# Patient Record
Sex: MALE | Race: WHITE | NOT HISPANIC OR LATINO | Employment: OTHER | URBAN - METROPOLITAN AREA
[De-identification: names, ages, dates, MRNs, and addresses within clinical notes are randomized per-mention and may not be internally consistent; named-entity substitution may affect disease eponyms.]

---

## 2017-01-04 PROBLEM — E66.9 OBESITY: Status: ACTIVE | Noted: 2017-01-04

## 2017-01-05 ENCOUNTER — ANTICOAGULATION VISIT (OUTPATIENT)
Dept: PHARMACY | Facility: HOSPITAL | Age: 52
End: 2017-01-05

## 2017-01-05 ENCOUNTER — OFFICE VISIT (OUTPATIENT)
Dept: CARDIOLOGY | Facility: HOSPITAL | Age: 52
End: 2017-01-05

## 2017-01-05 VITALS
DIASTOLIC BLOOD PRESSURE: 109 MMHG | HEIGHT: 77 IN | WEIGHT: 315 LBS | OXYGEN SATURATION: 96 % | HEART RATE: 79 BPM | SYSTOLIC BLOOD PRESSURE: 171 MMHG | BODY MASS INDEX: 37.19 KG/M2 | TEMPERATURE: 97.8 F | RESPIRATION RATE: 20 BRPM

## 2017-01-05 DIAGNOSIS — I10 ESSENTIAL HYPERTENSION: ICD-10-CM

## 2017-01-05 DIAGNOSIS — R60.0 PEDAL EDEMA: ICD-10-CM

## 2017-01-05 DIAGNOSIS — R06.02 SHORTNESS OF BREATH: ICD-10-CM

## 2017-01-05 DIAGNOSIS — R07.9 CHEST PAIN, UNSPECIFIED TYPE: Primary | ICD-10-CM

## 2017-01-05 DIAGNOSIS — Z95.2 HX OF AORTIC VALVE REPLACEMENT, MECHANICAL: ICD-10-CM

## 2017-01-05 DIAGNOSIS — I48.91 ATRIAL FIBRILLATION, UNSPECIFIED TYPE (HCC): ICD-10-CM

## 2017-01-05 LAB — INR PPP: 1.3 (ref 0.9–1.1)

## 2017-01-05 PROCEDURE — G0463 HOSPITAL OUTPT CLINIC VISIT: HCPCS

## 2017-01-05 PROCEDURE — 36416 COLLJ CAPILLARY BLOOD SPEC: CPT

## 2017-01-05 PROCEDURE — 85610 PROTHROMBIN TIME: CPT

## 2017-01-05 PROCEDURE — 99215 OFFICE O/P EST HI 40 MIN: CPT | Performed by: NURSE PRACTITIONER

## 2017-01-05 RX ORDER — HYDROCODONE BITARTRATE AND ACETAMINOPHEN 7.5; 325 MG/1; MG/1
1 TABLET ORAL 3 TIMES DAILY
COMMUNITY
Start: 2017-01-04 | End: 2017-06-13

## 2017-01-05 NOTE — MR AVS SNAPSHOT
Mitchell Alberto   1/5/2017 9:00 AM   Anticoagulation Visit    Dept Phone:  [phone]   Encounter #:  [id number]    Provider:  [facility]   Department:  [facility] ANTICOAGULATION CLINIC                Your Full Care Plan              Your Updated Medication List          This list is accurate as of: 1/5/17  9:38 AM.  Always use your most recent med list.                ALPRAZolam 0.25 MG tablet   Commonly known as:  XANAX   Take 1 tablet by mouth Daily As Needed for anxiety. for anxiety       CIALIS 5 MG tablet   Generic drug:  tadalafil   TAKE ONE TABLET BY MOUTH DAILY       citalopram 10 MG tablet   Commonly known as:  CELEXA   Take 1 tablet by mouth Daily.       dicyclomine 20 MG tablet   Commonly known as:  BENTYL   Take 1 tablet by mouth Every 8 (Eight) Hours As Needed (abd pain).       divalproex 500 MG DR tablet   Commonly known as:  DEPAKOTE   TAKE ONE TABLET BY MOUTH THREE TIMES A DAY       furosemide 40 MG tablet   Commonly known as:  LASIX   Take 1 tablet by mouth Daily.       guaiFENesin 600 MG 12 hr tablet   Commonly known as:  MUCINEX       HYDROcodone-acetaminophen  MG per tablet   Commonly known as:  NORCO       lisinopril 40 MG tablet   Commonly known as:  PRINIVIL,ZESTRIL   TAKE ONE TABLET BY MOUTH DAILY **MUST CALL MD FOR APPOINTMENT       pantoprazole 20 MG EC tablet   Commonly known as:  PROTONIX   Take 1 tablet by mouth Daily for 30 days.       triamterene-hydrochlorothiazide 37.5-25 MG per capsule   Commonly known as:  DYAZIDE       warfarin 5 MG tablet   Commonly known as:  COUMADIN   Take 1.5 to 2 tablets by mouth daily as directed by the anticoagulation pharmacist               We Performed the Following     POC INR       You Were Diagnosed With        Codes Comments    Hx of aortic valve replacement, mechanical     ICD-10-CM: Z95.2  ICD-9-CM: V43.3     Atrial fibrillation, unspecified type     ICD-10-CM: I48.91  ICD-9-CM: 427.31          Instructions     None    Patient Instructions History      Anticoagulation Summary as of 2017     INR goal 2.5-3.5   Today's INR 1.30!   Next INR check 2017    Indications   Atrial fibrillation [I48.91] [I48.91]  Hx of aortic valve replacement  mechanical [Z95.2] [Z95.2]         2017 Details    Sun  Wed Thu Fri Sat     1               2               3               4               5      10 mg   See details      6      10 mg         7      7.5 mg           8      7.5 mg         9      7.5 mg         10               11               12               13               14                 15               16               17               18               19               20               21                 22               23               24               25               26               27               28                 29               30               31                    Date Details    This INR check       Date of next INR:  2017           Upcoming Appointments     Visit Type Date Time Department    ANTI COAG - FOLLOW UP 2017  9:00 AM  [facility]    NEW PATIENT 2017  9:30 AM [facility] [place]    ANTI COAG - FOLLOW UP 2017  9:30 AM [facility]    FOLLOW UP 2017  2:45 PM [facility] [place]    FOLLOW UP 2/10/2017 10:15 AM [facility]      ironSourcet Signup     [facility] [email or web address] allows you to send messages to your doctor, view your test results, renew your prescriptions, schedule appointments, and more. To sign up, go to CDP and click on the Sign Up Now link in the New User? box. Enter your [email or web address] Activation Code exactly as it appears below along with the last four digits of your Social Security Number and your Date of Birth () to complete the sign-up process. If you do not sign up before the expiration date, you must request a new code.    [email or web address] Activation Code: [email or web address]  Expires:  1/19/2017  9:38 AM    If you have questions, you can email [email or web address] or call [phone] to talk to our MyChart staff. Remember, "[employer]hart is NOT to be used for urgent needs. For medical emergencies, dial 911.               Other Info from Your Visit           Your Appointments     Jan 09, 2017  9:30 AM EST   Anti Coag - Follow Up with ANTI COAG CLINIC   [facility] ANTICOAGULATION CLINIC (--)    [address]  [address]  [facility] [address]   [phone]            Jan 30, 2017  2:45 PM EST   Follow Up with Kate Garrido DO   [facility] INTERNAL MEDICINE AND PEDIATRICS (--)    [address]  [facility] [address] [phone]           Arrive 15 minutes prior to appointment.            Feb 10, 2017 10:15 AM EST   Follow Up with Mike Hollis MD   [facility] CARDIOLOGY (--)    [address]  [facility] [id number] [phone]           Arrive 15 minutes prior to appointment.              Allergies     No Known Allergies      Vital Signs     Smoking Status                   Never Smoker           Results     POC INR      Component Value Standard Range & Units    INR 1.30 0.9 - 1.1

## 2017-01-05 NOTE — MR AVS SNAPSHOT
Mitchell Alberto   1/5/2017 9:30 AM   Office Visit    Dept Phone:  [phone]   Encounter #:  [id number]    Provider:  YUE Martinez   Department:  [facility]                Your Full Care Plan              Today's Medication Changes          These changes are accurate as of: 1/5/17 10:56 AM.  If you have any questions, ask your nurse or doctor.               Medication(s)that have changed:     HYDROcodone-acetaminophen 7.5-325 MG per tablet   Commonly known as:  NORCO   Take 1 tablet by mouth 3 (Three) Times a Day.   What changed:  Another medication with the same name was removed. Continue taking this medication, and follow the directions you see here.   Changed by:  YUE Martinez         Stop taking medication(s)listed here:     dicyclomine 20 MG tablet   Commonly known as:  BENTYL   Stopped by:  YUE Martinez           guaiFENesin 600 MG 12 hr tablet   Commonly known as:  MUCINEX   Stopped by:  YUE Martinez                      Your Updated Medication List          This list is accurate as of: 1/5/17 10:56 AM.  Always use your most recent med list.                ALPRAZolam 0.25 MG tablet   Commonly known as:  XANAX   Take 1 tablet by mouth Daily As Needed for anxiety. for anxiety       CIALIS 5 MG tablet   Generic drug:  tadalafil   TAKE ONE TABLET BY MOUTH DAILY       citalopram 10 MG tablet   Commonly known as:  CELEXA   Take 1 tablet by mouth Daily.       divalproex 500 MG DR tablet   Commonly known as:  DEPAKOTE   TAKE ONE TABLET BY MOUTH THREE TIMES A DAY       furosemide 40 MG tablet   Commonly known as:  LASIX   Take 1 tablet by mouth Daily.       HYDROcodone-acetaminophen 7.5-325 MG per tablet   Commonly known as:  NORCO       lisinopril 40 MG tablet   Commonly known as:  PRINIVIL,ZESTRIL   TAKE ONE TABLET BY MOUTH DAILY **MUST CALL MD FOR APPOINTMENT       pantoprazole 20 MG EC tablet   Commonly known as:  PROTONIX    Take 1 tablet by mouth Daily for 30 days.       triamterene-hydrochlorothiazide 37.5-25 MG per capsule   Commonly known as:  DYAZIDE       warfarin 5 MG tablet   Commonly known as:  COUMADIN   Take 1.5 to 2 tablets by mouth daily as directed by the anticoagulation pharmacist               You Were Diagnosed With        Codes Comments    Benign prostatic hyperplasia (BPH) with post-void dribbling     ICD-10-CM: N40.1, N39.43  ICD-9-CM: 600.01, 788.35       Instructions     None    Patient Instructions History      Upcoming Appointments     Visit Type Date Time Department    ANTI COAG - FOLLOW UP 2017  9:00 AM  [facility]    NEW PATIENT 2017  9:30 AM [facility] [place]    FOLLOW UP 2017  9:00 AM [facility]    ANTI COAG - FOLLOW UP 2017  9:30 AM  [facility]    FOLLOW UP 2017  2:45 PM [facility] [place]      NeurOp Signup     [facility] NeurOp allows you to send messages to your doctor, view your test results, renew your prescriptions, schedule appointments, and more. To sign up, go to [employer] and click on the Sign Up Now link in the New User? box. Enter your NeurOp Activation Code exactly as it appears below along with the last four digits of your Social Security Number and your Date of Birth () to complete the sign-up process. If you do not sign up before the expiration date, you must request a new code.    NeurOp Activation Code: [email or web address]  Expires: 2017  9:38 AM    If you have questions, you can email [email or web address] or call [phone] to talk to our NeurOp staff. Remember, NeurOp is NOT to be used for urgent needs. For medical emergencies, dial 911.               Other Info from Your Visit           Your Appointments     2017  9:00 AM EST   Follow Up with Mike Hollis MD   [facility] [place] CARDIOLOGY (--)    [address] [place] [address] [address]  [facility] [address]  "  [phone]           Arrive 15 minutes prior to appointment.            Jan 09, 2017  9:30 AM EST   Anti Coag - Follow Up with ANTI COAG CLINIC   [facility] ANTICOAGULATION CLINIC (--)    [address]  [address]  [facility] [address]   [phone]            Jan 30, 2017  2:45 PM EST   Follow Up with Kate Garrido,    [facility] INTERNAL MEDICINE AND PEDIATRICS (--)    [address]  [facility] [address] [phone]           Arrive 15 minutes prior to appointment.              Allergies     No Known Allergies      Reason for Visit     Establish Care s/p ED Visit for Chest Pain      Vital Signs     Blood Pressure Pulse Temperature Respirations Height Weight    171/109 (BP Location: Left arm, Patient Position: Standing) 79 97.8 °F (36.6 °C) (Temporal Artery ) 20 77\" (195.6 cm) 340 lb (154 kg)    Oxygen Saturation Body Mass Index Smoking Status             96% 40.32 kg/m2 Never Smoker         Problems and Diagnoses Noted     Benign prostatic hyperplasia (BPH) with post-void dribbling        "

## 2017-01-05 NOTE — PROGRESS NOTES
Encounter Date:01/05/2017      Patient ID: Mitchell Alberto is a 51 y.o. male.        Subjective:     Chief Complaint: Establish Care (s/p ED Visit for Chest Pain)     History of Present Illness Patient presents to the Heart and valve center today at the request of [facility] ED. Patient presented to the office today with complaints of tightness in his chest, fatigue, dyspnea, pedal edema and intermittent dizziness. He has hx aortic valve replacement and ascending aortic dissection in  2012. Mr. Alberto reports that he has been having intermittent chest pains over the past 3 weeks. He describes the pain as sharp and stabbing, located on the left side of her chest.He reports getting short of breath from the pain, and reports that it feels similar to when he had his last cardiac surgery performed. The pain worsens with deep breathing. Additional ankle swelling. Denies any nausea or sweats.   Patient also reports an increase in his gerd symptoms. He was prescribed protonix by Dr Herrera in the Ed but patient has refused to take it stating that this feels similar to when he experienced his ascending aortic dissection in 2012. 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  Chest Pain   Pain location: L chest  Pain quality: sharp and stabbing   Pain radiates to: Does not radiate  Pain severity: Moderate  Onset quality: Gradual  Duration: 3 weeks  Timing: Intermittent  Progression: Worsening  Chronicity: Recurrent  Relieved by: Nothing  Worsened by: Deep breathing and exertion  Ineffective treatments: None tried  Associated symptoms: dizziness, lower extremity edema, shortness of breath, fatigue  Associated symptoms: no abdominal pain, no back pain, no cough,  no fever, no headache, no nausea, no numbness, no vomiting and no weakness  Problem list:       1. Ascending  aortic dissection and severe aortic insufficiency:  a. Mechanical Bentall aortic valve replacement ([facility]) and ascending aortic dissection repair, 11/17/2012, Dr. Jasmeet De Dios.   b. Echocardiogram, 12/03/2013:  i. LVEF (55% to 60%), mechanical aortic valve, area 1.18 cm2.  ii. Chronic Coumadin therapy                                                   c. Cardiac PET scan, 08/26/2015, negative for reversible ischemia; fixed photopenia                                                         of the apex and septum possibly due to LBBB; LVEF (30% to 34%).                                                  d. Cardiac catheterization, Dorian Giraldo MD, 09/03/2015: No obstructive CAD.    2. 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 Residual left upper extremity weakness.                                                                                                                                                                                   3. HTN  4. Obesity BMI 40.3    Past Surgical History   Procedure Laterality Date   • Abdominal aortic aneurysm repair       History of Aortic Aneurysm Repair   • Aortic valve repair/replacement       Replacement   • Back surgery     • Hernia repair  08/2014     Left inguinal herniorrhaphy   • Skin cancer excision  08/2014     Left forehead melanoma excision, Dr. Gisell Kothari, August 2014.        No Known Allergies      Current Outpatient Prescriptions:   •  ALPRAZolam (XANAX) 0.25 MG tablet, Take 1 tablet by mouth Daily As Needed for anxiety. for anxiety, Disp: 30 tablet, Rfl: 0  •  CIALIS 5 MG tablet, TAKE ONE TABLET BY MOUTH DAILY, Disp: 30 tablet, Rfl: 2  •  citalopram (CELEXA) 10 MG tablet, Take 1 tablet by mouth Daily., Disp: 30 tablet, Rfl: 3  •  divalproex (DEPAKOTE) 500 MG DR tablet, TAKE ONE TABLET BY MOUTH THREE TIMES A DAY, Disp: 90 tablet, Rfl: 2  •  furosemide (LASIX) 40 MG tablet, Take 1 tablet by mouth Daily., Disp: 90  tablet, Rfl: 0  •  HYDROcodone-acetaminophen (NORCO) 7.5-325 MG per tablet, Take 1 tablet by mouth 3 (Three) Times a Day., Disp: , Rfl:   •  lisinopril (PRINIVIL,ZESTRIL) 40 MG tablet, TAKE ONE TABLET BY MOUTH DAILY **MUST CALL MD FOR APPOINTMENT, Disp: 30 tablet, Rfl: 0  •  pantoprazole (PROTONIX) 20 MG EC tablet, Take 1 tablet by mouth Daily for 30 days., Disp: 30 tablet, Rfl: 0  •  triamterene-hydrochlorothiazide (DYAZIDE) 37.5-25 MG per capsule, Take 1 capsule by mouth daily., Disp: , Rfl:   •  warfarin (COUMADIN) 5 MG tablet, Take 1.5 to 2 tablets by mouth daily as directed by the anticoagulation pharmacist, Disp: 180 tablet, Rfl: 1    The following portions of the chart were reviewed and updated as appropriate: Allergies, current medications, past family history, social history, past medical history.     Review of Systems   Constitution: Positive for chills, decreased appetite, weakness, malaise/fatigue and night sweats. Negative for diaphoresis, fever and weight loss. Weight gain: up to 20 lbs in 1 year.   HENT: Positive for congestion and hearing loss (on left ear). Negative for headaches, hoarse voice and nosebleeds.    Eyes: Positive for blurred vision. Negative for visual disturbance and visual halos.   Cardiovascular: Positive for chest pain, dyspnea on exertion, irregular heartbeat, leg swelling and palpitations. Negative for claudication, cyanosis, near-syncope, orthopnea, paroxysmal nocturnal dyspnea and syncope.   Respiratory: Positive for cough, sleep disturbances due to breathing and snoring. Negative for hemoptysis, shortness of breath, sputum production and wheezing.    Endocrine: Positive for cold intolerance.   Hematologic/Lymphatic: Negative for bleeding problem. Bruises/bleeds easily.   Skin: Negative for dry skin, itching and rash.   Musculoskeletal: Negative for arthritis, joint pain, joint swelling and myalgias.   Gastrointestinal: Positive for bloating, flatus, heartburn and nausea.  "Negative for abdominal pain, constipation, diarrhea, hematemesis, hematochezia, melena and vomiting.   Genitourinary: Positive for nocturia. Negative for dysuria, frequency, hematuria and urgency.   Neurological: Positive for excessive daytime sleepiness, dizziness, light-headedness and loss of balance.   Psychiatric/Behavioral: Positive for depression. The patient has insomnia and is nervous/anxious.            Objective:     Vitals:    01/05/17 1002 01/05/17 1003 01/05/17 1004   BP: (!) 188/103 (!) 158/110 (!) 171/109   BP Location: Right arm Left arm Left arm   Patient Position: Sitting Sitting Standing   Cuff Size: Large Adult     Pulse: 72  79   Resp: 20     Temp: 97.8 °F (36.6 °C)     TempSrc: Temporal Artery      SpO2: 96%     Weight: (!) 340 lb (154 kg)     Height: 77\" (195.6 cm)         Physical Exam   Constitutional: He is oriented to person, place, and time. He appears well-developed and well-nourished. He is active and cooperative. No distress.   HENT:   Head: Normocephalic and atraumatic.   Mouth/Throat: Oropharynx is clear and moist.   Eyes: Conjunctivae and EOM are normal. Pupils are equal, round, and reactive to light.   Neck: Normal range of motion. Neck supple. No JVD present. No tracheal deviation present. No thyromegaly present.   Cardiovascular: Normal rate, regular rhythm, normal heart sounds and intact distal pulses.    Systolic murmur 1/6  Mechanical click present   Pulmonary/Chest: Effort normal and breath sounds normal.   Abdominal: Soft. Bowel sounds are normal. He exhibits no distension. There is no tenderness.   Musculoskeletal: Normal range of motion. He exhibits edema (2+ pitting edema noted bilateral lower extremities).   Neurological: He is alert and oriented to person, place, and time.   Skin: Skin is warm, dry and intact.   Psychiatric: He has a normal mood and affect. His behavior is normal.   Nursing note and vitals reviewed.      Lab and Diagnostic Review:    CTA Chest 12/27/16: "    Pulmonary arteries: No evidence of pulmonary embolus.  Aorta: Noncontrast abdominal aorta demonstrates normal density. Prosthetic    aortic valve. Negative for thoracic aortic dissection or aneurysm.  Lungs: There are bilateral posterior dependent changes. Linear atelectasis    or scarring greatest at the lung bases. No consolidation to indicate pneumonia.  Pleural spaces: Unremarkable. No significant effusion. No pneumothorax.  Heart: Mild cardiomegaly. No significant pericardial effusion. No evidence    of RV dysfunction.  Bones: There are degenerative changes involving the spine. No acute    fracture.  Thyroid: Calcified left thyroid nodule measuring 4 mm.  Lymph nodes: Calcified mediastinal and hilar lymph nodes.  Upper abdomen: No acute abnormality involving the visualized superior    Abdomen.  Labs 12/27/16:   Troponin I 0.00 - 0.07 ng/mL 0.00     INR  4.72     Glucose 70 - 100 mg/dL 101 (H)   BUN 9 - 23 mg/dL 19   Creatinine 0.60 - 1.30 mg/dL 1.00   Sodium 132 - 146 mmol/L 140   Potassium 3.5 - 5.5 mmol/L 3.9   Chloride 99 - 109 mmol/L 106   CO2 20.0 - 31.0 mmol/L 31.0   Calcium 8.7 - 10.4 mg/dL 9.1   Total Protein 5.7 - 8.2 g/dL 6.6   Albumin 3.20 - 4.80 g/dL 4.10   ALT (SGPT) 7 - 40 U/L 15   AST (SGOT) 0 - 33 U/L 21   Alkaline Phosphatase 25 - 100 U/L 55   Total Bilirubin 0.3 - 1.2 mg/dL 0.3   eGFR Non African Amer >60 mL/min/1.73 79   Globulin gm/dL 2.5   A/G Ratio 1.5 - 2.5 g/dL 1.6       WBC 3.50 - 10.80 10*3/mm3 6.03   RBC 4.20 - 5.76 10*6/mm3 4.31   Hemoglobin 13.1 - 17.5 g/dL 12.7 (L)   Hematocrit 38.9 - 50.9 % 38.1 (L)   MCV 80.0 - 99.0 fL 88.4   MCH 27.0 - 31.0 pg 29.5   MCHC 32.0 - 36.0 g/dL 33.3   RDW 11.3 - 14.5 % 15.7 (H)   RDW-SD 37.0 - 54.0 fl 50.7   MPV 6.0 - 12.0 fL 10.8   Platelets 150 - 450 10*3/mm3 181         Assessment and Plan:         1. Chest pain, unspecified type   Patient was diagnosed with GERD in the ED but is adamant that this is cardiac in nature. Patient to see   Bunny in the morning for further evaluation. [facility] per Dr Giraldo 9/2015 showed mild nonobstructive disease  CTA of chest 12/27/16 No signs of aortic dissection    2. Essential hypertension  Elevated today; patient will be seeing Dr Hollis in the morning. Further recommendations per Dr Hollis    3. Shortness of breath    Related to volume overload  4. Pedal edema  Patient is currently on diazide and notes he is compliant with his medications. Patient suffered ototoxicity from daily lasix use April 2016. Further recommendations per Dr Hollis    It has been a pleasure to participate in the care of this patient.  Patient was instructed to call the [facility] Center with any questions, concerns, or worsening symptoms.  * Please note that portions of this note were completed with a voice recognition program. Efforts were made to edit the dictation but occasionally words are transcribed.

## 2017-01-05 NOTE — PROGRESS NOTES
Anticoagulation Clinic Progress Note  Indication: Atrial Fibrillation and Mechanical Aortic Valve  Referring Provider: Enzo   Initial Warfarin Start Date: 11/2012  Planned Duration of Therapy: Life  Goal INR: 2.5- 3.5  Current Drug Interactions: divalproex, citalopram      Anticoagulation Clinic INR History:  Date 9/12 10/21 11/17 12/27 1/5       Total Weekly Dose  70mg 60-62.5mg  55mg (2 held doses)       INR 2.9 3.6 3.3 4.72 (ED) 1.3           Clinic Interview:  Tablet Strength: pt has 5 mg tablets   Current Dose: pt is uncertain, following calendar vs rx bottle -- believes he has been taking 10mg (2 tabs) alternating with 5mg (1 tab) every other day  Patient Findings      Positives Signs/symptoms of bleeding, Change in medications     Negatives Signs/symptoms of thrombosis, Laboratory test error suspected, Change in health, Change in alcohol use, Change in activity, Upcoming invasive procedure, Emergency department visit, Upcoming dental procedure, Missed doses, Extra doses, Change in diet/appetite, Hospital admission, Bruising, Other complaints     Comments Pt noticed BRBPR yesterday; recently admitted to the hospital with supratherapeutic INR but reports no bleeding before ED admission. Medications were changed on discharge -- added pantoprazole, although pt has not yet started taking it, + dicyclomine. Pt started on citalopram in November and reports it was helping initially, but he now no longer feels it is doing anything.         Plan:  1. INR is subtherapeutic today. Pt held doses last week following supratherapeutic reading in the ED, but INR dropped significantly since that time. Pt is somewhat unsure of his current warfarin dose, but based on history, will have him take 10mg today, tomorrow (pt verified he took 10mg yesterday, as well), then 7.5mg SatSun.  2. RTC on Monday to ensure INR is increasing appropriately. Will require close follow up for the next month given dose uncertainty.  3. Pt was  discharged on pantoprazole but questions needing it -- has not started it at home. Started citalopram in Nov. Discussed potential DDIs with this medication, as well as others. Enforced importance of letting our clinic know of any medication changes in the future.  4. Pt declines refills.  5. Verbal and written information provided. Pt expresses understanding and has no further questions at this time. Will address other questions related to disease state in [facility]VI.    Ann Cowden, PharmISABEL  1/5/2017  9:04 AM

## 2017-01-06 ENCOUNTER — HOSPITAL ENCOUNTER (OUTPATIENT)
Dept: CARDIOLOGY | Facility: HOSPITAL | Age: 52
Discharge: HOME | End: 2017-01-06
Admitting: PHYSICIAN ASSISTANT

## 2017-01-06 ENCOUNTER — OFFICE VISIT (OUTPATIENT)
Dept: CARDIOLOGY | Facility: CLINIC | Age: 52
End: 2017-01-06

## 2017-01-06 ENCOUNTER — APPOINTMENT (OUTPATIENT)
Dept: LAB | Facility: HOSPITAL | Age: 52
End: 2017-01-06

## 2017-01-06 VITALS
DIASTOLIC BLOOD PRESSURE: 96 MMHG | WEIGHT: 315 LBS | HEART RATE: 67 BPM | BODY MASS INDEX: 37.19 KG/M2 | HEIGHT: 77 IN | SYSTOLIC BLOOD PRESSURE: 152 MMHG

## 2017-01-06 DIAGNOSIS — I10 ESSENTIAL HYPERTENSION: ICD-10-CM

## 2017-01-06 DIAGNOSIS — Z95.2 HX OF AORTIC VALVE REPLACEMENT, MECHANICAL: ICD-10-CM

## 2017-01-06 DIAGNOSIS — R07.2 PRECORDIAL PAIN: Primary | ICD-10-CM

## 2017-01-06 LAB
BH CV ECHO MEAS - AO MAX PG (FULL): 29.6 MMHG
BH CV ECHO MEAS - AO MAX PG: 33.8 MMHG
BH CV ECHO MEAS - AO MEAN PG (FULL): 15.8 MMHG
BH CV ECHO MEAS - AO MEAN PG: 17.5 MMHG
BH CV ECHO MEAS - AO ROOT AREA (BSA CORRECTED): 1.2
BH CV ECHO MEAS - AO ROOT AREA: 8.4 CM^2
BH CV ECHO MEAS - AO ROOT DIAM: 3.3 CM
BH CV ECHO MEAS - AO V2 MAX: 290.2 CM/SEC
BH CV ECHO MEAS - AO V2 MEAN: 191.2 CM/SEC
BH CV ECHO MEAS - AO V2 VTI: 59.6 CM
BH CV ECHO MEAS - AVA(I,A): 1.2 CM^2
BH CV ECHO MEAS - AVA(I,D): 1.2 CM^2
BH CV ECHO MEAS - AVA(V,A): 1.3 CM^2
BH CV ECHO MEAS - AVA(V,D): 1.3 CM^2
BH CV ECHO MEAS - BSA(HAYCOCK): 3 M^2
BH CV ECHO MEAS - BSA: 2.8 M^2
BH CV ECHO MEAS - BZI_BMI: 40.8 KILOGRAMS/M^2
BH CV ECHO MEAS - BZI_METRIC_HEIGHT: 195.6 CM
BH CV ECHO MEAS - BZI_METRIC_WEIGHT: 156 KG
BH CV ECHO MEAS - CONTRAST EF (2CH): 54.5 ML/M^2
BH CV ECHO MEAS - CONTRAST EF 4CH: 46.6 ML/M^2
BH CV ECHO MEAS - EDV(CUBED): 114.6 ML
BH CV ECHO MEAS - EDV(MOD-SP2): 376 ML
BH CV ECHO MEAS - EDV(MOD-SP4): 305 ML
BH CV ECHO MEAS - EDV(TEICH): 110.5 ML
BH CV ECHO MEAS - EF(CUBED): 69.7 %
BH CV ECHO MEAS - EF(MOD-SP2): 54.5 %
BH CV ECHO MEAS - EF(MOD-SP4): 46.6 %
BH CV ECHO MEAS - EF(TEICH): 61.2 %
BH CV ECHO MEAS - ESV(CUBED): 34.7 ML
BH CV ECHO MEAS - ESV(MOD-SP2): 171 ML
BH CV ECHO MEAS - ESV(MOD-SP4): 163 ML
BH CV ECHO MEAS - ESV(TEICH): 42.9 ML
BH CV ECHO MEAS - FS: 32.8 %
BH CV ECHO MEAS - IVS/LVPW: 0.96
BH CV ECHO MEAS - IVSD: 1.8 CM
BH CV ECHO MEAS - LA DIMENSION: 4.2 CM
BH CV ECHO MEAS - LA/AO: 1.3
BH CV ECHO MEAS - LV DIASTOLIC VOL/BSA (35-75): 108.3 ML/M^2
BH CV ECHO MEAS - LV MASS(C)D: 415.9 GRAMS
BH CV ECHO MEAS - LV MASS(C)DI: 147.7 GRAMS/M^2
BH CV ECHO MEAS - LV MAX PG: 4.2 MMHG
BH CV ECHO MEAS - LV MEAN PG: 1.8 MMHG
BH CV ECHO MEAS - LV SYSTOLIC VOL/BSA (12-30): 57.9 ML/M^2
BH CV ECHO MEAS - LV V1 MAX: 102.4 CM/SEC
BH CV ECHO MEAS - LV V1 MEAN: 59.4 CM/SEC
BH CV ECHO MEAS - LV V1 VTI: 20.1 CM
BH CV ECHO MEAS - LVIDD: 4.9 CM
BH CV ECHO MEAS - LVIDS: 3.3 CM
BH CV ECHO MEAS - LVLD AP2: 10.6 CM
BH CV ECHO MEAS - LVLD AP4: 10.3 CM
BH CV ECHO MEAS - LVLS AP2: 9.7 CM
BH CV ECHO MEAS - LVLS AP4: 9.6 CM
BH CV ECHO MEAS - LVOT AREA (M): 3.5 CM^2
BH CV ECHO MEAS - LVOT AREA: 3.6 CM^2
BH CV ECHO MEAS - LVOT DIAM: 2.1 CM
BH CV ECHO MEAS - LVPWD: 1.9 CM
BH CV ECHO MEAS - MED PEAK E' VEL: 7.02 CM/SEC
BH CV ECHO MEAS - MPA AREA: 8.7 CM^2
BH CV ECHO MEAS - MPA DIAM: 3.3 CM
BH CV ECHO MEAS - MV A DUR: 8.7 SEC
BH CV ECHO MEAS - MV A MAX VEL: 82.7 CM/SEC
BH CV ECHO MEAS - MV DEC TIME: 0.27 SEC
BH CV ECHO MEAS - MV E MAX VEL: 78.4 CM/SEC
BH CV ECHO MEAS - MV E/A: 0.95
BH CV ECHO MEAS - PA ACC SLOPE: 1116 CM/SEC^2
BH CV ECHO MEAS - PA ACC TIME: 0.1 SEC
BH CV ECHO MEAS - PA MAX PG: 15.1 MMHG
BH CV ECHO MEAS - PA MEAN PG: 5.5 MMHG
BH CV ECHO MEAS - PA PR(ACCEL): 35.4 MMHG
BH CV ECHO MEAS - PA V2 MAX: 194.1 CM/SEC
BH CV ECHO MEAS - PA V2 MEAN: 105.6 CM/SEC
BH CV ECHO MEAS - PA V2 VTI: 37.9 CM
BH CV ECHO MEAS - PULM A REVS VEL: 31.6 CM/SEC
BH CV ECHO MEAS - PULM DIAS VEL: 51.7 CM/SEC
BH CV ECHO MEAS - PULM S/D: 1.5
BH CV ECHO MEAS - PULM SYS VEL: 76.7 CM/SEC
BH CV ECHO MEAS - QP/QS (V,AO): 0.69
BH CV ECHO MEAS - QP/QS (V,LVOT): 4.5
BH CV ECHO MEAS - RVSP: 25 MMHG
BH CV ECHO MEAS - SI(AO): 178.5 ML/M^2
BH CV ECHO MEAS - SI(CUBED): 28.3 ML/M^2
BH CV ECHO MEAS - SI(LVOT): 25.9 ML/M^2
BH CV ECHO MEAS - SI(MOD-SP2): 72.8 ML/M^2
BH CV ECHO MEAS - SI(MOD-SP4): 50.4 ML/M^2
BH CV ECHO MEAS - SI(TEICH): 24 ML/M^2
BH CV ECHO MEAS - SV(AO): 502.7 ML
BH CV ECHO MEAS - SV(CUBED): 79.8 ML
BH CV ECHO MEAS - SV(LVOT): 72.9 ML
BH CV ECHO MEAS - SV(MOD-SP2): 205 ML
BH CV ECHO MEAS - SV(MOD-SP4): 142 ML
BH CV ECHO MEAS - SV(TEICH): 67.6 ML
BH CV ECHO MEAS - TAPSE (>1.6): 2.2 CM2
BH CV ECHO MEAS - TR MAX VEL: 251 CM/SEC
BH CV XLRA - RV BASE: 6 CM
BH CV XLRA - RV LENGTH: 9.1 CM
BH CV XLRA - RV MID: 4.8 CM
BH CV XLRA - TDI S': 12.2 CM/SEC
BNP SERPL-MCNC: 30 PG/ML (ref 0–100)
E/E' RATIO: 10
LEFT ATRIUM VOLUME INDEX: 50 ML/M2
LV EF 2D ECHO EST: 55 %
Lab: -0.53
Lab: -3.5
Lab: 0.35
Lab: 0.78
Lab: 116.9 ML/M^2
Lab: 329.3 ML

## 2017-01-06 PROCEDURE — 83880 ASSAY OF NATRIURETIC PEPTIDE: CPT | Performed by: PHYSICIAN ASSISTANT

## 2017-01-06 PROCEDURE — 93000 ELECTROCARDIOGRAM COMPLETE: CPT | Performed by: INTERNAL MEDICINE

## 2017-01-06 PROCEDURE — 93306 TTE W/DOPPLER COMPLETE: CPT

## 2017-01-06 PROCEDURE — 93306 TTE W/DOPPLER COMPLETE: CPT | Performed by: INTERNAL MEDICINE

## 2017-01-06 PROCEDURE — 99213 OFFICE O/P EST LOW 20 MIN: CPT | Performed by: INTERNAL MEDICINE

## 2017-01-06 PROCEDURE — 36415 COLL VENOUS BLD VENIPUNCTURE: CPT | Performed by: INTERNAL MEDICINE

## 2017-01-06 NOTE — LETTER
January 6, 2017     Kate Garrido DO  [address] [facility] [address]  [place] [id number]    Patient: Mitchell Alberto   YOB: 1965   Date of Visit: 1/6/2017       Dear Dr. Garrido, DO:    Thank you for referring Mitchell Alberto to me for evaluation. Below are the relevant portions of my assessment and plan of care.    If you have questions, please do not hesitate to call me. I look forward to following Mitchell along with you.         Sincerely,        Mike Hollis MD        CC: No Recipients  SHAGGY Small  1/6/2017 10:15 AM  Signed  [facility]  Office Progress Note  Mitchell Alberto  1965  [phone]      Visit Date: 01/06/2017    PCP: Kate Garrido DO  [address] [facility] [address]  [place] [id number]    IDENTIFICATION: A 51 y.o. male resident of [place].    Chief Complaint   Patient presents with   • Follow-up     HTN       PROBLEM LIST:   1.  Ascending aortic dissection and severe aortic insufficiency:  a.  Mechanical Bentall aortic valve replacement ([facility]) and ascending aortic dissection repair, 11/17/2012, Dr. Jasmeet De Dios.   b. Echocardiogram, 12/03/2013:  i. LVEF (55% to 60%), mechanical aortic valve, area 1.18 cm2.  ii. Chronic Coumadin therapy                     c.    Cardiac PET scan, 08/26/2015, negative for reversible ischemia; fixed photopenia                            of the apex and septum possibly due to LBBB; LVEF (30% to 34%).                    d.    Cardiac catheterization,  Dorian Giraldo MD, 09/03/2015:  No obstructive                            CAD.  2.  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  Residual left upper extremity weakness.    3. Left arm discomfort:  a. Possibly due to embolic CVA vs. intraoperative nerve traction.   4. Hypertension.   5. Obesity.    6. 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 Status post surgery:  a. Left inguinal herniorrhaphy,  August 2014.   b. Left forehead melanoma excision, Dr. Gisell Kothari, August 2014.    Allergies  No Known Allergies    Current Medications    Current Outpatient Prescriptions:   •  ALPRAZolam (XANAX) 0.25 MG tablet, Take 1 tablet by mouth Daily As Needed for anxiety. for anxiety, Disp: 30 tablet, Rfl: 0  •  CIALIS 5 MG tablet, TAKE ONE TABLET BY MOUTH DAILY, Disp: 30 tablet, Rfl: 2  •  citalopram (CELEXA) 10 MG tablet, Take 1 tablet by mouth Daily., Disp: 30 tablet, Rfl: 3  •  divalproex (DEPAKOTE) 500 MG DR tablet, TAKE ONE TABLET BY MOUTH THREE TIMES A DAY, Disp: 90 tablet, Rfl: 2  •  furosemide (LASIX) 40 MG tablet, Take 1 tablet by mouth Daily., Disp: 90 tablet, Rfl: 0  •  HYDROcodone-acetaminophen (NORCO) 7.5-325 MG per tablet, Take 1 tablet by mouth 3 (Three) Times a Day., Disp: , Rfl:   •  lisinopril (PRINIVIL,ZESTRIL) 40 MG tablet, TAKE ONE TABLET BY MOUTH DAILY **MUST CALL MD FOR APPOINTMENT, Disp: 30 tablet, Rfl: 0  •  warfarin (COUMADIN) 5 MG tablet, Take 1.5 to 2 tablets by mouth daily as directed by the anticoagulation pharmacist, Disp: 180 tablet, Rfl: 1      History of Present Illness   Patient returns to office today for follow-up after 1 year hiatus from Dr. Lopez's office. He presents with 6 months of intermittent sharp, stabbing chest pain that radiates through his anterior left thorax that is not associated with nausea or dyspnea.  There is no rhyme or reason for it.  He does acknowledge shortness of breath with activities and walking across a room to make him significant fatigue.  He states he has been waking up in the last 2-3 weeks with proximal nocturnal dyspnea and having issues with orthopnea.  States the heaviness has ever been and he has been following a healthy diet and watching his caloric intake.  He does not follow his blood pressures at home but has been told of his last to ER visits that his blood pressure is been significantly high.  He relates this to the stress of a significant  "other being diagnosed with colon cancer.  He does acknowledge 2 weeks of upper rash or tract symptoms of congestion and rhinorrhea.  No cough or fevers.    ROS:  All systems have been reviewed and are negative with the exception of those mentioned in the HPI.    OBJECTIVE:  Vitals:    01/06/17 0914   BP: 152/96   BP Location: Left arm   Patient Position: Sitting   Pulse: 67   Weight: (!) 344 lb 12.8 oz (156 kg)   Height: 77\" (195.6 cm)     Physical Exam   Constitutional: He is oriented to person, place, and time. He appears well-nourished. No distress.   Neck: Neck supple. No JVD present. No tracheal deviation present. No thyromegaly present.   Cardiovascular: Normal rate, regular rhythm and intact distal pulses.   No extrasystoles are present.   No murmur heard.  Mechanical S2   Pulmonary/Chest: Effort normal and breath sounds normal. He has no wheezes. He has no rales.   Some tenderness to left anterior thorax with palpation.   Abdominal: Soft. Bowel sounds are normal. There is no tenderness. There is no guarding.   Musculoskeletal: Normal range of motion. He exhibits edema (trace pitting edema lower extremities bilaterally). He exhibits no tenderness.   Lymphadenopathy:     He has no cervical adenopathy.   Neurological: He is alert and oriented to person, place, and time.   Nursing note and vitals reviewed.      Diagnostic Data:    ECG 12 Lead  Date/Time: 1/6/2017 10:10 AM  Performed by: MARTA CONNER  Authorized by: MARTA CONNER   Rhythm: sinus rhythm  BPM: 65  Conduction: left bundle branch block  Clinical impression: abnormal ECG          ASSESSMENT:   Diagnosis Plan   1. Precordial pain  Adult Transthoracic Echo Complete    BNP   2. Hx of aortic valve replacement, mechanical [Z95.2]  Adult Transthoracic Echo Complete    BNP   3. Essential hypertension         PLAN:  1.  His sharp chest pains are probably related to costochondritis with the tenderness found on palpation today and his history of upper " respiratory tract symptoms.  However due to his significant concerns over development of another aortic dissection we will performed echocardiogram to evaluate his cardiac function.  His last ER visit last 2 weeks did show a normal CTA of his chest without evidence of aortic dissection or aneurysm.  2.  He had a reduced EF on his last echocardiogram in August 2015 and there was no obstructive CAD on a heart catheter in September 2015.  We will repeat echocardiogram to reevaluate this.  With his left bundle branch block a continuation of low EF does place him to benefit from a biventricular PPM.  3.  Elevated in office today.  Discussed with him to begin keeping a diary of blood pressures at home and follow low sodium diet before we attempt adjustment of antihypertensive therapy.    Follow up in 1 month.    Scribed for Mike Hollis MD by SHAGGY Small. 1/6/2017  10:13 AM     Kate Garrido DO, thank you for referring Mr. Alberto for evaluation.  I have forwarded my electronically generated recommendations to you for review.  Please do not hesitate to call with any questions.      Mike Hollis MD, FACC

## 2017-01-06 NOTE — PROGRESS NOTES
[facility]  Office Progress Note  Mitchell Alberto  1965  [phone]      Visit Date: 01/06/2017    PCP: Kate Garrido,   [address] [facility] [address]  [place] [id number]    IDENTIFICATION: A 51 y.o. male resident of [place].    Chief Complaint   Patient presents with   • Follow-up     HTN       PROBLEM LIST:   1.  Ascending aortic dissection and severe aortic insufficiency:  a.  Mechanical Bentall aortic valve replacement ([facility]) and ascending aortic dissection repair, 11/17/2012, Dr. Jasmeet De Dios.   b. Echocardiogram, 12/03/2013:  i. LVEF (55% to 60%), mechanical aortic valve, area 1.18 cm2.  ii. Chronic Coumadin therapy                     c.    Cardiac PET scan, 08/26/2015, negative for reversible ischemia; fixed photopenia                            of the apex and septum possibly due to LBBB; LVEF (30% to 34%).                    d.    Cardiac catheterization,  Dorian Giraldo MD, 09/03/2015:  No obstructive                            CAD.  2.  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  Residual left upper extremity weakness.    3. Left arm discomfort:  a. Possibly due to embolic CVA vs. intraoperative nerve traction.   4. Hypertension.   5. Obesity.    6. 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 Status post surgery:  a. Left inguinal herniorrhaphy, August 2014.   b. Left forehead melanoma excision, Dr. Gisell Kothari, August 2014.    Allergies  No Known Allergies    Current Medications    Current Outpatient Prescriptions:   •  ALPRAZolam (XANAX) 0.25 MG tablet, Take 1 tablet by mouth Daily As Needed for anxiety. for anxiety, Disp: 30 tablet, Rfl: 0  •  CIALIS 5 MG tablet, TAKE ONE TABLET BY MOUTH DAILY, Disp: 30 tablet, Rfl: 2  •  citalopram (CELEXA) 10 MG tablet, Take 1 tablet by mouth Daily., Disp: 30 tablet, Rfl: 3  •  divalproex (DEPAKOTE) 500 MG DR tablet, TAKE ONE TABLET BY MOUTH THREE TIMES A DAY, Disp: 90  "tablet, Rfl: 2  •  furosemide (LASIX) 40 MG tablet, Take 1 tablet by mouth Daily., Disp: 90 tablet, Rfl: 0  •  HYDROcodone-acetaminophen (NORCO) 7.5-325 MG per tablet, Take 1 tablet by mouth 3 (Three) Times a Day., Disp: , Rfl:   •  lisinopril (PRINIVIL,ZESTRIL) 40 MG tablet, TAKE ONE TABLET BY MOUTH DAILY **MUST CALL MD FOR APPOINTMENT, Disp: 30 tablet, Rfl: 0  •  warfarin (COUMADIN) 5 MG tablet, Take 1.5 to 2 tablets by mouth daily as directed by the anticoagulation pharmacist, Disp: 180 tablet, Rfl: 1      History of Present Illness   Patient returns to office today for follow-up after 1 year hiatus from Dr. Lopez's office. He presents with 6 months of intermittent sharp, stabbing chest pain that radiates through his anterior left thorax that is not associated with nausea or dyspnea.  There is no rhyme or reason for it.  He does acknowledge shortness of breath with activities and walking across a room to make him significant fatigue.  He states he has been waking up in the last 2-3 weeks with proximal nocturnal dyspnea and having issues with orthopnea.  States the heaviness has ever been and he has been following a healthy diet and watching his caloric intake.  He does not follow his blood pressures at home but has been told of his last to ER visits that his blood pressure is been significantly high.  He relates this to the stress of a significant other being diagnosed with colon cancer.  He does acknowledge 2 weeks of upper rash or tract symptoms of congestion and rhinorrhea.  No cough or fevers.    ROS:  All systems have been reviewed and are negative with the exception of those mentioned in the HPI.    OBJECTIVE:  Vitals:    01/06/17 0914   BP: 152/96   BP Location: Left arm   Patient Position: Sitting   Pulse: 67   Weight: (!) 344 lb 12.8 oz (156 kg)   Height: 77\" (195.6 cm)     Physical Exam   Constitutional: He is oriented to person, place, and time. He appears well-nourished. No distress.   Neck: Neck " supple. No JVD present. No tracheal deviation present. No thyromegaly present.   Cardiovascular: Normal rate, regular rhythm and intact distal pulses.   No extrasystoles are present.   No murmur heard.  Mechanical S2   Pulmonary/Chest: Effort normal and breath sounds normal. He has no wheezes. He has no rales.   Some tenderness to left anterior thorax with palpation.   Abdominal: Soft. Bowel sounds are normal. There is no tenderness. There is no guarding.   Musculoskeletal: Normal range of motion. He exhibits edema (trace pitting edema lower extremities bilaterally). He exhibits no tenderness.   Lymphadenopathy:     He has no cervical adenopathy.   Neurological: He is alert and oriented to person, place, and time.   Nursing note and vitals reviewed.      Diagnostic Data:    ECG 12 Lead  Date/Time: 1/6/2017 10:10 AM  Performed by: MARTA CONNER  Authorized by: MARTA CONNER   Rhythm: sinus rhythm  BPM: 65  Conduction: left bundle branch block  Clinical impression: abnormal ECG          ASSESSMENT:   Diagnosis Plan   1. Precordial pain  Adult Transthoracic Echo Complete    BNP   2. Hx of aortic valve replacement, mechanical [Z95.2]  Adult Transthoracic Echo Complete    BNP   3. Essential hypertension         PLAN:  1.  His sharp chest pains are probably related to costochondritis with the tenderness found on palpation today and his history of upper respiratory tract symptoms.  However due to his significant concerns over development of another aortic dissection we will performed echocardiogram to evaluate his cardiac function.  His last ER visit last 2 weeks did show a normal CTA of his chest without evidence of aortic dissection or aneurysm.  2.  He had a reduced EF on his last echocardiogram in August 2015 and there was no obstructive CAD on a heart catheter in September 2015.  We will repeat echocardiogram to reevaluate this.  With his left bundle branch block a continuation of low EF does place him to benefit  from a biventricular PPM.  3.  Elevated in office today.  Discussed with him to begin keeping a diary of blood pressures at home and follow low sodium diet before we attempt adjustment of antihypertensive therapy.    Follow up in 1 month.    Scribed for Mike Hollis MD by SHAGGY Small. 1/6/2017  10:13 AM   I, Mike Hollis MD, personally performed the services described in this documentation as scribed by the above named individual in my presence, and it is both accurate and complete.  1/6/2017  10:47 AM    Kate Garrido DO, thank you for referring Mr. Alberto for evaluation.  I have forwarded my electronically generated recommendations to you for review.  Please do not hesitate to call with any questions.      Mike Hollis MD, FACC

## 2017-01-09 ENCOUNTER — ANTICOAGULATION VISIT (OUTPATIENT)
Dept: PHARMACY | Facility: HOSPITAL | Age: 52
End: 2017-01-09

## 2017-01-09 ENCOUNTER — APPOINTMENT (OUTPATIENT)
Dept: PHARMACY | Facility: HOSPITAL | Age: 52
End: 2017-01-09

## 2017-01-09 DIAGNOSIS — Z95.2 HX OF AORTIC VALVE REPLACEMENT, MECHANICAL: ICD-10-CM

## 2017-01-09 LAB — INR PPP: 2.7 (ref 0.9–1.1)

## 2017-01-09 PROCEDURE — 36416 COLLJ CAPILLARY BLOOD SPEC: CPT

## 2017-01-09 PROCEDURE — G0463 HOSPITAL OUTPT CLINIC VISIT: HCPCS

## 2017-01-09 PROCEDURE — 85610 PROTHROMBIN TIME: CPT

## 2017-01-09 NOTE — MR AVS SNAPSHOT
Mitchell Alberto   1/9/2017 2:45 PM   Anticoagulation Visit    Dept Phone:  [phone]   Encounter #:  [id number]    Provider:  [facility]   Department:  [facility]                Your Full Care Plan              Your Updated Medication List          This list is accurate as of: 1/9/17  3:33 PM.  Always use your most recent med list.                ALPRAZolam 0.25 MG tablet   Commonly known as:  XANAX   Take 1 tablet by mouth Daily As Needed for anxiety. for anxiety       CIALIS 5 MG tablet   Generic drug:  tadalafil   TAKE ONE TABLET BY MOUTH DAILY       citalopram 10 MG tablet   Commonly known as:  CELEXA   Take 1 tablet by mouth Daily.       divalproex 500 MG DR tablet   Commonly known as:  DEPAKOTE   TAKE ONE TABLET BY MOUTH THREE TIMES A DAY       furosemide 40 MG tablet   Commonly known as:  LASIX   Take 1 tablet by mouth Daily.       HYDROcodone-acetaminophen 7.5-325 MG per tablet   Commonly known as:  NORCO       lisinopril 40 MG tablet   Commonly known as:  PRINIVIL,ZESTRIL   TAKE ONE TABLET BY MOUTH DAILY **MUST CALL MD FOR APPOINTMENT       warfarin 5 MG tablet   Commonly known as:  COUMADIN   Take 1.5 to 2 tablets by mouth daily as directed by the anticoagulation pharmacist               We Performed the Following     POC INR       You Were Diagnosed With        Codes Comments    Hx of aortic valve replacement, mechanical     ICD-10-CM: Z95.2  ICD-9-CM: V43.3       Instructions     None    Patient Instructions History      Anticoagulation Summary as of 1/9/2017     INR goal 2.5-3.5   Today's INR 2.70   Next INR check 1/23/2017    Indications   Atrial fibrillation [I48.91] [I48.91]  Hx of aortic valve replacement  mechanical [Z95.2] [Z95.2]         January 2017 Details    Sun Mon Tue Wed Thu Fri Sat     1               2               3               4               5               6               7                 8               9         10 mg   See details      10      7.5 mg         11      10 mg         12      7.5 mg         13      10 mg         14      7.5 mg           15      7.5 mg         16      10 mg         17      7.5 mg         18      10 mg         19      7.5 mg         20      10 mg         21      7.5 mg           22      7.5 mg         23      10 mg         24               25               26               27               28                 29               30               31                    Date Details    This INR check       Date of next INR:  2017           Upcoming Appointments     Visit Type Date Time Department    ANTI COAG - FOLLOW UP 2017  2:45 PM  [facility]    ANTI COAG - FOLLOW UP 2017 10:30 AM  [facility]    FOLLOW UP 2017  2:45 PM [facility]      Fang[employer] Signup     [facility] allows you to send messages to your doctor, view your test results, renew your prescriptions, schedule appointments, and more. To sign up, go to [employer] and click on the Sign Up Now link in the New User? box. Enter your [employer] Activation Code exactly as it appears below along with the last four digits of your Social Security Number and your Date of Birth () to complete the sign-up process. If you do not sign up before the expiration date, you must request a new code.    Fang[employer] Activation Code: [email or web address]  Expires: 2017  9:38 AM    If you have questions, you can email [email or web address] or call [phone] to talk to our [employer] staff. Remember, [employer] is NOT to be used for urgent needs. For medical emergencies, dial 911.               Other Info from Your Visit           Your Appointments     2017 10:30 AM EST   Anti Coag - Follow Up with ANTI COAG CLINIC   [facility] ANTICO[facility]UL[facility] (--)    [facility] [facility] [address]  [facility] [address]   [phone]            2017  2:45 PM EST    Follow Up with Kate Garrido DO   [facility] INTERNAL MEDICINE AND PEDIATRICS (--)    [address] [facility] [address]  [facility] [address] [phone]           Arrive 15 minutes prior to appointment.              Allergies     No Known Allergies      Vital Signs     Smoking Status                   Never Smoker           Results     POC INR      Component Value Standard Range & Units    INR 2.70 0.9 - 1.1

## 2017-01-09 NOTE — PROGRESS NOTES
Anticoagulation Clinic Progress Note  Indication: Atrial Fibrillation and Mechanical Aortic Valve  Referring Provider: Enzo   Initial Warfarin Start Date: 11/2012  Planned Duration of Therapy: Life  Goal INR: 2.5- 3.5  Current Drug Interactions: divalproex, citalopram      Anticoagulation Clinic INR History:  Date 9/12 10/21 11/17 12/27 1/5 1/9      Total Weekly Dose  70mg 60-62.5mg  55mg (2 held doses) 60 mg 60mg     INR 2.9 3.6 3.3 4.72 (ED) 1.3 2.7          Clinic Interview:  Tablet Strength: pt has 5 mg tablets   Current Dose: pt followed his dosing calendar -- 10mg x2 days, then 7.5mg x2 days  Patient Findings      Negatives Signs/symptoms of thrombosis, Signs/symptoms of bleeding, Laboratory test error suspected, Change in health, Change in alcohol use, Change in activity, Upcoming invasive procedure, Emergency department visit, Upcoming dental procedure, Missed doses, Extra doses, Change in medications, Change in diet/appetite, Hospital admission, Bruising, Other complaints     Comments No more BRBPR, although pt is planning to follow up with proctologist soon.         Plan:  1. INR is therapeutic today after dose adjustment last week. Given dosing history and recent supratherapeutic INR on avg. 62.5mg weekly dose, will instruct pt to decrease his dose slightly to 60mg weekly (7.5mg daily except 10mg MonWedFri).  2. RTC in 2 weeks.  3. No recent changes to medications -- pt was instructed not to take pantoprazole or dicyclomine.  4. Pt declines refills.  5. Verbal and written information provided. Pt expresses understanding and has no further questions at this time. Will address other questions related to disease state in HVI.    Ann Cowden, PharmD  1/9/2017  3:14 PM

## 2017-01-13 ENCOUNTER — TELEPHONE (OUTPATIENT)
Dept: CARDIOLOGY | Facility: CLINIC | Age: 52
End: 2017-01-13

## 2017-01-13 NOTE — TELEPHONE ENCOUNTER
Spoke with patient and informed him of recommendation to stop lasix and monitor BP and swelling over next week and call back patient stated he can not go without fluid pill. Informed patient would call back Monday with recommendation. Reviewed echo further patient understanding.

## 2017-01-13 NOTE — TELEPHONE ENCOUNTER
Spoke with patient and asked him how is chest pain and hearing loss has been. Patient stated he had chest pain over the holiday which is why he came in to see us. Patient is concerned about his hearing and wants to stop taking the lasix. Reviewed echo results with patient he stated concern over the right and left cavity size showing dilation. Informed patient nurse would talk with provider about his concerns and wanting to stop lasix and call him back to discuss. Patient verbalized understanding.

## 2017-01-17 NOTE — TELEPHONE ENCOUNTER
Called patient back to F/u on BP reading and swelling. Left patient message to call back with readings and how his swelling has been to determine what medication he can be switched to rather than lasix.

## 2017-01-20 ENCOUNTER — TELEPHONE (OUTPATIENT)
Dept: CARDIOLOGY | Facility: CLINIC | Age: 52
End: 2017-01-20

## 2017-01-20 DIAGNOSIS — R60.9 EDEMA, UNSPECIFIED TYPE: Primary | ICD-10-CM

## 2017-01-20 RX ORDER — LISINOPRIL 40 MG/1
TABLET ORAL
Qty: 30 TABLET | Refills: 0 | Status: SHIPPED | OUTPATIENT
Start: 2017-01-20 | End: 2017-01-30

## 2017-01-20 RX ORDER — CHLORTHALIDONE 25 MG/1
12.5 TABLET ORAL DAILY
Qty: 60 TABLET | Refills: 2 | Status: SHIPPED | OUTPATIENT
Start: 2017-01-20 | End: 2017-03-15

## 2017-01-20 RX ORDER — TRIAMTERENE AND HYDROCHLOROTHIAZIDE 37.5; 25 MG/1; MG/1
CAPSULE ORAL
Qty: 30 CAPSULE | Refills: 3 | Status: SHIPPED | OUTPATIENT
Start: 2017-01-20 | End: 2017-01-20

## 2017-01-20 NOTE — TELEPHONE ENCOUNTER
Called patient to ask if he had a BP log, and patient stated he did not he does not have a cuff to take his BP at home. Informed patient that Dr vira COON would advise him to stop the lasix due to his hearing loss concerns and we would start him on chlorthalidone 12.5mg daily and we would like him to take BP when possible and get labs drawn in 7 days. Patient verbalized understanding

## 2017-01-30 ENCOUNTER — OFFICE VISIT (OUTPATIENT)
Dept: INTERNAL MEDICINE | Facility: CLINIC | Age: 52
End: 2017-01-30

## 2017-01-30 VITALS
SYSTOLIC BLOOD PRESSURE: 140 MMHG | RESPIRATION RATE: 16 BRPM | WEIGHT: 315 LBS | BODY MASS INDEX: 41.15 KG/M2 | DIASTOLIC BLOOD PRESSURE: 82 MMHG | HEART RATE: 60 BPM

## 2017-01-30 DIAGNOSIS — H91.93 HEARING LOSS, BILATERAL: ICD-10-CM

## 2017-01-30 DIAGNOSIS — F41.9 ANXIETY: ICD-10-CM

## 2017-01-30 DIAGNOSIS — F32.A DEPRESSION, UNSPECIFIED DEPRESSION TYPE: Primary | ICD-10-CM

## 2017-01-30 PROCEDURE — 99214 OFFICE O/P EST MOD 30 MIN: CPT | Performed by: INTERNAL MEDICINE

## 2017-01-30 RX ORDER — ALPRAZOLAM 0.25 MG/1
0.25 TABLET ORAL DAILY PRN
Qty: 30 TABLET | Refills: 0 | OUTPATIENT
Start: 2017-01-30 | End: 2018-05-07

## 2017-02-02 ENCOUNTER — TELEPHONE (OUTPATIENT)
Dept: INTERNAL MEDICINE | Facility: CLINIC | Age: 52
End: 2017-02-02

## 2017-02-02 PROBLEM — H91.90 HEARING LOSS: Status: ACTIVE | Noted: 2017-02-02

## 2017-02-02 NOTE — TELEPHONE ENCOUNTER
I am trying to order a CT without contrast to assess the patient's sinus cavities and ears. I am struggling finding the right CT to order and a diagnosis code that will work.     I have used hearing loss and sinus congestion.? Any thoughts?

## 2017-02-07 DIAGNOSIS — H91.93 HEARING LOSS, BILATERAL: Primary | ICD-10-CM

## 2017-02-08 DIAGNOSIS — H91.93 HEARING LOSS, BILATERAL: Primary | ICD-10-CM

## 2017-02-13 ENCOUNTER — ANTICOAGULATION VISIT (OUTPATIENT)
Dept: PHARMACY | Facility: HOSPITAL | Age: 52
End: 2017-02-13

## 2017-02-13 ENCOUNTER — HOSPITAL ENCOUNTER (OUTPATIENT)
Dept: CT IMAGING | Facility: HOSPITAL | Age: 52
Discharge: HOME | End: 2017-02-13
Attending: INTERNAL MEDICINE | Admitting: INTERNAL MEDICINE

## 2017-02-13 ENCOUNTER — TELEPHONE (OUTPATIENT)
Dept: INTERNAL MEDICINE | Facility: CLINIC | Age: 52
End: 2017-02-13

## 2017-02-13 DIAGNOSIS — N40.1 BENIGN PROSTATIC HYPERPLASIA (BPH) WITH POST-VOID DRIBBLING: Primary | ICD-10-CM

## 2017-02-13 DIAGNOSIS — N39.43 BENIGN PROSTATIC HYPERPLASIA (BPH) WITH POST-VOID DRIBBLING: Primary | ICD-10-CM

## 2017-02-13 DIAGNOSIS — Z95.2 HX OF AORTIC VALVE REPLACEMENT, MECHANICAL: ICD-10-CM

## 2017-02-13 DIAGNOSIS — H91.93 HEARING LOSS, BILATERAL: ICD-10-CM

## 2017-02-13 LAB — INR PPP: 2.7 (ref 0.9–1.1)

## 2017-02-13 PROCEDURE — 70486 CT MAXILLOFACIAL W/O DYE: CPT

## 2017-02-13 PROCEDURE — 85610 PROTHROMBIN TIME: CPT

## 2017-02-13 PROCEDURE — G0463 HOSPITAL OUTPT CLINIC VISIT: HCPCS

## 2017-02-13 PROCEDURE — 36416 COLLJ CAPILLARY BLOOD SPEC: CPT

## 2017-02-13 NOTE — PROGRESS NOTES
Anticoagulation Clinic Progress Note  Indication: Atrial Fibrillation and Mechanical Aortic Valve  Referring Provider: Enzo   Initial Warfarin Start Date: 11/2012  Planned Duration of Therapy: Life  Goal INR: 2.5- 3.5  Current Drug Interactions: divalproex, citalopram      Anticoagulation Clinic INR History:  Date 9/12 10/21 11/17 12/27 1/5 1/9 2/13     Total Weekly Dose  70mg 60-62.5mg  55mg (2 held doses) 60 mg 60mg     INR 2.9 3.6 3.3 4.72 (ED) 1.3 2.7 2.7     Notes                Clinic Interview:  Tablet Strength: pt has 5 mg tablets   Current Dose: pt followed his dosing calendar -- 10mg x2 days, then 7.5mg x2 days  Patient Findings      Negatives Signs/symptoms of thrombosis, Signs/symptoms of bleeding, Laboratory test error suspected, Change in health, Change in alcohol use, Change in activity, Upcoming invasive procedure, Emergency department visit, Upcoming dental procedure, Missed doses, Extra doses, Change in medications, Change in diet/appetite, Hospital admission, Bruising, Other complaints     Comments Patient has nothing to report at this time.       Plan:  1. INR is therapeutic today after dose adjustment last week. Will instruct pt to continue 60mg weekly (7.5mg daily except 10mg MonWedFri).  2. RTC in 4 weeks.  3. No recent changes to medications.  4. Pt declines refills.  5. Verbal and written information provided. Pt expresses understanding and has no further questions at this time. Will address other questions related to disease state in HVI.    Do Vargas PharmD  2/13/2017  4:07 PM

## 2017-02-13 NOTE — TELEPHONE ENCOUNTER
Increase Cialis to 10 mg PO daily prn #30 with 3 refills, his insurance approved this so I think he cn have 30 per fill.

## 2017-02-13 NOTE — TELEPHONE ENCOUNTER
----- Message from Lashawn Hanley sent at 2/13/2017  2:03 PM EST -----  Contact: GINNA HARDEN PH:[phone]  GINNA HARDEN CALLING TO SEE IF HE CAN GET AN INCREASED DOSAGE OF CIALIS. HE USES THE [facility]. HE CAN BE REACHED -[phone]

## 2017-02-14 RX ORDER — TADALAFIL 10 MG/1
10 TABLET ORAL DAILY PRN
Qty: 30 TABLET | Refills: 3 | Status: SHIPPED | OUTPATIENT
Start: 2017-02-14 | End: 2017-03-15

## 2017-02-14 NOTE — TELEPHONE ENCOUNTER
Pt called back I informed him that he can increase his Cialis to 10 mg and that a rx has been sent to his pharmacy.

## 2017-02-20 ENCOUNTER — TELEPHONE (OUTPATIENT)
Dept: INTERNAL MEDICINE | Facility: CLINIC | Age: 52
End: 2017-02-20

## 2017-02-20 NOTE — TELEPHONE ENCOUNTER
----- Message from Sonya Oliveira sent at 2/20/2017  2:48 PM EST -----  Contact: Pharmacy  Pharmacy called stating patient keeps calling them and is getting pretty out of hand over Cialis rx they received.  Call [address] at [phone] regarding this rx.

## 2017-02-22 ENCOUNTER — TELEPHONE (OUTPATIENT)
Dept: INTERNAL MEDICINE | Facility: CLINIC | Age: 52
End: 2017-02-22

## 2017-02-22 DIAGNOSIS — N40.1 BENIGN PROSTATIC HYPERPLASIA (BPH) WITH POST-VOID DRIBBLING: ICD-10-CM

## 2017-02-22 DIAGNOSIS — N39.43 BENIGN PROSTATIC HYPERPLASIA (BPH) WITH POST-VOID DRIBBLING: ICD-10-CM

## 2017-02-22 RX ORDER — LISINOPRIL 40 MG/1
TABLET ORAL
Qty: 30 TABLET | Refills: 0 | Status: SHIPPED | OUTPATIENT
Start: 2017-02-22 | End: 2017-03-21 | Stop reason: SDUPTHER

## 2017-02-22 NOTE — TELEPHONE ENCOUNTER
Pt would like us to make a follow up with his ENT,   Dr. Hernandez at [facility], [place] due to hearing loss.

## 2017-02-27 NOTE — TELEPHONE ENCOUNTER
Can we make sure patient knows about the appointmetn below. If we have a box of Cialis 5mg samples put him one back if not when we do get them I will notify him.

## 2017-03-02 ENCOUNTER — TELEPHONE (OUTPATIENT)
Dept: INTERNAL MEDICINE | Facility: CLINIC | Age: 52
End: 2017-03-02

## 2017-03-02 NOTE — TELEPHONE ENCOUNTER
----- Message from Liane Dumont sent at 3/2/2017 11:16 AM EST -----  [phone]    PT HAS ONE MONTH FU SCHEDULED FOR TOMORROW.  HE WONDERS IF HE STILL NEEDS THIS APPT.  HE SAYS HAD A CT SCAN AND DISCUSSED RESULTS WITH DR MODI AND FEELS LIKE THAT WAS ALL HE NEEDED. APPT IS STILL ON FOR NOW. PLEASE LET PT KNOW IF HE NEED TO KEEP THIS

## 2017-03-15 ENCOUNTER — ANTICOAGULATION VISIT (OUTPATIENT)
Dept: PHARMACY | Facility: HOSPITAL | Age: 52
End: 2017-03-15

## 2017-03-15 DIAGNOSIS — Z95.2 HX OF AORTIC VALVE REPLACEMENT, MECHANICAL: ICD-10-CM

## 2017-03-15 DIAGNOSIS — I48.91 ATRIAL FIBRILLATION, UNSPECIFIED TYPE (HCC): ICD-10-CM

## 2017-03-15 LAB
INR PPP: 3.8 (ref 0.91–1.09)
PROTHROMBIN TIME: 45.2 SECONDS (ref 10–13.8)

## 2017-03-15 PROCEDURE — 36416 COLLJ CAPILLARY BLOOD SPEC: CPT

## 2017-03-15 PROCEDURE — 85610 PROTHROMBIN TIME: CPT

## 2017-03-15 PROCEDURE — G0463 HOSPITAL OUTPT CLINIC VISIT: HCPCS

## 2017-03-15 RX ORDER — TRIAMTERENE AND HYDROCHLOROTHIAZIDE 37.5; 25 MG/1; MG/1
1 CAPSULE ORAL DAILY
COMMUNITY
Start: 2017-02-22 | End: 2017-06-09

## 2017-03-15 RX ORDER — TADALAFIL 5 MG
1 TABLET ORAL AS NEEDED
COMMUNITY
Start: 2017-02-22 | End: 2021-07-29 | Stop reason: HOSPADM

## 2017-03-15 NOTE — PROGRESS NOTES
Anticoagulation Clinic Progress Note  Indication: Atrial Fibrillation and Mechanical Aortic Valve  Referring Provider: Enzo   Initial Warfarin Start Date: 11/2012  Planned Duration of Therapy: Life  Goal INR: 2.5- 3.5  Current Drug Interactions: divalproex, citalopram      Anticoagulation Clinic INR History:  Date 9/12 10/21 11/17 12/27 1/5 1/9 2/13 3/15    Total Weekly Dose  70mg 60-62.5mg  55mg (2 held doses) 60 mg 60mg 60mg    INR 2.9 3.6 3.3 4.72 (ED) 1.3 2.7 2.7 3.8    Notes                Clinic Interview:  Tablet Strength: pt has 5 mg tablets   Current Dose: pt is uncertain his dose but verifies that he has been following his dosing calendar  Patient Findings      Negatives Signs/symptoms of thrombosis, Signs/symptoms of bleeding, Laboratory test error suspected, Change in health, Change in alcohol use, Change in activity, Upcoming invasive procedure, Emergency department visit, Upcoming dental procedure, Missed doses, Extra doses, Change in medications, Change in diet/appetite, Hospital admission, Bruising, Other complaints         Plan:  1. INR is slightly supratherapeutic today but has previously been therapeutic. Will instruct pt to have an extra serving of GLV tonight, then continue 60mg weekly (7.5mg daily except 10mg MonWedFri).  2. RTC in 3 weeks.  3. Updated pt's med list, but he reports no recent changes. He is taking triamterene/HCTZ, not clorthalidone.   4. Pt declines refills.  5. Verbal and written information provided. Pt expresses understanding and has no further questions at this time. Will address other questions related to disease state in HVI.      Ann Cowden, PharmD  3/15/2017  4:09 PM

## 2017-03-21 DIAGNOSIS — F41.9 ANXIETY: ICD-10-CM

## 2017-03-21 RX ORDER — CITALOPRAM 10 MG/1
TABLET ORAL
Qty: 30 TABLET | Refills: 2 | Status: SHIPPED | OUTPATIENT
Start: 2017-03-21 | End: 2017-06-24 | Stop reason: SDUPTHER

## 2017-03-22 RX ORDER — LISINOPRIL 40 MG/1
TABLET ORAL
Qty: 30 TABLET | Refills: 0 | Status: SHIPPED | OUTPATIENT
Start: 2017-03-22 | End: 2017-04-25 | Stop reason: SDUPTHER

## 2017-04-10 ENCOUNTER — ANTICOAGULATION VISIT (OUTPATIENT)
Dept: PHARMACY | Facility: HOSPITAL | Age: 52
End: 2017-04-10

## 2017-04-10 DIAGNOSIS — Z95.2 HX OF AORTIC VALVE REPLACEMENT, MECHANICAL: ICD-10-CM

## 2017-04-10 LAB
INR PPP: 3 (ref 0.91–1.09)
PROTHROMBIN TIME: 35.7 SECONDS (ref 10–13.8)

## 2017-04-10 PROCEDURE — 85610 PROTHROMBIN TIME: CPT

## 2017-04-10 PROCEDURE — G0463 HOSPITAL OUTPT CLINIC VISIT: HCPCS

## 2017-04-10 PROCEDURE — 36416 COLLJ CAPILLARY BLOOD SPEC: CPT

## 2017-04-10 NOTE — PROGRESS NOTES
Anticoagulation Clinic Progress Note  Indication: Atrial Fibrillation and Mechanical Aortic Valve  Referring Provider: Enzo   Initial Warfarin Start Date: 11/2012  Planned Duration of Therapy: Life  Goal INR: 2.5- 3.5  Current Drug Interactions: divalproex, citalopram      Anticoagulation Clinic INR History:  Date 9/12 10/21 11/17 12/27 1/5 1/9 2/13 3/15 4/10   Total Weekly Dose  70mg 60-62.5mg  55mg (2 held doses) 60 mg 60mg 60mg 60mg   INR 2.9 3.6 3.3 4.72 (ED) 1.3 2.7 2.7 3.8 3.0   Notes                Clinic Interview:  Tablet Strength: pt has 5 mg tablets   Current Dose: pt is uncertain his dose but verifies that he has been following his dosing calendar  Patient Findings      Negatives Signs/symptoms of thrombosis, Signs/symptoms of bleeding, Laboratory test error suspected, Change in health, Change in alcohol use, Change in activity, Upcoming invasive procedure, Emergency department visit, Upcoming dental procedure, Missed doses, Extra doses, Change in medications, Change in diet/appetite, Hospital admission, Bruising, Other complaints     Comments Patient has been feeling dizzy- edy when turns head or goes from sitting to standing. He states he will see Dr. Hollis, offered to send a note to Dr. Hollis regarding but he declined and stated he would set up an appointment. Advised patient to keep a piece of paper in his wallet to write down BP and have it checked at [facility], [facility], [facility] etc. Patient states nothing else has changed at this time, and asked if we would set up reminder phone calls. Will personally call patient if [employer] does not start working for patients who are being seen on Monday/ Tuesday        Plan:  1. INR is therapeutic today (3.0). Will instruct patient to continue 60mg weekly (7.5mg daily except 10mg MonWedFri).  2. RTC in 4 weeks.  3. Updated pt's med list, but he reports no recent changes. He is taking triamterene/HCTZ, not clorthalidone.   4. Pt declines refills.  5. Verbal  and written information provided. Pt expresses understanding and has no further questions at this time. Will address other questions related to disease state in HVI.      Do Vargas, PharmD  4/10/2017  1:38 PM

## 2017-04-25 RX ORDER — DIVALPROEX SODIUM 500 MG/1
TABLET, DELAYED RELEASE ORAL
Qty: 90 TABLET | Refills: 1 | Status: SHIPPED | OUTPATIENT
Start: 2017-04-25 | End: 2017-11-27 | Stop reason: SDDI

## 2017-04-25 RX ORDER — LISINOPRIL 40 MG/1
TABLET ORAL
Qty: 30 TABLET | Refills: 0 | Status: SHIPPED | OUTPATIENT
Start: 2017-04-25 | End: 2017-05-29 | Stop reason: SDUPTHER

## 2017-05-08 ENCOUNTER — ANTICOAGULATION VISIT (OUTPATIENT)
Dept: PHARMACY | Facility: HOSPITAL | Age: 52
End: 2017-05-08

## 2017-05-08 DIAGNOSIS — Z95.2 HX OF AORTIC VALVE REPLACEMENT, MECHANICAL: ICD-10-CM

## 2017-05-08 LAB
INR PPP: 2.2 (ref 0.91–1.09)
PROTHROMBIN TIME: 26.1 SECONDS (ref 10–13.8)

## 2017-05-08 PROCEDURE — 36416 COLLJ CAPILLARY BLOOD SPEC: CPT

## 2017-05-08 PROCEDURE — G0463 HOSPITAL OUTPT CLINIC VISIT: HCPCS

## 2017-05-08 PROCEDURE — 85610 PROTHROMBIN TIME: CPT

## 2017-05-22 ENCOUNTER — APPOINTMENT (OUTPATIENT)
Dept: PHARMACY | Facility: HOSPITAL | Age: 52
End: 2017-05-22

## 2017-05-23 ENCOUNTER — ANTICOAGULATION VISIT (OUTPATIENT)
Dept: PHARMACY | Facility: HOSPITAL | Age: 52
End: 2017-05-23

## 2017-05-23 DIAGNOSIS — Z95.2 HX OF AORTIC VALVE REPLACEMENT, MECHANICAL: ICD-10-CM

## 2017-05-23 LAB
INR PPP: 2.2 (ref 0.91–1.09)
PROTHROMBIN TIME: 26.8 SECONDS (ref 10–13.8)

## 2017-05-23 PROCEDURE — G0463 HOSPITAL OUTPT CLINIC VISIT: HCPCS

## 2017-05-23 PROCEDURE — 36416 COLLJ CAPILLARY BLOOD SPEC: CPT

## 2017-05-23 PROCEDURE — 85610 PROTHROMBIN TIME: CPT

## 2017-05-24 DIAGNOSIS — Z95.2 HX OF AORTIC VALVE REPLACEMENT, MECHANICAL: ICD-10-CM

## 2017-05-24 RX ORDER — WARFARIN SODIUM 5 MG/1
TABLET ORAL
Qty: 80 TABLET | Refills: 2 | Status: SHIPPED | OUTPATIENT
Start: 2017-05-24 | End: 2017-07-19 | Stop reason: SDUPTHER

## 2017-05-26 RX ORDER — TRIAMTERENE AND HYDROCHLOROTHIAZIDE 37.5; 25 MG/1; MG/1
CAPSULE ORAL
Qty: 30 CAPSULE | Refills: 2 | Status: SHIPPED | OUTPATIENT
Start: 2017-05-26 | End: 2017-08-10 | Stop reason: SDUPTHER

## 2017-05-30 ENCOUNTER — TELEPHONE (OUTPATIENT)
Dept: PHARMACY | Facility: HOSPITAL | Age: 52
End: 2017-05-30

## 2017-05-30 RX ORDER — LISINOPRIL 40 MG/1
TABLET ORAL
Qty: 30 TABLET | Refills: 0 | Status: SHIPPED | OUTPATIENT
Start: 2017-05-30 | End: 2017-06-26 | Stop reason: SDUPTHER

## 2017-06-06 ENCOUNTER — TELEPHONE (OUTPATIENT)
Dept: PHARMACY | Facility: HOSPITAL | Age: 52
End: 2017-06-06

## 2017-06-06 ENCOUNTER — ANTICOAGULATION VISIT (OUTPATIENT)
Dept: PHARMACY | Facility: HOSPITAL | Age: 52
End: 2017-06-06

## 2017-06-06 DIAGNOSIS — Z95.2 HX OF AORTIC VALVE REPLACEMENT, MECHANICAL: ICD-10-CM

## 2017-06-06 LAB
INR PPP: 1.2 (ref 0.91–1.09)
PROTHROMBIN TIME: 14.2 SECONDS (ref 10–13.8)

## 2017-06-06 PROCEDURE — 36416 COLLJ CAPILLARY BLOOD SPEC: CPT

## 2017-06-06 PROCEDURE — 85610 PROTHROMBIN TIME: CPT

## 2017-06-06 PROCEDURE — G0463 HOSPITAL OUTPT CLINIC VISIT: HCPCS

## 2017-06-06 RX ORDER — FUROSEMIDE 40 MG/1
TABLET ORAL
Qty: 90 TABLET | Refills: 0 | OUTPATIENT
Start: 2017-06-06

## 2017-06-06 NOTE — TELEPHONE ENCOUNTER
Dr. Hollis     Mitchell Alberto is a 52yo Male who presented to [facility] today with INR of 1.2 due to increase in Vit K foods. Given pt's moderate thrombotic risk (mechanical AVR, afib, HTN), will plan to place him on bridge therapy with LMWH until INR returns to therapeutic range. He is scheduled for a cardiology appointment with you on 6/9. Patient will f/u INR in Anticoagulation Clinic at this time.    Warfarin Indication: Atrial Fibrillation and Mechanical Aortic Valve   Goal INR: 2.5- 3.5    Wt: 148.09kg (6/6/17)  SCr: 1.0 (12/27/16)   Estimated CrCl: 182.94mL/min  (12/27/2017)       Plan for Lovenox Bridge Therapy:  1. Lovenox 150mg BID first dose this afternoon until return to clinic on 6/9.  2. Patient will also boost warfarin dose 12% until RTC.      Please advise if you are agreeable with this plan for Bridge Therapy.

## 2017-06-06 NOTE — PROGRESS NOTES
"Anticoagulation Clinic Progress Note  Indication: Atrial Fibrillation and Mechanical Aortic Valve  Referring Provider: Enzo   Initial Warfarin Start Date: 11/2012  Planned Duration of Therapy: Life  Goal INR: 2.5- 3.5  Current Drug Interactions: divalproex, citalopram    Vit K: patient has 1-2 iceberg salads  Alcohol: none  Tobacco: none    Anticoagulation Clinic INR History:  Date 9/12 10/21 11/17 12/27 1/5 1/9 2/13 3/15 4/10   Total Weekly Dose  70mg 60-62.5mg  55mg (2 held doses) 60 mg 60mg 60mg 60mg   INR 2.9 3.6 3.3 4.72 (ED) 1.3 2.7 2.7 3.8 3.0   Notes              Date 5/8 5/23 6/6         Total Weekly Dose 52.5mg 60mg 62.5 62.5        INR 2.2 2.2 1.2         Notes missed dose More vit K Dose increase; increase in Vit K foods           Clinic Interview:  Tablet Strength: pt has 5 mg tablets   Current Dose: pt is uncertain his dose but verifies that he has been following his dosing calendar  Patient Findings      Positives Change in diet/appetite     Negatives Signs/symptoms of thrombosis, Signs/symptoms of bleeding, Laboratory test error suspected, Change in health, Change in alcohol use, Change in activity, Upcoming invasive procedure, Emergency department visit, Upcoming dental procedure, Missed doses, Extra doses, Change in medications, Hospital admission, Bruising, Other complaints     Comments Patient had big spinach salad and chicken liver yesterday (out of normal). Increased in green beans, he has \"alot\" in past week. He has also eaten norman slaw, peas, matt beans, and green beans. These foods are out of the normal for this patient. Patient is aware of effects of Vit K foods on INR. Patient is willing to use lovenox bridge until therapeutic INR is reached.      Plan:  1. INR is SUBtherapeutic today (1.2)- despite dose increase; likely due to increase in Vit K yesterday/ over past week. Patient confirmed he is going to see Dr. Hollis on 6/9.  Will instruct patient to start lovenox bridge today until " RTC on 6/9 after appointment with cardiology. Patient was provided with both verbal and written administration directions for SubQ injections. Encouraged him to ask the pharmacist for demonstration of dosing. Confirming plan with Dr. Hollis. Will instruct patient to take 15mg tonight and 10mg WedThu with Lovenox bridge until RTC on 6/9. Confirmed plan with Dr. Hollis.  2. RTC 6/9 with cardiology appointment as we regain therapeutic INR goal between 2.5-3.5.  3. Updated pt's med list, but he reports no recent changes.   4. Pt declines refills. Call in Lovenox shots for the patient to use as bridge therapy while boost Warfarin. Will dose patient for mechanical heart valve 1mg/kg q12h until targeted anticoagulation reached- confirmed lexicomp/ micromedex resources. Confirmed current patient's weight is: 325.8lbs (148kg). Call in to [facility] Lovenox 150mg SubQ q12h QTY: 10 [facility] voiced she would also instruct patient on administration techniques.   5. Verbal and written information provided. Pt expresses understanding and has no further questions at this time. Will address other questions related to disease state in HVI.      Do Vargas, PharmD  6/6/2017  1:28 PM

## 2017-06-07 RX ORDER — FUROSEMIDE 40 MG/1
TABLET ORAL
Qty: 90 TABLET | Refills: 0 | OUTPATIENT
Start: 2017-06-07

## 2017-06-09 ENCOUNTER — ANTICOAGULATION VISIT (OUTPATIENT)
Dept: PHARMACY | Facility: HOSPITAL | Age: 52
End: 2017-06-09

## 2017-06-09 ENCOUNTER — OFFICE VISIT (OUTPATIENT)
Dept: CARDIOLOGY | Facility: CLINIC | Age: 52
End: 2017-06-09

## 2017-06-09 ENCOUNTER — LAB (OUTPATIENT)
Dept: LAB | Facility: HOSPITAL | Age: 52
End: 2017-06-09

## 2017-06-09 VITALS
BODY MASS INDEX: 37.19 KG/M2 | HEART RATE: 68 BPM | HEIGHT: 77 IN | DIASTOLIC BLOOD PRESSURE: 94 MMHG | WEIGHT: 315 LBS | SYSTOLIC BLOOD PRESSURE: 160 MMHG

## 2017-06-09 DIAGNOSIS — I10 ESSENTIAL HYPERTENSION: ICD-10-CM

## 2017-06-09 DIAGNOSIS — I35.0 NONRHEUMATIC AORTIC VALVE STENOSIS: Primary | ICD-10-CM

## 2017-06-09 DIAGNOSIS — E78.2 MIXED HYPERLIPIDEMIA: ICD-10-CM

## 2017-06-09 DIAGNOSIS — Z95.2 HX OF AORTIC VALVE REPLACEMENT, MECHANICAL: ICD-10-CM

## 2017-06-09 DIAGNOSIS — I35.0 NONRHEUMATIC AORTIC VALVE STENOSIS: ICD-10-CM

## 2017-06-09 LAB
ALBUMIN SERPL-MCNC: 4.4 G/DL (ref 3.2–4.8)
ALBUMIN/GLOB SERPL: 1.6 G/DL (ref 1.5–2.5)
ALP SERPL-CCNC: 57 U/L (ref 25–100)
ALT SERPL W P-5'-P-CCNC: 17 U/L (ref 7–40)
ANION GAP SERPL CALCULATED.3IONS-SCNC: 4 MMOL/L (ref 3–11)
AST SERPL-CCNC: 18 U/L (ref 0–33)
BASOPHILS # BLD AUTO: 0.02 10*3/MM3 (ref 0–0.2)
BASOPHILS NFR BLD AUTO: 0.4 % (ref 0–1)
BILIRUB SERPL-MCNC: 0.4 MG/DL (ref 0.3–1.2)
BNP SERPL-MCNC: 110 PG/ML (ref 0–100)
BUN BLD-MCNC: 13 MG/DL (ref 9–23)
BUN/CREAT SERPL: 13 (ref 7–25)
CALCIUM SPEC-SCNC: 10.2 MG/DL (ref 8.7–10.4)
CHLORIDE SERPL-SCNC: 104 MMOL/L (ref 99–109)
CO2 SERPL-SCNC: 30 MMOL/L (ref 20–31)
CREAT BLD-MCNC: 1 MG/DL (ref 0.6–1.3)
DEPRECATED RDW RBC AUTO: 46.7 FL (ref 37–54)
EOSINOPHIL # BLD AUTO: 0.1 10*3/MM3 (ref 0.1–0.3)
EOSINOPHIL NFR BLD AUTO: 1.9 % (ref 0–3)
ERYTHROCYTE [DISTWIDTH] IN BLOOD BY AUTOMATED COUNT: 14.8 % (ref 11.3–14.5)
GFR SERPL CREATININE-BSD FRML MDRD: 78 ML/MIN/1.73
GLOBULIN UR ELPH-MCNC: 2.8 GM/DL
GLUCOSE BLD-MCNC: 90 MG/DL (ref 70–100)
HCT VFR BLD AUTO: 42.8 % (ref 38.9–50.9)
HGB BLD-MCNC: 14.2 G/DL (ref 13.1–17.5)
IMM GRANULOCYTES # BLD: 0.02 10*3/MM3 (ref 0–0.03)
IMM GRANULOCYTES NFR BLD: 0.4 % (ref 0–0.6)
INR PPP: 2.2 (ref 0.91–1.09)
LYMPHOCYTES # BLD AUTO: 1.3 10*3/MM3 (ref 0.6–4.8)
LYMPHOCYTES NFR BLD AUTO: 25 % (ref 24–44)
MCH RBC QN AUTO: 28.6 PG (ref 27–31)
MCHC RBC AUTO-ENTMCNC: 33.2 G/DL (ref 32–36)
MCV RBC AUTO: 86.1 FL (ref 80–99)
MONOCYTES # BLD AUTO: 0.57 10*3/MM3 (ref 0–1)
MONOCYTES NFR BLD AUTO: 11 % (ref 0–12)
NEUTROPHILS # BLD AUTO: 3.18 10*3/MM3 (ref 1.5–8.3)
NEUTROPHILS NFR BLD AUTO: 61.3 % (ref 41–71)
PLATELET # BLD AUTO: 191 10*3/MM3 (ref 150–450)
PMV BLD AUTO: 10.6 FL (ref 6–12)
POTASSIUM BLD-SCNC: 4.4 MMOL/L (ref 3.5–5.5)
PROT SERPL-MCNC: 7.2 G/DL (ref 5.7–8.2)
PROTHROMBIN TIME: 26.5 SECONDS (ref 10–13.8)
RBC # BLD AUTO: 4.97 10*6/MM3 (ref 4.2–5.76)
SODIUM BLD-SCNC: 138 MMOL/L (ref 132–146)
WBC NRBC COR # BLD: 5.19 10*3/MM3 (ref 3.5–10.8)

## 2017-06-09 PROCEDURE — 85025 COMPLETE CBC W/AUTO DIFF WBC: CPT | Performed by: INTERNAL MEDICINE

## 2017-06-09 PROCEDURE — G0463 HOSPITAL OUTPT CLINIC VISIT: HCPCS | Performed by: PHARMACIST

## 2017-06-09 PROCEDURE — 85610 PROTHROMBIN TIME: CPT

## 2017-06-09 PROCEDURE — 36415 COLL VENOUS BLD VENIPUNCTURE: CPT

## 2017-06-09 PROCEDURE — 99214 OFFICE O/P EST MOD 30 MIN: CPT | Performed by: INTERNAL MEDICINE

## 2017-06-09 PROCEDURE — 36416 COLLJ CAPILLARY BLOOD SPEC: CPT

## 2017-06-09 PROCEDURE — 83880 ASSAY OF NATRIURETIC PEPTIDE: CPT | Performed by: INTERNAL MEDICINE

## 2017-06-09 PROCEDURE — 80053 COMPREHEN METABOLIC PANEL: CPT | Performed by: INTERNAL MEDICINE

## 2017-06-09 NOTE — PROGRESS NOTES
[facility] at [facility]  Office Progress Note  Mitchell Alberto  1965  [phone]      Visit Date: 01/06/2017    PCP: Kate Garrido, DO  [address]  [place] [id number]    IDENTIFICATION: A 52 y.o. male resident of [place].    Chief Complaint   Patient presents with   • Follow-up     SOA/DIZZINESS       PROBLEM LIST:   1.  Ascending aortic dissection and severe aortic insufficiency:  a.  Mechanical Bentall aortic valve replacement ([employer] [facility]) and ascending aortic dissection repair, 11/17/2012, Dr. Jasmeet De Dios.   b. Echocardiogram, 12/03/2013:  i. LVEF (55% to 60%), mechanical aortic valve, area 1.18 cm2.  ii. Chronic Coumadin therapy                     c.    Cardiac PET scan, 08/26/2015, negative for reversible ischemia; fixed photopenia                            of the apex and septum possibly due to LBBB; LVEF (30% to 34%).                    d.    Cardiac catheterization,  Dorian Giraldo MD, 09/03/2015:  No obstructive                            CAD.                    E. 1/17 echo EF 55% mild lvh AVR mean pressure 17                    F. 12/16 CTA chest wnl  2.  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  Residual left upper extremity weakness.    3. Left arm discomfort:  a. Possibly due to embolic CVA vs. intraoperative nerve traction.   4. Hypertension.   5. Obesity.    6. 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 Status post surgery:  a. Left inguinal herniorrhaphy, August 2014.   b. Left forehead melanoma excision, Dr. Gisell Kothari, August 2014.    Allergies  No Known Allergies    Current Medications    Current Outpatient Prescriptions:   •  ALPRAZolam (XANAX) 0.25 MG tablet, Take 1 tablet by mouth Daily As Needed for anxiety. for anxiety, Disp: 30 tablet, Rfl: 0  •  CIALIS 5 MG tablet, Take 1 tablet by mouth As Needed., Disp: , Rfl:   •  citalopram (CeleXA) 10 MG tablet, TAKE ONE TABLET BY MOUTH DAILY, Disp:  "30 tablet, Rfl: 2  •  divalproex (DEPAKOTE) 500 MG DR tablet, TAKE ONE TABLET BY MOUTH THREE TIMES A DAY, Disp: 90 tablet, Rfl: 1  •  enoxaparin (LOVENOX) 150 MG/ML injection, Inject 0.99 mL under the skin Every 12 (Twelve) Hours for 4 days. Inject 1 syringe SubQ BID until return to anticoagulation clinic, Disp: 7.92 mL, Rfl: 0  •  HYDROcodone-acetaminophen (NORCO) 7.5-325 MG per tablet, Take 1 tablet by mouth 3 (Three) Times a Day., Disp: , Rfl:   •  lisinopril (PRINIVIL,ZESTRIL) 40 MG tablet, TAKE ONE TABLET BY MOUTH DAILY, Disp: 30 tablet, Rfl: 0  •  PHARMACY TO DOSE WARFARIN, Continuous As Needed (patient is managed by the [facility] ([phone]))., Disp: , Rfl:   •  triamterene-hydrochlorothiazide (DYAZIDE) 37.5-25 MG per capsule, TAKE ONE CAPSULE BY MOUTH DAILY, Disp: 30 capsule, Rfl: 2  •  warfarin (COUMADIN) 5 MG tablet, TAKE TWO TABLETS BY MOUTH EVERY OTHER DAY ALTERNATING WITH TAKE ONE AND ONE-HALF (1 & 1/2) TABLET BY MOUTH EVERY OTHER DAY OR AS DIRECTED, Disp: 80 tablet, Rfl: 2      History of Present Illness   Patient returns to office today for follow-up , patient with a litany of issues least of which is subtherapeutic INR this week requiring Lovenox injections.  He has had some tearing type of chest discomfort and headaches with labile blood pressures.      ROS:  All systems have been reviewed and are negative with the exception of those mentioned in the HPI.    OBJECTIVE:  Vitals:    06/09/17 1516 06/09/17 1517   BP: 172/97 160/94   BP Location: Left arm Left arm   Patient Position: Sitting Standing   Pulse: 67 68   Weight: (!) 320 lb 12.8 oz (146 kg)    Height: 77\" (195.6 cm)      Physical Exam   Constitutional: He is oriented to person, place, and time. He appears well-nourished. No distress.   Neck: Neck supple. No JVD present. No tracheal deviation present. No thyromegaly present.   Cardiovascular: Normal rate, regular rhythm and intact distal pulses.   No " extrasystoles are present.   No murmur heard.  Mechanical S2   Pulmonary/Chest: Effort normal and breath sounds normal. He has no wheezes. He has no rales.   Some tenderness to left anterior thorax with palpation.   Abdominal: Soft. Bowel sounds are normal. There is no tenderness. There is no guarding.   Musculoskeletal: Normal range of motion. He exhibits edema (trace pitting edema lower extremities bilaterally). He exhibits no tenderness.   Lymphadenopathy:     He has no cervical adenopathy.   Neurological: He is alert and oriented to person, place, and time.   Nursing note and vitals reviewed.      Diagnostic Data:  Procedures  ASSESSMENT:   Diagnosis Plan   1. Nonrheumatic aortic valve stenosis  CBC & Differential    BNP    Comprehensive Metabolic Panel   2. Essential hypertension     3. Mixed hyperlipidemia         PLAN:  1.  Aortic valve disease status post mechanical aVR in Bentyl procedure with acceptable CTA of the chest 1216.  He continues to have accelerated hypertension which we will have evaluated he was sent to obtain a home blood pressure monitor and contact us this following week.    2.  Dyspnea and shortness of breath will document BNP CBC and CMP in the office today    3.  Dyslipidemia  Not onstatin therapy

## 2017-06-09 NOTE — PROGRESS NOTES
Anticoagulation Clinic Progress Note  Indication: Atrial Fibrillation and Mechanical Aortic Valve  Referring Provider: Enzo   Initial Warfarin Start Date: 11/2012  Planned Duration of Therapy: Life  Goal INR: 2.5- 3.5  Current Drug Interactions: divalproex, citalopram    Vit K: patient has 1-2 iceberg salads  Alcohol: none  Tobacco: none    Anticoagulation Clinic INR History:  Date 9/12 10/21 11/17 12/27 1/5 1/9 2/13 3/15 4/10   Total Weekly Dose  70mg 60-62.5mg  55mg (2 held doses) 60 mg 60mg 60mg 60mg   INR 2.9 3.6 3.3 4.72 (ED) 1.3 2.7 2.7 3.8 3.0   Notes              Date 5/8 5/23 6/6 6/9        Total Weekly Dose 52.5mg 60mg 62.5 62.5        INR 2.2 2.2 1.2 2.2        Notes missed dose More vit K Dose increase; increase in Vit K foods           Clinic Interview:  Tablet Strength: pt has 5 mg tablets   Current Dose:     Patient Findings      Positives Change in diet/appetite     Negatives Signs/symptoms of thrombosis, Signs/symptoms of bleeding, Laboratory test error suspected, Change in health, Change in alcohol use, Change in activity, Upcoming invasive procedure, Emergency department visit, Upcoming dental procedure, Missed doses, Extra doses, Change in medications, Hospital admission, Bruising, Other complaints     Comments [facility] leading to the 1.2 INR         Plan:  1. INR is SUBtherapeutic today (2.2)- despite dose boost; likely due to increase in Vit K over past week. Patient confirmed he is going to see Dr. Hollis today. He was a bit rushed.  Will instruct patient to continue lovenox bridge today until RTC on 6/13 or has exhausted his supply. Will instruct patient to take 15mg tonight and 10mg sat, sun  Then 7.5 mg on Monday with Lovenox bridge until RTC on 6/13.  Will consider dose adjustment increase on Tuesday. He has been on 65-70 mg weekly. He may require 10 mg daily except 7.5 mg on one day of the week.   2. RTC 6/13 to recheck INR as we attempt to regain  therapeutic INR goal between 2.5-3.5.  3. he reports no recent changes in medications.   4. Pt declines refills.   5. Verbal and written information provided. Pt expresses understanding and has no further questions at this time. Will address other questions related to disease state in HVI.      Sam Zaldivar [facility]  6/9/2017  2:42 PM

## 2017-06-13 ENCOUNTER — ANTICOAGULATION VISIT (OUTPATIENT)
Dept: PHARMACY | Facility: HOSPITAL | Age: 52
End: 2017-06-13

## 2017-06-13 DIAGNOSIS — Z95.2 HX OF AORTIC VALVE REPLACEMENT, MECHANICAL: ICD-10-CM

## 2017-06-13 DIAGNOSIS — I48.91 ATRIAL FIBRILLATION, UNSPECIFIED TYPE (HCC): ICD-10-CM

## 2017-06-13 LAB
INR PPP: 2.6 (ref 0.91–1.09)
PROTHROMBIN TIME: 31.5 SECONDS (ref 10–13.8)

## 2017-06-13 PROCEDURE — 36416 COLLJ CAPILLARY BLOOD SPEC: CPT

## 2017-06-13 PROCEDURE — G0463 HOSPITAL OUTPT CLINIC VISIT: HCPCS

## 2017-06-13 PROCEDURE — 85610 PROTHROMBIN TIME: CPT

## 2017-06-13 RX ORDER — FLUTICASONE PROPIONATE 50 MCG
1 SPRAY, SUSPENSION (ML) NASAL DAILY PRN
COMMUNITY
Start: 2017-03-23 | End: 2018-05-07

## 2017-06-13 RX ORDER — HYDROCODONE BITARTRATE AND ACETAMINOPHEN 5; 325 MG/1; MG/1
1 TABLET ORAL 2 TIMES DAILY
COMMUNITY
Start: 2017-05-31 | End: 2017-11-27

## 2017-06-13 NOTE — PROGRESS NOTES
Anticoagulation Clinic Progress Note  Indication: Atrial Fibrillation and Mechanical Aortic Valve  Referring Provider: Enzo   Initial Warfarin Start Date: 11/2012  Planned Duration of Therapy: Life  Goal INR: 2.5- 3.5  Current Drug Interactions: divalproex, citalopram    Vit K: patient is eating daily samara lettuce salads  Alcohol: none  Tobacco: none    Anticoagulation Clinic INR History:  Date 9/12 10/21 11/17 12/27 1/5 1/9 2/13 3/15 4/10   Total Weekly Dose  70mg 60-62.5mg  55mg (2 held doses) 60 mg 60mg 60mg 60mg   INR 2.9 3.6 3.3 4.72 (ED) 1.3 2.7 2.7 3.8 3.0   Notes              Date 5/8 5/23 6/6 6/9 6/13       Total Weekly Dose 52.5mg 60mg 62.5mg 62.5 mg 77.5 mg 65 mg      INR 2.2 2.2 1.2 2.2 2.6       Notes missed dose More vit K Dose increase; increase in Vit K foods  boost         Clinic Interview:  Tablet Strength: pt has 5 mg tablets   Current Dose: fluctuating with subtherapeutic readings -- see dosing calendar below  Patient Findings      Positives Change in diet/appetite     Negatives Signs/symptoms of thrombosis, Signs/symptoms of bleeding, Laboratory test error suspected, Change in health, Change in alcohol use, Change in activity, Upcoming invasive procedure, Emergency department visit, Upcoming dental procedure, Missed doses, Extra doses, Change in medications, Hospital admission, Bruising, Other complaints     Comments Pt did not continue Lovenox over the weekend, per Dr Hollis's recommendation. He has been eating samara lettuce salads daily since last visit. Reports no complications, missed warfarin doses, or other changes. He is going to order a BP cuff, as requested by Dr Hollis. Asking for refill of furosemide, but noted that Dorie declined this refill last week. He will call to discuss further with Dr Hollis.         Plan:  1. INR is therapeutic today at the low end of goal following boosted doses last week (with Lovenox). Will instruct Mr Alberto to increase his dose slightly further  to warfarin 10 mg daily except 7.5 mg on Mondays.  2. RTC in 1 week to reassess. If therapeutic, consider 2-3 week follow up thereafter.  3. Pt reports no recent changes in medications.   4. Pt declines warfarin refills.   5. Verbal and written information provided. Pt expresses understanding and has no further questions at this time. Will address other questions related to disease state in HVI.      Ann R Cowden, RPH  6/13/2017  3:49 PM

## 2017-06-20 ENCOUNTER — TELEPHONE (OUTPATIENT)
Dept: CARDIOLOGY | Facility: CLINIC | Age: 52
End: 2017-06-20

## 2017-06-20 RX ORDER — FUROSEMIDE 40 MG/1
40 TABLET ORAL DAILY PRN
Qty: 90 TABLET | Refills: 3 | Status: SHIPPED | OUTPATIENT
Start: 2017-06-20 | End: 2018-09-23 | Stop reason: SDUPTHER

## 2017-06-20 RX ORDER — POTASSIUM CHLORIDE 750 MG/1
10 TABLET, FILM COATED, EXTENDED RELEASE ORAL DAILY PRN
Qty: 90 TABLET | Refills: 3 | Status: SHIPPED | OUTPATIENT
Start: 2017-06-20 | End: 2018-10-12

## 2017-06-20 NOTE — TELEPHONE ENCOUNTER
Called patient back and let him know I spoke with dr Hollis and we will call in lasix to take as needed for >2lb weight gain in 24hr. He would like him to take with potassium and will send both scripts into pharmacy.

## 2017-06-24 DIAGNOSIS — F41.9 ANXIETY: ICD-10-CM

## 2017-06-26 RX ORDER — CITALOPRAM 10 MG/1
TABLET ORAL
Qty: 30 TABLET | Refills: 1 | Status: SHIPPED | OUTPATIENT
Start: 2017-06-26 | End: 2017-08-10 | Stop reason: SDUPTHER

## 2017-06-26 RX ORDER — LISINOPRIL 40 MG/1
TABLET ORAL
Qty: 30 TABLET | Refills: 6 | Status: SHIPPED | OUTPATIENT
Start: 2017-06-26 | End: 2018-01-19 | Stop reason: SDUPTHER

## 2017-06-27 ENCOUNTER — APPOINTMENT (OUTPATIENT)
Dept: PHARMACY | Facility: HOSPITAL | Age: 52
End: 2017-06-27

## 2017-06-28 ENCOUNTER — APPOINTMENT (OUTPATIENT)
Dept: PHARMACY | Facility: HOSPITAL | Age: 52
End: 2017-06-28

## 2017-06-28 ENCOUNTER — ANTICOAGULATION VISIT (OUTPATIENT)
Dept: PHARMACY | Facility: HOSPITAL | Age: 52
End: 2017-06-28

## 2017-06-28 DIAGNOSIS — Z95.2 HX OF AORTIC VALVE REPLACEMENT, MECHANICAL: ICD-10-CM

## 2017-06-28 DIAGNOSIS — I48.91 ATRIAL FIBRILLATION, UNSPECIFIED TYPE (HCC): ICD-10-CM

## 2017-06-28 PROCEDURE — 85610 PROTHROMBIN TIME: CPT

## 2017-06-28 PROCEDURE — G0463 HOSPITAL OUTPT CLINIC VISIT: HCPCS

## 2017-06-28 PROCEDURE — 36416 COLLJ CAPILLARY BLOOD SPEC: CPT

## 2017-06-28 NOTE — PROGRESS NOTES
Anticoagulation Clinic Progress Note  Indication: Atrial Fibrillation and Mechanical Aortic Valve  Referring Provider: Enzo   Initial Warfarin Start Date: 11/2012  Planned Duration of Therapy: Life  Goal INR: 2.5- 3.5  Current Drug Interactions: divalproex, citalopram    Vit K: patient is eating daily samara lettuce salads  Alcohol: none  Tobacco: none    Anticoagulation Clinic INR History:  Date 9/12 10/21 11/17 12/27 1/5 1/9 2/13 3/15 4/10   Total Weekly Dose  70mg 60-62.5mg  55mg (2 held doses) 60 mg 60mg 60mg 60mg   INR 2.9 3.6 3.3 4.72 (ED) 1.3 2.7 2.7 3.8 3.0     Date 5/8 5/23 6/6 6/9 6/13 6/28      Total Weekly Dose 52.5mg 60mg 62.5mg 62.5 mg 77.5 mg 57.5 mg 67.5 mg     INR 2.2 2.2 1.2 2.2 2.6 2.3      Notes missed dose more vit K dose increase; increase in Vit K foods  boost missed dose        Clinic Interview:  Tablet Strength: pt has 5 mg tablets   Current Dose: pt is unable to tell me his exact dosing schedule, but he reports that he has followed his dosing calendar (10mg daily except 7.5mg Mon)  Patient Findings      Positives Missed doses, Other complaints     Negatives Signs/symptoms of thrombosis, Signs/symptoms of bleeding, Laboratory test error suspected, Change in health, Change in alcohol use, Change in activity, Upcoming invasive procedure, Emergency department visit, Upcoming dental procedure, Extra doses, Change in medications, Change in diet/appetite, Hospital admission, Bruising     Comments Mr. Alberto fell asleep early and missed his warfarin dose this past Sunday 6/25. He inquired about the importance of this medication, and the risks associated with his valve. We discussed this at length, explaining that his mechanical valve is a foreign body that is quite thrombogenic, though slightly less-so in the aortic position vs mitral. That his atrial fib adds to his overall thrombotic risk, as does his HTN in combination with these factors. In addition, warfarin is complicated given multiple  drug-drug and drug-food (vitamin k) interactions. Emphasized that he is not a candidate for the newer anticoagulants given his mechanical valve, and even if he has valve re-do with porcine option, he will likely still need lifelong anticoagulation given afib. Pt verbalizes improved understanding.         Plan:  1. INR is slightly subtherapeutic today, likely a result of missed dose this past weekend. Will instruct pt to BOOST his dose today (15mg), then continue warfarin 10 mg daily except 7.5 mg on Mondays.  2. RTC in 3 weeks. Pt more aware of importance of close monitoring given high risk (valve + afib). See above re: discussion.  3. Dr Hollis has called in a new rx for Lasix + KCl PRN >2lb weight gain in 24 hours. Pt has yet to pick this up.  4. Pt declines warfarin refills.   5. Verbal and written information provided. Pt expresses understanding and has no further questions at this time. Will address other questions related to disease state in HVI.      Liane Diaz RPH  6/28/2017  1:07 PM

## 2017-06-29 LAB
INR PPP: 2.3 (ref 0.91–1.09)
PROTHROMBIN TIME: 27.7 SECONDS (ref 10–13.8)

## 2017-07-19 ENCOUNTER — ANTICOAGULATION VISIT (OUTPATIENT)
Dept: PHARMACY | Facility: HOSPITAL | Age: 52
End: 2017-07-19

## 2017-07-19 DIAGNOSIS — Z95.2 HX OF AORTIC VALVE REPLACEMENT, MECHANICAL: ICD-10-CM

## 2017-07-19 DIAGNOSIS — I48.91 ATRIAL FIBRILLATION, UNSPECIFIED TYPE (HCC): ICD-10-CM

## 2017-07-19 LAB
INR PPP: 2.4 (ref 0.91–1.09)
PROTHROMBIN TIME: 29.2 SECONDS (ref 10–13.8)

## 2017-07-19 PROCEDURE — 36416 COLLJ CAPILLARY BLOOD SPEC: CPT

## 2017-07-19 PROCEDURE — G0463 HOSPITAL OUTPT CLINIC VISIT: HCPCS

## 2017-07-19 PROCEDURE — 85610 PROTHROMBIN TIME: CPT

## 2017-07-19 RX ORDER — WARFARIN SODIUM 5 MG/1
TABLET ORAL
Qty: 180 TABLET | Refills: 1 | OUTPATIENT
Start: 2017-07-19 | End: 2017-09-06 | Stop reason: SDUPTHER

## 2017-07-19 NOTE — PROGRESS NOTES
Anticoagulation Clinic Progress Note  Indication: Atrial Fibrillation and Mechanical Aortic Valve  Referring Provider: Enzo Hollis  Initial Warfarin Start Date: 11/2012  Planned Duration of Therapy: Life  Goal INR: 2.5- 3.5  Current Drug Interactions: divalproex, citalopram    Vit K: patient is eating daily samara lettuce salads  Alcohol: none  Tobacco: none    Anticoagulation Clinic INR History:  Date 9/12 10/21 11/17 12/27 1/5 1/9 2/13 3/15 4/10   Total Weekly Dose  70mg 60-62.5mg  55mg (2 held doses) 60 mg 60mg 60mg 60mg   INR 2.9 3.6 3.3 4.72 (ED) 1.3 2.7 2.7 3.8 3.0     Date 5/8 5/23 6/6 6/9 6/13 6/28 7/19     Total Weekly Dose 52.5mg 60mg 62.5mg 62.5 mg 77.5 mg 57.5 mg 67.5 mg 70mg    INR 2.2 2.2 1.2 2.2 2.6 2.3 2.4     Notes missed dose more vit K dose increase; increase in Vit K foods  boost missed dose          Clinic Interview:  Tablet Strength: pt has 5 mg tablets   Current Dose: pt is unable to tell me his exact dosing schedule, but he reports that he has followed his dosing calendar (10mg daily except 7.5mg Mon)  Patient Findings      Positives Signs/symptoms of thrombosis, Change in medications, Other complaints     Negatives Signs/symptoms of bleeding, Laboratory test error suspected, Change in health, Change in alcohol use, Change in activity, Upcoming invasive procedure, Emergency department visit, Upcoming dental procedure, Missed doses, Extra doses, Change in diet/appetite, Hospital admission, Bruising     Comments Pt has not missed any warfarin doses since his last anticoag clinic visit. He has picked up his rx for furosemide and is taking it on a daily basis -- if not, he reports his ankles swell badly. Last Friday he was sitting down and experienced one of his (usual) dizzy spells, but he passed out for about a minute this time, per friend's report. He thinks he noticed that he had some slurred speech that afternoon but that did not last long. Also reports that his right arm went  completely numb for a short time later that same afternoon. Since then, Mr. Alberto reports that he hasn't had any energy and just does not feel like himself. He is concerned that something has changed for the worse. We discussed s/sx of CVA/TIA at length -- the FAST acronym, and the importance of going to the ED immediately if he notices any symptoms like this in the future. Discussed current and previous INR, goal range, risk of CVA/TIA when INR falls out of range, and the importance of timely follow up. I encouraged him to go to the ED this afternoon for further workup given his description of this occurence, but he would like to wait to discuss with Dr Hollis at his appointment on Friday.       Plan:  1. INR is slightly subtherapeutic again today. Instructed pt to BOOST his warfarin dose today (12.5mg), then increase his dose to warfarin 10 mg daily.  3. RTC in 2 weeks to assess dose adjustment.   4. See above re: furosemide.  5. Pt requests warfarin refills.   6. Verbal and written information provided. Pt expresses understanding and has no further questions at this time.      Liane Diaz [facility]  7/19/2017  3:48 PM

## 2017-07-21 ENCOUNTER — OFFICE VISIT (OUTPATIENT)
Dept: CARDIOLOGY | Facility: CLINIC | Age: 52
End: 2017-07-21

## 2017-07-21 VITALS
SYSTOLIC BLOOD PRESSURE: 120 MMHG | BODY MASS INDEX: 36.72 KG/M2 | WEIGHT: 311 LBS | DIASTOLIC BLOOD PRESSURE: 70 MMHG | HEIGHT: 77 IN | HEART RATE: 72 BPM

## 2017-07-21 DIAGNOSIS — I10 ESSENTIAL HYPERTENSION: ICD-10-CM

## 2017-07-21 DIAGNOSIS — R55 POSTURAL DIZZINESS WITH PRESYNCOPE: Primary | ICD-10-CM

## 2017-07-21 DIAGNOSIS — R42 POSTURAL DIZZINESS WITH PRESYNCOPE: Primary | ICD-10-CM

## 2017-07-21 DIAGNOSIS — E78.2 MIXED HYPERLIPIDEMIA: ICD-10-CM

## 2017-07-21 DIAGNOSIS — I35.0 NONRHEUMATIC AORTIC VALVE STENOSIS: ICD-10-CM

## 2017-07-21 PROCEDURE — 99213 OFFICE O/P EST LOW 20 MIN: CPT | Performed by: INTERNAL MEDICINE

## 2017-07-21 NOTE — PROGRESS NOTES
[facility] at [facility]  Office Progress Note  Mitchell Alberto  1965  [phone]      Visit Date: 01/06/2017    PCP: Kate Garrido, DO  [address] [facility] [address]  [place] [id number]    IDENTIFICATION: A 52 y.o. male resident of [facility].    Chief Complaint   Patient presents with   • Dizziness       PROBLEM LIST:   1.  Ascending aortic dissection and severe aortic insufficiency:  a.  Mechanical Bentall aortic valve replacement ([facility]) and ascending aortic dissection repair, 11/17/2012, Dr. Jasmeet De Dios.   b. Echocardiogram, 12/03/2013:  i. LVEF (55% to 60%), mechanical aortic valve, area 1.18 cm2.  ii. Chronic Coumadin therapy                     c.    Cardiac PET scan, 08/26/2015, negative for reversible ischemia; fixed photopenia                            of the apex and septum possibly due to LBBB; LVEF (30% to 34%).                    d.    Cardiac catheterization,  Dorian Giraldo MD, 09/03/2015:  No obstructive                            CAD.                    E. 1/17 echo EF 55% mild lvh AVR mean pressure 17                    F. 12/16 CTA chest wnl  2.  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  Residual left upper extremity weakness.    3. Left arm discomfort:  a. Possibly due to embolic CVA vs. intraoperative nerve traction.   4. Hypertension.   5. Obesity.    6. 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 Status post surgery:  a. Left inguinal herniorrhaphy, August 2014.   b. Left forehead melanoma excision, Dr. Gisell Kothari, August 2014.    Allergies  No Known Allergies    Current Medications    Current Outpatient Prescriptions:   •  ALPRAZolam (XANAX) 0.25 MG tablet, Take 1 tablet by mouth Daily As Needed for anxiety. for anxiety, Disp: 30 tablet, Rfl: 0  •  CIALIS 5 MG tablet, Take 1 tablet by mouth As Needed., Disp: , Rfl:   •  citalopram (CeleXA) 10 MG tablet, TAKE ONE TABLET BY MOUTH DAILY, Disp: 30 tablet, Rfl:  "1  •  divalproex (DEPAKOTE) 500 MG DR tablet, TAKE ONE TABLET BY MOUTH THREE TIMES A DAY, Disp: 90 tablet, Rfl: 1  •  fluticasone (FLONASE) 50 MCG/ACT nasal spray, 1 spray into each nostril Daily As Needed., Disp: , Rfl:   •  furosemide (LASIX) 40 MG tablet, Take 1 tablet by mouth Daily As Needed (>2lb weight gain in 24hr). Take 1 tab as needed daily for >2lb weight gain in 24hr, Disp: 90 tablet, Rfl: 3  •  HYDROcodone-acetaminophen (NORCO) 5-325 MG per tablet, Take 1 tablet by mouth 2 (Two) Times a Day., Disp: , Rfl:   •  lisinopril (PRINIVIL,ZESTRIL) 40 MG tablet, TAKE ONE TABLET BY MOUTH DAILY, Disp: 30 tablet, Rfl: 6  •  PHARMACY TO DOSE WARFARIN, Continuous As Needed (patient is managed by the [facility] ([phone]))., Disp: , Rfl:   •  potassium chloride (K-DUR) 10 MEQ CR tablet, Take 1 tablet by mouth Daily As Needed (take with lasix)., Disp: 90 tablet, Rfl: 3  •  triamterene-hydrochlorothiazide (DYAZIDE) 37.5-25 MG per capsule, TAKE ONE CAPSULE BY MOUTH DAILY, Disp: 30 capsule, Rfl: 2  •  warfarin (COUMADIN) 5 MG tablet, Take 2 tablets by mouth daily, or as directed by the anticoagulation pharmacist., Disp: 180 tablet, Rfl: 1      History of Present Illness   Patient returns to office today for early follow-up , patient with presyncope however he has no monitor to measure.  Stable chest pain.    ROS:  All systems have been reviewed and are negative with the exception of those mentioned in the HPI.    OBJECTIVE:  Vitals:    07/21/17 1345 07/21/17 1347   BP: 128/78 120/70   BP Location: Right arm Right arm   Patient Position: Sitting Standing   Pulse: 72    Weight: (!) 311 lb (141 kg)    Height: 77\" (195.6 cm)      Physical Exam   Constitutional: He is oriented to person, place, and time. He appears well-nourished. No distress.   Neck: Neck supple. No JVD present. No tracheal deviation present. No thyromegaly present.   Cardiovascular: Normal rate, regular rhythm and " intact distal pulses.   No extrasystoles are present.   No murmur heard.  Mechanical S2   Pulmonary/Chest: Effort normal and breath sounds normal. He has no wheezes. He has no rales.   Some tenderness to left anterior thorax with palpation.   Abdominal: Soft. Bowel sounds are normal. There is no tenderness. There is no guarding.   Musculoskeletal: Normal range of motion. He exhibits edema (trace pitting edema lower extremities bilaterally). He exhibits no tenderness.   Lymphadenopathy:     He has no cervical adenopathy.   Neurological: He is alert and oriented to person, place, and time.   Nursing note and vitals reviewed.      Diagnostic Data:  Procedures  ASSESSMENT:   Diagnosis Plan   1. Postural dizziness with presyncope     2. Nonrheumatic aortic valve stenosis     3. Essential hypertension     4. Mixed hyperlipidemia         PLAN:  1.  Aortic valve disease status post mechanical aVR in Bentyl procedure with acceptable CTA of the chest 1216.  He continues to have accelerated hypertension which we will have evaluated he was sent to obtain a home blood pressure monitor and contact us this following week.    2.  Dyspnea and shortness of breath will document BNP CBC and CMP in the office today    3.  Dyslipidemia  Not on statin therapy    4.  Presyncope - pt to get home bp monitor and call w results.  cbc 6/17 wnl

## 2017-08-10 ENCOUNTER — ANTICOAGULATION VISIT (OUTPATIENT)
Dept: PHARMACY | Facility: HOSPITAL | Age: 52
End: 2017-08-10

## 2017-08-10 DIAGNOSIS — F41.9 ANXIETY: ICD-10-CM

## 2017-08-10 DIAGNOSIS — Z95.2 HX OF AORTIC VALVE REPLACEMENT, MECHANICAL: ICD-10-CM

## 2017-08-10 LAB
INR PPP: 3.5 (ref 0.91–1.09)
PROTHROMBIN TIME: 41.9 SECONDS (ref 10–13.8)

## 2017-08-10 PROCEDURE — G0463 HOSPITAL OUTPT CLINIC VISIT: HCPCS

## 2017-08-10 PROCEDURE — 85610 PROTHROMBIN TIME: CPT

## 2017-08-10 PROCEDURE — 36416 COLLJ CAPILLARY BLOOD SPEC: CPT

## 2017-08-10 RX ORDER — CITALOPRAM 10 MG/1
TABLET ORAL
Qty: 30 TABLET | Refills: 0 | Status: SHIPPED | OUTPATIENT
Start: 2017-08-10 | End: 2017-11-20 | Stop reason: SDUPTHER

## 2017-08-10 RX ORDER — TRIAMTERENE AND HYDROCHLOROTHIAZIDE 37.5; 25 MG/1; MG/1
CAPSULE ORAL
Qty: 30 CAPSULE | Refills: 1 | Status: SHIPPED | OUTPATIENT
Start: 2017-08-10 | End: 2018-10-12

## 2017-08-10 NOTE — PROGRESS NOTES
Anticoagulation Clinic Progress Note  Indication: Atrial Fibrillation and Mechanical Aortic Valve  Referring Provider: Enzo Hollis  Initial Warfarin Start Date: 11/2012  Planned Duration of Therapy: Life  Goal INR: 2.5- 3.5  Current Drug Interactions: divalproex, citalopram    Vit K: patient is eating daily samara lettuce salads  Alcohol: none  Tobacco: none    Anticoagulation Clinic INR History:  Date 9/12 10/21 11/17 12/27 1/5 1/9 2/13 3/15 4/10   Total Weekly Dose  70mg 60-62.5mg  55mg (2 held doses) 60 mg 60mg 60mg 60mg   INR 2.9 3.6 3.3 4.72 (ED) 1.3 2.7 2.7 3.8 3.0     Date 5/8 5/23 6/6 6/9 6/13 6/28 7/19 8/10    Total Weekly Dose 52.5mg 60mg 62.5mg 62.5 mg 77.5 mg 57.5 mg 67.5 mg 70mg    INR 2.2 2.2 1.2 2.2 2.6 2.3 2.4 3.5    Notes missed dose more vit K dose increase; increase in Vit K foods  boost missed dose          Clinic Interview:  Tablet Strength: pt has 5 mg tablets   Current Dose: pt is unable to tell me his exact dosing schedule, but he reports that he has followed his dosing calendar (10mg daily)  Patient Findings      Negatives Signs/symptoms of thrombosis, Signs/symptoms of bleeding, Laboratory test error suspected, Change in health, Change in alcohol use, Change in activity, Upcoming invasive procedure, Emergency department visit, Upcoming dental procedure, Missed doses, Extra doses, Change in medications, Change in diet/appetite, Hospital admission, Bruising, Other complaints     Comments Patient is still dizzy but has not passed out. He plans to order a blood pressure cuff within the next few days. Patient states that his energy is better. He has lost 56lbs.        Plan:  1. INR is therapeutic today (3.5). Instructed pt to continue warfarin 10 mg daily.  3. RTC in 3 weeks to ensure remains WNL.   4. No medication changes  5. Pt declines warfarin refills.   6. Verbal and written information provided. Pt expresses understanding and has no further questions at this time.      Do GRIFFITH  [place] Vargsa  8/10/2017  3:14 PM

## 2017-08-28 ENCOUNTER — APPOINTMENT (OUTPATIENT)
Dept: PHARMACY | Facility: HOSPITAL | Age: 52
End: 2017-08-28

## 2017-08-29 ENCOUNTER — APPOINTMENT (OUTPATIENT)
Dept: PHARMACY | Facility: HOSPITAL | Age: 52
End: 2017-08-29

## 2017-08-30 ENCOUNTER — ANTICOAGULATION VISIT (OUTPATIENT)
Dept: PHARMACY | Facility: HOSPITAL | Age: 52
End: 2017-08-30

## 2017-08-30 DIAGNOSIS — Z95.2 HX OF AORTIC VALVE REPLACEMENT, MECHANICAL: ICD-10-CM

## 2017-08-30 LAB
INR PPP: 1.2 (ref 0.91–1.09)
PROTHROMBIN TIME: 15 SECONDS (ref 10–13.8)

## 2017-08-30 PROCEDURE — 85610 PROTHROMBIN TIME: CPT

## 2017-08-30 PROCEDURE — 36416 COLLJ CAPILLARY BLOOD SPEC: CPT

## 2017-08-30 PROCEDURE — G0463 HOSPITAL OUTPT CLINIC VISIT: HCPCS

## 2017-08-30 NOTE — PROGRESS NOTES
Anticoagulation Clinic Progress Note  Indication: Atrial Fibrillation and Mechanical Aortic Valve  Referring Provider: Enzo Hollis  Initial Warfarin Start Date: 11/2012  Planned Duration of Therapy: Life  Goal INR: 2.5- 3.5  Current Drug Interactions: divalproex, citalopram    Vit K: patient is eating daily samara lettuce salads  Alcohol: none  Tobacco: none    Anticoagulation Clinic INR History:  Date 9/12 10/21 11/17 12/27 1/5 1/9 2/13 3/15 4/10   Total Weekly Dose  70mg 60-62.5mg  55mg (2 held doses) 60 mg 60mg 60mg 60mg   INR 2.9 3.6 3.3 4.72 (ED) 1.3 2.7 2.7 3.8 3.0     Date 5/8 5/23 6/6 6/9 6/13 6/28 7/19 8/10 8/30   Total Weekly Dose 52.5mg 60mg 62.5mg 62.5 mg 77.5 mg 57.5 mg 67.5 mg 70mg 70mg   INR 2.2 2.2 1.2 2.2 2.6 2.3 2.4 3.5 1.2   Notes missed dose more vit K dose increase; increase in Vit K foods  boost missed dose   Increase GLV; protein       Clinic Interview:  Tablet Strength: pt has 5 mg tablets   Current Dose: pt is unable to tell me his exact dosing schedule, but he reports that he has followed his dosing calendar (10mg daily)  Wt: 129.09kg (8/2017)  SCr: 1.0 (12/27/16)   Estimated CrCl: 182.94mL/min  (12/27/2017)  Patient Findings      Positives Upcoming invasive procedure, Change in diet/appetite     Negatives Signs/symptoms of thrombosis, Signs/symptoms of bleeding, Laboratory test error suspected, Change in health, Change in alcohol use, Change in activity, Emergency department visit, Upcoming dental procedure, Missed doses, Extra doses, Change in medications, Hospital admission, Bruising, Other complaints     Comments Patient states that his dizzy spells has stopped. Patient has elevated PSA and will complete biopsy. It is currently not scheduled, however, Dr. Ivy's office at [facility] nephrology will call patient today to schedule appointment. Advised patient to discuss hold for warfarin with them. Patient is increasing protein intake for diet and has stopped eating  carbohydrates- he has lost 68lbs. Patient also discusses eating more green vegetables this past week and doesn't anticipate to continue eating as much GLV as he has had this past week.         Plan for Lovenox Bridge Therapy:  1. Lovenox 120mg Q12H; first dose this afternoon through [facility] 9/3. QTY: 9 refills:0  2. Patient will also boost warfarin dose ~10.7% this week and RTC in one week.    Plan:  1. INR is SUBtherapeutic today (1.2)- likely due to increase in GLV and protein diet. Instructed pt to begin lovenox injections tonight and continue through Sunday night 9/3 and increase warfarin dose to 12.5mg today, tomorrow, and Friday (10.7% increase) then continue warfarin 10 mg daily until RTC.  3. RTC in 1 week.  4. No medication changes. Called in Rx for Lovenox as written above to [facility] at [facility] pharmacy.  5. Pt declines warfarin refills. Patient weighs 284lbs per patient- reports when he was at doctor last Monday.   6. Verbal and written information provided. Pt expresses understanding and has no further questions at this time.      Do Vargas, PharmD  8/30/2017  9:43 AM

## 2017-08-31 ENCOUNTER — TELEPHONE (OUTPATIENT)
Dept: CARDIOLOGY | Facility: CLINIC | Age: 52
End: 2017-08-31

## 2017-09-01 NOTE — TELEPHONE ENCOUNTER
MALDONADO at 11:18am with the fax number for [facility].  On the message I left instructions for his medication. Awaiting his return call.

## 2017-09-06 ENCOUNTER — ANTICOAGULATION VISIT (OUTPATIENT)
Dept: PHARMACY | Facility: HOSPITAL | Age: 52
End: 2017-09-06

## 2017-09-06 DIAGNOSIS — I48.91 ATRIAL FIBRILLATION, UNSPECIFIED TYPE (HCC): ICD-10-CM

## 2017-09-06 DIAGNOSIS — Z95.2 HX OF AORTIC VALVE REPLACEMENT, MECHANICAL: ICD-10-CM

## 2017-09-06 LAB
INR PPP: 3.2 (ref 0.91–1.09)
PROTHROMBIN TIME: 38.6 SECONDS (ref 10–13.8)

## 2017-09-06 PROCEDURE — G0463 HOSPITAL OUTPT CLINIC VISIT: HCPCS

## 2017-09-06 PROCEDURE — 36416 COLLJ CAPILLARY BLOOD SPEC: CPT

## 2017-09-06 PROCEDURE — 85610 PROTHROMBIN TIME: CPT

## 2017-09-06 RX ORDER — WARFARIN SODIUM 5 MG/1
TABLET ORAL
Qty: 180 TABLET | Refills: 1 | OUTPATIENT
Start: 2017-09-06 | End: 2018-07-29 | Stop reason: SDUPTHER

## 2017-09-06 NOTE — PROGRESS NOTES
Anticoagulation Clinic Progress Note  Indication: Atrial Fibrillation and Mechanical Aortic Valve  Referring Provider: Enzo Hollis  Initial Warfarin Start Date: 11/2012  Planned Duration of Therapy: Life  Goal INR: 2.5- 3.5  Current Drug Interactions: divalproex, citalopram    Vit K: patient is eating daily samara lettuce salads  Alcohol: none  Tobacco: none    Anticoagulation Clinic INR History:  Date 5/8 5/23 6/6 6/9 6/13 6/28 7/19 8/10 8/30   Total Weekly Dose 52.5mg 60mg 62.5mg 62.5 mg 77.5 mg 57.5 mg 67.5 mg 70mg 70mg   INR 2.2 2.2 1.2 2.2 2.6 2.3 2.4 3.5 1.2   Notes missed dose more vit K dose increase; increase in Vit K foods  boost missed dose   Increase GLV; protein     Date 9/6           Total Weekly Dose 67.5 mg           INR 3.2           Notes boost + 1 held dose               Clinic Interview:  Tablet Strength: pt has 5 mg tablets   Current Dose: pt is unable to tell me his exact dosing schedule, but he reports that he has followed his dosing calendar (10mg daily)  Wt: 129.09kg (8/2017)  SCr: 1.0 (12/27/16)   Estimated CrCl: 182.94mL/min  (12/27/2017)  Patient Findings      Positives Upcoming invasive procedure, Missed doses, Extra doses     Negatives Signs/symptoms of thrombosis, Signs/symptoms of bleeding, Laboratory test error suspected, Change in health, Change in alcohol use, Change in activity, Emergency department visit, Upcoming dental procedure, Change in medications, Change in diet/appetite, Hospital admission, Bruising, Other complaints     Comments Pt is scheduled for a prostate biopsy on Friday 9/8 -- instructed to hold his warfarin x3 days prior. He cannot afford Lovenox injections (copay over $100, so he did not use Lovenox injections as recommended last week). He continues on his diet.       Plan:  1. INR is therapeutic today following boosted doses last week. Pt is currently holding his warfarin x3 days prior to prostate biopsy -- instructed him to boost his dose x2 days  (12.5mg) after his procedure, then resume warfarin 10mg daily thereafter.  3. RTC ~1 week post-biopsy, Monday 9/18, to ensure INR back WNL following hold.  4. No recent medication changes -- pt did not take Lovenox this past week, as they were too expensive.  5. Pt requests warfarin refills.  6. Verbal and written information provided. Pt expresses understanding and has no further questions at this time.      Liane Diaz [facility]  9/6/2017  9:28 AM

## 2017-09-18 ENCOUNTER — ANTICOAGULATION VISIT (OUTPATIENT)
Dept: PHARMACY | Facility: HOSPITAL | Age: 52
End: 2017-09-18

## 2017-09-18 DIAGNOSIS — Z95.2 HX OF AORTIC VALVE REPLACEMENT, MECHANICAL: ICD-10-CM

## 2017-09-18 LAB
INR PPP: 1.8 (ref 0.91–1.09)
PROTHROMBIN TIME: 21.2 SECONDS (ref 10–13.8)

## 2017-09-18 PROCEDURE — 85610 PROTHROMBIN TIME: CPT

## 2017-09-18 PROCEDURE — 36416 COLLJ CAPILLARY BLOOD SPEC: CPT

## 2017-09-18 PROCEDURE — G0463 HOSPITAL OUTPT CLINIC VISIT: HCPCS

## 2017-09-18 NOTE — PROGRESS NOTES
"Anticoagulation Clinic Progress Note  Indication: Atrial Fibrillation and Mechanical Aortic Valve  Referring Provider: Enzo Hollis  Initial Warfarin Start Date: 11/2012  Planned Duration of Therapy: Life  Goal INR: 2.5- 3.5  Current Drug Interactions: divalproex, citalopram    Vit K: patient is eating daily samara lettuce salads  Alcohol: none  Tobacco: none    Anticoagulation Clinic INR History:  Date 5/8 5/23 6/6 6/9 6/13 6/28 7/19 8/10 8/30   Total Weekly Dose 52.5mg 60mg 62.5mg 62.5 mg 77.5 mg 57.5 mg 67.5 mg 70mg 70mg   INR 2.2 2.2 1.2 2.2 2.6 2.3 2.4 3.5 1.2   Notes missed dose more vit K dose increase; increase in Vit K foods  boost missed dose   Increase GLV; protein     Date 9/6 9/18          Total Weekly Dose 67.5 mg 60mg          INR 3.2 1.8          Notes boost + 1 held dose Missed dose + significant alcohol consumption on 9/16              Clinic Interview:  Tablet Strength: pt has 5 mg tablets   Current Dose: patient takes 10mg daily  Wt: 129.09kg (8/2017)  SCr: 1.0 (6/9/17)       Patient Findings      Positives Signs/symptoms of bleeding, Change in health, Change in alcohol use, Missed doses     Negatives Signs/symptoms of thrombosis, Laboratory test error suspected, Change in activity, Upcoming invasive procedure, Emergency department visit, Upcoming dental procedure, Extra doses, Change in medications, Change in diet/appetite, Hospital admission, Bruising, Other complaints     Comments Patient still having hematuria; was told by provider this should last ~3 days. Blood in semen also; was told this could last up to 1 month. Drank lots of alcohol on Saturday, unsure of how much.       Plan:  1. INR is subtherapeutic today (1.8). Pt resumed warfarin after biopsy with boosted doses x2 days (12.5mg) after the procedure, but missed a 10mg dose on 9/16. Instructed him to take warfarin 12.5mg x 2 doses (today and tomorrow), then resume 10mg daily.   2. Patient reports persistent blood, \"lots of it\", " in his urine and ejaculate since the Bx. Instructed him to call his physician and to notify us if the doctor wishes to make any changes to his warfarin.  3. RTC in 1 week, Monday 9/25, to ensure INR is WNL and to follow-up with bleeding.  4. No recent medication changes.  5. Declined warfarin refills.  6. Verbal and written information provided. Pt expresses understanding and has no further questions at this time.      [facility]  9/18/2017  10:16 AM

## 2017-10-03 ENCOUNTER — TELEPHONE (OUTPATIENT)
Dept: PHARMACY | Facility: HOSPITAL | Age: 52
End: 2017-10-03

## 2017-10-04 ENCOUNTER — APPOINTMENT (OUTPATIENT)
Dept: PHARMACY | Facility: HOSPITAL | Age: 52
End: 2017-10-04

## 2017-10-05 ENCOUNTER — TELEPHONE (OUTPATIENT)
Dept: PHARMACY | Facility: HOSPITAL | Age: 52
End: 2017-10-05

## 2017-10-09 ENCOUNTER — ANTICOAGULATION VISIT (OUTPATIENT)
Dept: PHARMACY | Facility: HOSPITAL | Age: 52
End: 2017-10-09

## 2017-10-09 DIAGNOSIS — I48.91 ATRIAL FIBRILLATION, UNSPECIFIED TYPE (HCC): ICD-10-CM

## 2017-10-09 DIAGNOSIS — Z95.2 HX OF AORTIC VALVE REPLACEMENT, MECHANICAL: ICD-10-CM

## 2017-10-09 LAB
INR PPP: 3.4 (ref 0.91–1.09)
PROTHROMBIN TIME: 40.5 SECONDS (ref 10–13.8)

## 2017-10-09 PROCEDURE — 36416 COLLJ CAPILLARY BLOOD SPEC: CPT

## 2017-10-09 PROCEDURE — G0463 HOSPITAL OUTPT CLINIC VISIT: HCPCS

## 2017-10-09 PROCEDURE — 85610 PROTHROMBIN TIME: CPT

## 2017-10-09 NOTE — PROGRESS NOTES
"Anticoagulation Clinic Progress Note  Indication: Atrial Fibrillation and Mechanical Aortic Valve  Referring Provider: Enzo Hollis  Initial Warfarin Start Date: 11/2012  Planned Duration of Therapy: Life  Goal INR: 2.5- 3.5  Current Drug Interactions: divalproex, citalopram    Vit K: patient is eating daily samara lettuce salads  Alcohol: none  Tobacco: none    Anticoagulation Clinic INR History:  Date 5/8 5/23 6/6 6/9 6/13 6/28 7/19 8/10 8/30   Total Weekly Dose 52.5mg 60mg 62.5mg 62.5 mg 77.5 mg 57.5 mg 67.5 mg 70mg 70mg   INR 2.2 2.2 1.2 2.2 2.6 2.3 2.4 3.5 1.2   Notes missed dose more vit K dose increase; increase in Vit K foods  boost missed dose   Increase GLV; protein     Date 9/6 9/18 10/9         Total Weekly Dose 67.5 mg 60mg          INR 3.2 1.8 3.4         Notes boost + 1 held dose Missed dose + significant alcohol consumption on 9/16              Clinic Interview:  Tablet Strength: pt has 5 mg tablets   Current Dose: patient takes 10mg daily  Wt: 129.09kg (8/2017)  SCr: 1.0 (6/9/17)       Patient Findings      Positives Signs/symptoms of bleeding, Change in health, Change in alcohol use, Missed doses     Negatives Signs/symptoms of thrombosis, Laboratory test error suspected, Change in activity, Upcoming invasive procedure, Emergency department visit, Upcoming dental procedure, Extra doses, Change in medications, Change in diet/appetite, Hospital admission, Bruising, Other complaints     Comments Patient still having hematuria; was told by provider this should last ~3 days. Blood in semen also; was told this could last up to 1 month. Drank lots of alcohol on Saturday, unsure of how much.     Plan:  1. INR is  then resume 10mg daily.   2. Patient reports persistent blood, \"lots of it\", in his urine and ejaculate since the Bx. Instructed him to call his physician and to notify us if the doctor wishes to make any changes to his warfarin.  3. RTC in 1 week, Monday 9/25, to ensure INR is WNL and to " follow-up with bleeding.  4. No recent medication changes.  5. Declined warfarin refills.  6. Verbal and written information provided. Pt expresses understanding and has no further questions at this time.    Liane Diaz RPH  10/9/2017  11:57 AM

## 2017-10-09 NOTE — PROGRESS NOTES
Anticoagulation Clinic Progress Note  Indication: Atrial Fibrillation and Mechanical Aortic Valve  Referring Provider: Enzo Hollis  Initial Warfarin Start Date: 11/2012  Planned Duration of Therapy: Life  Goal INR: 2.5- 3.5  Current Drug Interactions: divalproex, citalopram    Vit K: patient is eating daily samara lettuce salads  Alcohol: none  Tobacco: none    Anticoagulation Clinic INR History:  Date 5/8 5/23 6/6 6/9 6/13 6/28 7/19 8/10 8/30   Total Weekly Dose 52.5mg 60mg 62.5mg 62.5 mg 77.5 mg 57.5 mg 67.5 mg 70mg 70mg   INR 2.2 2.2 1.2 2.2 2.6 2.3 2.4 3.5 1.2   Notes missed dose more vit K dose increase; increase in Vit K foods  boost missed dose   Increase GLV; protein     Date 9/6 9/18 10/9         Total Weekly Dose 67.5 mg 60mg 70mg         INR 3.2 1.8 3.4         Notes boost + 1 held dose Missed dose + significant alcohol consumption on 9/16            Clinic Interview:  Tablet Strength: pt has 5 mg tablets   Current Dose: patient takes 10mg daily  Wt: 129.09kg (8/2017)  SCr: 1.0 (6/9/17)   Patient Findings      Positives Signs/symptoms of bleeding, Change in health, Bruising     Negatives Signs/symptoms of thrombosis, Laboratory test error suspected, Change in alcohol use, Change in activity, Upcoming invasive procedure, Emergency department visit, Upcoming dental procedure, Missed doses, Extra doses, Change in medications, Change in diet/appetite, Hospital admission, Other complaints     Comments Patient discussed that hematuria has remitted, however, he has continued to find blood in semen. Originally Mr. Alberto was told this could last up to 1 month, however, patient plans to contact Dr. Ivy today to discuss symptoms. Patient have noticed more bruising recently. He thinks it is possible that it is from his dog. Advised patient to continue to watch this. If it gets darker or bigger, advised patient to contact PCP to have it looked at immediately. Mr. Alberto reports having a virus this weekend,  however, he is feeling better.         Plan:  1. INR is therapeutic today (3.4). Instructed patient to continue 10mg daily.   2. Patient reports persistent blood in semen since the Bx. Instructed him to call his physician and to notify us if the doctor wishes to make any changes to his warfarin.  3. RTC in 3 week, Monday 10/30, to ensure INR is WNL and to follow-up with bleeding.  4. No recent medication changes.  5. Declined warfarin refills.  6. Verbal and written information provided. Pt expresses understanding and has no further questions at this time.      Do Vargas, PharmD  10/9/2017  11:58 AM

## 2017-10-19 DIAGNOSIS — Z95.2 HX OF AORTIC VALVE REPLACEMENT, MECHANICAL: ICD-10-CM

## 2017-10-19 RX ORDER — WARFARIN SODIUM 5 MG/1
TABLET ORAL
Qty: 80 TABLET | Refills: 1 | Status: SHIPPED | OUTPATIENT
Start: 2017-10-19 | End: 2017-11-16 | Stop reason: SDUPTHER

## 2017-10-21 ENCOUNTER — HOSPITAL ENCOUNTER (EMERGENCY)
Facility: HOSPITAL | Age: 52
Discharge: HOME | End: 2017-10-21
Attending: EMERGENCY MEDICINE | Admitting: EMERGENCY MEDICINE

## 2017-10-21 ENCOUNTER — APPOINTMENT (OUTPATIENT)
Dept: ULTRASOUND IMAGING | Facility: HOSPITAL | Age: 52
End: 2017-10-21

## 2017-10-21 VITALS
BODY MASS INDEX: 33.06 KG/M2 | TEMPERATURE: 98.7 F | HEIGHT: 77 IN | HEART RATE: 70 BPM | WEIGHT: 280 LBS | OXYGEN SATURATION: 96 % | SYSTOLIC BLOOD PRESSURE: 135 MMHG | RESPIRATION RATE: 18 BRPM | DIASTOLIC BLOOD PRESSURE: 78 MMHG

## 2017-10-21 DIAGNOSIS — N43.3 BILATERAL HYDROCELE: Primary | ICD-10-CM

## 2017-10-21 LAB
BACTERIA UR QL AUTO: ABNORMAL /HPF
BILIRUB UR QL STRIP: ABNORMAL
CLARITY UR: CLEAR
COLOR UR: YELLOW
GLUCOSE UR STRIP-MCNC: NEGATIVE MG/DL
HGB UR QL STRIP.AUTO: NEGATIVE
HYALINE CASTS UR QL AUTO: ABNORMAL /LPF
KETONES UR QL STRIP: NEGATIVE
LEUKOCYTE ESTERASE UR QL STRIP.AUTO: ABNORMAL
NITRITE UR QL STRIP: NEGATIVE
PH UR STRIP.AUTO: 6 [PH] (ref 5–8)
PROT UR QL STRIP: NEGATIVE
RBC # UR: ABNORMAL /HPF
REF LAB TEST METHOD: ABNORMAL
SP GR UR STRIP: 1.03 (ref 1–1.03)
SQUAMOUS #/AREA URNS HPF: ABNORMAL /HPF
UROBILINOGEN UR QL STRIP: ABNORMAL
WBC UR QL AUTO: ABNORMAL /HPF

## 2017-10-21 PROCEDURE — 87086 URINE CULTURE/COLONY COUNT: CPT | Performed by: EMERGENCY MEDICINE

## 2017-10-21 PROCEDURE — 87077 CULTURE AEROBIC IDENTIFY: CPT | Performed by: EMERGENCY MEDICINE

## 2017-10-21 PROCEDURE — 81001 URINALYSIS AUTO W/SCOPE: CPT | Performed by: EMERGENCY MEDICINE

## 2017-10-21 PROCEDURE — 93976 VASCULAR STUDY: CPT

## 2017-10-21 PROCEDURE — 87186 SC STD MICRODIL/AGAR DIL: CPT | Performed by: EMERGENCY MEDICINE

## 2017-10-21 PROCEDURE — 99283 EMERGENCY DEPT VISIT LOW MDM: CPT

## 2017-10-21 PROCEDURE — 76870 US EXAM SCROTUM: CPT

## 2017-10-21 RX ORDER — HYDROCODONE BITARTRATE AND ACETAMINOPHEN 5; 325 MG/1; MG/1
1 TABLET ORAL EVERY 6 HOURS PRN
Qty: 12 TABLET | Refills: 0 | Status: SHIPPED | OUTPATIENT
Start: 2017-10-21 | End: 2017-11-27

## 2017-10-21 RX ORDER — OXYCODONE AND ACETAMINOPHEN 7.5; 325 MG/1; MG/1
1 TABLET ORAL ONCE
Status: COMPLETED | OUTPATIENT
Start: 2017-10-21 | End: 2017-10-21

## 2017-10-21 RX ORDER — NAPROXEN 250 MG/1
500 TABLET ORAL ONCE
Status: COMPLETED | OUTPATIENT
Start: 2017-10-21 | End: 2017-10-21

## 2017-10-21 RX ADMIN — NAPROXEN 500 MG: 250 TABLET ORAL at 20:50

## 2017-10-21 RX ADMIN — OXYCODONE HYDROCHLORIDE AND ACETAMINOPHEN 1 TABLET: 7.5; 325 TABLET ORAL at 21:00

## 2017-10-22 NOTE — DISCHARGE INSTRUCTIONS
Support your scrotum with a jock strap or snug underwear.     Follow up with Dr. Ivy, urology, as soon as possible. Please call to make an appointment.    Immediately return to the emergency department for any new or worsening symptoms.

## 2017-10-22 NOTE — ED PROVIDER NOTES
Subjective   HPI Comments: Mr. Mitchell Alberto is a 52 y.o. male who presents to the ED with c/o scrotal pain. He notes that about 5 days ago he was moving something heavy with his neighbor when we felt a twinge in his scrotum and groin. Over the next couple of days the pain and swelling worsened and now his scrotum is the size of a softball. He complains of severe pain, along with redness and numbness in the area. He also complains of nausea associated with the pain. He notes that these sx are similar to when he had an inguinal hernia. His hernia was surgically repaired and scrotum returned to normal size shortly. He has no other acute sx at this time. He denies any new or unfaithful sexual partners.     Patient is a 52 y.o. male presenting with male genitourinary complaint.   History provided by:  Patient  Male  Problem   Presenting symptoms: scrotal pain and swelling    Scrotal pain:     Affected testicle:  Left    Severity:  Severe    Onset quality:  Sudden    Duration:  5 days    Timing:  Constant    Progression:  Worsening    Chronicity:  New  Swelling:     Location:  Genitalia    Onset quality:  Sudden    Duration:  5 days    Timing:  Constant    Progression:  Worsening    Chronicity:  New  Context comment:  Heavy lifting  Relieved by:  None tried  Worsened by:  Nothing  Ineffective treatments:  None tried  Associated symptoms: nausea and scrotal swelling    Risk factors: does not have multiple sexual partners and no new sexual partner        Review of Systems   Gastrointestinal: Positive for nausea.   Genitourinary: Positive for scrotal swelling and testicular pain.   Neurological: Positive for numbness (Left testicle area).       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 Residual left upper extremity weakness.     • Transient cerebral ischemia        No Known Allergies    Past Surgical History:   Procedure Laterality Date   • ABDOMINAL AORTIC ANEURYSM REPAIR      History of Aortic Aneurysm Repair   • AORTIC VALVE REPAIR/REPLACEMENT      Replacement   • BACK SURGERY     • HERNIA REPAIR  08/2014    Left inguinal herniorrhaphy   • SKIN CANCER EXCISION  08/2014    Left forehead melanoma excision, Dr. Gisell Kothari, August 2014.        Family History   Problem Relation Age of Onset   • Aortic aneurysm Mother    • Arthritis Mother    • Heart disease Mother    • Heart attack Mother    • Hypertension Mother    • Arthritis Father    • Heart disease Father    • Melanoma Father    • Hypertension Father    • Depression Other    • Hypertension Sister    • Heart disease Brother    • Hypertension Brother    • Hypertension Brother        Social History     Social History   • Marital status:      Spouse name: N/A   • Number of children: N/A   • Years of education: N/A     Occupational History   • Disabled      Social History Main Topics   • Smoking status: Never Smoker   • Smokeless tobacco: Never Used   • Alcohol use Yes      Comment: very occasional   • Drug use: No   • Sexual activity: Not Asked     Other Topics Concern   • None     Social History Narrative    Patient drinks 6-8 caffeine servings per day.         Objective   Physical Exam   Constitutional: He is oriented to person, place, and time. He appears well-developed and well-nourished. No distress.   HENT:   Head: Normocephalic and atraumatic.   Nose: Nose normal.   Airway patent.   Eyes: Conjunctivae are normal. No scleral icterus.   Neck: Normal range of motion and phonation normal. Neck supple.   Pulmonary/Chest: Effort normal. No respiratory distress.   Abdominal: Soft. There is tenderness.   LLQ TTP down to the left groin especially in the left  inguinal area.   Genitourinary: Left testis shows tenderness.   Genitourinary Comments: Swelling in the scrotum. Exquisite tenderness of the left testicle with enlargement.    Lymphadenopathy:        Right: No inguinal adenopathy present.        Left: No inguinal adenopathy present.   No obvious swollen lymph nodes in inguinal areas.    Neurological: He is alert and oriented to person, place, and time.   Skin: Skin is warm and dry.   Psychiatric: He has a normal mood and affect. His behavior is normal.   Nursing note and vitals reviewed.      Procedures         ED Course  ED Course   Comment By Time   MEERA query complete. Treatment plan to include limited course of prescribed  controlled substance. Risks including addiction, benefits, and alternatives presented to patient. Aum Rascon 10/21 2244     Recent Results (from the past 24 hour(s))   Urinalysis With / Culture If Indicated - Urine, Clean Catch    Collection Time: 10/21/17  9:00 PM   Result Value Ref Range    Color, UA Yellow Yellow, Straw    Appearance, UA Clear Clear    pH, UA 6.0 5.0 - 8.0    Specific Gravity, UA 1.026 1.001 - 1.030    Glucose, UA Negative Negative    Ketones, UA Negative Negative    Bilirubin, UA Small (1+) (A) Negative    Blood, UA Negative Negative    Protein, UA Negative Negative    Leuk Esterase, UA Small (1+) (A) Negative    Nitrite, UA Negative Negative    Urobilinogen, UA 1.0 E.U./dL 0.2 - 1.0 E.U./dL   Urinalysis, Microscopic Only - Urine, Clean Catch    Collection Time: 10/21/17  9:00 PM   Result Value Ref Range    RBC, UA 0-2 None Seen, 0-2 /HPF    WBC, UA 0-2 (A) None Seen /HPF    Bacteria, UA None Seen None Seen, Trace /HPF    Squamous Epithelial Cells, UA None Seen None Seen, 0-2 /HPF    Hyaline Casts, UA None Seen 0 - 6 /LPF    Methodology Automated Microscopy      Note: In addition to lab results from this visit, the labs listed above may include labs taken at another facility or during a different encounter within the last  "24 hours. Please correlate lab times with ED admission and discharge times for further clarification of the services performed during this visit.    US Testicular or Ovarian Vascular Limited   Final Result     Bilateral hydroceles.      THIS DOCUMENT HAS BEEN ELECTRONICALLY SIGNED BY AI ARITA MD      US Scrotum & Testicles   Final Result     Bilateral hydroceles.      THIS DOCUMENT HAS BEEN ELECTRONICALLY SIGNED BY AI ARITA MD        Vitals:    10/21/17 1958 10/21/17 2255   BP: 144/73 135/78   BP Location: Left arm Right arm   Patient Position: Sitting Lying   Pulse: 75 70   Resp: 16 18   Temp: 98.7 °F (37.1 °C)    TempSrc: Oral    SpO2: 94% 96%   Weight: 280 lb (127 kg)    Height: 77\" (195.6 cm)      Medications   naproxen (NAPROSYN) tablet 500 mg (500 mg Oral Given 10/21/17 2050)   oxyCODONE-acetaminophen (PERCOCET) 7.5-325 MG per tablet 1 tablet (1 tablet Oral Given 10/21/17 2100)     ECG/EMG Results (last 24 hours)     ** No results found for the last 24 hours. **                          [facility]    Final diagnoses:   Bilateral hydrocele       Documentation assistance provided by oriana MILLER.  Information recorded by the scribe was done at my direction and has been verified and validated by me.     Santos Miller  10/21/17 2045       Keven Lizarraga MD  10/22/17 0112    "

## 2017-10-23 ENCOUNTER — EPISODE CHANGES (OUTPATIENT)
Dept: CASE MANAGEMENT | Facility: OTHER | Age: 52
End: 2017-10-23

## 2017-10-24 ENCOUNTER — TELEPHONE (OUTPATIENT)
Dept: EMERGENCY DEPT | Facility: HOSPITAL | Age: 52
End: 2017-10-24

## 2017-10-24 LAB
BACTERIA SPEC AEROBE CULT: ABNORMAL

## 2017-10-31 ENCOUNTER — TELEPHONE (OUTPATIENT)
Dept: PHARMACY | Facility: HOSPITAL | Age: 52
End: 2017-10-31

## 2017-11-15 ENCOUNTER — APPOINTMENT (OUTPATIENT)
Dept: PHARMACY | Facility: HOSPITAL | Age: 52
End: 2017-11-15

## 2017-11-16 ENCOUNTER — ANTICOAGULATION VISIT (OUTPATIENT)
Dept: PHARMACY | Facility: HOSPITAL | Age: 52
End: 2017-11-16

## 2017-11-16 DIAGNOSIS — I48.91 ATRIAL FIBRILLATION, UNSPECIFIED TYPE (HCC): ICD-10-CM

## 2017-11-16 DIAGNOSIS — Z95.2 HX OF AORTIC VALVE REPLACEMENT, MECHANICAL: ICD-10-CM

## 2017-11-16 LAB
INR PPP: 1.6 (ref 0.91–1.09)
PROTHROMBIN TIME: 19.7 SECONDS (ref 10–13.8)

## 2017-11-16 PROCEDURE — G0463 HOSPITAL OUTPT CLINIC VISIT: HCPCS

## 2017-11-16 PROCEDURE — 36416 COLLJ CAPILLARY BLOOD SPEC: CPT

## 2017-11-16 PROCEDURE — 85610 PROTHROMBIN TIME: CPT

## 2017-11-16 NOTE — PROGRESS NOTES
"Anticoagulation Clinic Progress Note  Indication: Atrial Fibrillation and Mechanical Aortic Valve  Referring Provider: Enzo Hollis  Initial Warfarin Start Date: 11/2012  Planned Duration of Therapy: Life  Goal INR: 2.5- 3.5  Current Drug Interactions: divalproex, citalopram    Vit K: patient is eating daily samara lettuce salads  Alcohol: none  Tobacco: none      Anticoagulation Clinic INR History:  Date 5/8 5/23 6/6 6/9 6/13 6/28 7/19 8/10 8/30   Total Weekly Dose 52.5mg 60mg 62.5mg 62.5 mg 77.5 mg 57.5 mg 67.5 mg 70mg 70mg   INR 2.2 2.2 1.2 2.2 2.6 2.3 2.4 3.5 1.2   Notes missed dose more vit K dose increase; increase in Vit K foods  boost missed dose   Increase GLV; protein     Date 9/6 9/18 10/9 11/16        Total Weekly Dose 67.5 mg 60mg 70mg 70mg        INR 3.2 1.8 3.4 1.6        Notes boost + 1 held dose Missed dose + significant alcohol consumption on 9/16            Clinic Interview:  Tablet Strength: pt has 5 mg tablets   Current Dose: patient takes 10mg daily  Patient Findings      Positives Emergency department visit     Negatives Signs/symptoms of thrombosis, Signs/symptoms of bleeding, Laboratory test error suspected, Change in health, Change in alcohol use, Change in activity, Upcoming invasive procedure, Upcoming dental procedure, Missed doses, Extra doses, Change in medications, Change in diet/appetite, Hospital admission, Bruising, Other complaints     Comments Mr. Alberto denies any further bleeding issues, and he denies missed warfarin doses, reporting he is taking his medication \"religiously,\" with a daily alarm. He is taking TWO 5mg warfarin tablets daily. He also denies significant changes in his diet since last visit. He went to the ED with hydrocele and was prescribed Lortab last month, but he otherwise denies changes in medications. Only notable change is weight loss -- he has has lost 70-80 lbs over the past few months.     Plan:  1. INR is subtherapeutic, uncertain the cause (see " above -- pt denies changes). For now, will re-initiate enoxaparin shots q12h. Mr. Alberto will also BOOST his warfarin dose tonight (15mg) and tomorrow (12.5mg), then continue warfarin 10mg daily.   2. RTC on Monday to reassess. Given INR lability and pt's non-compliance with follow up, will submit information to [facility] in the meantime to see if he may qualify for patient self testing.  3. No recent medication changes; pt declines warfarin refills. Called in enoxaparin 120mg (0.98 mg/kg) syringes, #6 with 2 refills.   Wt: 271 lb = 123kg per pt report 11/16/17   SCr: 1.0 on 6/9/17    4. Verbal and written information provided in clinic. Pt expresses understanding and has no further questions at this time.    Liane Diaz [facility]  11/16/2017  9:09 AM

## 2017-11-20 DIAGNOSIS — F41.9 ANXIETY: ICD-10-CM

## 2017-11-20 RX ORDER — CITALOPRAM 10 MG/1
10 TABLET ORAL DAILY
Qty: 30 TABLET | Refills: 0 | OUTPATIENT
Start: 2017-11-20 | End: 2018-05-07

## 2017-11-27 ENCOUNTER — ANTICOAGULATION VISIT (OUTPATIENT)
Dept: PHARMACY | Facility: HOSPITAL | Age: 52
End: 2017-11-27

## 2017-11-27 DIAGNOSIS — Z95.2 HX OF AORTIC VALVE REPLACEMENT, MECHANICAL: ICD-10-CM

## 2017-11-27 LAB
INR PPP: 1.9 (ref 0.91–1.09)
PROTHROMBIN TIME: 22.4 SECONDS (ref 10–13.8)

## 2017-11-27 PROCEDURE — G0463 HOSPITAL OUTPT CLINIC VISIT: HCPCS

## 2017-11-27 PROCEDURE — 85610 PROTHROMBIN TIME: CPT

## 2017-11-27 PROCEDURE — 36416 COLLJ CAPILLARY BLOOD SPEC: CPT

## 2017-11-27 NOTE — PROGRESS NOTES
Anticoagulation Clinic Progress Note  Indication: Atrial Fibrillation and Mechanical Aortic Valve  Referring Provider: Enzo Hollis  Initial Warfarin Start Date: 11/2012  Planned Duration of Therapy: Life  Goal INR: 2.5- 3.5  Current Drug Interactions: citalopram    Vit K: patient is eating daily samara lettuce salads  Alcohol: none  Tobacco: none      Anticoagulation Clinic INR History:  Date 5/8 5/23 6/6 6/9 6/13 6/28 7/19 8/10 8/30   Total Weekly Dose 52.5mg 60mg 62.5mg 62.5 mg 77.5 mg 57.5 mg 67.5 mg 70mg 70mg   INR 2.2 2.2 1.2 2.2 2.6 2.3 2.4 3.5 1.2   Notes missed dose more vit K dose increase; increase in Vit K foods  boost missed dose   Increase GLV; protein     Date 9/6 9/18 10/9 11/16 11/27       Total Weekly Dose 67.5 mg 60mg 70mg 70mg 70mg 75mg      INR 3.2 1.8 3.4 1.6 1.9       Notes boost + 1 held dose Missed dose + significant alcohol consumption on 9/16  enox enox         Clinic Interview:  Tablet Strength: pt has 5 mg tablets   Current Dose: patient takes 10mg daily  Patient Findings      Positives Other complaints     Negatives Signs/symptoms of thrombosis, Signs/symptoms of bleeding, Laboratory test error suspected, Change in health, Change in alcohol use, Change in activity, Upcoming invasive procedure, Emergency department visit, Upcoming dental procedure, Missed doses, Extra doses, Change in medications, Change in diet/appetite, Hospital admission, Bruising     Comments Mr. Alberto reports self stopping depakote a month or so ago.  Patient has been eating several shucky beans (dried green beans). Only notable change is weight loss -- he has has lost 70-80 lbs over the past few months.     Plan:  1. INR is subtherapeutic again. For now, will re-initiate enoxaparin shots q12h through tomorrow. Mr. Alberto will increase weekly doses to 10mg daily except 15mg Mon.   2. RTC on Wednesday 12/6 to reassess. Patient has not heard from [facility], however, has voicemails from [employer] numbers and plans to  listen to voicemails.  3. No recent medication changes; pt declines warfarin refills. Called in enoxaparin 120mg (0.98 mg/kg) syringes, #2 with 0 refills. Wt: 266 lb = 120kg per pt report 11/27/17 SCr: 1.0 on 6/9/17  4. Verbal and written information provided in clinic. Pt expresses understanding by teach back and has no further questions at this time.    Do Vargas, PharmD  11/27/2017  1:17 PM

## 2017-12-04 LAB — INR PPP: 2.5

## 2017-12-11 LAB — INR PPP: 3.2

## 2017-12-20 ENCOUNTER — ANTICOAGULATION VISIT (OUTPATIENT)
Dept: PHARMACY | Facility: HOSPITAL | Age: 52
End: 2017-12-20

## 2017-12-20 DIAGNOSIS — Z95.2 HX OF AORTIC VALVE REPLACEMENT, MECHANICAL: ICD-10-CM

## 2017-12-26 ENCOUNTER — ANTICOAGULATION VISIT (OUTPATIENT)
Dept: PHARMACY | Facility: HOSPITAL | Age: 52
End: 2017-12-26

## 2017-12-26 DIAGNOSIS — Z95.2 HX OF AORTIC VALVE REPLACEMENT, MECHANICAL: ICD-10-CM

## 2017-12-26 LAB — INR PPP: 2.7

## 2017-12-26 NOTE — PROGRESS NOTES
Unable to LVM- Mailbox is full    Anticoagulation Clinic Progress Note  Indication: Atrial Fibrillation and Mechanical Aortic Valve  Referring Provider: Enzo Hollis  Initial Warfarin Start Date: 11/2012  Planned Duration of Therapy: Life  Goal INR: 2.5- 3.5  Current Drug Interactions: citalopram    Vit K: patient is eating daily samara lettuce salads  Alcohol: none  Tobacco: none      Anticoagulation Clinic INR History:  Date 5/8 5/23 6/6 6/9 6/13 6/28 7/19 8/10 8/30   Total Weekly Dose 52.5mg 60mg 62.5mg 62.5 mg 77.5 mg 57.5 mg 67.5 mg 70mg 70mg   INR 2.2 2.2 1.2 2.2 2.6 2.3 2.4 3.5 1.2   Notes missed dose more vit K dose increase; increase in Vit K foods  boost missed dose   Increase GLV; protein     Date 9/6 9/18 10/9 11/16 11/27 12/4 12/11 12/25    Total Weekly Dose 67.5 mg 60mg 70mg 70mg 70mg 70mg 75mg 75mg    INR 3.2 1.8 3.4 1.6 1.9 2.5 3.2 2.7    Notes boost + 1 held dose Missed dose + significant alcohol consumption on 9/16  enox enox         Clinic Interview:  Tablet Strength: pt has 5 mg tablets   Current Dose: patient takes 10mg daily  Patient Findings      Positives Bruising, Other complaints     Negatives Signs/symptoms of thrombosis, Signs/symptoms of bleeding, Laboratory test error suspected, Change in health, Change in alcohol use, Change in activity, Upcoming invasive procedure, Emergency department visit, Upcoming dental procedure, Missed doses, Extra doses, Change in medications, Change in diet/appetite, Hospital admission     Comments Pt reported he tripped and fell hard on the sidewalk Mon 12/25 while walking his dog. He said he was unable to catch himself and believes he may have bruised or broken a rib. I discussed the risk of internal bleeding with pt and advised him to either go to the ED or call his PCP immediately and discuss his accident with them. Pt acknowledged, understood and said he would call them today.     Patient Contact: [phone]    Plan:  1. INR is therapeutic.   Remy to continue weekly dose 10mg daily except 15mg Mon.   2. Recheck INR on 1/2 to ensure WNL. Plan to call [facility] regarding INR results that are not linked.  3. No recent medication changes; pt declines warfarin refills.   4. Verbal and written information provided in clinic. Pt expresses understanding by teach back and has no further questions at this time.    Sam Burleson  12/26/2017  8:28 AM       I, Do Vargas, PharmD, have reviewed the note in full and agree with the assessment and plan.  12/26/17  4:15 PM

## 2018-01-03 LAB — INR PPP: 2

## 2018-01-04 ENCOUNTER — ANTICOAGULATION VISIT (OUTPATIENT)
Dept: PHARMACY | Facility: HOSPITAL | Age: 53
End: 2018-01-04

## 2018-01-04 DIAGNOSIS — Z95.2 HX OF AORTIC VALVE REPLACEMENT, MECHANICAL: ICD-10-CM

## 2018-01-04 NOTE — PROGRESS NOTES
"Unable to LVM- Mailbox is full    Anticoagulation Clinic Progress Note  Indication: Atrial Fibrillation and Mechanical Aortic Valve  Referring Provider: Enzo Hollis  Initial Warfarin Start Date: 11/2012  Planned Duration of Therapy: Life  Goal INR: 2.5- 3.5  Current Drug Interactions: citalopram    Vit K: patient is eating daily samara lettuce salads  Alcohol: none  Tobacco: none      Anticoagulation Clinic INR History:  Date 5/8 5/23 6/6 6/9 6/13 6/28 7/19 8/10 8/30   Total Weekly Dose 52.5mg 60mg 62.5mg 62.5 mg 77.5 mg 57.5 mg 67.5 mg 70mg 70mg   INR 2.2 2.2 1.2 2.2 2.6 2.3 2.4 3.5 1.2   Notes missed dose more vit K dose increase; increase in Vit K foods  boost missed dose   Increase GLV; protein     Date 9/6 9/18 10/9 11/16 11/27 12/4 12/11 12/25 1/4   Total Weekly Dose 67.5 mg 60mg 70mg 70mg 70mg 70mg 75mg 75mg 70mg   INR 3.2 1.8 3.4 1.6 1.9 2.5 3.2 2.7 2.0   Notes boost + 1 held dose Missed dose + significant alcohol consumption on 9/16  enox enox    boost     Date            Total Weekly Dose 75mg           INR            Notes              Clinic Interview:  Tablet Strength: pt has 5 mg tablets   Current Dose: patient takes 10mg daily  Patient Findings      Positives Change in diet/appetite     Negatives Signs/symptoms of thrombosis, Signs/symptoms of bleeding, Laboratory test error suspected, Change in health, Change in alcohol use, Change in activity, Upcoming invasive procedure, Emergency department visit, Upcoming dental procedure, Missed doses, Extra doses, Change in medications, Hospital admission, Bruising, Other complaints     Comments Patient reports eating \"several\" servings of cooked cabbage over the holidays. He understands that GLV, especially cooked cabbage, can decrease his INR.     Patient Contact: [phone]    Plan:  1. INR was SUBtherapeutic on 1/3 at 2.0, goal INR 2.5-3.5. Mr. Alberto reports his dose as warfarin 10 mg daily, however our records show his dose was warfarin 10mg " "daily except 15mg on Mondays. Mr. Alberto attributes his low INR to \"several\" servings of cooked cabbage over the holidays. I instructed Mr. Alberto to BOOST with warfarin 15mg tonight, then continue his reported dose of warfarin 10mg daily.  2. Recheck INR in 1 week to ensure WNL.  3. Verbal information provided over the phone. Pt expresses understanding by teach back and has no further questions at this time.    Aliyah De Leon, PharmD  Pharmacy Resident  1/4/2018  10:00 AM  "

## 2018-01-15 ENCOUNTER — ANTICOAGULATION VISIT (OUTPATIENT)
Dept: PHARMACY | Facility: HOSPITAL | Age: 53
End: 2018-01-15

## 2018-01-15 DIAGNOSIS — Z95.2 HX OF AORTIC VALVE REPLACEMENT, MECHANICAL: ICD-10-CM

## 2018-01-15 LAB — INR PPP: 2.9

## 2018-01-15 NOTE — PROGRESS NOTES
Anticoagulation Clinic Progress Note  Indication: Atrial Fibrillation and Mechanical Aortic Valve  Referring Provider: Enzo Hollis  Initial Warfarin Start Date: 11/2012  Planned Duration of Therapy: Life  Goal INR: 2.5- 3.5  Current Drug Interactions: citalopram    Vit K: patient is eating daily samara lettuce salads  Alcohol: none  Tobacco: none      Anticoagulation Clinic INR History:  Date 5/8 5/23 6/6 6/9 6/13 6/28 7/19 8/10 8/30   Total Weekly Dose 52.5mg 60mg 62.5mg 62.5 mg 77.5 mg 57.5 mg 67.5 mg 70mg 70mg   INR 2.2 2.2 1.2 2.2 2.6 2.3 2.4 3.5 1.2   Notes missed dose more vit K dose increase; increase in Vit K foods  boost missed dose   Increase GLV; protein     Date 9/6 9/18 10/9 11/16 11/27 12/4 12/11 12/25 1/4   Total Weekly Dose 67.5 mg 60mg 70mg 70mg 70mg 70mg 75mg 75mg 70mg   INR 3.2 1.8 3.4 1.6 1.9 2.5 3.2 2.7 2.0   Notes boost + 1 held dose Missed dose + significant alcohol consumption on 9/16  enox enox    boost     Date 1/15           Total Weekly Dose 75mg           INR 2.9           Notes              Clinic Interview:  Tablet Strength: pt has 5 mg tablets   Current Dose: patient takes 10mg daily except 15mg Mon  Patient Findings      Negatives Signs/symptoms of thrombosis, Signs/symptoms of bleeding, Laboratory test error suspected, Change in health, Change in alcohol use, Change in activity, Upcoming invasive procedure, Emergency department visit, Upcoming dental procedure, Missed doses, Extra doses, Change in medications, Change in diet/appetite, Hospital admission, Bruising, Other complaints   Patient Contact: [phone]    Plan:  1. INR was therapeutic on 1/12 at 2.9. Instructed Mr. Alberto continue increased dose of warfarin 10mg daily except 15mg Mon.  2. Recheck INR on 1/25. Patient is waiting for shipment of testing supplies from [facility] and should have them by that time. He has been therapeutic on this dose recently, so q2week testing is acceptable.   3. Verbal information provided  over the phone. Pt expresses understanding by teach back and has no further questions at this time.    Ella Bauer, Pharmacy Intern   1/15/2018  8:30 AM       I, Do Vargas, PharmD, have reviewed the note in full and agree with the assessment and plan.  01/16/18  4:40 PM

## 2018-01-19 RX ORDER — LISINOPRIL 40 MG/1
TABLET ORAL
Qty: 90 TABLET | Refills: 3 | Status: SHIPPED | OUTPATIENT
Start: 2018-01-19 | End: 2018-10-12 | Stop reason: SDUPTHER

## 2018-01-25 ENCOUNTER — ANTICOAGULATION VISIT (OUTPATIENT)
Dept: PHARMACY | Facility: HOSPITAL | Age: 53
End: 2018-01-25

## 2018-01-25 DIAGNOSIS — Z95.2 HX OF AORTIC VALVE REPLACEMENT, MECHANICAL: ICD-10-CM

## 2018-01-25 LAB — INR PPP: 2.5

## 2018-01-25 NOTE — PROGRESS NOTES
Anticoagulation Clinic Progress Note  Indication: Atrial Fibrillation and Mechanical Aortic Valve  Referring Provider: Enzo Hollis  Initial Warfarin Start Date: 11/2012  Planned Duration of Therapy: Life  Goal INR: 2.5- 3.5  Current Drug Interactions: citalopram    Vit K: patient is eating daily samara lettuce salads  Alcohol: none  Tobacco: none      Anticoagulation Clinic INR History:  Date 5/8 5/23 6/6 6/9 6/13 6/28 7/19 8/10 8/30   Total Weekly Dose 52.5mg 60mg 62.5mg 62.5 mg 77.5 mg 57.5 mg 67.5 mg 70mg 70mg   INR 2.2 2.2 1.2 2.2 2.6 2.3 2.4 3.5 1.2   Notes missed dose more vit K dose increase; increase in Vit K foods  boost missed dose   Increase GLV; protein     Date 9/6 9/18 10/9 11/16 11/27 12/4 12/11 12/25 1/4   Total Weekly Dose 67.5 mg 60mg 70mg 70mg 70mg 70mg 75mg 75mg 70mg   INR 3.2 1.8 3.4 1.6 1.9 2.5 3.2 2.7 2.0   Notes boost + 1 held dose Missed dose + significant alcohol consumption on 9/16  enox enox    boost     Date 1/15 1/25          Total Weekly Dose 75mg 75mg          INR 2.9 2.5          Notes              Clinic Interview:  Tablet Strength: pt has 5 mg tablets   Current Dose: patient takes 10mg daily except 15mg Mon  Patient Findings      Negatives Signs/symptoms of thrombosis, Signs/symptoms of bleeding, Laboratory test error suspected, Change in health, Change in alcohol use, Change in activity, Upcoming invasive procedure, Emergency department visit, Upcoming dental procedure, Missed doses, Extra doses, Change in medications, Change in diet/appetite, Hospital admission, Bruising, Other complaints   Patient Contact: [phone]    Plan:  1. INR is therapeutic today at 2.5. Instructed Mr. Alberto continue warfarin 10mg daily except 15mg Mon.  2. Recheck INR in 2 weeks.   3. Verbal information provided over the phone. Pt expresses understanding by teach back and has no further questions at this time.    Ella Bauer, Pharmacy Intern   1/25/2018  3:23 PM     Gladis JAMES,  RP, have reviewed the note in full and agree with the assessment and plan.  01/25/18  4:54 PM

## 2018-02-15 ENCOUNTER — ANTICOAGULATION VISIT (OUTPATIENT)
Dept: PHARMACY | Facility: HOSPITAL | Age: 53
End: 2018-02-15

## 2018-02-15 DIAGNOSIS — Z95.2 HX OF AORTIC VALVE REPLACEMENT, MECHANICAL: ICD-10-CM

## 2018-02-15 LAB — INR PPP: 2.1

## 2018-02-15 NOTE — PROGRESS NOTES
Anticoagulation Clinic Progress Note  Indication: Atrial Fibrillation and Mechanical Aortic Valve  Referring Provider: Enzo Hollis  Initial Warfarin Start Date: 11/2012  Planned Duration of Therapy: Life  Goal INR: 2.5- 3.5  Current Drug Interactions: citalopram    Vit K: patient is eating daily samara lettuce salads  Alcohol: none  Tobacco: none      Anticoagulation Clinic INR History:  Date 5/8 5/23 6/6 6/9 6/13 6/28 7/19 8/10 8/30   Total Weekly Dose 52.5mg 60mg 62.5mg 62.5 mg 77.5 mg 57.5 mg 67.5 mg 70mg 70mg   INR 2.2 2.2 1.2 2.2 2.6 2.3 2.4 3.5 1.2   Notes missed dose more vit K dose increase; increase in Vit K foods  boost missed dose   Increase GLV; protein     Date 9/6 9/18 10/9 11/16 11/27 12/4 12/11 12/25 1/4   Total Weekly Dose 67.5 mg 60mg 70mg 70mg 70mg 70mg 75mg 75mg 70mg   INR 3.2 1.8 3.4 1.6 1.9 2.5 3.2 2.7 2.0   Notes boost + 1 held dose Missed dose + significant alcohol consumption on 9/16  enox enox    boost     Date 1/15 1/25 2/15         Total Weekly Dose 75mg 75mg 65mg         INR 2.9 2.5 2.1         Notes   Missed           Clinic Interview:  Tablet Strength: pt has 5 mg tablets   Current Dose: patient takes 10mg daily except 15mg Mon  Patient Findings      Positives Missed doses     Negatives Signs/symptoms of thrombosis, Signs/symptoms of bleeding, Laboratory test error suspected, Change in health, Change in alcohol use, Change in activity, Upcoming invasive procedure, Emergency department visit, Upcoming dental procedure, Extra doses, Change in medications, Change in diet/appetite, Hospital admission, Bruising, Other complaints     Comments Patient reports missing last night's 10mg dose due to travel. Patient also reports having 1 bite of cooked cabbage at [facility]. Patient denies any other changes in medications, health, or diet.    Patient Contact: [phone]    Plan:  1. INR is subtherapeutic today at 2.1 which Mr. Alberto attributed to his missed dose last night.  Instructed Mr. Alberto to BOOST tonight's dose to 15 mg and continue warfarin 10mg daily except 15mg Mon.  2. Recheck INR in 1 week.   3. Verbal information provided over the phone. Pt expresses understanding by teach back and has no further questions at this time.    Rona Raymundo, Pharmacy Intern   2/15/2018  11:03 AM     I, Sam Zaldivar, [facility], have reviewed the note in full and agree with the assessment and plan.  02/15/18  1:40 PM

## 2018-02-23 ENCOUNTER — ANTICOAGULATION VISIT (OUTPATIENT)
Dept: PHARMACY | Facility: HOSPITAL | Age: 53
End: 2018-02-23

## 2018-02-23 DIAGNOSIS — Z95.2 HX OF AORTIC VALVE REPLACEMENT, MECHANICAL: ICD-10-CM

## 2018-02-23 LAB — INR PPP: 3.3

## 2018-02-23 NOTE — PROGRESS NOTES
LVM 2/23 @ 3:00pm. If nothing has changed, patient can continue warfarin 10mg daily except 15mg Mon.    Anticoagulation Clinic Progress Note  Indication: Atrial Fibrillation and Mechanical Aortic Valve  Referring Provider: Enzo Hollis  Initial Warfarin Start Date: 11/2012  Planned Duration of Therapy: Life  Goal INR: 2.5- 3.5  Current Drug Interactions: citalopram    Vit K: patient is eating daily samara lettuce salads  Alcohol: none  Tobacco: none      Anticoagulation Clinic INR History:  Date 5/8 5/23 6/6 6/9 6/13 6/28 7/19 8/10 8/30   Total Weekly Dose 52.5mg 60mg 62.5mg 62.5 mg 77.5 mg 57.5 mg 67.5 mg 70mg 70mg   INR 2.2 2.2 1.2 2.2 2.6 2.3 2.4 3.5 1.2   Notes missed dose more vit K dose increase; increase in Vit K foods  boost missed dose   Increase GLV; protein     Date 9/6 9/18 10/9 11/16 11/27 12/4 12/11 12/25 1/4   Total Weekly Dose 67.5 mg 60mg 70mg 70mg 70mg 70mg 75mg 75mg 70mg   INR 3.2 1.8 3.4 1.6 1.9 2.5 3.2 2.7 2.0   Notes boost + 1 held dose Missed dose + significant alcohol consumption on 9/16  enox enox    boost     Date 1/15 1/25 2/15 2/23        Total Weekly Dose 75mg 75mg 65mg 75mg        INR 2.9 2.5 2.1 3.3        Notes   Missed           Clinic Interview:  Tablet Strength: pt has 5 mg tablets   Current Dose: patient takes 10mg daily except 15mg Mon    Patient Contact: [phone]    Plan:  1. INR is back to therapeutic range today at 3.3 after boosted dose last week. If nothing has changed, patient can continue warfarin 10mg daily except 15mg Mon.  2. Recheck INR in 1 week. (Friday)  3. Verbal information provided over the phone. Pt expresses understanding by teach back and has no further questions at this time.    Aliyah De Leon [facility]   2/23/2018  2:58 PM     Closing 2/23 note.  Sam Zaldivar [facility]  2/26/2018  2:31 PM

## 2018-03-02 ENCOUNTER — ANTICOAGULATION VISIT (OUTPATIENT)
Dept: PHARMACY | Facility: HOSPITAL | Age: 53
End: 2018-03-02

## 2018-03-02 DIAGNOSIS — Z95.2 HX OF AORTIC VALVE REPLACEMENT, MECHANICAL: ICD-10-CM

## 2018-03-02 DIAGNOSIS — I48.91 ATRIAL FIBRILLATION, UNSPECIFIED TYPE (HCC): ICD-10-CM

## 2018-03-02 LAB
INR PPP: 1.4
INR PPP: 1.5

## 2018-03-02 RX ORDER — DIVALPROEX SODIUM 500 MG/1
1500 TABLET, EXTENDED RELEASE ORAL DAILY
COMMUNITY
End: 2018-05-23 | Stop reason: SDUPTHER

## 2018-03-02 NOTE — PROGRESS NOTES
Anticoagulation Clinic Progress Note  Indication: Atrial Fibrillation and Mechanical Aortic Valve  Referring Provider: Enzo Hollis  Initial Warfarin Start Date: 11/2012  Planned Duration of Therapy: Life  Goal INR: 2.5- 3.5  Current Drug Interactions: citalopram, divalproex    Vit K: patient is eating daily samara lettuce salads  Alcohol: none  Tobacco: none      Anticoagulation Clinic INR History:  Date 5/8 5/23 6/6 6/9 6/13 6/28 7/19 8/10 8/30   Total Weekly Dose 52.5mg 60mg 62.5mg 62.5 mg 77.5 mg 57.5 mg 67.5 mg 70mg 70mg   INR 2.2 2.2 1.2 2.2 2.6 2.3 2.4 3.5 1.2   Notes missed dose more vit K dose increase; increase in Vit K foods  boost missed dose   Increase GLV; protein     Date 9/6 9/18 10/9 11/16 11/27 12/4 12/11 12/25 1/4   Total Weekly Dose 67.5 mg 60mg 70mg 70mg 70mg 70mg 75mg 75mg 70mg   INR 3.2 1.8 3.4 1.6 1.9 2.5 3.2 2.7 2.0   Notes boost + 1 held dose Missed dose + significant alcohol consumption on 9/16  enox enox    boost     Date 1/15 1/25 2/15 2/23 3/2       Total Weekly Dose 75mg 75mg 65mg 75mg 75mg       INR 2.9 2.5 2.1 3.3 1.4       Notes   Missed           Clinic Interview:  Tablet Strength: pt has 5 mg tablets   Current Dose: 10mg daily except 15mg Mon  Patient Contact: [phone]  Patient Findings      Positives Change in medications     Negatives Signs/symptoms of thrombosis, Signs/symptoms of bleeding, Laboratory test error suspected, Change in health, Change in alcohol use, Change in activity, Upcoming invasive procedure, Emergency department visit, Upcoming dental procedure, Missed doses, Extra doses, Change in diet/appetite, Hospital admission, Bruising, Other complaints     Comments Mr. Alberto reports he ate some osorio on a burger the other night, but he otherwise denies changes in diet or health. He also denies missing any doses of warfarin since last check. He did start taking divalproex 1500mg daily about two weeks ago (previously on, but stopped for about a year), bu  concomitant use of valproic acid and warfarin has resulted in supratherapeutic INR levels due to decreased protein binding.      Plan:  1. INR is subtherapeutic today, uncertain the cause. Instructed Mr. Alberto to take warfarin 15mg daily through the weekend and  Lovenox refill (120mg (1mg/kg) SubQ q12h x6).  2. Repeat INR on Monday.   3. Verbal information provided over the phone. Mr. Alberto expresses understanding by teach back and has no further questions at this time.    Liane Diaz [facility]   3/2/2018  5:07 PM

## 2018-03-06 ENCOUNTER — APPOINTMENT (OUTPATIENT)
Dept: CT IMAGING | Facility: HOSPITAL | Age: 53
End: 2018-03-06

## 2018-03-06 ENCOUNTER — APPOINTMENT (OUTPATIENT)
Dept: ULTRASOUND IMAGING | Facility: HOSPITAL | Age: 53
End: 2018-03-06

## 2018-03-06 ENCOUNTER — ANTICOAGULATION VISIT (OUTPATIENT)
Dept: PHARMACY | Facility: HOSPITAL | Age: 53
End: 2018-03-06

## 2018-03-06 ENCOUNTER — HOSPITAL ENCOUNTER (EMERGENCY)
Facility: HOSPITAL | Age: 53
Discharge: HOME | End: 2018-03-06
Attending: EMERGENCY MEDICINE | Admitting: EMERGENCY MEDICINE

## 2018-03-06 VITALS
OXYGEN SATURATION: 95 % | RESPIRATION RATE: 16 BRPM | WEIGHT: 255 LBS | HEART RATE: 58 BPM | SYSTOLIC BLOOD PRESSURE: 132 MMHG | BODY MASS INDEX: 30.11 KG/M2 | TEMPERATURE: 98.3 F | DIASTOLIC BLOOD PRESSURE: 67 MMHG | HEIGHT: 77 IN

## 2018-03-06 DIAGNOSIS — Z95.2 HX OF AORTIC VALVE REPLACEMENT, MECHANICAL: ICD-10-CM

## 2018-03-06 DIAGNOSIS — N45.1 EPIDIDYMITIS, LEFT: Primary | ICD-10-CM

## 2018-03-06 DIAGNOSIS — I48.91 ATRIAL FIBRILLATION, UNSPECIFIED TYPE (HCC): ICD-10-CM

## 2018-03-06 LAB
BASOPHILS # BLD AUTO: 0.01 10*3/MM3 (ref 0–0.2)
BASOPHILS NFR BLD AUTO: 0.1 % (ref 0–1)
BILIRUB UR QL STRIP: NEGATIVE
CLARITY UR: CLEAR
COLOR UR: YELLOW
DEPRECATED RDW RBC AUTO: 47 FL (ref 37–54)
EOSINOPHIL # BLD AUTO: 0.03 10*3/MM3 (ref 0–0.3)
EOSINOPHIL NFR BLD AUTO: 0.4 % (ref 0–3)
ERYTHROCYTE [DISTWIDTH] IN BLOOD BY AUTOMATED COUNT: 15.3 % (ref 11.3–14.5)
GLUCOSE UR STRIP-MCNC: NEGATIVE MG/DL
HCT VFR BLD AUTO: 40.6 % (ref 38.9–50.9)
HGB BLD-MCNC: 13.3 G/DL (ref 13.1–17.5)
HGB UR QL STRIP.AUTO: NEGATIVE
IMM GRANULOCYTES # BLD: 0.02 10*3/MM3 (ref 0–0.03)
IMM GRANULOCYTES NFR BLD: 0.2 % (ref 0–0.6)
INR PPP: 1.78 (ref 0.91–1.09)
INR PPP: 2
KETONES UR QL STRIP: ABNORMAL
LEUKOCYTE ESTERASE UR QL STRIP.AUTO: NEGATIVE
LYMPHOCYTES # BLD AUTO: 1.07 10*3/MM3 (ref 0.6–4.8)
LYMPHOCYTES NFR BLD AUTO: 12.6 % (ref 24–44)
MCH RBC QN AUTO: 27.7 PG (ref 27–31)
MCHC RBC AUTO-ENTMCNC: 32.8 G/DL (ref 32–36)
MCV RBC AUTO: 84.6 FL (ref 80–99)
MONOCYTES # BLD AUTO: 0.47 10*3/MM3 (ref 0–1)
MONOCYTES NFR BLD AUTO: 5.5 % (ref 0–12)
NEUTROPHILS # BLD AUTO: 6.92 10*3/MM3 (ref 1.5–8.3)
NEUTROPHILS NFR BLD AUTO: 81.2 % (ref 41–71)
NITRITE UR QL STRIP: NEGATIVE
PH UR STRIP.AUTO: 7 [PH] (ref 5–8)
PLATELET # BLD AUTO: 179 10*3/MM3 (ref 150–450)
PMV BLD AUTO: 11.3 FL (ref 6–12)
PROT UR QL STRIP: NEGATIVE
PROTHROMBIN TIME: 18.7 SECONDS (ref 9.6–11.5)
RBC # BLD AUTO: 4.8 10*6/MM3 (ref 4.2–5.76)
SP GR UR STRIP: 1.02 (ref 1–1.03)
UROBILINOGEN UR QL STRIP: ABNORMAL
WBC NRBC COR # BLD: 8.52 10*3/MM3 (ref 3.5–10.8)

## 2018-03-06 PROCEDURE — 85025 COMPLETE CBC W/AUTO DIFF WBC: CPT | Performed by: EMERGENCY MEDICINE

## 2018-03-06 PROCEDURE — 96375 TX/PRO/DX INJ NEW DRUG ADDON: CPT

## 2018-03-06 PROCEDURE — 76870 US EXAM SCROTUM: CPT

## 2018-03-06 PROCEDURE — 96365 THER/PROPH/DIAG IV INF INIT: CPT

## 2018-03-06 PROCEDURE — 25010000002 ONDANSETRON PER 1 MG: Performed by: EMERGENCY MEDICINE

## 2018-03-06 PROCEDURE — 85610 PROTHROMBIN TIME: CPT | Performed by: EMERGENCY MEDICINE

## 2018-03-06 PROCEDURE — 74176 CT ABD & PELVIS W/O CONTRAST: CPT

## 2018-03-06 PROCEDURE — 25010000002 CEFTRIAXONE PER 250 MG: Performed by: EMERGENCY MEDICINE

## 2018-03-06 PROCEDURE — 93976 VASCULAR STUDY: CPT

## 2018-03-06 PROCEDURE — 99284 EMERGENCY DEPT VISIT MOD MDM: CPT

## 2018-03-06 PROCEDURE — 81003 URINALYSIS AUTO W/O SCOPE: CPT | Performed by: EMERGENCY MEDICINE

## 2018-03-06 PROCEDURE — 25010000002 HYDROMORPHONE PER 4 MG: Performed by: EMERGENCY MEDICINE

## 2018-03-06 RX ORDER — HYDROCODONE BITARTRATE AND ACETAMINOPHEN 5; 325 MG/1; MG/1
1 TABLET ORAL EVERY 6 HOURS PRN
Qty: 15 TABLET | Refills: 0 | Status: SHIPPED | OUTPATIENT
Start: 2018-03-06 | End: 2018-05-16

## 2018-03-06 RX ORDER — CEFTRIAXONE SODIUM 1 G/50ML
1 INJECTION, SOLUTION INTRAVENOUS ONCE
Status: COMPLETED | OUTPATIENT
Start: 2018-03-06 | End: 2018-03-06

## 2018-03-06 RX ORDER — ONDANSETRON 2 MG/ML
4 INJECTION INTRAMUSCULAR; INTRAVENOUS ONCE
Status: COMPLETED | OUTPATIENT
Start: 2018-03-06 | End: 2018-03-06

## 2018-03-06 RX ORDER — LEVOFLOXACIN 500 MG/1
500 TABLET, FILM COATED ORAL DAILY
Qty: 10 TABLET | Refills: 0 | Status: SHIPPED | OUTPATIENT
Start: 2018-03-06 | End: 2018-05-16

## 2018-03-06 RX ORDER — HYDROMORPHONE HYDROCHLORIDE 1 MG/ML
0.5 INJECTION, SOLUTION INTRAMUSCULAR; INTRAVENOUS; SUBCUTANEOUS ONCE
Status: COMPLETED | OUTPATIENT
Start: 2018-03-06 | End: 2018-03-06

## 2018-03-06 RX ORDER — SODIUM CHLORIDE 0.9 % (FLUSH) 0.9 %
10 SYRINGE (ML) INJECTION AS NEEDED
Status: DISCONTINUED | OUTPATIENT
Start: 2018-03-06 | End: 2018-03-07 | Stop reason: HOSPADM

## 2018-03-06 RX ADMIN — ONDANSETRON 4 MG: 2 INJECTION INTRAMUSCULAR; INTRAVENOUS at 20:05

## 2018-03-06 RX ADMIN — CEFTRIAXONE SODIUM 1 G: 1 INJECTION, SOLUTION INTRAVENOUS at 21:57

## 2018-03-06 RX ADMIN — HYDROMORPHONE HYDROCHLORIDE 0.5 MG: 10 INJECTION INTRAMUSCULAR; INTRAVENOUS; SUBCUTANEOUS at 20:05

## 2018-03-06 NOTE — PROGRESS NOTES
Anticoagulation Clinic Progress Note  Indication: Atrial Fibrillation and Mechanical Aortic Valve  Referring Provider: Enzo Hollis  Initial Warfarin Start Date: 11/2012  Planned Duration of Therapy: Life  Goal INR: 2.5- 3.5  Current Drug Interactions: citalopram, divalproex    Vit K: patient is eating daily samara lettuce salads  Alcohol: none  Tobacco: none      Anticoagulation Clinic INR History:  Date 5/8 5/23 6/6 6/9 6/13 6/28 7/19 8/10 8/30   Total Weekly Dose 52.5mg 60mg 62.5mg 62.5 mg 77.5 mg 57.5 mg 67.5 mg 70mg 70mg   INR 2.2 2.2 1.2 2.2 2.6 2.3 2.4 3.5 1.2   Notes missed dose more vit K dose increase; increase in Vit K foods  boost missed dose   Increase GLV; protein     Date 9/6 9/18 10/9 11/16 11/27 12/4 12/11 12/25 1/4   Total Weekly Dose 67.5 mg 60mg 70mg 70mg 70mg 70mg 75mg 75mg 70mg   INR 3.2 1.8 3.4 1.6 1.9 2.5 3.2 2.7 2.0   Notes boost + 1 held dose Missed dose + significant alcohol consumption on 9/16  enox enox    boost     Date 1/15 1/25 2/15 2/23 3/2 3/6      Total Weekly Dose 75mg 75mg 65mg 75mg 75mg 90mg      INR 2.9 2.5 2.1 3.3 1.4 2.0, 1.78 (ED)      Notes   Missed   enox; boost enox       Clinic Interview:  Tablet Strength: pt has 5 mg tablets   Current Dose: 10mg daily except 15mg Mon  Patient Contact: [phone]  Patient Findings      Positives Change in health, Emergency department visit, Change in medications     Negatives Signs/symptoms of thrombosis, Signs/symptoms of bleeding, Laboratory test error suspected, Change in alcohol use, Change in activity, Upcoming invasive procedure, Upcoming dental procedure, Missed doses, Extra doses, Change in diet/appetite, Hospital admission, Bruising, Other complaints     Comments Mr. Alberto was seen in the ED on 3/6 with recent history of testicular pain and vomiting, treated for epididymitis. He received an IM dose of ceftriaxone and was given an outpt rx for levofloxacin on discharge (x10 day course).     Plan:  1. INR is  subtherapeutic again despite recent dose increase. Mr. Alberto received an IM abx injection + PO abx in the ED yesterday, so will proceed cautiously at this time. Given INR remains <2.0, instructed Mr. Alberto to  enoxaparin refill and use one shot every 12 hours until retest. He will also continue taking warfarin 15mg daily for now.  2. Repeat INR on Friday. If it trends significantly upward in this time, will recommend dose decrease / increased vitamin K intake to temper elevation.   3. Verbal information provided over the phone. Mr. Alberto expresses understanding by teach back and has no further questions at this time.    Liane Diaz [facility]   3/6/2018  11:52 AM

## 2018-03-07 NOTE — DISCHARGE INSTRUCTIONS
Levaquin will interact with your warfarin to make your blood more thin.  You should watch for bleeding.  See your doctor in a few days to have your INR checked, you may need to skip some doses of warfarin to avoid getting too thin. If you do develop blood in your stool or any concerning bleeding, come to the ED for evaluation and treatment.

## 2018-03-07 NOTE — ED PROVIDER NOTES
Subjective   HPI Comments: Mr. Mitchell Alberto is a 52 y.o. male who presents to the ED with c/o testicular pain. He notes of intermittent left testicular pain with onset 2 weeks ago. Today around 1130 this morning, he had a sudden pain again in his left testicle but instead of relieving on its own, his pain worsened and radiates to his LLQ and back. His pain worsens with standing or sitting. He has vomited today, and has some trouble urinating. He denies any other acute sx at this time. He has been by Urology and told he had bilateral hydroceles. He has a hx of hernia with repair.     Patient is a 52 y.o. male presenting with male genitourinary complaint.   History provided by:  Patient  Male  Problem   Presenting symptoms: scrotal pain    Scrotal pain:     Affected testicle:  Left    Severity:  Severe    Onset quality:  Gradual    Duration:  2 months    Timing:  Intermittent    Progression:  Worsening  Context: spontaneously    Relieved by:  None tried  Worsened by:  Nothing  Ineffective treatments:  None tried  Associated symptoms: abdominal pain, nausea and vomiting    Associated symptoms: no diarrhea        Review of Systems   Gastrointestinal: Positive for abdominal pain, nausea and vomiting. Negative for diarrhea.   Genitourinary: Positive for difficulty urinating and testicular pain.   Musculoskeletal: Positive for back pain.   All other systems reviewed and are negative.      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 Residual left upper extremity weakness.     • Transient cerebral ischemia        No Known Allergies    Past Surgical History:   Procedure Laterality Date   • ABDOMINAL AORTIC  ANEURYSM REPAIR      History of Aortic Aneurysm Repair   • AORTIC VALVE REPAIR/REPLACEMENT      Replacement   • BACK SURGERY     • HERNIA REPAIR  08/2014    Left inguinal herniorrhaphy   • SKIN CANCER EXCISION  08/2014    Left forehead melanoma excision, Dr. Gisell Kothari, August 2014.        Family History   Problem Relation Age of Onset   • Aortic aneurysm Mother    • Arthritis Mother    • Heart disease Mother    • Heart attack Mother    • Hypertension Mother    • Arthritis Father    • Heart disease Father    • Melanoma Father    • Hypertension Father    • Depression Other    • Hypertension Sister    • Heart disease Brother    • Hypertension Brother    • Hypertension Brother        Social History     Social History   • Marital status:      Occupational History   • Disabled      Social History Main Topics   • Smoking status: Never Smoker   • Smokeless tobacco: Never Used   • Alcohol use Yes      Comment: very occasional   • Drug use: No     Social History Narrative    Patient drinks 6-8 caffeine servings per day.         Objective   Physical Exam   Constitutional: He is oriented to person, place, and time. He appears well-developed and well-nourished. No distress.   HENT:   Head: Normocephalic and atraumatic.   Nose: Nose normal.   Eyes: Conjunctivae are normal. No scleral icterus.   Neck: Normal range of motion. Neck supple.   Cardiovascular: Normal rate, regular rhythm, normal heart sounds and intact distal pulses.    No murmur heard.  Pulmonary/Chest: Effort normal and breath sounds normal. No respiratory distress.   Abdominal: Soft. There is tenderness. There is no rebound and no guarding.   Obese. Moderate LLQ TTP   Musculoskeletal: Normal range of motion.   Neurological: He is alert and oriented to person, place, and time.   Skin: Skin is warm and dry. He is not diaphoretic.   Psychiatric: He has a normal mood and affect. His behavior is normal.   Nursing note and vitals reviewed.      Procedures          ED Course  ED Course   US shows epididymitis.  CT negative.  Labs benign.  Pt feels much better after meds. Patient stable on serial rechecks.  Discussed findings, concerns, plan of care, expected course, reasons to return and followup.                    MDM  Number of Diagnoses or Management Options  Epididymitis, left:      Amount and/or Complexity of Data Reviewed  Clinical lab tests: reviewed and ordered  Tests in the radiology section of CPT®: reviewed and ordered  Independent visualization of images, tracings, or specimens: yes        Final diagnoses:   Epididymitis, left       Documentation assistance provided by oriana MILLER.  Information recorded by the oriana was done at my direction and has been verified and validated by me.     Santos Miller  03/06/18 0381       Mac Norris MD  03/06/18 0799

## 2018-03-09 ENCOUNTER — TELEPHONE (OUTPATIENT)
Dept: PHARMACY | Facility: HOSPITAL | Age: 53
End: 2018-03-09

## 2018-03-09 NOTE — TELEPHONE ENCOUNTER
Tested with home monitor and last night INR = 3.7, so Mr. Alberto held the dose last night of warfarin and will continue with levofloxacin for 10 days. He will continue with warfarin 10 mg tonight and over the weekend before retesting on Monday. He didn't use the enoxaparin syringes with the slightly supratherapeutic INR result from last night.

## 2018-03-15 LAB — INR PPP: 2.4

## 2018-03-16 ENCOUNTER — ANTICOAGULATION VISIT (OUTPATIENT)
Dept: PHARMACY | Facility: HOSPITAL | Age: 53
End: 2018-03-16

## 2018-03-16 DIAGNOSIS — Z95.2 HX OF AORTIC VALVE REPLACEMENT, MECHANICAL: ICD-10-CM

## 2018-03-16 NOTE — PROGRESS NOTES
LVM 3/16 11:34     Anticoagulation Clinic Progress Note  Indication: Atrial Fibrillation and Mechanical Aortic Valve  Referring Provider: Enzo Hollis  Initial Warfarin Start Date: 11/2012  Planned Duration of Therapy: Life  Goal INR: 2.5- 3.5  Current Drug Interactions: citalopram, divalproex    Vit K: patient is eating daily asmara lettuce salads  Alcohol: none  Tobacco: none      Anticoagulation Clinic INR History:  Date 5/8 5/23 6/6 6/9 6/13 6/28 7/19 8/10 8/30   Total Weekly Dose 52.5mg 60mg 62.5mg 62.5 mg 77.5 mg 57.5 mg 67.5 mg 70mg 70mg   INR 2.2 2.2 1.2 2.2 2.6 2.3 2.4 3.5 1.2   Notes missed dose more vit K dose increase; increase in Vit K foods  boost missed dose   Increase GLV; protein     Date 9/6 9/18 10/9 11/16 11/27 12/4 12/11 12/25 1/4   Total Weekly Dose 67.5 mg 60mg 70mg 70mg 70mg 70mg 75mg 75mg 70mg   INR 3.2 1.8 3.4 1.6 1.9 2.5 3.2 2.7 2.0   Notes boost + 1 held dose Missed dose + significant alcohol consumption on 9/16  enox enox    boost     Date 1/15 1/25 2/15 2/23 3/2 3/6 3/8 3/15    Total Weekly Dose 75mg 75mg 65mg 75mg 75mg 90mg 100mg 55mg    INR 2.9 2.5 2.1 3.3 1.4 2.0, 1.78 (ED) 3.7 2.4    Notes   Missed   enox; boost levaquin heldx1 levaquin      Clinic Interview:  Tablet Strength: pt has 5 mg tablets   Current Dose: 10mg daily except 15mg Mon  Patient Contact: [phone]  Patient Findings 3/6/18     Positives Change in health, Emergency department visit, Change in medications     Negatives Signs/symptoms of thrombosis, Signs/symptoms of bleeding, Laboratory test error suspected, Change in alcohol use, Change in activity, Upcoming invasive procedure, Upcoming dental procedure, Missed doses, Extra doses, Change in diet/appetite, Hospital admission, Bruising, Other complaints     Comments Mr. Alberto was seen in the ED on 3/6 with recent history of testicular pain and vomiting, treated for epididymitis. He received an IM dose of ceftriaxone and was given an outpt rx for levofloxacin  on discharge (x10 day course).       1.    Rona Raymundo, Pharmacy Intern   3/16/2018  11:09 AM     With PT/INR result from Monday- will sign note from Friday.  I, Do Vargas, PharmD, have reviewed the note in full and agree with the assessment and plan.  03/20/18  2:18 PM

## 2018-03-19 LAB — INR PPP: 3.2

## 2018-03-20 ENCOUNTER — ANTICOAGULATION VISIT (OUTPATIENT)
Dept: PHARMACY | Facility: HOSPITAL | Age: 53
End: 2018-03-20

## 2018-03-20 DIAGNOSIS — Z95.2 HX OF AORTIC VALVE REPLACEMENT, MECHANICAL: ICD-10-CM

## 2018-03-20 NOTE — PROGRESS NOTES
Anticoagulation Clinic Progress Note  Indication: Atrial Fibrillation and Mechanical Aortic Valve  Referring Provider: Enzo Hollis  Initial Warfarin Start Date: 11/2012  Planned Duration of Therapy: Life  Goal INR: 2.5- 3.5  Current Drug Interactions: citalopram, divalproex    Vit K: patient is eating daily samaar lettuce salads  Alcohol: none  Tobacco: none      Anticoagulation Clinic INR History:  Date 5/8 5/23 6/6 6/9 6/13 6/28 7/19 8/10 8/30   Total Weekly Dose 52.5mg 60mg 62.5mg 62.5 mg 77.5 mg 57.5 mg 67.5 mg 70mg 70mg   INR 2.2 2.2 1.2 2.2 2.6 2.3 2.4 3.5 1.2   Notes missed dose more vit K dose increase; increase in Vit K foods  boost missed dose   Increase GLV; protein     Date 9/6 9/18 10/9 11/16 11/27 12/4 12/11 12/25 1/4   Total Weekly Dose 67.5 mg 60mg 70mg 70mg 70mg 70mg 75mg 75mg 70mg   INR 3.2 1.8 3.4 1.6 1.9 2.5 3.2 2.7 2.0   Notes boost + 1 held dose Missed dose + significant alcohol consumption on 9/16  enox enox    boost     Date 1/15 1/25 2/15 2/23 3/2 3/6 3/15 3/20     Total Weekly Dose 75mg 75mg 65mg 75mg 75mg 90mg 65mg 90mg 80mg    INR 2.9 2.5 2.1 3.3 1.4 2.0, 1.78 (ED) 2.4 3.2     Notes   Missed   enox; boost; levaquin Held x1        Clinic Interview:  Tablet Strength: pt has 5 mg tablets   Current Dose: 10mg daily except 15mg Mon  Patient Contact: [phone]    Patient Findings   Negatives Signs/symptoms of thrombosis, Signs/symptoms of bleeding, Laboratory test error suspected, Change in health, Change in alcohol use, Change in activity, Upcoming invasive procedure, Emergency department visit, Upcoming dental procedure, Missed doses, Extra doses, Change in medications, Change in diet/appetite, Hospital admission, Bruising, Other complaints   Comments Discussed Mayonnaise and vit K content. Emphasized consistency. Mr. Alberto reports using 15mg daily.     Plan:  1. INR is therapeutic following increase to 15mg daily through weekend. At this time, Mr. Alberto has had variable instances  causing variable INR results. Given he has discontinued his antibiotic use, will target dosing close to previous stable INR therefore instructed Mr. Alberto to take 10mg daily except 15mg ThurSun.  2. Repeat INR on Monday. Will consider dose increase to 15mg x 3 days weekly if INR falls.  3. Verbal information provided over the phone. Mr. Alberto expresses understanding by teach back and has no further questions at this time.    Do Vargas, PharmD   3/20/2018  2:07 PM

## 2018-03-20 NOTE — PROGRESS NOTES
LVM 3/16 11:34     Anticoagulation Clinic Progress Note  Indication: Atrial Fibrillation and Mechanical Aortic Valve  Referring Provider: Enzo Hollis  Initial Warfarin Start Date: 11/2012  Planned Duration of Therapy: Life  Goal INR: 2.5- 3.5  Current Drug Interactions: citalopram, divalproex    Vit K: patient is eating daily samara lettuce salads  Alcohol: none  Tobacco: none      Anticoagulation Clinic INR History:  Date 5/8 5/23 6/6 6/9 6/13 6/28 7/19 8/10 8/30   Total Weekly Dose 52.5mg 60mg 62.5mg 62.5 mg 77.5 mg 57.5 mg 67.5 mg 70mg 70mg   INR 2.2 2.2 1.2 2.2 2.6 2.3 2.4 3.5 1.2   Notes missed dose more vit K dose increase; increase in Vit K foods  boost missed dose   Increase GLV; protein     Date 9/6 9/18 10/9 11/16 11/27 12/4 12/11 12/25 1/4   Total Weekly Dose 67.5 mg 60mg 70mg 70mg 70mg 70mg 75mg 75mg 70mg   INR 3.2 1.8 3.4 1.6 1.9 2.5 3.2 2.7 2.0   Notes boost + 1 held dose Missed dose + significant alcohol consumption on 9/16  enox enox    boost     Date 1/15 1/25 2/15 2/23 3/2 3/6 3/8 3/15 3/19   Total Weekly Dose 75mg 75mg 65mg 75mg 75mg 90mg 100mg 65mg    INR 2.9 2.5 2.1 3.3 1.4 2.0, 1.78 (ED) 3.7 2.4 3.2   Notes   Missed   enox; boost levaquin heldx1 levaquin      Clinic Interview:  Tablet Strength: pt has 5 mg tablets   Current Dose: 10mg daily except 15mg Mon  Patient Contact: [phone]    Plan: 3/6/18  1. INR is back WNL today at 3.2.     Anna Tucker, Pharmacy Technician   3/20/2018  1:56 PM

## 2018-03-27 ENCOUNTER — ANTICOAGULATION VISIT (OUTPATIENT)
Dept: PHARMACY | Facility: HOSPITAL | Age: 53
End: 2018-03-27

## 2018-03-27 DIAGNOSIS — Z95.2 HX OF AORTIC VALVE REPLACEMENT, MECHANICAL: ICD-10-CM

## 2018-03-27 LAB — INR PPP: 3.5

## 2018-03-27 NOTE — PROGRESS NOTES
Anticoagulation Clinic Progress Note  Indication: Atrial Fibrillation and Mechanical Aortic Valve  Referring Provider: Enzo Hollis  Initial Warfarin Start Date: 11/2012  Planned Duration of Therapy: Life  Goal INR: 2.5- 3.5  Current Drug Interactions: citalopram, divalproex    Vit K: patient is eating daily samara lettuce salads  Alcohol: none  Tobacco: none       Anticoagulation Clinic INR History:  Date 5/8 5/23 6/6 6/9 6/13 6/28 7/19 8/10 8/30   Total Weekly Dose 52.5mg 60mg 62.5mg 62.5 mg 77.5 mg 57.5 mg 67.5 mg 70mg 70mg   INR 2.2 2.2 1.2 2.2 2.6 2.3 2.4 3.5 1.2   Notes missed dose more vit K dose increase; increase in Vit K foods  boost missed dose   Increase GLV; protein     Date 9/6 9/18 10/9 11/16 11/27 12/4 12/11 12/25 1/4   Total Weekly Dose 67.5 mg 60mg 70mg 70mg 70mg 70mg 75mg 75mg 70mg   INR 3.2 1.8 3.4 1.6 1.9 2.5 3.2 2.7 2.0   Notes boost + 1 held dose Missed dose + significant alcohol consumption on 9/16  enox enox    boost     Date 1/15 1/25 2/15 2/23 3/2 3/6 3/15 3/20 3/27    Total Weekly Dose 75mg 75mg 65mg 75mg 75mg 90mg 65mg 90mg 80mg 75mg   INR 2.9 2.5 2.1 3.3 1.4 2.0, 1.78 (ED) 2.4 3.2 3.5    Notes   Missed   enox; boost; levaquin Held x1        Clinic Interview:  Tablet Strength: pt has 5 mg tablets   Current Dose: 10mg daily except 15mg Mon  Patient Contact: [phone]    Patient Findings:  Positives Other complaints   Negatives Signs/symptoms of thrombosis, Signs/symptoms of bleeding, Laboratory test error suspected, Change in health, Change in alcohol use, Change in activity, Upcoming invasive procedure, Emergency department visit, Upcoming dental procedure, Missed doses, Extra doses, Change in medications, Change in diet/appetite, Hospital admission, Bruising   Comments Mr. Alberto complains of some recent apparent orthostatic hypotension (dizziness upon standing quickly). Advised him that it is best to go from sitting to standing more slowly, especially since he is on  anti-hypertensives. Also recommended (again) that he get a BP cuff to assess his BP with future episodes like this.      Plan:  1. INR is therapeutic today at the upper end of goal, so instructed Mr. Alberto to take warfarin 10mg daily except 15mg on Thursday, only, this week.  2. Repeat INR in one week.  3. Verbal information provided over the phone. Mr. Alberto expresses understanding by teach back and has no further questions at this time.    Liane Diaz [facility]   3/27/2018  4:41 PM

## 2018-04-05 ENCOUNTER — ANTICOAGULATION VISIT (OUTPATIENT)
Dept: PHARMACY | Facility: HOSPITAL | Age: 53
End: 2018-04-05

## 2018-04-05 DIAGNOSIS — Z95.2 HX OF AORTIC VALVE REPLACEMENT, MECHANICAL: ICD-10-CM

## 2018-04-05 LAB — INR PPP: 2.4

## 2018-04-05 NOTE — PROGRESS NOTES
Anticoagulation Clinic Progress Note  Indication: Atrial Fibrillation and Mechanical Aortic Valve  Referring Provider: Enzo Hollis  Initial Warfarin Start Date: 11/2012  Planned Duration of Therapy: Life  Goal INR: 2.5- 3.5  Current Drug Interactions: citalopram, divalproex    Vit K: patient is eating daily samara lettuce salads  Alcohol: none  Tobacco: none       Anticoagulation Clinic INR History:  Date 5/8 5/23 6/6 6/9 6/13 6/28 7/19 8/10 8/30   Total Weekly Dose 52.5mg 60mg 62.5mg 62.5 mg 77.5 mg 57.5 mg 67.5 mg 70mg 70mg   INR 2.2 2.2 1.2 2.2 2.6 2.3 2.4 3.5 1.2   Notes missed dose more vit K dose increase; increase in Vit K foods  boost missed dose   Increase GLV; protein     Date 9/6 9/18 10/9 11/16 11/27 12/4 12/11 12/25 1/4   Total Weekly Dose 67.5 mg 60mg 70mg 70mg 70mg 70mg 75mg 75mg 70mg   INR 3.2 1.8 3.4 1.6 1.9 2.5 3.2 2.7 2.0   Notes boost + 1 held dose Missed dose + significant alcohol consumption on 9/16  enox enox    boost     Date 1/15 1/25 2/15 2/23 3/2 3/6 3/15 3/20 3/27 4/5   Total Weekly Dose 75mg 75mg 65mg 75mg 75mg 90mg 65mg 90mg 80mg 75mg   INR 2.9 2.5 2.1 3.3 1.4 2.0, 1.78 (ED) 2.4 3.2 3.5 2.4   Notes   Missed   enox; boost; levaquin Held x1        Date 4/5            Total Weekly Dose 85mg            INR             Notes               Clinic Interview:  Tablet Strength: pt has 5 mg tablets   Current Dose: 10mg daily except 15mg Mon  Patient Contact: [phone]    Patient Findings   Positives Other complaints   Negatives Signs/symptoms of thrombosis, Signs/symptoms of bleeding, Laboratory test error suspected, Change in health, Change in alcohol use, Change in activity, Upcoming invasive procedure, Emergency department visit, Upcoming dental procedure, Missed doses, Extra doses, Change in medications, Change in diet/appetite, Hospital admission, Bruising   Comments Mr. Alberto still complains of some recent apparent orthostatic hypotension (dizziness upon standing quickly). He has  been slowly going from sitting to standing more slowly, as instructed at last telephone encounter. He is planning to make an appointment with cardiology regarding this. He expressed concern for drop given lovenox shots are expensive and insurance has not covered them well (6 shots ~$300)     Plan:  1. INR is therapeutic today at the upper end of goal. Given recent history of boost with 90 mg and high cost of lovenox shots will boost to attempt to avoid need for injections. Therefore, instructed Mr. Alberto to take 15mg tonight as scheduled and BOOST dose tomorrow to 15mg, then increase weekly dose to warfarin 10mg daily except 15mg on MonThu. May consider dose targeting 80- 85mg/ week thereafter.  2. Repeat INR in one week.  3. Verbal information provided over the phone. Mr. Alberto expresses understanding by teach back and has no further questions at this time.    Do Vargas, PharmD   4/5/2018  8:16 AM

## 2018-04-14 LAB — INR PPP: 2.3

## 2018-04-16 ENCOUNTER — ANTICOAGULATION VISIT (OUTPATIENT)
Dept: PHARMACY | Facility: HOSPITAL | Age: 53
End: 2018-04-16

## 2018-04-16 DIAGNOSIS — Z95.2 HX OF AORTIC VALVE REPLACEMENT, MECHANICAL: ICD-10-CM

## 2018-04-16 NOTE — PROGRESS NOTES
Anticoagulation Clinic Progress Note  Indication: Atrial Fibrillation and Mechanical Aortic Valve  Referring Provider: Enzo Hollis  Initial Warfarin Start Date: 11/2012  Planned Duration of Therapy: Life  Goal INR: 2.5- 3.5  Current Drug Interactions: citalopram, divalproex    Vit K: patient is eating daily samara lettuce salads  Alcohol: none  Tobacco: none       Anticoagulation Clinic INR History:  Date 5/8 5/23 6/6 6/9 6/13 6/28 7/19 8/10 8/30   Total Weekly Dose 52.5mg 60mg 62.5mg 62.5 mg 77.5 mg 57.5 mg 67.5 mg 70mg 70mg   INR 2.2 2.2 1.2 2.2 2.6 2.3 2.4 3.5 1.2   Notes missed dose more vit K dose increase; increase in Vit K foods  boost missed dose   Increase GLV; protein     Date 9/6 9/18 10/9 11/16 11/27 12/4 12/11 12/25 1/4   Total Weekly Dose 67.5 mg 60mg 70mg 70mg 70mg 70mg 75mg 75mg 70mg   INR 3.2 1.8 3.4 1.6 1.9 2.5 3.2 2.7 2.0   Notes boost + 1 held dose Missed dose + significant alcohol consumption on 9/16  enox enox    boost     Date 1/15 1/25 2/15 2/23 3/2 3/6 3/15 3/20 3/27 4/4   Total Weekly Dose 75mg 75mg 65mg 75mg 75mg 90mg 65mg 90mg 80mg 75mg   INR 2.9 2.5 2.1 3.3 1.4 2.0, 1.78 (ED) 2.4 3.2 3.5 2.4   Notes   Missed   enox; boost; levaquin Held x1        Date 4/14 4/20           Total Weekly Dose 80mg 90mg           INR 2.3            Notes               Clinic Interview:  Tablet Strength: pt has 5 mg tablets   Current Dose: 10mg daily except 15mg Mon  Patient Contact: [phone]    Patient Findings   Negatives Signs/symptoms of thrombosis, Signs/symptoms of bleeding, Laboratory test error suspected, Change in health, Change in alcohol use, Change in activity, Upcoming invasive procedure, Emergency department visit, Upcoming dental procedure, Missed doses, Extra doses, Change in medications, Change in diet/appetite, Hospital admission, Bruising, Other complaints   Comments Pt denies any changes in medications or diet. He said that when his INR came back subtherapeutic on Saturday he  self-adjusted his dose to 15mg Saturday and Sunday. He claims that in the past, 15mg daily has worked best for him and he thinks that's the dose he needs to be on.      Plan:  1. INR is subtherapeutic again today at 2.3. Pt has already self-adjusted his dose and taken 15mg on Saturday and Sunday. Instructed Mr. Alberto to take 15mg tonight as scheduled and then continue warfarin 10mg daily except 15mg on MonThu.   2. Repeat INR 4 days on 4/20 to ensure WNL. May consider weekly dose of 85-90mg at that time.   3. Verbal information provided over the phone. Mr. Alberto expresses understanding by teach back and has no further questions at this time.  .Eddie Coleman, Pharmacy Student  4/16/2018  3:52 PM     ISam, [facility], have reviewed the note in full and agree with the assessment and plan.  04/16/18  4:13 PM

## 2018-04-24 ENCOUNTER — ANTICOAGULATION VISIT (OUTPATIENT)
Dept: PHARMACY | Facility: HOSPITAL | Age: 53
End: 2018-04-24

## 2018-04-24 DIAGNOSIS — Z95.2 HX OF AORTIC VALVE REPLACEMENT, MECHANICAL: ICD-10-CM

## 2018-04-24 LAB — INR PPP: 2

## 2018-04-24 NOTE — PROGRESS NOTES
Anticoagulation Clinic Progress Note  Indication: Atrial Fibrillation and Mechanical Aortic Valve  Referring Provider: Enzo Hollis  Initial Warfarin Start Date: 11/2012  Planned Duration of Therapy: Life  Goal INR: 2.5- 3.5  Current Drug Interactions: citalopram, divalproex    Vit K: patient is eating daily samara lettuce salads  Alcohol: none  Tobacco: none       Anticoagulation Clinic INR History:  Date 5/8 5/23 6/6 6/9 6/13 6/28 7/19 8/10 8/30   Total Weekly Dose 52.5mg 60mg 62.5mg 62.5 mg 77.5 mg 57.5 mg 67.5 mg 70mg 70mg   INR 2.2 2.2 1.2 2.2 2.6 2.3 2.4 3.5 1.2   Notes missed dose more vit K dose increase; increase in Vit K foods  boost missed dose   Increase GLV; protein     Date 9/6 9/18 10/9 11/16 11/27 12/4 12/11 12/25 1/4   Total Weekly Dose 67.5 mg 60mg 70mg 70mg 70mg 70mg 75mg 75mg 70mg   INR 3.2 1.8 3.4 1.6 1.9 2.5 3.2 2.7 2.0   Notes boost + 1 held dose Missed dose + significant alcohol consumption on 9/16  enox enox    boost     Date 1/15 1/25 2/15 2/23 3/2 3/6 3/15 3/20 3/27 4/4   Total Weekly Dose 75mg 75mg 65mg 75mg 75mg 90mg 65mg 90mg 80mg 75mg   INR 2.9 2.5 2.1 3.3 1.4 2.0, 1.78 (ED) 2.4 3.2 3.5 2.4   Notes   Missed   enox; boost; levaquin Held x1        Date 4/14 4/20           Total Weekly Dose 80mg 80mg           INR 2.3 2.0           Notes               Clinic Interview:  Tablet Strength: pt has 5 mg tablets   Current Dose: 10mg daily except 15mg Mon  Patient Contact: [phone]    Patient Findings   Negatives Signs/symptoms of thrombosis, Signs/symptoms of bleeding, Laboratory test error suspected, Change in health, Change in alcohol use, Change in activity, Upcoming invasive procedure, Emergency department visit, Upcoming dental procedure, Missed doses, Extra doses, Change in medications, Change in diet/appetite, Hospital admission, Bruising, Other complaints   Comments He claims that in the past, 15mg daily has worked best for him and he thinks that's the dose he needs to be on.       Plan:  1. INR is subtherapeutic again today at 2.0 following non- compliance with follow up date. Unable to contact patient since 4/24 despite leaving several voicemail's. Spoke with patient today 4/27 and patient has continued 15mg daily since test on 4/24.   2. Repeat INR tomorrow on 4/27.  3. Verbal information provided over the phone. Mr. Alberto expresses understanding by teach back and has no further questions at this time.    Do Vargas, PharmD  4/24/2018  3:03 PM

## 2018-05-01 LAB — INR PPP: 3.2

## 2018-05-02 ENCOUNTER — TRANSCRIBE ORDERS (OUTPATIENT)
Dept: ADMINISTRATIVE | Facility: HOSPITAL | Age: 53
End: 2018-05-02

## 2018-05-02 ENCOUNTER — ANTICOAGULATION VISIT (OUTPATIENT)
Dept: PHARMACY | Facility: HOSPITAL | Age: 53
End: 2018-05-02

## 2018-05-02 DIAGNOSIS — R31.0 GROSS HEMATURIA: Primary | ICD-10-CM

## 2018-05-02 DIAGNOSIS — I48.91 ATRIAL FIBRILLATION, UNSPECIFIED TYPE (HCC): ICD-10-CM

## 2018-05-02 DIAGNOSIS — Z95.2 HX OF AORTIC VALVE REPLACEMENT, MECHANICAL: ICD-10-CM

## 2018-05-02 NOTE — PROGRESS NOTES
Anticoagulation [facility]- Clinic Progress Note  Indication: Atrial Fibrillation and Mechanical Aortic Valve  Referring Provider: Enzo Hollis  Initial Warfarin Start Date: 11/2012  Planned Duration of Therapy: Life  Goal INR: 2.5- 3.5  Current Drug Interactions: citalopram, divalproex    Vit K: patient is eating daily samara lettuce salads  Alcohol: none  Tobacco: none       Anticoagulation Clinic INR History:  Date 5/8 5/23 6/6 6/9 6/13 6/28 7/19 8/10 8/30   Total Weekly Dose 52.5mg 60mg 62.5mg 62.5 mg 77.5 mg 57.5 mg 67.5 mg 70mg 70mg   INR 2.2 2.2 1.2 2.2 2.6 2.3 2.4 3.5 1.2   Notes missed dose more vit K dose increase; increase in Vit K foods  boost missed dose   Increase GLV; protein     Date 9/6 9/18 10/9 11/16 11/27 12/4 12/11 12/25 1/4   Total Weekly Dose 67.5 mg 60mg 70mg 70mg 70mg 70mg 75mg 75mg 70mg   INR 3.2 1.8 3.4 1.6 1.9 2.5 3.2 2.7 2.0   Notes boost + 1 held dose Missed dose + significant alcohol consumption on 9/16  enox enox    boost     Date 1/15 1/25 2/15 2/23 3/2 3/6 3/15 3/20 3/27 4/4   Total Weekly Dose 75mg 75mg 65mg 75mg 75mg 90mg 65mg 90mg 80mg 75mg   INR 2.9 2.5 2.1 3.3 1.4 2.0, 1.78 (ED) 2.4 3.2 3.5 2.4   Notes   Missed   enox; boost; levaquin Held x1        Date 4/14 4/20 5/1          Total Weekly Dose 80mg 80mg 90mg          INR 2.3 2.0 3.2          Notes               Clinic Interview:  Tablet Strength: pt has 5 mg tablets   Current Dose: 15mg daily except 10mg FriSatSun  Patient Contact: [phone]    Plan:  1. INR is therapeutic today at 3.2. Continue current dose 15 mg daily except 10 mg FRSaSu  2. Repeat INR in one week on 5/8.  3. Verbal information provided over the phone. Mr. Alberto expresses understanding by teach back and has no further questions at this time.        Gertrudis Glez, Pharmacy Technician  5/2/2018  8:10 AM     I, [facility], have reviewed the note in full and agree with the assessment and plan.  05/07/18  7:57 AM

## 2018-05-03 ENCOUNTER — EPISODE CHANGES (OUTPATIENT)
Dept: CASE MANAGEMENT | Facility: OTHER | Age: 53
End: 2018-05-03

## 2018-05-15 ENCOUNTER — TELEPHONE (OUTPATIENT)
Dept: PHARMACY | Facility: HOSPITAL | Age: 53
End: 2018-05-15

## 2018-05-16 ENCOUNTER — APPOINTMENT (OUTPATIENT)
Dept: ULTRASOUND IMAGING | Facility: HOSPITAL | Age: 53
End: 2018-05-16

## 2018-05-16 ENCOUNTER — HOSPITAL ENCOUNTER (EMERGENCY)
Facility: HOSPITAL | Age: 53
Discharge: HOME | End: 2018-05-16
Attending: EMERGENCY MEDICINE | Admitting: EMERGENCY MEDICINE

## 2018-05-16 ENCOUNTER — ANTICOAGULATION VISIT (OUTPATIENT)
Dept: PHARMACY | Facility: HOSPITAL | Age: 53
End: 2018-05-16

## 2018-05-16 ENCOUNTER — APPOINTMENT (OUTPATIENT)
Dept: GENERAL RADIOLOGY | Facility: HOSPITAL | Age: 53
End: 2018-05-16

## 2018-05-16 VITALS
TEMPERATURE: 98.1 F | RESPIRATION RATE: 20 BRPM | HEART RATE: 40 BPM | BODY MASS INDEX: 30.7 KG/M2 | SYSTOLIC BLOOD PRESSURE: 165 MMHG | HEIGHT: 77 IN | WEIGHT: 260 LBS | DIASTOLIC BLOOD PRESSURE: 99 MMHG | OXYGEN SATURATION: 94 %

## 2018-05-16 DIAGNOSIS — N45.3 EPIDIDYMOORCHITIS: Primary | ICD-10-CM

## 2018-05-16 DIAGNOSIS — Z95.2 HX OF AORTIC VALVE REPLACEMENT, MECHANICAL: ICD-10-CM

## 2018-05-16 LAB
ALBUMIN SERPL-MCNC: 4.4 G/DL (ref 3.2–4.8)
ALBUMIN/GLOB SERPL: 1.7 G/DL (ref 1.5–2.5)
ALP SERPL-CCNC: 67 U/L (ref 25–100)
ALT SERPL W P-5'-P-CCNC: 53 U/L (ref 7–40)
ANION GAP SERPL CALCULATED.3IONS-SCNC: 6 MMOL/L (ref 3–11)
AST SERPL-CCNC: 43 U/L (ref 0–33)
BACTERIA UR QL AUTO: ABNORMAL /HPF
BASOPHILS # BLD AUTO: 0.02 10*3/MM3 (ref 0–0.2)
BASOPHILS NFR BLD AUTO: 0.3 % (ref 0–1)
BILIRUB SERPL-MCNC: 0.6 MG/DL (ref 0.3–1.2)
BILIRUB UR QL STRIP: NEGATIVE
BUN BLD-MCNC: 14 MG/DL (ref 9–23)
BUN/CREAT SERPL: 15.6 (ref 7–25)
CALCIUM SPEC-SCNC: 8.8 MG/DL (ref 8.7–10.4)
CHLORIDE SERPL-SCNC: 102 MMOL/L (ref 99–109)
CLARITY UR: CLEAR
CO2 SERPL-SCNC: 25 MMOL/L (ref 20–31)
COLOR UR: YELLOW
CREAT BLD-MCNC: 0.9 MG/DL (ref 0.6–1.3)
DEPRECATED RDW RBC AUTO: 46.3 FL (ref 37–54)
EOSINOPHIL # BLD AUTO: 0.13 10*3/MM3 (ref 0–0.3)
EOSINOPHIL NFR BLD AUTO: 1.6 % (ref 0–3)
ERYTHROCYTE [DISTWIDTH] IN BLOOD BY AUTOMATED COUNT: 15.2 % (ref 11.3–14.5)
GFR SERPL CREATININE-BSD FRML MDRD: 89 ML/MIN/1.73
GLOBULIN UR ELPH-MCNC: 2.6 GM/DL
GLUCOSE BLD-MCNC: 125 MG/DL (ref 70–100)
GLUCOSE UR STRIP-MCNC: NEGATIVE MG/DL
HCT VFR BLD AUTO: 41.9 % (ref 38.9–50.9)
HGB BLD-MCNC: 14 G/DL (ref 13.1–17.5)
HGB UR QL STRIP.AUTO: ABNORMAL
HYALINE CASTS UR QL AUTO: ABNORMAL /LPF
IMM GRANULOCYTES # BLD: 0.01 10*3/MM3 (ref 0–0.03)
IMM GRANULOCYTES NFR BLD: 0.1 % (ref 0–0.6)
INR PPP: 2.36 (ref 0.91–1.09)
INR PPP: 2.9
KETONES UR QL STRIP: NEGATIVE
LEUKOCYTE ESTERASE UR QL STRIP.AUTO: NEGATIVE
LIPASE SERPL-CCNC: 29 U/L (ref 6–51)
LYMPHOCYTES # BLD AUTO: 1.39 10*3/MM3 (ref 0.6–4.8)
LYMPHOCYTES NFR BLD AUTO: 17.6 % (ref 24–44)
MCH RBC QN AUTO: 28.1 PG (ref 27–31)
MCHC RBC AUTO-ENTMCNC: 33.4 G/DL (ref 32–36)
MCV RBC AUTO: 84.1 FL (ref 80–99)
MONOCYTES # BLD AUTO: 0.43 10*3/MM3 (ref 0–1)
MONOCYTES NFR BLD AUTO: 5.4 % (ref 0–12)
NEUTROPHILS # BLD AUTO: 5.95 10*3/MM3 (ref 1.5–8.3)
NEUTROPHILS NFR BLD AUTO: 75.1 % (ref 41–71)
NITRITE UR QL STRIP: NEGATIVE
PH UR STRIP.AUTO: 5.5 [PH] (ref 5–8)
PLATELET # BLD AUTO: 188 10*3/MM3 (ref 150–450)
PMV BLD AUTO: 11.3 FL (ref 6–12)
POTASSIUM BLD-SCNC: 4.5 MMOL/L (ref 3.5–5.5)
PROT SERPL-MCNC: 7 G/DL (ref 5.7–8.2)
PROT UR QL STRIP: NEGATIVE
PROTHROMBIN TIME: 24.8 SECONDS (ref 9.6–11.5)
RBC # BLD AUTO: 4.98 10*6/MM3 (ref 4.2–5.76)
RBC # UR: ABNORMAL /HPF
REF LAB TEST METHOD: ABNORMAL
SODIUM BLD-SCNC: 133 MMOL/L (ref 132–146)
SP GR UR STRIP: 1.01 (ref 1–1.03)
SQUAMOUS #/AREA URNS HPF: ABNORMAL /HPF
TROPONIN I SERPL-MCNC: 0 NG/ML (ref 0–0.07)
UROBILINOGEN UR QL STRIP: ABNORMAL
WBC NRBC COR # BLD: 7.92 10*3/MM3 (ref 3.5–10.8)
WBC UR QL AUTO: ABNORMAL /HPF

## 2018-05-16 PROCEDURE — 96375 TX/PRO/DX INJ NEW DRUG ADDON: CPT

## 2018-05-16 PROCEDURE — 85610 PROTHROMBIN TIME: CPT | Performed by: EMERGENCY MEDICINE

## 2018-05-16 PROCEDURE — 71045 X-RAY EXAM CHEST 1 VIEW: CPT

## 2018-05-16 PROCEDURE — 96361 HYDRATE IV INFUSION ADD-ON: CPT

## 2018-05-16 PROCEDURE — 80053 COMPREHEN METABOLIC PANEL: CPT | Performed by: EMERGENCY MEDICINE

## 2018-05-16 PROCEDURE — 84484 ASSAY OF TROPONIN QUANT: CPT

## 2018-05-16 PROCEDURE — 99284 EMERGENCY DEPT VISIT MOD MDM: CPT

## 2018-05-16 PROCEDURE — 93005 ELECTROCARDIOGRAM TRACING: CPT | Performed by: EMERGENCY MEDICINE

## 2018-05-16 PROCEDURE — 76870 US EXAM SCROTUM: CPT

## 2018-05-16 PROCEDURE — 85025 COMPLETE CBC W/AUTO DIFF WBC: CPT | Performed by: EMERGENCY MEDICINE

## 2018-05-16 PROCEDURE — 25010000002 ONDANSETRON PER 1 MG: Performed by: EMERGENCY MEDICINE

## 2018-05-16 PROCEDURE — 81001 URINALYSIS AUTO W/SCOPE: CPT | Performed by: EMERGENCY MEDICINE

## 2018-05-16 PROCEDURE — 93976 VASCULAR STUDY: CPT

## 2018-05-16 PROCEDURE — 96374 THER/PROPH/DIAG INJ IV PUSH: CPT

## 2018-05-16 PROCEDURE — 25010000002 MORPHINE PER 10 MG: Performed by: EMERGENCY MEDICINE

## 2018-05-16 PROCEDURE — 83690 ASSAY OF LIPASE: CPT | Performed by: EMERGENCY MEDICINE

## 2018-05-16 RX ORDER — ONDANSETRON 2 MG/ML
4 INJECTION INTRAMUSCULAR; INTRAVENOUS ONCE
Status: DISCONTINUED | OUTPATIENT
Start: 2018-05-16 | End: 2018-05-16 | Stop reason: HOSPADM

## 2018-05-16 RX ORDER — ONDANSETRON 2 MG/ML
4 INJECTION INTRAMUSCULAR; INTRAVENOUS ONCE
Status: COMPLETED | OUTPATIENT
Start: 2018-05-16 | End: 2018-05-16

## 2018-05-16 RX ORDER — LEVOFLOXACIN 500 MG/1
500 TABLET, FILM COATED ORAL DAILY
Qty: 10 TABLET | Refills: 0 | Status: SHIPPED | OUTPATIENT
Start: 2018-05-16 | End: 2018-10-12

## 2018-05-16 RX ORDER — LEVOFLOXACIN 500 MG/1
500 TABLET, FILM COATED ORAL ONCE
Status: COMPLETED | OUTPATIENT
Start: 2018-05-16 | End: 2018-05-16

## 2018-05-16 RX ORDER — HYDROCODONE BITARTRATE AND ACETAMINOPHEN 5; 325 MG/1; MG/1
1 TABLET ORAL EVERY 6 HOURS PRN
Qty: 12 TABLET | Refills: 0 | Status: SHIPPED | OUTPATIENT
Start: 2018-05-16 | End: 2018-10-12

## 2018-05-16 RX ORDER — SODIUM CHLORIDE 0.9 % (FLUSH) 0.9 %
10 SYRINGE (ML) INJECTION AS NEEDED
Status: DISCONTINUED | OUTPATIENT
Start: 2018-05-16 | End: 2018-05-16 | Stop reason: HOSPADM

## 2018-05-16 RX ORDER — MORPHINE SULFATE 4 MG/ML
4 INJECTION, SOLUTION INTRAMUSCULAR; INTRAVENOUS ONCE
Status: COMPLETED | OUTPATIENT
Start: 2018-05-16 | End: 2018-05-16

## 2018-05-16 RX ADMIN — ONDANSETRON 4 MG: 2 INJECTION INTRAMUSCULAR; INTRAVENOUS at 17:42

## 2018-05-16 RX ADMIN — MORPHINE SULFATE 4 MG: 4 INJECTION, SOLUTION INTRAMUSCULAR; INTRAVENOUS at 18:07

## 2018-05-16 RX ADMIN — LEVOFLOXACIN 500 MG: 500 TABLET, FILM COATED ORAL at 20:39

## 2018-05-16 RX ADMIN — SODIUM CHLORIDE 1000 ML: 9 INJECTION, SOLUTION INTRAVENOUS at 17:42

## 2018-05-16 NOTE — PROGRESS NOTES
LVM with Mr. Alberto- unable to get in touch with him    Anticoagulation Clinic - Remote Progress Note  [facility]    Indication: Atrial Fibrillation and Mechanical Aortic Valve  Referring Provider: Enzo Hollis  Initial Warfarin Start Date: 11/2012  Planned Duration of Therapy: Life  Goal INR: 2.5- 3.5  Current Drug Interactions: citalopram, divalproex    Vit K: patient is eating daily samara lettuce salads  Alcohol: none  Tobacco: none       Anticoagulation Clinic INR History:  Date 5/8 5/23 6/6 6/9 6/13 6/28 7/19 8/10 8/30   Total Weekly Dose 52.5mg 60mg 62.5mg 62.5 mg 77.5 mg 57.5 mg 67.5 mg 70mg 70mg   INR 2.2 2.2 1.2 2.2 2.6 2.3 2.4 3.5 1.2   Notes missed dose more vit K dose increase; increase in Vit K foods  boost missed dose   Increase GLV; protein     Date 9/6 9/18 10/9 11/16 11/27 12/4 12/11 12/25 1/4   Total Weekly Dose 67.5 mg 60mg 70mg 70mg 70mg 70mg 75mg 75mg 70mg   INR 3.2 1.8 3.4 1.6 1.9 2.5 3.2 2.7 2.0   Notes boost + 1 held dose Missed dose + significant alcohol consumption on 9/16  enox enox    boost     Date 1/15 1/25 2/15 2/23 3/2 3/6 3/15 3/20 3/27 4/4   Total Weekly Dose 75mg 75mg 65mg 75mg 75mg 90mg 65mg 90mg 80mg 75mg   INR 2.9 2.5 2.1 3.3 1.4 2.0, 1.78 (ED) 2.4 3.2 3.5 2.4   Notes   Missed   enox; boost; levaquin Held x1        Date 4/14 4/20 5/1 5/16         Total Weekly Dose 80mg 80mg 90mg 90mg         INR 2.3 2.0 3.2 2.9         Notes     levoflox          Clinic Interview:  Tablet Strength: pt has 5 mg tablets   Current Dose: 15mg daily except 10mg FriSatSun  Patient Contact: [phone]    Plan:  1. INR is therapeutic today (5/16) at 2.9. Instructed patient to continue current dose 15 mg daily except 10 mg FRSaSu  2. Repeat INR in one week (5/23)  3. Verbal information provided over the phone. Mr. Alberto expresses understanding by teach back and has no further questions at this time.  4. Mr. Alberto declines the need for warfarin refills at this  time.      5/17/2018  12:09 PM

## 2018-05-17 NOTE — ED PROVIDER NOTES
Subjective   Pt is a 52 yr old white male that presents to the ER with c/o testicular pain. Pain is in the left testicle.  Patient complains of pain with urination.  Patient describes the pain as a burning sensation.  And is seeing Dr. mehta in the past for this and has been given multiple rounds of antibiotics.  Patient reports that will improve for a little bit a time and then return.  Patient recently had a CT as well as a biopsy of his prostate however he is unsure of the results.  Patient complains of nausea, vomiting.  Patient also reports some mild chest pressure as well as shortness of breath.  Patient has a history of an aortic tear 4 years ago.  Patient also reports a history of enlarged prostate and hypertension.        History provided by:  Patient  Illness   Location:  Lt testicle  Severity:  Moderate  Timing:  Constant  Progression:  Worsening  Worsened by:  Palpation, movement  Associated symptoms: chest pain and shortness of breath    Associated symptoms: no abdominal pain, no cough, no diarrhea, no fever, no nausea and no vomiting        Review of Systems   Constitutional: Negative for chills and fever.   Respiratory: Positive for shortness of breath. Negative for cough.    Cardiovascular: Positive for chest pain.   Gastrointestinal: Negative for abdominal pain, diarrhea, nausea and vomiting.   Genitourinary: Positive for dysuria and testicular pain.   All other systems reviewed and are negative.      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 Residual left upper extremity weakness.     • Transient cerebral ischemia        No Known Allergies    Past Surgical History:   Procedure  Laterality Date   • ABDOMINAL AORTIC ANEURYSM REPAIR      History of Aortic Aneurysm Repair   • AORTIC VALVE REPAIR/REPLACEMENT      Replacement   • BACK SURGERY     • HERNIA REPAIR  08/2014    Left inguinal herniorrhaphy   • SKIN CANCER EXCISION  08/2014    Left forehead melanoma excision, Dr. Gisell Kothari, August 2014.        Family History   Problem Relation Age of Onset   • Aortic aneurysm Mother    • Arthritis Mother    • Heart disease Mother    • Heart attack Mother    • Hypertension Mother    • Arthritis Father    • Heart disease Father    • Melanoma Father    • Hypertension Father    • Depression Other    • Hypertension Sister    • Heart disease Brother    • Hypertension Brother    • Hypertension Brother        Social History     Social History   • Marital status:      Occupational History   • Disabled      Social History Main Topics   • Smoking status: Never Smoker   • Smokeless tobacco: Never Used   • Alcohol use Yes      Comment: very occasional   • Drug use: No     Other Topics Concern   • Not on file     Social History Narrative    Patient drinks 6-8 caffeine servings per day.           Objective   Physical Exam   Constitutional: He is oriented to person, place, and time. He appears well-developed and well-nourished. He appears distressed (uncomfortable in bed at this time.).   HENT:   Head: Normocephalic and atraumatic.   Right Ear: External ear normal.   Left Ear: External ear normal.   Mouth/Throat: Oropharynx is clear and moist.   Eyes: Conjunctivae and EOM are normal.   Neck: Normal range of motion.   Cardiovascular: Bradycardia present.    Pulmonary/Chest: Effort normal and breath sounds normal. No respiratory distress.   Abdominal: Soft. Bowel sounds are normal. He exhibits no distension. There is no tenderness.   Genitourinary: Penis normal. Right testis shows no mass, no swelling and no tenderness. Right testis is descended. Left testis shows tenderness. Left testis shows no mass and no  swelling. Left testis is descended. No penile erythema. No discharge found.   Neurological: He is alert and oriented to person, place, and time.   Skin: Skin is warm and dry.   Psychiatric: He has a normal mood and affect.   Nursing note and vitals reviewed.      Procedures           ED Course  ED Course as of May 27 2108   Sun May 27, 2018   2106 5/16/2018  2000 patient reports feeling much better at this time.  Patient is advised of results.  Patient will be started on Levaquin.  Patient will be given Norco for pain.  Patient encouraged to follow up with his urologist.  Patient also had a negative troponin.  Patient denies chest pain, shortness of breath at this time.  Patient has an appointment with cardiology.  Patient to keep his appointment as planned.  Patient agrees and verbalizes understanding.  [KG]      ED Course User Index  [KG] Ciarra Pereira, YUE        No results found for this or any previous visit (from the past 24 hour(s)).  Note: In addition to lab results from this visit, the labs listed above may include labs taken at another facility or during a different encounter within the last 24 hours. Please correlate lab times with ED admission and discharge times for further clarification of the services performed during this visit.    US Testicular or Ovarian Vascular Limited   Final Result     Findings consistent with LEFT EPIDIDYMOORCHITIS without evidence of    testicular torsion or mass.         Moderate anechoic simple appearing bilateral hydroceles, larger on the RIGHT.         Recommend followup to complete resolution.          THIS DOCUMENT HAS BEEN ELECTRONICALLY SIGNED BY DONATO GIVENS MD      US Scrotum & Testicles   Final Result     Findings consistent with LEFT EPIDIDYMOORCHITIS without evidence of    testicular torsion or mass.         Moderate anechoic simple appearing bilateral hydroceles, larger on the RIGHT.         Recommend followup to complete resolution.          THIS DOCUMENT  "HAS BEEN ELECTRONICALLY SIGNED BY DONATO GIVENS MD      XR Chest 1 View   Final Result   As on the previous examination there is cardiomegaly.   However on the current examination, there is the new finding of central   vascular congestion without overt pulmonary edema.       D:  05/16/2018   E:  05/16/2018       This report was finalized on 5/16/2018 5:50 PM by Dr. Jeromy Cooper MD.            Vitals:    05/16/18 1656 05/16/18 1745 05/16/18 1930 05/16/18 1931   BP: (!) 194/98 161/91 165/99    BP Location: Left arm      Patient Position: Sitting      Pulse: (!) 37 61  (!) 40   Resp: 20      Temp: 98.1 °F (36.7 °C)      TempSrc: Oral      SpO2: 99% 98% 95% 94%   Weight: 118 kg (260 lb)      Height: 195.6 cm (77\")        Medications   sodium chloride 0.9 % bolus 1,000 mL (0 mL Intravenous Stopped 5/16/18 2039)   Morphine sulfate (PF) injection 4 mg (4 mg Intravenous Given 5/16/18 1807)   ondansetron (ZOFRAN) injection 4 mg (4 mg Intravenous Given 5/16/18 1742)   levoFLOXacin (LEVAQUIN) tablet 500 mg (500 mg Oral Given 5/16/18 2039)     ECG/EMG Results (last 24 hours)     ** No results found for the last 24 hours. **                  [facility]      Final diagnoses:   Epididymoorchitis            Ciarra C Randall, APRN  05/27/18 2108    "

## 2018-05-21 NOTE — PROGRESS NOTES
Anticoagulation [facility]- Clinic Progress Note  Indication: Atrial Fibrillation and Mechanical Aortic Valve  Referring Provider: Enzo Hollis  Initial Warfarin Start Date: 11/2012  Planned Duration of Therapy: Life  Goal INR: 2.5- 3.5  Current Drug Interactions: citalopram, divalproex    Vit K: patient is eating daily samara lettuce salads  Alcohol: none  Tobacco: none       Anticoagulation Clinic INR History:  Date 5/8 5/23 6/6 6/9 6/13 6/28 7/19 8/10 8/30   Total Weekly Dose 52.5mg 60mg 62.5mg 62.5 mg 77.5 mg 57.5 mg 67.5 mg 70mg 70mg   INR 2.2 2.2 1.2 2.2 2.6 2.3 2.4 3.5 1.2   Notes missed dose more vit K dose increase; increase in Vit K foods  boost missed dose   Increase GLV; protein     Date 9/6 9/18 10/9 11/16 11/27 12/4 12/11 12/25 1/4   Total Weekly Dose 67.5 mg 60mg 70mg 70mg 70mg 70mg 75mg 75mg 70mg   INR 3.2 1.8 3.4 1.6 1.9 2.5 3.2 2.7 2.0   Notes boost + 1 held dose Missed dose + significant alcohol consumption on 9/16  enox enox    boost     Date 1/15 1/25 2/15 2/23 3/2 3/6 3/15 3/20 3/27 4/4   Total Weekly Dose 75mg 75mg 65mg 75mg 75mg 90mg 65mg 90mg 80mg 75mg   INR 2.9 2.5 2.1 3.3 1.4 2.0, 1.78 (ED) 2.4 3.2 3.5 2.4   Notes   Missed   enox; boost; levaquin Held x1        Date 4/14 4/20 5/1 5/16         Total Weekly Dose 80mg 80mg 90mg 90mg  (ED)         INR 2.3 2.0 3.2 2.9         Notes     levoflox          Clinic Interview:  Tablet Strength: pt has 5 mg tablets   Current Dose: 15mg daily except 10mg FriSatSun  Patient Contact: [phone]    Multiple failed attempts to reach patient, left voicemail on 5/21 instructing patient to continue maintenance dose and retest 5/21 due to recent abx therapy    Plan:  1. INR was therapeutic on 5/16 at 2.9. Will assume patient will continue current dose 15 mg daily except 10 mg FrSaSu  2. Left voicemail to repeat INR on 5/21 due to recent start of Levaquin regimen.  3. Verbal information provided over the phone. Mr. Alberto expresses understanding by teach  back and has no further questions at this time.        Gertrudis Glez, Pharmacy Technician  5/21/2018  9:55 AM

## 2018-05-21 NOTE — PROGRESS NOTES
Pt was to retest today 5/21. Gertrudis ALANIS I, Do Vargas, PharmD, have reviewed the note in full and agree with the assessment and plan.  05/21/18  4:16 PM

## 2018-05-23 ENCOUNTER — TELEPHONE (OUTPATIENT)
Dept: INTERNAL MEDICINE | Facility: CLINIC | Age: 53
End: 2018-05-23

## 2018-05-23 RX ORDER — DIVALPROEX SODIUM 500 MG/1
1500 TABLET, EXTENDED RELEASE ORAL DAILY
Qty: 45 TABLET | Refills: 0 | Status: SHIPPED | OUTPATIENT
Start: 2018-05-23 | End: 2018-06-05 | Stop reason: SDUPTHER

## 2018-05-23 NOTE — TELEPHONE ENCOUNTER
LVM with office number provided, let pt know I sent in 15 day supply of Depakote and he will need office visit for further refills. Next office visit scheduled with Basilio on 6/4/2018.     ----- Message from Liane Aceves sent at 5/23/2018  1:24 PM EDT -----  [phone]    PT HAS APPT TO REESTABLISH WITH BASILIO FROM DR MODI ON 6/4 BUT NEEDS ENOUGH divalproex (DEPAKOTE) 500 MG 24 hr tablet TO GET HIM THROUGH UNTIL THEN-HE HAS BEEN OUT FOR 2 WEEKS.  CAN YOU FILL THROUGH THEN?    [facility]

## 2018-05-29 ENCOUNTER — ANTICOAGULATION VISIT (OUTPATIENT)
Dept: PHARMACY | Facility: HOSPITAL | Age: 53
End: 2018-05-29

## 2018-05-29 DIAGNOSIS — Z95.2 HX OF AORTIC VALVE REPLACEMENT, MECHANICAL: ICD-10-CM

## 2018-05-29 LAB — INR PPP: 3.2

## 2018-06-05 RX ORDER — DIVALPROEX SODIUM 500 MG/1
TABLET, EXTENDED RELEASE ORAL
Qty: 21 TABLET | Refills: 0 | Status: SHIPPED | OUTPATIENT
Start: 2018-06-05 | End: 2018-10-12 | Stop reason: SDUPTHER

## 2018-06-07 ENCOUNTER — ANTICOAGULATION VISIT (OUTPATIENT)
Dept: PHARMACY | Facility: HOSPITAL | Age: 53
End: 2018-06-07

## 2018-06-07 DIAGNOSIS — Z95.2 HX OF AORTIC VALVE REPLACEMENT, MECHANICAL: ICD-10-CM

## 2018-06-07 LAB — INR PPP: 4.1

## 2018-06-07 NOTE — PROGRESS NOTES
Anticoagulation [facility]- Clinic Progress Note  Indication: Atrial Fibrillation and Mechanical Aortic Valve  Referring Provider: Enzo Hollis  Initial Warfarin Start Date: 11/2012  Planned Duration of Therapy: Life  Goal INR: 2.5- 3.5  Current Drug Interactions: citalopram, divalproex     Vit K: patient is eating daily samara lettuce salads  Alcohol: none  Tobacco: none       Anticoagulation Clinic INR History:  Date 5/8 5/23 6/6 6/9 6/13 6/28 7/19 8/10 8/30   Total Weekly Dose 52.5mg 60mg 62.5mg 62.5 mg 77.5 mg 57.5 mg 67.5 mg 70mg 70mg   INR 2.2 2.2 1.2 2.2 2.6 2.3 2.4 3.5 1.2   Notes missed dose more vit K dose increase; increase in Vit K foods  boost missed dose   Increase GLV; protein     Date 9/6 9/18 10/9 11/16 11/27 12/4 12/11 12/25 1/4   Total Weekly Dose 67.5 mg 60mg 70mg 70mg 70mg 70mg 75mg 75mg 70mg   INR 3.2 1.8 3.4 1.6 1.9 2.5 3.2 2.7 2.0   Notes boost + 1 held dose Missed dose + significant alcohol consumption on 9/16  enox enox    boost     Date 1/15 1/25 2/15 2/23 3/2 3/6 3/15 3/20 3/27 4/4   Total Weekly Dose 75mg 75mg 65mg 75mg 75mg 90mg 65mg 90mg 80mg 75mg   INR 2.9 2.5 2.1 3.3 1.4 2.0, 1.78 (ED) 2.4 3.2 3.5 2.4   Notes   Missed   enox; boost; levaquin Held x1        Date 4/14 4/20 5/1 5/16 5/27 6/7       Total Weekly Dose 80mg 80mg 90mg 90mg  (ED) 90mg        INR 2.3 2.0 3.2 2.9 3.2 4.1       Notes     levoflox          Clinic Interview:  Tablet Strength: 5mg tablets   Current Dose: 15mg daily except 10mg SunFriSat  Patient Contact: [phone]    Plan:  1. INR is supratherapeutic -- LVM for Mr. Alberto to take warfarin 5mg tonight.    Liane Diaz [facility]  6/7/2018  3:20 PM

## 2018-06-12 ENCOUNTER — ANTICOAGULATION VISIT (OUTPATIENT)
Dept: PHARMACY | Facility: HOSPITAL | Age: 53
End: 2018-06-12

## 2018-06-12 DIAGNOSIS — Z95.2 HX OF AORTIC VALVE REPLACEMENT, MECHANICAL: ICD-10-CM

## 2018-06-12 NOTE — PROGRESS NOTES
Anticoagulation [facility] Progress Note  Indication: Atrial Fibrillation and Mechanical Aortic Valve  Referring Provider: Enzo Hollis  Initial Warfarin Start Date: 11/2012  Planned Duration of Therapy: Life  Goal INR: 2.5- 3.5  Current Drug Interactions: citalopram, divalproex     Vit K: patient is eating daily samara lettuce salads  Alcohol: none  Tobacco: none       Anticoagulation Clinic INR History:  Date 5/8 5/23 6/6 6/9 6/13 6/28 7/19 8/10 8/30   Total Weekly Dose 52.5mg 60mg 62.5mg 62.5 mg 77.5 mg 57.5 mg 67.5 mg 70mg 70mg   INR 2.2 2.2 1.2 2.2 2.6 2.3 2.4 3.5 1.2   Notes missed dose more vit K dose increase; increase in Vit K foods  boost missed dose   Increase GLV; protein     Date 9/6 9/18 10/9 11/16 11/27 12/4 12/11 12/25 1/4   Total Weekly Dose 67.5 mg 60mg 70mg 70mg 70mg 70mg 75mg 75mg 70mg   INR 3.2 1.8 3.4 1.6 1.9 2.5 3.2 2.7 2.0   Notes boost + 1 held dose Missed dose + significant alcohol consumption on 9/16  enox enox    boost     Date 1/15 1/25 2/15 2/23 3/2 3/6 3/15 3/20 3/27 4/4   Total Weekly Dose 75mg 75mg 65mg 75mg 75mg 90mg 65mg 90mg 80mg 75mg   INR 2.9 2.5 2.1 3.3 1.4 2.0, 1.78 (ED) 2.4 3.2 3.5 2.4   Notes   Missed   enox; boost; levaquin Held x1        Date 4/14 4/20 5/1 5/16 5/27 6/7 6/13      Total Weekly Dose 80mg 80mg 90mg 90mg  (ED) 90mg 90mg 75mg      INR 2.3 2.0 3.2 2.9 3.2 4.1       Notes     levoflox          Clinic Interview:  Tablet Strength: 5mg tablets   Current Dose: 15mg daily except 10mg SunFriSat  Patient Contact: [phone]    Patient Findings     Positives:   Missed doses   Negatives:   Signs/symptoms of thrombosis, Signs/symptoms of bleeding, Laboratory test error suspected, Change in health, Change in alcohol use, Change in activity, Upcoming invasive procedure, Emergency department visit, Upcoming dental procedure, Extra doses, Change in medications, Change in diet/appetite, Hospital admission, Bruising, Other complaints   Comments:   Patient  self-adjusted and held dose 6/7 due to supratherapeutic reading.     Plan:  1. INR was supratherapeutic 6/7 at 4.1. Mr Alberto self-adjusted regimen and held 6/7 dose. Instructed Mr Alberto to re-check his INR tomorrow, 6/13 for and updated reading.  2. Repeat INR tomorrow, 6/13      Giovani JoeD  06/12/18  11:36 AM

## 2018-06-16 LAB — INR PPP: 3.1

## 2018-06-18 ENCOUNTER — ANTICOAGULATION VISIT (OUTPATIENT)
Dept: PHARMACY | Facility: HOSPITAL | Age: 53
End: 2018-06-18

## 2018-06-18 DIAGNOSIS — Z95.2 HX OF AORTIC VALVE REPLACEMENT, MECHANICAL: ICD-10-CM

## 2018-06-18 NOTE — PROGRESS NOTES
Anticoagulation [facility] Progress Note  Indication: Atrial Fibrillation and Mechanical Aortic Valve  Referring Provider: Enzo Hollis  Initial Warfarin Start Date: 11/2012  Planned Duration of Therapy: Life  Goal INR: 2.5- 3.5  Current Drug Interactions: citalopram, divalproex     Vit K: patient is eating daily samara lettuce salads  Alcohol: none  Tobacco: none       Anticoagulation Clinic INR History:  Date 5/8 5/23 6/6 6/9 6/13 6/28 7/19 8/10 8/30   Total Weekly Dose 52.5mg 60mg 62.5mg 62.5 mg 77.5 mg 57.5 mg 67.5 mg 70mg 70mg   INR 2.2 2.2 1.2 2.2 2.6 2.3 2.4 3.5 1.2   Notes missed dose more vit K dose increase; increase in Vit K foods  boost missed dose   Increase GLV; protein     Date 9/6 9/18 10/9 11/16 11/27 12/4 12/11 12/25 1/4   Total Weekly Dose 67.5 mg 60mg 70mg 70mg 70mg 70mg 75mg 75mg 70mg   INR 3.2 1.8 3.4 1.6 1.9 2.5 3.2 2.7 2.0   Notes boost + 1 held dose Missed dose + significant alcohol consumption on 9/16  enox enox    boost     Date 1/15 1/25 2/15 2/23 3/2 3/6 3/15 3/20 3/27 4/4   Total Weekly Dose 75mg 75mg 65mg 75mg 75mg 90mg 65mg 90mg 80mg 75mg   INR 2.9 2.5 2.1 3.3 1.4 2.0, 1.78 (ED) 2.4 3.2 3.5 2.4   Notes   Missed   enox; boost; levaquin Held x1        Date 4/14 4/20 5/1 5/16 5/27 6/7 6/16      Total Weekly Dose 80mg 80mg 90mg 90mg  (ED) 90mg 90mg 75mg?      INR 2.3 2.0 3.2 2.9 3.2 4.1       Notes     levoflox          Clinic Interview:  Tablet Strength: 5mg tablets   Current Dose: 15mg daily except 10mg SunFriSat  Patient Contact: [phone]    Plan:  1. INR was back WNL on 6/16 at 3.1. Left voice message instructing Mr. Alberto to continue warfarin 15mg daily except 10mg on SunFriSat.  2. Repeat INR on Thursday, 6/21.  3. Mitchell Alberto understands the importance of calling the [facility] if he notices any s/sx of bleeding, stroke, or abnormal bruising, if any changes are made to his medications or medication doses (Rx, OTC, herbal), or if any upcoming  procedures are scheduled. He likewise will let us know if any other changes, questions, or concerns arise regarding anticoagulation therapy. Mitchell Alberto voices understanding of this information and confirms that he has the [facility]'s contact information.    Gertrudis Glez CPhT  06/18/18  8:29 AM

## 2018-06-20 ENCOUNTER — TELEPHONE (OUTPATIENT)
Dept: PHARMACY | Facility: HOSPITAL | Age: 53
End: 2018-06-20

## 2018-06-22 ENCOUNTER — ANTICOAGULATION VISIT (OUTPATIENT)
Dept: PHARMACY | Facility: HOSPITAL | Age: 53
End: 2018-06-22

## 2018-06-22 DIAGNOSIS — Z95.2 HX OF AORTIC VALVE REPLACEMENT, MECHANICAL: ICD-10-CM

## 2018-06-22 LAB — INR PPP: 1.3

## 2018-06-22 NOTE — PROGRESS NOTES
Anticoagulation [facility] Progress Note  Indication: Atrial Fibrillation and Mechanical Aortic Valve  Referring Provider: Enzo Hollis  Initial Warfarin Start Date: 11/2012  Planned Duration of Therapy: Life  Goal INR: 2.5- 3.5  Current Drug Interactions: citalopram, divalproex     Vit K: patient is eating daily samara lettuce salads  Alcohol: none  Tobacco: none       Anticoagulation Clinic INR History:  Date 5/8 5/23 6/6 6/9 6/13 6/28 7/19 8/10 8/30   Total Weekly Dose 52.5mg 60mg 62.5mg 62.5 mg 77.5 mg 57.5 mg 67.5 mg 70mg 70mg   INR 2.2 2.2 1.2 2.2 2.6 2.3 2.4 3.5 1.2   Notes missed dose more vit K dose increase; increase in Vit K foods  boost missed dose   Increase GLV; protein     Date 9/6 9/18 10/9 11/16 11/27 12/4 12/11 12/25 1/4   Total Weekly Dose 67.5 mg 60mg 70mg 70mg 70mg 70mg 75mg 75mg 70mg   INR 3.2 1.8 3.4 1.6 1.9 2.5 3.2 2.7 2.0   Notes boost + 1 held dose Missed dose + significant alcohol consumption on 9/16  enox enox    boost     Date 1/15 1/25 2/15 2/23 3/2 3/6 3/15 3/20 3/27 4/4   Total Weekly Dose 75mg 75mg 65mg 75mg 75mg 90mg 65mg 90mg 80mg 75mg   INR 2.9 2.5 2.1 3.3 1.4 2.0, 1.78 (ED) 2.4 3.2 3.5 2.4   Notes   missed   enox; boost; levaquin held x1        Date 4/14 4/20 5/1 5/16 5/27 6/7 6/15 6/22     Total Weekly Dose 80mg 80mg 90mg 90mg  (ED) 90mg 75mg 95mg 95mg     INR 2.3 2.0 3.2 2.9 3.2 4.1 3.1 1.3     Notes     levoflox missed; levoflox levoflox levoflox       Clinic Interview:  Tablet Strength: 5mg tablets   Current Dose: 15mg daily except 10mg SunFriSat  Patient Contact: [phone]    Patient Findings:  Positives:   Change in medications   Negatives:   Signs/symptoms of thrombosis, Signs/symptoms of bleeding, Laboratory test error suspected, Change in health, Change in alcohol use, Change in activity, Upcoming invasive procedure, Emergency department visit, Upcoming dental procedure, Missed doses, Extra doses, Change in diet/appetite, Hospital admission, Bruising, Other  complaints   Comments:   Mr. Alberto denies missed doses of warfarin, denies changes in his diet. He finished levofloxacin two days ago.      Plan:  1. INR is subtherapeutic today, uncertain the cause. Mr. Alberto has just finished his abx and otherwise denies significant changes. He refuses Lovenox injections at this time (he has none available, and they are very expensive now). Instructed Mr. Alberto to take warfarin 17.5mg tonight, then 15mg SatSun.  2. Repeat INR on Monday.  3. Verbal information provided over the phone. Mr. Alberto expresses understanding with teach back and has no further questions at this time.     Ann Cowden Mayer, PharmD  6/22/2018  5:27 PM

## 2018-06-22 NOTE — PROGRESS NOTES
LVM 6/18 and 6/20 -- did not reach pt until Friday 6/22. Requested retest, see encounter dated 6/22.

## 2018-06-25 ENCOUNTER — ANTICOAGULATION VISIT (OUTPATIENT)
Dept: PHARMACY | Facility: HOSPITAL | Age: 53
End: 2018-06-25

## 2018-06-25 DIAGNOSIS — Z95.2 HX OF AORTIC VALVE REPLACEMENT, MECHANICAL: ICD-10-CM

## 2018-06-25 LAB — INR PPP: 2

## 2018-06-25 NOTE — PROGRESS NOTES
Anticoagulation Clinic - Remote Progress Note  [facility]    Indication: Atrial Fibrillation and Mechanical Aortic Valve; h/o embolic CVA  Referring Provider: milly Mckeon)  Initial Warfarin Start Date: 11/2012  Planned Duration of Therapy: Life  Goal INR: 2.5- 3.5  Current Drug Interactions: citalopram, divalproex   CHADS-VASc: 3 (HTN, h/o CVA)    Vit K Diet: patient is eating daily samara lettuce salads  Alcohol: none  Tobacco: none       Anticoagulation Clinic INR History:  Date 5/8 5/23 6/6 6/9 6/13 6/28 7/19 8/10 8/30   Total Weekly Dose 52.5mg 60mg 62.5mg 62.5 mg 77.5 mg 57.5 mg 67.5 mg 70mg 70mg   INR 2.2 2.2 1.2 2.2 2.6 2.3 2.4 3.5 1.2   Notes missed dose more vit K dose increase; increase in Vit K foods  boost missed dose   Increase GLV; protein     Date 9/6 9/18 10/9 11/16 11/27 12/4 12/11 12/25 1/4   Total Weekly Dose 67.5 mg 60mg 70mg 70mg 70mg 70mg 75mg 75mg 70mg   INR 3.2 1.8 3.4 1.6 1.9 2.5 3.2 2.7 2.0   Notes boost + 1 held dose Missed dose + significant alcohol consumption on 9/16  enox enox    boost     Date 1/15 1/25 2/15 2/23 3/2 3/6 3/15 3/20 3/27 4/4   Total Weekly Dose 75mg 75mg 65mg 75mg 75mg 90mg 65mg 90mg 80mg 75mg   INR 2.9 2.5 2.1 3.3 1.4 2.0, 1.78 (ED) 2.4 3.2 3.5 2.4   Notes   missed   enox; boost; levaquin held x1        Date 4/14 4/20 5/1 5/16 5/27 6/7 6/15 6/22 6/25    Total Weekly Dose 80mg 80mg 90mg 90mg  (ED) 90mg 75mg 95mg 95mg 107.5mg    INR 2.3 2.0 3.2 2.9 3.2 4.1 3.1 1.3 2.0    Notes     levoflox missed; levoflox levoflox levoflox; refuse lovenox       Clinic Interview:  Tablet Strength: 5mg tablets   Current Dose: 15mg daily except 10mg SunFriSat  Patient Contact: [phone] -- try to call in AM    [facility] 6/25 @ 2130 instructing patient to take warfarin 17.5mg tonight  [facility] 6/26 instructing patient to take 15mg tonight- reiterated doses from Monday    Plan:   LVM Monday 6/25, Tues 6/26 -- finally reached pt Wed 6/27 -- he reports he did not receive  either VM. He followed his dosing instructions over the weekend, and he has since taken 10mg warfarin (Jesus). He is unable to check his INR tonight, so he will take 15mg warfarin and recheck it tomorrow AM. For future reference, he reports the morning is a better time to reach him.    Ann Cowden Mayer, PharmD  6/27/2018  4:49 PM

## 2018-07-04 LAB — INR PPP: 2.7

## 2018-07-05 ENCOUNTER — ANTICOAGULATION VISIT (OUTPATIENT)
Dept: PHARMACY | Facility: HOSPITAL | Age: 53
End: 2018-07-05

## 2018-07-05 DIAGNOSIS — Z95.2 HX OF AORTIC VALVE REPLACEMENT, MECHANICAL: ICD-10-CM

## 2018-07-05 NOTE — PROGRESS NOTES
Anticoagulation Clinic - Remote Progress Note  [employer] [employer]    Indication: Atrial Fibrillation and Mechanical Aortic Valve; h/o embolic CVA  Referring Provider: milly Mckeon)  Initial Warfarin Start Date: 11/2012  Planned Duration of Therapy: Life  Goal INR: 2.5- 3.5  Current Drug Interactions: citalopram, divalproex   CHADS-VASc: 3 (HTN, h/o CVA)    Vit K Diet: patient is eating daily samara lettuce salads  Alcohol: none  Tobacco: none       Anticoagulation Clinic INR History:  Date 5/8 5/23 6/6 6/9 6/13 6/28 7/19 8/10 8/30   Total Weekly Dose 52.5mg 60mg 62.5mg 62.5 mg 77.5 mg 57.5 mg 67.5 mg 70mg 70mg   INR 2.2 2.2 1.2 2.2 2.6 2.3 2.4 3.5 1.2   Notes missed dose more vit K dose increase; increase in Vit K foods  boost missed dose   Increase GLV; protein     Date 9/6 9/18 10/9 11/16 11/27 12/4 12/11 12/25 1/4   Total Weekly Dose 67.5 mg 60mg 70mg 70mg 70mg 70mg 75mg 75mg 70mg   INR 3.2 1.8 3.4 1.6 1.9 2.5 3.2 2.7 2.0   Notes boost + 1 held dose Missed dose + significant alcohol consumption on 9/16  enox enox    boost     Date 1/15 1/25 2/15 2/23 3/2 3/6 3/15 3/20 3/27 4/4   Total Weekly Dose 75mg 75mg 65mg 75mg 75mg 90mg 65mg 90mg 80mg 75mg   INR 2.9 2.5 2.1 3.3 1.4 2.0, 1.78 (ED) 2.4 3.2 3.5 2.4   Notes   missed   enox; boost; levaquin held x1        Date 4/14 4/20 5/1 5/16 5/27 6/7 6/15 6/22 6/25 7/4   Total Weekly Dose 80mg 80mg 90mg 90mg  (ED) 90mg 75mg 95mg 95mg 107.5mg    INR 2.3 2.0 3.2 2.9 3.2 4.1 3.1 1.3 2.0 2.7   Notes     levoflox missed; levoflox levoflox levoflox; refuse lovenox       Clinic Interview:  Tablet Strength: 5mg tablets   Current Dose: 15mg daily except 10mg SunFriSat  Patient Contact: [phone] -- try to call in PM    Patient Findings   Negatives:   Signs/symptoms of thrombosis, Signs/symptoms of bleeding, Laboratory test error suspected, Change in health, Change in alcohol use, Change in activity, Upcoming invasive procedure, Emergency department visit, Upcoming  dental procedure, Missed doses, Extra doses, Change in medications, Change in diet/appetite, Hospital admission, Bruising, Other complaints     Plan:   1. INR is therapeutic. [facility] 7/5 @ 1445. [facility] 7/6 @ 1630. Finally got in touch with Mr. Alberto on 7/11. He reports that he has been taking 10mg daily except 15mg on TueSat. Updated in tracker. Discussed issue with non-compliance and patient voices understanding. Patient will repeat INR at 8am in the morning and call [facility] Anticoagulation clinic to ensure WNL prior to leaving town for several days.   2. Repeat INR tomorrow 7/12.  3. Verbal information provided over the phone. Mr. Alberto expresses understanding by teach back and has no further questions at this time.    Do Vargas, PharmD  7/5/2018  2:43 PM

## 2018-07-09 ENCOUNTER — TELEPHONE (OUTPATIENT)
Dept: PHARMACY | Facility: HOSPITAL | Age: 53
End: 2018-07-09

## 2018-07-29 DIAGNOSIS — Z95.2 HX OF AORTIC VALVE REPLACEMENT, MECHANICAL: ICD-10-CM

## 2018-07-30 RX ORDER — WARFARIN SODIUM 5 MG/1
TABLET ORAL
Qty: 180 TABLET | Refills: 0 | Status: SHIPPED | OUTPATIENT
Start: 2018-07-30 | End: 2018-11-09 | Stop reason: SDUPTHER

## 2018-08-01 ENCOUNTER — ANTICOAGULATION VISIT (OUTPATIENT)
Dept: PHARMACY | Facility: HOSPITAL | Age: 53
End: 2018-08-01

## 2018-08-01 DIAGNOSIS — Z95.2 HX OF AORTIC VALVE REPLACEMENT, MECHANICAL: ICD-10-CM

## 2018-08-01 LAB — INR PPP: 2.7

## 2018-08-01 NOTE — PROGRESS NOTES
Anticoagulation Clinic - Remote Progress Note  [employer] [employer]  Testing frequency: 7 days    Indication: Atrial Fibrillation and Mechanical Aortic Valve; h/o embolic CVA  Referring Provider: milly Mckeon)  Initial Warfarin Start Date: 11/2012  Planned Duration of Therapy: Life  Goal INR: 2.5-3.5  Current Drug Interactions: citalopram, divalproex   CHADS-VASc: 3 (HTN, h/o CVA)    Vit K Diet: patient is eating daily samara lettuce salads  Alcohol: none  Tobacco: none       Anticoagulation Clinic INR History:  Date 5/8 5/23 6/6 6/9 6/13 6/28 7/19 8/10 8/30   Total Weekly Dose 52.5mg 60mg 62.5mg 62.5 mg 77.5 mg 57.5 mg 67.5 mg 70mg 70mg   INR 2.2 2.2 1.2 2.2 2.6 2.3 2.4 3.5 1.2   Notes missed dose more vit K dose increase; increase in Vit K foods  boost missed dose   Increase GLV; protein     Date 9/6 9/18 10/9 11/16 11/27 12/4 12/11 12/25 1/4/18   Total Weekly Dose 67.5 mg 60mg 70mg 70mg 70mg 70mg 75mg 75mg 70mg   INR 3.2 1.8 3.4 1.6 1.9 2.5 3.2 2.7 2.0   Notes boost + 1 held dose Missed dose + significant alcohol consumption on 9/16  enox enox    boost     Date 1/15 1/25 2/15 2/23 3/2 3/6 3/15 3/20 3/27 4/4   Total Weekly Dose 75mg 75mg 65mg 75mg 75mg 90mg 65mg 90mg 80mg 75mg   INR 2.9 2.5 2.1 3.3 1.4 2.0, 1.78 (ED) 2.4 3.2 3.5 2.4   Notes   missed   enox; boost; levaquin held x1        Date 4/14 4/20 5/1 5/16 5/27 6/7 6/15 6/22 6/25 7/4   Total Weekly Dose 80mg 80mg 90mg 90mg  (ED) 90mg 75mg 95mg 95mg 107.5 mg ???   INR 2.3 2.0 3.2 2.9 3.2 4.1 3.1 1.3 2.0 2.7   Notes     levoflox missed; levoflox levoflox levoflox; refuse lovenox       Date 7/31            Total Weekly Dose 105mg  ???            INR 2.7            Notes self adj              Phone Interview:  Tablet Strength: 5mg tablets   Current Dose: 15mg daily except 10mg SunFriSat  Patient Contact: [phone] -- try to call in PM    Patient Findings:  Positives:   Extra doses   Negatives:   Signs/symptoms of thrombosis, Signs/symptoms of  bleeding, Laboratory test error suspected, Change in health, Change in alcohol use, Change in activity, Upcoming invasive procedure, Emergency department visit, Upcoming dental procedure, Missed doses, Change in medications, Change in diet/appetite, Hospital admission, Bruising, Other complaints   Comments:   Spoke with patient re:results from 7/31. He says he has been taking warfarin 15mg every day for the past few weeks, have updated tracker to reflect.     He will test tonight/early tomorrow and says we should be able to talk to him around 1100 tomorrow, 8/8.     Plan:   1. INR was therapeutic on 7/31 at 2.7.  Instructed patient to continue warfarin 15mg for now (see pt findings).  2. Repeat INR in 1 week, 8/8 -- spoke to patient on 8/7@1627, he says he will test tonight/early tomorrow and requests we call around 1100 tomorrow, 8/8.  3. Verbal information provided over the phone. Pt RBV dosing instructions, expresses understanding by teach back, and has no further questions at this time.    Sam Burleson CPhT  8/1/2018  11:37 AM    I, Liaen Diaz, [facility], have reviewed the note in full and agree with the assessment and plan.  08/07/18  5:19 PM

## 2018-08-27 ENCOUNTER — ANTICOAGULATION VISIT (OUTPATIENT)
Dept: PHARMACY | Facility: HOSPITAL | Age: 53
End: 2018-08-27

## 2018-08-27 DIAGNOSIS — Z95.2 HX OF AORTIC VALVE REPLACEMENT, MECHANICAL: ICD-10-CM

## 2018-08-27 LAB — INR PPP: 1.3

## 2018-08-31 ENCOUNTER — ANTICOAGULATION VISIT (OUTPATIENT)
Dept: PHARMACY | Facility: HOSPITAL | Age: 53
End: 2018-08-31

## 2018-08-31 DIAGNOSIS — Z95.2 HX OF AORTIC VALVE REPLACEMENT, MECHANICAL: ICD-10-CM

## 2018-09-04 ENCOUNTER — ANTICOAGULATION VISIT (OUTPATIENT)
Dept: PHARMACY | Facility: HOSPITAL | Age: 53
End: 2018-09-04

## 2018-09-04 DIAGNOSIS — Z95.2 HX OF AORTIC VALVE REPLACEMENT, MECHANICAL: ICD-10-CM

## 2018-09-04 LAB — INR PPP: 5.6

## 2018-09-04 NOTE — PROGRESS NOTES
Anticoagulation Clinic - Remote Progress Note  [employer] [employer] [facility]  Testing frequency: 7 days    Indication: Atrial Fibrillation and Mechanical Aortic Valve; h/o embolic CVA  Referring Provider: milly Mckeon)  Initial Warfarin Start Date: 11/2012  Planned Duration of Therapy: Life  Goal INR: 2.5-3.5  Current Drug Interactions: citalopram, divalproex   CHADS-VASc: 3 (HTN, h/o CVA)    Vit K Diet: patient is eating daily samara lettuce salads  Alcohol: none  Tobacco: none       Anticoagulation Clinic INR History:  Date 5/8 5/23 6/6 6/9 6/13 6/28 7/19 8/10 8/30   Total Weekly Dose 52.5mg 60mg 62.5mg 62.5 mg 77.5 mg 57.5 mg 67.5 mg 70mg 70mg   INR 2.2 2.2 1.2 2.2 2.6 2.3 2.4 3.5 1.2   Notes missed dose more vit K dose increase; increase in Vit K foods  boost missed dose   Increase GLV; protein     Date 9/6 9/18 10/9 11/16 11/27 12/4 12/11 12/25 1/4/18   Total Weekly Dose 67.5 mg 60mg 70mg 70mg 70mg 70mg 75mg 75mg 70mg   INR 3.2 1.8 3.4 1.6 1.9 2.5 3.2 2.7 2.0   Notes boost + 1 held dose Missed dose + significant alcohol consumption on 9/16  enox enox    boost     Date 1/15 1/25 2/15 2/23 3/2 3/6 3/15 3/20 3/27 4/4   Total Weekly Dose 75mg 75mg 65mg 75mg 75mg 90mg 65mg 90mg 80mg 75mg   INR 2.9 2.5 2.1 3.3 1.4 2.0, 1.78 (ED) 2.4 3.2 3.5 2.4   Notes   missed   enox; boost; levaquin held x1        Date 4/14 4/20 5/1 5/16 5/27 6/7 6/15 6/22 6/25 7/4   Total Weekly Dose 80mg 80mg 90mg 90mg  (ED) 90mg 75mg 95mg 95mg 107.5 mg ???   INR 2.3 2.0 3.2 2.9 3.2 4.1 3.1 1.3 2.0 2.7   Notes     levoflox missed; levoflox levoflox levoflox; refuse lovenox       Date 7/31 8/27 8/31 9/4        Total Weekly Dose 105mg  ??? 65mg? 95mg 100 mg        INR 2.7 1.3 2.6 5.6        Notes self adj Self adj; miss; enox            Phone Interview:  Tablet Strength: 5mg tablets   Current Dose: 15mg daily except 10mg SunFriSat  Patient Contact: [phone] -- try to call in PM    Patient Findings   Negatives:   Signs/symptoms of  thrombosis, Signs/symptoms of bleeding, Laboratory test error suspected, Change in health, Change in alcohol use, Change in activity, Upcoming invasive procedure, Emergency department visit, Upcoming dental procedure, Missed doses, Extra doses, Change in medications, Change in diet/appetite, Hospital admission, Bruising, Other complaints       Plan:   1. INR is SUPRAtherapeutic today.  For now, instructed Mr. Alberto to HOLD his dose today and then continue 15mg WedThur. May consider dosing of 90-95mg/week.  2. Repeat INR on Friday.  3. Verbal information provided over the phone. Pt RBV dosing instructions, expresses understanding by teach back, and has no further questions at this time.    Do Vargas, PharmD  9/4/2018  1:57 PM

## 2018-09-11 LAB — INR PPP: 1.6

## 2018-09-12 ENCOUNTER — ANTICOAGULATION VISIT (OUTPATIENT)
Dept: PHARMACY | Facility: HOSPITAL | Age: 53
End: 2018-09-12

## 2018-09-12 DIAGNOSIS — Z95.2 HX OF AORTIC VALVE REPLACEMENT, MECHANICAL: ICD-10-CM

## 2018-09-12 NOTE — PROGRESS NOTES
Anticoagulation Clinic - Remote Progress Note  [employer] [employer]  Testing frequency: 7 days    Indication: Atrial Fibrillation and Mechanical Aortic Valve; h/o embolic CVA  Referring Provider: milly Mckeon)  Initial Warfarin Start Date: 11/2012  Planned Duration of Therapy: Life  Goal INR: 2.5-3.5  Current Drug Interactions: citalopram, divalproex   CHADS-VASc: 3 (HTN, h/o CVA)    Vit K Diet: patient is eating daily samara lettuce salads  Alcohol: none  Tobacco: none       Anticoagulation Clinic INR History:  Date 5/8 5/23 6/6 6/9 6/13 6/28 7/19 8/10 8/30   Total Weekly Dose 52.5mg 60mg 62.5mg 62.5 mg 77.5 mg 57.5 mg 67.5 mg 70mg 70mg   INR 2.2 2.2 1.2 2.2 2.6 2.3 2.4 3.5 1.2   Notes missed dose more vit K dose increase; increase in Vit K foods  boost missed dose   Increase GLV; protein     Date 9/6 9/18 10/9 11/16 11/27 12/4 12/11 12/25 1/4/18   Total Weekly Dose 67.5 mg 60mg 70mg 70mg 70mg 70mg 75mg 75mg 70mg   INR 3.2 1.8 3.4 1.6 1.9 2.5 3.2 2.7 2.0   Notes boost + 1 held dose Missed dose + significant alcohol consumption on 9/16  enox enox    boost     Date 1/15 1/25 2/15 2/23 3/2 3/6 3/15 3/20 3/27 4/4   Total Weekly Dose 75mg 75mg 65mg 75mg 75mg 90mg 65mg 90mg 80mg 75mg   INR 2.9 2.5 2.1 3.3 1.4 2.0, 1.78 (ED) 2.4 3.2 3.5 2.4   Notes   missed   enox; boost; levaquin held x1        Date 4/14 4/20 5/1 5/16 5/27 6/7 6/15 6/22 6/25 7/4   Total Weekly Dose 80mg 80mg 90mg 90mg  (ED) 90mg 75mg 95mg 95mg 107.5 mg ???   INR 2.3 2.0 3.2 2.9 3.2 4.1 3.1 1.3 2.0 2.7   Notes     levoflox missed; levoflox levoflox levoflox; refuse lovenox       Date 7/31 8/27 8/31 9/4 9/11       Total Weekly Dose 105mg  ??? 65mg  ??? 95mg 100 mg 80mg  ???       INR 2.7 1.3 2.6 5.6 1.6       Notes self adj self adj; miss; enox   incr GLV, mis-dosing, refuse enox         Phone Interview:  Tablet Strength: 5mg tablets   Current Dose: 15mg daily except 10mg SunFriSat  Patient Contact: [phone] -- try to call in  PM    Patient Findings:   Positives:   Missed doses, Change in diet/appetite   Negatives:   Signs/symptoms of thrombosis, Signs/symptoms of bleeding, Laboratory test error suspected, Change in health, Change in alcohol use, Change in activity, Upcoming invasive procedure, Emergency department visit, Upcoming dental procedure, Extra doses, Change in medications, Hospital admission, Bruising, Other complaints   Comments:   Mr. Alberto reports he has been eating a lot of broccoli this past week (broccoli casserole). He has also only been taking TWO warfarin tablets (10mg) daily, instead of the instructed 15mg dose.     Plan:   1. INR is SUBtherapeutic today, due to a multitude of factors: Mr. Alberto did not follow our warfarin dosing instructions, he did not test as requested, and he has been varying his diet, eating a lot of broccoli over the last week or so. He is unable to afford / refuses Lovenox at this time. For now, instructed him to take warfarin 17.5mg (3.5 tablets) WedThurs.  2. Repeat INR on Friday. Emphasized the importance of compliance with these instructions, to hopefully limit this ongoing INR oscillation (SUB to SUPRAtherapeutic).   3. Verbal information provided over the phone. Pt RBV dosing instructions, expresses understanding by teach back, and has no further questions at this time.  4. Reviewed vitamin K content of various foods, as Mr. Alberto admitted he was unaware of the content in broccoli. Will mail him a list, as well.    Liane Diaz [facility]  9/12/2018  7:59 AM

## 2018-09-24 RX ORDER — FUROSEMIDE 40 MG/1
TABLET ORAL
Qty: 90 TABLET | Refills: 2 | Status: SHIPPED | OUTPATIENT
Start: 2018-09-24 | End: 2020-01-02

## 2018-09-27 ENCOUNTER — ANTICOAGULATION VISIT (OUTPATIENT)
Dept: PHARMACY | Facility: HOSPITAL | Age: 53
End: 2018-09-27

## 2018-09-27 DIAGNOSIS — Z95.2 HX OF AORTIC VALVE REPLACEMENT, MECHANICAL: ICD-10-CM

## 2018-09-27 LAB — INR PPP: 1.6

## 2018-09-27 NOTE — PROGRESS NOTES
Anticoagulation Clinic - Remote Progress Note  [facility]  Testing frequency: 7 days    Indication: Atrial Fibrillation and Mechanical Aortic Valve; h/o embolic CVA  Referring Provider: milly Mckeon)  Initial Warfarin Start Date: 11/2012  Planned Duration of Therapy: Life  Goal INR: 2.5-3.5  Current Drug Interactions: citalopram, divalproex   CHADS-VASc: 3 (HTN, h/o CVA)    Vit K Diet: patient is eating daily samara lettuce salads  Alcohol: none  Tobacco: none       Anticoagulation Clinic INR History:  Date 5/8 5/23 6/6 6/9 6/13 6/28 7/19 8/10 8/30   Total Weekly Dose 52.5mg 60mg 62.5mg 62.5 mg 77.5 mg 57.5 mg 67.5 mg 70mg 70mg   INR 2.2 2.2 1.2 2.2 2.6 2.3 2.4 3.5 1.2   Notes missed dose more vit K dose increase; increase in Vit K foods  boost missed dose   Increase GLV; protein     Date 9/6 9/18 10/9 11/16 11/27 12/4 12/11 12/25 1/4/18   Total Weekly Dose 67.5 mg 60mg 70mg 70mg 70mg 70mg 75mg 75mg 70mg   INR 3.2 1.8 3.4 1.6 1.9 2.5 3.2 2.7 2.0   Notes boost + 1 held dose Missed dose + significant alcohol consumption on 9/16  enox enox    boost     Date 1/15 1/25 2/15 2/23 3/2 3/6 3/15 3/20 3/27 4/4   Total Weekly Dose 75mg 75mg 65mg 75mg 75mg 90mg 65mg 90mg 80mg 75mg   INR 2.9 2.5 2.1 3.3 1.4 2.0, 1.78 (ED) 2.4 3.2 3.5 2.4   Notes   missed   enox; boost; levaquin held x1        Date 4/14 4/20 5/1 5/16 5/27 6/7 6/15 6/22 6/25 7/4   Total Weekly Dose 80mg 80mg 90mg 90mg  (ED) 90mg 75mg 95mg 95mg 107.5 mg ???   INR 2.3 2.0 3.2 2.9 3.2 4.1 3.1 1.3 2.0 2.7   Notes     levoflox missed; levoflox levoflox levoflox; refuse lovenox       Date 7/31 8/27 8/31 9/4 9/11 9/27      Total Weekly Dose 105mg  ??? 65mg  ??? 95mg 100 mg 80mg  ??? 70 mg      INR 2.7 1.3 2.6 5.6 1.6 1.6      Notes self adj self adj; miss; enox   incr GLV, mis-dosing, refuse enox incorrect doses        Phone Interview:  Tablet Strength: 5mg tablets   Current Dose: 15mg daily except 10mg SunFriSat  Patient Contact: [phone] --  "try to call in PM    Patient Findings     Positives:   Change in medications, Change in diet/appetite, Other complaints   Negatives:   Signs/symptoms of thrombosis, Signs/symptoms of bleeding, Laboratory test error suspected, Change in health, Change in alcohol use, Change in activity, Upcoming invasive procedure, Emergency department visit, Upcoming dental procedure, Missed doses, Extra doses, Hospital admission, Bruising   Comments:   He does not believe he missed any doses but he has been at the hospital. Mr Alberto has been taking his warfarin differently than prescribed.  He stated that he has been taking warfarin 10 mg oral daily since he boosted with 17.5 mg 9/12 and 9/13.  Discussed the possibility of bridging with enoxaparin.  Enoxaparin was called into pharmacy and when I called patient back, he would agree to 3 doses as that is all that he can afford.    He stated that he ate a couple servings of broccoli this weekend.   Wife had esoph removed and part of her lung.     LVM: 9/27, explaining that his INR was concerningly low, that he was at an increased risk of clotting, and to please call back by end of day 430pm to discuss a plan.    Plan:   1. INR is SUBtherapeutic today, however, Mr. Alberto thought that he had taken warfarin as instructed but that he has been at the hospital with his sick wife; however, stated that he may have missed some doses. Discovered he had been taking differently than prescribed. He has been varying his diet, eating a lot of broccoli over the last week or so as he has been eating at the hospital.  He is willing to take enoxaparin bridge - \"as many doses as he can afford\" - he agreed to take one dose tonight and then 2 doses on Friday.  Lovenox Rx called in.  For now, instructed him to take warfarin 15 mg (3 tablets) today, tomorrow and Saturday.  Reviewed importance of self-care.  2. Repeat INR on Sunday morning early and contact the clinic at inpatient number. Emphasized the " importance of compliance with these instructions, to hopefully limit this ongoing INR oscillation (SUB to SUPRAtherapeutic).   3. Verbal information provided over the phone. Pt RBV dosing instructions, expresses understanding by teach back, and has no further questions at this time.    Dora Pereira, PharmD  9/27/2018  1:04 PM

## 2018-10-02 ENCOUNTER — ANTICOAGULATION VISIT (OUTPATIENT)
Dept: PHARMACY | Facility: HOSPITAL | Age: 53
End: 2018-10-02

## 2018-10-02 DIAGNOSIS — Z95.2 HX OF AORTIC VALVE REPLACEMENT, MECHANICAL: ICD-10-CM

## 2018-10-02 LAB — INR PPP: 2.3

## 2018-10-02 NOTE — PROGRESS NOTES
Anticoagulation Clinic - Remote Progress Note  [facility]  Testing frequency: 7 days    Indication: Atrial Fibrillation and Mechanical Aortic Valve; h/o embolic CVA  Referring Provider: milly Mckeon)  Initial Warfarin Start Date: 11/2012  Planned Duration of Therapy: Life  Goal INR: 2.5-3.5  Current Drug Interactions: citalopram, divalproex   CHADS-VASc: 3 (HTN, h/o CVA)    Vit K Diet: patient is eating daily samara lettuce salads  Alcohol: none  Tobacco: none       Anticoagulation Clinic INR History:  Date 5/8 5/23 6/6 6/9 6/13 6/28 7/19 8/10 8/30   Total Weekly Dose 52.5mg 60mg 62.5mg 62.5 mg 77.5 mg 57.5 mg 67.5 mg 70mg 70mg   INR 2.2 2.2 1.2 2.2 2.6 2.3 2.4 3.5 1.2   Notes missed dose more vit K dose increase; increase in Vit K foods  boost missed dose   Increase GLV; protein     Date 9/6 9/18 10/9 11/16 11/27 12/4 12/11 12/25 1/4/18   Total Weekly Dose 67.5 mg 60mg 70mg 70mg 70mg 70mg 75mg 75mg 70mg   INR 3.2 1.8 3.4 1.6 1.9 2.5 3.2 2.7 2.0   Notes boost + 1 held dose Missed dose + significant alcohol consumption on 9/16  enox enox    boost     Date 1/15 1/25 2/15 2/23 3/2 3/6 3/15 3/20 3/27 4/4   Total Weekly Dose 75mg 75mg 65mg 75mg 75mg 90mg 65mg 90mg 80mg 75mg   INR 2.9 2.5 2.1 3.3 1.4 2.0, 1.78 (ED) 2.4 3.2 3.5 2.4   Notes   missed   enox; boost; levaquin held x1        Date 4/14 4/20 5/1 5/16 5/27 6/7 6/15 6/22 6/25 7/4   Total Weekly Dose 80mg 80mg 90mg 90mg  (ED) 90mg 75mg 95mg 95mg 107.5 mg ???   INR 2.3 2.0 3.2 2.9 3.2 4.1 3.1 1.3 2.0 2.7   Notes     levoflox missed; levoflox levoflox levoflox; refuse lovenox       Date 7/31 8/27 8/31 9/4 9/11 9/27 10/2     Total Weekly Dose 105mg  ??? 65mg  ??? 95mg 100 mg 80mg  ??? 70 mg 90mg     INR 2.7 1.3 2.6 5.6 1.6 1.6 2.3     Notes self adj self adj; miss; enox   incr GLV, mis-dosing, refuse enox incorrect doses        Phone Interview:  Tablet Strength: 5mg tablets   Current Dose: 15mg daily except 10mg SunFriSat  Patient Contact:  [phone] -- try to call in PM    Patient Findings:  Negatives:   Signs/symptoms of thrombosis, Signs/symptoms of bleeding, Laboratory test error suspected, Change in health, Change in alcohol use, Change in activity, Upcoming invasive procedure, Emergency department visit, Upcoming dental procedure, Missed doses, Extra doses, Change in medications, Change in diet/appetite, Hospital admission, Bruising, Other complaints     Plan:   1. INR is slightly SUBtherapeutic again on 10/2 at 2.3, up from 1.6 last week. Spoke to Mr. Alberto today, 10/5. He states he has taken warfarin 15mg daily except 10mg on Mondays. Mr. Alberto states his family member is in the hospital and he is doing the best he can, it is difficult to check his INR regularly. He is very aware per our previous encounters the risks associated with SUBtherapeutic and SUPRAtherapeutic INR Results. He is aware of the need for compliance. Instructed Mr. Alberto to continue taking warfarin 15mg daily except 10mg on Monday. Patient will recheck INR today, 10/5. If INR is therapeutic, will not call patient but he will continue his current maintenance dose as stated above.  2. Patient stated he went to the pharmacy to  Lovenox shots and they were very expensive--patient cannot afford Lovenox injections. He acknowledged the risks of SUBtherapeutic INRs.  3. Recheck INR today, 10/5, and again on 10/12.  4. Verbal and written information provided to patient over the phone. Mr. Alberto expresses understanding by teach back and has no further questions at this time.     Edie Dunaway, PharmD  10/5/2018  11:26 AM

## 2018-10-05 LAB — INR PPP: 2.4

## 2018-10-08 ENCOUNTER — ANTICOAGULATION VISIT (OUTPATIENT)
Dept: PHARMACY | Facility: HOSPITAL | Age: 53
End: 2018-10-08

## 2018-10-08 DIAGNOSIS — Z95.2 HX OF AORTIC VALVE REPLACEMENT, MECHANICAL: ICD-10-CM

## 2018-10-08 NOTE — PROGRESS NOTES
Anticoagulation Clinic - Remote Progress Note  [facility]  Testing frequency: 7 days    Indication: Atrial Fibrillation and Mechanical Aortic Valve; h/o embolic CVA  Referring Provider: milly Mckeon)  Initial Warfarin Start Date: 11/2012  Planned Duration of Therapy: Life  Goal INR: 2.5-3.5  Current Drug Interactions: citalopram, divalproex   CHADS-VASc: 3 (HTN, h/o CVA)    Vit K Diet: patient is eating daily samara lettuce salads  Alcohol: none  Tobacco: none       Anticoagulation Clinic INR History:  Date 5/8 5/23 6/6 6/9 6/13 6/28 7/19 8/10 8/30   Total Weekly Dose 52.5mg 60mg 62.5mg 62.5 mg 77.5 mg 57.5 mg 67.5 mg 70mg 70mg   INR 2.2 2.2 1.2 2.2 2.6 2.3 2.4 3.5 1.2   Notes missed dose more vit K dose increase; increase in Vit K foods  boost missed dose   Increase GLV; protein     Date 9/6 9/18 10/9 11/16 11/27 12/4 12/11 12/25 1/4/18   Total Weekly Dose 67.5 mg 60mg 70mg 70mg 70mg 70mg 75mg 75mg 70mg   INR 3.2 1.8 3.4 1.6 1.9 2.5 3.2 2.7 2.0   Notes boost + 1 held dose Missed dose + significant alcohol consumption on 9/16  enox enox    boost     Date 1/15 1/25 2/15 2/23 3/2 3/6 3/15 3/20 3/27 4/4   Total Weekly Dose 75mg 75mg 65mg 75mg 75mg 90mg 65mg 90mg 80mg 75mg   INR 2.9 2.5 2.1 3.3 1.4 2.0, 1.78 (ED) 2.4 3.2 3.5 2.4   Notes   missed   enox; boost; levaquin held x1        Date 4/14 4/20 5/1 5/16 5/27 6/7 6/15 6/22 6/25 7/4   Total Weekly Dose 80mg 80mg 90mg 90mg  (ED) 90mg 75mg 95mg 95mg 107.5 mg ???   INR 2.3 2.0 3.2 2.9 3.2 4.1 3.1 1.3 2.0 2.7   Notes     levoflox missed; levoflox levoflox levoflox; refuse lovenox       Date 7/31 8/27 8/31 9/4 9/11 9/27 10/2 10/5    Total Weekly Dose 105mg  ??? 65mg  ??? 95mg 100 mg 80mg  ??? 70 mg 90mg 100mg 100mg   INR 2.7 1.3 2.6 5.6 1.6 1.6 2.3 2.4    Notes self adj self adj; miss; enox   incr GLV, mis-dosing, refuse enox incorrect doses        Phone Interview:  Tablet Strength: 5mg tablets   Current Dose: 15mg daily except 10mg  Chris  Patient Contact: [phone] -- try to call in PM    Patient Findings:  Negatives:   Signs/symptoms of thrombosis, Signs/symptoms of bleeding, Laboratory test error suspected, Change in health, Change in alcohol use, Change in activity, Upcoming invasive procedure, Emergency department visit, Upcoming dental procedure, Missed doses, Extra doses, Change in medications, Change in diet/appetite, Hospital admission, Bruising, Other complaints   Comments:   Mr. Alberto denies changes in medications, diet, or health. He is currently staying in the hospital with his wife.      Plan:    1. INR was slightly subtherapeutic again Friday but continues to slowly improve. Mr. Alberto is very aware of the risks associated with SUBtherapeutic and SUPRAtherapeutic INR results and of the need for compliance. Instructed Mr. Alberto to continue warfarin 15mg daily except 10mg on Monday.  2. Repeat INR again this Friday, 10/12.  3. Verbal and written information provided to patient over the phone. Mr. Alberto expresses understanding by teach back and has no further questions at this time.     Ann Cowden Mayer, PharmD  10/8/2018  12:25 PM

## 2018-10-11 NOTE — PROGRESS NOTES
"Subjective:    Mitchell Alberto is a 53 y.o. male.     Chief Complaint   Patient presents with   • Annual Exam   • Establish Care     re-establish care        History of Present Illness   Here to re-establish care.    Patient complains of BPH for years of duration. Patient requests Cialis because it helps \"shrink his prostate\" and controls his nocturia and dribbling. Patient complains of enlarged prostate for years of duration.Associated nocturia, dribbling and denies erectile dysfunction.  Patient requests a different urologist. Patient reports intermittent issues with hydrocele and he does not desire any more surgeries. With flares he has testicle swelling and pain. Patient states he has had these issues for years.    Patient complains of anxiety and depression and \"borderline bipolar\" for years of duration. Associated with \"bad choices\", unwise spending, decreased motivation and depression. Managed with Depakote. Worsened with wife's cancer and illness. He is spending every day at hospital with wife. No suicidal or homicidal thoughts. Patient requests refill for Depakote.     Patient complains of hypertension for years of duration. Patient reports he has had a 3cm tear in aorta with valve replacement 7 years ago. No chest pain, leg swelling or shortness of breath. Patient states he takes Lovenox as needed and Coumadin daily for anticoagulation. Patient has history of atrial fib and patient reports current right BBB. Patient states he saw cardiologist one year ago and needs to return. Patient states he sees Dr Hollis.    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 He has hearing aids in both ears that he uses intermittently.    Current Outpatient Prescriptions:   •  CIALIS 5 MG tablet, Take 1 tablet by mouth As Needed., Disp: , Rfl:   •  divalproex (DEPAKOTE) 500 MG 24 hr tablet, Take 3 tablets daily, Disp: 90 tablet, Rfl: 6  •  enoxaparin (LOVENOX) 120 MG/0.8ML solution syringe, Inject 0.8 mL " under the skin into the appropriate area as directed Every 12 (Twelve) Hours., Disp: 14 syringe, Rfl: 1  •  enoxaparin (LOVENOX) 150 MG/ML injection, Inject contents of one syringe under the skin every 24 hours., Disp: 6 mL, Rfl: 0  •  furosemide (LASIX) 40 MG tablet, TAKE ONE TABLET BY MOUTH DAILY AS NEEDED (GREATER THAN 2LB WEIGHT GAIN IN 24 HOURS), Disp: 90 tablet, Rfl: 2  •  lisinopril (PRINIVIL,ZESTRIL) 40 MG tablet, Take 1 tablet by mouth Daily., Disp: 90 tablet, Rfl: 3  •  PHARMACY TO DOSE WARFARIN, Continuous As Needed (patient is managed by the [facility] ([phone]))., Disp: , Rfl:   •  warfarin (COUMADIN) 5 MG tablet, TAKE TWO TABLETS BY MOUTH DAILY OR AS DIRECTED BY ANTICOAGULATION PHARMACIST, Disp: 180 tablet, Rfl: 0     The following portions of the patient's history were reviewed and updated as appropriate: allergies, current medications, past family history, past medical history, past social history, past surgical history and problem list.    Review of Systems   Constitutional: Negative for chills, fatigue and fever.   HENT: Positive for hearing loss. Negative for congestion, dental problem, mouth sores, nosebleeds, postnasal drip, rhinorrhea, sinus pain, sinus pressure, sneezing and sore throat.         Hearing aids.   Eyes: Negative for pain, discharge, redness and itching.   Respiratory: Negative for cough, shortness of breath and wheezing.    Cardiovascular: Negative for chest pain, palpitations and leg swelling.        HTN. Artificial valve. No VICTORIA, orthopnea, PND, or claudication.   Gastrointestinal: Negative for abdominal distention, abdominal pain, blood in stool, diarrhea, nausea and vomiting.   Endocrine: Negative for cold intolerance, heat intolerance, polydipsia and polyuria.   Genitourinary: Negative for difficulty urinating, dysuria, frequency, hematuria and urgency.   Musculoskeletal: Negative for arthralgias, gait problem, joint swelling and  "myalgias.   Skin: Negative for color change, rash and wound.   Allergic/Immunologic: Negative.    Neurological: Negative for dizziness, syncope, weakness, light-headedness, numbness and headaches.   Hematological: Negative for adenopathy. Does not bruise/bleed easily.   Psychiatric/Behavioral: Negative for self-injury and suicidal ideas.        Anxiety and depression       Objective:    /80   Pulse 60   Temp 97.8 °F (36.6 °C)   Resp 18   Ht 195.6 cm (77\")   Wt 134 kg (295 lb 3.2 oz)   BMI 35.01 kg/m²     Physical Exam   Constitutional: He is oriented to person, place, and time. He appears well-developed and well-nourished. He is cooperative. He is easily aroused.  Non-toxic appearance. He does not have a sickly appearance. He does not appear ill. No distress.   HENT:   Head: Normocephalic and atraumatic. Head is without abrasion. Hair is normal.   Right Ear: Tympanic membrane, external ear and ear canal normal. No foreign bodies. Tympanic membrane is not perforated and not erythematous. Decreased hearing is noted.   Left Ear: Tympanic membrane, external ear and ear canal normal. No foreign bodies. Tympanic membrane is not perforated and not erythematous. Decreased hearing is noted.   Nose: Nose normal. No mucosal edema, rhinorrhea or septal deviation. No epistaxis.  No foreign bodies.   Mouth/Throat: Oropharynx is clear and moist. No oral lesions. Normal dentition.   Eyes: Conjunctivae and lids are normal. Pupils are equal, round, and reactive to light. Right eye exhibits no discharge. Left eye exhibits no discharge. Right conjunctiva is not injected. Left conjunctiva is not injected. No scleral icterus.   Neck: Normal range of motion and full passive range of motion without pain. Neck supple. No edema and normal range of motion present. No thyroid mass and no thyromegaly present.   Cardiovascular: Normal rate, regular rhythm and normal heart sounds. Exam reveals no gallop and no friction rub.   No " murmur heard.  Mechanical S2   Pulmonary/Chest: Effort normal and breath sounds normal. No accessory muscle usage. He has no rhonchi. He has no rales. He exhibits no tenderness.   Abdominal: Soft. Bowel sounds are normal. He exhibits no distension. There is no hepatosplenomegaly or hepatomegaly. There is no tenderness. There is no CVA tenderness.   Musculoskeletal: Normal range of motion. He exhibits no edema, tenderness or deformity.   Lymphadenopathy:     He has no cervical adenopathy.   Neurological: He is alert, oriented to person, place, and time and easily aroused. He has normal reflexes. No cranial nerve deficit. Coordination normal.   Muscle strength 5/5 and equal throughout.   Skin: Skin is warm, dry and intact. No abrasion and no rash noted. He is not diaphoretic. No cyanosis or erythema. Nails show no clubbing.   Psychiatric: He has a normal mood and affect. His speech is normal and behavior is normal.   Nursing note and vitals reviewed.      Assessment/Plan:    Mitchell was seen today for annual exam and establish care.    Diagnoses and all orders for this visit:    Essential hypertension  -     CBC & Differential  -     Comprehensive Metabolic Panel  Continue current medicines and monitor blood pressure    Immunization due  -     Fluarix/Fluzone/Afluria Quad>6 Months    Atrial fibrillation, unspecified type (CMS/HCC)  -     Ambulatory Referral to Cardiology  Continue anticoagulation  Ascending aortic aneurysm (CMS/HCC)  -     Ambulatory Referral to Cardiology    Hx of aortic valve replacement, mechanical [Z95.2]  -     Ambulatory Referral to Cardiology    Need for hepatitis C screening test  -     Hepatitis C antibody    Screening for endocrine disorder  -     T4, Free  -     TSH    Screening for lipoid disorders  -     Lipid Panel    Benign prostatic hyperplasia with post-void dribbling  -     Ambulatory Referral to Urology  Continue Cialis  Anxiety  Continue Depakote  Depression, unspecified depression  type  Continue Depakote  Bilateral sensorineural hearing loss    Screen for colon cancer  -     Ambulatory Referral For Screening Colonoscopy    Other orders  -     divalproex (DEPAKOTE) 500 MG 24 hr tablet; Take 3 tablets daily  -     lisinopril (PRINIVIL,ZESTRIL) 40 MG tablet; Take 1 tablet by mouth Daily.        Return in about 3 months (around 1/12/2019).

## 2018-10-12 ENCOUNTER — TELEPHONE (OUTPATIENT)
Dept: INTERNAL MEDICINE | Facility: CLINIC | Age: 53
End: 2018-10-12

## 2018-10-12 ENCOUNTER — OFFICE VISIT (OUTPATIENT)
Dept: INTERNAL MEDICINE | Facility: CLINIC | Age: 53
End: 2018-10-12

## 2018-10-12 VITALS
HEART RATE: 60 BPM | HEIGHT: 77 IN | SYSTOLIC BLOOD PRESSURE: 120 MMHG | DIASTOLIC BLOOD PRESSURE: 80 MMHG | RESPIRATION RATE: 18 BRPM | TEMPERATURE: 97.8 F | WEIGHT: 295.2 LBS | BODY MASS INDEX: 34.86 KG/M2

## 2018-10-12 DIAGNOSIS — F41.9 ANXIETY: ICD-10-CM

## 2018-10-12 DIAGNOSIS — N40.1 BENIGN PROSTATIC HYPERPLASIA WITH POST-VOID DRIBBLING: ICD-10-CM

## 2018-10-12 DIAGNOSIS — N39.43 BENIGN PROSTATIC HYPERPLASIA WITH POST-VOID DRIBBLING: ICD-10-CM

## 2018-10-12 DIAGNOSIS — Z95.2 HX OF AORTIC VALVE REPLACEMENT, MECHANICAL: ICD-10-CM

## 2018-10-12 DIAGNOSIS — Z11.59 NEED FOR HEPATITIS C SCREENING TEST: ICD-10-CM

## 2018-10-12 DIAGNOSIS — I71.21 ASCENDING AORTIC ANEURYSM (HCC): ICD-10-CM

## 2018-10-12 DIAGNOSIS — R73.9 HYPERGLYCEMIA: ICD-10-CM

## 2018-10-12 DIAGNOSIS — F32.A DEPRESSION, UNSPECIFIED DEPRESSION TYPE: ICD-10-CM

## 2018-10-12 DIAGNOSIS — I48.91 ATRIAL FIBRILLATION, UNSPECIFIED TYPE (HCC): ICD-10-CM

## 2018-10-12 DIAGNOSIS — Z12.11 SCREEN FOR COLON CANCER: ICD-10-CM

## 2018-10-12 DIAGNOSIS — H90.3 BILATERAL SENSORINEURAL HEARING LOSS: ICD-10-CM

## 2018-10-12 DIAGNOSIS — Z23 IMMUNIZATION DUE: ICD-10-CM

## 2018-10-12 DIAGNOSIS — Z13.220 SCREENING FOR LIPOID DISORDERS: ICD-10-CM

## 2018-10-12 DIAGNOSIS — I10 ESSENTIAL HYPERTENSION: Primary | ICD-10-CM

## 2018-10-12 DIAGNOSIS — Z13.29 SCREENING FOR ENDOCRINE DISORDER: ICD-10-CM

## 2018-10-12 PROBLEM — H90.5 SENSORINEURAL HEARING LOSS: Status: ACTIVE | Noted: 2017-02-02

## 2018-10-12 LAB
ALBUMIN SERPL-MCNC: 4.57 G/DL (ref 3.2–4.8)
ALBUMIN/GLOB SERPL: 2.1 G/DL (ref 1.5–2.5)
ALP SERPL-CCNC: 76 U/L (ref 25–100)
ALT SERPL W P-5'-P-CCNC: 27 U/L (ref 7–40)
ANION GAP SERPL CALCULATED.3IONS-SCNC: 5 MMOL/L (ref 3–11)
ARTICHOKE IGE QN: 104 MG/DL (ref 0–130)
AST SERPL-CCNC: 23 U/L (ref 0–33)
BASOPHILS # BLD AUTO: 0.02 10*3/MM3 (ref 0–0.2)
BASOPHILS NFR BLD AUTO: 0.3 % (ref 0–1)
BILIRUB SERPL-MCNC: 0.5 MG/DL (ref 0.3–1.2)
BUN BLD-MCNC: 27 MG/DL (ref 9–23)
BUN/CREAT SERPL: 22.5 (ref 7–25)
CALCIUM SPEC-SCNC: 9.4 MG/DL (ref 8.7–10.4)
CHLORIDE SERPL-SCNC: 104 MMOL/L (ref 99–109)
CHOLEST SERPL-MCNC: 164 MG/DL (ref 0–200)
CO2 SERPL-SCNC: 28 MMOL/L (ref 20–31)
CREAT BLD-MCNC: 1.2 MG/DL (ref 0.6–1.3)
DEPRECATED RDW RBC AUTO: 46.8 FL (ref 37–54)
EOSINOPHIL # BLD AUTO: 0.13 10*3/MM3 (ref 0–0.3)
EOSINOPHIL NFR BLD AUTO: 2 % (ref 0–3)
ERYTHROCYTE [DISTWIDTH] IN BLOOD BY AUTOMATED COUNT: 14.7 % (ref 11.3–14.5)
GFR SERPL CREATININE-BSD FRML MDRD: 63 ML/MIN/1.73
GLOBULIN UR ELPH-MCNC: 2.1 GM/DL
GLUCOSE BLD-MCNC: 110 MG/DL (ref 70–100)
HBA1C MFR BLD: 5.6 % (ref 4.8–5.6)
HCT VFR BLD AUTO: 45.1 % (ref 38.9–50.9)
HCV AB SER DONR QL: NORMAL
HDLC SERPL-MCNC: 53 MG/DL (ref 40–60)
HGB BLD-MCNC: 14.6 G/DL (ref 13.1–17.5)
IMM GRANULOCYTES # BLD: 0.01 10*3/MM3 (ref 0–0.03)
IMM GRANULOCYTES NFR BLD: 0.2 % (ref 0–0.6)
LYMPHOCYTES # BLD AUTO: 1.46 10*3/MM3 (ref 0.6–4.8)
LYMPHOCYTES NFR BLD AUTO: 22.8 % (ref 24–44)
MCH RBC QN AUTO: 28.1 PG (ref 27–31)
MCHC RBC AUTO-ENTMCNC: 32.4 G/DL (ref 32–36)
MCV RBC AUTO: 86.9 FL (ref 80–99)
MONOCYTES # BLD AUTO: 0.47 10*3/MM3 (ref 0–1)
MONOCYTES NFR BLD AUTO: 7.4 % (ref 0–12)
NEUTROPHILS # BLD AUTO: 4.3 10*3/MM3 (ref 1.5–8.3)
NEUTROPHILS NFR BLD AUTO: 67.3 % (ref 41–71)
PLATELET # BLD AUTO: 206 10*3/MM3 (ref 150–450)
PMV BLD AUTO: 11.6 FL (ref 6–12)
POTASSIUM BLD-SCNC: 4.5 MMOL/L (ref 3.5–5.5)
PROT SERPL-MCNC: 6.7 G/DL (ref 5.7–8.2)
RBC # BLD AUTO: 5.19 10*6/MM3 (ref 4.2–5.76)
SODIUM BLD-SCNC: 137 MMOL/L (ref 132–146)
T4 FREE SERPL-MCNC: 0.96 NG/DL (ref 0.89–1.76)
TRIGL SERPL-MCNC: 61 MG/DL (ref 0–150)
TSH SERPL DL<=0.05 MIU/L-ACNC: 2.2 MIU/ML (ref 0.35–5.35)
WBC NRBC COR # BLD: 6.39 10*3/MM3 (ref 3.5–10.8)

## 2018-10-12 PROCEDURE — 36415 COLL VENOUS BLD VENIPUNCTURE: CPT | Performed by: NURSE PRACTITIONER

## 2018-10-12 PROCEDURE — 90471 IMMUNIZATION ADMIN: CPT | Performed by: NURSE PRACTITIONER

## 2018-10-12 PROCEDURE — 90686 IIV4 VACC NO PRSV 0.5 ML IM: CPT | Performed by: NURSE PRACTITIONER

## 2018-10-12 PROCEDURE — 84439 ASSAY OF FREE THYROXINE: CPT | Performed by: NURSE PRACTITIONER

## 2018-10-12 PROCEDURE — 83036 HEMOGLOBIN GLYCOSYLATED A1C: CPT | Performed by: NURSE PRACTITIONER

## 2018-10-12 PROCEDURE — 80053 COMPREHEN METABOLIC PANEL: CPT | Performed by: NURSE PRACTITIONER

## 2018-10-12 PROCEDURE — 85025 COMPLETE CBC W/AUTO DIFF WBC: CPT | Performed by: NURSE PRACTITIONER

## 2018-10-12 PROCEDURE — 80061 LIPID PANEL: CPT | Performed by: NURSE PRACTITIONER

## 2018-10-12 PROCEDURE — 99214 OFFICE O/P EST MOD 30 MIN: CPT | Performed by: NURSE PRACTITIONER

## 2018-10-12 PROCEDURE — 86803 HEPATITIS C AB TEST: CPT | Performed by: NURSE PRACTITIONER

## 2018-10-12 PROCEDURE — 84443 ASSAY THYROID STIM HORMONE: CPT | Performed by: NURSE PRACTITIONER

## 2018-10-12 RX ORDER — DIVALPROEX SODIUM 500 MG/1
TABLET, EXTENDED RELEASE ORAL
Qty: 90 TABLET | Refills: 6 | Status: SHIPPED | OUTPATIENT
Start: 2018-10-12 | End: 2020-02-21

## 2018-10-12 RX ORDER — LISINOPRIL 40 MG/1
40 TABLET ORAL DAILY
Qty: 90 TABLET | Refills: 3 | Status: SHIPPED | OUTPATIENT
Start: 2018-10-12 | End: 2020-02-04 | Stop reason: SDUPTHER

## 2018-10-12 NOTE — TELEPHONE ENCOUNTER
----- Message from Liane Aceves sent at 10/12/2018 11:05 AM EDT -----  WERE YOU GOING TO REFER PT TO A PROCTOLOGIST?  PT SAID IT WAS VERY IMPORTANT WHEN HE CHECKED OUT AND I DONT SEE A REFERRAL

## 2018-10-15 ENCOUNTER — TELEPHONE (OUTPATIENT)
Dept: INTERNAL MEDICINE | Facility: CLINIC | Age: 53
End: 2018-10-15

## 2018-10-15 ENCOUNTER — ANTICOAGULATION VISIT (OUTPATIENT)
Dept: PHARMACY | Facility: HOSPITAL | Age: 53
End: 2018-10-15

## 2018-10-15 DIAGNOSIS — Z95.2 HX OF AORTIC VALVE REPLACEMENT, MECHANICAL: ICD-10-CM

## 2018-10-15 NOTE — TELEPHONE ENCOUNTER
----- Message from YUE Durham sent at 10/15/2018 11:11 AM EDT -----  Inform patient glucose mildly elevated, but A1C stable, Hepatitis C negative, thyroid labs normal. BUN elevated. Lipid panel normal. CBC stable.

## 2018-10-15 NOTE — PROGRESS NOTES
Anticoagulation Clinic - Remote Progress Note  [facility]  Testing frequency: 7 days    Indication: Atrial Fibrillation and Mechanical Aortic Valve; h/o embolic CVA  Referring Provider: milly Mckeon)  Initial Warfarin Start Date: 11/2012  Planned Duration of Therapy: Life  Goal INR: 2.5-3.5  Current Drug Interactions: citalopram, divalproex   CHADS-VASc: 3 (HTN, h/o CVA)    Vit K Diet: patient is eating daily samara lettuce salads  Alcohol: none  Tobacco: none       Anticoagulation Clinic INR History:  Date 5/8 5/23 6/6 6/9 6/13 6/28 7/19 8/10 8/30   Total Weekly Dose 52.5mg 60mg 62.5mg 62.5 mg 77.5 mg 57.5 mg 67.5 mg 70mg 70mg   INR 2.2 2.2 1.2 2.2 2.6 2.3 2.4 3.5 1.2   Notes missed dose more vit K dose increase; increase in Vit K foods  boost missed dose   Increase GLV; protein     Date 9/6 9/18 10/9 11/16 11/27 12/4 12/11 12/25 1/4/18   Total Weekly Dose 67.5 mg 60mg 70mg 70mg 70mg 70mg 75mg 75mg 70mg   INR 3.2 1.8 3.4 1.6 1.9 2.5 3.2 2.7 2.0   Notes boost + 1 held dose Missed dose + significant alcohol consumption on 9/16  enox enox    boost     Date 1/15 1/25 2/15 2/23 3/2 3/6 3/15 3/20 3/27 4/4   Total Weekly Dose 75mg 75mg 65mg 75mg 75mg 90mg 65mg 90mg 80mg 75mg   INR 2.9 2.5 2.1 3.3 1.4 2.0, 1.78 (ED) 2.4 3.2 3.5 2.4   Notes   missed   enox; boost; levaquin held x1        Date 4/14 4/20 5/1 5/16 5/27 6/7 6/15 6/22 6/25 7/4   Total Weekly Dose 80mg 80mg 90mg 90mg  (ED) 90mg 75mg 95mg 95mg 107.5 mg ???   INR 2.3 2.0 3.2 2.9 3.2 4.1 3.1 1.3 2.0 2.7   Notes     levoflox missed; levoflox levoflox levoflox; refuse lovenox       Date 7/31 8/27 8/31 9/4 9/11 9/27 10/2 10/5 10/15   Total Weekly Dose 105mg  ??? 65mg  ??? 95mg 100 mg 80mg  ??? 70 mg 90mg 100mg 100mg   INR 2.7 1.3 2.6 5.6 1.6 1.6 2.3 2.4 2.8   Notes self adj self adj; miss; enox   incr GLV, mis-dosing, refuse enox incorrect doses        Date            Total Weekly Dose            INR            Notes                Phone  Interview:  Tablet Strength: 5mg tablets   Current Dose: 15mg daily except 10mg SunFriSat  Patient Contact: [phone] -- try to call in PM    Patient Findings:  Negatives:   Signs/symptoms of thrombosis, Signs/symptoms of bleeding, Laboratory test error suspected, Change in health, Change in alcohol use, Change in activity, Upcoming invasive procedure, Emergency department visit, Upcoming dental procedure, Missed doses, Extra doses, Change in medications, Change in diet/appetite, Hospital admission, Bruising, Other complaints   Comments:   Mr. Alberto denies changes in medications, diet, or health. He is currently staying in the hospital with his wife.      Plan:    1. INR was therapeutic today at 2.8. Mr Alberto reports compliance and no changes. Instructed Mr. Alberto to continue warfarin 15mg daily except 10mg on Monday.  2. Repeat INR again this Monday 10/22.  3. Verbal and written information provided to patient over the phone. Mr. Alberto expresses understanding by teach back and has no further questions at this time.     Watson Bush, PharmD Candidate 2019  10/15/2018  1:51 PM     [facility], [facility], have reviewed the note in full and agree with the assessment and plan.  10/16/18  2:07 PM

## 2018-10-16 LAB — INR PPP: 2.8

## 2018-10-18 ENCOUNTER — EPISODE CHANGES (OUTPATIENT)
Dept: CASE MANAGEMENT | Facility: OTHER | Age: 53
End: 2018-10-18

## 2018-10-26 ENCOUNTER — ANTICOAGULATION VISIT (OUTPATIENT)
Dept: PHARMACY | Facility: HOSPITAL | Age: 53
End: 2018-10-26

## 2018-10-26 DIAGNOSIS — Z95.2 HX OF AORTIC VALVE REPLACEMENT, MECHANICAL: ICD-10-CM

## 2018-10-26 LAB — INR PPP: 4.6

## 2018-10-26 NOTE — PROGRESS NOTES
Anticoagulation Clinic - Remote Progress Note  [facility]  Testing frequency: 7 days    Indication: Atrial Fibrillation and Mechanical Aortic Valve; h/o embolic CVA  Referring Provider: milly Mckeon)  Initial Warfarin Start Date: 11/2012  Planned Duration of Therapy: Life  Goal INR: 2.5-3.5  Current Drug Interactions: citalopram, divalproex   CHADS-VASc: 3 (HTN, h/o CVA)    Vit K Diet: patient is eating daily samara lettuce salads  Alcohol: none  Tobacco: none       Anticoagulation Clinic INR History:  Date 5/8 5/23 6/6 6/9 6/13 6/28 7/19 8/10 8/30   Total Weekly Dose 52.5mg 60mg 62.5mg 62.5 mg 77.5 mg 57.5 mg 67.5 mg 70mg 70mg   INR 2.2 2.2 1.2 2.2 2.6 2.3 2.4 3.5 1.2   Notes missed dose more vit K dose increase; increase in Vit K foods  boost missed dose   Increase GLV; protein     Date 9/6 9/18 10/9 11/16 11/27 12/4 12/11 12/25 1/4/18   Total Weekly Dose 67.5 mg 60mg 70mg 70mg 70mg 70mg 75mg 75mg 70mg   INR 3.2 1.8 3.4 1.6 1.9 2.5 3.2 2.7 2.0   Notes boost + 1 held dose Missed dose + significant alcohol consumption on 9/16  enox enox    boost     Date 1/15 1/25 2/15 2/23 3/2 3/6 3/15 3/20 3/27 4/4   Total Weekly Dose 75mg 75mg 65mg 75mg 75mg 90mg 65mg 90mg 80mg 75mg   INR 2.9 2.5 2.1 3.3 1.4 2.0, 1.78 (ED) 2.4 3.2 3.5 2.4   Notes   missed   enox; boost; levaquin held x1        Date 4/14 4/20 5/1 5/16 5/27 6/7 6/15 6/22 6/25 7/4   Total Weekly Dose 80mg 80mg 90mg 90mg  (ED) 90mg 75mg 95mg 95mg 107.5 mg ???   INR 2.3 2.0 3.2 2.9 3.2 4.1 3.1 1.3 2.0 2.7   Notes     levoflox missed; levoflox levoflox levoflox; refuse lovenox       Date 7/31 8/27 8/31 9/4 9/11 9/27 10/2 10/5 10/15   Total Weekly Dose 105mg  ??? 65mg  ??? 95mg 100 mg 80mg  ??? 70 mg 90mg 100mg 100mg   INR 2.7 1.3 2.6 5.6 1.6 1.6 2.3 2.4 2.8   Notes self adj self adj; miss; enox   incr GLV, mis-dosing, refuse enox incorrect doses        Date 10/26           Total Weekly Dose 100 mg 90 mg          INR 4.6           Notes                 Phone Interview:  Tablet Strength: 5mg tablets   Current Dose: 15mg daily except 10mg SunFriSat  Patient Contact: [phone] -- try to call in PM    Patient Findings:  Negatives:   Signs/symptoms of thrombosis, Signs/symptoms of bleeding, Laboratory test error suspected, Change in health, Change in alcohol use, Change in activity, Upcoming invasive procedure, Emergency department visit, Upcoming dental procedure, Missed doses, Extra doses, Change in medications, Change in diet/appetite, Hospital admission, Bruising, Other complaints   Comments:   His wife was able to leave [facility] last week; she still has feeding tube, and chest tube, but the PICC line is out. So making small steps in improvement.       Plan:    1. INR was supratherapeutic today at 4.6. Mr Alberto reports compliance and no changes. Instructed Mr. Alberto to reduce his warfarin to 10 mg on Friday and Saturday then continue warfarin 15mg daily except 10mg on Monday. 10% reduction.   2. Repeat INR again this Friday 11/02. If in goal range consider maintaining around 90-95 mg/week and changing the 10 mg doses to be on different days.  3. Verbal information provided to patient over the phone. Mr. Alberto expresses understanding by teach back and has no further questions at this time.     Sam Zaldivar Rph  10/26/2018  3:41 PM

## 2018-11-05 ENCOUNTER — ANTICOAGULATION VISIT (OUTPATIENT)
Dept: PHARMACY | Facility: HOSPITAL | Age: 53
End: 2018-11-05

## 2018-11-05 DIAGNOSIS — Z95.2 HX OF AORTIC VALVE REPLACEMENT, MECHANICAL: ICD-10-CM

## 2018-11-05 LAB — INR PPP: 7.9

## 2018-11-05 NOTE — PROGRESS NOTES
Anticoagulation Clinic - Remote Progress Note  [facility]  Testing frequency: 7 days    Indication: Atrial Fibrillation and Mechanical Aortic Valve; h/o embolic CVA  Referring Provider: milly Mckeon)  Initial Warfarin Start Date: 11/2012  Planned Duration of Therapy: Life  Goal INR: 2.5-3.5  Current Drug Interactions: citalopram, divalproex   CHADS-VASc: 3 (HTN, h/o CVA)    Vit K Diet: patient is eating daily samara lettuce salads  Alcohol: none  Tobacco: none       Anticoagulation Clinic INR History:  Date 5/8 5/23 6/6 6/9 6/13 6/28 7/19 8/10 8/30   Total Weekly Dose 52.5mg 60mg 62.5mg 62.5 mg 77.5 mg 57.5 mg 67.5 mg 70mg 70mg   INR 2.2 2.2 1.2 2.2 2.6 2.3 2.4 3.5 1.2   Notes missed dose more vit K dose increase; increase in Vit K foods  boost missed dose   Increase GLV; protein     Date 9/6 9/18 10/9 11/16 11/27 12/4 12/11 12/25 1/4/18   Total Weekly Dose 67.5 mg 60mg 70mg 70mg 70mg 70mg 75mg 75mg 70mg   INR 3.2 1.8 3.4 1.6 1.9 2.5 3.2 2.7 2.0   Notes boost + 1 held dose Missed dose + significant alcohol consumption on 9/16  enox enox    boost     Date 1/15 1/25 2/15 2/23 3/2 3/6 3/15 3/20 3/27 4/4   Total Weekly Dose 75mg 75mg 65mg 75mg 75mg 90mg 65mg 90mg 80mg 75mg   INR 2.9 2.5 2.1 3.3 1.4 2.0, 1.78 (ED) 2.4 3.2 3.5 2.4   Notes   missed   enox; boost; levaquin held x1        Date 4/14 4/20 5/1 5/16 5/27 6/7 6/15 6/22 6/25 7/4   Total Weekly Dose 80mg 80mg 90mg 90mg  (ED) 90mg 75mg 95mg 95mg 107.5 mg ???   INR 2.3 2.0 3.2 2.9 3.2 4.1 3.1 1.3 2.0 2.7   Notes     levoflox missed; levoflox levoflox levoflox; refuse lovenox       Date 7/31 8/27 8/31 9/4 9/11 9/27 10/2 10/5 10/15   Total Weekly Dose 105mg  ??? 65mg  ??? 95mg 100 mg 80mg  ??? 70 mg 90mg 100mg 100mg   INR 2.7 1.3 2.6 5.6 1.6 1.6 2.3 2.4 2.8   Notes self adj self adj; miss; enox   incr GLV, mis-dosing, refuse enox incorrect doses        Date 10/26 11/5          Total Weekly Dose 100 mg           INR 4.6 7.9          Notes               Phone Interview:  Tablet Strength: 5mg tablets   Current Dose: 15mg daily except 10mg SunFriSat  Patient Contact: [phone] -- try to call in PM    Plan:    1. INR is critically supratherapeutic per POCT today -- based on recent issues with test strip recall / correlation results, INR is likely elevated but not to this extent. For now, instructed Mr. Alberto to HOLD his warfarin x2 nights.  2. Repeat INR in clinic on Wednesday. Please document pt's home monitor test strip lot #, and recommend discarding if recalled. Please also verify recent dosing history.   3. Verbal information provided over the phone. Mr. Alberto expresses understanding by teach back and has no further questions at this time.     Ann Cowden Mayer, PharmD  11/5/2018  3:30 PM

## 2018-11-07 ENCOUNTER — ANTICOAGULATION VISIT (OUTPATIENT)
Dept: PHARMACY | Facility: HOSPITAL | Age: 53
End: 2018-11-07

## 2018-11-07 DIAGNOSIS — Z12.11 SCREENING FOR COLON CANCER: Primary | ICD-10-CM

## 2018-11-07 DIAGNOSIS — Z95.2 HX OF AORTIC VALVE REPLACEMENT, MECHANICAL: ICD-10-CM

## 2018-11-07 LAB
INR PPP: 1.9 (ref 0.91–1.09)
PROTHROMBIN TIME: 23.3 SECONDS (ref 10–13.8)

## 2018-11-07 PROCEDURE — 85610 PROTHROMBIN TIME: CPT

## 2018-11-07 PROCEDURE — G0463 HOSPITAL OUTPT CLINIC VISIT: HCPCS

## 2018-11-07 PROCEDURE — 36416 COLLJ CAPILLARY BLOOD SPEC: CPT

## 2018-11-07 NOTE — PROGRESS NOTES
Anticoagulation Clinic - Remote Progress Note  [facility]  Testing frequency: 7 days    Indication: Atrial Fibrillation and Mechanical Aortic Valve; h/o embolic CVA  Referring Provider: milly Mckeon)  Initial Warfarin Start Date: 11/2012  Planned Duration of Therapy: Life  Goal INR: 2.5-3.5  Current Drug Interactions: citalopram, divalproex   CHADS-VASc: 3 (HTN, h/o CVA)    Vit K Diet: patient is eating daily samara lettuce salads  Alcohol: none  Tobacco: none       Anticoagulation Clinic INR History:  Date 5/8 5/23 6/6 6/9 6/13 6/28 7/19 8/10 8/30   Total Weekly Dose 52.5mg 60mg 62.5mg 62.5 mg 77.5 mg 57.5 mg 67.5 mg 70mg 70mg   INR 2.2 2.2 1.2 2.2 2.6 2.3 2.4 3.5 1.2   Notes missed dose more vit K dose increase; increase in Vit K foods  boost missed dose   Increase GLV; protein     Date 9/6 9/18 10/9 11/16 11/27 12/4 12/11 12/25 1/4/18   Total Weekly Dose 67.5 mg 60mg 70mg 70mg 70mg 70mg 75mg 75mg 70mg   INR 3.2 1.8 3.4 1.6 1.9 2.5 3.2 2.7 2.0   Notes boost + 1 held dose Missed dose + significant alcohol consumption on 9/16  enox enox    boost     Date 1/15 1/25 2/15 2/23 3/2 3/6 3/15 3/20 3/27 4/4   Total Weekly Dose 75mg 75mg 65mg 75mg 75mg 90mg 65mg 90mg 80mg 75mg   INR 2.9 2.5 2.1 3.3 1.4 2.0, 1.78 (ED) 2.4 3.2 3.5 2.4   Notes   missed   enox; boost; levaquin held x1        Date 4/14 4/20 5/1 5/16 5/27 6/7 6/15 6/22 6/25 7/4   Total Weekly Dose 80mg 80mg 90mg 90mg  (ED) 90mg 75mg 95mg 95mg 107.5 mg ???   INR 2.3 2.0 3.2 2.9 3.2 4.1 3.1 1.3 2.0 2.7   Notes     levoflox missed; levoflox levoflox levoflox; refuse lovenox       Date 7/31 8/27 8/31 9/4 9/11 9/27 10/2 10/5 10/15   Total Weekly Dose 105mg  ??? 65mg  ??? 95mg 100 mg 80mg  ??? 70 mg 90mg 100mg 100mg   INR 2.7 1.3 2.6 5.6 1.6 1.6 2.3 2.4 2.8   Notes self adj self adj; miss; enox   incr GLV, mis-dosing, refuse enox incorrect doses        Date 10/26 11/5 11/7         Total Weekly Dose 100 mg  75mg         INR 4.6 7.9 1.9          Notes  Potential recalled test strips            Phone Interview:  Verbal Release Authorization signed on 11/7/18 -- may speak with Zuleima Alberto (patient's wife)  Tablet Strength: 5mg tablets   Current Dose: 15mg daily except 10mg SunFriSat  Patient Contact: [phone] -- try to call in PM    Patient Findings:  Negatives:   Signs/symptoms of thrombosis, Signs/symptoms of bleeding, Laboratory test error suspected, Change in health, Change in alcohol use, Change in activity, Upcoming invasive procedure, Emergency department visit, Upcoming dental procedure, Missed doses, Extra doses, Change in medications, Change in diet/appetite, Hospital admission, Bruising, Other complaints   Comments:   Patient states he has been eating salads daily (mostly samara or iceburg lettuce). No other changes since last encounter.     Plan:    1. INR is subtherapeutic today in clinic at 1.9. Patient's INR was reported as 7.9 on 11/5 and patient held two doses. Based on recent issues with test strip recall / correlation results, INR was likely elevated but not to this extent. As patient's INR is now subtherapeutic, instructed Mr. Alberto to BOOST warfarin doses tonight and tomorrow to 20 mg, then continue current maintenance dose of warfarin 15 mg daily except 10 mg on Monday (~10% increase).  2. Repeat INR on Wednesday, 11/14. Ensure patient received new test strips at that time if current strips have been recalled.  3. Verbal information provided over the phone. Mr. Alberto expresses understanding by teach back and has no further questions at this time.   4. Mitchell Alberto reports that his Roche Test Strip lot number is [lot number]. This is one of the lot numbers that is affected by the FDA Class 1 and Roche recall. Mitchell Alberto will no longer use these test strips and voices understanding. he will request new test strips from [employer]. he will plan to call us the day before his next scheduled INR if he has not yet  received a new lot. We will then send in a standing order to a lab of his preference.      ** Patient to call the anticoagulation clinic when he gets home to see if his test strips have been recalled.    Edie Dunaway, PharmD  11/7/2018  4:00 PM

## 2018-11-09 RX ORDER — WARFARIN SODIUM 5 MG/1
TABLET ORAL
Qty: 100 TABLET | Refills: 0 | Status: SHIPPED | OUTPATIENT
Start: 2018-11-09 | End: 2018-11-18 | Stop reason: SDUPTHER

## 2018-11-15 ENCOUNTER — ANTICOAGULATION VISIT (OUTPATIENT)
Dept: PHARMACY | Facility: HOSPITAL | Age: 53
End: 2018-11-15

## 2018-11-15 DIAGNOSIS — Z95.2 HX OF AORTIC VALVE REPLACEMENT, MECHANICAL: ICD-10-CM

## 2018-11-15 LAB
INR PPP: 5.5 (ref 0.91–1.09)
INR PPP: 5.7 (ref 0.91–1.09)
PROTHROMBIN TIME: 65.6 SECONDS (ref 10–13.8)
PROTHROMBIN TIME: 68.2 SECONDS (ref 10–13.8)

## 2018-11-15 PROCEDURE — 85610 PROTHROMBIN TIME: CPT

## 2018-11-15 PROCEDURE — G0463 HOSPITAL OUTPT CLINIC VISIT: HCPCS

## 2018-11-15 PROCEDURE — 36416 COLLJ CAPILLARY BLOOD SPEC: CPT

## 2018-11-15 NOTE — PROGRESS NOTES
Anticoagulation Clinic - Remote Progress Note  [facility]  Testing frequency: 7 days    Indication: Atrial Fibrillation and Mechanical Aortic Valve; h/o embolic CVA  Referring Provider: milly Mckeon)  Initial Warfarin Start Date: 11/2012  Planned Duration of Therapy: Life  Goal INR: 2.5-3.5  Current Drug Interactions: citalopram, divalproex   CHADS-VASc: 3 (HTN, h/o CVA)    Vit K Diet: patient is eating daily samara lettuce salads  Alcohol: none  Tobacco: none       Anticoagulation Clinic INR History:  Date 5/8 5/23 6/6 6/9 6/13 6/28 7/19 8/10 8/30   Total Weekly Dose 52.5mg 60mg 62.5mg 62.5 mg 77.5 mg 57.5 mg 67.5 mg 70mg 70mg   INR 2.2 2.2 1.2 2.2 2.6 2.3 2.4 3.5 1.2   Notes missed dose more vit K dose increase; increase in Vit K foods  boost missed dose   Increase GLV; protein     Date 9/6 9/18 10/9 11/16 11/27 12/4 12/11 12/25 1/4/18   Total Weekly Dose 67.5 mg 60mg 70mg 70mg 70mg 70mg 75mg 75mg 70mg   INR 3.2 1.8 3.4 1.6 1.9 2.5 3.2 2.7 2.0   Notes boost + 1 held dose Missed dose + significant alcohol consumption on 9/16  enox enox    boost     Date 1/15 1/25 2/15 2/23 3/2 3/6 3/15 3/20 3/27 4/4   Total Weekly Dose 75mg 75mg 65mg 75mg 75mg 90mg 65mg 90mg 80mg 75mg   INR 2.9 2.5 2.1 3.3 1.4 2.0, 1.78 (ED) 2.4 3.2 3.5 2.4   Notes   missed   enox; boost; levaquin held x1        Date 4/14 4/20 5/1 5/16 5/27 6/7 6/15 6/22 6/25 7/4   Total Weekly Dose 80mg 80mg 90mg 90mg  (ED) 90mg 75mg 95mg 95mg 107.5 mg ???   INR 2.3 2.0 3.2 2.9 3.2 4.1 3.1 1.3 2.0 2.7   Notes     levoflox missed; levoflox levoflox levoflox; refuse lovenox       Date 7/31 8/27 8/31 9/4 9/11 9/27 10/2 10/5 10/15   Total Weekly Dose 105mg  ??? 65mg  ??? 95mg 100 mg 80mg  ??? 70 mg 90mg 100mg 100mg   INR 2.7 1.3 2.6 5.6 1.6 1.6 2.3 2.4 2.8   Notes self adj self adj; miss; enox   incr GLV, mis-dosing, refuse enox incorrect doses        Date 10/26 11/5 11/7 11/15        Total Weekly Dose 100 mg  75mg 105mg 85mg       INR 4.6 7.9  1.9 5.5, 5.7        Notes  recalled test strips clinic; 2 dose hold clinic 1 dose hold         Phone Interview:  Verbal Release Authorization signed on 11/7/18 -- may speak with Zuleima Alberto (patient's wife)  Tablet Strength: 5mg tablets   Current Dose: 15mg daily except 10mg SunFriSat  Patient Contact: [phone] -- try to call in PM    Patient Findings:  Positives:  Other complaints   Negatives:  Signs/symptoms of thrombosis, Signs/symptoms of bleeding, Laboratory test error suspected, Change in health, Change in alcohol use, Change in activity, Upcoming invasive procedure, Emergency department visit, Upcoming dental procedure, Missed doses, Extra doses, Change in medications, Change in diet/appetite, Hospital admission, Bruising   Comments:  Mr. Alberto denies any change in medications, diet, or health since last week. He remains extremely stressed with family issues (wife will likely require another surgery), but his appetite has not been significantly affected by this, and he is not taking anything new to help with anxiety / stress / sleep. He also denies any sign of bleeding or abnormal bruising.      Plan:    1. INR is critically supratherapeutic today. Despite strong encouragement / request to do so, Mr. Alberto is unwilling to go to the lab for a venipuncture correlation. Am unable to say with certainty that his INR is this elevated -- based on this with recent INR lability, instructed Mr. Alberto to HOLD tonight's dose and continue warfarin 15mg FriSatSun (15% decr.).  2. Repeat INR on Monday. Mr. Alberto has yet to receive new, unaffected test strips -- if new strips are still unavailable by 11/19, request that he return to the clinic, or go to a nearby lab.   3. Verbal and written information provided today in clinic. Mr. Alberto expresses understanding by teach back and has no further questions at this time.      Ann Cowden Mayer, PharmD  11/15/2018  12:20 PM

## 2018-11-18 DIAGNOSIS — Z95.2 HX OF AORTIC VALVE REPLACEMENT, MECHANICAL: ICD-10-CM

## 2018-11-19 RX ORDER — WARFARIN SODIUM 5 MG/1
TABLET ORAL
Qty: 180 TABLET | Refills: 0 | Status: SHIPPED | OUTPATIENT
Start: 2018-11-19 | End: 2019-03-04 | Stop reason: SDUPTHER

## 2018-12-01 ENCOUNTER — TELEPHONE (OUTPATIENT)
Dept: INTERNAL MEDICINE | Facility: CLINIC | Age: 53
End: 2018-12-01

## 2018-12-02 NOTE — TELEPHONE ENCOUNTER
Please call patient and inquire about colonoscopy. If he cannot do colonoscopy ask if he will do Cologuard or stool occult test.

## 2018-12-03 ENCOUNTER — TELEPHONE (OUTPATIENT)
Dept: INTERNAL MEDICINE | Facility: CLINIC | Age: 53
End: 2018-12-03

## 2018-12-03 RX ORDER — ATORVASTATIN CALCIUM 20 MG/1
20 TABLET, FILM COATED ORAL NIGHTLY
Qty: 30 TABLET | Refills: 6 | Status: SHIPPED | OUTPATIENT
Start: 2018-12-03 | End: 2019-01-04

## 2018-12-03 NOTE — TELEPHONE ENCOUNTER
Please call patient and see if wellness can be scheduled within next 2 weeks and if unable let me know. Also inform patient it is recommended that he be on a statin due to heart issues  and I have ordered that (atorvastatin) and sent to pharmacy.

## 2018-12-07 ENCOUNTER — ANTICOAGULATION VISIT (OUTPATIENT)
Dept: PHARMACY | Facility: HOSPITAL | Age: 53
End: 2018-12-07

## 2018-12-07 DIAGNOSIS — Z95.2 HX OF AORTIC VALVE REPLACEMENT, MECHANICAL: ICD-10-CM

## 2018-12-07 LAB — INR PPP: 2.8

## 2018-12-07 NOTE — TELEPHONE ENCOUNTER
LMOVM, office number given. Have attempted to contact this patient 3 times, left VM and office number. Do you want to note in chart or send a letter? Thanks.

## 2018-12-07 NOTE — PROGRESS NOTES
Anticoagulation Clinic - Remote Progress Note  [facility]  Testing frequency: 7 days    Indication: Atrial Fibrillation and Mechanical Aortic Valve; h/o embolic CVA  Referring Provider: milly Mckeon)  Initial Warfarin Start Date: 11/2012  Planned Duration of Therapy: Life  Goal INR: 2.5-3.5  Current Drug Interactions: citalopram, divalproex   CHADS-VASc: 3 (HTN, h/o CVA)    Vit K Diet: patient is eating daily samara lettuce salads  Alcohol: none  Tobacco: none       Anticoagulation Clinic INR History:  Date 5/8 5/23 6/6 6/9 6/13 6/28 7/19 8/10 8/30   Total Weekly Dose 52.5mg 60mg 62.5mg 62.5 mg 77.5 mg 57.5 mg 67.5 mg 70mg 70mg   INR 2.2 2.2 1.2 2.2 2.6 2.3 2.4 3.5 1.2   Notes missed dose more vit K dose increase; increase in Vit K foods  boost missed dose   Increase GLV; protein     Date 9/6 9/18 10/9 11/16 11/27 12/4 12/11 12/25 1/4/18   Total Weekly Dose 67.5 mg 60mg 70mg 70mg 70mg 70mg 75mg 75mg 70mg   INR 3.2 1.8 3.4 1.6 1.9 2.5 3.2 2.7 2.0   Notes boost + 1 held dose Missed dose + significant alcohol consumption on 9/16  enox enox    boost     Date 1/15 1/25 2/15 2/23 3/2 3/6 3/15 3/20 3/27 4/4   Total Weekly Dose 75mg 75mg 65mg 75mg 75mg 90mg 65mg 90mg 80mg 75mg   INR 2.9 2.5 2.1 3.3 1.4 2.0, 1.78 (ED) 2.4 3.2 3.5 2.4   Notes   missed   enox; boost; levaquin held x1        Date 4/14 4/20 5/1 5/16 5/27 6/7 6/15 6/22 6/25 7/4   Total Weekly Dose 80mg 80mg 90mg 90mg  (ED) 90mg 75mg 95mg 95mg 107.5 mg ???   INR 2.3 2.0 3.2 2.9 3.2 4.1 3.1 1.3 2.0 2.7   Notes     levoflox missed; levoflox levoflox levoflox; refuse lovenox       Date 7/31 8/27 8/31 9/4 9/11 9/27 10/2 10/5 10/15   Total Weekly Dose 105mg  ??? 65mg  ??? 95mg 100 mg 80mg  ??? 70 mg 90mg 100mg 100mg   INR 2.7 1.3 2.6 5.6 1.6 1.6 2.3 2.4 2.8   Notes self adj self adj; miss; enox   incr GLV, mis-dosing, refuse enox incorrect doses        Date 10/26 11/5 11/7 11/15 12/7       Total Weekly Dose 100 mg  75mg 105mg 100mg       INR 4.6  7.9 1.9 5.5, 5.7 2.8       Notes  recalled test strips clinic; 2 dose hold clinic          Phone Interview:  Verbal Release Authorization signed on 11/7/18 -- may speak with Zuleima Alberto (patient's wife)  Tablet Strength: 5mg tablets   Patient Contact: [phone] -- try to call in PM    Patient Findings:  Negatives:  Signs/symptoms of thrombosis, Signs/symptoms of bleeding, Laboratory test error suspected, Change in health, Change in alcohol use, Change in activity, Upcoming invasive procedure, Emergency department visit, Upcoming dental procedure, Missed doses, Extra doses, Change in medications, Change in diet/appetite, Hospital admission, Bruising, Other complaints     Plan:    1. INR is therapeutic today, so instructed Mr. Alberto to continue warfarin 15mg daily except 10mg on Mondays.   2. Repeat INR in one week.   3. Verbal information provided over the phone. Mr. Alberto expresses understanding by teach back and has no further questions at this time.      Ann Cowden Mayer, PharmD  12/7/2018  5:43 PM

## 2018-12-08 DIAGNOSIS — Z95.2 HX OF AORTIC VALVE REPLACEMENT, MECHANICAL: ICD-10-CM

## 2018-12-10 RX ORDER — WARFARIN SODIUM 5 MG/1
TABLET ORAL
Qty: 100 TABLET | Refills: 0 | Status: SHIPPED | OUTPATIENT
Start: 2018-12-10 | End: 2019-06-17

## 2019-01-04 ENCOUNTER — OFFICE VISIT (OUTPATIENT)
Dept: CARDIOLOGY | Facility: CLINIC | Age: 54
End: 2019-01-04

## 2019-01-04 ENCOUNTER — TELEPHONE (OUTPATIENT)
Dept: CARDIOLOGY | Facility: CLINIC | Age: 54
End: 2019-01-04

## 2019-01-04 VITALS
SYSTOLIC BLOOD PRESSURE: 138 MMHG | HEIGHT: 77 IN | WEIGHT: 299.8 LBS | DIASTOLIC BLOOD PRESSURE: 70 MMHG | HEART RATE: 66 BPM | BODY MASS INDEX: 35.4 KG/M2 | OXYGEN SATURATION: 96 %

## 2019-01-04 DIAGNOSIS — R06.09 DOE (DYSPNEA ON EXERTION): ICD-10-CM

## 2019-01-04 DIAGNOSIS — I35.0 NONRHEUMATIC AORTIC VALVE STENOSIS: Primary | ICD-10-CM

## 2019-01-04 DIAGNOSIS — E78.2 MIXED HYPERLIPIDEMIA: ICD-10-CM

## 2019-01-04 PROCEDURE — 93000 ELECTROCARDIOGRAM COMPLETE: CPT | Performed by: INTERNAL MEDICINE

## 2019-01-04 PROCEDURE — 99214 OFFICE O/P EST MOD 30 MIN: CPT | Performed by: INTERNAL MEDICINE

## 2019-01-04 NOTE — PROGRESS NOTES
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    Chief Complaint   Patient presents with   • Shortness of Breath   • Weight Loss       PROBLEM LIST:   1.  Ascending aortic dissection and severe aortic insufficiency:  a.  Mechanical Bentall aortic valve replacement ([facility]) and ascending aortic dissection repair, 11/17/2012, Dr. Jasmeet De Dios.   b. Echocardiogram, 12/03/2013:  i. LVEF (55% to 60%), mechanical aortic valve, area 1.18 cm2.  ii. Chronic Coumadin therapy                     c.    Cardiac PET scan, 08/26/2015, negative for reversible ischemia; fixed photopenia                            of the apex and septum possibly due to LBBB; LVEF (30% to 34%).                    d.    Cardiac catheterization,  Dorian Giraldo MD, 09/03/2015:  No obstructive                            CAD.                    E. 1/17 echo EF 55% mild lvh AVR mean pressure 17                    F. 12/16 CTA chest wnl  2.  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  Residual left upper extremity weakness.    3. Left arm discomfort:  a. Possibly due to embolic CVA vs. intraoperative nerve traction.   4. Hypertension.   5. HL  10/18 164/61/53/104  6. 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 Status post surgery:  a. Left inguinal herniorrhaphy, August 2014.   b. Left forehead melanoma excision, Dr. Gisell Kothari, August 2014.    Allergies  Allergies   Allergen Reactions   • Meclizine Hcl Itching       Current Medications    Current Outpatient Medications:   •  CIALIS 5 MG tablet, Take 1 tablet by mouth As Needed., Disp: , Rfl:   •  divalproex (DEPAKOTE) 500 MG 24 hr tablet, Take 3 tablets daily, Disp: 90 tablet, Rfl: 6  •  furosemide (LASIX) 40 MG tablet, TAKE ONE TABLET  "BY MOUTH DAILY AS NEEDED (GREATER THAN 2LB WEIGHT GAIN IN 24 HOURS), Disp: 90 tablet, Rfl: 2  •  lisinopril (PRINIVIL,ZESTRIL) 40 MG tablet, Take 1 tablet by mouth Daily., Disp: 90 tablet, Rfl: 3  •  PHARMACY TO DOSE WARFARIN, Continuous As Needed (patient is managed by the [facility] ([phone]))., Disp: , Rfl:   •  warfarin (COUMADIN) 5 MG tablet, TAKE TWO TABLETS BY MOUTH DAILY OR AS DIRECTED BY ANTICOAGULATION PHARMACIST, Disp: 180 tablet, Rfl: 0  •  warfarin (COUMADIN) 5 MG tablet, TAKE TWO TO THREE TABLETS BY MOUTH DAILY OR AS DIRECTED BY THE ANTICOAGULATION PHARMACIST, Disp: 100 tablet, Rfl: 0  •  Sod Picosulfate-Mag Ox-Cit Acd 10-3.5-12 MG-GM -GM/160ML solution, Take 1 kit by mouth Take As Directed. Follow instructions that were mailed to your home. If you didn't receive these call [phone]., Disp: 2 bottle, Rfl: 0      History of Present Illness   Patient returns to office today for delayed follow-up.  His significant other has had a diagnosis of stage III esophageal cancer esophageal perforation and systemic fungal infection over the last one year and he seemed been primary caregiver for such.  He does follow his INR regularly but states she's had marketed lability in such.  He notes recurrent and chronic left sided sinusitis has been more problematic.  Unfortunately he is utilizing Neosporin for treatment topically.  Does note some paroxysmal nocturnal dyspnea and having to sit upright.  ROS:  All systems have been reviewed and are negative with the exception of those mentioned in the HPI.    OBJECTIVE:  Vitals:    01/04/19 1109   BP: 138/70   BP Location: Left arm   Patient Position: Sitting   Pulse: 66   SpO2: 96%   Weight: 136 kg (299 lb 12.8 oz)   Height: 195.6 cm (77\")     Physical Exam   Constitutional: He is oriented to person, place, and time. He appears well-nourished. No distress.   Neck: Neck supple. No JVD present. No tracheal deviation present. No " thyromegaly present.   Cardiovascular: Normal rate, regular rhythm and intact distal pulses.  No extrasystoles are present.   No murmur heard.  Mechanical S2   Pulmonary/Chest: Effort normal and breath sounds normal. He has no wheezes. He has no rales.   Some tenderness to left anterior thorax with palpation.   Abdominal: Soft. Bowel sounds are normal. There is no tenderness. There is no guarding.   Musculoskeletal: Normal range of motion. He exhibits edema (trace pitting edema lower extremities bilaterally). He exhibits no tenderness.   Lymphadenopathy:     He has no cervical adenopathy.   Neurological: He is alert and oriented to person, place, and time.   Nursing note and vitals reviewed.      Diagnostic Data:    ECG 12 Lead  Date/Time: 1/4/2019 11:35 AM  Performed by: Mike Hollis MD  Authorized by: Mike Hollis MD   Comparison: not compared with previous ECG   Rhythm: sinus rhythm  BPM: 66  Conduction: incomplete LBBB  Clinical impression: abnormal ECG          ASSESSMENT:   Diagnosis Plan   1. Nonrheumatic aortic valve stenosis     2. VICTORIA (dyspnea on exertion)     3. Mixed hyperlipidemia         PLAN:  1.  Aortic valve disease status post mechanical aVR in Bentyl procedure with acceptable CTA of the chest 1216.  He continues to have accelerated hypertension which we will have evaluated he was sent to obtain a home blood pressure monitor and contact us this following week.    2.  Dyspnea and shortness of breath will document BNP CBC and CMP in the office today    3.  Dyslipidemia  Not on statin therapy    4.  Sinusitis- ENT referral    Scribed for Mike Hollis MD by Ca Miguel PA-C. 1/4/2019  11:36 AM   IMike MD, personally performed the services described in this documentation as scribed by the above named individual in my presence, and it is both accurate and complete.  1/4/2019  11:36 AM

## 2019-01-07 ENCOUNTER — TELEPHONE (OUTPATIENT)
Dept: PHARMACY | Facility: HOSPITAL | Age: 54
End: 2019-01-07

## 2019-01-09 ENCOUNTER — RESULTS ENCOUNTER (OUTPATIENT)
Dept: CARDIOLOGY | Facility: CLINIC | Age: 54
End: 2019-01-09

## 2019-01-09 DIAGNOSIS — R06.09 DOE (DYSPNEA ON EXERTION): ICD-10-CM

## 2019-01-11 ENCOUNTER — ANTICOAGULATION VISIT (OUTPATIENT)
Dept: PHARMACY | Facility: HOSPITAL | Age: 54
End: 2019-01-11

## 2019-01-11 DIAGNOSIS — Z95.2 HX OF AORTIC VALVE REPLACEMENT, MECHANICAL: ICD-10-CM

## 2019-01-11 LAB — INR PPP: 2.4

## 2019-01-11 NOTE — PROGRESS NOTES
Anticoagulation Clinic - Remote Progress Note  [facility]  Testing frequency: 7 days    Indication: Atrial Fibrillation and Mechanical Aortic Valve; h/o embolic CVA  Referring Provider: milly Mckeon)  Initial Warfarin Start Date: 11/2012  Planned Duration of Therapy: Life  Goal INR: 2.5-3.5  Current Drug Interactions: citalopram, divalproex   CHADS-VASc: 3 (HTN, h/o CVA)    Vit K Diet: patient is eating daily samara lettuce salads  Alcohol: none  Tobacco: none       Anticoagulation Clinic INR History:  Date 5/8 5/23 6/6 6/9 6/13 6/28 7/19 8/10 8/30   Total Weekly Dose 52.5mg 60mg 62.5mg 62.5 mg 77.5 mg 57.5 mg 67.5 mg 70mg 70mg   INR 2.2 2.2 1.2 2.2 2.6 2.3 2.4 3.5 1.2   Notes missed dose more vit K dose increase; increase in Vit K foods  boost missed dose   Increase GLV; protein     Date 9/6 9/18 10/9 11/16 11/27 12/4 12/11 12/25 1/4/18   Total Weekly Dose 67.5 mg 60mg 70mg 70mg 70mg 70mg 75mg 75mg 70mg   INR 3.2 1.8 3.4 1.6 1.9 2.5 3.2 2.7 2.0   Notes boost + 1 held dose Missed dose + significant alcohol consumption on 9/16  enox enox    boost     Date 1/15 1/25 2/15 2/23 3/2 3/6 3/15 3/20 3/27 4/4   Total Weekly Dose 75mg 75mg 65mg 75mg 75mg 90mg 65mg 90mg 80mg 75mg   INR 2.9 2.5 2.1 3.3 1.4 2.0, 1.78 (ED) 2.4 3.2 3.5 2.4   Notes   missed   enox; boost; levaquin held x1        Date 4/14 4/20 5/1 5/16 5/27 6/7 6/15 6/22 6/25 7/4   Total Weekly Dose 80mg 80mg 90mg 90mg  (ED) 90mg 75mg 95mg 95mg 107.5 mg ???   INR 2.3 2.0 3.2 2.9 3.2 4.1 3.1 1.3 2.0 2.7   Notes     levoflox missed; levoflox levoflox levoflox; refuse lovenox       Date 7/31 8/27 8/31 9/4 9/11 9/27 10/2 10/5 10/15   Total Weekly Dose 105mg  ??? 65mg  ??? 95mg 100 mg 80mg  ??? 70 mg 90mg 100mg 100mg   INR 2.7 1.3 2.6 5.6 1.6 1.6 2.3 2.4 2.8   Notes self adj self adj; miss; enox   incr GLV, mis-dosing, refuse enox incorrect doses        Date 10/26 11/5 11/7 11/15 12/7 1/11/19      Total Weekly Dose 100 mg  75mg 105mg 100mg 100mg       INR 4.6 7.9 1.9 5.5, 5.7 2.8 2.4      Notes  recalled test strips clinic; 2 dose hold clinic          Phone Interview:  Verbal Release Authorization signed on 11/7/18 -- may speak with Zuleima Alberto (patient's wife)  Tablet Strength: 5mg tablets   Patient Contact: [phone] -- try to call in PM    Patient Findings:  Negatives:  Signs/symptoms of thrombosis, Signs/symptoms of bleeding, Laboratory test error suspected, Change in health, Change in alcohol use, Change in activity, Upcoming invasive procedure, Emergency department visit, Upcoming dental procedure, Missed doses, Extra doses, Change in medications, Change in diet/appetite, Hospital admission, Bruising, Other complaints   Comments:  Patient reports all are negative.       Plan:    1. INR is slightly subtherapeutic 1/11 at 2.4. Unable to reach patient over the weekend, and he was unable to listen to his voicemail. Spoke with patient on 1/15, instructed him to continue warfarin 15mg daily except 10mg on Mondays.   2. Repeat INR Friday 1/18.   3. Verbal information provided over the phone. Mr. Alberto expresses understanding by teach back and has no further questions at this time.      Sonya Worthington, PharmD  Pharmacy Resident  1/15/2019  1:17 PM

## 2019-01-15 ENCOUNTER — APPOINTMENT (OUTPATIENT)
Dept: CARDIOLOGY | Facility: HOSPITAL | Age: 54
End: 2019-01-15
Attending: INTERNAL MEDICINE

## 2019-01-18 RX ORDER — WARFARIN SODIUM 5 MG/1
TABLET ORAL
Qty: 100 TABLET | Refills: 0 | Status: SHIPPED | OUTPATIENT
Start: 2019-01-18 | End: 2019-06-17

## 2019-01-24 ENCOUNTER — TELEPHONE (OUTPATIENT)
Dept: PHARMACY | Facility: HOSPITAL | Age: 54
End: 2019-01-24

## 2019-01-24 ENCOUNTER — ANTICOAGULATION VISIT (OUTPATIENT)
Dept: PHARMACY | Facility: HOSPITAL | Age: 54
End: 2019-01-24

## 2019-01-24 DIAGNOSIS — Z95.2 HX OF AORTIC VALVE REPLACEMENT, MECHANICAL: ICD-10-CM

## 2019-01-24 NOTE — PROGRESS NOTES
Anticoagulation Clinic - Remote Progress Note  [facility]  Testing frequency: 7 days    Indication: Atrial Fibrillation and Mechanical Aortic Valve; h/o embolic CVA  Referring Provider: milly Mckeon)  Initial Warfarin Start Date: 11/2012  Planned Duration of Therapy: Life  Goal INR: 2.5-3.5  Current Drug Interactions: citalopram, divalproex   CHADS-VASc: 3 (HTN, h/o CVA)    Vit K Diet: patient is eating daily samara lettuce salads  Alcohol: none  Tobacco: none       Anticoagulation Clinic INR History:  Date 5/8 5/23 6/6 6/9 6/13 6/28 7/19 8/10 8/30   Total Weekly Dose 52.5mg 60mg 62.5mg 62.5 mg 77.5 mg 57.5 mg 67.5 mg 70mg 70mg   INR 2.2 2.2 1.2 2.2 2.6 2.3 2.4 3.5 1.2   Notes missed dose more vit K dose increase; increase in Vit K foods  boost missed dose   Increase GLV; protein     Date 9/6 9/18 10/9 11/16 11/27 12/4 12/11 12/25 1/4/18   Total Weekly Dose 67.5 mg 60mg 70mg 70mg 70mg 70mg 75mg 75mg 70mg   INR 3.2 1.8 3.4 1.6 1.9 2.5 3.2 2.7 2.0   Notes boost + 1 held dose Missed dose + significant alcohol consumption on 9/16  enox enox    boost     Date 1/15 1/25 2/15 2/23 3/2 3/6 3/15 3/20 3/27 4/4   Total Weekly Dose 75mg 75mg 65mg 75mg 75mg 90mg 65mg 90mg 80mg 75mg   INR 2.9 2.5 2.1 3.3 1.4 2.0, 1.78 (ED) 2.4 3.2 3.5 2.4   Notes   missed   enox; boost; levaquin held x1        Date 4/14 4/20 5/1 5/16 5/27 6/7 6/15 6/22 6/25 7/4   Total Weekly Dose 80mg 80mg 90mg 90mg  (ED) 90mg 75mg 95mg 95mg 107.5mg ???   INR 2.3 2.0 3.2 2.9 3.2 4.1 3.1 1.3 2.0 2.7   Notes     levoflox missed; levoflox levoflox levoflox; refuse lovenox       Date 7/31 8/27 8/31 9/4 9/11 9/27 10/2 10/5 10/15   Total Weekly Dose 105mg  ??? 65mg  ??? 95mg 100mg 80mg  ??? 70mg 90mg 100mg 100mg   INR 2.7 1.3 2.6 5.6 1.6 1.6 2.3 2.4 2.8   Notes self adj self adj; miss; enox   incr GLV, mis-dosing, refuse enox incorrect doses        Date 10/26 11/5 11/7 11/15 12/7 1/11/19 1/24/19     Total Weekly Dose 100mg  75mg 105mg 100mg  100mg 100mg     INR 4.6 7.9 1.9 5.5, 5.7 2.8 2.4 3.9     Notes  recalled test strips clinic; 2 dose hold clinic          Phone Interview:  Verbal Release Authorization signed on 11/7/18 -- may speak with Zuleima Alberto (patient's wife)  Tablet Strength: 5mg tablets   Patient Contact: [phone] -- try to call in PM    Plan:    1. INR is SUPRAtherapeutic today at 3.9. Of note, patient had not called in INR results to [facility] as of 1540. After consulting with Dora Pereira, PharmD, instructed patient to DECREASE tonight's dose to 10mg and then continue warfarin 15mg daily except 10mg on Mondays.   2. Repeat INR in 1 week, 1/31.  3. Verbal information provided over the phone. Mitchell Alberto RBV dosing instructions, expresses understanding by teach back, and has no further questions at this time.     Sam Burleson, [place]  1/24/2019  3:42 PM    LVM for Mr. Alberto with instructions above. At follow up (today, or next week), please remind pt of Echo (no show 1/21).  I, Liane Diaz, [facility], have reviewed the note in full and agree with the assessment and plan (5% dose decrease).  01/25/19  9:05 AM

## 2019-01-25 LAB — INR PPP: 3.9

## 2019-01-29 ENCOUNTER — TELEPHONE (OUTPATIENT)
Dept: PHARMACY | Facility: HOSPITAL | Age: 54
End: 2019-01-29

## 2019-01-29 NOTE — TELEPHONE ENCOUNTER
Dr. Hollis,    Mr. Alberto is a 53yoM on warfarin for stroke prevention in the setting of a mAVR (+ history of embolic CVA). He has been scheduled for a colonoscopy on Monday 2/4/19. Are you agreeable to a 5-day warfarin dose hold with Lovenox bridge (1mg/kg subQ BID)?    Ht: 196cm  Wt:136kg  SCr: 0.2 (10/12/18)  Est CrCl: 109mL/min (modified)    Will plan for last Lovenox dose 24 hours pre-scope. Mr. Alberto will restart warfarin Monday evening, and restart Lovenox on Tuesday AM, continuing BID dosing until INR is therapeutic.       Thank you,  Liane

## 2019-01-29 NOTE — PROGRESS NOTES
Mr. Alberto has not returned our call despite multiple voicemails. Left another message today to request / remind Mr. Alberto to please retest his INR on Thursday 1/31/19.  OF NOTE: Mr. Alberto has been scheduled for a colonoscopy next Monday, 2/4/19. Left a message with Dr. Bird's RN to inquire about the need for warfarin dose hold.    Ann Cowden Mayer, PharmD  1/29/2019  3:22 PM

## 2019-01-30 NOTE — TELEPHONE ENCOUNTER
Mr. Alberto reported another INR earlier -- called and LVM to follow up re: INR / perioperative plan.

## 2019-02-01 DIAGNOSIS — Z12.11 SCREENING FOR COLON CANCER: Primary | ICD-10-CM

## 2019-02-01 RX ORDER — SODIUM, POTASSIUM,MAG SULFATES 17.5-3.13G
1 SOLUTION, RECONSTITUTED, ORAL ORAL TAKE AS DIRECTED
Qty: 2 BOTTLE | Refills: 0 | Status: SHIPPED | OUTPATIENT
Start: 2019-02-01 | End: 2020-02-21

## 2019-02-08 ENCOUNTER — ANTICOAGULATION VISIT (OUTPATIENT)
Dept: PHARMACY | Facility: HOSPITAL | Age: 54
End: 2019-02-08

## 2019-02-08 DIAGNOSIS — Z95.2 HX OF AORTIC VALVE REPLACEMENT, MECHANICAL: ICD-10-CM

## 2019-02-08 LAB — INR PPP: 2.7

## 2019-02-08 NOTE — PROGRESS NOTES
Anticoagulation Clinic - Remote Progress Note  [facility]  Testing frequency: 7 days    Indication: Atrial Fibrillation and Mechanical Aortic Valve; h/o embolic CVA  Referring Provider: milly Mckeon)  Initial Warfarin Start Date: 11/2012  Planned Duration of Therapy: Life  Goal INR: 2.5-3.5  Current Drug Interactions: citalopram, divalproex   CHADS-VASc: 3 (HTN, h/o CVA)    Vit K Diet: patient is eating daily samara lettuce salads  Alcohol: none  Tobacco: none       Anticoagulation Clinic INR History:  Date 5/8 5/23 6/6 6/9 6/13 6/28 7/19 8/10 8/30   Total Weekly Dose 52.5mg 60mg 62.5mg 62.5 mg 77.5 mg 57.5 mg 67.5 mg 70mg 70mg   INR 2.2 2.2 1.2 2.2 2.6 2.3 2.4 3.5 1.2   Notes missed dose more vit K dose increase; increase in Vit K foods  boost missed dose   Increase GLV; protein     Date 9/6 9/18 10/9 11/16 11/27 12/4 12/11 12/25 1/4/18   Total Weekly Dose 67.5 mg 60mg 70mg 70mg 70mg 70mg 75mg 75mg 70mg   INR 3.2 1.8 3.4 1.6 1.9 2.5 3.2 2.7 2.0   Notes boost + 1 held dose Missed dose + significant alcohol consumption on 9/16  enox enox    boost     Date 1/15 1/25 2/15 2/23 3/2 3/6 3/15 3/20 3/27 4/4   Total Weekly Dose 75mg 75mg 65mg 75mg 75mg 90mg 65mg 90mg 80mg 75mg   INR 2.9 2.5 2.1 3.3 1.4 2.0, 1.78 (ED) 2.4 3.2 3.5 2.4   Notes   missed   enox; boost; levaquin held x1        Date 4/14 4/20 5/1 5/16 5/27 6/7 6/15 6/22 6/25 7/4   Total Weekly Dose 80mg 80mg 90mg 90mg  (ED) 90mg 75mg 95mg 95mg 107.5mg ???   INR 2.3 2.0 3.2 2.9 3.2 4.1 3.1 1.3 2.0 2.7   Notes     levoflox missed; levoflox levoflox levoflox; refuse lovenox       Date 7/31 8/27 8/31 9/4 9/11 9/27 10/2 10/5 10/15   Total Weekly Dose 105mg  ??? 65mg  ??? 95mg 100mg 80mg  ??? 70mg 90mg 100mg 100mg   INR 2.7 1.3 2.6 5.6 1.6 1.6 2.3 2.4 2.8   Notes self adj self adj; miss; enox   incr GLV, mis-dosing, refuse enox incorrect doses        Date 10/26 11/5 11/7 11/15 12/7 1/11/19 1/24/19 2/8    Total Weekly Dose 100mg  75mg 105mg 100mg  100mg 100mg 100mg    INR 4.6 7.9 1.9 5.5, 5.7 2.8 2.4 3.9 2.7    Notes  recalled test strips clinic; 2 dose hold clinic    LVM      Phone Interview:  Verbal Release Authorization signed on 11/7/18 -- may speak with Zuleima Alberto (patient's wife)  Tablet Strength: 5mg tablets   Patient Contact: [phone] -- try to call in PM    Plan:    1. INR is back WNL today. After multiple unsuccessful attempts over a week to contact patient, left voice message recommending patient resume maintenance dose of warfarin 15mg daily except 10mg on Mondays and call us back as soon as possible for follow up.  2. Repeat INR in one week, 2/15.  3. Of note, sent out letter on 2/13 via mail requesting patient get in contact with us.     Gertrudis Glez CPhT  2/8/2019  1:55 PM         I, Sam Zaldivar [facility], have reviewed the note in full and agree with the assessment and plan.  02/15/19  10:33 AM

## 2019-02-18 ENCOUNTER — TELEPHONE (OUTPATIENT)
Dept: CARDIOLOGY | Facility: CLINIC | Age: 54
End: 2019-02-18

## 2019-02-18 ENCOUNTER — ANTICOAGULATION VISIT (OUTPATIENT)
Dept: PHARMACY | Facility: HOSPITAL | Age: 54
End: 2019-02-18

## 2019-02-18 DIAGNOSIS — Z95.2 HX OF AORTIC VALVE REPLACEMENT, MECHANICAL: ICD-10-CM

## 2019-02-18 LAB — INR PPP: 3.6

## 2019-02-18 NOTE — PROGRESS NOTES
Anticoagulation Clinic - Remote Progress Note  [facility]  Testing frequency: 7 days    Indication: Atrial Fibrillation and Mechanical Aortic Valve; h/o embolic CVA  Referring Provider: milly Mckeon)  Initial Warfarin Start Date: 11/2012  Planned Duration of Therapy: Life  Goal INR: 2.5-3.5  Current Drug Interactions: citalopram, divalproex   CHADS-VASc: 3 (HTN, h/o CVA)    Vit K Diet: patient is eating daily samara lettuce salads  Alcohol: none  Tobacco: none       Anticoagulation Clinic INR History:  Date 5/8 5/23 6/6 6/9 6/13 6/28 7/19 8/10 8/30   Total Weekly Dose 52.5mg 60mg 62.5mg 62.5 mg 77.5 mg 57.5 mg 67.5 mg 70mg 70mg   INR 2.2 2.2 1.2 2.2 2.6 2.3 2.4 3.5 1.2   Notes missed dose more vit K dose increase; increase in Vit K foods  boost missed dose   Increase GLV; protein     Date 9/6 9/18 10/9 11/16 11/27 12/4 12/11 12/25 1/4/18   Total Weekly Dose 67.5 mg 60mg 70mg 70mg 70mg 70mg 75mg 75mg 70mg   INR 3.2 1.8 3.4 1.6 1.9 2.5 3.2 2.7 2.0   Notes boost + 1 held dose Missed dose + significant alcohol consumption on 9/16  enox enox    boost     Date 1/15 1/25 2/15 2/23 3/2 3/6 3/15 3/20 3/27 4/4   Total Weekly Dose 75mg 75mg 65mg 75mg 75mg 90mg 65mg 90mg 80mg 75mg   INR 2.9 2.5 2.1 3.3 1.4 2.0, 1.78 (ED) 2.4 3.2 3.5 2.4   Notes   missed   enox; boost; levaquin held x1        Date 4/14 4/20 5/1 5/16 5/27 6/7 6/15 6/22 6/25 7/4   Total Weekly Dose 80mg 80mg 90mg 90mg  (ED) 90mg 75mg 95mg 95mg 107.5mg ???   INR 2.3 2.0 3.2 2.9 3.2 4.1 3.1 1.3 2.0 2.7   Notes     levoflox missed; levoflox levoflox levoflox; refuse lovenox       Date 7/31 8/27 8/31 9/4 9/11 9/27 10/2 10/5 10/15   Total Weekly Dose 105mg  ??? 65mg  ??? 95mg 100mg 80mg  ??? 70mg 90mg 100mg 100mg   INR 2.7 1.3 2.6 5.6 1.6 1.6 2.3 2.4 2.8   Notes self adj self adj; miss; enox   incr GLV, mis-dosing, refuse enox incorrect doses        Date 10/26 11/5 11/7 11/15 12/7 1/11/19 1/24/19 2/8 2/18   Total Weekly Dose 100mg  75mg 105mg  100mg 100mg 100mg 100mg 105 mg   INR 4.6 7.9 1.9 5.5, 5.7 2.8 2.4 3.9 2.7 3.6   Notes  recalled test strips clinic; 2 dose hold clinic    LV      Phone Interview:  Verbal Release Authorization signed on 11/7/18 -- may speak with Zuleima Alberto (patient's wife)  [phone]  Tablet Strength: 5mg tablets   Patient Contact: [phone] -- try to call in PM    Patient Findings   Negatives:  Signs/symptoms of thrombosis, Signs/symptoms of bleeding, Laboratory test error suspected, Change in health, Change in alcohol use, Change in activity, Upcoming invasive procedure, Emergency department visit, Upcoming dental procedure, Missed doses, Extra doses, Change in medications, Change in diet/appetite, Hospital admission, Bruising, Other complaints   Comments:  We have left multiple voice mails for Mr. Alberto and have been unsuccessful in reaching him. In response to letter, Mr. Alberto also provided his wife's mobile number if unable to reach him.  He called today to report that his INR was 3.6.  He had self adjusted his dose last Monday and took warfarin 15 mg rather than the 10 mg dose that was prescribed   Informed me that he is having a colonoscopy on Thursday, 2/21.     Apparently, had been rescheduled from earlier this month.  MD had intended a 5 day hold preoperatively with lovenox bridge.     When discussed with Mr. Alberto, he stated that he could not afford Lovenox.  Discussed importance of bridge therapy with mechanical valves.   After checking Good Rx and cost in [facility], he was willing to utilize coupon in [employer] for Lovenox 100 mg syringes #10 for $63.45.   Discussed perioperative plan with Mr. Alberto.  He did not wish to have the plan e-mailed to him; verbalized understanding, RBV dosing instructions.      Plan:    1. INR is slightly above goal today at 3.6 (Mr. Alberto called in, note self-adjusted dose last Monday). Mr. Alberto noted he is scheduled for a colonoscopy this Thursday, 2/21.        Developed perioperative plan below that was reviewed with Mr. Alberto.      Note: usual maintenance dose of warfarin 15mg daily except 10mg on Mondays   2. Repeat INR Monday, 2/15  3. Verbal information provided over the phone. Mr. Alberto expresses understanding by teach back and has no further questions at this time.  4. Rx sent to [facility] for Lovenox 100 mg syringes #10     Dora Pereira, PharmD  2/18/2019  3:04 PM     He has been scheduled for a colonoscopy on Thursday 2/21/19.      Ht: 196cm  Wt:136kg  SCr: 0.2 (10/12/18)  Est CrCl: 109mL/min (modified)    2/18:  Hold warfarin  2/19:  hold warfarin  2/20:  hold warfarin, Lovenox 100 mg x 1 dose in am  2/21:  Colonoscopy:    hold lovenox,     restart warfarin in PM unless otherwise directed by CRS -- boost warfarin dose to 20 mg  2/22:  restart lovenox 200 mg subcut q24h until INR therapeutic;  boost warfarin dose to 20mg then resume maintenance dose.  2/23:  Continue lovenox until INR therapeutic; maintenance dose of warfarin 15 mg  Recheck INR on Monday, 2/25.

## 2019-02-18 NOTE — TELEPHONE ENCOUNTER
[facility] called and stated that [facility] is hard to get a hold of and he called them today stating that he is having a colonoscopy on thurs.     He cannot afford the lovenox to bridge over after his procedure.     Suggestion?

## 2019-02-19 NOTE — TELEPHONE ENCOUNTER
After discussion with Mr. Alberto, he was willing to fill Rx for Lovenox 100 mg syringes #10 using GoodRx coupon for $63.45.    With the above restriction:  Recommend Lovenox 1.5 mg/kg subcut daily beginning POD#1.  He will take Lovenox 100 mg subcut dose day prior to procedure.    Dora Pereira, GiovaniD

## 2019-02-21 ENCOUNTER — OUTSIDE FACILITY SERVICE (OUTPATIENT)
Dept: GASTROENTEROLOGY | Facility: CLINIC | Age: 54
End: 2019-02-21

## 2019-02-21 ENCOUNTER — LAB REQUISITION (OUTPATIENT)
Dept: LAB | Facility: HOSPITAL | Age: 54
End: 2019-02-21

## 2019-02-21 DIAGNOSIS — Z12.11 ENCOUNTER FOR SCREENING FOR MALIGNANT NEOPLASM OF COLON: ICD-10-CM

## 2019-02-21 PROCEDURE — 45385 COLONOSCOPY W/LESION REMOVAL: CPT | Performed by: INTERNAL MEDICINE

## 2019-02-21 PROCEDURE — 88305 TISSUE EXAM BY PATHOLOGIST: CPT | Performed by: INTERNAL MEDICINE

## 2019-02-21 PROCEDURE — 45380 COLONOSCOPY AND BIOPSY: CPT | Performed by: INTERNAL MEDICINE

## 2019-02-22 LAB
CYTO UR: NORMAL
LAB AP CASE REPORT: NORMAL
LAB AP CLINICAL INFORMATION: NORMAL
LAB AP DIAGNOSIS COMMENT: NORMAL
PATH REPORT.FINAL DX SPEC: NORMAL
PATH REPORT.GROSS SPEC: NORMAL

## 2019-02-25 RX ORDER — WARFARIN SODIUM 5 MG/1
TABLET ORAL
Qty: 30 TABLET | Refills: 0 | Status: SHIPPED | OUTPATIENT
Start: 2019-02-25 | End: 2019-03-30 | Stop reason: SDUPTHER

## 2019-03-04 ENCOUNTER — ANTICOAGULATION VISIT (OUTPATIENT)
Dept: PHARMACY | Facility: HOSPITAL | Age: 54
End: 2019-03-04

## 2019-03-04 DIAGNOSIS — Z95.2 HX OF AORTIC VALVE REPLACEMENT, MECHANICAL: ICD-10-CM

## 2019-03-04 LAB
INR PPP: 3.2 (ref 0.91–1.09)
PROTHROMBIN TIME: 38.4 SECONDS (ref 10–13.8)

## 2019-03-04 PROCEDURE — 36416 COLLJ CAPILLARY BLOOD SPEC: CPT

## 2019-03-04 PROCEDURE — 85610 PROTHROMBIN TIME: CPT

## 2019-03-04 PROCEDURE — G0463 HOSPITAL OUTPT CLINIC VISIT: HCPCS | Performed by: PHARMACIST

## 2019-03-04 RX ORDER — WARFARIN SODIUM 5 MG/1
TABLET ORAL
Qty: 180 TABLET | Refills: 0 | Status: SHIPPED | OUTPATIENT
Start: 2019-03-04 | End: 2019-06-17

## 2019-03-04 NOTE — PROGRESS NOTES
Anticoagulation Clinic - Remote Progress Note  [facility] [employer] [facility]  Testing frequency: 7 days    Indication: Atrial Fibrillation and Mechanical Aortic Valve; h/o embolic CVA  Referring Provider: milly Mckeon)  Initial Warfarin Start Date: 11/2012  Planned Duration of Therapy: Life  Goal INR: 2.5-3.5  Current Drug Interactions: citalopram, divalproex   CHADS-VASc: 3 (HTN, h/o CVA)    Vit K Diet: patient is eating daily samara lettuce salads  Alcohol: none  Tobacco: none       Anticoagulation Clinic INR History:  Date 5/8 5/23 6/6 6/9 6/13 6/28 7/19 8/10 8/30   Total Weekly Dose 52.5mg 60mg 62.5mg 62.5 mg 77.5 mg 57.5 mg 67.5 mg 70mg 70mg   INR 2.2 2.2 1.2 2.2 2.6 2.3 2.4 3.5 1.2   Notes missed dose more vit K dose increase; increase in Vit K foods  boost missed dose   Increase GLV; protein     Date 9/6 9/18 10/9 11/16 11/27 12/4 12/11 12/25 1/4/18   Total Weekly Dose 67.5 mg 60mg 70mg 70mg 70mg 70mg 75mg 75mg 70mg   INR 3.2 1.8 3.4 1.6 1.9 2.5 3.2 2.7 2.0   Notes boost + 1 held dose Missed dose + significant alcohol consumption on 9/16  enox enox    boost     Date 1/15 1/25 2/15 2/23 3/2 3/6 3/15 3/20 3/27 4/4   Total Weekly Dose 75mg 75mg 65mg 75mg 75mg 90mg 65mg 90mg 80mg 75mg   INR 2.9 2.5 2.1 3.3 1.4 2.0, 1.78 (ED) 2.4 3.2 3.5 2.4   Notes   missed   enox; boost; levaquin held x1        Date 4/14 4/20 5/1 5/16 5/27 6/7 6/15 6/22 6/25 7/4   Total Weekly Dose 80mg 80mg 90mg 90mg  (ED) 90mg 75mg 95mg 95mg 107.5mg ???   INR 2.3 2.0 3.2 2.9 3.2 4.1 3.1 1.3 2.0 2.7   Notes     levoflox missed; levoflox levoflox levoflox; refuse lovenox       Date 7/31 8/27 8/31 9/4 9/11 9/27 10/2 10/5 10/15   Total Weekly Dose 105mg  ??? 65mg  ??? 95mg 100mg 80mg  ??? 70mg 90mg 100mg 100mg   INR 2.7 1.3 2.6 5.6 1.6 1.6 2.3 2.4 2.8   Notes self adj self adj; miss; enox   incr GLV, mis-dosing, refuse enox incorrect doses        Date 10/26 11/5 11/7 11/15 12/7 1/11/19 1/24/19 2/8 2/18 3/4    Total Weekly Dose 100mg  75mg  105mg 100mg 100mg 100mg 100mg 105 mg 100 mg    INR 4.6 7.9 1.9 5.5, 5.7 2.8 2.4 3.9 2.7 3.6 3.2    Notes  recalled test strips clinic; 2 dose hold clinic    LVM  clinic      Phone Interview:  Verbal Release Authorization signed on 11/7/18 -- may speak with Zuleima Alberto (patient's wife)  [phone]  Tablet Strength: 5mg tablets   Patient Contact: [phone] -- try to call in PM    Patient Findings     Negatives:  Signs/symptoms of thrombosis, Signs/symptoms of bleeding, Laboratory test error suspected, Change in health, Change in alcohol use, Change in activity, Upcoming invasive procedure, Emergency department visit, Upcoming dental procedure, Missed doses, Extra doses, Change in medications, Change in diet/appetite, Hospital admission, Bruising, Other complaints   Comments:  He has a lot going on at home and doesn't further elaborate.   He says that even when he tests at home he doesn't have time to call in the results.   He tested last week and had an INR of 2.7 but didn't call it to [employer].   He says that he could not afford the lovenox syringes and didn't pick them up. Recommend to consider [place] or [place] if he is a member for future lovenox prescriptions.        Plan:    1. INR is therapeutic at 3.2 after his hold last month and corresponding boost doses. Recommended Mr. Alberto to continue with warfarin 15 mg daily except 10 mg on Monday. Recheck INR in two weeks. He visits us today in the clinic after his appointment with Dr. gonzalez.           Note: usual maintenance dose of warfarin 15mg daily except 10mg on Mondays   2. Repeat INR Monday, 3/18.   3. Verbal and written  information provided in the clinic. Mr. Alberto expresses understanding by teach back and has no further questions at this time.       Sam Zaldivar, PharmD  3/4/2019  10:12 AM

## 2019-03-29 ENCOUNTER — ANTICOAGULATION VISIT (OUTPATIENT)
Dept: PHARMACY | Facility: HOSPITAL | Age: 54
End: 2019-03-29

## 2019-03-29 DIAGNOSIS — Z95.2 HX OF AORTIC VALVE REPLACEMENT, MECHANICAL: ICD-10-CM

## 2019-03-29 LAB — INR PPP: 4.8

## 2019-03-29 NOTE — PROGRESS NOTES
Anticoagulation Clinic - Remote Progress Note  [facility]  Testing frequency: 7 days    Indication: Atrial Fibrillation and Mechanical Aortic Valve; h/o embolic CVA  Referring Provider: milly Mckeon)  Initial Warfarin Start Date: 11/2012  Planned Duration of Therapy: Life  Goal INR: 2.5-3.5  Current Drug Interactions: citalopram, divalproex   CHADS-VASc: 3 (HTN, h/o CVA)    Vit K Diet: patient is eating daily samara lettuce salads  Alcohol: none  Tobacco: none       Anticoagulation Clinic INR History:  Date 5/8 5/23 6/6 6/9 6/13 6/28 7/19 8/10 8/30   Total Weekly Dose 52.5mg 60mg 62.5mg 62.5 mg 77.5 mg 57.5 mg 67.5 mg 70mg 70mg   INR 2.2 2.2 1.2 2.2 2.6 2.3 2.4 3.5 1.2   Notes missed dose more vit K dose increase; increase in Vit K foods  boost missed dose   Increase GLV; protein     Date 9/6 9/18 10/9 11/16 11/27 12/4 12/11 12/25 1/4/18   Total Weekly Dose 67.5 mg 60mg 70mg 70mg 70mg 70mg 75mg 75mg 70mg   INR 3.2 1.8 3.4 1.6 1.9 2.5 3.2 2.7 2.0   Notes boost + 1 held dose Missed dose + significant alcohol consumption on 9/16  enox enox    boost     Date 1/15 1/25 2/15 2/23 3/2 3/6 3/15 3/20 3/27 4/4   Total Weekly Dose 75mg 75mg 65mg 75mg 75mg 90mg 65mg 90mg 80mg 75mg   INR 2.9 2.5 2.1 3.3 1.4 2.0, 1.78 (ED) 2.4 3.2 3.5 2.4   Notes   missed   enox; boost; levaquin held x1        Date 4/14 4/20 5/1 5/16 5/27 6/7 6/15 6/22 6/25 7/4   Total Weekly Dose 80mg 80mg 90mg 90mg  (ED) 90mg 75mg 95mg 95mg 107.5mg ???   INR 2.3 2.0 3.2 2.9 3.2 4.1 3.1 1.3 2.0 2.7   Notes     levoflox missed; levoflox levoflox levoflox; refuse lovenox       Date 7/31 8/27 8/31 9/4 9/11 9/27 10/2 10/5 10/15   Total Weekly Dose 105mg  ??? 65mg  ??? 95mg 100mg 80mg  ??? 70mg 90mg 100mg 100mg   INR 2.7 1.3 2.6 5.6 1.6 1.6 2.3 2.4 2.8   Notes self adj self adj; miss; enox   incr GLV, mis-dosing, refuse enox incorrect doses        Date 10/26 11/5 11/7 11/15 12/7 1/11/19 1/24/19 2/8 2/18 3/4 3/26   Total Weekly Dose 100mg  75mg  105mg 100mg 100mg 100mg 100mg 105 mg 100 mg 100 mg   INR 4.6 7.9 1.9 5.5, 5.7 2.8 2.4 3.9 2.7 3.6 3.2 4.8   Notes  recalled test strips clinic; 2 dose hold clinic    LVM  clinic      Phone Interview:  Verbal Release Authorization signed on 11/7/18 -- may speak with Zuleima Alberto (patient's wife)  [phone]  Tablet Strength: 5mg tablets   Patient Contact: [phone] -- try to call in PM    Patient Findings   Negatives:  Signs/symptoms of thrombosis, Signs/symptoms of bleeding, Laboratory test error suspected, Change in health, Change in alcohol use, Change in activity, Upcoming invasive procedure, Emergency department visit, Upcoming dental procedure, Missed doses, Extra doses, Change in medications, Change in diet/appetite, Hospital admission, Bruising, Other complaints       Plan:    1. INR is supratherapeutic at 4.8, with no apparent cause. Recommended Mr. Alberto to reduce today and tomorrow's dose to 7.5 mg and then continue with warfarin 15 mg daily except 10 mg on Monday. Recheck INR next week on 4/4.     Note: usual maintenance dose of warfarin 15mg daily except 10mg on Mondays   2. Repeat INR Thursday, 4/4   3. Verbal and written  information provided in the clinic. Mr. Alberto expresses understanding by teach back and has no further questions at this time.       Sam Zaldivar, PharmD  3/29/2019  3:16 PM

## 2019-04-01 RX ORDER — WARFARIN SODIUM 5 MG/1
TABLET ORAL
Qty: 30 TABLET | Refills: 0 | Status: SHIPPED | OUTPATIENT
Start: 2019-04-01 | End: 2019-06-24 | Stop reason: SDUPTHER

## 2019-04-18 ENCOUNTER — TELEPHONE (OUTPATIENT)
Dept: PHARMACY | Facility: HOSPITAL | Age: 54
End: 2019-04-18

## 2019-04-22 ENCOUNTER — APPOINTMENT (OUTPATIENT)
Dept: GENERAL RADIOLOGY | Facility: HOSPITAL | Age: 54
End: 2019-04-22

## 2019-04-22 ENCOUNTER — HOSPITAL ENCOUNTER (EMERGENCY)
Facility: HOSPITAL | Age: 54
Discharge: HOME | End: 2019-04-22
Attending: EMERGENCY MEDICINE | Admitting: EMERGENCY MEDICINE

## 2019-04-22 VITALS
TEMPERATURE: 98.4 F | HEART RATE: 64 BPM | DIASTOLIC BLOOD PRESSURE: 64 MMHG | HEIGHT: 77 IN | WEIGHT: 295 LBS | OXYGEN SATURATION: 98 % | SYSTOLIC BLOOD PRESSURE: 160 MMHG | BODY MASS INDEX: 34.83 KG/M2 | RESPIRATION RATE: 18 BRPM

## 2019-04-22 DIAGNOSIS — I49.3 PVC (PREMATURE VENTRICULAR CONTRACTION): ICD-10-CM

## 2019-04-22 DIAGNOSIS — I49.9 CARDIAC ARRHYTHMIA, UNSPECIFIED CARDIAC ARRHYTHMIA TYPE: Primary | ICD-10-CM

## 2019-04-22 LAB
ALBUMIN SERPL-MCNC: 4.3 G/DL (ref 3.5–5.2)
ALBUMIN/GLOB SERPL: 1.5 G/DL
ALP SERPL-CCNC: 70 U/L (ref 39–117)
ALT SERPL W P-5'-P-CCNC: 14 U/L (ref 1–41)
ANION GAP SERPL CALCULATED.3IONS-SCNC: 10 MMOL/L
AST SERPL-CCNC: 17 U/L (ref 1–40)
BASOPHILS # BLD AUTO: 0.02 10*3/MM3 (ref 0–0.2)
BASOPHILS NFR BLD AUTO: 0.4 % (ref 0–1.5)
BILIRUB SERPL-MCNC: 0.6 MG/DL (ref 0.2–1.2)
BUN BLD-MCNC: 20 MG/DL (ref 6–20)
BUN/CREAT SERPL: 22.7 (ref 7–25)
CALCIUM SPEC-SCNC: 9.3 MG/DL (ref 8.6–10.5)
CHLORIDE SERPL-SCNC: 104 MMOL/L (ref 98–107)
CO2 SERPL-SCNC: 27 MMOL/L (ref 22–29)
CREAT BLD-MCNC: 0.88 MG/DL (ref 0.76–1.27)
DEPRECATED RDW RBC AUTO: 47.1 FL (ref 37–54)
EOSINOPHIL # BLD AUTO: 0.12 10*3/MM3 (ref 0–0.4)
EOSINOPHIL NFR BLD AUTO: 2.5 % (ref 0.3–6.2)
ERYTHROCYTE [DISTWIDTH] IN BLOOD BY AUTOMATED COUNT: 14.7 % (ref 12.3–15.4)
GFR SERPL CREATININE-BSD FRML MDRD: 91 ML/MIN/1.73
GLOBULIN UR ELPH-MCNC: 2.9 GM/DL
GLUCOSE BLD-MCNC: 91 MG/DL (ref 65–99)
HCT VFR BLD AUTO: 42.9 % (ref 37.5–51)
HGB BLD-MCNC: 14.2 G/DL (ref 13–17.7)
HOLD SPECIMEN: NORMAL
HOLD SPECIMEN: NORMAL
IMM GRANULOCYTES # BLD AUTO: 0.01 10*3/MM3 (ref 0–0.05)
IMM GRANULOCYTES NFR BLD AUTO: 0.2 % (ref 0–0.5)
INR PPP: 1.14 (ref 0.85–1.16)
LYMPHOCYTES # BLD AUTO: 1.26 10*3/MM3 (ref 0.7–3.1)
LYMPHOCYTES NFR BLD AUTO: 26.3 % (ref 19.6–45.3)
MAGNESIUM SERPL-MCNC: 1.8 MG/DL (ref 1.6–2.6)
MCH RBC QN AUTO: 28.9 PG (ref 26.6–33)
MCHC RBC AUTO-ENTMCNC: 33.1 G/DL (ref 31.5–35.7)
MCV RBC AUTO: 87.4 FL (ref 79–97)
MONOCYTES # BLD AUTO: 0.5 10*3/MM3 (ref 0.1–0.9)
MONOCYTES NFR BLD AUTO: 10.4 % (ref 5–12)
NEUTROPHILS # BLD AUTO: 2.89 10*3/MM3 (ref 1.7–7)
NEUTROPHILS NFR BLD AUTO: 60.4 % (ref 42.7–76)
NT-PROBNP SERPL-MCNC: 258.6 PG/ML (ref 5–900)
PLATELET # BLD AUTO: 192 10*3/MM3 (ref 140–450)
PMV BLD AUTO: 10.4 FL (ref 6–12)
POTASSIUM BLD-SCNC: 4.1 MMOL/L (ref 3.5–5.2)
PROT SERPL-MCNC: 7.2 G/DL (ref 6–8.5)
PROTHROMBIN TIME: 14.1 SECONDS (ref 11.2–14.3)
RBC # BLD AUTO: 4.91 10*6/MM3 (ref 4.14–5.8)
SODIUM BLD-SCNC: 141 MMOL/L (ref 136–145)
TROPONIN I SERPL-MCNC: 0 NG/ML (ref 0–0.07)
TSH SERPL DL<=0.05 MIU/L-ACNC: 1.5 MIU/ML (ref 0.27–4.2)
WBC NRBC COR # BLD: 4.79 10*3/MM3 (ref 3.4–10.8)
WHOLE BLOOD HOLD SPECIMEN: NORMAL
WHOLE BLOOD HOLD SPECIMEN: NORMAL

## 2019-04-22 PROCEDURE — 71045 X-RAY EXAM CHEST 1 VIEW: CPT

## 2019-04-22 PROCEDURE — 84443 ASSAY THYROID STIM HORMONE: CPT | Performed by: EMERGENCY MEDICINE

## 2019-04-22 PROCEDURE — 85025 COMPLETE CBC W/AUTO DIFF WBC: CPT

## 2019-04-22 PROCEDURE — 96372 THER/PROPH/DIAG INJ SC/IM: CPT

## 2019-04-22 PROCEDURE — 83880 ASSAY OF NATRIURETIC PEPTIDE: CPT | Performed by: EMERGENCY MEDICINE

## 2019-04-22 PROCEDURE — 85610 PROTHROMBIN TIME: CPT | Performed by: NURSE PRACTITIONER

## 2019-04-22 PROCEDURE — 25010000002 ENOXAPARIN PER 10 MG: Performed by: NURSE PRACTITIONER

## 2019-04-22 PROCEDURE — 99284 EMERGENCY DEPT VISIT MOD MDM: CPT

## 2019-04-22 PROCEDURE — 80053 COMPREHEN METABOLIC PANEL: CPT | Performed by: EMERGENCY MEDICINE

## 2019-04-22 PROCEDURE — 83735 ASSAY OF MAGNESIUM: CPT | Performed by: EMERGENCY MEDICINE

## 2019-04-22 PROCEDURE — 84484 ASSAY OF TROPONIN QUANT: CPT

## 2019-04-22 PROCEDURE — 93005 ELECTROCARDIOGRAM TRACING: CPT | Performed by: EMERGENCY MEDICINE

## 2019-04-22 PROCEDURE — 93005 ELECTROCARDIOGRAM TRACING: CPT

## 2019-04-22 RX ORDER — SODIUM CHLORIDE 0.9 % (FLUSH) 0.9 %
10 SYRINGE (ML) INJECTION AS NEEDED
Status: DISCONTINUED | OUTPATIENT
Start: 2019-04-22 | End: 2019-04-22 | Stop reason: HOSPADM

## 2019-04-22 RX ADMIN — ENOXAPARIN SODIUM 130 MG: 80 INJECTION SUBCUTANEOUS at 18:43

## 2019-04-23 ENCOUNTER — ANTICOAGULATION VISIT (OUTPATIENT)
Dept: PHARMACY | Facility: HOSPITAL | Age: 54
End: 2019-04-23

## 2019-04-23 ENCOUNTER — TELEPHONE (OUTPATIENT)
Dept: CARDIOLOGY | Facility: CLINIC | Age: 54
End: 2019-04-23

## 2019-04-23 DIAGNOSIS — Z95.2 HX OF AORTIC VALVE REPLACEMENT, MECHANICAL: ICD-10-CM

## 2019-04-23 NOTE — TELEPHONE ENCOUNTER
Aminta at 's office notified that  EKG yesterday was same as previous for him. Yes he is acceptable cardiac risk for eyelid surgery tomorrow. Keep holding coumadin and resume following surgery. She verbalized understanding and wants clearance letter faxed to # [phone]. Letter faxed.

## 2019-04-23 NOTE — TELEPHONE ENCOUNTER
Aminta from 's office states patient was scheduled for eyelid surgery yesterday and had to cancel for heart rate issues. He was sent to our ED. See ED note. Has been off his coumadin for 7 days for the surgery and had Lovenox injection in the ED.  is requesting cardiac clearance today to have his surgery rescheduled for tomorrow. Please advise.

## 2019-04-23 NOTE — TELEPHONE ENCOUNTER
EKG yesterday was same as previous for him. Yes he is acceptable cardiac risk for eyelid surgery tomorrow. Keep holding coumadin and resume following surgery. Thanks

## 2019-04-23 NOTE — PROGRESS NOTES
Anticoagulation Clinic - Remote Progress Note  [facility]  Testing frequency: 7 days    Indication: Atrial Fibrillation and Mechanical Aortic Valve; h/o embolic CVA  Referring Provider: milly Mckeon)  Initial Warfarin Start Date: 11/2012  Planned Duration of Therapy: Life  Goal INR: 2.5-3.5  Current Drug Interactions: citalopram, divalproex   CHADS-VASc: 3 (HTN, h/o CVA)    Vit K Diet: patient is eating daily samara lettuce salads  Alcohol: none  Tobacco: none       Anticoagulation Clinic INR History:  Date 5/8 5/23 6/6 6/9 6/13 6/28 7/19 8/10 8/30   Total Weekly Dose 52.5mg 60mg 62.5mg 62.5 mg 77.5 mg 57.5 mg 67.5 mg 70mg 70mg   INR 2.2 2.2 1.2 2.2 2.6 2.3 2.4 3.5 1.2   Notes missed dose more vit K dose increase; increase in Vit K foods  boost missed dose   Increase GLV; protein     Date 9/6 9/18 10/9 11/16 11/27 12/4 12/11 12/25 1/4/18   Total Weekly Dose 67.5 mg 60mg 70mg 70mg 70mg 70mg 75mg 75mg 70mg   INR 3.2 1.8 3.4 1.6 1.9 2.5 3.2 2.7 2.0   Notes boost + 1 held dose Missed dose + significant alcohol consumption on 9/16  enox enox    boost     Date 1/15 1/25 2/15 2/23 3/2 3/6 3/15 3/20 3/27 4/4   Total Weekly Dose 75mg 75mg 65mg 75mg 75mg 90mg 65mg 90mg 80mg 75mg   INR 2.9 2.5 2.1 3.3 1.4 2.0, 1.78 (ED) 2.4 3.2 3.5 2.4   Notes   missed   enox; boost; levaquin held x1        Date 4/14 4/20 5/1 5/16 5/27 6/7 6/15 6/22 6/25 7/4   Total Weekly Dose 80mg 80mg 90mg 90mg  (ED) 90mg 75mg 95mg 95mg 107.5mg ???   INR 2.3 2.0 3.2 2.9 3.2 4.1 3.1 1.3 2.0 2.7   Notes     levoflox missed; levoflox levoflox levoflox; refuse lovenox       Date 7/31 8/27 8/31 9/4 9/11 9/27 10/2 10/5 10/15   Total Weekly Dose 105mg  ??? 65mg  ??? 95mg 100mg 80mg  ??? 70mg 90mg 100mg 100mg   INR 2.7 1.3 2.6 5.6 1.6 1.6 2.3 2.4 2.8   Notes self adj self adj; miss; enox   incr GLV, mis-dosing, refuse enox incorrect doses        Date 10/26 11/5 11/7 11/15 12/7 1/11/19 1/24/19 2/8 2/18 3/4 3/26 4/23   Total Weekly Dose 100mg   75mg 105mg 100mg 100mg 100mg 100mg 105 mg 100 mg 100 mg 0mg   INR 4.6 7.9 1.9 5.5, 5.7 2.8 2.4 3.9 2.7 3.6 3.2 4.8 1.14 ED   Notes  recalled test strips clinic; 2 dose hold clinic    LVM  clinic HM 1.9 HM     Phone Interview:  Verbal Release Authorization signed on 11/7/18 -- may speak with Zuleima Alberto (patient's wife)  [phone]  Tablet Strength: 5mg tablets   Patient Contact: [phone] -- try to call in PM    Patient Findings   Positives:  Missed doses   Negatives:  Signs/symptoms of thrombosis, Signs/symptoms of bleeding, Laboratory test error suspected, Change in health, Change in alcohol use, Change in activity, Upcoming invasive procedure, Emergency department visit, Upcoming dental procedure, Extra doses, Change in medications, Change in diet/appetite, Hospital admission, Bruising, Other complaints   Comments:  Mr. Alberto did not call the clinic despite verbalizing understanding of calling with upcoming procedure, need for warfarin dose hold, missed doses of warfarin, etc. Mr. Alberto is aware of our phone number and has repeatedly been non compliant with follow up and does not call when instructed to do so. He has voiced the understanding of SUBtherapeutic and SUPRAtherapeutic INR results and his hx of mechanical AVR. At this point, we have educated him and he continues to be noncompliant.     He went for the procedure yesterday but was sent to the ED from preop for an arrhythmia and bradycardia just prior to an eyelid surgery.  Patient has a history of mechanical valve replacement and has been off his Coumadin for 7 days. They prescribed lovenox.      Plan:    1. INR is baseline with self dose hold for procedure. Mr. Alberto did not call the clinic despite verbalizing understanding of calling with upcoming procedure, need for warfarin dose hold, missed doses of warfarin, etc. Mr. Alberto is aware of our phone number and has repeatedly been non compliant with follow up and does not call when  instructed to do so. He has voiced the understanding of SUBtherapeutic and SUPRAtherapeutic INR results and his hx of mechanical AVR. At this point, we have educated him and he continues to be noncompliant.    2. He reports that he held his dose for 7 days per Dr. Spring's recommendation, but did not call the clinic or Dr. Hollis's office to discuss. There is no note of cardiac clearance until this morning where Aminta from Dr. Spring's office called and spoke with Ibeth Joshi- please refer to telephone encounter from 4/23 this morning. He was prescribed lovenox at ED yesterday and will receive on shot today per the recommendation of ED. After the procedure on 2/24, he will then boost his dose of warfarin to 20mg x2 days and then resume prior maintenace dose of warfarin 15mg daily except 10mg on Mondays. He will RESUME lovenox after his procedure on 4/24 and continue every 12 hours as prescribed. He should have 11 syringes per ED Rx and reports that he does not need refills of lovenox.     3. Of note: Mr. Alberto reports that his INR yesterday using his POCT home monitor was 1.9. This is significantly elevated when compared to 1.14 in the ED.     4. Repeat INR on Monday 4/29.  5. Verbal and written  information provided in the clinic. Mr. Alberto expresses understanding by teach back and has no further questions at this time.     Do Vargas, PharmD  4/23/2019  10:08 AM

## 2019-05-07 ENCOUNTER — ANTICOAGULATION VISIT (OUTPATIENT)
Dept: PHARMACY | Facility: HOSPITAL | Age: 54
End: 2019-05-07

## 2019-05-07 DIAGNOSIS — Z95.2 HX OF AORTIC VALVE REPLACEMENT, MECHANICAL: ICD-10-CM

## 2019-05-07 LAB — INR PPP: 3.5

## 2019-05-07 NOTE — PROGRESS NOTES
Anticoagulation Clinic - Remote Progress Note  [facility]  Testing frequency: 7 days    Indication: Atrial Fibrillation and Mechanical Aortic Valve; h/o embolic CVA  Referring Provider: milly Mckeon)  Initial Warfarin Start Date: 11/2012  Planned Duration of Therapy: Life  Goal INR: 2.5-3.5  Current Drug Interactions: citalopram, divalproex   CHADS-VASc: 3 (HTN, h/o CVA)    Vit K Diet: patient is eating daily samara lettuce salads  Alcohol: none  Tobacco: none       Anticoagulation Clinic INR History:  Date 5/8 5/23 6/6 6/9 6/13 6/28 7/19 8/10 8/30   Total Weekly Dose 52.5mg 60mg 62.5mg 62.5 mg 77.5 mg 57.5 mg 67.5 mg 70mg 70mg   INR 2.2 2.2 1.2 2.2 2.6 2.3 2.4 3.5 1.2   Notes missed dose more vit K dose increase; increase in Vit K foods  boost missed dose   Increase GLV; protein     Date 9/6 9/18 10/9 11/16 11/27 12/4 12/11 12/25 1/4/18   Total Weekly Dose 67.5 mg 60mg 70mg 70mg 70mg 70mg 75mg 75mg 70mg   INR 3.2 1.8 3.4 1.6 1.9 2.5 3.2 2.7 2.0   Notes boost + 1 held dose Missed dose + significant alcohol consumption on 9/16  enox enox    boost     Date 1/15 1/25 2/15 2/23 3/2 3/6 3/15 3/20 3/27 4/4   Total Weekly Dose 75mg 75mg 65mg 75mg 75mg 90mg 65mg 90mg 80mg 75mg   INR 2.9 2.5 2.1 3.3 1.4 2.0, 1.78 (ED) 2.4 3.2 3.5 2.4   Notes   missed   enox; boost; levaquin held x1        Date 4/14 4/20 5/1 5/16 5/27 6/7 6/15 6/22 6/25 7/4   Total Weekly Dose 80mg 80mg 90mg 90mg  (ED) 90mg 75mg 95mg 95mg 107.5mg ???   INR 2.3 2.0 3.2 2.9 3.2 4.1 3.1 1.3 2.0 2.7   Notes     levoflox missed; levoflox levoflox levoflox; refuse lovenox       Date 7/31 8/27 8/31 9/4 9/11 9/27 10/2 10/5 10/15   Total Weekly Dose 105mg  ??? 65mg  ??? 95mg 100mg 80mg  ??? 70mg 90mg 100mg 100mg   INR 2.7 1.3 2.6 5.6 1.6 1.6 2.3 2.4 2.8   Notes self adj self adj; miss; enox   incr GLV, mis-dosing, refuse enox incorrect doses        Date 10/26 11/5 11/7 11/15 12/7 1/11/19 1/24/19 2/8 2/18 3/4 3/26 4/23   Total Weekly Dose 100mg   75mg 105mg 100mg 100mg 100mg 100mg 105mg 100mg 100mg 0mg   INR 4.6 7.9 1.9 5.5, 5.7 2.8 2.4 3.9 2.7 3.6 3.2 4.8 1.14 ED   Notes  recalled test strips clinic; 2 dose hold clinic    LVM  clinic HM 1.9 HM     Date 5/7              Total Weekly Dose ? Not able to confirm              INR 3.5              Notes s/p surgery; 2x boost                Phone Interview:  Verbal Release Authorization signed on 11/7/18 -- may speak with Zuleima Alberto (patient's wife)  [phone]  Tablet Strength: 5mg tablets   Patient Contact: [phone] -- try to call in PM    Unable to LVM, mailbox full 5/7@[phone]  [facility] 5/8@[phone]  5/9/2019: have left voicemail for Mr. Alberto as discussed in the plan below.    Plan:   1. INR is therapeutic and back WNL, though it is at the upper limit of his INR goal range. Patient was non-compliant with follow-up post-eyelid surgery and is >1 week overdue. He has a history of being noncompliant and has verbalized the risks associated with SUB and SUPRatherapeutic INR results several times. Over voicemail, have instructed patient to continue most recent maintenance dose of warfarin 15mg daily except for 10mg on Monday.   2. Repeat INR in one week from last test on 5/14.  3. Mitchell Alberto understands the importance of calling the [facility] if he notices any s/sx of bleeding, stroke, or abnormal bruising, if any changes are made to his medications or medication doses (Rx, OTC, herbal), or if any upcoming procedures are scheduled. Mitchell Alberto will likewise let us know if any other changes, questions, or concerns arise regarding anticoagulation therapy. Mitchell Alberto voiced understanding of this information and confirms that he has the [facility]'s contact information.     Sam Burleson, [facility]- Left voicemails x 2days  Do Vargas, PharmD  5/7/2019  4:31 PM

## 2019-05-16 ENCOUNTER — HOSPITAL ENCOUNTER (EMERGENCY)
Facility: HOSPITAL | Age: 54
Discharge: HOME | End: 2019-05-16

## 2019-05-16 VITALS
BODY MASS INDEX: 33.06 KG/M2 | TEMPERATURE: 97.9 F | SYSTOLIC BLOOD PRESSURE: 185 MMHG | WEIGHT: 280 LBS | OXYGEN SATURATION: 93 % | RESPIRATION RATE: 18 BRPM | HEIGHT: 77 IN | HEART RATE: 66 BPM | DIASTOLIC BLOOD PRESSURE: 91 MMHG

## 2019-06-03 LAB — INR PPP: 4.9

## 2019-06-04 ENCOUNTER — ANTICOAGULATION VISIT (OUTPATIENT)
Dept: PHARMACY | Facility: HOSPITAL | Age: 54
End: 2019-06-04

## 2019-06-04 DIAGNOSIS — Z95.2 HX OF AORTIC VALVE REPLACEMENT, MECHANICAL: ICD-10-CM

## 2019-06-04 NOTE — PROGRESS NOTES
Anticoagulation Clinic - Remote Progress Note  [facility]  Testing frequency: 7 days    Indication: Atrial Fibrillation and Mechanical Aortic Valve; h/o embolic CVA  Referring Provider: Bunny Spence, milly)  Initial Warfarin Start Date: 11/2012  Planned Duration of Therapy: Life  Goal INR: 2.5-3.5  Current Drug Interactions: citalopram, divalproex   CHADS-VASc: 3 (HTN, h/o CVA)    Vit K Diet: patient is eating daily samara lettuce salads  Alcohol: none  Tobacco: none       Anticoagulation Clinic INR History:  Date 5/8 5/23 6/6 6/9 6/13 6/28 7/19 8/10 8/30   Total Weekly Dose 52.5mg 60mg 62.5mg 62.5 mg 77.5 mg 57.5 mg 67.5 mg 70mg 70mg   INR 2.2 2.2 1.2 2.2 2.6 2.3 2.4 3.5 1.2   Notes missed dose more vit K dose increase; increase in Vit K foods  boost missed dose   Increase GLV; protein     Date 9/6 9/18 10/9 11/16 11/27 12/4 12/11 12/25 1/4/18   Total Weekly Dose 67.5 mg 60mg 70mg 70mg 70mg 70mg 75mg 75mg 70mg   INR 3.2 1.8 3.4 1.6 1.9 2.5 3.2 2.7 2.0   Notes boost + 1 held dose Missed dose + significant alcohol consumption on 9/16  enox enox    boost     Date 1/15 1/25 2/15 2/23 3/2 3/6 3/15 3/20 3/27 4/4   Total Weekly Dose 75mg 75mg 65mg 75mg 75mg 90mg 65mg 90mg 80mg 75mg   INR 2.9 2.5 2.1 3.3 1.4 2.0, 1.78 (ED) 2.4 3.2 3.5 2.4   Notes   missed   enox; boost; levaquin held x1        Date 4/14 4/20 5/1 5/16 5/27 6/7 6/15 6/22 6/25 7/4   Total Weekly Dose 80mg 80mg 90mg 90mg  (ED) 90mg 75mg 95mg 95mg 107.5mg ???   INR 2.3 2.0 3.2 2.9 3.2 4.1 3.1 1.3 2.0 2.7   Notes     levoflox missed; levoflox levoflox levoflox; refuse lovenox       Date 7/31 8/27 8/31 9/4 9/11 9/27 10/2 10/5 10/15   Total Weekly Dose 105mg  ??? 65mg  ??? 95mg 100mg 80mg  ??? 70mg 90mg 100mg 100mg   INR 2.7 1.3 2.6 5.6 1.6 1.6 2.3 2.4 2.8   Notes self adj self adj; miss; enox   incr GLV, mis-dosing, refuse enox incorrect doses        Date 10/26 11/5 11/7 11/15 12/7 1/11/19 1/24/19 2/8 2/18 3/4 3/26 4/23   Total Weekly Dose 100mg   75mg 105mg 100mg 100mg 100mg 100mg 105mg 100mg 100mg 0mg   INR 4.6 7.9 1.9 5.5, 5.7 2.8 2.4 3.9 2.7 3.6 3.2 4.8 1.14 ED   Notes  recalled test strips clinic; 2 dose hold clinic    LVM  clinic HM 1.9 HM     Date 5/7 6/3             Total Weekly Dose ? Not able to confirm 75mg?             INR 3.5 4.9             Notes s/p surgery; 2x boost                Phone Interview:  Verbal Release Authorization signed on 11/7/18 -- may speak with Zuleima Alberto (patient's wife)  [phone]  Tablet Strength: 5mg tablets   Patient Contact: [phone] -- try to call in PM    Patient Findings   Positives:  Change in diet/appetite, Bruising   Negatives:  Signs/symptoms of thrombosis, Signs/symptoms of bleeding, Laboratory test error suspected, Change in health, Change in alcohol use, Change in activity, Upcoming invasive procedure, Emergency department visit, Upcoming dental procedure, Missed doses, Extra doses, Change in medications, Hospital admission, Other complaints   Comments:  Mr. Alberto reports bruising all over but no s/sx of bleeding. He has not had very much GLV intake - reports broccoli in [employer] recently and has eaten iceberg salads ~1-2x/week. Reports no changes in medications. He states he has been taking warfarin 10mg daily except 15mg on Mondays; however, we have his usual maintenance dose as warfarin 15mg daily except 10mg on Mondays. He was also in a hurry to get off of the phone.     Plan:   1. INR was supratherapeutic yesterday at 4.9. Patient was non-compliant with retesting his INR. He has a history of being noncompliant and has verbalized the risks associated with SUB and SUPRAtherapeutic INR results several times. For now, instructed patient to decrease his dose tonight and tomorrow to warfarin 7.5mg then take 10mg on Thursday.  2. Repeat INR on Friday 6/7.  3. Verbal information provided over the phone. Mitchell Alberto expresses understanding by teach back, RBV dosing instructions, and has  no further questions at this time.  4. Mitchell Alberto understands the importance of calling the [facility] if he notices any s/sx of bleeding, stroke, or abnormal bruising, if any changes are made to his medications or medication doses (Rx, OTC, herbal), or if any upcoming procedures are scheduled. Mitchell Alberto will likewise let us know if any other changes, questions, or concerns arise regarding anticoagulation therapy. Mitchell Alberto voiced understanding of this information and confirms that he has the [facility]'s contact information.     Rd Angel, PharmD, BCPS  6/4/2019  11:49 AM

## 2019-06-05 ENCOUNTER — HOSPITAL ENCOUNTER (EMERGENCY)
Facility: HOSPITAL | Age: 54
Discharge: HOME | End: 2019-06-05
Attending: EMERGENCY MEDICINE | Admitting: EMERGENCY MEDICINE

## 2019-06-05 ENCOUNTER — APPOINTMENT (OUTPATIENT)
Dept: ULTRASOUND IMAGING | Facility: HOSPITAL | Age: 54
End: 2019-06-05

## 2019-06-05 ENCOUNTER — APPOINTMENT (OUTPATIENT)
Dept: CT IMAGING | Facility: HOSPITAL | Age: 54
End: 2019-06-05

## 2019-06-05 VITALS
SYSTOLIC BLOOD PRESSURE: 191 MMHG | WEIGHT: 290 LBS | HEIGHT: 77 IN | HEART RATE: 61 BPM | RESPIRATION RATE: 16 BRPM | DIASTOLIC BLOOD PRESSURE: 91 MMHG | OXYGEN SATURATION: 98 % | BODY MASS INDEX: 34.24 KG/M2 | TEMPERATURE: 98.3 F

## 2019-06-05 DIAGNOSIS — N45.1 EPIDIDYMITIS: Primary | ICD-10-CM

## 2019-06-05 DIAGNOSIS — S76.212A INGUINAL STRAIN, LEFT, INITIAL ENCOUNTER: ICD-10-CM

## 2019-06-05 LAB
BILIRUB UR QL STRIP: NEGATIVE
CLARITY UR: CLEAR
COLOR UR: YELLOW
GLUCOSE UR STRIP-MCNC: NEGATIVE MG/DL
HGB UR QL STRIP.AUTO: NEGATIVE
INR PPP: 2.12 (ref 0.85–1.16)
KETONES UR QL STRIP: ABNORMAL
LEUKOCYTE ESTERASE UR QL STRIP.AUTO: NEGATIVE
NITRITE UR QL STRIP: NEGATIVE
PH UR STRIP.AUTO: 6.5 [PH] (ref 5–8)
PROT UR QL STRIP: ABNORMAL
PROTHROMBIN TIME: 22.8 SECONDS (ref 11.2–14.3)
SP GR UR STRIP: 1.02 (ref 1–1.03)
UROBILINOGEN UR QL STRIP: ABNORMAL

## 2019-06-05 PROCEDURE — 99283 EMERGENCY DEPT VISIT LOW MDM: CPT

## 2019-06-05 PROCEDURE — 93976 VASCULAR STUDY: CPT

## 2019-06-05 PROCEDURE — 81003 URINALYSIS AUTO W/O SCOPE: CPT | Performed by: EMERGENCY MEDICINE

## 2019-06-05 PROCEDURE — 72192 CT PELVIS W/O DYE: CPT

## 2019-06-05 PROCEDURE — 96372 THER/PROPH/DIAG INJ SC/IM: CPT

## 2019-06-05 PROCEDURE — 25010000002 CEFTRIAXONE PER 250 MG: Performed by: EMERGENCY MEDICINE

## 2019-06-05 PROCEDURE — 85610 PROTHROMBIN TIME: CPT | Performed by: EMERGENCY MEDICINE

## 2019-06-05 PROCEDURE — 76870 US EXAM SCROTUM: CPT

## 2019-06-05 RX ORDER — DOXYCYCLINE 100 MG/1
100 CAPSULE ORAL ONCE
Status: COMPLETED | OUTPATIENT
Start: 2019-06-05 | End: 2019-06-05

## 2019-06-05 RX ORDER — DOXYCYCLINE 100 MG/1
100 CAPSULE ORAL 2 TIMES DAILY
Qty: 28 CAPSULE | Refills: 0 | Status: SHIPPED | OUTPATIENT
Start: 2019-06-05 | End: 2019-06-19

## 2019-06-05 RX ORDER — HYDROCODONE BITARTRATE AND ACETAMINOPHEN 7.5; 325 MG/1; MG/1
1 TABLET ORAL ONCE
Status: COMPLETED | OUTPATIENT
Start: 2019-06-05 | End: 2019-06-05

## 2019-06-05 RX ORDER — LIDOCAINE HYDROCHLORIDE 10 MG/ML
1 INJECTION, SOLUTION EPIDURAL; INFILTRATION; INTRACAUDAL; PERINEURAL ONCE
Status: COMPLETED | OUTPATIENT
Start: 2019-06-05 | End: 2019-06-05

## 2019-06-05 RX ORDER — CYCLOBENZAPRINE HCL 10 MG
10 TABLET ORAL 3 TIMES DAILY PRN
Qty: 15 TABLET | Refills: 0 | Status: SHIPPED | OUTPATIENT
Start: 2019-06-05 | End: 2020-02-21

## 2019-06-05 RX ORDER — CEFTRIAXONE 500 MG/1
500 INJECTION, POWDER, FOR SOLUTION INTRAMUSCULAR; INTRAVENOUS ONCE
Status: COMPLETED | OUTPATIENT
Start: 2019-06-05 | End: 2019-06-05

## 2019-06-05 RX ADMIN — DOXYCYCLINE 100 MG: 100 CAPSULE ORAL at 20:54

## 2019-06-05 RX ADMIN — CEFTRIAXONE SODIUM 500 MG: 500 INJECTION, POWDER, FOR SOLUTION INTRAMUSCULAR; INTRAVENOUS at 20:55

## 2019-06-05 RX ADMIN — HYDROCODONE BITARTRATE AND ACETAMINOPHEN 1 TABLET: 7.5; 325 TABLET ORAL at 18:40

## 2019-06-05 RX ADMIN — LIDOCAINE HYDROCHLORIDE 1 ML: 10 INJECTION, SOLUTION EPIDURAL; INFILTRATION; INTRACAUDAL; PERINEURAL at 20:56

## 2019-06-05 NOTE — ED PROVIDER NOTES
Subjective   53-year-old white male complain of left testicular pain and left inguinal pain for the past 2 weeks.  Patient states that he has had hydroceles in the past and states that both testicles have been hurting but is left worse than that of the right.  Patient also states that he has had some bruising in the left inguinal area and attributes this to his INR which was last checked at 4.8 yesterday.  He was told to hold his Coumadin level.  Patient states that he does have pain in the left lower inguinal pelvic region in addition to his testicles.  He denies any trauma, fever, urinary complaints, or other concerns.        History provided by:  Patient  Male  Problem   Presenting symptoms: scrotal pain and swelling    Relieved by:  Nothing  Worsened by:  Nothing  Ineffective treatments:  None tried  Associated symptoms: groin pain and scrotal swelling    Associated symptoms: no fever and no flank pain        Review of Systems   Constitutional: Negative for fever.   Genitourinary: Positive for scrotal swelling. Negative for flank pain.   All other systems reviewed and are negative.      Past Medical History:   Diagnosis Date   • Abnormal weight loss    • Anxiety    • Aorta aneurysm (CMS/HCC)    • Bacterial meningitis     as child   • Bipolar I disorder, single manic episode (CMS/HCC)    • Congestive heart failure (CMS/HCC)    • Depression    • Obesity    • Shortness of breath    • Stroke (CMS/HCC)     Embolic CVA with left upper extremity weakness, November 2012, data deficit: a. Residual left upper extremity weakness.     • Transient cerebral ischemia        Allergies   Allergen Reactions   • Meclizine Hcl Itching       Past Surgical History:   Procedure Laterality Date   • ABDOMINAL AORTIC ANEURYSM REPAIR      History of Aortic Aneurysm Repair   • AORTIC VALVE REPAIR/REPLACEMENT      Replacement   • BACK SURGERY     • HERNIA REPAIR  08/2014    Left inguinal herniorrhaphy   • SKIN CANCER EXCISION  08/2014     Left forehead melanoma excision, Dr. Gisell Kothari, August 2014.        Family History   Problem Relation Age of Onset   • Aortic aneurysm Mother    • Arthritis Mother    • Heart disease Mother    • Heart attack Mother    • Hypertension Mother    • Arthritis Father    • Heart disease Father    • Melanoma Father    • Hypertension Father    • Depression Other    • Hypertension Sister    • Heart disease Brother    • Hypertension Brother    • Hypertension Brother        Social History     Socioeconomic History   • Marital status:      Spouse name: Not on file   • Number of children: Not on file   • Years of education: Not on file   • Highest education level: Not on file   Occupational History   • Occupation: Disabled   Tobacco Use   • Smoking status: Never Smoker   • Smokeless tobacco: Never Used   Substance and Sexual Activity   • Alcohol use: Yes     Comment: very occasional   • Drug use: No   Social History Narrative    Patient drinks 6-8 caffeine servings per day.           Objective   Physical Exam   Constitutional: He appears well-developed and well-nourished.   HENT:   Head: Normocephalic and atraumatic.   Eyes: Conjunctivae are normal.   Neck: Neck supple.   Pulmonary/Chest: Effort normal.   Genitourinary:   Genitourinary Comments: Genitals: Testes down x2.  There is tenderness to the left testicle.  Left inguinal region reveals a area of bruising and tenderness along the inguinal/iliac path.  Abdomen otherwise nontender.   Neurological: He is alert.   Skin: Capillary refill takes less than 2 seconds.   Psychiatric: He has a normal mood and affect. His behavior is normal.   Nursing note and vitals reviewed.      Procedures           ED Course  ED Course as of Jun 05 2055 Wed Jun 05, 2019 2049 Patient with no other complaints.  He agrees with the plan in place.  [JI]      ED Course User Index  [JI] Yo Armas PA          Recent Results (from the past 24 hour(s))   Urinalysis With Microscopic If  Indicated (No Culture) - Urine, Clean Catch    Collection Time: 06/05/19  6:41 PM   Result Value Ref Range    Color, UA Yellow Yellow, Straw    Appearance, UA Clear Clear    pH, UA 6.5 5.0 - 8.0    Specific Gravity, UA 1.020 1.005 - 1.030    Glucose, UA Negative Negative    Ketones, UA Trace (A) Negative    Bilirubin, UA Negative Negative    Blood, UA Negative Negative    Protein, UA Trace (A) Negative    Leuk Esterase, UA Negative Negative    Nitrite, UA Negative Negative    Urobilinogen, UA 1.0 E.U./dL 0.2 - 1.0 E.U./dL   Protime-INR    Collection Time: 06/05/19  7:48 PM   Result Value Ref Range    Protime 22.8 (H) 11.2 - 14.3 Seconds    INR 2.12 (H) 0.85 - 1.16     Note: In addition to lab results from this visit, the labs listed above may include labs taken at another facility or during a different encounter within the last 24 hours. Please correlate lab times with ED admission and discharge times for further clarification of the services performed during this visit.    CT Pelvis Without Contrast   Preliminary Result   Right and left hydroceles as described. Enlarged prostate   and scattered small sigmoid diverticula. No evidence of acute   inflammatory process, inguinal hernia or other acute disease is seen.       DICTATED:   06/05/2019   EDITED/ls :   06/05/2019               US Testicular or Ovarian Vascular Limited   Preliminary Result   1. Persistent bilateral hydroceles right greater than left, as on   previous exam. 2. Normal appearance of the right and left testicles and   right epididymis.   3. Small left-sided spermatoceles.   4. Mildly hyperemic appearance of the left testicle and epididymis   perhaps mild epididymoorchitis.        DICTATED:   06/05/2019   EDITED/ls :   06/05/2019           US Scrotum & Testicles    (Results Pending)     Vitals:    06/05/19 1643 06/05/19 2038   BP: 172/91 (!) 191/91   BP Location: Left arm Right arm   Patient Position: Sitting Sitting   Pulse: 68 61   Resp: 16 16  "  Temp: 98.3 °F (36.8 °C)    TempSrc: Oral    SpO2: 94% 98%   Weight: 132 kg (290 lb)    Height: 195.6 cm (77\")      Medications   cefTRIAXone (ROCEPHIN) injection 500 mg (not administered)   lidocaine PF 1% (XYLOCAINE) injection 1 mL (not administered)   HYDROcodone-acetaminophen (NORCO) 7.5-325 MG per tablet 1 tablet (1 tablet Oral Given 6/5/19 1840)   doxycycline (MONODOX) capsule 100 mg (100 mg Oral Given 6/5/19 2054)     ECG/EMG Results (last 24 hours)     ** No results found for the last 24 hours. **        No orders to display               MDM      Final diagnoses:   Epididymitis   Inguinal strain, left, initial encounter            Yo Armas PA  06/05/19 2056    "

## 2019-06-06 NOTE — DISCHARGE INSTRUCTIONS
Use supportive briefs style underwear.  Return if high fever, or pain worsens.  Use Tylenol as needed for pain.

## 2019-06-13 RX ORDER — WARFARIN SODIUM 5 MG/1
TABLET ORAL
Qty: 30 TABLET | Refills: 0 | OUTPATIENT
Start: 2019-06-13

## 2019-06-13 NOTE — TELEPHONE ENCOUNTER
"Mr. Alberto called after hours this afternoon -- he does not need warfarin refills but is upset to be deemed \"non-compliant\" because he needs to get in to see Dr. Hollis and cannot get any earlier appt. Recommended contacting the [facility] -- will mention this again tomorrow. Explained that he has been non-compliant with INR testing -- he was instructed to test again last Friday and we did not receive a result. He tells me that his INR was 1.2 at that time, and he wanted to wait to get a \"better reading\" before reporting to us. He denies testing issues or missed doses of warfarin. His partner, Bereket, is is very poor health status, so he has been very stressed helping to care for her.     He also notes frustration with his epididymitis / hydroceles, for which he was prescribed doxy in the ED last week. He reports he could not afford to get this abx, but he will pick it up today because he is in so much pain. Advised him of DDI with warfarin. He complains of significant bruising, which may worsen with doxy.    He also promises to test his INR this evening, so that we can follow up tomorrow during business hours.    Ann Cowden Mayer, PharmD  6/13/2019  4:49 PM  "

## 2019-06-14 ENCOUNTER — TELEPHONE (OUTPATIENT)
Dept: PHARMACY | Facility: HOSPITAL | Age: 54
End: 2019-06-14

## 2019-06-17 ENCOUNTER — ANTICOAGULATION VISIT (OUTPATIENT)
Dept: PHARMACY | Facility: HOSPITAL | Age: 54
End: 2019-06-17

## 2019-06-17 DIAGNOSIS — Z95.2 HX OF AORTIC VALVE REPLACEMENT, MECHANICAL: ICD-10-CM

## 2019-06-17 LAB — INR PPP: 2.5

## 2019-06-17 NOTE — PROGRESS NOTES
Anticoagulation Clinic - Remote Progress Note  [facility]  Testing frequency: 7 days    Indication: Atrial Fibrillation and Mechanical Aortic Valve; h/o embolic CVA  Referring Provider: Bunny Spence, milly)  Initial Warfarin Start Date: 11/2012  Planned Duration of Therapy: Life  Goal INR: 2.5-3.5  Current Drug Interactions: citalopram, divalproex   CHADS-VASc: 3 (HTN, h/o CVA)    Vit K Diet: patient is eating daily samara lettuce salads  Alcohol: none  Tobacco: none       Anticoagulation Clinic INR History:  Date 5/8 5/23 6/6 6/9 6/13 6/28 7/19 8/10 8/30   Total Weekly Dose 52.5mg 60mg 62.5mg 62.5 mg 77.5 mg 57.5 mg 67.5 mg 70mg 70mg   INR 2.2 2.2 1.2 2.2 2.6 2.3 2.4 3.5 1.2   Notes missed dose more vit K dose increase; increase in Vit K foods  boost missed dose   Increase GLV; protein     Date 9/6 9/18 10/9 11/16 11/27 12/4 12/11 12/25 1/4/18   Total Weekly Dose 67.5 mg 60mg 70mg 70mg 70mg 70mg 75mg 75mg 70mg   INR 3.2 1.8 3.4 1.6 1.9 2.5 3.2 2.7 2.0   Notes boost + 1 held dose Missed dose + significant alcohol consumption on 9/16  enox enox    boost     Date 1/15 1/25 2/15 2/23 3/2 3/6 3/15 3/20 3/27 4/4   Total Weekly Dose 75mg 75mg 65mg 75mg 75mg 90mg 65mg 90mg 80mg 75mg   INR 2.9 2.5 2.1 3.3 1.4 2.0, 1.78 (ED) 2.4 3.2 3.5 2.4   Notes   missed   enox; boost; levaquin held x1        Date 4/14 4/20 5/1 5/16 5/27 6/7 6/15 6/22 6/25 7/4   Total Weekly Dose 80mg 80mg 90mg 90mg  (ED) 90mg 75mg 95mg 95mg 107.5mg ???   INR 2.3 2.0 3.2 2.9 3.2 4.1 3.1 1.3 2.0 2.7   Notes     levoflox missed; levoflox levoflox levoflox; refuse lovenox       Date 7/31 8/27 8/31 9/4 9/11 9/27 10/2 10/5 10/15   Total Weekly Dose 105mg  ??? 65mg  ??? 95mg 100mg 80mg  ??? 70mg 90mg 100mg 100mg   INR 2.7 1.3 2.6 5.6 1.6 1.6 2.3 2.4 2.8   Notes self adj self adj; miss; enox   incr GLV, mis-dosing, refuse enox incorrect doses        Date 10/26 11/5 11/7 11/15 12/7 1/11/19 1/24/19 2/8 2/18 3/4 3/26 4/23   Total Weekly Dose 100mg  " 75mg 105mg 100mg 100mg 100mg 100mg 105mg 100mg 100mg 0mg   INR 4.6 7.9 1.9 5.5, 5.7 2.8 2.4 3.9 2.7 3.6 3.2 4.8 1.14 ED, 1.9 HM   Notes  recalled test strips clinic; 2 dose hold clinic    [facility]  clinic       Date 5/7 6/3 6/17            Total Weekly Dose not able to confirm 75mg  ??? 95mg            INR 3.5 4.9 2.5            Notes s/p surgery; 2x boost  doxy start 6/13 doxy             Phone Interview:  Verbal Release Authorization signed on 11/7/18 -- may speak with Zuleima Alberto (patient's wife)  [phone]  Tablet Strength: 5mg tablets   Patient Contact: [phone] -- try to call in PM    Patient Findings   Positives:  Change in medications, Bruising   Negatives:  Signs/symptoms of thrombosis, Signs/symptoms of bleeding, Laboratory test error suspected, Change in health, Change in alcohol use, Change in activity, Upcoming invasive procedure, Emergency department visit, Upcoming dental procedure, Missed doses, Extra doses, Change in diet/appetite, Hospital admission, Other complaints   Comments:  Mr. Alberto started doxy (late) last week and will continue this course x14 days. He notes \"terrible\" bruising that is starting to get better.     Plan:   1. INR is therapeutic today -- again, overdue. Mr. Alberto reports he has been taking warfarin 15mg daily except 10mg MonThurs, so recommend that he continue this dose.   2. Repeat INR on Thursday, one week post-abx initiation.   3. Verbal information provided over the phone. Mitchell Alberto expresses understanding by teach back, RBV dosing instructions, and has no further questions at this time.  4. Mitchell Alberto understands the importance of calling the [facility]ticoagulation [facility] if he notices any s/sx of bleeding, stroke, or abnormal bruising, if any changes are made to his medications or medication doses (Rx, OTC, herbal), or if any upcoming procedures are scheduled. Mitchell Alberto will likewise let us know if any other changes, questions, or concerns arise " regarding anticoagulation therapy. Mitchell Alberto voiced understanding of this information and confirms that he has the [facility]'s contact information.     Liane Diaz, PharmD  6/17/2019  2:11 PM

## 2019-06-24 ENCOUNTER — ANTICOAGULATION VISIT (OUTPATIENT)
Dept: PHARMACY | Facility: HOSPITAL | Age: 54
End: 2019-06-24

## 2019-06-24 DIAGNOSIS — Z95.2 HX OF AORTIC VALVE REPLACEMENT, MECHANICAL: ICD-10-CM

## 2019-06-24 LAB — INR PPP: 2.3

## 2019-06-24 RX ORDER — WARFARIN SODIUM 5 MG/1
TABLET ORAL
Qty: 100 TABLET | Refills: 0 | Status: SHIPPED | OUTPATIENT
Start: 2019-06-24 | End: 2019-07-27 | Stop reason: SDUPTHER

## 2019-06-24 NOTE — PROGRESS NOTES
Anticoagulation Clinic - Remote Progress Note  [facility]  Testing frequency: 7 days    Indication: Atrial Fibrillation and Mechanical Aortic Valve; h/o embolic CVA  Referring Provider: Bunny Spence, milly)  Initial Warfarin Start Date: 11/2012  Planned Duration of Therapy: Life  Goal INR: 2.5-3.5  Current Drug Interactions: citalopram, divalproex   CHADS-VASc: 3 (HTN, h/o CVA)    Vit K Diet: patient is eating daily samara lettuce salads  Alcohol: none  Tobacco: none       Anticoagulation Clinic INR History:  Date 5/8 5/23 6/6 6/9 6/13 6/28 7/19 8/10 8/30   Total Weekly Dose 52.5mg 60mg 62.5mg 62.5 mg 77.5 mg 57.5 mg 67.5 mg 70mg 70mg   INR 2.2 2.2 1.2 2.2 2.6 2.3 2.4 3.5 1.2   Notes missed dose more vit K dose increase; increase in Vit K foods  boost missed dose   Increase GLV; protein     Date 9/6 9/18 10/9 11/16 11/27 12/4 12/11 12/25 1/4/18   Total Weekly Dose 67.5 mg 60mg 70mg 70mg 70mg 70mg 75mg 75mg 70mg   INR 3.2 1.8 3.4 1.6 1.9 2.5 3.2 2.7 2.0   Notes boost + 1 held dose Missed dose + significant alcohol consumption on 9/16  enox enox    boost     Date 1/15 1/25 2/15 2/23 3/2 3/6 3/15 3/20 3/27 4/4   Total Weekly Dose 75mg 75mg 65mg 75mg 75mg 90mg 65mg 90mg 80mg 75mg   INR 2.9 2.5 2.1 3.3 1.4 2.0, 1.78 (ED) 2.4 3.2 3.5 2.4   Notes   missed   enox; boost; levaquin held x1        Date 4/14 4/20 5/1 5/16 5/27 6/7 6/15 6/22 6/25 7/4   Total Weekly Dose 80mg 80mg 90mg 90mg  (ED) 90mg 75mg 95mg 95mg 107.5mg ???   INR 2.3 2.0 3.2 2.9 3.2 4.1 3.1 1.3 2.0 2.7   Notes     levoflox missed; levoflox levoflox levoflox; refuse lovenox       Date 7/31 8/27 8/31 9/4 9/11 9/27 10/2 10/5 10/15   Total Weekly Dose 105mg  ??? 65mg  ??? 95mg 100mg 80mg  ??? 70mg 90mg 100mg 100mg   INR 2.7 1.3 2.6 5.6 1.6 1.6 2.3 2.4 2.8   Notes self adj self adj; miss; enox   incr GLV, mis-dosing, refuse enox incorrect doses        Date 10/26 11/5 11/7 11/15 12/7 1/11/19 1/24/19 2/8 2/18 3/4 3/26 4/23   Total Weekly Dose 100mg  " 75mg 105mg 100mg 100mg 100mg 100mg 105mg 100mg 100mg 0mg   INR 4.6 7.9 1.9 5.5, 5.7 2.8 2.4 3.9 2.7 3.6 3.2 4.8 1.14 ED, 1.9 HM   Notes  recalled test strips clinic; 2 dose hold clinic    [facility]VM  clinic       Date 5/7 6/3 6/17 6/24           Total Weekly Dose not able to confirm 75mg  ??? 95mg 80 mg           INR 3.5 4.9 2.5 2.3           Notes s/p surgery; 2x boost  doxy start 6/13 Doxy; 1 miss             Phone Interview:  Verbal Release Authorization signed on 11/7/18 -- may speak with Zuleima Alberto (patient's wife)  [phone]  Tablet Strength: 5mg tablets   Patient Contact: [phone] -- try to call in PM    Patient Findings   Positives:  Change in medications, Bruising   Negatives:  Signs/symptoms of thrombosis, Signs/symptoms of bleeding, Laboratory test error suspected, Change in health, Change in alcohol use, Change in activity, Upcoming invasive procedure, Emergency department visit, Upcoming dental procedure, Missed doses, Extra doses, Change in diet/appetite, Hospital admission, Other complaints   Comments:  Mr. Alberto started doxy (late) last week and will continue this course x14 days. He notes \"terrible\" bruising that is starting to get better.     Plan:   1. INR is subtherapeutic today -- at 2.2 after a missed dose on Saturday 6/22. Recommend Mr. Alberto to take warfarin 15 mg today and then resume warfarin 15mg daily except 10mg MonThurs.   2. Repeat INR on Monday 7/1.    3. Verbal information provided over the phone. Mitchell Alberto expresses understanding by teach back, RBV dosing instructions, and has no further questions at this time.  4. Mitchell Alberto understands the importance of calling the [facility] Anticoagulation Clinic if he notices any s/sx of bleeding, stroke, or abnormal bruising, if any changes are made to his medications or medication doses (Rx, OTC, herbal), or if any upcoming procedures are scheduled. Mitchell HICKS Remy will likewise let us know if any other changes, questions, or " concerns arise regarding anticoagulation therapy. Mitchell Alberto voiced understanding of this information and confirms that he has the [facility]'s contact information.   5. He requests a refill to [facility] on [address] (sent 100#)    Sam Zaldivar,  PharmD  6/24/2019  3:44 PM

## 2019-06-25 RX ORDER — WARFARIN SODIUM 5 MG/1
TABLET ORAL
Qty: 30 TABLET | Refills: 0 | OUTPATIENT
Start: 2019-06-25

## 2019-07-11 LAB — INR PPP: 5.4

## 2019-07-12 ENCOUNTER — ANTICOAGULATION VISIT (OUTPATIENT)
Dept: PHARMACY | Facility: HOSPITAL | Age: 54
End: 2019-07-12

## 2019-07-12 DIAGNOSIS — Z95.2 HX OF AORTIC VALVE REPLACEMENT, MECHANICAL: ICD-10-CM

## 2019-07-12 NOTE — PROGRESS NOTES
Anticoagulation Clinic - Remote Progress Note  [facility]  Testing frequency: 7 days    Indication: Atrial Fibrillation and Mechanical Aortic Valve; h/o embolic CVA  Referring Provider: Bunny Spence, milly)  Initial Warfarin Start Date: 11/2012  Planned Duration of Therapy: Life  Goal INR: 2.5-3.5  Current Drug Interactions: citalopram, divalproex   CHADS-VASc: 3 (HTN, h/o CVA)    Vit K Diet: patient is eating daily samara lettuce salads  Alcohol: none  Tobacco: none       Anticoagulation Clinic INR History:  Date 5/8 5/23 6/6 6/9 6/13 6/28 7/19 8/10 8/30   Total Weekly Dose 52.5mg 60mg 62.5mg 62.5 mg 77.5 mg 57.5 mg 67.5 mg 70mg 70mg   INR 2.2 2.2 1.2 2.2 2.6 2.3 2.4 3.5 1.2   Notes missed dose more vit K dose increase; increase in Vit K foods  boost missed dose   Increase GLV; protein     Date 9/6 9/18 10/9 11/16 11/27 12/4 12/11 12/25 1/4/18   Total Weekly Dose 67.5 mg 60mg 70mg 70mg 70mg 70mg 75mg 75mg 70mg   INR 3.2 1.8 3.4 1.6 1.9 2.5 3.2 2.7 2.0   Notes boost + 1 held dose Missed dose + significant alcohol consumption on 9/16  enox enox    boost     Date 1/15 1/25 2/15 2/23 3/2 3/6 3/15 3/20 3/27 4/4   Total Weekly Dose 75mg 75mg 65mg 75mg 75mg 90mg 65mg 90mg 80mg 75mg   INR 2.9 2.5 2.1 3.3 1.4 2.0, 1.78 (ED) 2.4 3.2 3.5 2.4   Notes   missed   enox; boost; levaquin held x1        Date 4/14 4/20 5/1 5/16 5/27 6/7 6/15 6/22 6/25 7/4   Total Weekly Dose 80mg 80mg 90mg 90mg  (ED) 90mg 75mg 95mg 95mg 107.5mg ???   INR 2.3 2.0 3.2 2.9 3.2 4.1 3.1 1.3 2.0 2.7   Notes     levoflox missed; levoflox levoflox levoflox; refuse lovenox       Date 7/31 8/27 8/31 9/4 9/11 9/27 10/2 10/5 10/15   Total Weekly Dose 105mg  ??? 65mg  ??? 95mg 100mg 80mg  ??? 70mg 90mg 100mg 100mg   INR 2.7 1.3 2.6 5.6 1.6 1.6 2.3 2.4 2.8   Notes self adj self adj; miss; enox   incr GLV, mis-dosing, refuse enox incorrect doses        Date 10/26 11/5 11/7 11/15 12/7 1/11/19 1/24/19 2/8 2/18 3/4 3/26 4/23   Total Weekly Dose 100mg   75mg 105mg 100mg 100mg 100mg 100mg 105mg 100mg 100mg 0mg   INR 4.6 7.9 1.9 5.5, 5.7 2.8 2.4 3.9 2.7 3.6 3.2 4.8 1.14 ED, 1.9 HM   Notes  recalled test strips clinic; 2 dose hold clinic    LVM  clinic       Date 5/7 6/3 6/17 6/24 7/11          Total Weekly Dose not able to confirm 75mg  ??? 95mg 80 mg 95mg 85mg         INR 3.5 4.9 2.5 2.3 5.4          Notes s/p surgery; 2x boost  doxy start 6/13 doxy; 1 miss doxy self-held 2 doses           Phone Interview:  Verbal Release Authorization signed on 11/7/18 -- may speak with Zuleima Alberto (patient's wife)  [phone]  Tablet Strength: 5mg tablets   Patient Contact: [phone] -- try to call in PM    Patient Findings:  Positives:  Missed doses, Change in medications   Negatives:  Signs/symptoms of thrombosis, Signs/symptoms of bleeding, Laboratory test error suspected, Change in health, Change in alcohol use, Change in activity, Upcoming invasive procedure, Emergency department visit, Upcoming dental procedure, Extra doses, Change in diet/appetite, Hospital admission, Bruising, Other complaints   Comments:  Mr. Alberto thinks he finished doxy last Wednesday. He denies any additional changes and reports no signs of bleeding or unusual bruising despite elevated INR. He SELF-HELD two doses of warfarin last week.      Plan:   1. INR was supratherapeutic on Thursday -- it was reported late, and we were unable to reach Mr. Alberto on Friday. Called again Monday AM, and he notes he held two doses of warfarin ThursFri.   2. Repeat INR today (Monday 7/15) to reassess.   3. Verbal information provided over the phone. Mitchell Alberto expresses understanding by teach back, RBV dosing instructions, and has no further questions at this time.  4. Please reconcile External Rx History at follow up.     Ann Cowden Mayer,  PharmD  7/12/2019  8:32 AM

## 2019-07-16 ENCOUNTER — TELEPHONE (OUTPATIENT)
Dept: PHARMACY | Facility: HOSPITAL | Age: 54
End: 2019-07-16

## 2019-07-17 LAB — INR PPP: 2

## 2019-07-18 ENCOUNTER — ANTICOAGULATION VISIT (OUTPATIENT)
Dept: PHARMACY | Facility: HOSPITAL | Age: 54
End: 2019-07-18

## 2019-07-18 DIAGNOSIS — Z95.2 HX OF AORTIC VALVE REPLACEMENT, MECHANICAL: ICD-10-CM

## 2019-07-18 NOTE — PROGRESS NOTES
Anticoagulation Clinic - Remote Progress Note  [facility]  Testing frequency: 7 days    Indication: Atrial Fibrillation and Mechanical Aortic Valve; h/o embolic CVA  Referring Provider: Bunny Spence, milly)  Initial Warfarin Start Date: 11/2012  Planned Duration of Therapy: Life  Goal INR: 2.5-3.5  Current Drug Interactions: citalopram, divalproex   CHADS-VASc: 3 (HTN, h/o CVA)    Vit K Diet: patient is eating daily samara lettuce salads  Alcohol: none  Tobacco: none       Anticoagulation Clinic INR History:  Date 5/8 5/23 6/6 6/9 6/13 6/28 7/19 8/10 8/30   Total Weekly Dose 52.5mg 60mg 62.5mg 62.5 mg 77.5 mg 57.5 mg 67.5 mg 70mg 70mg   INR 2.2 2.2 1.2 2.2 2.6 2.3 2.4 3.5 1.2   Notes missed dose more vit K dose increase; increase in Vit K foods  boost missed dose   Increase GLV; protein     Date 9/6 9/18 10/9 11/16 11/27 12/4 12/11 12/25 1/4/18   Total Weekly Dose 67.5 mg 60mg 70mg 70mg 70mg 70mg 75mg 75mg 70mg   INR 3.2 1.8 3.4 1.6 1.9 2.5 3.2 2.7 2.0   Notes boost + 1 held dose Missed dose + significant alcohol consumption on 9/16  enox enox    boost     Date 1/15 1/25 2/15 2/23 3/2 3/6 3/15 3/20 3/27 4/4   Total Weekly Dose 75mg 75mg 65mg 75mg 75mg 90mg 65mg 90mg 80mg 75mg   INR 2.9 2.5 2.1 3.3 1.4 2.0, 1.78 (ED) 2.4 3.2 3.5 2.4   Notes   missed   enox; boost; levaquin held x1        Date 4/14 4/20 5/1 5/16 5/27 6/7 6/15 6/22 6/25 7/4   Total Weekly Dose 80mg 80mg 90mg 90mg  (ED) 90mg 75mg 95mg 95mg 107.5mg ???   INR 2.3 2.0 3.2 2.9 3.2 4.1 3.1 1.3 2.0 2.7   Notes     levoflox missed; levoflox levoflox levoflox; refuse lovenox       Date 7/31 8/27 8/31 9/4 9/11 9/27 10/2 10/5 10/15   Total Weekly Dose 105mg  ??? 65mg  ??? 95mg 100mg 80mg  ??? 70mg 90mg 100mg 100mg   INR 2.7 1.3 2.6 5.6 1.6 1.6 2.3 2.4 2.8   Notes self adj self adj; miss; enox   incr GLV, mis-dosing, refuse enox incorrect doses        Date 10/26 11/5 11/7 11/15 12/7 1/11/19 1/24/19 2/8 2/18 3/4 3/26 4/23   Total Weekly Dose 100mg   75mg 105mg 100mg 100mg 100mg 100mg 105mg 100mg 100mg 0mg   INR 4.6 7.9 1.9 5.5, 5.7 2.8 2.4 3.9 2.7 3.6 3.2 4.8 1.14 ED, 1.9 HM   Notes  recalled test strips clinic; 2 dose hold clinic    LVM  clinic       Date 5/7 6/3 6/17 6/24 7/11 7/18         Total Weekly Dose not able to confirm 75mg  ??? 95mg 80 mg 95mg 75mg 100 mg        INR 3.5 4.9 2.5 2.3 5.4 2         Notes s/p surgery; 2x boost  doxy start 6/13 doxy; 1 miss doxy self-held 2 doses           Phone Interview:  Verbal Release Authorization signed on 11/7/18 -- may speak with Zuleima Alberto (patient's wife)  [phone]  Tablet Strength: 5mg tablets   Patient Contact: [phone] -- try to call in PM    Patient Findings   Negatives:  Signs/symptoms of thrombosis, Signs/symptoms of bleeding, Laboratory test error suspected, Change in health, Change in alcohol use, Change in activity, Upcoming invasive procedure, Emergency department visit, Upcoming dental procedure, Missed doses, Extra doses, Change in medications, Change in diet/appetite, Hospital admission, Bruising, Other complaints   Comments:  Had just spoken with Mr. Alberto on Monday, denies any changes      Plan:   1. INR was SUB therapeutic yesterday evening at 2.  Instructed Mr. Alberto to take warfarin 15 mg oral daily except 10 mg on Saturday until recheck Monday.   2. Repeat INR on Monday, 7/22.   3. Verbal information provided over the phone. Mitchell Alberto expresses understanding by teach back, RBV dosing instructions, and has no further questions at this time.  4. Please reconcile External Rx History at follow up. He was unable to do so today    Dora Pereira, PharmD  7/18/2019  8:17 AM

## 2019-07-22 LAB — INR PPP: 2.2

## 2019-07-23 ENCOUNTER — ANTICOAGULATION VISIT (OUTPATIENT)
Dept: PHARMACY | Facility: HOSPITAL | Age: 54
End: 2019-07-23

## 2019-07-23 DIAGNOSIS — Z95.2 HX OF AORTIC VALVE REPLACEMENT, MECHANICAL: ICD-10-CM

## 2019-07-23 NOTE — PROGRESS NOTES
Anticoagulation Clinic - Remote Progress Note  [facility]  Testing frequency: 7 days    Indication: Atrial Fibrillation and Mechanical Aortic Valve; h/o embolic CVA  Referring Provider: Bunny Spence, milly)  Initial Warfarin Start Date: 11/2012  Planned Duration of Therapy: Life  Goal INR: 2.5-3.5  Current Drug Interactions: citalopram, divalproex   CHADS-VASc: 3 (HTN, h/o CVA)    Vit K Diet: patient is eating daily samara lettuce salads  Alcohol: none  Tobacco: none       Anticoagulation Clinic INR History:  Date 5/8 5/23 6/6 6/9 6/13 6/28 7/19 8/10 8/30   Total Weekly Dose 52.5mg 60mg 62.5mg 62.5 mg 77.5 mg 57.5 mg 67.5 mg 70mg 70mg   INR 2.2 2.2 1.2 2.2 2.6 2.3 2.4 3.5 1.2   Notes missed dose more vit K dose increase; increase in Vit K foods  boost missed dose   Increase GLV; protein     Date 9/6 9/18 10/9 11/16 11/27 12/4 12/11 12/25 1/4/18   Total Weekly Dose 67.5 mg 60mg 70mg 70mg 70mg 70mg 75mg 75mg 70mg   INR 3.2 1.8 3.4 1.6 1.9 2.5 3.2 2.7 2.0   Notes boost + 1 held dose Missed dose + significant alcohol consumption on 9/16  enox enox    boost     Date 1/15 1/25 2/15 2/23 3/2 3/6 3/15 3/20 3/27 4/4   Total Weekly Dose 75mg 75mg 65mg 75mg 75mg 90mg 65mg 90mg 80mg 75mg   INR 2.9 2.5 2.1 3.3 1.4 2.0, 1.78 (ED) 2.4 3.2 3.5 2.4   Notes   missed   enox; boost; levaquin held x1        Date 4/14 4/20 5/1 5/16 5/27 6/7 6/15 6/22 6/25 7/4   Total Weekly Dose 80mg 80mg 90mg 90mg  (ED) 90mg 75mg 95mg 95mg 107.5mg ???   INR 2.3 2.0 3.2 2.9 3.2 4.1 3.1 1.3 2.0 2.7   Notes     levoflox missed; levoflox levoflox levoflox; refuse lovenox       Date 7/31 8/27 8/31 9/4 9/11 9/27 10/2 10/5 10/15   Total Weekly Dose 105mg  ??? 65mg  ??? 95mg 100mg 80mg  ??? 70mg 90mg 100mg 100mg   INR 2.7 1.3 2.6 5.6 1.6 1.6 2.3 2.4 2.8   Notes self adj self adj; miss; enox   incr GLV, mis-dosing, refuse enox incorrect doses        Date 10/26 11/5 11/7 11/15 12/7 1/11/19 1/24/19 2/8 2/18 3/4 3/26 4/23   Total Weekly Dose 100mg   75mg 105mg 100mg 100mg 100mg 100mg 105mg 100mg 100mg 0mg   INR 4.6 7.9 1.9 5.5, 5.7 2.8 2.4 3.9 2.7 3.6 3.2 4.8 1.14 ED, 1.9 HM   Notes  recalled test strips clinic; 2 dose hold clinic    [facility]  clinic       Date 5/7 6/3 6/17 6/24 7/11 7/18 7/22        Total Weekly Dose not able to confirm 75mg  ??? 95mg 80 mg 95mg 75mg 100 mg        INR 3.5 4.9 2.5 2.3 5.4 2 2.2        Notes s/p surgery; 2x boost  doxy start 6/13 doxy; 1 miss doxy self-held 2 doses           Phone Interview:  Verbal Release Authorization signed on 11/7/18 -- may speak with Zuleima Alberto (patient's wife)  [phone]  Tablet Strength: 5mg tablets   Patient Contact: [phone] -- try to call in PM    Patient Findings     Negatives:  Signs/symptoms of thrombosis, Signs/symptoms of bleeding, Laboratory test error suspected, Change in health, Change in alcohol use, Change in activity, Upcoming invasive procedure, Emergency department visit, Upcoming dental procedure, Missed doses, Extra doses, Change in medications, Change in diet/appetite, Hospital admission, Bruising, Other complaints      Plan:   1. INR was SUB therapeutic Monday evening 7/22 at 2.2. Was unable to get in touch with Mr Alberto until Wednesday 7/24. He boosted Monday's dose to 15mg and took 15mg again Tuesday. Instructed him to take 15mg again tonight  2. Repeat INR Thursday per patient preference, may want to adjust maintenance dose to one 10mg per week pending results  3. Verbal information provided over the phone. Mitchell Alberto expresses understanding by teach back, RBV dosing instructions, and has no further questions at this time.  4. Please reconcile External Rx History at follow up. He was unable to do so today    Sonya Bustillos, PharmD.  07/24/19   9:28 AM

## 2019-07-25 LAB — INR PPP: 4.4

## 2019-07-26 ENCOUNTER — ANTICOAGULATION VISIT (OUTPATIENT)
Dept: PHARMACY | Facility: HOSPITAL | Age: 54
End: 2019-07-26

## 2019-07-26 DIAGNOSIS — Z95.2 HX OF AORTIC VALVE REPLACEMENT, MECHANICAL: ICD-10-CM

## 2019-07-27 DIAGNOSIS — Z95.2 HX OF AORTIC VALVE REPLACEMENT, MECHANICAL: ICD-10-CM

## 2019-07-31 RX ORDER — WARFARIN SODIUM 5 MG/1
TABLET ORAL
Qty: 90 TABLET | Refills: 0 | Status: SHIPPED | OUTPATIENT
Start: 2019-07-31 | End: 2019-09-06 | Stop reason: SDUPTHER

## 2019-08-01 ENCOUNTER — TELEPHONE (OUTPATIENT)
Dept: PHARMACY | Facility: HOSPITAL | Age: 54
End: 2019-08-01

## 2019-08-05 ENCOUNTER — ANTICOAGULATION VISIT (OUTPATIENT)
Dept: PHARMACY | Facility: HOSPITAL | Age: 54
End: 2019-08-05

## 2019-08-05 DIAGNOSIS — Z95.2 HX OF AORTIC VALVE REPLACEMENT, MECHANICAL: ICD-10-CM

## 2019-08-05 LAB — INR PPP: 4

## 2019-08-05 NOTE — PROGRESS NOTES
Anticoagulation Clinic - Remote Progress Note  [facility]  Testing frequency: 7 days    Indication: Atrial Fibrillation and Mechanical Aortic Valve; h/o embolic CVA  Referring Provider: Bunny Spence, milly)  Initial Warfarin Start Date: 11/2012  Planned Duration of Therapy: Life  Goal INR: 2.5-3.5  Current Drug Interactions: citalopram, divalproex   CHADS-VASc: 3 (HTN, h/o CVA)    Vit K Diet: patient is eating daily samara lettuce salads  Alcohol: none  Tobacco: none       Anticoagulation Clinic INR History:  Date 5/8 5/23 6/6 6/9 6/13 6/28 7/19 8/10 8/30   Total Weekly Dose 52.5mg 60mg 62.5mg 62.5 mg 77.5 mg 57.5 mg 67.5 mg 70mg 70mg   INR 2.2 2.2 1.2 2.2 2.6 2.3 2.4 3.5 1.2   Notes missed dose more vit K dose increase; increase in Vit K foods  boost missed dose   Increase GLV; protein     Date 9/6 9/18 10/9 11/16 11/27 12/4 12/11 12/25 1/4/18   Total Weekly Dose 67.5 mg 60mg 70mg 70mg 70mg 70mg 75mg 75mg 70mg   INR 3.2 1.8 3.4 1.6 1.9 2.5 3.2 2.7 2.0   Notes boost + 1 held dose Missed dose + significant alcohol consumption on 9/16  enox enox    boost     Date 1/15 1/25 2/15 2/23 3/2 3/6 3/15 3/20 3/27 4/4   Total Weekly Dose 75mg 75mg 65mg 75mg 75mg 90mg 65mg 90mg 80mg 75mg   INR 2.9 2.5 2.1 3.3 1.4 2.0, 1.78 (ED) 2.4 3.2 3.5 2.4   Notes   missed   enox; boost; levaquin held x1        Date 4/14 4/20 5/1 5/16 5/27 6/7 6/15 6/22 6/25 7/4   Total Weekly Dose 80mg 80mg 90mg 90mg  (ED) 90mg 75mg 95mg 95mg 107.5mg ???   INR 2.3 2.0 3.2 2.9 3.2 4.1 3.1 1.3 2.0 2.7   Notes     levoflox missed; levoflox levoflox levoflox; refuse lovenox       Date 7/31 8/27 8/31 9/4 9/11 9/27 10/2 10/5 10/15   Total Weekly Dose 105mg  ??? 65mg  ??? 95mg 100mg 80mg  ??? 70mg 90mg 100mg 100mg   INR 2.7 1.3 2.6 5.6 1.6 1.6 2.3 2.4 2.8   Notes self adj self adj; miss; enox   incr GLV, mis-dosing, refuse enox incorrect doses        Date 10/26 11/5 11/7 11/15 12/7 1/11/19 1/24/19 2/8 2/18 3/4 3/26 4/23   Total Weekly Dose 100mg   75mg 105mg 100mg 100mg 100mg 100mg 105mg 100mg 100mg 0mg   INR 4.6 7.9 1.9 5.5, 5.7 2.8 2.4 3.9 2.7 3.6 3.2 4.8 1.14 ED, 1.9 HM   Notes  recalled test strips clinic; 2 dose hold clinic    LVM  clinic       Date 5/7 6/3 6/17 6/24 7/11 7/18 7/22 7/25  8/5      Total Weekly Dose not able to confirm 75mg  ??? 95mg 80 mg 95mg 75mg 100 mg 100mg??? 95 mg      INR 3.5 4.9 2.5 2.3 5.4 2.0 2.2 4.4 4      Notes s/p surgery; 2x boost  doxy start 6/13 doxy; 1 miss doxy self-held 2 doses   No GLV        Phone Interview:  Verbal Release Authorization signed on 11/7/18 -- may speak with Zuleima Alberto (patient's wife)  [phone]  Tablet Strength: 5mg tablets   Patient Contact: [phone] -- try to call in PM or contact his wife.  His email is [email or web address]     Patient Findings   Positives:  Change in diet/appetite, Other complaints   Negatives:  Signs/symptoms of thrombosis, Signs/symptoms of bleeding, Laboratory test error suspected, Change in health, Change in alcohol use, Change in activity, Upcoming invasive procedure, Emergency department visit, Upcoming dental procedure, Missed doses, Extra doses, Change in medications, Hospital admission, Bruising   Comments:  About one month ago had terrible bruising but has improved completed.  He drinks very little ETOH, but if he does it is on Fridays or Saturdays (beer and tequila).  Discussed potential interaction with warfarin; he stated that he will stop drinking.    He stated that he plans to begin eating one small salad per week.     Discussed with patient the importance of timely PT/INR draws and follow up with the clinic to discuss results.  He stated that he would do better as he know that he will die if he doesn't take care of himself.  He also stated that his wife has stage III esophageal cancer and he is very busy taking care of her.  Discussed the importance of self-care.     Attempted to review medication list; however, he stated that he is on the same meds  with the exception of the antibiotic he had taken several weeks ago.  Discussed importance of contacting the clinic with any medication addition/ changes for evaluation of potential DDI.  He verbalized understanding      Plan:   1. INR was supratherapeutic today at 4.  Instructed Mr. Alberto to take warfarin 15mg oral daily except 10mg MonWedFri this week (90 mg/week, 5.3% decrease)  2. Repeat INR on Monday, 8/12  3. Verbal information provided over the phone. Mitchell Alberto expresses understanding by teach back, RBV dosing instructions, and has no further questions at this time.  I will also email a copy of the dosing calendar.  4. Mitchell Alberto understands the importance of calling the [facility] if he notices any s/sx of bleeding, stroke, or abnormal bruising, if any changes are made to his medications or medication doses (Rx, OTC, herbal), or if any upcoming procedures are scheduled. Mitchell Alberto will likewise let us know if any other changes, questions, or concerns arise regarding anticoagulation therapy. Mitchell Alberto voiced understanding of this information and confirms that he has the [facility]'s contact information.   Mr. Alberto verbalized understanding of above.         Dora Pereira, PharmD  08/05/19   11:44 AM

## 2019-08-12 ENCOUNTER — ANTICOAGULATION VISIT (OUTPATIENT)
Dept: PHARMACY | Facility: HOSPITAL | Age: 54
End: 2019-08-12

## 2019-08-12 DIAGNOSIS — Z95.2 HX OF AORTIC VALVE REPLACEMENT, MECHANICAL: ICD-10-CM

## 2019-08-12 LAB — INR PPP: 6.2

## 2019-08-12 NOTE — PROGRESS NOTES
Anticoagulation Clinic - Remote Progress Note  [facility]  Testing frequency: 7 days    Indication: Atrial Fibrillation and Mechanical Aortic Valve; h/o embolic CVA  Referring Provider: Bunny Spence, milly)  Initial Warfarin Start Date: 11/2012  Planned Duration of Therapy: Life  Goal INR: 2.5-3.5  Current Drug Interactions: citalopram, divalproex   CHADS-VASc: 3 (HTN, h/o CVA)    Vit K Diet: patient is eating daily samara lettuce salads  Alcohol: none  Tobacco: none       Anticoagulation Clinic INR History:  Date 5/8 5/23 6/6 6/9 6/13 6/28 7/19 8/10 8/30   Total Weekly Dose 52.5mg 60mg 62.5mg 62.5 mg 77.5 mg 57.5 mg 67.5 mg 70mg 70mg   INR 2.2 2.2 1.2 2.2 2.6 2.3 2.4 3.5 1.2   Notes missed dose more vit K dose increase; increase in Vit K foods  boost missed dose   Increase GLV; protein     Date 9/6 9/18 10/9 11/16 11/27 12/4 12/11 12/25 1/4/18   Total Weekly Dose 67.5 mg 60mg 70mg 70mg 70mg 70mg 75mg 75mg 70mg   INR 3.2 1.8 3.4 1.6 1.9 2.5 3.2 2.7 2.0   Notes boost + 1 held dose Missed dose + significant alcohol consumption on 9/16  enox enox    boost     Date 1/15 1/25 2/15 2/23 3/2 3/6 3/15 3/20 3/27 4/4   Total Weekly Dose 75mg 75mg 65mg 75mg 75mg 90mg 65mg 90mg 80mg 75mg   INR 2.9 2.5 2.1 3.3 1.4 2.0, 1.78 (ED) 2.4 3.2 3.5 2.4   Notes   missed   enox; boost; levaquin held x1        Date 4/14 4/20 5/1 5/16 5/27 6/7 6/15 6/22 6/25 7/4   Total Weekly Dose 80mg 80mg 90mg 90mg  (ED) 90mg 75mg 95mg 95mg 107.5mg ???   INR 2.3 2.0 3.2 2.9 3.2 4.1 3.1 1.3 2.0 2.7   Notes     levoflox missed; levoflox levoflox levoflox; refuse lovenox       Date 7/31 8/27 8/31 9/4 9/11 9/27 10/2 10/5 10/15   Total Weekly Dose 105mg  ??? 65mg  ??? 95mg 100mg 80mg  ??? 70mg 90mg 100mg 100mg   INR 2.7 1.3 2.6 5.6 1.6 1.6 2.3 2.4 2.8   Notes self adj self adj; miss; enox   incr GLV, mis-dosing, refuse enox incorrect doses        Date 10/26 11/5 11/7 11/15 12/7 1/11/19 1/24/19 2/8 2/18 3/4 3/26 4/23   Total Weekly Dose 100mg   75mg 105mg 100mg 100mg 100mg 100mg 105mg 100mg 100mg 0mg   INR 4.6 7.9 1.9 5.5, 5.7 2.8 2.4 3.9 2.7 3.6 3.2 4.8 1.14 ED, 1.9 HM   Notes  recalled test strips clinic; 2 dose hold clinic    LVM  clinic       Date 5/7 6/3 6/17 6/24 7/11 7/18 7/22 7/25  8/5 8/12     Total Weekly Dose not able to confirm 75mg  ??? 95mg 80 mg 95mg 75mg 100 mg 100mg??? 95 mg 95 mg 75 mg    INR 3.5 4.9 2.5 2.3 5.4 2.0 2.2 4.4 4 6.2     Notes s/p surgery; 2x boost  doxy start 6/13 doxy; 1 miss doxy self-held 2 doses   No GLV VPA restarted Held x 1  Dose red x1      Phone Interview:  Verbal Release Authorization signed on 11/7/18 -- may speak with Zuleima Alberto (patient's wife)  [phone]  Tablet Strength: 5mg tablets   Patient Contact: [phone] -- try to call in PM or contact his wife.  His email is [email or web address]     Patient Findings     Positives:  Change in medications   Negatives:  Signs/symptoms of thrombosis, Signs/symptoms of bleeding, Laboratory test error suspected, Change in health, Change in alcohol use, Change in activity, Upcoming invasive procedure, Emergency department visit, Upcoming dental procedure, Missed doses, Extra doses, Change in diet/appetite, Hospital admission, Bruising, Other complaints   Comments:  He has restarted taking his valproic acid (DDI:  may result in an increased risk of bleeding)      Plan:   1. INR was supratherapeutic today at 6.2. LVM to hold warfarin today. Mr. Alberto contacted the clinic on Tuesday.  He reported that he held his dose last night.  Instructed him to take warfarin 10 mg dose tonight and 15 mg on Wednesday prior to recheck on Thursday.  (note has restarted VPA)    2. Repeat INR on Thursday, 8/15.  Mr. Alberto confirmed he would contact the clinic prior to close of business that day for dosing instructions.  3. Verbal information provided over the phone. Mitchell Alberto expresses understanding by teach back, RBV dosing instructions, and has no further questions at this  time.  I will also email a copy of the dosing calendar.  4. Mitchell Alebrto understands the importance of calling the [facility] if he notices any s/sx of bleeding, stroke, or abnormal bruising, if any changes are made to his medications or medication doses (Rx, OTC, herbal), or if any upcoming procedures are scheduled. Mitchell Alberto will likewise let us know if any other changes, questions, or concerns arise regarding anticoagulation therapy. Mitchell Alberto voiced understanding of this information and confirms that he has the [facility]'s contact information.   Mr. Alberto verbalized understanding of above.         Dora Pereira, PharmD  08/12/19   1:10 PM

## 2019-08-13 ENCOUNTER — TELEPHONE (OUTPATIENT)
Dept: CARDIOLOGY | Facility: CLINIC | Age: 54
End: 2019-08-13

## 2019-08-13 NOTE — TELEPHONE ENCOUNTER
Pt LVM stating he wanted to be seen sooner than October. Called pt back to get more information. No answer, LVM to return my call.

## 2019-08-15 ENCOUNTER — ANTICOAGULATION VISIT (OUTPATIENT)
Dept: PHARMACY | Facility: HOSPITAL | Age: 54
End: 2019-08-15

## 2019-08-15 DIAGNOSIS — Z95.2 HX OF AORTIC VALVE REPLACEMENT, MECHANICAL: ICD-10-CM

## 2019-08-15 LAB — INR PPP: 3

## 2019-08-15 NOTE — PROGRESS NOTES
Anticoagulation Clinic - Remote Progress Note  [facility]  Testing frequency: 7 days    Indication: Atrial Fibrillation and Mechanical Aortic Valve; h/o embolic CVA  Referring Provider: Bunny Spence, milly)  Initial Warfarin Start Date: 11/2012  Planned Duration of Therapy: Life  Goal INR: 2.5-3.5  Current Drug Interactions: citalopram, divalproex; valproic acid  CHADS-VASc: 3 (HTN, h/o CVA)    Vit K Diet: patient is eating daily samara lettuce salads  Alcohol: none  Tobacco: none       Anticoagulation Clinic INR History:  Date 5/8 5/23 6/6 6/9 6/13 6/28 7/19 8/10 8/30   Total Weekly Dose 52.5mg 60mg 62.5mg 62.5 mg 77.5 mg 57.5 mg 67.5 mg 70mg 70mg   INR 2.2 2.2 1.2 2.2 2.6 2.3 2.4 3.5 1.2   Notes missed dose more vit K dose increase; increase in Vit K foods  boost missed dose   Increase GLV; protein     Date 9/6 9/18 10/9 11/16 11/27 12/4 12/11 12/25 1/4/18   Total Weekly Dose 67.5 mg 60mg 70mg 70mg 70mg 70mg 75mg 75mg 70mg   INR 3.2 1.8 3.4 1.6 1.9 2.5 3.2 2.7 2.0   Notes boost + 1 held dose Missed dose + significant alcohol consumption on 9/16  enox enox    boost     Date 1/15 1/25 2/15 2/23 3/2 3/6 3/15 3/20 3/27 4/4   Total Weekly Dose 75mg 75mg 65mg 75mg 75mg 90mg 65mg 90mg 80mg 75mg   INR 2.9 2.5 2.1 3.3 1.4 2.0, 1.78 (ED) 2.4 3.2 3.5 2.4   Notes   missed   enox; boost; levaquin held x1        Date 4/14 4/20 5/1 5/16 5/27 6/7 6/15 6/22 6/25 7/4   Total Weekly Dose 80mg 80mg 90mg 90mg  (ED) 90mg 75mg 95mg 95mg 107.5mg ???   INR 2.3 2.0 3.2 2.9 3.2 4.1 3.1 1.3 2.0 2.7   Notes     levoflox missed; levoflox levoflox levoflox; refuse lovenox       Date 7/31 8/27 8/31 9/4 9/11 9/27 10/2 10/5 10/15   Total Weekly Dose 105mg  ??? 65mg  ??? 95mg 100mg 80mg  ??? 70mg 90mg 100mg 100mg   INR 2.7 1.3 2.6 5.6 1.6 1.6 2.3 2.4 2.8   Notes self adj self adj; miss; enox   incr GLV, mis-dosing, refuse enox incorrect doses        Date 10/26 11/5 11/7 11/15 12/7 1/11/19 1/24/19 2/8 2/18 3/4 3/26 4/23   Total  Weekly Dose 100mg  75mg 105mg 100mg 100mg 100mg 100mg 105mg 100mg 100mg 0mg   INR 4.6 7.9 1.9 5.5, 5.7 2.8 2.4 3.9 2.7 3.6 3.2 4.8 1.14 ED, 1.9 HM   Notes  recalled test strips clinic; 2 dose hold clinic    LVM  clinic       Date 5/7 6/3 6/17 6/24 7/11 7/18 7/22 7/25  8/5 8/12 8/15    Total Weekly Dose not able to confirm 75mg  ??? 95mg 80 mg 95mg 75mg 100 mg 100mg??? 95 mg 95 mg 75 mg    INR 3.5 4.9 2.5 2.3 5.4 2.0 2.2 4.4 4 6.2 3.0    Notes s/p surgery; 2x boost  doxy start 6/13 doxy; 1 miss doxy self-held 2 doses   No GLV VPA restarted Held x 1  Dose red x1; restar valproic acid      Phone Interview:  Verbal Release Authorization signed on 11/7/18 -- may speak with Zuleima Alberto (patient's wife)  [phone]  Tablet Strength: 5mg tablets   Patient Contact: [phone] -- try to call in PM or contact his wife.  His email is [email or web address]     Patient Findings   Negatives:  Signs/symptoms of thrombosis, Signs/symptoms of bleeding, Laboratory test error suspected, Change in health, Change in alcohol use, Change in activity, Upcoming invasive procedure, Emergency department visit, Upcoming dental procedure, Missed doses, Extra doses, Change in medications, Change in diet/appetite, Hospital admission, Bruising, Other complaints      Plan:   1. INR was therapeutic today at 3.0 post dose hold.  Instructed him to decrease dose to 15mg daily except 10mg MWF.  (note has restarted VPA)  2. Repeat INR on Tuesday 8/20.  3. Verbal information provided over the phone. Mitchell Alberto expresses understanding by teach back, RBV dosing instructions, and has no further questions at this time.  I will also email a copy of the dosing calendar.  4. Mitchell Alberto understands the importance of calling the [facility] if he notices any s/sx of bleeding, stroke, or abnormal bruising, if any changes are made to his medications or medication doses (Rx, OTC, herbal), or if any upcoming procedures are scheduled.  Mitchell Alberto will likewise let us know if any other changes, questions, or concerns arise regarding anticoagulation therapy. Mitchell Alberto voiced understanding of this information and confirms that he has the [facility]'s contact information.   Mr. Alberto verbalized understanding of above.         Do Vargas, PharmD  08/15/19   4:43 PM

## 2019-08-21 LAB — INR PPP: 5

## 2019-08-22 ENCOUNTER — ANTICOAGULATION VISIT (OUTPATIENT)
Dept: PHARMACY | Facility: HOSPITAL | Age: 54
End: 2019-08-22

## 2019-08-22 DIAGNOSIS — Z95.2 HX OF AORTIC VALVE REPLACEMENT, MECHANICAL: ICD-10-CM

## 2019-08-22 NOTE — PROGRESS NOTES
Anticoagulation Clinic - Remote Progress Note  [facility]  Testing frequency: 7 days    Indication: Atrial Fibrillation and Mechanical Aortic Valve; h/o embolic CVA  Referring Provider: Bunny Spence, milly)  Initial Warfarin Start Date: 11/2012  Planned Duration of Therapy: Life  Goal INR: 2.5-3.5  Current Drug Interactions: citalopram, divalproex;   CHADS-VASc: 3 (HTN, h/o CVA)    Vit K Diet: patient is eating daily samara lettuce salads  Alcohol: none  Tobacco: none       Anticoagulation Clinic INR History:  Date 5/8 5/23 6/6 6/9 6/13 6/28 7/19 8/10 8/30   Total Weekly Dose 52.5mg 60mg 62.5mg 62.5 mg 77.5 mg 57.5 mg 67.5 mg 70mg 70mg   INR 2.2 2.2 1.2 2.2 2.6 2.3 2.4 3.5 1.2   Notes missed dose more vit K dose increase; increase in Vit K foods  boost missed dose   Increase GLV; protein     Date 9/6 9/18 10/9 11/16 11/27 12/4 12/11 12/25 1/4/18   Total Weekly Dose 67.5 mg 60mg 70mg 70mg 70mg 70mg 75mg 75mg 70mg   INR 3.2 1.8 3.4 1.6 1.9 2.5 3.2 2.7 2.0   Notes boost + 1 held dose Missed dose + significant alcohol consumption on 9/16  enox enox    boost     Date 1/15 1/25 2/15 2/23 3/2 3/6 3/15 3/20 3/27 4/4   Total Weekly Dose 75mg 75mg 65mg 75mg 75mg 90mg 65mg 90mg 80mg 75mg   INR 2.9 2.5 2.1 3.3 1.4 2.0, 1.78 (ED) 2.4 3.2 3.5 2.4   Notes   missed   enox; boost; levaquin held x1        Date 4/14 4/20 5/1 5/16 5/27 6/7 6/15 6/22 6/25 7/4   Total Weekly Dose 80mg 80mg 90mg 90mg  (ED) 90mg 75mg 95mg 95mg 107.5mg ???   INR 2.3 2.0 3.2 2.9 3.2 4.1 3.1 1.3 2.0 2.7   Notes     levoflox missed; levoflox levoflox levoflox; refuse lovenox       Date 7/31 8/27 8/31 9/4 9/11 9/27 10/2 10/5 10/15   Total Weekly Dose 105mg  ??? 65mg  ??? 95mg 100mg 80mg  ??? 70mg 90mg 100mg 100mg   INR 2.7 1.3 2.6 5.6 1.6 1.6 2.3 2.4 2.8   Notes self adj self adj; miss; enox   incr GLV, mis-dosing, refuse enox incorrect doses        Date 10/26 11/5 11/7 11/15 12/7 1/11/19 1/24/19 2/8 2/18 3/4 3/26 4/23   Total Weekly Dose  100mg  75mg 105mg 100mg 100mg 100mg 100mg 105mg 100mg 100mg 0mg   INR 4.6 7.9 1.9 5.5, 5.7 2.8 2.4 3.9 2.7 3.6 3.2 4.8 1.14 ED, 1.9 HM   Notes  recalled test strips clinic; 2 dose hold clinic    LVM  clinic       Date 5/7 6/3 6/17 6/24 7/11 7/18 7/22 7/25  8/5 8/12 8/15 8/21   Total Weekly Dose not able to confirm 75mg  ??? 95mg 80 mg 95mg 75mg 100 mg 100mg??? 95 mg 95 mg 75 mg 90 mg   INR 3.5 4.9 2.5 2.3 5.4 2.0 2.2 4.4 4 6.2 3.0 5   Notes s/p surgery; 2x boost  doxy start 6/13 doxy; 1 miss doxy self-held 2 doses   No GLV VPA restarted Held x 1  Dose red x1; restar valproic acid      Phone Interview:  Verbal Release Authorization signed on 11/7/18 -- may speak with Zuleima Alberto (patient's wife)  [phone]  Tablet Strength: 5mg tablets   Patient Contact: [phone] -- try to call in PM or contact his wife.  His email is [email or web address]      Patient Findings  Positives:  Change in diet/appetite, Bruising   Negatives:  Signs/symptoms of thrombosis, Signs/symptoms of bleeding, Laboratory test error suspected, Change in health, Change in alcohol use, Change in activity, Upcoming invasive procedure, Emergency department visit, Upcoming dental procedure, Missed doses, Extra doses, Change in medications, Hospital admission, Other complaints   Comments:  He has had more bruising on his arms.  He has been eating a little mayonnaise due to vitamin K content. He will have a small serving of broccoli tonight.        Plan:   1. INR was supra therapeutic last night at 5.  Instructed him to hold his warfarin dose today, then tomorrow resume warfarin 15 mg oral daily except 10mg MonWedFri.  (note has restarted VPA)  He will eat a small serving of broccoli.  2. Repeat INR on Monday, 8/26.  He declined venipuncture INR today.  3. Verbal information provided over the phone. Mitchell Alberto expresses understanding by teach back, RBV dosing instructions, and has no further questions at this time.  I will also email a copy  of the dosing calendar.  4. Mitchell Alberto understands the importance of calling the [facility] if he notices any s/sx of bleeding, stroke, or abnormal bruising, if any changes are made to his medications or medication doses (Rx, OTC, herbal), or if any upcoming procedures are scheduled. Mitchell Alberto will likewise let us know if any other changes, questions, or concerns arise regarding anticoagulation therapy. Mitchell Alberto voiced understanding of this information and confirms that he has the [facility]'s contact information.   Mr. Alberto verbalized understanding of above.      Dora Pereira, PharmD  08/22/19   8:01 AM

## 2019-08-27 ENCOUNTER — ANTICOAGULATION VISIT (OUTPATIENT)
Dept: PHARMACY | Facility: HOSPITAL | Age: 54
End: 2019-08-27

## 2019-08-27 DIAGNOSIS — Z95.2 HX OF AORTIC VALVE REPLACEMENT, MECHANICAL: ICD-10-CM

## 2019-08-27 LAB — INR PPP: 2.3

## 2019-08-27 NOTE — PROGRESS NOTES
Anticoagulation Clinic - Remote Progress Note  [facility]  Testing frequency: 7 days    Indication: Atrial Fibrillation and Mechanical Aortic Valve; h/o embolic CVA  Referring Provider: Bunny Spence, milly)  Initial Warfarin Start Date: 11/2012  Planned Duration of Therapy: Life  Goal INR: 2.5-3.5  Current Drug Interactions: citalopram, divalproex;   CHADS-VASc: 3 (HTN, h/o CVA)    Vit K Diet: patient is eating daily smaara lettuce salads  Alcohol: none  Tobacco: none       Anticoagulation Clinic INR History:  Date 5/8 5/23 6/6 6/9 6/13 6/28 7/19 8/10 8/30   Total Weekly Dose 52.5mg 60mg 62.5mg 62.5 mg 77.5 mg 57.5 mg 67.5 mg 70mg 70mg   INR 2.2 2.2 1.2 2.2 2.6 2.3 2.4 3.5 1.2   Notes missed dose more vit K dose increase; increase in Vit K foods  boost missed dose   Increase GLV; protein     Date 9/6 9/18 10/9 11/16 11/27 12/4 12/11 12/25 1/4/18   Total Weekly Dose 67.5 mg 60mg 70mg 70mg 70mg 70mg 75mg 75mg 70mg   INR 3.2 1.8 3.4 1.6 1.9 2.5 3.2 2.7 2.0   Notes boost + 1 held dose Missed dose + significant alcohol consumption on 9/16  enox enox    boost     Date 1/15 1/25 2/15 2/23 3/2 3/6 3/15 3/20 3/27 4/4   Total Weekly Dose 75mg 75mg 65mg 75mg 75mg 90mg 65mg 90mg 80mg 75mg   INR 2.9 2.5 2.1 3.3 1.4 2.0, 1.78 (ED) 2.4 3.2 3.5 2.4   Notes   missed   enox; boost; levaquin held x1        Date 4/14 4/20 5/1 5/16 5/27 6/7 6/15 6/22 6/25 7/4   Total Weekly Dose 80mg 80mg 90mg 90mg  (ED) 90mg 75mg 95mg 95mg 107.5mg ???   INR 2.3 2.0 3.2 2.9 3.2 4.1 3.1 1.3 2.0 2.7   Notes     levoflox missed; levoflox levoflox levoflox; refuse lovenox       Date 7/31 8/27 8/31 9/4 9/11 9/27 10/2 10/5 10/15   Total Weekly Dose 105mg  ??? 65mg  ??? 95mg 100mg 80mg  ??? 70mg 90mg 100mg 100mg   INR 2.7 1.3 2.6 5.6 1.6 1.6 2.3 2.4 2.8   Notes self adj self adj; miss; enox   incr GLV, mis-dosing, refuse enox incorrect doses        Date 10/26 11/5 11/7 11/15 12/7 1/11/19 1/24/19 2/8 2/18 3/4 3/26 4/23   Total Weekly Dose  "100mg  75mg 105mg 100mg 100mg 100mg 100mg 105mg 100mg 100mg 0mg   INR 4.6 7.9 1.9 5.5, 5.7 2.8 2.4 3.9 2.7 3.6 3.2 4.8 1.14 ED, 1.9 HM   Notes  recalled test strips clinic; 2 dose hold clinic    LVM  clinic       Date 5/7 6/3 6/17 6/24 7/11 7/18 7/22 7/25  8/5 8/12 8/15 8/21   Total Weekly Dose not able to confirm 75mg  ??? 95mg 80 mg 95mg 75mg 100 mg 100mg??? 95 mg 95 mg 75 mg 90 mg   INR 3.5 4.9 2.5 2.3 5.4 2.0 2.2 4.4 4 6.2 3.0 5   Notes s/p surgery; 2x boost  doxy start 6/13 doxy; 1 miss doxy self-held 2 doses   No GLV VPA restarted Held x 1  Dose red x1; restar valproic acid      Date  8/26              Total Weekly Dose 70 mg              INR  2.3              Notes                 Phone Interview:  Verbal Release Authorization signed on 11/7/18 -- may speak with Zuleima Alberto (patient's wife)  [phone]  Tablet Strength: 5mg tablets   Patient Contact: [phone] -- try to call in PM or contact his wife.  His email is [email or web address]     Patient Findings   Positives:  Change in diet/appetite   Negatives:  Signs/symptoms of thrombosis, Signs/symptoms of bleeding, Laboratory test error suspected, Change in health, Change in alcohol use, Change in activity, Upcoming invasive procedure, Emergency department visit, Upcoming dental procedure, Missed doses, Extra doses, Change in medications, Hospital admission, Bruising, Other complaints   Comments:  Had \"a major amount of broccoli last week\" rather than a small serving as he was instructed    Encouraged consistency in intake of GLV, he plans to return to usual intake.  (of note: patient planning to report thru [facility] today; I had contacted the patient to remind him to check and he stated that he had forgotten to report)      Plan:     1. INR was sub therapeutic last night at 2.3 after dose hold.  Instructed him take warfarin 15 mg oral daily except 10mg MonWedFri until recheck Friday.   2. Repeat INR on Friday morning, 8/30.  Continue to evaluate " maintenance dosing needs.    3. Verbal information provided over the phone. Mitchell Alberto expresses understanding by teach back, RBV dosing instructions, and has no further questions at this time.  I will also email a copy of the dosing calendar.  4. Mitchell Alberto understands the importance of calling the [facility] if he notices any s/sx of bleeding, stroke, or abnormal bruising, if any changes are made to his medications or medication doses (Rx, OTC, herbal), or if any upcoming procedures are scheduled. Mitchell Alberto will likewise let us know if any other changes, questions, or concerns arise regarding anticoagulation therapy. Mitchell Alberto voiced understanding of this information and confirms that he has the [facility]'s contact information.   Mr. Alberto verbalized understanding of above.      Dora Pereira, PharmD  08/27/19   1:22 PM

## 2019-08-30 ENCOUNTER — ANTICOAGULATION VISIT (OUTPATIENT)
Dept: PHARMACY | Facility: HOSPITAL | Age: 54
End: 2019-08-30

## 2019-08-30 DIAGNOSIS — Z95.2 HX OF AORTIC VALVE REPLACEMENT, MECHANICAL: ICD-10-CM

## 2019-08-30 LAB — INR PPP: 3.9

## 2019-08-30 NOTE — PROGRESS NOTES
Anticoagulation Clinic - Remote Progress Note  [facility]  Testing frequency: 7 days    Indication: Atrial Fibrillation and Mechanical Aortic Valve; h/o embolic CVA  Referring Provider: Bunny Spence, milly)  Initial Warfarin Start Date: 11/2012  Planned Duration of Therapy: Life  Goal INR: 2.5-3.5  Current Drug Interactions: citalopram, divalproex;   CHADS-VASc: 3 (HTN, h/o CVA)    Vit K Diet: patient is eating daily samara lettuce salads  Alcohol: none  Tobacco: none       Anticoagulation Clinic INR History:  Date 5/8 5/23 6/6 6/9 6/13 6/28 7/19 8/10 8/30   Total Weekly Dose 52.5mg 60mg 62.5mg 62.5 mg 77.5 mg 57.5 mg 67.5 mg 70mg 70mg   INR 2.2 2.2 1.2 2.2 2.6 2.3 2.4 3.5 1.2   Notes missed dose more vit K dose increase; increase in Vit K foods  boost missed dose   Increase GLV; protein     Date 9/6 9/18 10/9 11/16 11/27 12/4 12/11 12/25 1/4/18   Total Weekly Dose 67.5 mg 60mg 70mg 70mg 70mg 70mg 75mg 75mg 70mg   INR 3.2 1.8 3.4 1.6 1.9 2.5 3.2 2.7 2.0   Notes boost + 1 held dose Missed dose + significant alcohol consumption on 9/16  enox enox    boost     Date 1/15 1/25 2/15 2/23 3/2 3/6 3/15 3/20 3/27 4/4   Total Weekly Dose 75mg 75mg 65mg 75mg 75mg 90mg 65mg 90mg 80mg 75mg   INR 2.9 2.5 2.1 3.3 1.4 2.0, 1.78 (ED) 2.4 3.2 3.5 2.4   Notes   missed   enox; boost; levaquin held x1        Date 4/14 4/20 5/1 5/16 5/27 6/7 6/15 6/22 6/25 7/4   Total Weekly Dose 80mg 80mg 90mg 90mg  (ED) 90mg 75mg 95mg 95mg 107.5mg ???   INR 2.3 2.0 3.2 2.9 3.2 4.1 3.1 1.3 2.0 2.7   Notes     levoflox missed; levoflox levoflox levoflox; refuse lovenox       Date 7/31 8/27 8/31 9/4 9/11 9/27 10/2 10/5 10/15   Total Weekly Dose 105mg  ??? 65mg  ??? 95mg 100mg 80mg  ??? 70mg 90mg 100mg 100mg   INR 2.7 1.3 2.6 5.6 1.6 1.6 2.3 2.4 2.8   Notes self adj self adj; miss; enox   incr GLV, mis-dosing, refuse enox incorrect doses        Date 10/26 11/5 11/7 11/15 12/7 1/11/19 1/24/19 2/8 2/18 3/4 3/26 4/23   Total Weekly Dose  100mg  75mg 105mg 100mg 100mg 100mg 100mg 105mg 100mg 100mg 0mg   INR 4.6 7.9 1.9 5.5, 5.7 2.8 2.4 3.9 2.7 3.6 3.2 4.8 1.14 ED, 1.9 HM   Notes  recalled test strips clinic; 2 dose hold clinic    LVM  clinic       Date 5/7 6/3 6/17 6/24 7/11 7/18 7/22 7/25  8/5 8/12 8/15 8/21   Total Weekly Dose not able to confirm 75mg  ??? 95mg 80 mg 95mg 75mg 100 mg 100mg??? 95 mg 95 mg 75 mg 90 mg   INR 3.5 4.9 2.5 2.3 5.4 2.0 2.2 4.4 4 6.2 3.0 5   Notes s/p surgery; 2x boost  doxy start 6/13 doxy; 1 miss doxy self-held 2 doses   No GLV VPA restarted Held x 1  Dose red x1; restar valproic acid      Date  8/26 8/30             Total Weekly Dose 70 mg 90mg 85mg            INR  2.3 3.9             Notes 1 hold                Phone Interview:  Verbal Release Authorization signed on 11/7/18 -- may speak with Zuleima Alberto (patient's wife)  [phone]  Tablet Strength: 5mg tablets   Patient Contact: [phone] -- try to call in PM or contact his wife.  His email is [email or web address]     Patient Findings    Negatives:  Signs/symptoms of thrombosis, Signs/symptoms of bleeding, Laboratory test error suspected, Change in health, Change in alcohol use, Change in activity, Upcoming invasive procedure, Emergency department visit, Upcoming dental procedure, Missed doses, Extra doses, Change in medications, Change in diet/appetite, Hospital admission, Bruising, Other complaints   Comments:  Per wife Zuleima, she has been keeping close watch on his diet and making sure he does not miss doses and checks at the correct time.         Plan:   1. INR was supratherapeutic today at 3.9, an especially big jump since Monday. Instructed his wife Zuleima to give warfarin 15 mg oral daily except 10mg MonWedFriSATURDAY until recheck Thursday (85mg/week)  2. Repeat INR on Thursday morning 9/5  Continue to evaluate maintenance dosing needs.    3. Verbal information provided over the phone. Mitchell Alberto expresses understanding by teach back, RBV  dosing instructions, and has no further questions at this time.  Emailed a copy of the dosing calendar.  4. Mitchell Alberto understands the importance of calling the [facility] if he notices any s/sx of bleeding, stroke, or abnormal bruising, if any changes are made to his medications or medication doses (Rx, OTC, herbal), or if any upcoming procedures are scheduled. Mitchell Alberto will likewise let us know if any other changes, questions, or concerns arise regarding anticoagulation therapy. Mitchell Alberto voiced understanding of this information and confirms that he has the [facility]'s contact information.   Mr. Alberto verbalized understanding of above.      Sonya Bustillos, GiovaniD.  08/30/19   11:34 AM

## 2019-09-05 DIAGNOSIS — Z95.2 HX OF AORTIC VALVE REPLACEMENT, MECHANICAL: ICD-10-CM

## 2019-09-06 ENCOUNTER — ANTICOAGULATION VISIT (OUTPATIENT)
Dept: PHARMACY | Facility: HOSPITAL | Age: 54
End: 2019-09-06

## 2019-09-06 DIAGNOSIS — Z95.2 HX OF AORTIC VALVE REPLACEMENT, MECHANICAL: ICD-10-CM

## 2019-09-06 LAB — INR PPP: 4.2

## 2019-09-06 RX ORDER — WARFARIN SODIUM 5 MG/1
TABLET ORAL
Qty: 75 TABLET | Refills: 0 | Status: SHIPPED | OUTPATIENT
Start: 2019-09-06 | End: 2020-01-28 | Stop reason: SDUPTHER

## 2019-09-06 RX ORDER — WARFARIN SODIUM 5 MG/1
TABLET ORAL
Qty: 90 TABLET | Refills: 0 | OUTPATIENT
Start: 2019-09-06

## 2019-09-06 NOTE — PROGRESS NOTES
Anticoagulation Clinic - Remote Progress Note  [facility]  Testing frequency: 7 days    Indication: Atrial Fibrillation and Mechanical Aortic Valve; h/o embolic CVA  Referring Provider: Bunny Spence, milly)  Initial Warfarin Start Date: 11/2012  Planned Duration of Therapy: Life  Goal INR: 2.5-3.5  Current Drug Interactions: citalopram, divalproex;   CHADS-VASc: 3 (HTN, h/o CVA)    Vit K Diet: patient is eating daily samara lettuce salads  Alcohol: none  Tobacco: none       Anticoagulation Clinic INR History:  Date 5/8 5/23 6/6 6/9 6/13 6/28 7/19 8/10 8/30   Total Weekly Dose 52.5mg 60mg 62.5mg 62.5 mg 77.5 mg 57.5 mg 67.5 mg 70mg 70mg   INR 2.2 2.2 1.2 2.2 2.6 2.3 2.4 3.5 1.2   Notes missed dose more vit K dose increase; increase in Vit K foods  boost missed dose   Increase GLV; protein     Date 9/6 9/18 10/9 11/16 11/27 12/4 12/11 12/25 1/4/18   Total Weekly Dose 67.5 mg 60mg 70mg 70mg 70mg 70mg 75mg 75mg 70mg   INR 3.2 1.8 3.4 1.6 1.9 2.5 3.2 2.7 2.0   Notes boost + 1 held dose Missed dose + significant alcohol consumption on 9/16  enox enox    boost     Date 1/15 1/25 2/15 2/23 3/2 3/6 3/15 3/20 3/27 4/4   Total Weekly Dose 75mg 75mg 65mg 75mg 75mg 90mg 65mg 90mg 80mg 75mg   INR 2.9 2.5 2.1 3.3 1.4 2.0, 1.78 (ED) 2.4 3.2 3.5 2.4   Notes   missed   enox; boost; levaquin held x1        Date 4/14 4/20 5/1 5/16 5/27 6/7 6/15 6/22 6/25 7/4   Total Weekly Dose 80mg 80mg 90mg 90mg  (ED) 90mg 75mg 95mg 95mg 107.5mg ???   INR 2.3 2.0 3.2 2.9 3.2 4.1 3.1 1.3 2.0 2.7   Notes     levoflox missed; levoflox levoflox levoflox; refuse lovenox       Date 7/31 8/27 8/31 9/4 9/11 9/27 10/2 10/5 10/15   Total Weekly Dose 105mg  ??? 65mg  ??? 95mg 100mg 80mg  ??? 70mg 90mg 100mg 100mg   INR 2.7 1.3 2.6 5.6 1.6 1.6 2.3 2.4 2.8   Notes self adj self adj; miss; enox   incr GLV, mis-dosing, refuse enox incorrect doses        Date 10/26 11/5 11/7 11/15 12/7 1/11/19 1/24/19 2/8 2/18 3/4 3/26 4/23   Total Weekly Dose  100mg  75mg 105mg 100mg 100mg 100mg 100mg 105mg 100mg 100mg 0mg   INR 4.6 7.9 1.9 5.5, 5.7 2.8 2.4 3.9 2.7 3.6 3.2 4.8 1.14 ED, 1.9 HM   Notes  recalled test strips clinic; 2 dose hold clinic    LVM  clinic       Date 5/7 6/3 6/17 6/24 7/11 7/18 7/22 7/25  8/5 8/12 8/15 8/21   Total Weekly Dose not able to confirm 75mg  ??? 95mg 80 mg 95mg 75mg 100 mg 100mg??? 95 mg 95 mg 75 mg 90 mg   INR 3.5 4.9 2.5 2.3 5.4 2.0 2.2 4.4 4 6.2 3.0 5   Notes s/p surgery; 2x boost  doxy start 6/13 doxy; 1 miss doxy self-held 2 doses   No GLV VPA restarted Held x 1  Dose red x1; restar valproic acid      Date  8/26 8/30 9/6            Total Weekly Dose 70 mg 90mg 85mg 80mg           INR  2.3 3.9 4.2            Notes 1 hold                Phone Interview:  Verbal Release Authorization signed on 11/7/18 -- may speak with Zuleima Alberto (patient's wife)  [phone]  Tablet Strength: 5mg tablets   Patient Contact: [phone] -- try to call in PM or contact his wife.  His email is [email or web address]     Patient Findings   Positives:  Change in diet/appetite   Negatives:  Signs/symptoms of thrombosis, Signs/symptoms of bleeding, Laboratory test error suspected, Change in health, Change in alcohol use, Change in activity, Upcoming invasive procedure, Emergency department visit, Upcoming dental procedure, Missed doses, Extra doses, Change in medications, Hospital admission, Bruising, Other complaints   Comments:  Mrs. Alberto reports her  has been implementing some spinach and mayonnaise into his salads/diet lately but hasn't been doing daily salads. She has also been setting alarms on her phone to keep his dosing schedule as planned.     Plan:   1. INR was SUPRAtherapeutic today at 4.2. Instructed his wife Zuleima to give warfarin 10mg daily except 15mg Sunday and retest Wednesday (80mg/week). If INR is in range at next follow-up consider an 80mg/week dosing schedule with 10mg daily except 15mg SunThurs.  2. Repeat INR on  Wednesday, 9/11. Continue to evaluate maintenance dosing needs.    3. Verbal information provided over the phone. Mrs. Alberto expresses understanding by teach back, RBV dosing instructions, and has no further questions at this time.   4. Mitchell Alberto understands the importance of calling the [facility] if he notices any s/sx of bleeding, stroke, or abnormal bruising, if any changes are made to his medications or medication doses (Rx, OTC, herbal), or if any upcoming procedures are scheduled. Mitchell Alberto will likewise let us know if any other changes, questions, or concerns arise regarding anticoagulation therapy. Mitchell Alberto voiced understanding of this information and confirms that he has the [facility]'s contact information.   Mr. Alberto verbalized understanding of above.      Pro Humphries, PharmD Candidate 2020  09/06/2019  3:51 PM

## 2019-09-09 NOTE — PROGRESS NOTES
I, Do Vargas, PharmD, have reviewed the note in full and agree with the assessment and plan.  09/09/19  5:11 PM

## 2019-09-12 ENCOUNTER — ANTICOAGULATION VISIT (OUTPATIENT)
Dept: PHARMACY | Facility: HOSPITAL | Age: 54
End: 2019-09-12

## 2019-09-12 ENCOUNTER — TELEPHONE (OUTPATIENT)
Dept: PHARMACY | Facility: HOSPITAL | Age: 54
End: 2019-09-12

## 2019-09-12 DIAGNOSIS — Z95.2 HX OF AORTIC VALVE REPLACEMENT, MECHANICAL: ICD-10-CM

## 2019-09-12 LAB — INR PPP: 4.5

## 2019-09-12 NOTE — PROGRESS NOTES
Anticoagulation Clinic - Remote Progress Note  [facility]  Testing frequency: 7 days    Indication: Atrial Fibrillation and Mechanical Aortic Valve; h/o embolic CVA  Referring Provider: Bunny Spence, milly)  Initial Warfarin Start Date: 11/2012  Planned Duration of Therapy: Life  Goal INR: 2.5-3.5  Current Drug Interactions: citalopram, divalproex;   CHADS-VASc: 3 (HTN, h/o CVA)    Vit K Diet: patient is eating daily samara lettuce salads  Alcohol: none  Tobacco: none       Anticoagulation Clinic INR History:  Date 5/8 5/23 6/6 6/9 6/13 6/28 7/19 8/10 8/30   Total Weekly Dose 52.5mg 60mg 62.5mg 62.5 mg 77.5 mg 57.5 mg 67.5 mg 70mg 70mg   INR 2.2 2.2 1.2 2.2 2.6 2.3 2.4 3.5 1.2   Notes missed dose more vit K dose increase; increase in Vit K foods  boost missed dose   Increase GLV; protein     Date 9/6 9/18 10/9 11/16 11/27 12/4 12/11 12/25 1/4/18   Total Weekly Dose 67.5 mg 60mg 70mg 70mg 70mg 70mg 75mg 75mg 70mg   INR 3.2 1.8 3.4 1.6 1.9 2.5 3.2 2.7 2.0   Notes boost + 1 held dose Missed dose + significant alcohol consumption on 9/16  enox enox    boost     Date 1/15 1/25 2/15 2/23 3/2 3/6 3/15 3/20 3/27 4/4   Total Weekly Dose 75mg 75mg 65mg 75mg 75mg 90mg 65mg 90mg 80mg 75mg   INR 2.9 2.5 2.1 3.3 1.4 2.0, 1.78 (ED) 2.4 3.2 3.5 2.4   Notes   missed   enox; boost; levaquin held x1        Date 4/14 4/20 5/1 5/16 5/27 6/7 6/15 6/22 6/25 7/4   Total Weekly Dose 80mg 80mg 90mg 90mg  (ED) 90mg 75mg 95mg 95mg 107.5mg ???   INR 2.3 2.0 3.2 2.9 3.2 4.1 3.1 1.3 2.0 2.7   Notes     levoflox missed; levoflox levoflox levoflox; refuse lovenox       Date 7/31 8/27 8/31 9/4 9/11 9/27 10/2 10/5 10/15   Total Weekly Dose 105mg  ??? 65mg  ??? 95mg 100mg 80mg  ??? 70mg 90mg 100mg 100mg   INR 2.7 1.3 2.6 5.6 1.6 1.6 2.3 2.4 2.8   Notes self adj self adj; miss; enox   incr GLV, mis-dosing, refuse enox incorrect doses        Date 10/26 11/5 11/7 11/15 12/7 1/11/19 1/24/19 2/8 2/18 3/4 3/26 4/23   Total Weekly Dose  100mg  75mg 105mg 100mg 100mg 100mg 100mg 105mg 100mg 100mg 0mg   INR 4.6 7.9 1.9 5.5, 5.7 2.8 2.4 3.9 2.7 3.6 3.2 4.8 1.14 ED, 1.9 HM   Notes  recalled test strips clinic; 2 dose hold clinic    LVM  clinic       Date 5/7 6/3 6/17 6/24 7/11 7/18 7/22 7/25  8/5 8/12 8/15 8/21   Total Weekly Dose not able to confirm 75mg  ??? 95mg 80 mg 95mg 75mg 100 mg 100mg??? 95 mg 95 mg 75 mg 90 mg   INR 3.5 4.9 2.5 2.3 5.4 2.0 2.2 4.4 4 6.2 3.0 5   Notes s/p surgery; 2x boost  doxy start 6/13 doxy; 1 miss doxy self-held 2 doses   No GLV VPA restarted Held x 1  Dose red x1; restar valproic acid      Date  8/26 8/30 9/6 9/12           Total Weekly Dose 70 mg 90mg 85mg 85mg           INR  2.3 3.9 4.2 4.5           Notes 1 hold                Phone Interview:  Verbal Release Authorization signed on 11/7/18 -- may speak with Zuleima Alberto (patient's wife)  [phone]  Tablet Strength: 5mg tablets   Patient Contact: [phone] -- try to call in PM or contact his wife.  His email is [email or web address]     Patient Findings   Positives:  Extra doses, Bruising   Negatives:  Signs/symptoms of thrombosis, Signs/symptoms of bleeding, Laboratory test error suspected, Change in health, Change in alcohol use, Change in activity, Upcoming invasive procedure, Emergency department visit, Upcoming dental procedure, Missed doses, Change in medications, Change in diet/appetite, Hospital admission, Other complaints   Comments:  Mr. Alberto noted that he had taken 3 tablets yesterday, deviating from his standard 2 tablets that day putting him at an 85mg week during this encounter. His wife noted some new bruises she just noticed on his leg today and they are unaware of how he got them but they say he typically bruises easily.      Plan:   1. INR was SUPRAtherapeutic today at 4.5. Instructed his Mr. Alberto and his wife to have him take 10mg daily until retest Monday (75mg/week).  2. Repeat INR on Monday, 9/16. Continue to evaluate maintenance  dosing needs.    3. Verbal information provided over the phone.  and Mrs. Alberto express understanding by teach back, RBV dosing instructions, and have no further questions at this time.   4. Mitchell Alberto understands the importance of calling the [facility] if he notices any s/sx of bleeding, stroke, or abnormal bruising, if any changes are made to his medications or medication doses (Rx, OTC, herbal), or if any upcoming procedures are scheduled. Mitchell Alberto will likewise let us know if any other changes, questions, or concerns arise regarding anticoagulation therapy. Mitchell Alberto voiced understanding of this information and confirms that he has the [facility]'s contact information.   Mr. Alberto verbalized understanding of above.      Pro Humphries, PharmD Candidate 2020 09/12/2019  4:10 PM      IHannah, [facility], have reviewed the note in full and agree with the assessment and plan.  09/12/19  4:17 PM

## 2019-09-17 LAB — INR PPP: 3.3

## 2019-09-18 ENCOUNTER — ANTICOAGULATION VISIT (OUTPATIENT)
Dept: PHARMACY | Facility: HOSPITAL | Age: 54
End: 2019-09-18

## 2019-09-18 DIAGNOSIS — Z95.2 HX OF AORTIC VALVE REPLACEMENT, MECHANICAL: ICD-10-CM

## 2019-09-18 NOTE — PROGRESS NOTES
Anticoagulation Clinic - Remote Progress Note  [facility]  Testing frequency: 7 days    Indication: Atrial Fibrillation and Mechanical Aortic Valve; h/o embolic CVA  Referring Provider: Bunny Spence, milly)  Initial Warfarin Start Date: 11/2012  Planned Duration of Therapy: Life  Goal INR: 2.5-3.5  Current Drug Interactions: citalopram, divalproex;   CHADS-VASc: 3 (HTN, h/o CVA)    Vit K Diet: patient is eating daily samara lettuce salads  Alcohol: none  Tobacco: none       Anticoagulation Clinic INR History:  Date 5/8 5/23 6/6 6/9 6/13 6/28 7/19 8/10 8/30   Total Weekly Dose 52.5mg 60mg 62.5mg 62.5 mg 77.5 mg 57.5 mg 67.5 mg 70mg 70mg   INR 2.2 2.2 1.2 2.2 2.6 2.3 2.4 3.5 1.2   Notes missed dose more vit K dose increase; increase in Vit K foods  boost missed dose   Increase GLV; protein     Date 9/6 9/18 10/9 11/16 11/27 12/4 12/11 12/25 1/4/18   Total Weekly Dose 67.5 mg 60mg 70mg 70mg 70mg 70mg 75mg 75mg 70mg   INR 3.2 1.8 3.4 1.6 1.9 2.5 3.2 2.7 2.0   Notes boost + 1 held dose Missed dose + significant alcohol consumption on 9/16  enox enox    boost     Date 1/15 1/25 2/15 2/23 3/2 3/6 3/15 3/20 3/27 4/4   Total Weekly Dose 75mg 75mg 65mg 75mg 75mg 90mg 65mg 90mg 80mg 75mg   INR 2.9 2.5 2.1 3.3 1.4 2.0, 1.78 (ED) 2.4 3.2 3.5 2.4   Notes   missed   enox; boost; levaquin held x1        Date 4/14 4/20 5/1 5/16 5/27 6/7 6/15 6/22 6/25 7/4   Total Weekly Dose 80mg 80mg 90mg 90mg  (ED) 90mg 75mg 95mg 95mg 107.5mg ???   INR 2.3 2.0 3.2 2.9 3.2 4.1 3.1 1.3 2.0 2.7   Notes     levoflox missed; levoflox levoflox levoflox; refuse lovenox       Date 7/31 8/27 8/31 9/4 9/11 9/27 10/2 10/5 10/15   Total Weekly Dose 105mg  ??? 65mg  ??? 95mg 100mg 80mg  ??? 70mg 90mg 100mg 100mg   INR 2.7 1.3 2.6 5.6 1.6 1.6 2.3 2.4 2.8   Notes self adj self adj; miss; enox   incr GLV, mis-dosing, refuse enox incorrect doses        Date 10/26 11/5 11/7 11/15 12/7 1/11/19 1/24/19 2/8 2/18 3/4 3/26 4/23   Total Weekly Dose  100mg  75mg 105mg 100mg 100mg 100mg 100mg 105mg 100mg 100mg 0mg   INR 4.6 7.9 1.9 5.5, 5.7 2.8 2.4 3.9 2.7 3.6 3.2 4.8 1.14 ED, 1.9 HM   Notes  recalled test strips clinic; 2 dose hold clinic    LVM  clinic       Date 5/7 6/3 6/17 6/24 7/11 7/18 7/22 7/25  8/5 8/12 8/15 8/21   Total Weekly Dose not able to confirm 75mg  ??? 95mg 80 mg 95mg 75mg 100 mg 100mg??? 95 mg 95 mg 75 mg 90 mg   INR 3.5 4.9 2.5 2.3 5.4 2.0 2.2 4.4 4 6.2 3.0 5   Notes s/p surgery; 2x boost  doxy start 6/13 doxy; 1 miss doxy self-held 2 doses   No GLV VPA restarted Held x 1  Dose red x1; restar valproic acid      Date  8/26 8/30 9/6 9/12 9/17          Total Weekly Dose 70 mg 90mg 85mg 85mg 75 mg          INR  2.3 3.9 4.2 4.5 3.3          Notes 1 hold    rec'd 9/18            Phone Interview:  Verbal Release Authorization signed on 11/7/18 -- may speak with Zuleima Alberto (patient's wife)    Tablet Strength: 5mg tablets   Patient Contact: [phone] -- try to call in PM or contact his wife Zuleima at [phone].  His email is [email or web address]     Patient Findings   Negatives:  Signs/symptoms of thrombosis, Signs/symptoms of bleeding, Laboratory test error suspected, Change in health, Change in alcohol use, Change in activity, Upcoming invasive procedure, Emergency department visit, Upcoming dental procedure, Missed doses, Extra doses, Change in medications, Change in diet/appetite, Hospital admission, Bruising, Other complaints     Plan:   1. INR was therapeutic yesterday at 3.3. Instructed his Mr. Alberto and his wife to have him take warfarin 10mg daily except 15mg on WednesdaysSaturday until retest Monday (80mg/week).  2. Repeat INR on Monday, 9/23. Continue to evaluate maintenance dosing needs.    3. Verbal information provided over the phone.  and Mrs. Alberto express understanding by teach back, RBV dosing instructions, and have no further questions at this time.   4. Mitchell Alberto understands the importance of calling the  [facility] if he notices any s/sx of bleeding, stroke, or abnormal bruising, if any changes are made to his medications or medication doses (Rx, OTC, herbal), or if any upcoming procedures are scheduled. Mitchell Alberto will likewise let us know if any other changes, questions, or concerns arise regarding anticoagulation therapy. Mitchell Alberto voiced understanding of this information and confirms that he has the [facility]'s contact information.   Mr. Alberto verbalized understanding of above.      Do Vargas, PharmD  09/18/2019  3:13 PM

## 2019-10-02 ENCOUNTER — ANTICOAGULATION VISIT (OUTPATIENT)
Dept: PHARMACY | Facility: HOSPITAL | Age: 54
End: 2019-10-02

## 2019-10-02 DIAGNOSIS — Z95.2 HX OF AORTIC VALVE REPLACEMENT, MECHANICAL: ICD-10-CM

## 2019-10-02 LAB — INR PPP: 3.9

## 2019-10-02 NOTE — PROGRESS NOTES
Anticoagulation Clinic - Remote Progress Note  [facility]  Testing frequency: 7 days    Indication: Atrial Fibrillation and Mechanical Aortic Valve; h/o embolic CVA  Referring Provider: Bunny Spence, milly)  Initial Warfarin Start Date: 11/2012  Planned Duration of Therapy: Life  Goal INR: 2.5-3.5  Current Drug Interactions: citalopram, divalproex;   CHADS-VASc: 3 (HTN, h/o CVA)    Vit K Diet: patient is eating daily samara lettuce salads  Alcohol: none  Tobacco: none       Anticoagulation Clinic INR History:  Date 5/8 5/23 6/6 6/9 6/13 6/28 7/19 8/10 8/30   Total Weekly Dose 52.5mg 60mg 62.5mg 62.5 mg 77.5 mg 57.5 mg 67.5 mg 70mg 70mg   INR 2.2 2.2 1.2 2.2 2.6 2.3 2.4 3.5 1.2   Notes missed dose more vit K dose increase; increase in Vit K foods  boost missed dose   Increase GLV; protein     Date 9/6 9/18 10/9 11/16 11/27 12/4 12/11 12/25 1/4/18   Total Weekly Dose 67.5 mg 60mg 70mg 70mg 70mg 70mg 75mg 75mg 70mg   INR 3.2 1.8 3.4 1.6 1.9 2.5 3.2 2.7 2.0   Notes boost + 1 held dose Missed dose + significant alcohol consumption on 9/16  enox enox    boost     Date 1/15 1/25 2/15 2/23 3/2 3/6 3/15 3/20 3/27 4/4   Total Weekly Dose 75mg 75mg 65mg 75mg 75mg 90mg 65mg 90mg 80mg 75mg   INR 2.9 2.5 2.1 3.3 1.4 2.0, 1.78 (ED) 2.4 3.2 3.5 2.4   Notes   missed   enox; boost; levaquin held x1        Date 4/14 4/20 5/1 5/16 5/27 6/7 6/15 6/22 6/25 7/4   Total Weekly Dose 80mg 80mg 90mg 90mg  (ED) 90mg 75mg 95mg 95mg 107.5mg ???   INR 2.3 2.0 3.2 2.9 3.2 4.1 3.1 1.3 2.0 2.7   Notes     levoflox missed; levoflox levoflox levoflox; refuse lovenox       Date 7/31 8/27 8/31 9/4 9/11 9/27 10/2 10/5 10/15   Total Weekly Dose 105mg  ??? 65mg  ??? 95mg 100mg 80mg  ??? 70mg 90mg 100mg 100mg   INR 2.7 1.3 2.6 5.6 1.6 1.6 2.3 2.4 2.8   Notes self adj self adj; miss; enox   incr GLV, mis-dosing, refuse enox incorrect doses        Date 10/26 11/5 11/7 11/15 12/7 1/11/19 1/24/19 2/8 2/18 3/4 3/26 4/23   Total Weekly Dose  100mg  75mg 105mg 100mg 100mg 100mg 100mg 105mg 100mg 100mg 0mg   INR 4.6 7.9 1.9 5.5, 5.7 2.8 2.4 3.9 2.7 3.6 3.2 4.8 1.14 ED, 1.9 HM   Notes  recalled test strips clinic; 2 dose hold clinic    LVM  clinic       Date 5/7 6/3 6/17 6/24 7/11 7/18 7/22 7/25  8/5 8/12 8/15 8/21   Total Weekly Dose not able to confirm 75mg  ??? 95mg 80 mg 95mg 75mg 100 mg 100mg??? 95 mg 95 mg 75 mg 90 mg   INR 3.5 4.9 2.5 2.3 5.4 2.0 2.2 4.4 4 6.2 3.0 5   Notes s/p surgery; 2x boost  doxy start 6/13 doxy; 1 miss doxy self-held 2 doses   No GLV VPA restarted Held x 1  Dose red x1; restar valproic acid      Date  8/26 8/30 9/6 9/12 9/17 10/2         Total Weekly Dose 70 mg 90mg 85mg 85mg 75 mg 80 mg 77.5mg        INR  2.3 3.9 4.2 4.5 3.3  3.9         Notes 1 hold    rec'd 9/18            Phone Interview:  Verbal Release Authorization signed on 11/7/18 -- may speak with Zuleima Alberto (patient's wife)    Tablet Strength: 5mg tablets   Patient Contact: [phone] -- try to call in PM or contact his wife Zuleima at [phone].  His email is [email or web address]     Patient Findings   Negatives:  Signs/symptoms of thrombosis, Signs/symptoms of bleeding, Laboratory test error suspected, Change in health, Change in alcohol use, Change in activity, Upcoming invasive procedure, Emergency department visit, Upcoming dental procedure, Missed doses, Extra doses, Change in medications, Change in diet/appetite, Hospital admission, Bruising, Other complaints   Comments:  Zuleima stated that they are watching his food intake closely.   Denies any changes     Plan:     1. INR is supra therapeutic today at 3.9. Instructed his Mr. Alberto and his wife to have him take warfarin  12.5 mg today, then tomorrow resume 10mg daily except 15mg on WednesdaysSaturday until retests.  2. Repeat INR on Wednesday, 10/9. Continue to evaluate maintenance dosing needs.    3. Verbal information provided over the phone.  and Mrs. Alberto express understanding by  teach back, RBV dosing instructions, and have no further questions at this time.   4. Mitchell Alberto understands the importance of calling the [facility] if he notices any s/sx of bleeding, stroke, or abnormal bruising, if any changes are made to his medications or medication doses (Rx, OTC, herbal), or if any upcoming procedures are scheduled. Mitchell Alberto will likewise let us know if any other changes, questions, or concerns arise regarding anticoagulation therapy. Mitchell Alberto voiced understanding of this information and confirms that he has the [facility]'s contact information.   Mr. Alberto verbalized understanding of above.      Dora Pereira, PharmD  09/18/2019  3:13 PM

## 2019-10-07 DIAGNOSIS — Z95.2 HX OF AORTIC VALVE REPLACEMENT, MECHANICAL: ICD-10-CM

## 2019-10-07 RX ORDER — WARFARIN SODIUM 5 MG/1
TABLET ORAL
Qty: 90 TABLET | Refills: 0 | OUTPATIENT
Start: 2019-10-07

## 2019-10-10 ENCOUNTER — ANTICOAGULATION VISIT (OUTPATIENT)
Dept: PHARMACY | Facility: HOSPITAL | Age: 54
End: 2019-10-10

## 2019-10-10 DIAGNOSIS — Z95.2 HX OF AORTIC VALVE REPLACEMENT, MECHANICAL: ICD-10-CM

## 2019-10-10 LAB — INR PPP: 3.6

## 2019-10-10 NOTE — PROGRESS NOTES
Anticoagulation Clinic - Remote Progress Note  [facility]  Testing frequency: 7 days    Indication: Atrial Fibrillation and Mechanical Aortic Valve; h/o embolic CVA  Referring Provider: Bunny Spence, milly)  Initial Warfarin Start Date: 11/2012  Planned Duration of Therapy: Life  Goal INR: 2.5-3.5  Current Drug Interactions: citalopram, divalproex;   CHADS-VASc: 3 (HTN, h/o CVA)    Vit K Diet: patient is eating daily samara lettuce salads  Alcohol: none  Tobacco: none       Anticoagulation Clinic INR History:  Date 5/8 5/23 6/6 6/9 6/13 6/28 7/19 8/10 8/30   Total Weekly Dose 52.5mg 60mg 62.5mg 62.5 mg 77.5 mg 57.5 mg 67.5 mg 70mg 70mg   INR 2.2 2.2 1.2 2.2 2.6 2.3 2.4 3.5 1.2   Notes missed dose more vit K dose increase; increase in Vit K foods  boost missed dose   Increase GLV; protein     Date 9/6 9/18 10/9 11/16 11/27 12/4 12/11 12/25 1/4/18   Total Weekly Dose 67.5 mg 60mg 70mg 70mg 70mg 70mg 75mg 75mg 70mg   INR 3.2 1.8 3.4 1.6 1.9 2.5 3.2 2.7 2.0   Notes boost + 1 held dose Missed dose + significant alcohol consumption on 9/16  enox enox    boost     Date 1/15 1/25 2/15 2/23 3/2 3/6 3/15 3/20 3/27 4/4   Total Weekly Dose 75mg 75mg 65mg 75mg 75mg 90mg 65mg 90mg 80mg 75mg   INR 2.9 2.5 2.1 3.3 1.4 2.0, 1.78 (ED) 2.4 3.2 3.5 2.4   Notes   missed   enox; boost; levaquin held x1        Date 4/14 4/20 5/1 5/16 5/27 6/7 6/15 6/22 6/25 7/4   Total Weekly Dose 80mg 80mg 90mg 90mg  (ED) 90mg 75mg 95mg 95mg 107.5mg ???   INR 2.3 2.0 3.2 2.9 3.2 4.1 3.1 1.3 2.0 2.7   Notes     levoflox missed; levoflox levoflox levoflox; refuse lovenox       Date 7/31 8/27 8/31 9/4 9/11 9/27 10/2 10/5 10/15   Total Weekly Dose 105mg  ??? 65mg  ??? 95mg 100mg 80mg  ??? 70mg 90mg 100mg 100mg   INR 2.7 1.3 2.6 5.6 1.6 1.6 2.3 2.4 2.8   Notes self adj self adj; miss; enox   incr GLV, mis-dosing, refuse enox incorrect doses        Date 10/26 11/5 11/7 11/15 12/7 1/11/19 1/24/19 2/8 2/18 3/4 3/26 4/23   Total Weekly Dose  100mg  75mg 105mg 100mg 100mg 100mg 100mg 105mg 100mg 100mg 0mg   INR 4.6 7.9 1.9 5.5, 5.7 2.8 2.4 3.9 2.7 3.6 3.2 4.8 1.14 ED, 1.9 HM   Notes  recalled test strips clinic; 2 dose hold clinic    LVM  clinic       Date 5/7 6/3 6/17 6/24 7/11 7/18 7/22 7/25  8/5 8/12 8/15 8/21   Total Weekly Dose not able to confirm 75mg  ??? 95mg 80 mg 95mg 75mg 100 mg 100mg??? 95 mg 95 mg 75 mg 90 mg   INR 3.5 4.9 2.5 2.3 5.4 2.0 2.2 4.4 4 6.2 3.0 5   Notes s/p surgery; 2x boost  doxy start 6/13 doxy; 1 miss doxy self-held 2 doses   No GLV VPA restarted Held x 1  Dose red x1; restar valproic acid      Date  8/26 8/30 9/6 9/12 9/17 10/2 10/10        Total Weekly Dose 70 mg 90mg 85mg 85mg 75 mg 80 mg 80 mg        INR  2.3 3.9 4.2 4.5 3.3  3.9 3.6        Notes 1 hold    rec'd 9/18            Phone Interview:  Verbal Release Authorization signed on 11/7/18 -- may speak with Zuleima Alberto (patient's wife)    Tablet Strength: 5mg tablets   Patient Contact: [phone] -- try to call in PM or contact his wife Zuleima at [phone].  His email is [email or web address]     Patient Findings   Negatives:  Signs/symptoms of thrombosis, Signs/symptoms of bleeding, Laboratory test error suspected, Change in health, Change in alcohol use, Change in activity, Upcoming invasive procedure, Emergency department visit, Upcoming dental procedure, Missed doses, Extra doses, Change in medications, Change in diet/appetite, Hospital admission, Bruising, Other complaints   Comments:  Denies changes     Plan:   1. INR is slightly supra therapeutic today at 3.6. After multiple VM, was able to reach them today.  At this point, it does not appear that the instructions left on voice mail were followed.   Instructed his Mr. Alberto and his wife to continue regimen they followed from previous week of warfarin 10mg oral daily except 12.5mg on Wednesdays and 15 mg on Saturdays until retests.  2. Repeat INR on Wednesday, 10/16. Continue to evaluate maintenance  dosing needs.    3. Verbal information provided over the phone.  and Mrs. Alberto express understanding by teach back, RBV dosing instructions, and have no further questions at this time.   4. Mitchell Alberto understands the importance of calling the [facility] if he notices any s/sx of bleeding, stroke, or abnormal bruising, if any changes are made to his medications or medication doses (Rx, OTC, herbal), or if any upcoming procedures are scheduled. Mitchell Alberto will likewise let us know if any other changes, questions, or concerns arise regarding anticoagulation therapy. Mitchell Alberto voiced understanding of this information and confirms that he has the [facility]'s contact information.   Mr. Alberto verbalized understanding of above.      Dora Pereira, PharmD  09/18/2019  3:13 PM    Discussed plan below with wife Ivis- she wrote down and RBV dosing directions. iVent placed. 15 [facility]ox [facility]. They may look into a GoodRx coupon.  SCr: 0.88mg/dL on 04/22/2019.   Weight 300lbs (136.36kg) 10/14/2019 per Dr. Silva office  Enoxaparin 150mg/mL syringe- Sig: squeeze out 0.1mL and administer 0.9mL SubQ q12h     10/18: Hold warfarin  10/19: Hold warfarin; begin Lovenox in PM only  10/20: Hold warfarin; Lovenox q12h  10/21: Hold warfarin; Lovenox q12h  10/22: Hold warfarin; Lovenox in AM only  10/23: Prostate biopsy- resume warfarin boosted dose and Lovenox PM only  10/24: warfarin boosted dose and Lovenox q12h  10/25: Warfarin resume prior maintenance plan and Lovenox q12h  ...  10/28: Repeat INR

## 2019-10-16 ENCOUNTER — TELEPHONE (OUTPATIENT)
Dept: PHARMACY | Facility: HOSPITAL | Age: 54
End: 2019-10-16

## 2019-10-16 NOTE — TELEPHONE ENCOUNTER
Dr. Hollis,    Dr. Homer Silva' office ([phone]- [place]) called today to discuss a prostate biopsy set up for 10/23/2019. They have requested a warfarin 5 day dose hold with Lovenox bridge.     SCr: 0.88mg/dL on 04/22/2019.   Weight 300lbs (136.36kg) 10/14/2019 per Dr. Silva office  Enoxaparin 150mg/mL syringe- Sig: squeeze out 0.1mL and administer 0.9mL SubQ q12h      10/18: Hold warfarin  10/19: Hold warfarin; begin Lovenox in PM only  10/20: Hold warfarin; Lovenox q12h  10/21: Hold warfarin; Lovenox q12h  10/22: Hold warfarin; Lovenox in AM only  10/23: Prostate biopsy- resume warfarin boosted dose and Lovenox PM only  10/24: warfarin boosted dose and Lovenox q12h  10/25: Warfarin resume prior maintenance plan and Lovenox q12h  ...  10/28: Repeat INR    Please advise if you are agreeable to the plan above for Mr. Alberto or if you would prefer an alternative approach.    Thank you,    Do

## 2019-10-31 NOTE — TELEPHONE ENCOUNTER
"Spoke with Mr. Alberto this morning as he was due to recheck his INR on 10/28 (none received as of 10/31).    He stated that he had a \" horrific amount of bleeding on Monday morning.\"  He went to his Urologist and the bleeding has been diminishing.     He is currently out of strips but expects them to arrive today and will test at that time.  If not, he was agreeable to coming to the clinic; however, not until tomorrow.    He refused going to lab today.    Dora Pereira, PharmD    "

## 2019-11-01 ENCOUNTER — TELEPHONE (OUTPATIENT)
Dept: PHARMACY | Facility: HOSPITAL | Age: 54
End: 2019-11-01

## 2019-11-04 ENCOUNTER — ANTICOAGULATION VISIT (OUTPATIENT)
Dept: PHARMACY | Facility: HOSPITAL | Age: 54
End: 2019-11-04

## 2019-11-04 DIAGNOSIS — Z95.2 HX OF AORTIC VALVE REPLACEMENT, MECHANICAL: ICD-10-CM

## 2019-11-04 LAB — INR PPP: 1.7

## 2019-11-04 NOTE — PROGRESS NOTES
Anticoagulation Clinic - Remote Progress Note  [facility]  Testing frequency: 7 days    Indication: Atrial Fibrillation and Mechanical Aortic Valve; h/o embolic CVA  Referring Provider: Bunny Spence, milly)  Initial Warfarin Start Date: 11/2012  Planned Duration of Therapy: Life  Goal INR: 2.5-3.5  Current Drug Interactions: citalopram, divalproex;   CHADS-VASc: 3 (HTN, h/o CVA)    Vit K Diet: patient is eating daily samara lettuce salads  Alcohol: none  Tobacco: none       Anticoagulation Clinic INR History:  Date 5/8 5/23 6/6 6/9 6/13 6/28 7/19 8/10 8/30   Total Weekly Dose 52.5mg 60mg 62.5mg 62.5 mg 77.5 mg 57.5 mg 67.5 mg 70mg 70mg   INR 2.2 2.2 1.2 2.2 2.6 2.3 2.4 3.5 1.2   Notes missed dose more vit K dose increase; increase in Vit K foods  boost missed dose   Increase GLV; protein     Date 9/6 9/18 10/9 11/16 11/27 12/4 12/11 12/25 1/4/18   Total Weekly Dose 67.5 mg 60mg 70mg 70mg 70mg 70mg 75mg 75mg 70mg   INR 3.2 1.8 3.4 1.6 1.9 2.5 3.2 2.7 2.0   Notes boost + 1 held dose Missed dose + significant alcohol consumption on 9/16  enox enox    boost     Date 1/15 1/25 2/15 2/23 3/2 3/6 3/15 3/20 3/27 4/4   Total Weekly Dose 75mg 75mg 65mg 75mg 75mg 90mg 65mg 90mg 80mg 75mg   INR 2.9 2.5 2.1 3.3 1.4 2.0, 1.78 (ED) 2.4 3.2 3.5 2.4   Notes   missed   enox; boost; levaquin held x1        Date 4/14 4/20 5/1 5/16 5/27 6/7 6/15 6/22 6/25 7/4   Total Weekly Dose 80mg 80mg 90mg 90mg  (ED) 90mg 75mg 95mg 95mg 107.5mg ???   INR 2.3 2.0 3.2 2.9 3.2 4.1 3.1 1.3 2.0 2.7   Notes     levoflox missed; levoflox levoflox levoflox; refuse lovenox       Date 7/31 8/27 8/31 9/4 9/11 9/27 10/2 10/5 10/15   Total Weekly Dose 105mg  ??? 65mg  ??? 95mg 100mg 80mg  ??? 70mg 90mg 100mg 100mg   INR 2.7 1.3 2.6 5.6 1.6 1.6 2.3 2.4 2.8   Notes self adj self adj; miss; enox   incr GLV, mis-dosing, refuse enox incorrect doses        Date 10/26 11/5 11/7 11/15 12/7 1/11/19 1/24/19 2/8 2/18 3/4 3/26 4/23   Total Weekly Dose  100mg  75mg 105mg 100mg 100mg 100mg 100mg 105mg 100mg 100mg 0mg   INR 4.6 7.9 1.9 5.5, 5.7 2.8 2.4 3.9 2.7 3.6 3.2 4.8 1.14 ED, 1.9 HM   Notes  recalled test strips clinic; 2 dose hold clinic    LVM  clinic       Date 5/7 6/3 6/17 6/24 7/11 7/18 7/22 7/25  8/5 8/12 8/15 8/21   Total Weekly Dose not able to confirm 75mg  ??? 95mg 80 mg 95mg 75mg 100 mg 100mg??? 95 mg 95 mg 75 mg 90 mg   INR 3.5 4.9 2.5 2.3 5.4 2.0 2.2 4.4 4 6.2 3.0 5   Notes s/p surgery; 2x boost  doxy start 6/13 doxy; 1 miss doxy self-held 2 doses   No GLV VPA restarted Held x 1      Date  8/26 8/30 9/6 9/12 9/17 10/2 10/10 11/4       Total Weekly Dose 70 mg 90mg 85mg 85mg 75 mg 80 mg 80 mg 62.5 mg       INR  2.3 3.9 4.2 4.5 3.3  3.9 3.6 1.7       Notes 1 hold    rec'd 9/18   1 miss         Phone Interview:  Verbal Release Authorization signed on 11/7/18 -- may speak with Zuleima Alberto (patient's wife)    Tablet Strength: 5mg tablets   Patient Contact: [phone] -- try to call in PM or contact his wife Zuleima at [phone].  His email is [email or web address]     Patient Findings:  Positives:  Missed doses   Negatives:  Signs/symptoms of thrombosis, Signs/symptoms of bleeding, Laboratory test error suspected, Change in health, Change in alcohol use, Change in activity, Upcoming invasive procedure, Emergency department visit, Upcoming dental procedure, Extra doses, Change in medications, Change in diet/appetite, Hospital admission, Bruising, Other complaints   Comments:  Mr. Alberto reports he ran out of warfarin this weekend so did not take any on Saturday evening. He has since gotten his rx refilled.   His wife, Zuleima, is now managing his medications     Plan:   1. INR is subtherapeutic today, likely attributable to missed dose this weekend. For now, instructed Mr. Alberto's wife to have him take warfarin 10mg daily except 15mg MonWed.  2. Repeat INR on Friday to ensure it is back WNL.   3. Verbal information provided over the phone.  Mrs. Alberto expresses understanding by teach back and has no further questions at this time.   4. Mitchell Alberto understands the importance of calling the [facility] if he notices any s/sx of bleeding, stroke, or abnormal bruising, if any changes are made to his medications or medication doses (Rx, OTC, herbal), or if any upcoming procedures are scheduled. Mitchell Alberto will likewise let us know if any other changes, questions, or concerns arise regarding anticoagulation therapy. Mitchell Alberto voiced understanding of this information and confirms that he has the [facility]'s contact information.    Liane Diaz, PharmD  11/4/2019  11:20 AM

## 2019-11-12 ENCOUNTER — ANTICOAGULATION VISIT (OUTPATIENT)
Dept: PHARMACY | Facility: HOSPITAL | Age: 54
End: 2019-11-12

## 2019-11-12 DIAGNOSIS — Z95.2 HX OF AORTIC VALVE REPLACEMENT, MECHANICAL: ICD-10-CM

## 2019-11-12 LAB — INR PPP: 3.4

## 2019-11-12 RX ORDER — BUPROPION HYDROCHLORIDE 100 MG/1
100 TABLET ORAL DAILY
COMMUNITY
End: 2021-03-09 | Stop reason: SDUPTHER

## 2019-11-12 NOTE — PROGRESS NOTES
Anticoagulation Clinic - Remote Progress Note  [facility]  Testing frequency: 7 days    Indication: Atrial Fibrillation and Mechanical Aortic Valve; h/o embolic CVA  Referring Provider: Bunny Spence, milly)  Initial Warfarin Start Date: 11/2012  Planned Duration of Therapy: Life  Goal INR: 2.5-3.5  Current Drug Interactions: citalopram  CHADS-VASc: 3 (HTN, h/o CVA)    Vit K Diet: patient is eating daily samara lettuce salads  Alcohol: none  Tobacco: none       Anticoagulation Clinic INR History:  Date 5/8 5/23 6/6 6/9 6/13 6/28 7/19 8/10 8/30   Total Weekly Dose 52.5mg 60mg 62.5mg 62.5 mg 77.5 mg 57.5 mg 67.5 mg 70mg 70mg   INR 2.2 2.2 1.2 2.2 2.6 2.3 2.4 3.5 1.2   Notes missed dose more vit K dose increase; increase in Vit K foods  boost missed dose   Increase GLV; protein     Date 9/6 9/18 10/9 11/16 11/27 12/4 12/11 12/25 1/4/18   Total Weekly Dose 67.5 mg 60mg 70mg 70mg 70mg 70mg 75mg 75mg 70mg   INR 3.2 1.8 3.4 1.6 1.9 2.5 3.2 2.7 2.0   Notes boost + 1 held dose Missed dose + significant alcohol consumption on 9/16  enox enox    boost     Date 1/15 1/25 2/15 2/23 3/2 3/6 3/15 3/20 3/27 4/4   Total Weekly Dose 75mg 75mg 65mg 75mg 75mg 90mg 65mg 90mg 80mg 75mg   INR 2.9 2.5 2.1 3.3 1.4 2.0, 1.78 (ED) 2.4 3.2 3.5 2.4   Notes   missed   enox; boost; levaquin held x1        Date 4/14 4/20 5/1 5/16 5/27 6/7 6/15 6/22 6/25 7/4   Total Weekly Dose 80mg 80mg 90mg 90mg  (ED) 90mg 75mg 95mg 95mg 107.5mg ???   INR 2.3 2.0 3.2 2.9 3.2 4.1 3.1 1.3 2.0 2.7   Notes     levoflox missed; levoflox levoflox levoflox; refuse lovenox       Date 7/31 8/27 8/31 9/4 9/11 9/27 10/2 10/5 10/15   Total Weekly Dose 105mg  ??? 65mg  ??? 95mg 100mg 80mg  ??? 70mg 90mg 100mg 100mg   INR 2.7 1.3 2.6 5.6 1.6 1.6 2.3 2.4 2.8   Notes self adj self adj; miss; enox   incr GLV, mis-dosing, refuse enox incorrect doses        Date 10/26 11/5 11/7 11/15 12/7 1/11/19 1/24/19 2/8 2/18 3/4 3/26 4/23   Total Weekly Dose 100mg  75mg 105mg  100mg 100mg 100mg 100mg 105mg 100mg 100mg 0mg   INR 4.6 7.9 1.9 5.5, 5.7 2.8 2.4 3.9 2.7 3.6 3.2 4.8 1.14 ED, 1.9 HM   Notes  recalled test strips clinic; 2 dose hold clinic    LVM  clinic       Date 5/7 6/3 6/17 6/24 7/11 7/18 7/22 7/25  8/5 8/12 8/15 8/21   Total Weekly Dose not able to confirm 75mg  ??? 95mg 80 mg 95mg 75mg 100 mg 100mg??? 95 mg 95 mg 75 mg 90 mg   INR 3.5 4.9 2.5 2.3 5.4 2.0 2.2 4.4 4 6.2 3.0 5   Notes s/p surgery; 2x boost  doxy start 6/13 doxy; 1  doxy self-held 2 doses   No GLV VPA restarted Held x 1      Date  8/26 8/30 9/6 9/12 9/17 10/2 10/10 11/4 11/12      Total Weekly Dose 70 mg 90mg 85mg 85mg 75 mg 80 mg 80 mg 62.5 mg 85mg 82.5mg     INR  2.3 3.9 4.2 4.5 3.3  3.9 3.6 1.7 3.4      Notes 1 hold    rec'd 9/18   1  Patient took 85mg (Planned for 80mg)        Phone Interview:  Verbal Release Authorization signed on 11/7/18 -- may speak with Zuleima Alberto (patient's wife)    Tablet Strength: 5mg tablets   Patient Contact: [phone] -- try to call in PM or contact his wife Zuleima at [phone].  His email is [email or web address]     Patient Findings:  Positives:  Change in medications   Negatives:  Signs/symptoms of thrombosis, Signs/symptoms of bleeding, Laboratory test error suspected, Change in health, Change in alcohol use, Change in activity, Upcoming invasive procedure, Emergency department visit, Upcoming dental procedure, Missed doses, Extra doses, Change in diet/appetite, Hospital admission, Bruising, Other complaints   Comments:  Mrs. Alberto stated that Mr. Alberto eats GLV two times a week this has not been changed recently. Mrs. Alberto informed me that Mr. Alberto has stop taking depakote and is now taking wellbutrin. He denies any recent diet changes, medication changes, or signs/symptoms of bleeding. He has not had any missed doses.       Plan:   1. INR is therapeutic today at 3.4. Mrs. Alberto informed me that Mr. Alebrto took 85mg weekly dose last week (updated  in tracker). With INR therapeutic and trended up instructed Mrs. Alberto to have Mr. Alberto take 82.5 mg weekly dose (Warfarin 15 mg Sun, Wed, 12.5mg on Friday and 10mg on all other days)  2. Repeat INR in one week on 11/19.  3. Verbal information provided over the phone. Mrs. Alberto expresses understanding by teach back and has no further questions at this time.   4. Mitchellus FABIOLA Alberto understands the importance of calling the [facility] if he notices any s/sx of bleeding, stroke, or abnormal bruising, if any changes are made to his medications or medication doses (Rx, OTC, herbal), or if any upcoming procedures are scheduled. Mitchell Alberto will likewise let us know if any other changes, questions, or concerns arise regarding anticoagulation therapy. Mitchell Alberto voiced understanding of this information and confirms that he has the [facility]'s contact information.    Thanks  Cuong Keith, PharmD  PGY1 Resident  11/12/2019  11:25 AM

## 2019-11-22 ENCOUNTER — ANTICOAGULATION VISIT (OUTPATIENT)
Dept: PHARMACY | Facility: HOSPITAL | Age: 54
End: 2019-11-22

## 2019-11-22 DIAGNOSIS — Z95.2 HX OF AORTIC VALVE REPLACEMENT, MECHANICAL: ICD-10-CM

## 2019-11-22 LAB — INR PPP: 4.7

## 2019-11-22 NOTE — PROGRESS NOTES
Anticoagulation Clinic - Remote Progress Note  [facility]  Testing frequency: 7 days    Indication: Atrial Fibrillation and Mechanical Aortic Valve; h/o embolic CVA  Referring Provider: Bunny chacon seen 1/2019 (milly Giraldo)  Initial Warfarin Start Date: 11/2012  Planned Duration of Therapy: Life  Goal INR: 2.5-3.5  Current Drug Interactions: citalopram  CHADS-VASc: 3 (HTN, h/o CVA)    Vit K Diet: patient is eating daily samara lettuce salads  Alcohol: none  Tobacco: none       Anticoagulation Clinic INR History:  Date 5/8 5/23 6/6 6/9 6/13 6/28 7/19 8/10 8/30   Total Weekly Dose 52.5mg 60mg 62.5mg 62.5 mg 77.5 mg 57.5 mg 67.5 mg 70mg 70mg   INR 2.2 2.2 1.2 2.2 2.6 2.3 2.4 3.5 1.2   Notes missed dose more vit K dose increase; increase in Vit K foods  boost missed dose   Increase GLV; protein     Date 9/6 9/18 10/9 11/16 11/27 12/4 12/11 12/25 1/4/18   Total Weekly Dose 67.5 mg 60mg 70mg 70mg 70mg 70mg 75mg 75mg 70mg   INR 3.2 1.8 3.4 1.6 1.9 2.5 3.2 2.7 2.0   Notes boost + 1 held dose Missed dose + significant alcohol consumption on 9/16  enox enox    boost     Date 1/15 1/25 2/15 2/23 3/2 3/6 3/15 3/20 3/27 4/4   Total Weekly Dose 75mg 75mg 65mg 75mg 75mg 90mg 65mg 90mg 80mg 75mg   INR 2.9 2.5 2.1 3.3 1.4 2.0, 1.78 (ED) 2.4 3.2 3.5 2.4   Notes   missed   enox; boost; levaquin held x1        Date 4/14 4/20 5/1 5/16 5/27 6/7 6/15 6/22 6/25 7/4   Total Weekly Dose 80mg 80mg 90mg 90mg  (ED) 90mg 75mg 95mg 95mg 107.5mg ???   INR 2.3 2.0 3.2 2.9 3.2 4.1 3.1 1.3 2.0 2.7   Notes     levoflox missed; levoflox levoflox levoflox; refuse lovenox       Date 7/31 8/27 8/31 9/4 9/11 9/27 10/2 10/5 10/15   Total Weekly Dose 105mg  ??? 65mg  ??? 95mg 100mg 80mg  ??? 70mg 90mg 100mg 100mg   INR 2.7 1.3 2.6 5.6 1.6 1.6 2.3 2.4 2.8   Notes self adj self adj; miss; enox   incr GLV, mis-dosing, refuse enox incorrect doses        Date 10/26 11/5 11/7 11/15 12/7 1/11/19 1/24/19 2/8 2/18 3/4 3/26 4/23   Total Weekly Dose  100mg  75mg 105mg 100mg 100mg 100mg 100mg 105mg 100mg 100mg 0mg   INR 4.6 7.9 1.9 5.5, 5.7 2.8 2.4 3.9 2.7 3.6 3.2 4.8 1.14 ED, 1.9 HM   Notes  recalled test strips clinic; 2 dose hold clinic    LVM  clinic       Date 5/7 6/3 6/17 6/24 7/11 7/18 7/22 7/25  8/5 8/12 8/15 8/21   Total Weekly Dose not able to confirm 75mg  ??? 95mg 80 mg 95mg 75mg 100 mg 100mg??? 95 mg 95 mg 75 mg 90 mg   INR 3.5 4.9 2.5 2.3 5.4 2.0 2.2 4.4 4 6.2 3.0 5   Notes s/p surgery; 2x boost  doxy start 6/13 doxy; 1  doxy self-held 2 doses   No GLV VPA restarted Held x 1      Date  8/26 8/30 9/6 9/12 9/17 10/2 10/10 11/4 11/12 11/22     Total Weekly Dose 70 mg 90mg 85mg 85mg 75 mg 80 mg 80 mg 62.5 mg 85mg 82.5mg     INR  2.3 3.9 4.2 4.5 3.3  3.9 3.6 1.7 3.4 4.7     Notes 1 hold    rec'd 9/18   1  Patient took 85mg (Planned for 80mg)        Phone Interview:  Verbal Release Authorization signed on 11/7/18 -- may speak with Zuleima Alberto (patient's wife)    Tablet Strength: 5mg tablets   Patient Contact: [phone] -- try to call in PM or contact his wife Zuleima at [phone].  His email is [email or web address]     Patient Findings   Negatives:  Signs/symptoms of thrombosis, Signs/symptoms of bleeding, Laboratory test error suspected, Change in health, Change in alcohol use, Change in activity, Upcoming invasive procedure, Emergency department visit, Upcoming dental procedure, Missed doses, Extra doses, Change in medications, Change in diet/appetite, Hospital admission, Bruising, Other complaints   Comments:  Mrs. Alberto reports that they haven't changed anything. He has been under and extreme amount of stress and has been eating more. He does report that he has seen an increase in 24lbs over the past two months. He is going tomorrow to have some labs drawn at [facility] lab on [facility] tomorrow about 9am. He will call us about 1400 if he doesn't hear from us at that point.      Plan:   1. INR is SUPRAtherapeutic today at 4.7 and  non compliant with follow up date.  Instructed Mrs. Alberto to have  take 10mg tonight and repeat INR draw with POCT and venipuncture. May consider new dose of 10mg daily except 12.5mg SunWedFri.  2. Repeat INR tomorrow with other blood tests for his PCP and using his POCT device. Placed Pt/INR order for patient at [facility] phone: [phone] fax: [phone]. Spoke with Melissa to confirm he has other labs placed. She will call if she doesn't receive our order for a PT/INR. Contact Ivis at that time.  3. Also discussed possibility of changing to venipunctures only or to brand name coumadin.   4. Verbal information provided over the phone to Mrs. Alberto and Mr. Alberto. Mrs. Alberto expresses understanding by teach back and has no further questions at this time.     Do Vargas, PharmD  11/22/2019  9:29 AM

## 2019-11-26 ENCOUNTER — TELEPHONE (OUTPATIENT)
Dept: PHARMACY | Facility: HOSPITAL | Age: 54
End: 2019-11-26

## 2019-11-26 NOTE — TELEPHONE ENCOUNTER
Have called the lab multiple times with two LVM. Have also LVM for patient. Sam sent over for INR by medical records.

## 2019-11-27 NOTE — TELEPHONE ENCOUNTER
LVM with patient's wife- point of contact today. Also called [employer] [facility] lab- unable to get in contact with representative. Also called  healthcare for more direct number. They provided [phone]

## 2019-12-10 ENCOUNTER — ANTICOAGULATION VISIT (OUTPATIENT)
Dept: PHARMACY | Facility: HOSPITAL | Age: 54
End: 2019-12-10

## 2019-12-10 DIAGNOSIS — Z95.2 HX OF AORTIC VALVE REPLACEMENT, MECHANICAL: ICD-10-CM

## 2019-12-10 LAB — INR PPP: 2.4

## 2019-12-10 NOTE — PROGRESS NOTES
Anticoagulation Clinic - Remote Progress Note  [facility]  Testing frequency: 7 days    Indication: Atrial Fibrillation and Mechanical Aortic Valve; h/o embolic CVA  Referring Provider: Bunny chacon seen 1/2019 (milly Giraldo)  Initial Warfarin Start Date: 11/2012  Planned Duration of Therapy: Life  Goal INR: 2.5-3.5  Current Drug Interactions: citalopram  CHADS-VASc: 3 (HTN, h/o CVA)    Vit K Diet: patient is eating daily samara lettuce salads  Alcohol: none  Tobacco: none       Anticoagulation Clinic INR History:  Date 5/8 5/23 6/6 6/9 6/13 6/28 7/19 8/10 8/30   Total Weekly Dose 52.5mg 60mg 62.5mg 62.5 mg 77.5 mg 57.5 mg 67.5 mg 70mg 70mg   INR 2.2 2.2 1.2 2.2 2.6 2.3 2.4 3.5 1.2   Notes missed dose more vit K dose increase; increase in Vit K foods  boost missed dose   Increase GLV; protein     Date 9/6 9/18 10/9 11/16 11/27 12/4 12/11 12/25 1/4/18   Total Weekly Dose 67.5 mg 60mg 70mg 70mg 70mg 70mg 75mg 75mg 70mg   INR 3.2 1.8 3.4 1.6 1.9 2.5 3.2 2.7 2.0   Notes boost + 1 held dose Missed dose + significant alcohol consumption on 9/16  enox enox    boost     Date 1/15 1/25 2/15 2/23 3/2 3/6 3/15 3/20 3/27 4/4   Total Weekly Dose 75mg 75mg 65mg 75mg 75mg 90mg 65mg 90mg 80mg 75mg   INR 2.9 2.5 2.1 3.3 1.4 2.0, 1.78 (ED) 2.4 3.2 3.5 2.4   Notes   missed   enox; boost; levaquin held x1        Date 4/14 4/20 5/1 5/16 5/27 6/7 6/15 6/22 6/25 7/4   Total Weekly Dose 80mg 80mg 90mg 90mg  (ED) 90mg 75mg 95mg 95mg 107.5mg ???   INR 2.3 2.0 3.2 2.9 3.2 4.1 3.1 1.3 2.0 2.7   Notes     levoflox missed; levoflox levoflox levoflox; refuse lovenox       Date 7/31 8/27 8/31 9/4 9/11 9/27 10/2 10/5 10/15   Total Weekly Dose 105mg  ??? 65mg  ??? 95mg 100mg 80mg  ??? 70mg 90mg 100mg 100mg   INR 2.7 1.3 2.6 5.6 1.6 1.6 2.3 2.4 2.8   Notes self adj self adj; miss; enox   incr GLV, mis-dosing, refuse enox incorrect doses        Date 10/26 11/5 11/7 11/15 12/7 1/11/19 1/24/19 2/8 2/18 3/4 3/26 4/23   Total Weekly Dose  100mg  75mg 105mg 100mg 100mg 100mg 100mg 105mg 100mg 100mg 0mg   INR 4.6 7.9 1.9 5.5, 5.7 2.8 2.4 3.9 2.7 3.6 3.2 4.8 1.14 ED, 1.9 HM   Notes  recalled test strips clinic; 2 dose hold clinic    LVM  clinic       Date 5/7 6/3 6/17 6/24 7/11 7/18 7/22 7/25  8/5 8/12 8/15 8/21   Total Weekly Dose not able to confirm 75mg  ??? 95mg 80 mg 95mg 75mg 100 mg 100mg??? 95 mg 95 mg 75 mg 90 mg   INR 3.5 4.9 2.5 2.3 5.4 2.0 2.2 4.4 4 6.2 3.0 5   Notes s/p surgery; 2x boost  doxy start 6/13 doxy; 1 miss doxy self-held 2 doses   No GLV VPA restarted Held x 1      Date  8/26 8/30 9/6 9/12 9/17 10/2 10/10 11/4 11/12 11/22 12/10    Total Weekly Dose 70 mg 90mg 85mg 85mg 75 mg 80 mg 80 mg 62.5 mg 85mg 82.5mg 70mg    INR  2.3 3.9 4.2 4.5 3.3  3.9 3.6 1.7 3.4 4.7 2.4    Notes 1 hold    rec'd 9/18   1  Patient took 85mg (Planned for 80mg)  Self-adjusted dose      Phone Interview:  Verbal Release Authorization signed on 11/7/18 -- may speak with Zuleimazuhair Alberto (patient's wife)    Tablet Strength: 5mg tablets   Patient Contact: [phone] -- try to call in PM or contact his wife Zuleima at [phone].  His email is [email or web address]     Patient Findings   Positives:  Missed doses   Negatives:  Signs/symptoms of thrombosis, Signs/symptoms of bleeding, Laboratory test error suspected, Change in health, Change in alcohol use, Change in activity, Upcoming invasive procedure, Emergency department visit, Upcoming dental procedure, Extra doses, Change in medications, Change in diet/appetite, Hospital admission, Bruising, Other complaints   Comments:  Spoke with Ivis, she says no medication changes have been made since Mr Alberto had his bloodwork drawn at . She says she has only been able to get him to take 10mg (two tablets) daily since we last spoke rather than the directed 12.5mg twice weekly       Plan:   1. INR is slightly subtherapeutic today at 2.4.  Instructed Mrs. Alberto to have Mr Alberto take 12.5mg tonight and  continue 10mg daily except 12.5mg Tuesday.  2. Repeat INR in 1 week  3. Verbal information provided over the phone to Mrs. Alberto and Mr. Alberto. Mrs. Alberto expresses understanding by teach back and has no further questions at this time.     Sonya Bustillos, GiovaniD.  12/10/19   1:18 PM

## 2020-01-02 RX ORDER — FUROSEMIDE 40 MG/1
TABLET ORAL
Qty: 90 TABLET | Refills: 0 | Status: SHIPPED | OUTPATIENT
Start: 2020-01-02 | End: 2020-04-30

## 2020-01-03 ENCOUNTER — ANTICOAGULATION VISIT (OUTPATIENT)
Dept: PHARMACY | Facility: HOSPITAL | Age: 55
End: 2020-01-03

## 2020-01-03 DIAGNOSIS — Z95.2 HX OF AORTIC VALVE REPLACEMENT, MECHANICAL: ICD-10-CM

## 2020-01-03 LAB — INR PPP: 3.6

## 2020-01-03 NOTE — PROGRESS NOTES
Anticoagulation Clinic - Remote Progress Note  [facility]  Testing frequency: 7 days    Indication: Atrial Fibrillation and Mechanical Aortic Valve; h/o embolic CVA  Referring Provider: Bunny chacon seen 1/2019 (milly Giraldo)  Initial Warfarin Start Date: 11/2012  Planned Duration of Therapy: Life  Goal INR: 2.5-3.5  Current Drug Interactions: citalopram  CHADS-VASc: 3 (HTN, h/o CVA)    Vit K Diet: patient is eating daily samara lettuce salads  Alcohol: none  Tobacco: none       Anticoagulation Clinic INR History:  Date 5/8 5/23 6/6 6/9 6/13 6/28 7/19 8/10 8/30   Total Weekly Dose 52.5mg 60mg 62.5mg 62.5 mg 77.5 mg 57.5 mg 67.5 mg 70mg 70mg   INR 2.2 2.2 1.2 2.2 2.6 2.3 2.4 3.5 1.2   Notes missed dose more vit K dose increase; increase in Vit K foods  boost missed dose   Increase GLV; protein     Date 9/6 9/18 10/9 11/16 11/27 12/4 12/11 12/25 1/4/18   Total Weekly Dose 67.5 mg 60mg 70mg 70mg 70mg 70mg 75mg 75mg 70mg   INR 3.2 1.8 3.4 1.6 1.9 2.5 3.2 2.7 2.0   Notes boost + 1 held dose Missed dose + significant alcohol consumption on 9/16  enox enox    boost     Date 1/15 1/25 2/15 2/23 3/2 3/6 3/15 3/20 3/27 4/4   Total Weekly Dose 75mg 75mg 65mg 75mg 75mg 90mg 65mg 90mg 80mg 75mg   INR 2.9 2.5 2.1 3.3 1.4 2.0, 1.78 (ED) 2.4 3.2 3.5 2.4   Notes   missed   enox; boost; levaquin held x1        Date 4/14 4/20 5/1 5/16 5/27 6/7 6/15 6/22 6/25 7/4   Total Weekly Dose 80mg 80mg 90mg 90mg  (ED) 90mg 75mg 95mg 95mg 107.5mg ???   INR 2.3 2.0 3.2 2.9 3.2 4.1 3.1 1.3 2.0 2.7   Notes     levoflox missed; levoflox levoflox levoflox; refuse lovenox       Date 7/31 8/27 8/31 9/4 9/11 9/27 10/2 10/5 10/15   Total Weekly Dose 105mg  ??? 65mg  ??? 95mg 100mg 80mg  ??? 70mg 90mg 100mg 100mg   INR 2.7 1.3 2.6 5.6 1.6 1.6 2.3 2.4 2.8   Notes self adj self adj; miss; enox   incr GLV, mis-dosing, refuse enox incorrect doses        Date 10/26 11/5 11/7 11/15 12/7 1/11/19 1/24/19 2/8 2/18 3/4 3/26 4/23   Total Weekly Dose  100mg  75mg 105mg 100mg 100mg 100mg 100mg 105mg 100mg 100mg 0mg   INR 4.6 7.9 1.9 5.5, 5.7 2.8 2.4 3.9 2.7 3.6 3.2 4.8 1.14 ED, 1.9 HM   Notes  recalled test strips clinic; 2 dose hold clinic    LVM  clinic       Date 5/7 6/3 6/17 6/24 7/11 7/18 7/22 7/25  8/5 8/12 8/15 8/21   Total Weekly Dose not able to confirm 75mg  ??? 95mg 80 mg 95mg 75mg 100 mg 100mg??? 95 mg 95 mg 75 mg 90 mg   INR 3.5 4.9 2.5 2.3 5.4 2.0 2.2 4.4 4 6.2 3.0 5   Notes s/p surgery; 2x boost  doxy start 6/13 doxy; 1  doxy self-held 2 doses   No GLV VPA restarted Held x 1      Date  8/26 8/30 9/6 9/12 9/17 10/2 10/10 11/4 11/12 11/22 12/10 1/3/20   Total Weekly Dose 70 mg 90mg 85mg 85mg 75 mg 80 mg 80 mg 62.5 mg 85mg 82.5mg 70mg 72.5 mg   INR  2.3 3.9 4.2 4.5 3.3  3.9 3.6 1.7 3.4 4.7 2.4 3.6   Notes 1 hold    rec'd 9/18   1  Patient took 85mg (Planned for 80mg)  Self-adjusted dose      Phone Interview:  Verbal Release Authorization signed on 11/7/18 -- may speak with Zuleima Alberto (patient's wife)    Tablet Strength: 5mg tablets   Patient Contact: [phone] -- try to call in PM or contact his wife Zuleima at [phone].  His email is [email or web address]     Patient Findings   Negatives:  Signs/symptoms of thrombosis, Signs/symptoms of bleeding, Laboratory test error suspected, Change in health, Change in alcohol use, Change in activity, Upcoming invasive procedure, Emergency department visit, Upcoming dental procedure, Missed doses, Extra doses, Change in medications, Change in diet/appetite, Hospital admission, Bruising, Other complaints   Comments:  Mrs. Alberto states Mr. Alberto has not had any s/sx of bleeding or bruising out of the ordinary. She stated that his diet has stayed consistent. She reports that he has not started any new medications or stopped any medications. He has been compliant with his warfarin since the last encounter.  He was supposed a procedure to have 80% of his prostate removed, but that has now been  canceled. He has another appointment to reevaluate the procedure. Requested that Mr. Alberto will increase his GLV intake to help decrease his INR to a therapeutic range.         Plan:   1. INR is slightly SUPRAtherapeutic today at 3.6.  Instructed Mrs. Alberto to have Mr Alberto continue 10mg daily except 12.5mg Tuesday along with an extra serving of GLV this week.   2. Repeat INR in 1 week  3. Verbal information provided over the phone to Mrs. Alberto and Mr. Alberto. Mrs. Alberto expresses understanding by teach back and has no further questions at this time.     Andrew Cabrera, Pharmacy Intern   01/03/20   3:50 PM    ISonya, [facility], have reviewed the note in full and agree with the assessment and plan.  01/03/20  4:41 PM

## 2020-01-20 ENCOUNTER — TELEPHONE (OUTPATIENT)
Dept: PHARMACY | Facility: HOSPITAL | Age: 55
End: 2020-01-20

## 2020-01-22 ENCOUNTER — ANTICOAGULATION VISIT (OUTPATIENT)
Dept: PHARMACY | Facility: HOSPITAL | Age: 55
End: 2020-01-22

## 2020-01-22 DIAGNOSIS — Z95.2 HX OF AORTIC VALVE REPLACEMENT, MECHANICAL: ICD-10-CM

## 2020-01-22 LAB — INR PPP: 1.3

## 2020-01-22 NOTE — PROGRESS NOTES
Anticoagulation Clinic - Remote Progress Note  [facility]  Testing frequency: 7 days    Indication: Atrial Fibrillation and Mechanical Aortic Valve; h/o embolic CVA  Referring Provider: Bunny last seen 1/2019; Pt overdue for repeat appointment.  Initial Warfarin Start Date: 11/2012  Planned Duration of Therapy: Life  Goal INR: 2.5-3.5  Current Drug Interactions: citalopram  CHADS-VASc: 3 (HTN, h/o CVA)    Vit K Diet: patient is eating daily samara lettuce salads  Alcohol: none  Tobacco: none       Anticoagulation Clinic INR History:  Date 5/8 5/23 6/6 6/9 6/13 6/28 7/19 8/10 8/30   Total Weekly Dose 52.5mg 60mg 62.5mg 62.5 mg 77.5 mg 57.5 mg 67.5 mg 70mg 70mg   INR 2.2 2.2 1.2 2.2 2.6 2.3 2.4 3.5 1.2   Notes missed dose more vit K dose increase; increase in Vit K foods  boost missed dose   Increase GLV; protein     Date 9/6 9/18 10/9 11/16 11/27 12/4 12/11 12/25 1/4/18   Total Weekly Dose 67.5 mg 60mg 70mg 70mg 70mg 70mg 75mg 75mg 70mg   INR 3.2 1.8 3.4 1.6 1.9 2.5 3.2 2.7 2.0   Notes boost + 1 held dose Missed dose + significant alcohol consumption on 9/16  enox enox    boost     Date 1/15 1/25 2/15 2/23 3/2 3/6 3/15 3/20 3/27 4/4   Total Weekly Dose 75mg 75mg 65mg 75mg 75mg 90mg 65mg 90mg 80mg 75mg   INR 2.9 2.5 2.1 3.3 1.4 2.0, 1.78 (ED) 2.4 3.2 3.5 2.4   Notes   missed   enox; boost; levaquin held x1        Date 4/14 4/20 5/1 5/16 5/27 6/7 6/15 6/22 6/25 7/4   Total Weekly Dose 80mg 80mg 90mg 90mg  (ED) 90mg 75mg 95mg 95mg 107.5mg ???   INR 2.3 2.0 3.2 2.9 3.2 4.1 3.1 1.3 2.0 2.7   Notes     levoflox missed; levoflox levoflox levoflox; refuse lovenox       Date 7/31 8/27 8/31 9/4 9/11 9/27 10/2 10/5 10/15   Total Weekly Dose 105mg  ??? 65mg  ??? 95mg 100mg 80mg  ??? 70mg 90mg 100mg 100mg   INR 2.7 1.3 2.6 5.6 1.6 1.6 2.3 2.4 2.8   Notes self adj self adj; miss; enox   incr GLV, mis-dosing, refuse enox incorrect doses        Date 10/26 11/5 11/7 11/15 12/7 1/11/19 1/24/19 2/8 2/18 3/4 3/26 4/23   Total  Weekly Dose 100mg  75mg 105mg 100mg 100mg 100mg 100mg 105mg 100mg 100mg 0mg   INR 4.6 7.9 1.9 5.5, 5.7 2.8 2.4 3.9 2.7 3.6 3.2 4.8 1.14 ED, 1.9 HM   Notes  recalled test strips clinic; 2 dose hold clinic    LVM  clinic       Date 5/7 6/3 6/17 6/24 7/11 7/18 7/22 7/25  8/5 8/12 8/15 8/21   Total Weekly Dose not able to confirm 75mg  ??? 95mg 80 mg 95mg 75mg 100 mg 100mg??? 95 mg 95 mg 75 mg 90 mg   INR 3.5 4.9 2.5 2.3 5.4 2.0 2.2 4.4 4 6.2 3.0 5   Notes s/p surgery; 2x boost  doxy start 6/13 doxy; 1 miss doxy self-held 2 doses   No GLV VPA restarted Held x 1      Date  8/26 8/30 9/6 9/12 9/17 10/2 10/10 11/4 11/12 11/22 12/10 1/3/20 1/22   Total Weekly Dose 70 mg 90mg 85mg 85mg 75 mg 80 mg 80 mg 62.5 mg 85mg 82.5mg 70mg 72.5 mg 72.5mg   INR  2.3 3.9 4.2 4.5 3.3  3.9 3.6 1.7 3.4 4.7 2.4 3.6 1.3   Notes 1 hold    rec'd 9/18   1  Patient took 85mg (Planned for 80mg)  Self-adjusted dose  lovenox     Phone Interview:  Verbal Release Authorization signed on 11/7/18 -- may speak with Zuleima Alberto (patient's wife)    Tablet Strength: 5mg tablets   Patient Contact: [phone] -- try to call in PM or contact his wife Zuleima at [phone].  His email is [email or web address]     Patient Findings   Negatives:  Signs/symptoms of thrombosis, Signs/symptoms of bleeding, Laboratory test error suspected, Change in health, Change in alcohol use, Change in activity, Upcoming invasive procedure, Emergency department visit, Upcoming dental procedure, Missed doses, Extra doses, Change in medications, Change in diet/appetite, Hospital admission, Bruising, Other complaints   Comments:  Spoke with Zuleima, who reports that his GLV hasn't changed. She wasn't certain of his recent weight. Zuleima reports that Mr. Alberto has not missed any doses of warfarin, started meal replacement drinks, and all patient findings were found to be negative.     When I spoke to Mr. Alberto he reports that he may have ate something recently. Of  note: 1/2019 patient required 100mg/week of warfarin.     Plan:   1. INR is critically subtherapeutic today at 1.3 on 1/22. Unable to reach Zuleima/Mr. Alberto until 1/23.  Instructed Zuleima to have Mr. Alberto increase his warfarin to 15mg tonight and tomorrow and 10mg through the weekend. Will also start lovenox shots. Mr. Alberto plans to call back to report recent weight, where he would like his Rx sent, and then will transfer him to Dr. Hollis's office to set up an appointment.     Addendum: Mr. Alberto called back and confirmed that he is 325lbs. Discussed importance of accuracy due to weight based dosing of lovenox. Patient also needs an updated Scr. Pt is unable to get this lab drawn at this time. 4/22/2019: Scr: 0.8 Provided patient with information via email for GoodRx coupon card and sent Rx in to [facility] per his request. He will begin lovenox 150mg/1mL q12h qty: 7. He confirms he has set up an appointment with Dr. Hollis in 2/2020.   2. Repeat  Monday 1/27.  3. Verbal information provided over the phone to Mrs. Alberto and Mr. Alberto. Mrs. Alberto expresses understanding by teach back and has no further questions at this time.     Do Vargas, PharmD  1/23/2020  11:51 AM

## 2020-01-28 DIAGNOSIS — Z95.2 HX OF AORTIC VALVE REPLACEMENT, MECHANICAL: ICD-10-CM

## 2020-01-28 RX ORDER — WARFARIN SODIUM 5 MG/1
TABLET ORAL
Qty: 180 TABLET | Refills: 0 | OUTPATIENT
Start: 2020-01-28

## 2020-01-28 RX ORDER — WARFARIN SODIUM 5 MG/1
TABLET ORAL
Qty: 75 TABLET | Refills: 0 | Status: SHIPPED | OUTPATIENT
Start: 2020-01-28 | End: 2020-02-20 | Stop reason: SDUPTHER

## 2020-01-30 ENCOUNTER — ANTICOAGULATION VISIT (OUTPATIENT)
Dept: PHARMACY | Facility: HOSPITAL | Age: 55
End: 2020-01-30

## 2020-01-30 DIAGNOSIS — Z95.2 HX OF AORTIC VALVE REPLACEMENT, MECHANICAL: ICD-10-CM

## 2020-01-30 LAB — INR PPP: 2.3 (ref 2.5–3.5)

## 2020-01-30 NOTE — PROGRESS NOTES
Anticoagulation Clinic - Remote Progress Note  [facility]  Testing frequency: 7 days    Indication: Atrial Fibrillation and Mechanical Aortic Valve; h/o embolic CVA  Referring Provider: Bunny last seen 1/2019; Pt overdue for repeat appointment.  Initial Warfarin Start Date: 11/2012  Planned Duration of Therapy: Life  Goal INR: 2.5-3.5  Current Drug Interactions: citalopram  CHADS-VASc: 3 (HTN, h/o CVA)    Vit K Diet: patient is eating daily samara lettuce salads  Alcohol: none  Tobacco: none       Anticoagulation Clinic INR History:  Date 5/8 5/23 6/6 6/9 6/13 6/28 7/19 8/10 8/30   Total Weekly Dose 52.5mg 60mg 62.5mg 62.5 mg 77.5 mg 57.5 mg 67.5 mg 70mg 70mg   INR 2.2 2.2 1.2 2.2 2.6 2.3 2.4 3.5 1.2   Notes missed dose more vit K dose increase; increase in Vit K foods  boost missed dose   Increase GLV; protein     Date 9/6 9/18 10/9 11/16 11/27 12/4 12/11 12/25 1/4/18   Total Weekly Dose 67.5 mg 60mg 70mg 70mg 70mg 70mg 75mg 75mg 70mg   INR 3.2 1.8 3.4 1.6 1.9 2.5 3.2 2.7 2.0   Notes boost + 1 held dose Missed dose + significant alcohol consumption on 9/16  enox enox    boost     Date 1/15 1/25 2/15 2/23 3/2 3/6 3/15 3/20 3/27 4/4   Total Weekly Dose 75mg 75mg 65mg 75mg 75mg 90mg 65mg 90mg 80mg 75mg   INR 2.9 2.5 2.1 3.3 1.4 2.0, 1.78 (ED) 2.4 3.2 3.5 2.4   Notes   missed   enox; boost; levaquin held x1        Date 4/14 4/20 5/1 5/16 5/27 6/7 6/15 6/22 6/25 7/4   Total Weekly Dose 80mg 80mg 90mg 90mg  (ED) 90mg 75mg 95mg 95mg 107.5mg ???   INR 2.3 2.0 3.2 2.9 3.2 4.1 3.1 1.3 2.0 2.7   Notes     levoflox missed; levoflox levoflox levoflox; refuse lovenox       Date 7/31 8/27 8/31 9/4 9/11 9/27 10/2 10/5 10/15   Total Weekly Dose 105mg  ??? 65mg  ??? 95mg 100mg 80mg  ??? 70mg 90mg 100mg 100mg   INR 2.7 1.3 2.6 5.6 1.6 1.6 2.3 2.4 2.8   Notes self adj self adj; miss; enox   incr GLV, mis-dosing, refuse enox incorrect doses        Date 10/26 11/5 11/7 11/15 12/7 1/11/19 1/24/19 2/8 2/18 3/4 3/26 4/23   Total  "Weekly Dose 100mg  75mg 105mg 100mg 100mg 100mg 100mg 105mg 100mg 100mg 0mg   INR 4.6 7.9 1.9 5.5, 5.7 2.8 2.4 3.9 2.7 3.6 3.2 4.8 1.14 ED, 1.9 HM   Notes  recalled test strips clinic; 2 dose hold clinic    LVM  clinic       Date 5/7 6/3 6/17 6/24 7/11 7/18 7/22 7/25  8/5 8/12 8/15 8/21   Total Weekly Dose not able to confirm 75mg  ??? 95mg 80 mg 95mg 75mg 100 mg 100mg??? 95 mg 95 mg 75 mg 90 mg   INR 3.5 4.9 2.5 2.3 5.4 2.0 2.2 4.4 4 6.2 3.0 5   Notes s/p surgery; 2x boost  doxy start 6/13 doxy; 1 miss doxy self-held 2 doses   No GLV VPA restarted Held x 1      Date  8/26 8/30 9/6 9/12 9/17 10/2 10/10 11/4 11/12 11/22 12/10 1/3/20 1/22   Total Weekly Dose 70 mg 90mg 85mg 85mg 75 mg 80 mg 80 mg 62.5 mg 85mg 82.5mg 70mg 72.5 mg 72.5mg   INR  2.3 3.9 4.2 4.5 3.3  3.9 3.6 1.7 3.4 4.7 2.4 3.6 1.3   Notes 1 hold    rec'd 9/18   1  Patient took 85mg (Planned for 80mg)  Self-adjusted dose  lovenox     Date 1/30            Total Weekly Dose 82.5mg            INR 2.3            Notes                 Phone Interview:  Verbal Release Authorization signed on 11/7/18 -- may speak with Zuleima Alberto (patient's wife)    Tablet Strength: 5mg tablets   Patient Contact: [phone] -- try to call in PM or contact his wife Zuleima at [phone].  His email is [email or web address]     Patient Findings   Negatives:  Signs/symptoms of thrombosis, Signs/symptoms of bleeding, Laboratory test error suspected, Change in health, Change in alcohol use, Change in activity, Upcoming invasive procedure, Emergency department visit, Upcoming dental procedure, Missed doses, Extra doses, Change in medications, Change in diet/appetite, Hospital admission, Bruising, Other complaints   Comments:  Mr. Alberto reports that his wife has given him his warfarin \"like clockwork\". Unable to determine dosing since Monday as that is when we gave them dosing directions until. Zuleima is sick with the flu, therefore, I spoke with Mr. Alberto. Mr. Alberto " reports he believes he has been taking 10mg daily since Monday. He will have Sapna call us back on Monday to determine dosing until repeat next week.      Plan:   1. INR remains subtherapeutic today at 2.3 on 1/30 however, much improved. He was originally instructed to retest on 1/27. Mr. Alberto reports that he believes he has been taking 10mg daily since 1/27 Please refer to patient findings above. At this time, will instruct Mr. Alberto to continue 10mg daily except 15mg tonight. He will have his wife call us on Monday so we can discuss dosing since this past Monday and we can determine dosing for next week.     Addendum 2/3: Patient's wife confirmed that the patient only took 10mg the week before however, used lovenox. He did not boost his dose to 15 mg x 2 days. They rechecked INR today. Created new encounter    2. Repeat INR early next week.   3. Verbal information provided over the phone to Mrs. Alberto and Mr. Alberto. Mrs. Alberto expresses understanding by teach back and has no further questions at this time.     Do Vargas, PharmD  1/30/2020  12:06 PM

## 2020-02-03 ENCOUNTER — ANTICOAGULATION VISIT (OUTPATIENT)
Dept: PHARMACY | Facility: HOSPITAL | Age: 55
End: 2020-02-03

## 2020-02-03 DIAGNOSIS — Z95.2 HX OF AORTIC VALVE REPLACEMENT, MECHANICAL: ICD-10-CM

## 2020-02-03 LAB — INR PPP: 3.2

## 2020-02-03 NOTE — PROGRESS NOTES
Anticoagulation Clinic - Remote Progress Note  [facility]  Testing frequency: 7 days    Indication: Atrial Fibrillation and Mechanical Aortic Valve; h/o embolic CVA  Referring Provider: Bunny last seen 1/2019; Pt overdue for repeat appointment.  Initial Warfarin Start Date: 11/2012  Planned Duration of Therapy: Life  Goal INR: 2.5-3.5  Current Drug Interactions: citalopram  CHADS-VASc: 3 (HTN, h/o CVA)    Vit K Diet: patient is eating daily samara lettuce salads  Alcohol: none  Tobacco: none       Anticoagulation Clinic INR History:  Date 5/8 5/23 6/6 6/9 6/13 6/28 7/19 8/10 8/30   Total Weekly Dose 52.5mg 60mg 62.5mg 62.5 mg 77.5 mg 57.5 mg 67.5 mg 70mg 70mg   INR 2.2 2.2 1.2 2.2 2.6 2.3 2.4 3.5 1.2   Notes missed dose more vit K dose increase; increase in Vit K foods  boost missed dose   Increase GLV; protein     Date 9/6 9/18 10/9 11/16 11/27 12/4 12/11 12/25 1/4/18   Total Weekly Dose 67.5 mg 60mg 70mg 70mg 70mg 70mg 75mg 75mg 70mg   INR 3.2 1.8 3.4 1.6 1.9 2.5 3.2 2.7 2.0   Notes boost + 1 held dose Missed dose + significant alcohol consumption on 9/16  enox enox    boost     Date 1/15 1/25 2/15 2/23 3/2 3/6 3/15 3/20 3/27 4/4   Total Weekly Dose 75mg 75mg 65mg 75mg 75mg 90mg 65mg 90mg 80mg 75mg   INR 2.9 2.5 2.1 3.3 1.4 2.0, 1.78 (ED) 2.4 3.2 3.5 2.4   Notes   missed   enox; boost; levaquin held x1        Date 4/14 4/20 5/1 5/16 5/27 6/7 6/15 6/22 6/25 7/4   Total Weekly Dose 80mg 80mg 90mg 90mg  (ED) 90mg 75mg 95mg 95mg 107.5mg ???   INR 2.3 2.0 3.2 2.9 3.2 4.1 3.1 1.3 2.0 2.7   Notes     levoflox missed; levoflox levoflox levoflox; refuse lovenox       Date 7/31 8/27 8/31 9/4 9/11 9/27 10/2 10/5 10/15   Total Weekly Dose 105mg  ??? 65mg  ??? 95mg 100mg 80mg  ??? 70mg 90mg 100mg 100mg   INR 2.7 1.3 2.6 5.6 1.6 1.6 2.3 2.4 2.8   Notes self adj self adj; miss; enox   incr GLV, mis-dosing, refuse enox incorrect doses        Date 10/26 11/5 11/7 11/15 12/7 1/11/19 1/24/19 2/8 2/18 3/4 3/26 4/23   Total  "Weekly Dose 100mg  75mg 105mg 100mg 100mg 100mg 100mg 105mg 100mg 100mg 0mg   INR 4.6 7.9 1.9 5.5, 5.7 2.8 2.4 3.9 2.7 3.6 3.2 4.8 1.14 ED, 1.9 HM   Notes  recalled test strips clinic; 2 dose hold clinic    LVM  clinic       Date 5/7 6/3 6/17 6/24 7/11 7/18 7/22 7/25  8/5 8/12 8/15 8/21   Total Weekly Dose not able to confirm 75mg  ??? 95mg 80 mg 95mg 75mg 100 mg 100mg??? 95 mg 95 mg 75 mg 90 mg   INR 3.5 4.9 2.5 2.3 5.4 2.0 2.2 4.4 4 6.2 3.0 5   Notes s/p surgery; 2x boost  doxy start 6/13 doxy; 1 miss doxy self-held 2 doses   No GLV VPA restarted Held x 1      Date  8/26 8/30 9/6 9/12 9/17 10/2 10/10 11/4 11/12 11/22 12/10 1/3/20 1/22   Total Weekly Dose 70 mg 90mg 85mg 85mg 75 mg 80 mg 80 mg 62.5 mg 85mg 82.5mg 70mg 72.5 mg 72.5mg   INR  2.3 3.9 4.2 4.5 3.3  3.9 3.6 1.7 3.4 4.7 2.4 3.6 1.3   Notes 1 hold    rec'd 9/18   1  Patient took 85mg (Planned for 80mg)  Self-adjusted dose  lovenox     Date 1/30 2/3           Total Weekly Dose 82.5mg 77.5mg           INR 2.3 3.2           Notes                 Phone Interview:  Verbal Release Authorization signed on 11/7/18 -- may speak with Zuleima Alberto (patient's wife)    Tablet Strength: 5mg tablets   Patient Contact: [phone] -- try to call in PM or contact his wife Zuleima at [phone].  His email is [email or web address]     Patient Findings   Negatives:  Signs/symptoms of thrombosis, Signs/symptoms of bleeding, Laboratory test error suspected, Change in health, Change in alcohol use, Change in activity, Upcoming invasive procedure, Emergency department visit, Upcoming dental procedure, Missed doses, Extra doses, Change in medications, Change in diet/appetite, Hospital admission, Bruising, Other complaints   Comments:  Mr. Alberto reports that his wife has given him his warfarin \"like clockwork\". Unable to determine dosing since Monday as that is when we gave them dosing directions until. Zuleima is sick with the flu, therefore, I spoke with Mr. " Remy. Mr. Alberto reports he believes he has been taking 10mg daily since Monday. He will have Sapna call us back on Monday to determine dosing until repeat next week.      Plan:   1. INR therapeutic today at 3.2. Mr. Alberto reports that he believes he has been taking 10mg daily since 1/27. Patient reported has not increased as much as anticipated, therefore, At this time, instructed Mr. Alberto to continue 10mg daily except 12.5mg TueFri.   2. Repeat INR Monday 2/10.  3. Verbal information provided over the phone to Mrs. Alberto and Mr. Alberto. Mrs. Alberto expresses understanding by teach back and has no further questions at this time.     Do Vargas, PharmD  2/3/2020  4:11 PM

## 2020-02-04 RX ORDER — LISINOPRIL 40 MG/1
40 TABLET ORAL DAILY
Qty: 30 TABLET | Refills: 0 | Status: SHIPPED | OUTPATIENT
Start: 2020-02-04 | End: 2020-03-02

## 2020-02-14 NOTE — PROGRESS NOTES
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    PROBLEM LIST:   1.  Ascending aortic dissection and severe aortic insufficiency:  a.  Mechanical Bentall aortic valve replacement ([facility]) and ascending aortic dissection repair, 11/17/2012, Dr. Jasmeet De Dios.   b. Echocardiogram, 12/03/2013:  i. LVEF (55% to 60%), mechanical aortic valve, area 1.18 cm2.  ii. Chronic Coumadin therapy                     c.    Cardiac PET scan, 08/26/2015, negative for reversible ischemia; fixed photopenia                            of the apex and septum possibly due to LBBB; LVEF (30% to 34%).                    d.    Cardiac catheterization,  Dorian Giraldo MD, 09/03/2015:  No obstructive                            CAD.                    E. 1/17 echo EF 55% mild lvh AVR mean pressure 17                    F. 12/16 CTA chest wnl  2.  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  Residual left upper extremity weakness.    3. Left arm discomfort:  a. Possibly due to embolic CVA vs. intraoperative nerve traction.   4. Hypertension.   5. HL  10/18 164/61/53/104  6. 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 Status post surgery:  a. Left inguinal herniorrhaphy, August 2014.   b. Left forehead melanoma excision, Dr. Gisell Kothari, August 2014.      Chief Complaint   Patient presents with   • Aortic Stenosis       Allergies  Allergies   Allergen Reactions   • Meclizine Hcl Itching       Current Medications    Current Outpatient Medications:   •  buPROPion (WELLBUTRIN) 100 MG tablet, Take 50 mg by mouth 2 (Two) Times a Day., Disp: , Rfl:   •  CIALIS 5 MG tablet, Take 1 tablet by mouth As Needed., Disp: , Rfl:   •  enoxaparin (LOVENOX) 150 MG/ML injection, Discard 0.1mL and administer 0.9mL SubQ every  "12 hours as directed. (Patient taking differently: As Needed. Discard 0.1mL and administer 0.9mL SubQ every 12 hours as directed.), Disp: 15 mL, Rfl: 0  •  furosemide (LASIX) 40 MG tablet, TAKE ONE TABLET BY MOUTH DAILY AS NEEDED (GREATER THAN 2LB WEIGHT GAIN IN 24 HOURS) (Patient taking differently: 40 mg Daily.), Disp: 90 tablet, Rfl: 0  •  lisinopril (PRINIVIL,ZESTRIL) 40 MG tablet, Take 1 tablet by mouth Daily., Disp: 30 tablet, Rfl: 0  •  PHARMACY TO DOSE WARFARIN, Continuous As Needed (patient is managed by the [facility] ([phone]))., Disp: , Rfl:   •  warfarin (COUMADIN) 5 MG tablet, TAKE TWO TO THREE TABLETS BY MOUTH DAILY OR AS DIRECTED BY THE ANTICOAGULATION PHARMACIST *must keep appt 2/21 for more refills*, Disp: 75 tablet, Rfl: 0      History of Present Illness   Mitchell Alberto is a 54 y.o. year old male here for follow up.  Increased shortness of breath some fatigue and feels like he is retaining fluid.  He has had no overt chest pain or presyncope        OBJECTIVE:  Vitals:    02/21/20 1506   BP: 134/78   BP Location: Left arm   Patient Position: Sitting   Pulse: 66   SpO2: 93%   Weight: (!) 148 kg (326 lb)   Height: 195.6 cm (77\")     Physical Exam   Constitutional: He appears well-developed and well-nourished.   Neck: Normal range of motion. Neck supple. No hepatojugular reflux and no JVD present. Carotid bruit is not present. No tracheal deviation present. No thyromegaly present.   Cardiovascular: Normal rate, regular rhythm, S1 normal, S2 normal, intact distal pulses and normal pulses. PMI is not displaced. Exam reveals no gallop, no distant heart sounds, no friction rub, no midsystolic click and no opening snap.   No murmur heard.  Pulses:       Radial pulses are 2+ on the right side, and 2+ on the left side.        Dorsalis pedis pulses are 2+ on the right side, and 2+ on the left side.        Posterior tibial pulses are 2+ on the right side, and 2+ on " the left side.   [place] S2 click   Pulmonary/Chest: Effort normal and breath sounds normal. He has no wheezes. He has no rales.   Abdominal: Soft. Bowel sounds are normal. He exhibits no mass. There is no tenderness. There is no guarding.       Diagnostic Data:  Procedures      ASSESSMENT:   Diagnosis Plan   1. Acute on chronic diastolic CHF (congestive heart failure) (CMS/[facility])  Comprehensive Metabolic Panel    BNP    CBC & Differential   2. Nonrheumatic aortic valve stenosis     3. Essential hypertension     4. Mixed hyperlipidemia     5. Chronic diastolic congestive heart failure (CMS/[facility])         PLAN:  CHF chronic with potential acute on chronic documented CMP CBC BNP today    Aortic valve replacement for follow-up echocardiogram due at this time    Hypertension controlled on current regimen        Provider, No Known, thank you for referring Mr. Alberto for evaluation.  I have forwarded my electronically generated recommendations to you for review.  Please do not hesitate to call with any questions.      Mike Hollis MD, FACC

## 2020-02-18 ENCOUNTER — ANTICOAGULATION VISIT (OUTPATIENT)
Dept: PHARMACY | Facility: HOSPITAL | Age: 55
End: 2020-02-18

## 2020-02-18 DIAGNOSIS — Z95.2 HX OF AORTIC VALVE REPLACEMENT, MECHANICAL: ICD-10-CM

## 2020-02-18 LAB — INR PPP: 3.1

## 2020-02-18 NOTE — PROGRESS NOTES
Anticoagulation Clinic - Remote Progress Note  [facility]  Testing frequency: 7 days    Indication: Atrial Fibrillation and Mechanical Aortic Valve; h/o embolic CVA  Referring Provider: Bunny last seen 1/2019; Pt overdue for repeat appointment.  Initial Warfarin Start Date: 11/2012  Planned Duration of Therapy: Life  Goal INR: 2.5-3.5  Current Drug Interactions: citalopram  CHADS-VASc: 3 (HTN, h/o CVA)    Vit K Diet: patient is eating daily samara lettuce salads  Alcohol: none  Tobacco: none       Anticoagulation Clinic INR History:  Date 5/8 5/23 6/6 6/9 6/13 6/28 7/19 8/10 8/30   Total Weekly Dose 52.5mg 60mg 62.5mg 62.5 mg 77.5 mg 57.5 mg 67.5 mg 70mg 70mg   INR 2.2 2.2 1.2 2.2 2.6 2.3 2.4 3.5 1.2   Notes missed dose more vit K dose increase; increase in Vit K foods  boost missed dose   Increase GLV; protein     Date 9/6 9/18 10/9 11/16 11/27 12/4 12/11 12/25 1/4/18   Total Weekly Dose 67.5 mg 60mg 70mg 70mg 70mg 70mg 75mg 75mg 70mg   INR 3.2 1.8 3.4 1.6 1.9 2.5 3.2 2.7 2.0   Notes boost + 1 held dose Missed dose + significant alcohol consumption on 9/16  enox enox    boost     Date 1/15 1/25 2/15 2/23 3/2 3/6 3/15 3/20 3/27 4/4   Total Weekly Dose 75mg 75mg 65mg 75mg 75mg 90mg 65mg 90mg 80mg 75mg   INR 2.9 2.5 2.1 3.3 1.4 2.0, 1.78 (ED) 2.4 3.2 3.5 2.4   Notes   missed   enox; boost; levaquin held x1        Date 4/14 4/20 5/1 5/16 5/27 6/7 6/15 6/22 6/25 7/4   Total Weekly Dose 80mg 80mg 90mg 90mg  (ED) 90mg 75mg 95mg 95mg 107.5mg ???   INR 2.3 2.0 3.2 2.9 3.2 4.1 3.1 1.3 2.0 2.7   Notes     levoflox missed; levoflox levoflox levoflox; refuse lovenox       Date 7/31 8/27 8/31 9/4 9/11 9/27 10/2 10/5 10/15   Total Weekly Dose 105mg  ??? 65mg  ??? 95mg 100mg 80mg  ??? 70mg 90mg 100mg 100mg   INR 2.7 1.3 2.6 5.6 1.6 1.6 2.3 2.4 2.8   Notes self adj self adj; miss; enox   incr GLV, mis-dosing, refuse enox incorrect doses        Date 10/26 11/5 11/7 11/15 12/7 1/11/19 1/24/19 2/8 2/18 3/4 3/26 4/23   Total  Weekly Dose 100mg  75mg 105mg 100mg 100mg 100mg 100mg 105mg 100mg 100mg 0mg   INR 4.6 7.9 1.9 5.5, 5.7 2.8 2.4 3.9 2.7 3.6 3.2 4.8 1.14 ED, 1.9 HM   Notes  recalled test strips clinic; 2 dose hold clinic    LVM  clinic       Date 5/7 6/3 6/17 6/24 7/11 7/18 7/22 7/25  8/5 8/12 8/15 8/21   Total Weekly Dose not able to confirm 75mg  ??? 95mg 80 mg 95mg 75mg 100 mg 100mg??? 95 mg 95 mg 75 mg 90 mg   INR 3.5 4.9 2.5 2.3 5.4 2.0 2.2 4.4 4 6.2 3.0 5   Notes s/p surgery; 2x boost  doxy start 6/13 doxy; 1 miss doxy self-held 2 doses   No GLV VPA restarted Held x 1      Date  8/26 8/30 9/6 9/12 9/17 10/2 10/10 11/4 11/12 11/22 12/10 1/3/20 1/22   Total Weekly Dose 70 mg 90mg 85mg 85mg 75 mg 80 mg 80 mg 62.5 mg 85mg 82.5mg 70mg 72.5 mg 72.5mg   INR  2.3 3.9 4.2 4.5 3.3  3.9 3.6 1.7 3.4 4.7 2.4 3.6 1.3   Notes 1 hold    rec'd 9/18   1  Patient took 85mg (Planned for 80mg)  Self-adjusted dose  lovenox     Date 1/30 2/3 2/18          Total Weekly Dose 82.5mg 77.5mg 75mg          INR 2.3 3.2 3.1          Notes                 Phone Interview:  Verbal Release Authorization signed on 11/7/18 -- may speak with Zuleima Alberto (patient's wife)    Tablet Strength: 5mg tablets   Patient Contact: [phone] -- try to call in PM or contact his wife Zuleima at [phone].  His email is [email or web address]     Patient Findings   Negatives:  Signs/symptoms of thrombosis, Signs/symptoms of bleeding, Laboratory test error suspected, Change in health, Change in alcohol use, Change in activity, Upcoming invasive procedure, Emergency department visit, Upcoming dental procedure, Missed doses, Extra doses, Change in medications, Change in diet/appetite, Hospital admission, Bruising, Other complaints   Comments:  Zuleima denies all findings for patient.     Plan:   1. INR therapeutic today at 3.1. Instructed Mr. Alberto to continue 10mg daily except 12.5mg TueFri.   2. Repeat INR in one week. 02/25/20.  3. Verbal information provided  over the phone to Mrs. Alberto. Mrs. Alberto expresses understanding by teach back and has no further questions at this time.     Sonia Jimenez, Pharmacy Intern   2/18/2020   1:10 PM    I, Do Vargas, PharmD, have reviewed the note in full and agree with the assessment and plan.  02/18/20  2:20 PM

## 2020-02-20 DIAGNOSIS — Z95.2 HX OF AORTIC VALVE REPLACEMENT, MECHANICAL: ICD-10-CM

## 2020-02-20 RX ORDER — WARFARIN SODIUM 5 MG/1
TABLET ORAL
Qty: 75 TABLET | Refills: 0 | Status: SHIPPED | OUTPATIENT
Start: 2020-02-20 | End: 2020-03-30 | Stop reason: SDUPTHER

## 2020-02-20 RX ORDER — WARFARIN SODIUM 5 MG/1
TABLET ORAL
Qty: 75 TABLET | Refills: 0 | OUTPATIENT
Start: 2020-02-20

## 2020-02-20 RX ORDER — WARFARIN SODIUM 5 MG/1
TABLET ORAL
Qty: 75 TABLET | Refills: 0 | Status: SHIPPED | OUTPATIENT
Start: 2020-02-20 | End: 2020-02-20 | Stop reason: SDUPTHER

## 2020-02-20 NOTE — TELEPHONE ENCOUNTER
Warfarin 5 mg tablets, #75 sent with message that he must keep appointment with Dr. Hollis on 2/21 for future refills.    Dora Pereira, PharmD

## 2020-02-21 ENCOUNTER — LAB (OUTPATIENT)
Dept: LAB | Facility: HOSPITAL | Age: 55
End: 2020-02-21

## 2020-02-21 ENCOUNTER — OFFICE VISIT (OUTPATIENT)
Dept: CARDIOLOGY | Facility: CLINIC | Age: 55
End: 2020-02-21

## 2020-02-21 VITALS
SYSTOLIC BLOOD PRESSURE: 134 MMHG | HEIGHT: 77 IN | OXYGEN SATURATION: 93 % | DIASTOLIC BLOOD PRESSURE: 78 MMHG | WEIGHT: 315 LBS | HEART RATE: 66 BPM | BODY MASS INDEX: 37.19 KG/M2

## 2020-02-21 DIAGNOSIS — I10 ESSENTIAL HYPERTENSION: ICD-10-CM

## 2020-02-21 DIAGNOSIS — I35.0 NONRHEUMATIC AORTIC VALVE STENOSIS: ICD-10-CM

## 2020-02-21 DIAGNOSIS — E78.2 MIXED HYPERLIPIDEMIA: ICD-10-CM

## 2020-02-21 DIAGNOSIS — I50.33 ACUTE ON CHRONIC DIASTOLIC CHF (CONGESTIVE HEART FAILURE) (HCC): Primary | ICD-10-CM

## 2020-02-21 DIAGNOSIS — I50.33 ACUTE ON CHRONIC DIASTOLIC CHF (CONGESTIVE HEART FAILURE) (HCC): ICD-10-CM

## 2020-02-21 DIAGNOSIS — I50.32 CHRONIC DIASTOLIC CONGESTIVE HEART FAILURE (HCC): ICD-10-CM

## 2020-02-21 LAB
ALBUMIN SERPL-MCNC: 4.3 G/DL (ref 3.5–5.2)
ALBUMIN/GLOB SERPL: 1.7 G/DL
ALP SERPL-CCNC: 73 U/L (ref 39–117)
ALT SERPL W P-5'-P-CCNC: 18 U/L (ref 1–41)
ANION GAP SERPL CALCULATED.3IONS-SCNC: 12.4 MMOL/L (ref 5–15)
AST SERPL-CCNC: 19 U/L (ref 1–40)
BASOPHILS # BLD AUTO: 0.04 10*3/MM3 (ref 0–0.2)
BASOPHILS NFR BLD AUTO: 0.6 % (ref 0–1.5)
BILIRUB SERPL-MCNC: 0.3 MG/DL (ref 0.2–1.2)
BUN BLD-MCNC: 22 MG/DL (ref 6–20)
BUN/CREAT SERPL: 19.8 (ref 7–25)
CALCIUM SPEC-SCNC: 9.1 MG/DL (ref 8.6–10.5)
CHLORIDE SERPL-SCNC: 101 MMOL/L (ref 98–107)
CO2 SERPL-SCNC: 25.6 MMOL/L (ref 22–29)
CREAT BLD-MCNC: 1.11 MG/DL (ref 0.76–1.27)
DEPRECATED RDW RBC AUTO: 43.3 FL (ref 37–54)
EOSINOPHIL # BLD AUTO: 0.11 10*3/MM3 (ref 0–0.4)
EOSINOPHIL NFR BLD AUTO: 1.7 % (ref 0.3–6.2)
ERYTHROCYTE [DISTWIDTH] IN BLOOD BY AUTOMATED COUNT: 14.3 % (ref 12.3–15.4)
GFR SERPL CREATININE-BSD FRML MDRD: 69 ML/MIN/1.73
GLOBULIN UR ELPH-MCNC: 2.6 GM/DL
GLUCOSE BLD-MCNC: 86 MG/DL (ref 65–99)
HCT VFR BLD AUTO: 40.2 % (ref 37.5–51)
HGB BLD-MCNC: 13.1 G/DL (ref 13–17.7)
IMM GRANULOCYTES # BLD AUTO: 0.02 10*3/MM3 (ref 0–0.05)
IMM GRANULOCYTES NFR BLD AUTO: 0.3 % (ref 0–0.5)
LYMPHOCYTES # BLD AUTO: 1.37 10*3/MM3 (ref 0.7–3.1)
LYMPHOCYTES NFR BLD AUTO: 21.3 % (ref 19.6–45.3)
MCH RBC QN AUTO: 27.3 PG (ref 26.6–33)
MCHC RBC AUTO-ENTMCNC: 32.6 G/DL (ref 31.5–35.7)
MCV RBC AUTO: 83.9 FL (ref 79–97)
MONOCYTES # BLD AUTO: 0.46 10*3/MM3 (ref 0.1–0.9)
MONOCYTES NFR BLD AUTO: 7.2 % (ref 5–12)
NEUTROPHILS # BLD AUTO: 4.42 10*3/MM3 (ref 1.7–7)
NEUTROPHILS NFR BLD AUTO: 68.9 % (ref 42.7–76)
NRBC BLD AUTO-RTO: 0 /100 WBC (ref 0–0.2)
PLATELET # BLD AUTO: 208 10*3/MM3 (ref 140–450)
PMV BLD AUTO: 12 FL (ref 6–12)
POTASSIUM BLD-SCNC: 4.2 MMOL/L (ref 3.5–5.2)
PROT SERPL-MCNC: 6.9 G/DL (ref 6–8.5)
RBC # BLD AUTO: 4.79 10*6/MM3 (ref 4.14–5.8)
SODIUM BLD-SCNC: 139 MMOL/L (ref 136–145)
WBC NRBC COR # BLD: 6.42 10*3/MM3 (ref 3.4–10.8)

## 2020-02-21 PROCEDURE — 83880 ASSAY OF NATRIURETIC PEPTIDE: CPT

## 2020-02-21 PROCEDURE — 99214 OFFICE O/P EST MOD 30 MIN: CPT | Performed by: INTERNAL MEDICINE

## 2020-02-21 PROCEDURE — 80053 COMPREHEN METABOLIC PANEL: CPT

## 2020-02-21 PROCEDURE — 85025 COMPLETE CBC W/AUTO DIFF WBC: CPT

## 2020-02-21 PROCEDURE — 36415 COLL VENOUS BLD VENIPUNCTURE: CPT

## 2020-02-23 LAB — BNP SERPL-MCNC: 191.7 PG/ML (ref 0–100)

## 2020-02-25 ENCOUNTER — HOSPITAL ENCOUNTER (OUTPATIENT)
Dept: CARDIOLOGY | Facility: HOSPITAL | Age: 55
Discharge: HOME | End: 2020-02-25
Admitting: INTERNAL MEDICINE

## 2020-02-25 DIAGNOSIS — I50.33 ACUTE ON CHRONIC DIASTOLIC CHF (CONGESTIVE HEART FAILURE) (HCC): ICD-10-CM

## 2020-02-25 PROCEDURE — 93306 TTE W/DOPPLER COMPLETE: CPT

## 2020-02-25 PROCEDURE — 93306 TTE W/DOPPLER COMPLETE: CPT | Performed by: INTERNAL MEDICINE

## 2020-02-26 ENCOUNTER — TELEPHONE (OUTPATIENT)
Dept: CARDIOLOGY | Facility: CLINIC | Age: 55
End: 2020-02-26

## 2020-02-26 LAB
BH CV ECHO MEAS - AO MAX PG (FULL): 23.7 MMHG
BH CV ECHO MEAS - AO MAX PG: 32.5 MMHG
BH CV ECHO MEAS - AO MEAN PG (FULL): 15.2 MMHG
BH CV ECHO MEAS - AO MEAN PG: 20.3 MMHG
BH CV ECHO MEAS - AO ROOT AREA (BSA CORRECTED): 1
BH CV ECHO MEAS - AO ROOT AREA: 6.4 CM^2
BH CV ECHO MEAS - AO ROOT DIAM: 2.8 CM
BH CV ECHO MEAS - AO V2 MAX: 285 CM/SEC
BH CV ECHO MEAS - AO V2 MEAN: 217.7 CM/SEC
BH CV ECHO MEAS - AO V2 VTI: 60.5 CM
BH CV ECHO MEAS - AVA(I,A): 1.8 CM^2
BH CV ECHO MEAS - AVA(I,D): 1.8 CM^2
BH CV ECHO MEAS - AVA(V,A): 1.7 CM^2
BH CV ECHO MEAS - AVA(V,D): 1.7 CM^2
BH CV ECHO MEAS - BSA(HAYCOCK): 2.9 M^2
BH CV ECHO MEAS - BSA: 2.8 M^2
BH CV ECHO MEAS - BZI_BMI: 38.7 KILOGRAMS/M^2
BH CV ECHO MEAS - BZI_METRIC_HEIGHT: 195.6 CM
BH CV ECHO MEAS - BZI_METRIC_WEIGHT: 147.9 KG
BH CV ECHO MEAS - EDV(CUBED): 233.1 ML
BH CV ECHO MEAS - EDV(TEICH): 190.8 ML
BH CV ECHO MEAS - EF(CUBED): 60.8 %
BH CV ECHO MEAS - EF(TEICH): 51.4 %
BH CV ECHO MEAS - ESV(CUBED): 91.5 ML
BH CV ECHO MEAS - ESV(TEICH): 92.7 ML
BH CV ECHO MEAS - FS: 26.8 %
BH CV ECHO MEAS - IVS/LVPW: 1
BH CV ECHO MEAS - IVSD: 1.2 CM
BH CV ECHO MEAS - LA DIMENSION: 4.3 CM
BH CV ECHO MEAS - LA/AO: 1.5
BH CV ECHO MEAS - LAD MAJOR: 6.2 CM
BH CV ECHO MEAS - LAT PEAK E' VEL: 8.7 CM/SEC
BH CV ECHO MEAS - LATERAL E/E' RATIO: 10.6
BH CV ECHO MEAS - LV MASS(C)D: 345.7 GRAMS
BH CV ECHO MEAS - LV MASS(C)DI: 125.6 GRAMS/M^2
BH CV ECHO MEAS - LV MAX PG: 8.8 MMHG
BH CV ECHO MEAS - LV MEAN PG: 5.1 MMHG
BH CV ECHO MEAS - LV V1 MAX: 148.1 CM/SEC
BH CV ECHO MEAS - LV V1 MEAN: 107.2 CM/SEC
BH CV ECHO MEAS - LV V1 VTI: 34 CM
BH CV ECHO MEAS - LVIDD: 6.2 CM
BH CV ECHO MEAS - LVIDS: 4.5 CM
BH CV ECHO MEAS - LVOT AREA (M): 3.1 CM^2
BH CV ECHO MEAS - LVOT AREA: 3.2 CM^2
BH CV ECHO MEAS - LVOT DIAM: 2 CM
BH CV ECHO MEAS - LVPWD: 1.2 CM
BH CV ECHO MEAS - MED PEAK E' VEL: 5.7 CM/SEC
BH CV ECHO MEAS - MEDIAL E/E' RATIO: 16.1
BH CV ECHO MEAS - MV A MAX VEL: 71.1 CM/SEC
BH CV ECHO MEAS - MV DEC SLOPE: 548.6 CM/SEC^2
BH CV ECHO MEAS - MV DEC TIME: 0.22 SEC
BH CV ECHO MEAS - MV E MAX VEL: 93.8 CM/SEC
BH CV ECHO MEAS - MV E/A: 1.3
BH CV ECHO MEAS - MV P1/2T MAX VEL: 120.8 CM/SEC
BH CV ECHO MEAS - MV P1/2T: 64.5 MSEC
BH CV ECHO MEAS - MVA P1/2T LCG: 1.8 CM^2
BH CV ECHO MEAS - MVA(P1/2T): 3.4 CM^2
BH CV ECHO MEAS - PA ACC SLOPE: 609.6 CM/SEC^2
BH CV ECHO MEAS - PA ACC TIME: 0.15 SEC
BH CV ECHO MEAS - PA PR(ACCEL): 10.9 MMHG
BH CV ECHO MEAS - RAP SYSTOLE: 3 MMHG
BH CV ECHO MEAS - RV MAX PG: 1.2 MMHG
BH CV ECHO MEAS - RV V1 MAX: 55.8 CM/SEC
BH CV ECHO MEAS - RVSP: 13 MMHG
BH CV ECHO MEAS - SI(AO): 139.8 ML/M^2
BH CV ECHO MEAS - SI(CUBED): 51.5 ML/M^2
BH CV ECHO MEAS - SI(LVOT): 39.7 ML/M^2
BH CV ECHO MEAS - SI(TEICH): 35.6 ML/M^2
BH CV ECHO MEAS - SV(AO): 384.9 ML
BH CV ECHO MEAS - SV(CUBED): 141.7 ML
BH CV ECHO MEAS - SV(LVOT): 109.3 ML
BH CV ECHO MEAS - SV(TEICH): 98 ML
BH CV ECHO MEAS - TAPSE (>1.6): 2.6 CM2
BH CV ECHO MEAS - TR MAX PG: 10 MMHG
BH CV ECHO MEAS - TR MAX VEL: 158.8 CM/SEC
BH CV ECHO MEASUREMENTS AVERAGE E/E' RATIO: 13.03
BH CV XLRA - RV BASE: 5.2 CM
BH CV XLRA - RV LENGTH: 9.2 CM
BH CV XLRA - RV MID: 4.8 CM
BH CV XLRA - TDI S': 10.7 CM/SEC
LEFT ATRIUM VOLUME INDEX: 31.2 ML/M^2
LEFT ATRIUM VOLUME: 86 ML
LV EF 2D ECHO EST: 52 %
MAXIMAL PREDICTED HEART RATE: 166 BPM
STRESS TARGET HR: 141 BPM

## 2020-02-26 NOTE — TELEPHONE ENCOUNTER
Spoke with patient and let him know his most recent labs per  were WNL and we will see him at next f/u appt. Informed him also that most recent echo was acceptable with unchanged mechanical valve function from prior study in 2017.  Pt verbalized understanding

## 2020-02-27 ENCOUNTER — TELEPHONE (OUTPATIENT)
Dept: PHARMACY | Facility: HOSPITAL | Age: 55
End: 2020-02-27

## 2020-02-27 ENCOUNTER — ANTICOAGULATION VISIT (OUTPATIENT)
Dept: PHARMACY | Facility: HOSPITAL | Age: 55
End: 2020-02-27

## 2020-02-27 DIAGNOSIS — Z95.2 HX OF AORTIC VALVE REPLACEMENT, MECHANICAL: ICD-10-CM

## 2020-02-27 LAB — INR PPP: 3.2

## 2020-02-27 NOTE — PROGRESS NOTES
Anticoagulation Clinic - Remote Progress Note  [facility]  Testing frequency: 7 days    Indication: Atrial Fibrillation and Mechanical Aortic Valve; h/o embolic CVA  Referring Provider: Bunny last seen 1/2019; Pt overdue for repeat appointment.  Initial Warfarin Start Date: 11/2012  Planned Duration of Therapy: Life  Goal INR: 2.5-3.5  Current Drug Interactions: citalopram  CHADS-VASc: 3 (HTN, h/o CVA)    Vit K Diet: patient is eating daily samara lettuce salads  Alcohol: none  Tobacco: none       Anticoagulation Clinic INR History:  Date 5/8 5/23 6/6 6/9 6/13 6/28 7/19 8/10 8/30   Total Weekly Dose 52.5mg 60mg 62.5mg 62.5 mg 77.5 mg 57.5 mg 67.5 mg 70mg 70mg   INR 2.2 2.2 1.2 2.2 2.6 2.3 2.4 3.5 1.2   Notes missed dose more vit K dose increase; increase in Vit K foods  boost missed dose   Increase GLV; protein     Date 9/6 9/18 10/9 11/16 11/27 12/4 12/11 12/25 1/4/18   Total Weekly Dose 67.5 mg 60mg 70mg 70mg 70mg 70mg 75mg 75mg 70mg   INR 3.2 1.8 3.4 1.6 1.9 2.5 3.2 2.7 2.0   Notes boost + 1 held dose Missed dose + significant alcohol consumption on 9/16  enox enox    boost     Date 1/15 1/25 2/15 2/23 3/2 3/6 3/15 3/20 3/27 4/4   Total Weekly Dose 75mg 75mg 65mg 75mg 75mg 90mg 65mg 90mg 80mg 75mg   INR 2.9 2.5 2.1 3.3 1.4 2.0, 1.78 (ED) 2.4 3.2 3.5 2.4   Notes   missed   enox; boost; levaquin held x1        Date 4/14 4/20 5/1 5/16 5/27 6/7 6/15 6/22 6/25 7/4   Total Weekly Dose 80mg 80mg 90mg 90mg  (ED) 90mg 75mg 95mg 95mg 107.5mg ???   INR 2.3 2.0 3.2 2.9 3.2 4.1 3.1 1.3 2.0 2.7   Notes     levoflox missed; levoflox levoflox levoflox; refuse lovenox       Date 7/31 8/27 8/31 9/4 9/11 9/27 10/2 10/5 10/15   Total Weekly Dose 105mg  ??? 65mg  ??? 95mg 100mg 80mg  ??? 70mg 90mg 100mg 100mg   INR 2.7 1.3 2.6 5.6 1.6 1.6 2.3 2.4 2.8   Notes self adj self adj; miss; enox   incr GLV, mis-dosing, refuse enox incorrect doses        Date 10/26 11/5 11/7 11/15 12/7 1/11/19 1/24/19 2/8 2/18 3/4 3/26 4/23   Total  Weekly Dose 100mg  75mg 105mg 100mg 100mg 100mg 100mg 105mg 100mg 100mg 0mg   INR 4.6 7.9 1.9 5.5, 5.7 2.8 2.4 3.9 2.7 3.6 3.2 4.8 1.14 ED, 1.9 HM   Notes  recalled test strips clinic; 2 dose hold clinic    LVM  clinic       Date 5/7 6/3 6/17 6/24 7/11 7/18 7/22 7/25  8/5 8/12 8/15 8/21   Total Weekly Dose not able to confirm 75mg  ??? 95mg 80 mg 95mg 75mg 100 mg 100mg??? 95 mg 95 mg 75 mg 90 mg   INR 3.5 4.9 2.5 2.3 5.4 2.0 2.2 4.4 4 6.2 3.0 5   Notes s/p surgery; 2x boost  doxy start 6/13 doxy; 1 miss doxy self-held 2 doses   No GLV VPA restarted Held x 1      Date  8/26 8/30 9/6 9/12 9/17 10/2 10/10 11/4 11/12 11/22 12/10 1/3/20 1/22   Total Weekly Dose 70 mg 90mg 85mg 85mg 75 mg 80 mg 80 mg 62.5 mg 85mg 82.5mg 70mg 72.5 mg 72.5mg   INR  2.3 3.9 4.2 4.5 3.3  3.9 3.6 1.7 3.4 4.7 2.4 3.6 1.3   Notes 1 hold    rec'd 9/18   1  Patient took 85mg (Planned for 80mg)  Self-adjusted dose  lovenox     Date 1/30 2/3 2/18 2/27         Total Weekly Dose 82.5mg 77.5mg 75mg 75mg         INR 2.3 3.2 3.1 3.2         Notes                 Phone Interview:  Verbal Release Authorization signed on 11/7/18 -- may speak with Zuleima Cecil (patient's wife)    Tablet Strength: 5mg tablets   Patient Contact: [phone] -- try to call in PM or contact his wife Zuleima at [phone].  His email is [email or web address]     Patient Findings   Negatives:  Signs/symptoms of thrombosis, Signs/symptoms of bleeding, Laboratory test error suspected, Change in health, Change in alcohol use, Change in activity, Upcoming invasive procedure, Emergency department visit, Upcoming dental procedure, Missed doses, Extra doses, Change in medications, Change in diet/appetite, Hospital admission, Bruising, Other complaints   Comments:  Mr. Alberto reports all findings for patient to be negative.     Plan:   1. INR therapeutic today at 3.2. Instructed Mr. Alberto to continue 10mg daily except 12.5mg TueFri.   2. Repeat INR in two week 03/12/2020.  3.  Verbal information provided over the phone to Mrs. Alberto. Mrs. Alberto expresses understanding by teach back and has no further questions at this time.     Do Vargas, PharmD   2/27/2020  12:17 P

## 2020-03-02 RX ORDER — LISINOPRIL 40 MG/1
TABLET ORAL
Qty: 90 TABLET | Refills: 3 | Status: SHIPPED | OUTPATIENT
Start: 2020-03-02 | End: 2021-04-09

## 2020-03-24 ENCOUNTER — TELEPHONE (OUTPATIENT)
Dept: PHARMACY | Facility: HOSPITAL | Age: 55
End: 2020-03-24

## 2020-03-30 DIAGNOSIS — Z95.2 HX OF AORTIC VALVE REPLACEMENT, MECHANICAL: ICD-10-CM

## 2020-03-30 RX ORDER — WARFARIN SODIUM 5 MG/1
TABLET ORAL
Qty: 75 TABLET | Refills: 0 | OUTPATIENT
Start: 2020-03-30

## 2020-03-30 RX ORDER — WARFARIN SODIUM 5 MG/1
TABLET ORAL
Qty: 180 TABLET | Refills: 1 | Status: SHIPPED | OUTPATIENT
Start: 2020-03-30 | End: 2020-08-07

## 2020-04-08 ENCOUNTER — ANTICOAGULATION VISIT (OUTPATIENT)
Dept: PHARMACY | Facility: HOSPITAL | Age: 55
End: 2020-04-08

## 2020-04-08 DIAGNOSIS — Z95.2 HX OF AORTIC VALVE REPLACEMENT, MECHANICAL: ICD-10-CM

## 2020-04-08 LAB — INR PPP: 4.3

## 2020-04-08 NOTE — PROGRESS NOTES
Anticoagulation Clinic - Remote Progress Note  [facility]  Testing frequency: 7 days    Indication: Atrial Fibrillation and Mechanical Aortic Valve; h/o embolic CVA  Referring Provider: Bunny last seen 1/2019; Pt overdue for repeat appointment.  Initial Warfarin Start Date: 11/2012  Planned Duration of Therapy: Life  Goal INR: 2.5-3.5  Current Drug Interactions: citalopram  CHADS-VASc: 3 (HTN, h/o CVA)    Vit K Diet: patient is eating daily samara lettuce salads  Alcohol: none  Tobacco: none       Anticoagulation Clinic INR History:  Date 4/14 4/20 5/1 5/16 5/27 6/7 6/15 6/22 6/25 7/4   Total Weekly Dose 80mg 80mg 90mg 90mg  (ED) 90mg 75mg 95mg 95mg 107.5mg ???   INR 2.3 2.0 3.2 2.9 3.2 4.1 3.1 1.3 2.0 2.7   Notes     levoflox missed; levoflox levoflox levoflox; refuse lovenox       Date 7/31 8/27 8/31 9/4 9/11 9/27 10/2 10/5 10/15   Total Weekly Dose 105mg  ??? 65mg  ??? 95mg 100mg 80mg  ??? 70mg 90mg 100mg 100mg   INR 2.7 1.3 2.6 5.6 1.6 1.6 2.3 2.4 2.8   Notes self adj self adj; miss; enox   incr GLV, mis-dosing, refuse enox incorrect doses        Date 10/26 11/5 11/7 11/15 12/7 1/11/19 1/24/19 2/8 2/18 3/4 3/26 4/23   Total Weekly Dose 100mg  75mg 105mg 100mg 100mg 100mg 100mg 105mg 100mg 100mg 0mg   INR 4.6 7.9 1.9 5.5, 5.7 2.8 2.4 3.9 2.7 3.6 3.2 4.8 1.14 ED, 1.9 HM   Notes  recalled test strips clinic; 2 dose hold clinic    LVM  clinic       Date 5/7 6/3 6/17 6/24 7/11 7/18 7/22 7/25  8/5 8/12 8/15 8/21   Total Weekly Dose not able to confirm 75mg  ??? 95mg 80 mg 95mg 75mg 100 mg 100mg??? 95 mg 95 mg 75 mg 90 mg   INR 3.5 4.9 2.5 2.3 5.4 2.0 2.2 4.4 4 6.2 3.0 5   Notes s/p surgery; 2x boost  doxy start 6/13 doxy; 1 miss doxy self-held 2 doses   No GLV VPA restarted Held x 1      Date  8/26 8/30 9/6 9/12 9/17 10/2 10/10 11/4 11/12 11/22 12/10 1/3/20 1/22   Total Weekly Dose 70 mg 90mg 85mg 85mg 75 mg 80 mg 80 mg 62.5 mg 85mg 82.5mg 70mg 72.5 mg 72.5mg   INR  2.3 3.9 4.2 4.5 3.3  3.9 3.6 1.7 3.4 4.7  2.4 3.6 1.3   Notes 1 hold    rec'd 9/18   1 miss Patient took 85mg (Planned for 80mg)  Self-adjusted dose  lovenox     Date 1/30 2/3 2/18 2/27 4/8        Total Weekly Dose 82.5mg 77.5mg 75mg 75mg 70mg        INR 2.3 3.2 3.1 3.2 4.3        Notes     Self reduce dose; Levaquin;           Phone Interview:  Verbal Release Authorization signed on 11/7/18 -- may speak with Zuleima Alberto (patient's wife)    Tablet Strength: 5mg tablets   Patient Contact: [phone] -- try to call in PM or contact his wife Zuleima at [phone].  His email is [email or web address]     Patient Findings   Positives:  Change in health, Missed doses, Other complaints   Negatives:  Signs/symptoms of thrombosis, Signs/symptoms of bleeding, Laboratory test error suspected, Change in alcohol use, Change in activity, Upcoming invasive procedure, Emergency department visit, Upcoming dental procedure, Extra doses, Change in medications, Change in diet/appetite, Hospital admission, Bruising   Comments:  Mr. Alberot reports that he has taken Levaquin the past two days. This antibiotic was not prescribed to him, he is taking left over medication that was his wife's.  His wife is sick. Discussed this is not recommended. He declines to be sick at this time and reports he is trying to stay healthy to take care of his wife. He also reports that his wife decreased his dose last week from 10mg daily except 12.5mg MonFri to 10mg daily without cause.     Plan:   1. INR SUPRAtherapeutic today. Instructed Mr. Alberto to decrease dose to 5mg tonight and then continue 10mg daily.  2. Repeat INR on 4/16.  3. Verbal information provided over the phone to Mrs. Alberto. Mrs. Alberto expresses understanding by teach back and has no further questions at this time.     Do Vargas, PharmD   4/8/2020  12:17 P

## 2020-04-22 ENCOUNTER — TELEPHONE (OUTPATIENT)
Dept: PHARMACY | Facility: HOSPITAL | Age: 55
End: 2020-04-22

## 2020-04-29 ENCOUNTER — ANTICOAGULATION VISIT (OUTPATIENT)
Dept: PHARMACY | Facility: HOSPITAL | Age: 55
End: 2020-04-29

## 2020-04-29 DIAGNOSIS — Z95.2 HX OF AORTIC VALVE REPLACEMENT, MECHANICAL: ICD-10-CM

## 2020-04-29 LAB — INR PPP: 4.1

## 2020-04-30 RX ORDER — FUROSEMIDE 40 MG/1
TABLET ORAL
Qty: 90 TABLET | Refills: 0 | Status: SHIPPED | OUTPATIENT
Start: 2020-04-30 | End: 2020-10-13

## 2020-05-18 ENCOUNTER — TELEPHONE (OUTPATIENT)
Dept: CARDIOLOGY | Facility: CLINIC | Age: 55
End: 2020-05-18

## 2020-05-18 ENCOUNTER — ANTICOAGULATION VISIT (OUTPATIENT)
Dept: PHARMACY | Facility: HOSPITAL | Age: 55
End: 2020-05-18

## 2020-05-18 DIAGNOSIS — Z95.2 HX OF AORTIC VALVE REPLACEMENT, MECHANICAL: ICD-10-CM

## 2020-05-18 LAB — INR PPP: 4.5

## 2020-05-18 NOTE — TELEPHONE ENCOUNTER
Patient called to report that he had recent labs after he hurt his leg at [facility] and it was suggested he call our office as it was suggested he had CHF. I attempted to call him back but it rings busy, unable to leave a message. Records requested from .

## 2020-05-18 NOTE — PROGRESS NOTES
Anticoagulation Clinic - Remote Progress Note  [facility]  Testing frequency: 7 days    Indication: Atrial Fibrillation and Mechanical Aortic Valve; h/o embolic CVA  Referring Provider: Bunny last seen 1/2019; Pt overdue for repeat appointment.  Initial Warfarin Start Date: 11/2012  Planned Duration of Therapy: Life  Goal INR: 2.5-3.5  Current Drug Interactions: citalopram  CHADS-VASc: 3 (HTN, h/o CVA)    Vit K Diet: patient is eating daily samara lettuce salads  Alcohol: none  Tobacco: none       Anticoagulation Clinic INR History:  Date 4/14 4/20 5/1 5/16 5/27 6/7 6/15 6/22 6/25 7/4   Total Weekly Dose 80mg 80mg 90mg 90mg  (ED) 90mg 75mg 95mg 95mg 107.5mg ???   INR 2.3 2.0 3.2 2.9 3.2 4.1 3.1 1.3 2.0 2.7   Notes     levoflox missed; levoflox levoflox levoflox; refuse lovenox       Date 7/31 8/27 8/31 9/4 9/11 9/27 10/2 10/5 10/15   Total Weekly Dose 105mg  ??? 65mg  ??? 95mg 100mg 80mg  ??? 70mg 90mg 100mg 100mg   INR 2.7 1.3 2.6 5.6 1.6 1.6 2.3 2.4 2.8   Notes self adj self adj; miss; enox   incr GLV, mis-dosing, refuse enox incorrect doses        Date 10/26 11/5 11/7 11/15 12/7 1/11/19 1/24/19 2/8 2/18 3/4 3/26 4/23   Total Weekly Dose 100mg  75mg 105mg 100mg 100mg 100mg 100mg 105mg 100mg 100mg 0mg   INR 4.6 7.9 1.9 5.5, 5.7 2.8 2.4 3.9 2.7 3.6 3.2 4.8 1.14 ED, 1.9 HM   Notes  recalled test strips clinic; 2 dose hold clinic    LVM  clinic       Date 5/7 6/3 6/17 6/24 7/11 7/18 7/22 7/25  8/5 8/12 8/15 8/21   Total Weekly Dose not able to confirm 75mg  ??? 95mg 80 mg 95mg 75mg 100 mg 100mg??? 95 mg 95 mg 75 mg 90 mg   INR 3.5 4.9 2.5 2.3 5.4 2.0 2.2 4.4 4 6.2 3.0 5   Notes s/p surgery; 2x boost  doxy start 6/13 doxy; 1 miss doxy self-held 2 doses   No GLV VPA restarted Held x 1      Date  8/26 8/30 9/6 9/12 9/17 10/2 10/10 11/4 11/12 11/22 12/10 1/3/20 1/22   Total Weekly Dose 70 mg 90mg 85mg 85mg 75 mg 80 mg 80 mg 62.5 mg 85mg 82.5mg 70mg 72.5 mg 72.5mg   INR  2.3 3.9 4.2 4.5 3.3  3.9 3.6 1.7 3.4 4.7  2.4 3.6 1.3   Notes 1 hold    rec'd 9/18   1 miss Patient took 85mg (Planned for 80mg)  Self-adjusted dose  lovenox     Date 1/30 2/3 2/18 2/27 4/8 4/29 5/18      Total Weekly Dose 82.5mg 77.5mg 75mg 75mg 70mg 70mg 65mg      INR 2.3 3.2 3.1 3.2 4.3 4.1 4.5      Notes     Self reduce dose; Levaquin;  Less GLV; covid         Phone Interview:  Verbal Release Authorization signed on 11/7/18 -- may speak with Zuleima Alberto (patient's wife)    Tablet Strength: 5mg tablets   Patient Contact: [phone] -- try to call in PM or contact his wife Zuleima at [phone].  His email is [email or web address]     Patient Findings   Positives:  Change in medications, Change in diet/appetite   Negatives:  Signs/symptoms of thrombosis, Signs/symptoms of bleeding, Laboratory test error suspected, Change in health, Change in alcohol use, Change in activity, Upcoming invasive procedure, Emergency department visit, Upcoming dental procedure, Missed doses, Extra doses, Hospital admission, Bruising, Other complaints   Comments:  They are not getting fresh produce at this time with COVID-19. Ivis reports that he didn't take antibiotics. Mr. Alberto did something to his leg (sprain/ pulled tendon). He saw the MD on Friday at [facility]. They were concerned for that was a blood clot. They did not do a doppler. He does have bruising in his leg. Mr. Alberto and Mrs. Alberto report that he was also diagnosed with CHF at [facility]. They have called and LVM for Dr. Hollis.      Plan:   1. INR SUPRAtherapeutic on 5/15 despite dose reduction. INR was reported after hours on Friday 5/15. Pt's wife is attributing Mr. Alberto's elevated INR to decrease in GLV intake. During the COVID-19 pandemic, more difficult to get fresh produce, therefore, instructed Ivis to have him decrease dose to 5mg today and then decrease his dose to 10mg daily except 7.5mg MonThurSat.  2. Repeat INR Wednesday 5/28/2020.   3. Verbal information provided over  the phone to Mrs. Alberto. Mrs. Alberto expresses understanding by teach back and has no further questions at this time.     Do Vargas, PharmD   5/18/2020  12:59 PM

## 2020-05-27 ENCOUNTER — OFFICE VISIT (OUTPATIENT)
Dept: ORTHOPEDIC SURGERY | Facility: CLINIC | Age: 55
End: 2020-05-27

## 2020-05-27 ENCOUNTER — TELEPHONE (OUTPATIENT)
Dept: ORTHOPEDIC SURGERY | Facility: CLINIC | Age: 55
End: 2020-05-27

## 2020-05-27 VITALS — HEART RATE: 88 BPM | OXYGEN SATURATION: 95 % | BODY MASS INDEX: 37.19 KG/M2 | HEIGHT: 77 IN | WEIGHT: 315 LBS

## 2020-05-27 DIAGNOSIS — M25.561 ACUTE PAIN OF RIGHT KNEE: Primary | ICD-10-CM

## 2020-05-27 DIAGNOSIS — S76.109A INJURY OF QUADRICEPS MUSCLE: ICD-10-CM

## 2020-05-27 PROCEDURE — 99204 OFFICE O/P NEW MOD 45 MIN: CPT | Performed by: PHYSICIAN ASSISTANT

## 2020-05-27 NOTE — TELEPHONE ENCOUNTER
Per TAR called in Tramadol 50mg 1 tab Q8h prn for pain #3 to [facility] pharmacy on [address]. Left message with med request; Instructed to call with any further questions.     Emily

## 2020-05-27 NOTE — PROGRESS NOTES
[facility] Note    Subjective     Patient: Mitchell Alberto  : 1965    Primary Care Provider: No primary care provider on file.    Requesting Provider: As above    Pain of the Right Leg      History    Chief Complaint: Right leg pain    History of Present Illness: This is a very pleasant 54-year-old presenting today to discuss his right leg pain.  He reports approximately 3 weeks ago he was taking some plants in the house and stepped up and felt a pop in his thigh.  He had immediate pain in his right knee and right thigh.  He rates it to be 10/10 and stabbing aching shooting and throbbing.  He then developed ecchymosis over the right thigh as well as swelling the entire leg.  Over time, he developed some ecchymosis in his toes as well.  He reports pain with any weightbearing and walking getting up from a seated position as well as night pain and rest pain.  He was seen by his primary care provider and then subsequently by urgent treatment at [facility].  They had focused on the ankle and not the remainder of the leg.  He had x-rays of the right foot and ankle on 2020 at urgent treatment.  He is been treating with activity modification.  He is on Coumadin and cannot take any anti-inflammatories.  He is here for further evaluation and treatment recommendations.    Current Outpatient Medications on File Prior to Visit   Medication Sig Dispense Refill   • buPROPion (WELLBUTRIN) 100 MG tablet Take 50 mg by mouth 2 (Two) Times a Day.     • CIALIS 5 MG tablet Take 1 tablet by mouth As Needed.     • furosemide (LASIX) 40 MG tablet TAKE ONE TABLET BY MOUTH DAILY AS NEEDED (GREATER THAN 2 POUNDS WEIGHT GAIN IN 24 HOURS) 90 tablet 0   • lisinopril (PRINIVIL,ZESTRIL) 40 MG tablet TAKE ONE TABLET BY MOUTH DAILY 90 tablet 3   • warfarin (COUMADIN) 5 MG tablet TAKE TWO TO THREE TABLETS BY MOUTH DAILY OR AS DIRECTED BY THE ANTICOAGULATION PHARMACIST 180 tablet 1   • enoxaparin (LOVENOX)  150 MG/ML injection Discard 0.1mL and administer 0.9mL SubQ every 12 hours as directed. (Patient taking differently: As Needed. Discard 0.1mL and administer 0.9mL SubQ every 12 hours as directed.) 15 mL 0   • PHARMACY TO DOSE WARFARIN Continuous As Needed (patient is managed by the [facility] ([phone])).       No current facility-administered medications on file prior to visit.       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 Residual left upper extremity weakness.     • Transient cerebral ischemia      Past Surgical History:   Procedure Laterality Date   • ABDOMINAL AORTIC ANEURYSM REPAIR      History of Aortic Aneurysm Repair   • AORTIC VALVE REPAIR/REPLACEMENT      Replacement   • BACK SURGERY     • HERNIA REPAIR  08/2014    Left inguinal herniorrhaphy   • SKIN CANCER EXCISION  08/2014    Left forehead melanoma excision, Dr. Gisell Kothari, August 2014.      Family History   Problem Relation Age of Onset   • Aortic aneurysm Mother    • Arthritis Mother    • Heart disease Mother    • Heart attack Mother    • Hypertension Mother    • Arthritis Father    • Heart disease Father    • Melanoma Father    • Hypertension Father    • Depression Other    • Hypertension Sister    • Heart disease Brother    • Hypertension Brother    • Hypertension Brother       Social History     Socioeconomic History   • Marital status:      Spouse name: Not on file   • Number of children: Not on file   • Years of education: Not on file   • Highest education level: Not on file  "  Occupational History   • Occupation: Disabled   Tobacco Use   • Smoking status: Never Smoker   • Smokeless tobacco: Never Used   Substance and Sexual Activity   • Alcohol use: Yes     Comment: very occasional   • Drug use: No   Social History Narrative    Patient drinks 6-8 caffeine servings per day.        Review of Systems   Constitutional: Negative.    HENT: Negative.    Eyes: Negative.    Respiratory: Negative.    Cardiovascular: Negative.    Gastrointestinal: Negative.    Endocrine: Negative.    Genitourinary: Negative.    Musculoskeletal: Positive for arthralgias.   Skin: Negative.    Allergic/Immunologic: Negative.    Neurological: Negative.    Hematological: Negative.    Psychiatric/Behavioral: Negative.        The following portions of the patient's history were reviewed and updated as appropriate: allergies, current medications, past family history, past medical history, past social history, past surgical history and problem list.      Objective      Physical Exam  Pulse 88   Ht 195.6 cm (77.01\")   Wt (!) 147 kg (324 lb 1.2 oz)   SpO2 95%   BMI 38.42 kg/m²     Body mass index is 38.42 kg/m².    GENERAL: Body habitus: obese    Lower extremity edema: Left: 1+ pitting; Right: 2+ pitting    Varicose veins:  Left: mild; Right: mild    Gait: antalgic     Mental Status:  awake and alert; oriented to person, place, and time    Voice:  clear  SKIN:  Patient has resolving ecchymosis over the anterior thight    Hair Growth:  Right:normal; Left:  normal  HEENT: Head: Normocephalic, atraumatic,  without obvious abnormality.  eye: normal external eye, no icterus   PULM:  Repiratory effort normal    Ortho Exam  Right  hip    RANGE OF MOTION:   FLEXION CONTRACTURE: None   FLEXION: 110 degrees   INTERNAL ROTATION: 20 degrees at 90 degrees of flexion   EXTERNAL ROTATION: 40 degrees at 90 degrees of flexion    PAIN WITH HIP MOTION: no  PAIN WITH LOGROLL: no  STINCHFIELD TEST: negative    STRENGTH:  5/5 hip adduction, " abduction, flexion. 5/5 strength knee flexion, extension. 5/5 strength ankle dorsiflexion and plantarflexion.     GREATER TROCHANTER BURSAL PAIN:  No    SENSATION TO LIGHT TOUCH:  DEEP PERONEAL/SUPERFICIAL PERONEAL/SURAL/SAPHENOUS/TIBIAL:  intact    STRAIGHT LEG TEST:   negative        Right Knee Exam  ----------  ALIGNMENT: Right: neutral----------  RANGE OF MOTION:  Right: Normal (0-120 degrees) with no extensor lag or flexion contracture. Patient complains of exquisite pain in the quadriceps with palpation and flexion of the knee  LIGAMENTOUS STABILITY:   Right:stable to varus and valgus stress at terminal extension and 30 degrees without any evidence of laxity----------  STRENGTH:  KNEE FLEXION Right 5/5  KNEE EXTENSION Right 5/5 ----------  PAIN WITH PALPATION: Right tender over quad as above generalized tenderness about the entire RLE  PAIN WITH KNEE ROM: Right yes not joint pain, quad pain    ----------  SENSATION TO LIGHT TOUCH:  DEEP PERONEAL/SUPERFICIAL PERONEAL/SURAL/SAPHENOUS/TIBIAL:   Right intact----------  ERYTHEMA:  Right: no;  WOUNDS/INCISIONS: none, no overlying skin problems.    V:  Dorsalis Pedis:  Right: 2+;     Posterior Tibial: Right:2+;     Capillary Refill:  Brisk  MSK:      Tibia:  Right:  tender over subcutaneous border;    Ankle:  Right nontener    Foot:  Right:  non tender      NEURO:     [place] [employer] 5.07 monofilament test: not evaluated    Lower extremity sensation: intact     Calf Atrophy:none    Motor Function: all 5/5            Medical Decision Making    Data Review:   ordered and reviewed x-rays today and reviewed radiology images    Assessment:  1. Acute pain of right knee    2. Injury of quadriceps muscle        Plan:  Right quadriceps injury.  I reviewed x-rays from 5/24/2020 as well as today's x-rays of the knee and femur and clinical findings with the patient.  X-rays from 5/24/2020 of the foot and ankle are negative for any acute bony findings.  X-rays of the femur and  knee today show no evidence of any acute bony findings.  On exam he is tender over the quadriceps with resolving ecchymosis.  He has pain with flexion of the knee over the quadriceps.  He reports no injury to the ankle and feeling a pop in the thigh when stepping up from the stair.  Patient does Take Coumadin which has contributed to some of his swelling ecchymosis and pain.  I explained that I think his pain functional imitations are secondary to a quadriceps tear.  Given his Coumadin, especially with an INR of 4,  this injury had more bleeding and he likely has a hematoma in the thigh.  This will take time to resolve and sometimes can take months.  Recommendation today is that he get a thigh-high compression stocking.  I have given him places where he will be able to get thigh-high compression stockings.  We have given him prescription for tramadol.  He will return to see us in 3 weeks for repeat exam or sooner if needed.    Patient history, diagnosis and treatment plan discussed with Dr. Shaver.      Eleni Hills PA-C  05/28/20  14:45

## 2020-06-18 ENCOUNTER — TELEPHONE (OUTPATIENT)
Dept: PHARMACY | Facility: HOSPITAL | Age: 55
End: 2020-06-18

## 2020-06-22 ENCOUNTER — ANTICOAGULATION VISIT (OUTPATIENT)
Dept: PHARMACY | Facility: HOSPITAL | Age: 55
End: 2020-06-22

## 2020-06-22 DIAGNOSIS — Z95.2 HX OF AORTIC VALVE REPLACEMENT, MECHANICAL: ICD-10-CM

## 2020-06-22 LAB — INR PPP: 3

## 2020-06-22 NOTE — PROGRESS NOTES
Anticoagulation Clinic - Remote Progress Note  [facility]  Testing frequency: 7 days    Indication: Atrial Fibrillation and Mechanical Aortic Valve; h/o embolic CVA  Referring Provider: Bunny last seen 1/2019; Pt overdue for repeat appointment.  Initial Warfarin Start Date: 11/2012  Planned Duration of Therapy: Life  Goal INR: 2.5-3.5  Current Drug Interactions: citalopram  CHADS-VASc: 3 (HTN, h/o CVA)    Vit K Diet: patient is eating daily samara lettuce salads 6/22/20  Alcohol: none  Tobacco: none       Anticoagulation Clinic INR History:  Date 4/14 4/20 5/1 5/16 5/27 6/7 6/15 6/22 6/25 7/4   Total Weekly Dose 80mg 80mg 90mg 90mg  (ED) 90mg 75mg 95mg 95mg 107.5mg ???   INR 2.3 2.0 3.2 2.9 3.2 4.1 3.1 1.3 2.0 2.7   Notes     levoflox missed; levoflox levoflox levoflox; refuse lovenox       Date 7/31 8/27 8/31 9/4 9/11 9/27 10/2 10/5 10/15   Total Weekly Dose 105mg  ??? 65mg  ??? 95mg 100mg 80mg  ??? 70mg 90mg 100mg 100mg   INR 2.7 1.3 2.6 5.6 1.6 1.6 2.3 2.4 2.8   Notes self adj self adj; miss; enox   incr GLV, mis-dosing, refuse enox incorrect doses        Date 10/26 11/5 11/7 11/15 12/7 1/11/19 1/24/19 2/8 2/18 3/4 3/26 4/23   Total Weekly Dose 100mg  75mg 105mg 100mg 100mg 100mg 100mg 105mg 100mg 100mg 0mg   INR 4.6 7.9 1.9 5.5, 5.7 2.8 2.4 3.9 2.7 3.6 3.2 4.8 1.14 ED, 1.9 HM   Notes  recalled test strips clinic; 2 dose hold clinic    LVM  clinic       Date 5/7 6/3 6/17 6/24 7/11 7/18 7/22 7/25  8/5 8/12 8/15 8/21   Total Weekly Dose not able to confirm 75mg  ??? 95mg 80 mg 95mg 75mg 100 mg 100mg??? 95 mg 95 mg 75 mg 90 mg   INR 3.5 4.9 2.5 2.3 5.4 2.0 2.2 4.4 4 6.2 3.0 5   Notes s/p surgery; 2x boost  doxy start 6/13 doxy; 1 miss doxy self-held 2 doses   No GLV VPA restarted Held x 1      Date  8/26 8/30 9/6 9/12 9/17 10/2 10/10 11/4 11/12 11/22 12/10 1/3/20 1/22   Total Weekly Dose 70 mg 90mg 85mg 85mg 75 mg 80 mg 80 mg 62.5 mg 85mg 82.5mg 70mg 72.5 mg 72.5mg   INR  2.3 3.9 4.2 4.5 3.3  3.9 3.6 1.7  3.4 4.7 2.4 3.6 1.3   Notes 1 hold    rec'd 9/18   1  Patient took 85mg (Planned for 80mg)  Self-adjusted dose  lovenox     Date 1/30 2/3 2/18 2/27 4/8 4/29 5/18 6/20     Total Weekly Dose 82.5mg 77.5mg 75mg 75mg 70mg 70mg 65mg 65mg     INR 2.3 3.2 3.1 3.2 4.3 4.1 4.5 3.0     Notes     Self reduce dose; Levaquin;  Less GLV; covid  recv'd 6/22; mis-dose       Phone Interview:  Verbal Release Authorization signed on 11/7/18 -- may speak with Zuleima Alberto (patient's wife)    Tablet Strength: 5mg tablets   Patient Contact: [phone] -- try to call in PM or contact his wife Zuleima at [phone].  His email is [email or web address]     Patient Findings:  Positives:  Change in health, Extra doses, Bruising   Negatives:  Signs/symptoms of thrombosis, Signs/symptoms of bleeding, Laboratory test error suspected, Change in alcohol use, Change in activity, Upcoming invasive procedure, Emergency department visit, Upcoming dental procedure, Missed doses, Change in medications, Change in diet/appetite, Hospital admission, Other complaints   Comments:  Mr. Alberto was seen on 5/27/20 for consult following severe hip and leg pain from walking up stairs. Upon x-rays, found to have quadricep tear in the right leg. He reports being in immense amount of pain but denies being prescribed any pain medication although it appears they may have possibly called in three pills of tramadol the day of consultation. He also reports extensive bruising in the area which the orthopedic office told him may take 3-6 months to heal completely. He wasn't able to confirm his dose of warfarin but asked his spouse, Zuleima. She told him she has been giving him 65mg/week versus the tracker's indicated 62.5mg/week. He says he is sure he has been taking 1.5 tablets on TuesThurs (7.5mg) and 2 tablets the rest of the days (10mg). Since INR is therapeutic, instructed Mr. Alberto to continue this dosing for now.      Plan:   1. INR was back WNL on  Saturday. Results not received until Monday due to checking outside of clinic hours. Instructed Mr. Alberto to continue warfarin 10mg daily except 7.5mg on TuesThurs until recheck. Patient historically non compliant with follow up.   2. Repeat INR in about two weeks. Clinic will be closed on Friday, 7/3. Patient not agreeable to check sooner.  3. Verbal information provided over the phone to Mrs. Alberto. Mrs. Alberto expresses understanding by teach back and has no further questions at this time.     Gertrudis Glez, Pharmacy Technician   6/22/2020  12:59 PM    Sonya JAMES [facility], have reviewed the note in full and agree with the assessment and plan.  06/22/20  11:47

## 2020-07-30 ENCOUNTER — TELEPHONE (OUTPATIENT)
Dept: PHARMACY | Facility: HOSPITAL | Age: 55
End: 2020-07-30

## 2020-07-30 NOTE — TELEPHONE ENCOUNTER
Spoke with patient re:overdue PT/INR lab draw. He rsays he is aware he is extremely overdue for an INR but reports his spouse, Ivis, has been in the hospital on vent. He also reports he has had issues turning on his HM, but says he will call [facility] to speak with them and try to test/submit INR today.

## 2020-08-03 ENCOUNTER — ANTICOAGULATION VISIT (OUTPATIENT)
Dept: PHARMACY | Facility: HOSPITAL | Age: 55
End: 2020-08-03

## 2020-08-03 DIAGNOSIS — Z95.2 HX OF AORTIC VALVE REPLACEMENT, MECHANICAL: ICD-10-CM

## 2020-08-03 LAB — INR PPP: 3

## 2020-08-03 NOTE — PROGRESS NOTES
Anticoagulation Clinic - Remote Progress Note  [facility]  Testing frequency: 7 days    Indication: Atrial Fibrillation and Mechanical Aortic Valve; h/o embolic CVA  Referring Provider: Bunny last seen 1/2019; Pt overdue for repeat appointment.  Initial Warfarin Start Date: 11/2012  Planned Duration of Therapy: Life  Goal INR: 2.5-3.5  Current Drug Interactions: citalopram  CHADS-VASc: 3 (HTN, h/o CVA)    Vit K Diet: patient is eating daily samara lettuce salads 6/22/20  Alcohol: none  Tobacco: none       Anticoagulation Clinic INR History:  Date 4/14 4/20 5/1 5/16 5/27 6/7 6/15 6/22 6/25 7/4   Total Weekly Dose 80mg 80mg 90mg 90mg  (ED) 90mg 75mg 95mg 95mg 107.5mg ???   INR 2.3 2.0 3.2 2.9 3.2 4.1 3.1 1.3 2.0 2.7   Notes     levoflox missed; levoflox levoflox levoflox; refuse lovenox       Date 7/31 8/27 8/31 9/4 9/11 9/27 10/2 10/5 10/15   Total Weekly Dose 105mg  ??? 65mg  ??? 95mg 100mg 80mg  ??? 70mg 90mg 100mg 100mg   INR 2.7 1.3 2.6 5.6 1.6 1.6 2.3 2.4 2.8   Notes self adj self adj; miss; enox   incr GLV, mis-dosing, refuse enox incorrect doses        Date 10/26 11/5 11/7 11/15 12/7 1/11/19 1/24/19 2/8 2/18 3/4 3/26 4/23   Total Weekly Dose 100mg  75mg 105mg 100mg 100mg 100mg 100mg 105mg 100mg 100mg 0mg   INR 4.6 7.9 1.9 5.5, 5.7 2.8 2.4 3.9 2.7 3.6 3.2 4.8 1.14 ED, 1.9 HM   Notes  recalled test strips clinic; 2 dose hold clinic    LVM  clinic       Date 5/7 6/3 6/17 6/24 7/11 7/18 7/22 7/25  8/5 8/12 8/15 8/21   Total Weekly Dose not able to confirm 75mg  ??? 95mg 80 mg 95mg 75mg 100 mg 100mg??? 95 mg 95 mg 75 mg 90 mg   INR 3.5 4.9 2.5 2.3 5.4 2.0 2.2 4.4 4 6.2 3.0 5   Notes s/p surgery; 2x boost  doxy start 6/13 doxy; 1 miss doxy self-held 2 doses   No GLV VPA restarted Held x 1      Date  8/26 8/30 9/6 9/12 9/17 10/2 10/10 11/4 11/12 11/22 12/10 1/3/20 1/22   Total Weekly Dose 70 mg 90mg 85mg 85mg 75 mg 80 mg 80 mg 62.5 mg 85mg 82.5mg 70mg 72.5 mg 72.5mg   INR  2.3 3.9 4.2 4.5 3.3  3.9 3.6 1.7  3.4 4.7 2.4 3.6 1.3   Notes 1 hold    rec'd 9/18   1 miss Patient took 85mg (Planned for 80mg)  Self-adjusted dose  lovenox     Date 1/30 2/3 2/18 2/27 4/8 4/29 5/18 6/20  8/3   Total Weekly Dose 82.5mg 77.5mg 75mg 75mg 70mg 70mg 65mg 65mg Non compliance 65 mg   INR 2.3 3.2 3.1 3.2 4.3 4.1 4.5 3.0  3.0   Notes     Self reduce dose; Levaquin;  Less GLV; covid  recv'd 6/22; mis-dose       Phone Interview:  Verbal Release Authorization signed on 11/7/18 -- may speak with Zuleima Alberto (patient's wife)    Tablet Strength: 5mg tablets   Patient Contact: [phone] -- try to call in PM or contact his wife Zuleima at [phone].  His email is [email or web address]     Patient Findings:    Negatives:  Signs/symptoms of thrombosis, Signs/symptoms of bleeding, Laboratory test error suspected, Change in health, Change in alcohol use, Change in activity, Upcoming invasive procedure, Emergency department visit, Upcoming dental procedure, Missed doses, Extra doses, Change in medications, Change in diet/appetite, Hospital admission, Bruising, Other complaints   Comments:  He has been under a lot os stress due to his wife's health issues. Is planning to schedule a dental cleaning. Reports his leg is doing much better; decreased pain (suspected hematoma)       Plan:   1. INR is therapeutic and  WNL. Has been >1 month since patient tested on . Instructed Mr. Alberto to continue warfarin 10mg daily except 7.5mg on TuesThurs until recheck. Patient historically non compliant with follow up.   2. Repeat INR in about one week to ensure WNL then can increase to q2w  3. Verbal information provided over the phone to Mrs. Alberto. Mrs. Alberto expresses understanding by teach back and has no further questions at this time.     Sonya Bustillos, PharmD.  08/03/20   14:05      Patient verbal reported 3.1 but submitted 3.0 to kylee Bustillos PharmD.  08/04/20   11:43

## 2020-08-07 DIAGNOSIS — Z95.2 HX OF AORTIC VALVE REPLACEMENT, MECHANICAL: ICD-10-CM

## 2020-08-07 RX ORDER — WARFARIN SODIUM 5 MG/1
TABLET ORAL
Qty: 180 TABLET | Refills: 0 | Status: SHIPPED | OUTPATIENT
Start: 2020-08-07 | End: 2020-10-06 | Stop reason: SDUPTHER

## 2020-08-17 ENCOUNTER — ANTICOAGULATION VISIT (OUTPATIENT)
Dept: PHARMACY | Facility: HOSPITAL | Age: 55
End: 2020-08-17

## 2020-08-17 DIAGNOSIS — Z95.2 HX OF AORTIC VALVE REPLACEMENT, MECHANICAL: ICD-10-CM

## 2020-08-17 LAB — INR PPP: 3.8

## 2020-08-17 NOTE — PROGRESS NOTES
Anticoagulation Clinic - Remote Progress Note  [facility]  Testing frequency: 7 days    Indication: Atrial Fibrillation and Mechanical Aortic Valve; h/o embolic CVA  Referring Provider: Bunny last seen 1/2019; Pt overdue for repeat appointment.  Initial Warfarin Start Date: 11/2012  Planned Duration of Therapy: Life  Goal INR: 2.5-3.5  Current Drug Interactions: citalopram  CHADS-VASc: 3 (HTN, h/o CVA)    Vit K Diet: patient is eating daily samara lettuce salads 6/22/20  Alcohol: none  Tobacco: none       Anticoagulation Clinic INR History:  Date 4/14 4/20 5/1 5/16 5/27 6/7 6/15 6/22 6/25 7/4   Total Weekly Dose 80mg 80mg 90mg 90mg  (ED) 90mg 75mg 95mg 95mg 107.5mg ???   INR 2.3 2.0 3.2 2.9 3.2 4.1 3.1 1.3 2.0 2.7   Notes     levoflox missed; levoflox levoflox levoflox; refuse lovenox       Date 7/31 8/27 8/31 9/4 9/11 9/27 10/2 10/5 10/15   Total Weekly Dose 105mg  ??? 65mg  ??? 95mg 100mg 80mg  ??? 70mg 90mg 100mg 100mg   INR 2.7 1.3 2.6 5.6 1.6 1.6 2.3 2.4 2.8   Notes self adj self adj; miss; enox   incr GLV, mis-dosing, refuse enox incorrect doses        Date 10/26 11/5 11/7 11/15 12/7 1/11/19 1/24/19 2/8 2/18 3/4 3/26 4/23   Total Weekly Dose 100mg  75mg 105mg 100mg 100mg 100mg 100mg 105mg 100mg 100mg 0mg   INR 4.6 7.9 1.9 5.5, 5.7 2.8 2.4 3.9 2.7 3.6 3.2 4.8 1.14 ED, 1.9 HM   Notes  recalled test strips clinic; 2 dose hold clinic    LVM  clinic       Date 5/7 6/3 6/17 6/24 7/11 7/18 7/22 7/25  8/5 8/12 8/15 8/21   Total Weekly Dose not able to confirm 75mg  ??? 95mg 80 mg 95mg 75mg 100 mg 100mg??? 95 mg 95 mg 75 mg 90 mg   INR 3.5 4.9 2.5 2.3 5.4 2.0 2.2 4.4 4 6.2 3.0 5   Notes s/p surgery; 2x boost  doxy start 6/13 doxy; 1 miss doxy self-held 2 doses   No GLV VPA restarted Held x 1      Date  8/26 8/30 9/6 9/12 9/17 10/2 10/10 11/4 11/12 11/22 12/10 1/3/20 1/22   Total Weekly Dose 70 mg 90mg 85mg 85mg 75 mg 80 mg 80 mg 62.5 mg 85mg 82.5mg 70mg 72.5 mg 72.5mg   INR  2.3 3.9 4.2 4.5 3.3  3.9 3.6 1.7  3.4 4.7 2.4 3.6 1.3   Notes 1 hold    rec'd 9/18   1 miss Patient took 85mg (Planned for 80mg)  Self-adjusted dose  lovenox     Date 1/30 2/3 2/18 2/27 4/8 4/29 5/18 6/20  8/3 8/17     Total Weekly Dose 82.5mg 77.5mg 75mg 75mg 70mg 70mg 65mg 65mg Non compliance 65 mg 65 mg     INR 2.3 3.2 3.1 3.2 4.3 4.1 4.5 3.0  3.0 3.8     Notes     Self reduce dose; Levaquin;  Less GLV; covid  recv'd 6/22; mis-dose          Phone Interview:  Verbal Release Authorization signed on 11/7/18 -- may speak with Zuleima Alberto (patient's wife)    Tablet Strength: 5mg tablets   Patient Contact: [phone] -- try to call in PM or contact his wife Zuleima at [phone].  His email is [email or web address]     Patient Findings:    Positives:  Change in diet/appetite   Negatives:  Signs/symptoms of thrombosis, Signs/symptoms of bleeding, Laboratory test error suspected, Change in health, Change in alcohol use, Change in activity, Upcoming invasive procedure, Emergency department visit, Upcoming dental procedure, Missed doses, Extra doses, Change in medications, Hospital admission, Bruising, Other complaints   Comments:  He has some green beans last night and samara salad for the past 3 days as he is changning his diet a bit.         Plan:   1. INR is supratherapeutic at 3.8. Instructed Mr. Alberto to reduce today's dose to 7.5 mg (usu10 mg on Mondays) and then continue warfarin 10mg daily except 7.5mg on TuesThurs until recheck. Patient historically non compliant with follow up.   2. Repeat INR in about one week to ensure WNL then can increase to q2w  3. Verbal information provided over the phone to Mrs. Alberto. Mrs. Alberto expresses understanding by teach back and has no further questions at this time.     Sam Zaldivar, PharmD.  08/17/20   16:21

## 2020-09-01 ENCOUNTER — TELEPHONE (OUTPATIENT)
Dept: PHARMACY | Facility: HOSPITAL | Age: 55
End: 2020-09-01

## 2020-10-05 ENCOUNTER — ANTICOAGULATION VISIT (OUTPATIENT)
Dept: PHARMACY | Facility: HOSPITAL | Age: 55
End: 2020-10-05

## 2020-10-05 DIAGNOSIS — Z95.2 HX OF AORTIC VALVE REPLACEMENT, MECHANICAL: ICD-10-CM

## 2020-10-05 LAB — INR PPP: 3

## 2020-10-05 NOTE — PROGRESS NOTES
Anticoagulation Clinic - Remote Progress Note  [facility]  Testing frequency: 7 days    Indication: Atrial Fibrillation and Mechanical Aortic Valve; h/o embolic CVA  Referring Provider: Bunny last seen 1/2019; Pt overdue for repeat appointment.  Initial Warfarin Start Date: 11/2012  Planned Duration of Therapy: Life  Goal INR: 2.5-3.5  Current Drug Interactions: citalopram  CHADS-VASc: 3 (HTN, h/o CVA)    Vit K Diet: patient eats salad 1-3 times a week, goes through spurts when he tries to eat healthier(10/6/20)  Alcohol: none  Tobacco: none       Anticoagulation Clinic INR History:  Date 4/14 4/20 5/1 5/16 5/27 6/7 6/15 6/22 6/25 7/4   Total Weekly Dose 80mg 80mg 90mg 90mg  (ED) 90mg 75mg 95mg 95mg 107.5mg ???   INR 2.3 2.0 3.2 2.9 3.2 4.1 3.1 1.3 2.0 2.7   Notes     levoflox missed; levoflox levoflox levoflox; refuse lovenox       Date 7/31 8/27 8/31 9/4 9/11 9/27 10/2 10/5 10/15   Total Weekly Dose 105mg  ??? 65mg  ??? 95mg 100mg 80mg  ??? 70mg 90mg 100mg 100mg   INR 2.7 1.3 2.6 5.6 1.6 1.6 2.3 2.4 2.8   Notes self adj self adj; miss; enox   incr GLV, mis-dosing, refuse enox incorrect doses        Date 10/26 11/5 11/7 11/15 12/7 1/11/19 1/24/19 2/8 2/18 3/4 3/26 4/23   Total Weekly Dose 100mg  75mg 105mg 100mg 100mg 100mg 100mg 105mg 100mg 100mg 0mg   INR 4.6 7.9 1.9 5.5, 5.7 2.8 2.4 3.9 2.7 3.6 3.2 4.8 1.14 ED, 1.9 HM   Notes  recalled test strips clinic; 2 dose hold clinic    LVM  clinic       Date 5/7 6/3 6/17 6/24 7/11 7/18 7/22 7/25  8/5 8/12 8/15 8/21   Total Weekly Dose not able to confirm 75mg  ??? 95mg 80 mg 95mg 75mg 100 mg 100mg??? 95 mg 95 mg 75 mg 90 mg   INR 3.5 4.9 2.5 2.3 5.4 2.0 2.2 4.4 4 6.2 3.0 5   Notes s/p surgery; 2x boost  doxy start 6/13 doxy; 1 miss doxy self-held 2 doses   No GLV VPA restarted Held x 1      Date  8/26 8/30 9/6 9/12 9/17 10/2 10/10 11/4 11/12 11/22 12/10 1/3/20 1/22   Total Weekly Dose 70 mg 90mg 85mg 85mg 75 mg 80 mg 80 mg 62.5 mg 85mg 82.5mg 70mg 72.5 mg  72.5mg   INR  2.3 3.9 4.2 4.5 3.3  3.9 3.6 1.7 3.4 4.7 2.4 3.6 1.3   Notes 1 hold    rec'd 9/18   1 miss Patient took 85mg (Planned for 80mg)  Self-adjusted dose  lovenox     Date 1/30 2/3 2/18 2/27 4/8 4/29 5/18 6/20  8/3 8/17 10/5    Total Weekly Dose 82.5mg 77.5mg 75mg 75mg 70mg 70mg 65mg 65mg Non compliance 65 mg 65 mg 65mg    INR 2.3 3.2 3.1 3.2 4.3 4.1 4.5 3.0  3.0 3.8 3.0    Notes     Self reduce dose; Levaquin;  Less GLV; covid  recv'd 6/22; mis-dose          Phone Interview:  Verbal Release Authorization signed on 11/7/18 -- may speak with Zuleima Alberto (patient's wife)    Tablet Strength: 5mg tablets   Patient Contact: [phone] -- try to call in PM or contact his wife Zuleima at [phone].  His email is [email or web address]     Patient Findings:  Negatives:  Signs/symptoms of thrombosis, Signs/symptoms of bleeding, Laboratory test error suspected, Change in health, Change in alcohol use, Change in activity, Upcoming invasive procedure, Emergency department visit, Upcoming dental procedure, Missed doses, Extra doses, Change in medications, Change in diet/appetite, Hospital admission, Bruising, Other complaints   Comments:  Zuleima reports no changes since last encounter. She was recently discharged from hospital and will be able to help him more. She also mentions he goes through spurts of eating salads daily, but usually just eats salad 1-3 times a week. Need to discuss at next encounter him scheduling cardiology appointment with Dr. Hollis      Plan:   1. INR is therapeutic today at 3.0, although he was was very late in checking his INR. Instructed Zuleima to have Mr. Alberto to continue warfarin 10mg daily except 7.5mg on TuesThurs until recheck. Discussed with Zuleima importance of reporting INR because she says she checks it every week he just doesn't always put it in.   2. Repeat INR in about one week to ensure WNL then can increase to q2w  3. Verbal information provided over the phone  to Zuleima. Mrs. Alberto expresses understanding by teach back and has no further questions at this time.   4. Patient requests refills of Warfarin 5mg to [facility] [address]     Lyn Shaffer, PharmD Candidate 2021  10/05/20   14:09 EDT    #60 sent, overdue for appt with Dr Bunny JAMES, Sonya Bustillos, PharmD, have reviewed the note in full and agree with the assessment and plan.  10/06/20  12:25 EDT

## 2020-10-06 RX ORDER — WARFARIN SODIUM 5 MG/1
TABLET ORAL
Qty: 60 TABLET | Refills: 0 | Status: SHIPPED | OUTPATIENT
Start: 2020-10-06 | End: 2020-11-10

## 2020-10-14 RX ORDER — FUROSEMIDE 40 MG/1
TABLET ORAL
Qty: 90 TABLET | Refills: 1 | Status: SHIPPED | OUTPATIENT
Start: 2020-10-14 | End: 2021-03-09 | Stop reason: SDUPTHER

## 2020-10-20 ENCOUNTER — ANTICOAGULATION VISIT (OUTPATIENT)
Dept: PHARMACY | Facility: HOSPITAL | Age: 55
End: 2020-10-20

## 2020-10-20 DIAGNOSIS — Z95.2 HX OF AORTIC VALVE REPLACEMENT, MECHANICAL: ICD-10-CM

## 2020-10-20 LAB — INR PPP: 2.9

## 2020-10-20 NOTE — PROGRESS NOTES
Anticoagulation Clinic - Remote Progress Note  [facility]  Testing frequency: 7 days    Indication: Atrial Fibrillation and Mechanical Aortic Valve; h/o embolic CVA  Referring Provider: Bunny last seen 1/2019; Pt overdue for repeat appointment.  Initial Warfarin Start Date: 11/2012  Planned Duration of Therapy: Life  Goal INR: 2.5-3.5  Current Drug Interactions: citalopram  CHADS-VASc: 3 (HTN, h/o CVA)    Vit K Diet: patient eats salad 1-3 times a week, goes through spurts when he tries to eat healthier(10/6/20)  Alcohol: none  Tobacco: none       Anticoagulation Clinic INR History:  Date 4/14 4/20 5/1 5/16 5/27 6/7 6/15 6/22 6/25 7/4   Total Weekly Dose 80mg 80mg 90mg 90mg  (ED) 90mg 75mg 95mg 95mg 107.5mg ???   INR 2.3 2.0 3.2 2.9 3.2 4.1 3.1 1.3 2.0 2.7   Notes     levoflox missed; levoflox levoflox levoflox; refuse lovenox       Date 7/31 8/27 8/31 9/4 9/11 9/27 10/2 10/5 10/15   Total Weekly Dose 105mg  ??? 65mg  ??? 95mg 100mg 80mg  ??? 70mg 90mg 100mg 100mg   INR 2.7 1.3 2.6 5.6 1.6 1.6 2.3 2.4 2.8   Notes self adj self adj; miss; enox   incr GLV, mis-dosing, refuse enox incorrect doses        Date 10/26 11/5 11/7 11/15 12/7 1/11/19 1/24/19 2/8 2/18 3/4 3/26 4/23   Total Weekly Dose 100mg  75mg 105mg 100mg 100mg 100mg 100mg 105mg 100mg 100mg 0mg   INR 4.6 7.9 1.9 5.5, 5.7 2.8 2.4 3.9 2.7 3.6 3.2 4.8 1.14 ED, 1.9 HM   Notes  recalled test strips clinic; 2 dose hold clinic    LVM  clinic       Date 5/7 6/3 6/17 6/24 7/11 7/18 7/22 7/25  8/5 8/12 8/15 8/21   Total Weekly Dose not able to confirm 75mg  ??? 95mg 80 mg 95mg 75mg 100 mg 100mg??? 95 mg 95 mg 75 mg 90 mg   INR 3.5 4.9 2.5 2.3 5.4 2.0 2.2 4.4 4 6.2 3.0 5   Notes s/p surgery; 2x boost  doxy start 6/13 doxy; 1 miss doxy self-held 2 doses   No GLV VPA restarted Held x 1      Date  8/26 8/30 9/6 9/12 9/17 10/2 10/10 11/4 11/12 11/22 12/10 1/3/20 1/22   Total Weekly Dose 70 mg 90mg 85mg 85mg 75 mg 80 mg 80 mg 62.5 mg 85mg 82.5mg 70mg 72.5 mg  72.5mg   INR  2.3 3.9 4.2 4.5 3.3  3.9 3.6 1.7 3.4 4.7 2.4 3.6 1.3   Notes 1 hold    rec'd 9/18   1 miss Patient took 85mg (Planned for 80mg)  Self-adjusted dose  lovenox     Date 1/30 2/3 2/18 2/27 4/8 4/29 5/18 6/20  8/3 8/17 10/5 10/19    Total Weekly Dose 82.5mg 77.5mg 75mg 75mg 70mg 70mg 65mg 65mg Non compliance 65 mg 65 mg 65mg 65mg    INR 2.3 3.2 3.1 3.2 4.3 4.1 4.5 3.0  3.0 3.8 3.0 2.9    Notes     Self reduce dose; Levaquin;  Less GLV; covid  recv'd 6/22; mis-dose           Phone Interview:  Verbal Release Authorization signed on 11/7/18 -- may speak with Zuleima Alberto (patient's wife)    Tablet Strength: 5mg tablets   Patient Contact: [phone] -- try to call in PM or contact his wife Zuleima at [phone].  His email is [email or web address]     Patient Findings:  Negatives:  Signs/symptoms of thrombosis, Signs/symptoms of bleeding, Laboratory test error suspected, Change in health, Change in alcohol use, Change in activity, Upcoming invasive procedure, Emergency department visit, Upcoming dental procedure, Missed doses, Extra doses, Change in medications, Change in diet/appetite, Hospital admission, Bruising, Other complaints   Comments:  Zuleima reports no changes since last encounter. She also mentions he goes through spurts of eating salads daily, but usually just eats salad 1-3 times a week. She is trying to keep it more consistent and said he had a salad last night but knows no spinach/kale.     Of note, patient now has a cardiology appointment with Dr. Hollis 2/2021.         Plan:   1. INR is therapeutic today at 2.9. Instructed Zuleima to have Mr. Alberto to continue warfarin 10mg daily except 7.5mg on TuesThurs until recheck. Discussed with Zuleima importance of reporting INR because she says she checks it every week he just doesn't always put it in.   2. Repeat INR in 1 weeks, 10/26 as required by kylee. Patient has history of non-compliance checking INR  3. Verbal information  provided over the phone to Zuleima. Mrs. Alberto expresses understanding by teach back and has no further questions at this time.     Lyn Shaffer, PharmD Candidate 2021  10/20/20   08:17 EDT    I, Sonya Bustillos, PharmD, have reviewed the note in full and agree with the assessment and plan.  10/20/20  11:22 EDT

## 2020-11-10 DIAGNOSIS — Z95.2 HX OF AORTIC VALVE REPLACEMENT, MECHANICAL: ICD-10-CM

## 2020-11-10 RX ORDER — WARFARIN SODIUM 5 MG/1
TABLET ORAL
Qty: 30 TABLET | Refills: 0 | Status: SHIPPED | OUTPATIENT
Start: 2020-11-10 | End: 2020-12-09

## 2020-11-11 ENCOUNTER — ANTICOAGULATION VISIT (OUTPATIENT)
Dept: PHARMACY | Facility: HOSPITAL | Age: 55
End: 2020-11-11

## 2020-11-11 DIAGNOSIS — Z95.2 HX OF AORTIC VALVE REPLACEMENT, MECHANICAL: ICD-10-CM

## 2020-11-11 LAB — INR PPP: 4.1

## 2020-11-11 NOTE — PROGRESS NOTES
Anticoagulation Clinic - Remote Progress Note  [facility]  Testing frequency: 7 days    Indication: Atrial Fibrillation and Mechanical Aortic Valve; h/o embolic CVA  Referring Provider: Bunny last seen 1/2019; Pt overdue for repeat appointment.  Initial Warfarin Start Date: 11/2012  Planned Duration of Therapy: Life  Goal INR: 2.5-3.5  Current Drug Interactions: citalopram  CHADS-VASc: 3 (HTN, h/o CVA)    Vit K Diet: patient eats salad 1-3 times a week, goes through spurts when he tries to eat healthier(10/6/20)  Alcohol: none  Tobacco: none       Anticoagulation Clinic INR History:  Date 4/14 4/20 5/1 5/16 5/27 6/7 6/15 6/22 6/25 7/4   Total Weekly Dose 80mg 80mg 90mg 90mg  (ED) 90mg 75mg 95mg 95mg 107.5mg ???   INR 2.3 2.0 3.2 2.9 3.2 4.1 3.1 1.3 2.0 2.7   Notes     levoflox missed; levoflox levoflox levoflox; refuse lovenox       Date 7/31 8/27 8/31 9/4 9/11 9/27 10/2 10/5 10/15   Total Weekly Dose 105mg  ??? 65mg  ??? 95mg 100mg 80mg  ??? 70mg 90mg 100mg 100mg   INR 2.7 1.3 2.6 5.6 1.6 1.6 2.3 2.4 2.8   Notes self adj self adj; miss; enox   incr GLV, mis-dosing, refuse enox incorrect doses        Date 10/26 11/5 11/7 11/15 12/7 1/11/19 1/24/19 2/8 2/18 3/4 3/26 4/23   Total Weekly Dose 100mg  75mg 105mg 100mg 100mg 100mg 100mg 105mg 100mg 100mg 0mg   INR 4.6 7.9 1.9 5.5, 5.7 2.8 2.4 3.9 2.7 3.6 3.2 4.8 1.14 ED, 1.9 HM   Notes  recalled test strips clinic; 2 dose hold clinic    LVM  clinic       Date 5/7 6/3 6/17 6/24 7/11 7/18 7/22 7/25  8/5 8/12 8/15 8/21   Total Weekly Dose not able to confirm 75mg  ??? 95mg 80 mg 95mg 75mg 100 mg 100mg??? 95 mg 95 mg 75 mg 90 mg   INR 3.5 4.9 2.5 2.3 5.4 2.0 2.2 4.4 4 6.2 3.0 5   Notes s/p surgery; 2x boost  doxy start 6/13 doxy; 1 miss doxy self-held 2 doses   No GLV VPA restarted Held x 1      Date  8/26 8/30 9/6 9/12 9/17 10/2 10/10 11/4 11/12 11/22 12/10 1/3/20 1/22   Total Weekly Dose 70 mg 90mg 85mg 85mg 75 mg 80 mg 80 mg 62.5 mg 85mg 82.5mg 70mg 72.5 mg  72.5mg   INR  2.3 3.9 4.2 4.5 3.3  3.9 3.6 1.7 3.4 4.7 2.4 3.6 1.3   Notes 1 hold    rec'd 9/18   1 miss Patient took 85mg (Planned for 80mg)  Self-adjusted dose  lovenox     Date 1/30 2/3 2/18 2/27 4/8 4/29 5/18 6/20  8/3 8/17 10/5 10/19 11/11   Total Weekly Dose 82.5mg 77.5mg 75mg 75mg 70mg 70mg 65mg 65mg Non compliance 65 mg 65 mg 65mg 65mg 65mg   INR 2.3 3.2 3.1 3.2 4.3 4.1 4.5 3.0  3.0 3.8 3.0 2.9 4.1   Notes     Self reduce dose; Levaquin;  Less GLV; covid  recv'd 6/22; mis-dose           Phone Interview:  Verbal Release Authorization signed on 11/7/18 -- may speak with Zuleima Alberto (patient's wife)    Tablet Strength: 5mg tablets   Patient Contact: [phone] -- try to call in PM or contact his wife Zuleima at [phone].  His email is [email or web address]     Patient Findings:        Negatives:  Signs/symptoms of thrombosis, Signs/symptoms of bleeding, Laboratory test error suspected, Change in health, Change in alcohol use, Change in activity, Upcoming invasive procedure, Emergency department visit, Upcoming dental procedure, Missed doses, Extra doses, Change in medications, Change in diet/appetite, Hospital admission, Bruising, Other complaints           Plan:   1. INR was SUPRA therapeutic yesterday ay 4.1. Patient was noncompliant with requested recheck date. Instructed  Mr. Alberto to reduce dose tonight to 5mg then continue warfarin 10mg daily except 7.5mg on TuesThurs until recheck.   2. Repeat INR in 1 week, 11/17 as required by kylee. Patient has history of non-compliance checking INR  3. Verbal information provided over the phone to Zuleima. Mr. Alberto expresses understanding by teach back and has no further questions at this time.     Sonya Bustillos, GiovaniD.  11/12/20   15:55 EST

## 2020-12-08 ENCOUNTER — TELEPHONE (OUTPATIENT)
Dept: PHARMACY | Facility: HOSPITAL | Age: 55
End: 2020-12-08

## 2020-12-09 DIAGNOSIS — Z95.2 HX OF AORTIC VALVE REPLACEMENT, MECHANICAL: ICD-10-CM

## 2020-12-10 RX ORDER — WARFARIN SODIUM 5 MG/1
TABLET ORAL
Qty: 60 TABLET | Refills: 2 | Status: SHIPPED | OUTPATIENT
Start: 2020-12-10 | End: 2021-03-12

## 2020-12-14 NOTE — TELEPHONE ENCOUNTER
Spoke with patient re: overdue PT/INR. Patient reports he will test tonight and submit INR. He is aware we will likely receive results tomorrow and call him then.

## 2020-12-16 NOTE — TELEPHONE ENCOUNTER
LVM on both contact numbers reminding Mr. Alberto that PT/INR overdue.    Dora Pereira, GiovaniD

## 2020-12-22 ENCOUNTER — ANTICOAGULATION VISIT (OUTPATIENT)
Dept: PHARMACY | Facility: HOSPITAL | Age: 55
End: 2020-12-22

## 2020-12-22 DIAGNOSIS — Z95.2 HX OF AORTIC VALVE REPLACEMENT, MECHANICAL: ICD-10-CM

## 2020-12-22 LAB — INR PPP: 2.3

## 2020-12-22 NOTE — PROGRESS NOTES
Anticoagulation Clinic - Remote Progress Note  [facility]  Testing frequency: 7 days    Indication: Atrial Fibrillation and Mechanical Aortic Valve; h/o embolic CVA  Referring Provider: Bunny last seen 1/2019; Pt overdue for repeat appointment.  Initial Warfarin Start Date: 11/2012  Planned Duration of Therapy: Life  Goal INR: 2.5-3.5  Current Drug Interactions: citalopram  CHADS-VASc: 3 (HTN, h/o CVA)    Vit K Diet: patient eats salad 1-3 times a week, goes through spurts when he tries to eat healthier(10/6/20)  Alcohol: none  Tobacco: none       Anticoagulation Clinic INR History:  Date 4/14 4/20 5/1 5/16 5/27 6/7 6/15 6/22 6/25 7/4   Total Weekly Dose 80mg 80mg 90mg 90mg  (ED) 90mg 75mg 95mg 95mg 107.5mg ???   INR 2.3 2.0 3.2 2.9 3.2 4.1 3.1 1.3 2.0 2.7   Notes     levoflox missed; levoflox levoflox levoflox; refuse lovenox       Date 7/31 8/27 8/31 9/4 9/11 9/27 10/2 10/5 10/15   Total Weekly Dose 105mg  ??? 65mg  ??? 95mg 100mg 80mg  ??? 70mg 90mg 100mg 100mg   INR 2.7 1.3 2.6 5.6 1.6 1.6 2.3 2.4 2.8   Notes self adj self adj; miss; enox   incr GLV, mis-dosing, refuse enox incorrect doses        Date 10/26 11/5 11/7 11/15 12/7 1/11/19 1/24/19 2/8 2/18 3/4 3/26 4/23   Total Weekly Dose 100mg  75mg 105mg 100mg 100mg 100mg 100mg 105mg 100mg 100mg 0mg   INR 4.6 7.9 1.9 5.5, 5.7 2.8 2.4 3.9 2.7 3.6 3.2 4.8 1.14 ED, 1.9 HM   Notes  recalled test strips clinic; 2 dose hold clinic    LVM  clinic       Date 5/7 6/3 6/17 6/24 7/11 7/18 7/22 7/25  8/5 8/12 8/15 8/21   Total Weekly Dose not able to confirm 75mg  ??? 95mg 80 mg 95mg 75mg 100 mg 100mg??? 95 mg 95 mg 75 mg 90 mg   INR 3.5 4.9 2.5 2.3 5.4 2.0 2.2 4.4 4 6.2 3.0 5   Notes s/p surgery; 2x boost  doxy start 6/13 doxy; 1 miss doxy self-held 2 doses   No GLV VPA restarted Held x 1      Date  8/26 8/30 9/6 9/12 9/17 10/2 10/10 11/4 11/12 11/22 12/10 1/3/20 1/22   Total Weekly Dose 70 mg 90mg 85mg 85mg 75 mg 80 mg 80 mg 62.5 mg 85mg 82.5mg 70mg 72.5 mg  72.5mg   INR  2.3 3.9 4.2 4.5 3.3  3.9 3.6 1.7 3.4 4.7 2.4 3.6 1.3   Notes 1 hold    rec'd 9/18   1 miss Patient took 85mg (Planned for 80mg)  Self-adjusted dose  lovenox     Date 1/30 2/3 2/18 2/27 4/8 4/29 5/18 6/20  8/3 8/17 10/5 10/19 11/11   Total Weekly Dose 82.5mg 77.5mg 75mg 75mg 70mg 70mg 65mg 65mg Non compliance 65 mg 65 mg 65mg 65mg 65mg   INR 2.3 3.2 3.1 3.2 4.3 4.1 4.5 3.0  3.0 3.8 3.0 2.9 4.1   Notes     Self reduce dose; Levaquin;  Less GLV; covid  recv'd 6/22; mis-dose           Date 12/21              Total WeeklyDose 65mg              INR 2.3              Notes rec 12/22 inc GLV                    Phone Interview:  Verbal Release Authorization signed on 11/7/18 -- may speak with Zuleima Alberto (patient's wife)    Tablet Strength: 5mg tablets   Patient Contact: [phone] -- try to call in PM or contact his wife Zuleima at [phone].  His email is [email or web address]     Patient Findings:  Positives:  Change in diet/appetite, Other complaints   Negatives:  Signs/symptoms of thrombosis, Signs/symptoms of bleeding, Laboratory test error suspected, Change in health, Change in alcohol use, Change in activity, Upcoming invasive procedure, Emergency department visit, Upcoming dental procedure, Missed doses, Extra doses, Change in medications, Hospital admission, Bruising   Comments:  Believes he had more GLV (had fettuccini with spinach), does not plan to eat any additional GLV   Has been late to check, is stressed taking care for very ill wife. Patient is unable to verify dosing as his wife fills his pill planner for him,          Plan:   1. INR was slightly SUBherapeutic 12/21 at 2.3. Patient was noncompliant with requested recheck date, unable to reach patient until 12/23. Stressed the importance of regular INR checks, Considering low INR likely due to inc GLV and hx of stability on this dose,  Instructed  Mr. Alberto to continue warfarin 10mg daily except 7.5mg on TuesThurs until recheck.    2. Repeat INR in 1 week, 12/29 as required by madhavs. Patient has history of non-compliance checking INR  3. Verbal information provided over the phone to Zuleima. Mr. Alberto expresses understanding by teach back and has no further questions at this time.     Sonya Bustillos, PharmD.  12/23/20   14:58 EST

## 2020-12-23 ENCOUNTER — ANTICOAGULATION VISIT (OUTPATIENT)
Dept: PHARMACY | Facility: HOSPITAL | Age: 55
End: 2020-12-23

## 2020-12-23 DIAGNOSIS — Z95.2 HX OF AORTIC VALVE REPLACEMENT, MECHANICAL: ICD-10-CM

## 2020-12-23 NOTE — PROGRESS NOTES
Anticoagulation Clinic - Remote Progress Note  [facility]  Testing frequency: 7 days    Indication: Atrial Fibrillation and Mechanical Aortic Valve; h/o embolic CVA  Referring Provider: Bunny last seen 1/2019; Pt overdue for repeat appointment.  Initial Warfarin Start Date: 11/2012  Planned Duration of Therapy: Life  Goal INR: 2.5-3.5  Current Drug Interactions: citalopram  CHADS-VASc: 3 (HTN, h/o CVA)    Vit K Diet: patient eats salad 1-3 times a week, goes through spurts when he tries to eat healthier(10/6/20)  Alcohol: none  Tobacco: none       Anticoagulation Clinic INR History:  Date 4/14 4/20 5/1 5/16 5/27 6/7 6/15 6/22 6/25 7/4   Total Weekly Dose 80mg 80mg 90mg 90mg  (ED) 90mg 75mg 95mg 95mg 107.5mg ???   INR 2.3 2.0 3.2 2.9 3.2 4.1 3.1 1.3 2.0 2.7   Notes     levoflox missed; levoflox levoflox levoflox; refuse lovenox       Date 7/31 8/27 8/31 9/4 9/11 9/27 10/2 10/5 10/15   Total Weekly Dose 105mg  ??? 65mg  ??? 95mg 100mg 80mg  ??? 70mg 90mg 100mg 100mg   INR 2.7 1.3 2.6 5.6 1.6 1.6 2.3 2.4 2.8   Notes self adj self adj; miss; enox   incr GLV, mis-dosing, refuse enox incorrect doses        Date 10/26 11/5 11/7 11/15 12/7 1/11/19 1/24/19 2/8 2/18 3/4 3/26 4/23   Total Weekly Dose 100mg  75mg 105mg 100mg 100mg 100mg 100mg 105mg 100mg 100mg 0mg   INR 4.6 7.9 1.9 5.5, 5.7 2.8 2.4 3.9 2.7 3.6 3.2 4.8 1.14 ED, 1.9 HM   Notes  recalled test strips clinic; 2 dose hold clinic    LVM  clinic       Date 5/7 6/3 6/17 6/24 7/11 7/18 7/22 7/25  8/5 8/12 8/15 8/21   Total Weekly Dose not able to confirm 75mg  ??? 95mg 80 mg 95mg 75mg 100 mg 100mg??? 95 mg 95 mg 75 mg 90 mg   INR 3.5 4.9 2.5 2.3 5.4 2.0 2.2 4.4 4 6.2 3.0 5   Notes s/p surgery; 2x boost  doxy start 6/13 doxy; 1 miss doxy self-held 2 doses   No GLV VPA restarted Held x 1      Date  8/26 8/30 9/6 9/12 9/17 10/2 10/10 11/4 11/12 11/22 12/10 1/3/20 1/22   Total Weekly Dose 70 mg 90mg 85mg 85mg 75 mg 80 mg 80 mg 62.5 mg 85mg 82.5mg 70mg 72.5 mg  72.5mg   INR  2.3 3.9 4.2 4.5 3.3  3.9 3.6 1.7 3.4 4.7 2.4 3.6 1.3   Notes 1 hold    rec'd 9/18   1 miss Patient took 85mg (Planned for 80mg)  Self-adjusted dose  lovenox     Date 1/30 2/3 2/18 2/27 4/8 4/29 5/18 6/20  8/3 8/17 10/5 10/19 11/11   Total Weekly Dose 82.5mg 77.5mg 75mg 75mg 70mg 70mg 65mg 65mg Non compliance 65 mg 65 mg 65mg 65mg 65mg   INR 2.3 3.2 3.1 3.2 4.3 4.1 4.5 3.0  3.0 3.8 3.0 2.9 4.1   Notes     Self reduce dose; Levaquin;  Less GLV; covid  recv'd 6/22; mis-dose           Date 12/21              Total WeeklyDose               INR 2.3              Notes rec 12/22                    Phone Interview:  Verbal Release Authorization signed on 11/7/18 -- may speak with Zuleima Alberto (patient's wife)    Tablet Strength: 5mg tablets   Patient Contact: [phone] -- try to call in PM or contact his wife Zuleima at [phone].  His email is [email or web address]     Patient Findings:      Plan:   1. INR was SUBherapeutic yesterday at 2.3. Patient was noncompliant with requested recheck date. Instructed  Mr. Alberto to continue warfarin 10mg daily except 7.5mg on TuesThurs until recheck.   2. Repeat INR in 1 week, 12/29 as required by acelis. Patient has history of non-compliance checking INR  3. Verbal information provided over the phone to Zuleima. Mr. Alberto expresses understanding by teach back and has no further questions at this time.

## 2021-01-14 LAB — INR PPP: 2.9

## 2021-01-15 LAB — INR PPP: 2.9

## 2021-01-18 LAB — INR PPP: 1.3

## 2021-01-20 ENCOUNTER — ANTICOAGULATION VISIT (OUTPATIENT)
Dept: PHARMACY | Facility: HOSPITAL | Age: 56
End: 2021-01-20

## 2021-01-20 DIAGNOSIS — Z95.2 HX OF AORTIC VALVE REPLACEMENT, MECHANICAL: ICD-10-CM

## 2021-01-20 LAB — INR PPP: 1.2

## 2021-01-20 NOTE — PROGRESS NOTES
Anticoagulation Clinic - Remote Progress Note  [facility]  Testing frequency: 7 days    Indication: Atrial Fibrillation and Mechanical Aortic Valve; h/o embolic CVA  Referring Provider: Bunny last seen 1/2019; Next appointment: 2/26/2021  Initial Warfarin Start Date: 11/2012  Planned Duration of Therapy: Life  Goal INR: 2.5-3.5  Current Drug Interactions: citalopram  CHADS-VASc: 3 (HTN, h/o CVA)    Vit K Diet: patient eats salad 1-3 times a week, goes through spurts when he tries to eat healthier(10/6/20)  Alcohol: none  Tobacco: none       Anticoagulation Clinic INR History:  Date 4/14 4/20 5/1 5/16 5/27 6/7 6/15 6/22 6/25 7/4   Total Weekly Dose 80mg 80mg 90mg 90mg  (ED) 90mg 75mg 95mg 95mg 107.5mg ???   INR 2.3 2.0 3.2 2.9 3.2 4.1 3.1 1.3 2.0 2.7   Notes     levoflox missed; levoflox levoflox levoflox; refuse lovenox       Date 7/31 8/27 8/31 9/4 9/11 9/27 10/2 10/5 10/15   Total Weekly Dose 105mg  ??? 65mg  ??? 95mg 100mg 80mg  ??? 70mg 90mg 100mg 100mg   INR 2.7 1.3 2.6 5.6 1.6 1.6 2.3 2.4 2.8   Notes self adj self adj; miss; enox   incr GLV, mis-dosing, refuse enox incorrect doses        Date 10/26 11/5 11/7 11/15 12/7 1/11/19 1/24/19 2/8 2/18 3/4 3/26 4/23   Total Weekly Dose 100mg  75mg 105mg 100mg 100mg 100mg 100mg 105mg 100mg 100mg 0mg   INR 4.6 7.9 1.9 5.5, 5.7 2.8 2.4 3.9 2.7 3.6 3.2 4.8 1.14 ED, 1.9 HM   Notes  recalled test strips clinic; 2 dose hold clinic    LVM  clinic       Date 5/7 6/3 6/17 6/24 7/11 7/18 7/22 7/25  8/5 8/12 8/15 8/21   Total Weekly Dose not able to confirm 75mg  ??? 95mg 80 mg 95mg 75mg 100 mg 100mg??? 95 mg 95 mg 75 mg 90 mg   INR 3.5 4.9 2.5 2.3 5.4 2.0 2.2 4.4 4 6.2 3.0 5   Notes s/p surgery; 2x boost  doxy start 6/13 doxy; 1 miss doxy self-held 2 doses   No GLV VPA restarted Held x 1      Date  8/26 8/30 9/6 9/12 9/17 10/2 10/10 11/4 11/12 11/22 12/10 1/3/20 1/22   Total Weekly Dose 70 mg 90mg 85mg 85mg 75 mg 80 mg 80 mg 62.5 mg 85mg 82.5mg 70mg 72.5 mg 72.5mg   INR   2.3 3.9 4.2 4.5 3.3  3.9 3.6 1.7 3.4 4.7 2.4 3.6 1.3   Notes 1 hold    rec'd 9/18   1 miss Patient took 85mg (Planned for 80mg)  Self-adjusted dose  lovenox     Date 1/30 2/3 2/18 2/27 4/8 4/29 5/18 6/20  8/3 8/17 10/5 10/19 11/11   Total Weekly Dose 82.5mg 77.5mg 75mg 75mg 70mg 70mg 65mg 65mg Non compliance 65 mg 65 mg 65mg 65mg 65mg   INR 2.3 3.2 3.1 3.2 4.3 4.1 4.5 3.0  3.0 3.8 3.0 2.9 4.1   Notes     Self reduce dose; Levaquin;  Less GLV; covid  recv'd 6/22; mis-dose           Date 12/21 1/20/2021            Total WeeklyDose 65mg Noncompliance 40mg            INR 2.3  1.2            Notes rec 12/22 inc GLV  Self held; augmentin                  Phone Interview:  Verbal Release Authorization signed on 11/7/18 -- may speak with Zuleima Alberto (patient's wife)    Tablet Strength: 5mg tablets   Patient Contact: [phone] -- try to call in PM or contact his wife Zuleima at [phone].  His email is [email or web address]    Patient Findings  Positives:  Missed doses, Change in medications   Negatives:  Signs/symptoms of thrombosis, Signs/symptoms of bleeding, Laboratory test error suspected, Change in health, Change in alcohol use, Change in activity, Upcoming invasive procedure, Emergency department visit, Upcoming dental procedure, Extra doses, Change in diet/appetite, Hospital admission, Bruising, Other complaints   Comments:  Mr. Alberto was taking an antibiotic Augmentin and stopped Augmentin on 1/15/2021 for tooth infection. They did not call the clinic. Once they tested his INR they found his monitor to read 44 showing his PT. They self adjusted warfarin and held for several days. Today 1/20/2021 they called the clinic with baseline INR results. Sam was able to obtain previous results and walk them through how to change his results from showing PT to showing INR. Have discussed risks associated with noncompliance and risks associated with SUB and SUPRAtherapeutic INR results with Both Mr. Alberto  and his wife, Ivis in the past multiple times. They continue to adjust and then call when they are concerned. They voice understanding to always call the clinic. Ivis reports health concerns for herself as well.      Mr. Alberto weighed himself today 324lbs. Pt requires lovenox bridge and needs a recent BMP. Pt understands need to keep appointment with Dr. Hollis in February.     In addition to recent dose adjustments Ivis reports that Mr. Alberot has had an increase in warfarin dose. They have increase his maintenance dose to 10mg daily except 15mg TueThur instead of previously discussed 10mg daily except 7.5mg TueThur     Plan:   1. INR was baseline today following concomittant use of augmentin and warfarin, testing on home monitor that was accidentally switched to PT showing results of 44 and greater, and self adjustment. Patient and Patient's spouse did not call the clinic following results last week and self adjusted. They both have been told several times not to self adjust warfarin and to call the clinic. They have verbalize the risks associated with self adjustment and SUB and SUPRAtherapeutic INR results. Please refer to patient findings  2. Sam helped to switch the patient's monitor from PT to showing INR results. INR results were updated in the anticoagulation tracker as well as patient held doses and decreases. INR today is 1.2.   3. Patient has been largely noncompliant with repeat INR. He will come to [facility] lab today for BMP for appropriate lovenox frequency.   4. Instructed  Mr. Alberto to boost dose tonight to 15mg and repeat BMP and INR tomorrow. He also will administer 150mg of lovenox today using lovenox 80mg syringe. Ivis voices understanding of how to administer. Discussed dose and where to discard the amount on the second syringe.   5. Verbal information provided over the phone to uZleima. Mr. Alberto expresses understanding by teach back and has no further questions at this  time.     Do Vargas, PharmD  01/20/21   16:11 EST

## 2021-01-21 ENCOUNTER — TELEPHONE (OUTPATIENT)
Dept: PHARMACY | Facility: HOSPITAL | Age: 56
End: 2021-01-21

## 2021-01-21 ENCOUNTER — LAB (OUTPATIENT)
Dept: LAB | Facility: HOSPITAL | Age: 56
End: 2021-01-21

## 2021-01-21 DIAGNOSIS — Z95.2 HX OF AORTIC VALVE REPLACEMENT, MECHANICAL: ICD-10-CM

## 2021-01-21 LAB
ANION GAP SERPL CALCULATED.3IONS-SCNC: 11 MMOL/L (ref 5–15)
BUN SERPL-MCNC: 24 MG/DL (ref 6–20)
BUN/CREAT SERPL: 19.7 (ref 7–25)
CALCIUM SPEC-SCNC: 9.4 MG/DL (ref 8.6–10.5)
CHLORIDE SERPL-SCNC: 101 MMOL/L (ref 98–107)
CO2 SERPL-SCNC: 27 MMOL/L (ref 22–29)
CREAT SERPL-MCNC: 1.22 MG/DL (ref 0.76–1.27)
GFR SERPL CREATININE-BSD FRML MDRD: 62 ML/MIN/1.73
GLUCOSE SERPL-MCNC: 100 MG/DL (ref 65–99)
INR PPP: 1.2 (ref 0.85–1.16)
POTASSIUM SERPL-SCNC: 4.3 MMOL/L (ref 3.5–5.2)
PROTHROMBIN TIME: 14.9 SECONDS (ref 11.5–14)
SODIUM SERPL-SCNC: 139 MMOL/L (ref 136–145)

## 2021-01-21 PROCEDURE — 85610 PROTHROMBIN TIME: CPT

## 2021-01-21 PROCEDURE — 36415 COLL VENOUS BLD VENIPUNCTURE: CPT

## 2021-01-21 PROCEDURE — 80048 BASIC METABOLIC PNL TOTAL CA: CPT

## 2021-01-21 NOTE — TELEPHONE ENCOUNTER
Awaiting BMP and INR draw today- order was placed and patient had previously discussed coming to have them drawn at 0900 this morning. LVM for both  Remy and Ivis.

## 2021-01-22 ENCOUNTER — ANTICOAGULATION VISIT (OUTPATIENT)
Dept: PHARMACY | Facility: HOSPITAL | Age: 56
End: 2021-01-22

## 2021-01-22 DIAGNOSIS — Z95.2 HX OF AORTIC VALVE REPLACEMENT, MECHANICAL: ICD-10-CM

## 2021-01-22 NOTE — PROGRESS NOTES
Anticoagulation Clinic - Remote Progress Note  [facility]  Testing frequency: 7 days    Indication: Atrial Fibrillation and Mechanical Aortic Valve; h/o embolic CVA  Referring Provider: Bunny last seen 1/2019; Next appointment: 2/26/2021  Initial Warfarin Start Date: 11/2012  Planned Duration of Therapy: Life  Goal INR: 2.5-3.5  Current Drug Interactions: citalopram  CHADS-VASc: 3 (HTN, h/o CVA)    Vit K Diet: patient eats salad 1-3 times a week, goes through spurts when he tries to eat healthier(10/6/20)  Alcohol: none  Tobacco: none       Anticoagulation Clinic INR History:  Date 4/14 4/20 5/1 5/16 5/27 6/7 6/15 6/22 6/25 7/4   Total Weekly Dose 80mg 80mg 90mg 90mg  (ED) 90mg 75mg 95mg 95mg 107.5mg ???   INR 2.3 2.0 3.2 2.9 3.2 4.1 3.1 1.3 2.0 2.7   Notes     levoflox missed; levoflox levoflox levoflox; refuse lovenox       Date 7/31 8/27 8/31 9/4 9/11 9/27 10/2 10/5 10/15   Total Weekly Dose 105mg  ??? 65mg  ??? 95mg 100mg 80mg  ??? 70mg 90mg 100mg 100mg   INR 2.7 1.3 2.6 5.6 1.6 1.6 2.3 2.4 2.8   Notes self adj self adj; miss; enox   incr GLV, mis-dosing, refuse enox incorrect doses        Date 10/26 11/5 11/7 11/15 12/7 1/11/19 1/24/19 2/8 2/18 3/4 3/26 4/23   Total Weekly Dose 100mg  75mg 105mg 100mg 100mg 100mg 100mg 105mg 100mg 100mg 0mg   INR 4.6 7.9 1.9 5.5, 5.7 2.8 2.4 3.9 2.7 3.6 3.2 4.8 1.14 ED, 1.9 HM   Notes  recalled test strips clinic; 2 dose hold clinic    LVM  clinic       Date 5/7 6/3 6/17 6/24 7/11 7/18 7/22 7/25  8/5 8/12 8/15 8/21   Total Weekly Dose not able to confirm 75mg  ??? 95mg 80 mg 95mg 75mg 100 mg 100mg??? 95 mg 95 mg 75 mg 90 mg   INR 3.5 4.9 2.5 2.3 5.4 2.0 2.2 4.4 4 6.2 3.0 5   Notes s/p surgery; 2x boost  doxy start 6/13 doxy; 1 miss doxy self-held 2 doses   No GLV VPA restarted Held x 1      Date  8/26 8/30 9/6 9/12 9/17 10/2 10/10 11/4 11/12 11/22 12/10 1/3/20 1/22   Total Weekly Dose 70 mg 90mg 85mg 85mg 75 mg 80 mg 80 mg 62.5 mg 85mg 82.5mg 70mg 72.5 mg 72.5mg   INR   2.3 3.9 4.2 4.5 3.3  3.9 3.6 1.7 3.4 4.7 2.4 3.6 1.3   Notes 1 hold    rec'd 9/18   1 miss Patient took 85mg (Planned for 80mg)  Self-adjusted dose  lovenox     Date 1/30 2/3 2/18 2/27 4/8 4/29 5/18 6/20  8/3 8/17 10/5 10/19 11/11   Total Weekly Dose 82.5mg 77.5mg 75mg 75mg 70mg 70mg 65mg 65mg Non compliance 65 mg 65 mg 65mg 65mg 65mg   INR 2.3 3.2 3.1 3.2 4.3 4.1 4.5 3.0  3.0 3.8 3.0 2.9 4.1   Notes     Self reduce dose; Levaquin;  Less GLV; covid  recv'd 6/22; mis-dose           Date 12/21 1/20/2021 1/21           Total WeeklyDose 65mg Noncompliance 40mg 45mg?           INR 2.3  1.2 1.2 (maia)           Notes rec 12/22 inc GLV  Self held; augmentin Boost x1; lovenox             Phone Interview:  Verbal Release Authorization signed on 11/7/18 -- may speak with Zuleima Alberto (patient's wife)    Tablet Strength: 5mg tablets   Patient Contact: [phone] -- try to call in PM or contact his wife Zuleima at [phone].  His email is [email or web address]    Patient Findings  Negatives:  Signs/symptoms of thrombosis, Signs/symptoms of bleeding, Laboratory test error suspected, Change in health, Change in alcohol use, Change in activity, Upcoming invasive procedure, Emergency department visit, Upcoming dental procedure, Missed doses, Extra doses, Change in medications, Change in diet/appetite, Hospital admission, Bruising, Other complaints   Comments:  Please refer to previous encounter from 1/20.     Plan:   1. INR was baseline via venipuncture yesterday and patient received new BMP. Scr: 1.22 Crcl: 142mL/min. Ok to proceed with lovenox 1mg/kg q12h. Called patient yesterday when did not receive results - was unable to reach him or his wife. Received results on 1/22. Pt reports that they repeated lovenox last night and 15mg of warfarin. Pt will boost dose tonight to 12.5mg and then resume prior maintenance dose of 10mg daily except 15mg TueThur. (of note: we had patient was taking 10mg daily except 7.5mg  Nathalieur, however, wife and patient report they have been administering 15mg on TueThu)  2. Patient has been largely noncompliant with repeat INR. Plan to repeat INR on Monday via home monitor.  3. Sent in lovenox 1mg/kg q12h. 324lbs 1/20/2021; Scr: 1.22 CrCl: 142mL/min. Sent in 150mg syringes  4. Reminded patient and his wife that he needs to keep his appointment with Dr. Hollis. We cannot continue to manage if he does not see his provider yearly.  5. Verbal information provided over the phone to Zuleima. Mr. Alberto expresses understanding by teach back and has no further questions at this time.     Do Vargas, PharmD  01/22/21   08:39 EST

## 2021-01-25 ENCOUNTER — ANTICOAGULATION VISIT (OUTPATIENT)
Dept: PHARMACY | Facility: HOSPITAL | Age: 56
End: 2021-01-25

## 2021-01-25 DIAGNOSIS — Z95.2 HX OF AORTIC VALVE REPLACEMENT, MECHANICAL: ICD-10-CM

## 2021-01-25 LAB — INR PPP: 1.6

## 2021-01-25 NOTE — PROGRESS NOTES
Anticoagulation Clinic - Remote Progress Note  [facility]  Testing frequency: 7 days    Indication: Atrial Fibrillation and Mechanical Aortic Valve; h/o embolic CVA  Referring Provider: Bunny last seen 1/2019; Next appointment: 2/26/2021  Initial Warfarin Start Date: 11/2012  Planned Duration of Therapy: Life  Goal INR: 2.5-3.5  Current Drug Interactions: citalopram  CHADS-VASc: 3 (HTN, h/o CVA)    Vit K Diet: patient eats salad 1-3 times a week, goes through spurts when he tries to eat healthier(10/6/20)  Alcohol: none  Tobacco: none       Anticoagulation Clinic INR History:  Date 4/14 4/20 5/1 5/16 5/27 6/7 6/15 6/22 6/25 7/4   Total Weekly Dose 80mg 80mg 90mg 90mg  (ED) 90mg 75mg 95mg 95mg 107.5mg ???   INR 2.3 2.0 3.2 2.9 3.2 4.1 3.1 1.3 2.0 2.7   Notes     levoflox missed; levoflox levoflox levoflox; refuse lovenox       Date 7/31 8/27 8/31 9/4 9/11 9/27 10/2 10/5 10/15   Total Weekly Dose 105mg  ??? 65mg  ??? 95mg 100mg 80mg  ??? 70mg 90mg 100mg 100mg   INR 2.7 1.3 2.6 5.6 1.6 1.6 2.3 2.4 2.8   Notes self adj self adj; miss; enox   incr GLV, mis-dosing, refuse enox incorrect doses        Date 10/26 11/5 11/7 11/15 12/7 1/11/19 1/24/19 2/8 2/18 3/4 3/26 4/23   Total Weekly Dose 100mg  75mg 105mg 100mg 100mg 100mg 100mg 105mg 100mg 100mg 0mg   INR 4.6 7.9 1.9 5.5, 5.7 2.8 2.4 3.9 2.7 3.6 3.2 4.8 1.14 ED, 1.9 HM   Notes  recalled test strips clinic; 2 dose hold clinic    LVM  clinic       Date 5/7 6/3 6/17 6/24 7/11 7/18 7/22 7/25  8/5 8/12 8/15 8/21   Total Weekly Dose not able to confirm 75mg  ??? 95mg 80 mg 95mg 75mg 100 mg 100mg??? 95 mg 95 mg 75 mg 90 mg   INR 3.5 4.9 2.5 2.3 5.4 2.0 2.2 4.4 4 6.2 3.0 5   Notes s/p surgery; 2x boost  doxy start 6/13 doxy; 1 miss doxy self-held 2 doses   No GLV VPA restarted Held x 1      Date  8/26 8/30 9/6 9/12 9/17 10/2 10/10 11/4 11/12 11/22 12/10 1/3/20 1/22   Total Weekly Dose 70 mg 90mg 85mg 85mg 75 mg 80 mg 80 mg 62.5 mg 85mg 82.5mg 70mg 72.5 mg 72.5mg   INR   2.3 3.9 4.2 4.5 3.3  3.9 3.6 1.7 3.4 4.7 2.4 3.6 1.3   Notes 1 hold    rec'd 9/18   1  Patient took 85mg (Planned for 80mg)  Self-adjusted dose  lovenox     Date 1/30 2/3 2/18 2/27 4/8 4/29 5/18 6/20  8/3 8/17 10/5 10/19 11/11   Total Weekly Dose 82.5mg 77.5mg 75mg 75mg 70mg 70mg 65mg 65mg Non compliance 65 mg 65 mg 65mg 65mg 65mg   INR 2.3 3.2 3.1 3.2 4.3 4.1 4.5 3.0  3.0 3.8 3.0 2.9 4.1   Notes     Self reduce dose; Levaquin;  Less GLV; covid  recv'd 6/22; mis-dose           Date 12/21 1/20/2021 1/21 1/25          Total WeeklyDose 65mg Noncompliance 40mg 45mg? 62.5mg          INR 2.3  1.2 1.2 (maia) 1.6          Notes rec 12/22 inc GLV  Self held; augmentin Boost x1; lovenox 2x miss, 1x misdose            Phone Interview:  Verbal Release Authorization signed on 11/7/18 -- may speak with Zuleima Alberto (patient's wife)    Tablet Strength: 5mg tablets   Patient Contact: [phone] -- try to call in PM or contact his wife Zuleima at [phone].  His email is [email or web address]    Patient Findings  Positives:  Missed doses   Negatives:  Signs/symptoms of thrombosis, Signs/symptoms of bleeding, Laboratory test error suspected, Change in health, Change in alcohol use, Change in activity, Upcoming invasive procedure, Emergency department visit, Upcoming dental procedure, Extra doses, Change in medications, Change in diet/appetite, Hospital admission, Bruising, Other complaints   Comments:  Did not get lovenox injection last night, will resume. Misunderstood and gave warfarin 7.5mg Friday instead of 12.5mg   Patient has lovenox at home.          Plan:   1. INR remains subtherapeutic but improved. Will continue with lovenox 1mg/kg q12h.   Pt will boost warfarin dose tonight to 15mg then 10mg TuesWed. Patient has been on maintenance dose of 10mg daily except 15mg TueThur. (of note: we had patient was taking 10mg daily except 7.5mg TueThur, however, wife and patient report they have been administering 15mg on  Gladys)  2. Patient has been largely noncompliant with repeat INR and self-managing/adjusting his doses  3. Current weight:  324lbs 1/20/2021; Scr: 1.22 CrCl: 142mL/min. Sent in 150mg syringes at last encounter  4. Reminded patient and his wife that he needs to keep his appointment with Dr. Hollis 2/26/21. We cannot continue to manage if he does not see his provider yearly.  5. Verbal information provided over the phone to Zuleima. Mr. Alberto expresses understanding by teach back and has no further questions at this time.     Sonya Bustillos, GiovaniD.  01/25/21   13:27 EST

## 2021-01-28 ENCOUNTER — APPOINTMENT (OUTPATIENT)
Dept: PHARMACY | Facility: HOSPITAL | Age: 56
End: 2021-01-28

## 2021-01-29 ENCOUNTER — APPOINTMENT (OUTPATIENT)
Dept: PHARMACY | Facility: HOSPITAL | Age: 56
End: 2021-01-29

## 2021-02-01 ENCOUNTER — APPOINTMENT (OUTPATIENT)
Dept: PHARMACY | Facility: HOSPITAL | Age: 56
End: 2021-02-01

## 2021-02-01 ENCOUNTER — ANTICOAGULATION VISIT (OUTPATIENT)
Dept: PHARMACY | Facility: HOSPITAL | Age: 56
End: 2021-02-01

## 2021-02-01 DIAGNOSIS — Z95.2 HX OF AORTIC VALVE REPLACEMENT, MECHANICAL: ICD-10-CM

## 2021-02-01 LAB
INR PPP: 2.9 (ref 0.91–1.09)
PROTHROMBIN TIME: 34.5 SECONDS (ref 10–13.8)

## 2021-02-01 PROCEDURE — 36416 COLLJ CAPILLARY BLOOD SPEC: CPT

## 2021-02-01 PROCEDURE — 85610 PROTHROMBIN TIME: CPT

## 2021-02-01 PROCEDURE — G0463 HOSPITAL OUTPT CLINIC VISIT: HCPCS

## 2021-02-01 NOTE — PROGRESS NOTES
Anticoagulation Clinic - Remote Progress Note  [facility]  Testing frequency: 7 days    Indication: Atrial Fibrillation and Mechanical Aortic Valve; h/o embolic CVA  Referring Provider: Bunny last seen 1/2019; Next appointment: 2/26/2021  Initial Warfarin Start Date: 11/2012  Planned Duration of Therapy: Life  Goal INR: 2.5-3.5  Current Drug Interactions: citalopram  CHADS-VASc: 3 (HTN, h/o CVA)    Vit K Diet: patient eats salad 1-3 times a week, goes through spurts when he tries to eat healthier(10/6/20)  Alcohol: none  Tobacco: none       Anticoagulation Clinic INR History:  Date 4/14 4/20 5/1 5/16 5/27 6/7 6/15 6/22 6/25 7/4   Total Weekly Dose 80mg 80mg 90mg 90mg  (ED) 90mg 75mg 95mg 95mg 107.5mg ???   INR 2.3 2.0 3.2 2.9 3.2 4.1 3.1 1.3 2.0 2.7   Notes     levoflox missed; levoflox levoflox levoflox; refuse lovenox       Date 7/31 8/27 8/31 9/4 9/11 9/27 10/2 10/5 10/15   Total Weekly Dose 105mg  ??? 65mg  ??? 95mg 100mg 80mg  ??? 70mg 90mg 100mg 100mg   INR 2.7 1.3 2.6 5.6 1.6 1.6 2.3 2.4 2.8   Notes self adj self adj; miss; enox   incr GLV, mis-dosing, refuse enox incorrect doses        Date 10/26 11/5 11/7 11/15 12/7 1/11/19 1/24/19 2/8 2/18 3/4 3/26 4/23   Total Weekly Dose 100mg  75mg 105mg 100mg 100mg 100mg 100mg 105mg 100mg 100mg 0mg   INR 4.6 7.9 1.9 5.5, 5.7 2.8 2.4 3.9 2.7 3.6 3.2 4.8 1.14 ED, 1.9 HM   Notes  recalled test strips clinic; 2 dose hold clinic    LVM  clinic       Date 5/7 6/3 6/17 6/24 7/11 7/18 7/22 7/25  8/5 8/12 8/15 8/21   Total Weekly Dose not able to confirm 75mg  ??? 95mg 80 mg 95mg 75mg 100 mg 100mg??? 95 mg 95 mg 75 mg 90 mg   INR 3.5 4.9 2.5 2.3 5.4 2.0 2.2 4.4 4 6.2 3.0 5   Notes s/p surgery; 2x boost  doxy start 6/13 doxy; 1 miss doxy self-held 2 doses   No GLV VPA restarted Held x 1      Date  8/26 8/30 9/6 9/12 9/17 10/2 10/10 11/4 11/12 11/22 12/10 1/3/20 1/22   Total Weekly Dose 70 mg 90mg 85mg 85mg 75 mg 80 mg 80 mg 62.5 mg 85mg 82.5mg 70mg 72.5 mg 72.5mg   INR   2.3 3.9 4.2 4.5 3.3  3.9 3.6 1.7 3.4 4.7 2.4 3.6 1.3   Notes 1 hold    rec'd 9/18   1  Patient took 85mg (Planned for 80mg)  Self-adjusted dose  lovenox     Date 1/30 2/3 2/18 2/27 4/8 4/29 5/18 6/20  8/3 8/17 10/5 10/19 11/11   Total Weekly Dose 82.5mg 77.5mg 75mg 75mg 70mg 70mg 65mg 65mg Non compliance 65 mg 65 mg 65mg 65mg 65mg   INR 2.3 3.2 3.1 3.2 4.3 4.1 4.5 3.0  3.0 3.8 3.0 2.9 4.1   Notes     Self reduce dose; Levaquin;  Less GLV; covid  recv'd 6/22; mis-dose           Date 12/21 1/20/2021 1/21 1/25 2/1         Total WeeklyDose 65mg Noncompliance 40mg 45mg? 62.5mg 80mg?         INR 2.3  1.2 1.2 (maia) 1.6 2.9         Notes rec 12/22 inc GLV  Self held; augmentin Boost x1; lovenox 2x miss, 1x misdose            Phone Interview:  Verbal Release Authorization signed on 11/7/18 -- may speak with Zuleima Alberto (patient's wife)    Tablet Strength: 5mg tablets   Patient Contact: [phone] -- try to call in PM or contact his wife Zuleima at [phone].  His email is [email or web address]    Patient Findings    Positives:  Bruising, Other complaints   Negatives:  Signs/symptoms of thrombosis, Signs/symptoms of bleeding, Laboratory test error suspected, Change in health, Change in alcohol use, Change in activity, Upcoming invasive procedure, Emergency department visit, Upcoming dental procedure, Missed doses, Extra doses, Change in medications, Change in diet/appetite, Hospital admission   Comments:  Large increase from previous encounter, but dose patient reports he has been stable on while he was self adjusting dosing x1 month of noncompliance with the clinic. Reports he has gained some weight and having issues with SOB; plans to move up his appt with Dr Hollis. Has increased stress due to his wife's health.   Has been continuing lovenox injections, will discontinue at this time. Patient unsure of his dosing for the last week ad his wife handles his dosing. His strips should come this week            Plan:   1. INR therapeutic but improved. Will discontinue with lovenox   Patient will resume maintenance dose of 10mg daily except 15mg TueThur. (of note: we had patient was taking 10mg daily except 7.5mg TueThur, however, wife and patient report they have been administering 15mg on TueThu)  2. Patient has been largely noncompliant with repeat INR and self-managing/adjusting his doses-- Repeat INR in 1 week on HM or will call clinic if strips not received  3.  Reminded patient and his wife that he needs to keep his appointment with Dr. Hollis 2/26/21. We cannot continue to manage if he does not see his provider yearly.  4 Verbal information provided over the phone to Zuleima. Mr. Alberto expresses understanding by teach back and has no further questions at this time.       Sonya Bustillos, PharmD.  02/01/21   13:38 EST

## 2021-02-11 ENCOUNTER — TELEPHONE (OUTPATIENT)
Dept: PHARMACY | Facility: HOSPITAL | Age: 56
End: 2021-02-11

## 2021-02-16 ENCOUNTER — ANTICOAGULATION VISIT (OUTPATIENT)
Dept: PHARMACY | Facility: HOSPITAL | Age: 56
End: 2021-02-16

## 2021-02-16 ENCOUNTER — TELEPHONE (OUTPATIENT)
Dept: PHARMACY | Facility: HOSPITAL | Age: 56
End: 2021-02-16

## 2021-02-16 DIAGNOSIS — Z95.2 HX OF AORTIC VALVE REPLACEMENT, MECHANICAL: ICD-10-CM

## 2021-02-16 LAB — INR PPP: 3.7

## 2021-02-16 NOTE — TELEPHONE ENCOUNTER
Per Elvia Burroughs, as long as day before surgery INR <2.75, can continue warfarin. Scheduled for 2/26. Will repeat INR 2/25. LVM to discuss with patient, mailbox full.

## 2021-02-16 NOTE — TELEPHONE ENCOUNTER
Dental extraction/cleaning scheduled with [facility] in [facility]  [phone], LVM to determine warfarin hold-- closed for weather 2/16  Patient unsure when scheduled

## 2021-02-16 NOTE — PROGRESS NOTES
Anticoagulation Clinic - Remote Progress Note  [facility]  Testing frequency: 7 days    Indication: Atrial Fibrillation and Mechanical Aortic Valve; h/o embolic CVA  Referring Provider: Bunny last seen 1/2019; Next appointment: 2/26/2021  Initial Warfarin Start Date: 11/2012  Planned Duration of Therapy: Life  Goal INR: 2.5-3.5  Current Drug Interactions: citalopram  CHADS-VASc: 3 (HTN, h/o CVA)    Vit K Diet: patient eats salad 1-3 times a week, goes through spurts when he tries to eat healthier(10/6/20)  Alcohol: none  Tobacco: none       Anticoagulation Clinic INR History:  Date 4/14 4/20 5/1 5/16 5/27 6/7 6/15 6/22 6/25 7/4   Total Weekly Dose 80mg 80mg 90mg 90mg  (ED) 90mg 75mg 95mg 95mg 107.5mg ???   INR 2.3 2.0 3.2 2.9 3.2 4.1 3.1 1.3 2.0 2.7   Notes     levoflox missed; levoflox levoflox levoflox; refuse lovenox       Date 7/31 8/27 8/31 9/4 9/11 9/27 10/2 10/5 10/15   Total Weekly Dose 105mg  ??? 65mg  ??? 95mg 100mg 80mg  ??? 70mg 90mg 100mg 100mg   INR 2.7 1.3 2.6 5.6 1.6 1.6 2.3 2.4 2.8   Notes self adj self adj; miss; enox   incr GLV, mis-dosing, refuse enox incorrect doses        Date 10/26 11/5 11/7 11/15 12/7 1/11/19 1/24/19 2/8 2/18 3/4 3/26 4/23   Total Weekly Dose 100mg  75mg 105mg 100mg 100mg 100mg 100mg 105mg 100mg 100mg 0mg   INR 4.6 7.9 1.9 5.5, 5.7 2.8 2.4 3.9 2.7 3.6 3.2 4.8 1.14 ED, 1.9 HM   Notes  recalled test strips clinic; 2 dose hold clinic    LVM  clinic       Date 5/7 6/3 6/17 6/24 7/11 7/18 7/22 7/25  8/5 8/12 8/15 8/21   Total Weekly Dose not able to confirm 75mg  ??? 95mg 80 mg 95mg 75mg 100 mg 100mg??? 95 mg 95 mg 75 mg 90 mg   INR 3.5 4.9 2.5 2.3 5.4 2.0 2.2 4.4 4 6.2 3.0 5   Notes s/p surgery; 2x boost  doxy start 6/13 doxy; 1 miss doxy self-held 2 doses   No GLV VPA restarted Held x 1      Date  8/26 8/30 9/6 9/12 9/17 10/2 10/10 11/4 11/12 11/22 12/10 1/3/20 1/22   Total Weekly Dose 70 mg 90mg 85mg 85mg 75 mg 80 mg 80 mg 62.5 mg 85mg 82.5mg 70mg 72.5 mg 72.5mg   INR   2.3 3.9 4.2 4.5 3.3  3.9 3.6 1.7 3.4 4.7 2.4 3.6 1.3   Notes 1 hold    rec'd 9/18   1  Patient took 85mg (Planned for 80mg)  Self-adjusted dose  lovenox     Date 1/30 2/3 2/18 2/27 4/8 4/29 5/18 6/20  8/3 8/17 10/5 10/19 11/11   Total Weekly Dose 82.5mg 77.5mg 75mg 75mg 70mg 70mg 65mg 65mg Non compliance 65 mg 65 mg 65mg 65mg 65mg   INR 2.3 3.2 3.1 3.2 4.3 4.1 4.5 3.0  3.0 3.8 3.0 2.9 4.1   Notes     Self reduce dose; Levaquin;  Less GLV; covid  recv'd 6/22; mis-dose           Date 12/21 1/20/2021 1/21 1/25 2/1 2/16        Total WeeklyDose 65mg Noncompliance 40mg 45mg? 62.5mg 80mg? 80mg        INR 2.3  1.2 1.2 (maia) 1.6 2.9 3.7        Notes rec 12/22 inc GLV  Self held; augmentin Boost x1; lovenox 2x miss, 1x misdose  amox          Phone Interview:  Verbal Release Authorization signed on 11/7/18 -- may speak with Zuleima Alberto (patient's wife)    Tablet Strength: 5mg tablets   Patient Contact: [phone] -- try to call in PM or contact his wife Zuleima at [phone].  His email is [email or web address]    Patient Findings    Positives:  Upcoming dental procedure, Change in medications   Negatives:  Signs/symptoms of thrombosis, Signs/symptoms of bleeding, Laboratory test error suspected, Change in health, Change in alcohol use, Change in activity, Upcoming invasive procedure, Emergency department visit, Missed doses, Extra doses, Change in diet/appetite, Hospital admission, Bruising, Other complaints   Comments:  On 2/3 Started on amox 500mg tid believes he has ~4 days remaining. Dental extraction/cleaning scheduled with [facility] in [facility]  [phone], LVM to determine warfarin hold-- closed for weather 2/16         Plan:   1. INR slightly supratherapeutic today with concurrent abx. Patient will resume decrease tonight's dose to 10mg then resume maintenance dose of 10mg daily except 15mg TueThur. (of note: we had patient was taking 10mg daily except 7.5mg TueThur, however, wife and  patient report they have been administering 15mg on TueThu)  2. Patient has been largely noncompliant with repeat INR and self-managing/adjusting his doses-- Repeat INR in 1 week on    3.  Reminded patient and his wife that he needs to keep his appointment with Dr. Hollis 2/26/21. We cannot continue to manage if he does not see his provider yearly.  4 Verbal information provided over the phone to Zuleima. Mr. Alberto expresses understanding by teach back and has no further questions at this time.   5. annabelle Bustillos, GiovaniD.  02/16/21   10:10 EST

## 2021-02-22 NOTE — TELEPHONE ENCOUNTER
Spoke with patient, asked him to repeat INR today as well as 2/25 prior to dental work. Agreeable to do both and have a serving of GLV 2/24

## 2021-03-03 ENCOUNTER — ANTICOAGULATION VISIT (OUTPATIENT)
Dept: PHARMACY | Facility: HOSPITAL | Age: 56
End: 2021-03-03

## 2021-03-03 DIAGNOSIS — Z95.2 HX OF AORTIC VALVE REPLACEMENT, MECHANICAL: ICD-10-CM

## 2021-03-03 LAB
INR PPP: 3.5
INR PPP: 4.5

## 2021-03-03 NOTE — PROGRESS NOTES
Anticoagulation Clinic - Remote Progress Note  [facility]  Testing frequency: 7 days    Indication: Atrial Fibrillation and Mechanical Aortic Valve; h/o embolic CVA  Referring Provider: Bunny last seen 1/2019; Next appointment: 2/26/2021  Initial Warfarin Start Date: 11/2012  Planned Duration of Therapy: Life  Goal INR: 2.5-3.5  Current Drug Interactions: citalopram  CHADS-VASc: 3 (HTN, h/o CVA)    Vit K Diet: patient eats salad 1-3 times a week, goes through spurts when he tries to eat healthier 3/3/2021  Alcohol: none  Tobacco: none       Anticoagulation Clinic INR History:  Date 4/14 4/20 5/1 5/16 5/27 6/7 6/15 6/22 6/25 7/4   Total Weekly Dose 80mg 80mg 90mg 90mg  (ED) 90mg 75mg 95mg 95mg 107.5mg ???   INR 2.3 2.0 3.2 2.9 3.2 4.1 3.1 1.3 2.0 2.7   Notes     levoflox missed; levoflox levoflox levoflox; refuse lovenox       Date 7/31 8/27 8/31 9/4 9/11 9/27 10/2 10/5 10/15   Total Weekly Dose 105mg  ??? 65mg  ??? 95mg 100mg 80mg  ??? 70mg 90mg 100mg 100mg   INR 2.7 1.3 2.6 5.6 1.6 1.6 2.3 2.4 2.8   Notes self adj self adj; miss; enox   incr GLV, mis-dosing, refuse enox incorrect doses        Date 10/26 11/5 11/7 11/15 12/7 1/11/19 1/24/19 2/8 2/18 3/4 3/26 4/23   Total Weekly Dose 100mg  75mg 105mg 100mg 100mg 100mg 100mg 105mg 100mg 100mg 0mg   INR 4.6 7.9 1.9 5.5, 5.7 2.8 2.4 3.9 2.7 3.6 3.2 4.8 1.14 ED, 1.9 HM   Notes  recalled test strips clinic; 2 dose hold clinic    LVM  clinic       Date 5/7 6/3 6/17 6/24 7/11 7/18 7/22 7/25  8/5 8/12 8/15 8/21   Total Weekly Dose not able to confirm 75mg  ??? 95mg 80 mg 95mg 75mg 100 mg 100mg??? 95 mg 95 mg 75 mg 90 mg   INR 3.5 4.9 2.5 2.3 5.4 2.0 2.2 4.4 4 6.2 3.0 5   Notes s/p surgery; 2x boost  doxy start 6/13 doxy; 1 miss doxy self-held 2 doses   No GLV VPA restarted Held x 1      Date  8/26 8/30 9/6 9/12 9/17 10/2 10/10 11/4 11/12 11/22 12/10 1/3/20 1/22   Total Weekly Dose 70 mg 90mg 85mg 85mg 75 mg 80 mg 80 mg 62.5 mg 85mg 82.5mg 70mg 72.5 mg 72.5mg   INR   2.3 3.9 4.2 4.5 3.3  3.9 3.6 1.7 3.4 4.7 2.4 3.6 1.3   Notes 1 hold    rec'd 9/18   1  Patient took 85mg (Planned for 80mg)  Self-adjusted dose  lovenox     Date 1/30 2/3 2/18 2/27 4/8 4/29 5/18 6/20  8/3 8/17 10/5 10/19 11/11   Total Weekly Dose 82.5mg 77.5mg 75mg 75mg 70mg 70mg 65mg 65mg Non compliance 65 mg 65 mg 65mg 65mg 65mg   INR 2.3 3.2 3.1 3.2 4.3 4.1 4.5 3.0  3.0 3.8 3.0 2.9 4.1   Notes     Self reduce dose; Levaquin;  Less GLV; covid  recv'd 6/22; mis-dose           Date 12/21 1/20/2021 1/21 1/25 2/1 2/16        Total WeeklyDose 65mg Noncompliance 40mg 45mg? 62.5mg 80mg? 80mg        INR 2.3  1.2 1.2 (maia) 1.6 2.9 3.7        Notes rec 12/22 inc GLV  Self held; augmentin Boost x1; lovenox 2x miss, 1x misdose  amox          Phone Interview:  Verbal Release Authorization signed on 11/7/18 -- may speak with Zuleima Alberto (patient's wife)    Tablet Strength: 5mg tablets   Patient Contact: [phone] -- try to call in PM or contact his wife Zuleima at [phone].  His email is [email or web address]    Patient Findings  Positives:  Upcoming dental procedure   Negatives:  Signs/symptoms of thrombosis, Signs/symptoms of bleeding, Laboratory test error suspected, Change in health, Change in alcohol use, Change in activity, Upcoming invasive procedure, Emergency department visit, Missed doses, Extra doses, Change in medications, Change in diet/appetite, Hospital admission, Bruising, Other complaints   Comments:  Per Ira nothing has changed- no GLV per usual. Ira reports that he is going to have a procedure on Monday with his DMD. They did not tell us about this. INR needs to be below 2.7 per the patient. They were not able to tell me who their dentist is. Ira will call when it is rescheduled. She has noted some bruises on his stomach.    Called DMD office. Precious in [facility]  [phone]  Pt cancelled the procedure this past Monday. We were not aware. It is not rescheduled. Per  Grace pt ok to aim for INR < 2.75 day prior to procedure. One tooth extraction.     Plan:   1. INR slightly supratherapeutic today with concurrent abx. Patient will decrease tonight's dose to 7.5mg and then decrease weekly dose of 10mg daily. He will also have a serving of broccoli casserole tonight.  2. Patient has been largely noncompliant with repeat INR and self-managing/adjusting his doses-- Repeat INR in 1 week on    3.  Reminded patient and his wife that he needs to keep his appointment with Dr. Hollis 2/26/21. We cannot continue to manage if he does not see his provider yearly.  4. Verbal information provided over the phone to Zuleima. Mr. Alberto expresses understanding by teach back and has no further questions at this time.   5. i[facility]ent updated. Procedure not scheduled. Pt cancelled the procedure this past Monday. It is not rescheduled. Per Grace pt ok to aim for INR < 2.75 day prior to procedure. One tooth extraction.    Do Vargas, PharmD  03/03/21   13:38 EST

## 2021-03-08 ENCOUNTER — TELEPHONE (OUTPATIENT)
Dept: CARDIOLOGY | Facility: CLINIC | Age: 56
End: 2021-03-08

## 2021-03-08 NOTE — TELEPHONE ENCOUNTER
Pt called with complaints of swelling and soa and wants a sooner scheduled appt with  than his 03/12 appt. RN advised patient to call and go see [facility] where they can run testing and maybe perform some iv diuresis to help with fluid overload and then  can see and re eval patient at his scheduled appt. Pt verbalized understanding

## 2021-03-09 ENCOUNTER — OFFICE VISIT (OUTPATIENT)
Dept: CARDIOLOGY | Facility: HOSPITAL | Age: 56
End: 2021-03-09

## 2021-03-09 VITALS
DIASTOLIC BLOOD PRESSURE: 82 MMHG | SYSTOLIC BLOOD PRESSURE: 175 MMHG | BODY MASS INDEX: 37.19 KG/M2 | RESPIRATION RATE: 20 BRPM | TEMPERATURE: 97.2 F | OXYGEN SATURATION: 97 % | HEIGHT: 77 IN | HEART RATE: 54 BPM | WEIGHT: 315 LBS

## 2021-03-09 DIAGNOSIS — I50.33 ACUTE ON CHRONIC DIASTOLIC CHF (CONGESTIVE HEART FAILURE) (HCC): Primary | ICD-10-CM

## 2021-03-09 DIAGNOSIS — Z95.2 HX OF AORTIC VALVE REPLACEMENT, MECHANICAL: ICD-10-CM

## 2021-03-09 DIAGNOSIS — I10 ESSENTIAL HYPERTENSION: ICD-10-CM

## 2021-03-09 LAB
ANION GAP SERPL CALCULATED.3IONS-SCNC: 7.7 MMOL/L (ref 5–15)
BUN SERPL-MCNC: 21 MG/DL (ref 6–20)
BUN/CREAT SERPL: 21 (ref 7–25)
CALCIUM SPEC-SCNC: 9.2 MG/DL (ref 8.6–10.5)
CHLORIDE SERPL-SCNC: 107 MMOL/L (ref 98–107)
CO2 SERPL-SCNC: 25.3 MMOL/L (ref 22–29)
CREAT SERPL-MCNC: 1 MG/DL (ref 0.76–1.27)
GFR SERPL CREATININE-BSD FRML MDRD: 78 ML/MIN/1.73
GLUCOSE SERPL-MCNC: 121 MG/DL (ref 65–99)
NT-PROBNP SERPL-MCNC: 1447 PG/ML (ref 0–900)
POTASSIUM SERPL-SCNC: 4.7 MMOL/L (ref 3.5–5.2)
SODIUM SERPL-SCNC: 140 MMOL/L (ref 136–145)

## 2021-03-09 PROCEDURE — 99214 OFFICE O/P EST MOD 30 MIN: CPT | Performed by: NURSE PRACTITIONER

## 2021-03-09 PROCEDURE — 80048 BASIC METABOLIC PNL TOTAL CA: CPT | Performed by: NURSE PRACTITIONER

## 2021-03-09 PROCEDURE — 83880 ASSAY OF NATRIURETIC PEPTIDE: CPT | Performed by: NURSE PRACTITIONER

## 2021-03-09 RX ORDER — BUPROPION HYDROCHLORIDE 100 MG/1
100 TABLET, EXTENDED RELEASE ORAL 2 TIMES DAILY
Qty: 60 TABLET | Refills: 3 | Status: ON HOLD | OUTPATIENT
Start: 2021-03-09 | End: 2021-07-01

## 2021-03-09 RX ORDER — ACETAMINOPHEN 500 MG
500 TABLET ORAL EVERY 6 HOURS PRN
COMMUNITY
End: 2021-07-29 | Stop reason: HOSPADM

## 2021-03-09 RX ORDER — FUROSEMIDE 40 MG/1
40 TABLET ORAL 2 TIMES DAILY
Qty: 60 TABLET | Refills: 2 | Status: SHIPPED | OUTPATIENT
Start: 2021-03-09 | End: 2021-07-29 | Stop reason: HOSPADM

## 2021-03-09 NOTE — PROGRESS NOTES
"Chief Complaint  Establish Care (fluid overload)    Subjective    History of Present Illness {CC  Problem List  Visit  Diagnosis   Encounters  Notes  Medications  Labs  Result Review Imaging  Media :23}       History of Present Illness   55-year-old male presents the office today for ongoing evaluation of his acute on chronic diastolic heart failure.  He reports a 20 pound weight gain in the last 2 weeks.  Reports worsening dyspnea, abdominal fullness, pedal edema.  He reports compliance with his medications of lisinopril and Lasix.  He reports that he does not feel like the Lasix is working very well.  He has not taken his lisinopril yet today.  He reports he experiences dyspnea with minimal exertion.  Also reports significant fatigue.  He notes that his wife is dying of esophageal cancer and he notes that he is under significant stress.   Objective     Vital Signs:   Vitals:    03/09/21 0831 03/09/21 0832 03/09/21 0833 03/09/21 0937   BP: (!) 194/91 (!) 215/99 (!) 195/86 175/82   BP Location: Right arm Left arm Left arm Left arm   Patient Position: Sitting Standing Sitting Sitting   Cuff Size: Large Adult Large Adult Large Adult    Pulse: 53 59 54    Resp:   20    Temp:  97.2 °F (36.2 °C) 97.2 °F (36.2 °C)    TempSrc:  Temporal Temporal    SpO2: 98% 96% 97%    Weight:   (!) 154 kg (340 lb 2 oz)    Height:   195.6 cm (77\")      Body mass index is 40.33 kg/m².  Physical Exam  Vitals and nursing note reviewed.   Constitutional:       Appearance: Normal appearance.   HENT:      Head: Normocephalic.   Eyes:      Pupils: Pupils are equal, round, and reactive to light.   Cardiovascular:      Rate and Rhythm: Normal rate and regular rhythm.      Pulses: Normal pulses.      Heart sounds: Normal heart sounds. No murmur.      Comments: Click present   Pulmonary:      Effort: Pulmonary effort is normal.      Breath sounds: Rales present.   Abdominal:      General: Bowel sounds are normal. There is distension.      " Palpations: Abdomen is soft.   Musculoskeletal:         General: Normal range of motion.      Cervical back: Normal range of motion.      Right lower leg: Edema (2+ pitting ) present.      Left lower leg: Edema (2+ pitting ) present.   Skin:     General: Skin is warm and dry.      Capillary Refill: Capillary refill takes less than 2 seconds.   Neurological:      Mental Status: He is alert and oriented to person, place, and time.   Psychiatric:         Mood and Affect: Mood normal.         Thought Content: Thought content normal.              Result Review  Data Reviewed:{ Labs  Result Review  Imaging  Med Tab  Media :23}   Basic Metabolic Panel (01/21/2021 19:23)  Adult Transthoracic Echo Complete W/ Cont if Necessary Per Protocol (02/25/2020 10:04)    Bmp and probnp pending from today          Assessment and Plan {CC Problem List  Visit Diagnosis  ROS  Review (Popup)  Health Maintenance  Quality  BestPractice  Medications  SmartSets  SnapShot Encounters  Media :23}   1. Acute on chronic diastolic CHF (congestive heart failure) (CMS/HCC)  IV diuresis today in office. Patient received 80 mg lasix  today through a butterfly in the left hand  over slow IV push. During IV diuresis, vitals were monitored and stable. Please see IV diuresis record for those vitals. Patient voided 950 ml in the office prior to discharge from the office. Butterfly was d/c'd and area was free of erythema, ecchymosis, or drainage.  Patient was  instructed to record urinary output for the next 24 hours. Patient will receive a follow up call from the HF center in 24 hours to evaluate urinary output and reassess signs and symptoms.   - Basic Metabolic Panel; Future  - proBNP; Future  - Basic Metabolic Panel  - proBNP  Increase lasix to 40 mg bid starting tomorrow   will consider initiation of potassium based on labs for today   2. Essential hypertension  Patient has not taken lisinopril yet today  Monitor closely at home   - Basic  Metabolic Panel; Future  - Basic Metabolic Panel    3. Hx of aortic valve replacement, mechanical    Anticoagulated with coumadin and denies any s/s of bleeding     I spent 45 minutes caring for Mitchell on this date of service. This time includes time spent by me in the following activities:preparing for the visit, obtaining and/or reviewing a separately obtained history, performing a medically appropriate examination and/or evaluation , counseling and educating the patient/family/caregiver, ordering medications, tests, or procedures and care coordination    Follow Up {Instructions Charge Capture  Follow-up Communications :23}   Return if symptoms worsen or fail to improve.    Patient was given instructions and counseling regarding his condition or for health maintenance advice. Please see specific information pulled into the AVS if appropriate.  Patient was instructed to call the [facility] with any questions, concerns, or worsening symptoms.    *Please note that portions of this note were completed with a voice recognition program. Efforts were made to edit the dictations, but occasionally words are mistranscribed.

## 2021-03-09 NOTE — PATIENT INSTRUCTIONS
Increase lasix to 40 mg twice a day starting tomorrow  Eleni will call you tomorrow to reassess s/s

## 2021-03-10 ENCOUNTER — TELEPHONE (OUTPATIENT)
Dept: CARDIOLOGY | Facility: HOSPITAL | Age: 56
End: 2021-03-10

## 2021-03-11 ENCOUNTER — TELEPHONE (OUTPATIENT)
Dept: CARDIOLOGY | Facility: HOSPITAL | Age: 56
End: 2021-03-11

## 2021-03-11 DIAGNOSIS — Z95.2 HX OF AORTIC VALVE REPLACEMENT, MECHANICAL: ICD-10-CM

## 2021-03-11 NOTE — TELEPHONE ENCOUNTER
----- Message from YUE Martinez sent at 3/11/2021  9:38 AM EST -----  Please call this gentleman and see if he is feeling better after iv. I tried calling him yesterday but was unable to reach him. Thanks  ----- Message -----  From: Eleni Cardoza APRN  Sent: 3/10/2021  To: YUE Martinez    Iv diuretics

## 2021-03-11 NOTE — TELEPHONE ENCOUNTER
Spoke with patient who notes that edema has improved but is still present. He notes that he voided frequently after iv diuresis. Patient to continue lasix 40 mg bid. Patient to see Dr Hollis tomorrow for further evaluation. He notes that Dyspnea has improved.

## 2021-03-11 NOTE — TELEPHONE ENCOUNTER
Spoke to patient about symptoms after receiving IV diuresis earlier this week. Patient states that he is feeling much better today but does feel like that the diurectic is not working like it should be. Patient mentioned that he still have considerable edema in his feet and ankle and has been experiencing SOA. He notes that he was getting very short of breath just while talking on the phone.

## 2021-03-12 ENCOUNTER — OFFICE VISIT (OUTPATIENT)
Dept: CARDIOLOGY | Facility: CLINIC | Age: 56
End: 2021-03-12

## 2021-03-12 VITALS
OXYGEN SATURATION: 94 % | WEIGHT: 315 LBS | SYSTOLIC BLOOD PRESSURE: 142 MMHG | HEART RATE: 60 BPM | BODY MASS INDEX: 37.19 KG/M2 | DIASTOLIC BLOOD PRESSURE: 70 MMHG | HEIGHT: 77 IN

## 2021-03-12 DIAGNOSIS — R06.00 DYSPNEA, UNSPECIFIED TYPE: ICD-10-CM

## 2021-03-12 DIAGNOSIS — Z95.2 HX OF AORTIC VALVE REPLACEMENT, MECHANICAL: ICD-10-CM

## 2021-03-12 DIAGNOSIS — I10 ESSENTIAL HYPERTENSION: ICD-10-CM

## 2021-03-12 DIAGNOSIS — I35.0 NONRHEUMATIC AORTIC VALVE STENOSIS: Primary | ICD-10-CM

## 2021-03-12 DIAGNOSIS — I50.21 ACUTE SYSTOLIC CONGESTIVE HEART FAILURE (HCC): ICD-10-CM

## 2021-03-12 DIAGNOSIS — I48.91 ATRIAL FIBRILLATION, UNSPECIFIED TYPE (HCC): Primary | ICD-10-CM

## 2021-03-12 PROCEDURE — 99214 OFFICE O/P EST MOD 30 MIN: CPT | Performed by: INTERNAL MEDICINE

## 2021-03-12 PROCEDURE — 93000 ELECTROCARDIOGRAM COMPLETE: CPT | Performed by: INTERNAL MEDICINE

## 2021-03-12 RX ORDER — WARFARIN SODIUM 5 MG/1
TABLET ORAL
Qty: 180 TABLET | Refills: 0 | Status: SHIPPED | OUTPATIENT
Start: 2021-03-12 | End: 2021-06-17

## 2021-03-12 NOTE — PROGRESS NOTES
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    PROBLEM LIST:   1.  Ascending aortic dissection and severe aortic insufficiency:  a.  Mechanical Bentall aortic valve replacement ([facility]) and ascending aortic dissection repair, 11/17/2012, Dr. Jasmeet De Dios.   b. Echocardiogram, 12/03/2013:  i. LVEF (55% to 60%), mechanical aortic valve, area 1.18 cm2.  ii. Chronic Coumadin therapy                     c.    Cardiac PET scan, 08/26/2015, negative for reversible ischemia; fixed photopenia                            of the apex and septum possibly due to LBBB; LVEF (30% to 34%).                    d.    Cardiac catheterization,  Dorian Giraldo MD, 09/03/2015:  No obstructive                            CAD.                    E. 1/17 echo EF 55% mild lvh AVR mean pressure 17                    F. 12/16 CTA chest wnl  2.  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  Residual left upper extremity weakness.    3. Left arm discomfort:  a. Possibly due to embolic CVA vs. intraoperative nerve traction.   4. Hypertension.   5. HL  10/18 164/61/53/104  6. 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 Status post surgery:  a. Left inguinal herniorrhaphy, August 2014.   b. Left forehead melanoma excision, Dr. Gisell Kothari, August 2014.      Chief Complaint   Patient presents with   • Acute on chronic diastolic CHF (congestive heart failure) (C       Allergies  Allergies   Allergen Reactions   • Meclizine Hcl Itching       Current Medications    Current Outpatient Medications:   •  acetaminophen (TYLENOL) 500 MG tablet, Take 500 mg by mouth Every 6 (Six) Hours As Needed for Mild Pain  (toothache)., Disp: , Rfl:   •  buPROPion SR (Wellbutrin SR) 100 MG 12 hr tablet, Take 1 tablet by mouth 2  "(Two) Times a Day., Disp: 60 tablet, Rfl: 3  •  CIALIS 5 MG tablet, Take 1 tablet by mouth As Needed., Disp: , Rfl:   •  enoxaparin (LOVENOX) 150 MG/ML injection, Administer contents of one syringe subcutaneously every 12 hours as directed, Disp: 10 mL, Rfl: 0  •  furosemide (LASIX) 40 MG tablet, Take 1 tablet by mouth 2 (Two) Times a Day., Disp: 60 tablet, Rfl: 2  •  lisinopril (PRINIVIL,ZESTRIL) 40 MG tablet, TAKE ONE TABLET BY MOUTH DAILY, Disp: 90 tablet, Rfl: 3  •  warfarin (COUMADIN) 5 MG tablet, TAKE 1 AND 1/2 TO 2 TABLETS BY MOUTH DAILY OR AS DIRECTED BY THE ANTICOAGULATION CLINIC (Patient taking differently: TAKE 1 AND 1/2 TO 2 TABLETS BY MOUTH DAILY OR AS DIRECTED BY THE ANTICOAGULATION CLINIC. patient is managed by the [facility] ([phone])), Disp: 60 tablet, Rfl: 2      History of Present Illness   Mitchell Alberto is a 55 y.o. year old male here for follow up.  Increased shortness of breath some fatigue and feels like he is retaining fluid.  He was seen in the heart valve center this past week and IV diuresed with some improvement.  He is doubled his Lasix dose and states he continues to improve steadily.  He has been eating excess amounts of sodium as his wife is having end-of-life issues and he is not been able to properly prepare nutritious diet plan  He has had some dental issues with a painful tooth in his lower mandible and has not been on antibiotics intermittently.      OBJECTIVE:  Vitals:    03/12/21 1555   BP: 142/70   BP Location: Left arm   Patient Position: Sitting   Pulse: 60   SpO2: 94%   Weight: (!) 153 kg (338 lb)   Height: 195.6 cm (77\")     Physical Exam  Constitutional:       Appearance: He is well-developed.   Neck:      Thyroid: No thyromegaly.      Vascular: No carotid bruit, hepatojugular reflux or JVD.      Trachea: No tracheal deviation.   Cardiovascular:      Rate and Rhythm: Normal rate and regular rhythm.      Chest Wall: PMI is not " displaced.      Pulses: Normal pulses and intact distal pulses. No midsystolic click and no opening snap.           Radial pulses are 2+ on the right side and 2+ on the left side.        Dorsalis pedis pulses are 2+ on the right side and 2+ on the left side.        Posterior tibial pulses are 2+ on the right side and 2+ on the left side.      Heart sounds: S1 normal and S2 normal. Heart sounds not distant. No murmur. No friction rub. No gallop.       Comments: Crisp S2 click  Pulmonary:      Effort: Pulmonary effort is normal.      Breath sounds: Normal breath sounds. No wheezing or rales.   Abdominal:      General: Bowel sounds are normal.      Palpations: Abdomen is soft. There is no mass.      Tenderness: There is no abdominal tenderness. There is no guarding.   Musculoskeletal:      Cervical back: Normal range of motion and neck supple.         Diagnostic Data:    ECG 12 Lead    Date/Time: 3/12/2021 4:04 PM  Performed by: Mike Hollis MD  Authorized by: Mike Hollis MD   Comparison: compared with previous ECG from 4/22/2019  Similar to previous ECG  Comparison to previous ECG: PVC's noted on 3/12/21 EKG  Rhythm: sinus rhythm  Ectopy: bigeminy  Rate: normal  BPM: 64  Conduction: left bundle branch block  QRS axis: right    Clinical impression: abnormal EKG              ASSESSMENT:   Diagnosis Plan   1. Nonrheumatic aortic valve stenosis     2. Acute systolic congestive heart failure (CMS/HCC)     3. Essential hypertension         PLAN:  CHF acute on chronic with plan for echocardiogram continued double dosing diuretic at current    Aortic valve replacement for follow-up echocardiogram     Hypertension controlled on current regimen        Provider, No Known, thank you for referring Mr. Alberto for evaluation.  I have forwarded my electronically generated recommendations to you for review.  Please do not hesitate to call with any questions.      Mike Hollis MD, [facility]

## 2021-03-19 ENCOUNTER — HOSPITAL ENCOUNTER (OUTPATIENT)
Dept: CARDIOLOGY | Facility: HOSPITAL | Age: 56
Discharge: HOME | End: 2021-03-19
Admitting: INTERNAL MEDICINE

## 2021-03-19 VITALS — BODY MASS INDEX: 37.19 KG/M2 | HEIGHT: 77 IN | WEIGHT: 315 LBS

## 2021-03-19 DIAGNOSIS — Z95.2 HX OF AORTIC VALVE REPLACEMENT, MECHANICAL: ICD-10-CM

## 2021-03-19 DIAGNOSIS — R06.00 DYSPNEA, UNSPECIFIED TYPE: ICD-10-CM

## 2021-03-19 DIAGNOSIS — I48.91 ATRIAL FIBRILLATION, UNSPECIFIED TYPE (HCC): ICD-10-CM

## 2021-03-19 PROCEDURE — 93306 TTE W/DOPPLER COMPLETE: CPT | Performed by: INTERNAL MEDICINE

## 2021-03-19 PROCEDURE — 93306 TTE W/DOPPLER COMPLETE: CPT

## 2021-03-22 ENCOUNTER — ANTICOAGULATION VISIT (OUTPATIENT)
Dept: PHARMACY | Facility: HOSPITAL | Age: 56
End: 2021-03-22

## 2021-03-22 DIAGNOSIS — Z95.2 HX OF AORTIC VALVE REPLACEMENT, MECHANICAL: ICD-10-CM

## 2021-03-22 LAB
BH CV ECHO MEAS - AO MAX PG (FULL): 38.4 MMHG
BH CV ECHO MEAS - AO MAX PG: 43 MMHG
BH CV ECHO MEAS - AO MEAN PG (FULL): 20 MMHG
BH CV ECHO MEAS - AO MEAN PG: 22 MMHG
BH CV ECHO MEAS - AO ROOT AREA (BSA CORRECTED): 1.3
BH CV ECHO MEAS - AO ROOT AREA: 10.8 CM^2
BH CV ECHO MEAS - AO ROOT DIAM: 3.7 CM
BH CV ECHO MEAS - AO V2 MAX: 329 CM/SEC
BH CV ECHO MEAS - AO V2 MEAN: 208 CM/SEC
BH CV ECHO MEAS - AO V2 VTI: 70.9 CM
BH CV ECHO MEAS - AVA(I,A): 1.2 CM^2
BH CV ECHO MEAS - AVA(I,D): 1.2 CM^2
BH CV ECHO MEAS - AVA(V,A): 1.2 CM^2
BH CV ECHO MEAS - AVA(V,D): 1.2 CM^2
BH CV ECHO MEAS - BSA(HAYCOCK): 2.9 M^2
BH CV ECHO MEAS - BSA: 2.8 M^2
BH CV ECHO MEAS - BZI_BMI: 40.1 KILOGRAMS/M^2
BH CV ECHO MEAS - BZI_METRIC_HEIGHT: 195.6 CM
BH CV ECHO MEAS - BZI_METRIC_WEIGHT: 153.3 KG
BH CV ECHO MEAS - EDV(CUBED): 242.4 ML
BH CV ECHO MEAS - EDV(MOD-SP2): 195 ML
BH CV ECHO MEAS - EDV(MOD-SP4): 213 ML
BH CV ECHO MEAS - EDV(TEICH): 196.5 ML
BH CV ECHO MEAS - EF(CUBED): 35.4 %
BH CV ECHO MEAS - EF(MOD-BP): 51 %
BH CV ECHO MEAS - EF(MOD-SP2): 44.6 %
BH CV ECHO MEAS - EF(MOD-SP4): 55.4 %
BH CV ECHO MEAS - EF(TEICH): 28.4 %
BH CV ECHO MEAS - ESV(CUBED): 156.6 ML
BH CV ECHO MEAS - ESV(MOD-SP2): 108 ML
BH CV ECHO MEAS - ESV(MOD-SP4): 95 ML
BH CV ECHO MEAS - ESV(TEICH): 140.7 ML
BH CV ECHO MEAS - FS: 13.6 %
BH CV ECHO MEAS - IVS/LVPW: 1.1
BH CV ECHO MEAS - IVSD: 1.2 CM
BH CV ECHO MEAS - LA DIMENSION: 4.6 CM
BH CV ECHO MEAS - LA/AO: 1.2
BH CV ECHO MEAS - LAD MAJOR: 6.4 CM
BH CV ECHO MEAS - LAT PEAK E' VEL: 6 CM/SEC
BH CV ECHO MEAS - LATERAL E/E' RATIO: 17.8
BH CV ECHO MEAS - LV DIASTOLIC VOL/BSA (35-75): 76.2 ML/M^2
BH CV ECHO MEAS - LV MASS(C)D: 315.3 GRAMS
BH CV ECHO MEAS - LV MASS(C)DI: 112.8 GRAMS/M^2
BH CV ECHO MEAS - LV MAX PG: 4.6 MMHG
BH CV ECHO MEAS - LV MEAN PG: 2 MMHG
BH CV ECHO MEAS - LV SYSTOLIC VOL/BSA (12-30): 34 ML/M^2
BH CV ECHO MEAS - LV V1 MAX: 107 CM/SEC
BH CV ECHO MEAS - LV V1 MEAN: 62.1 CM/SEC
BH CV ECHO MEAS - LV V1 VTI: 21.5 CM
BH CV ECHO MEAS - LVIDD: 6.2 CM
BH CV ECHO MEAS - LVIDS: 5.4 CM
BH CV ECHO MEAS - LVLD AP2: 10.7 CM
BH CV ECHO MEAS - LVLD AP4: 10.2 CM
BH CV ECHO MEAS - LVLS AP2: 9.6 CM
BH CV ECHO MEAS - LVLS AP4: 9.3 CM
BH CV ECHO MEAS - LVOT AREA (M): 3.8 CM^2
BH CV ECHO MEAS - LVOT AREA: 3.8 CM^2
BH CV ECHO MEAS - LVOT DIAM: 2.2 CM
BH CV ECHO MEAS - LVPWD: 1.1 CM
BH CV ECHO MEAS - MED PEAK E' VEL: 5.6 CM/SEC
BH CV ECHO MEAS - MEDIAL E/E' RATIO: 18.9
BH CV ECHO MEAS - MV A MAX VEL: 52.8 CM/SEC
BH CV ECHO MEAS - MV DEC TIME: 0.18 SEC
BH CV ECHO MEAS - MV E MAX VEL: 106 CM/SEC
BH CV ECHO MEAS - MV E/A: 2
BH CV ECHO MEAS - PA ACC SLOPE: 481.5 CM/SEC^2
BH CV ECHO MEAS - PA ACC TIME: 0.14 SEC
BH CV ECHO MEAS - PA PR(ACCEL): 16.5 MMHG
BH CV ECHO MEAS - RAP SYSTOLE: 3 MMHG
BH CV ECHO MEAS - RVSP: 39 MMHG
BH CV ECHO MEAS - SI(AO): 272.7 ML/M^2
BH CV ECHO MEAS - SI(CUBED): 30.7 ML/M^2
BH CV ECHO MEAS - SI(LVOT): 29.2 ML/M^2
BH CV ECHO MEAS - SI(MOD-SP2): 31.1 ML/M^2
BH CV ECHO MEAS - SI(MOD-SP4): 42.2 ML/M^2
BH CV ECHO MEAS - SI(TEICH): 20 ML/M^2
BH CV ECHO MEAS - SV(AO): 762.3 ML
BH CV ECHO MEAS - SV(CUBED): 85.8 ML
BH CV ECHO MEAS - SV(LVOT): 81.7 ML
BH CV ECHO MEAS - SV(MOD-SP2): 87 ML
BH CV ECHO MEAS - SV(MOD-SP4): 118 ML
BH CV ECHO MEAS - SV(TEICH): 55.8 ML
BH CV ECHO MEAS - TAPSE (>1.6): 2.1 CM
BH CV ECHO MEAS - TR MAX PG: 36 MMHG
BH CV ECHO MEAS - TR MAX VEL: 299 CM/SEC
BH CV ECHO MEASUREMENTS AVERAGE E/E' RATIO: 18.28
BH CV VAS BP LEFT ARM: NORMAL MMHG
BH CV XLRA - RV BASE: 5.5 CM
BH CV XLRA - RV LENGTH: 10.1 CM
BH CV XLRA - RV MID: 4.9 CM
BH CV XLRA - TDI S': 13 CM/SEC
INR PPP: 3.2
LEFT ATRIUM VOLUME INDEX: 31.5 ML/M^2
LEFT ATRIUM VOLUME: 88 ML
LV EF 2D ECHO EST: 50 %

## 2021-03-22 NOTE — PROGRESS NOTES
Anticoagulation Clinic - Remote Progress Note  [facility]  Testing frequency: 7 days    Indication: Atrial Fibrillation and Mechanical Aortic Valve; h/o embolic CVA  Referring Provider: Bunny last seen 3/12/21; Next appointment: 6/25/21  Initial Warfarin Start Date: 11/2012  Planned Duration of Therapy: Life  Goal INR: 2.5-3.5  Current Drug Interactions: citalopram  CHADS-VASc: 3 (HTN, h/o CVA)    Vit K Diet: patient eats salad 1-3 times a week, goes through spurts when he tries to eat healthier 3/3/2021  Alcohol: none  Tobacco: none       Anticoagulation Clinic INR History:  Date 4/14 4/20 5/1 5/16 5/27 6/7 6/15 6/22 6/25 7/4   Total Weekly Dose 80mg 80mg 90mg 90mg  (ED) 90mg 75mg 95mg 95mg 107.5mg ???   INR 2.3 2.0 3.2 2.9 3.2 4.1 3.1 1.3 2.0 2.7   Notes     levoflox missed; levoflox levoflox levoflox; refuse lovenox       Date 7/31 8/27 8/31 9/4 9/11 9/27 10/2 10/5 10/15   Total Weekly Dose 105mg  ??? 65mg  ??? 95mg 100mg 80mg  ??? 70mg 90mg 100mg 100mg   INR 2.7 1.3 2.6 5.6 1.6 1.6 2.3 2.4 2.8   Notes self adj self adj; miss; enox   incr GLV, mis-dosing, refuse enox incorrect doses        Date 10/26 11/5 11/7 11/15 12/7 1/11/19 1/24/19 2/8 2/18 3/4 3/26 4/23   Total Weekly Dose 100mg  75mg 105mg 100mg 100mg 100mg 100mg 105mg 100mg 100mg 0mg   INR 4.6 7.9 1.9 5.5, 5.7 2.8 2.4 3.9 2.7 3.6 3.2 4.8 1.14 ED, 1.9 HM   Notes  recalled test strips clinic; 2 dose hold clinic    LVM  clinic       Date 5/7 6/3 6/17 6/24 7/11 7/18 7/22 7/25  8/5 8/12 8/15 8/21   Total Weekly Dose not able to confirm 75mg  ??? 95mg 80 mg 95mg 75mg 100 mg 100mg??? 95 mg 95 mg 75 mg 90 mg   INR 3.5 4.9 2.5 2.3 5.4 2.0 2.2 4.4 4 6.2 3.0 5   Notes s/p surgery; 2x boost  doxy start 6/13 doxy; 1 miss doxy self-held 2 doses   No GLV VPA restarted Held x 1      Date  8/26 8/30 9/6 9/12 9/17 10/2 10/10 11/4 11/12 11/22 12/10 1/3/20 1/22   Total Weekly Dose 70 mg 90mg 85mg 85mg 75 mg 80 mg 80 mg 62.5 mg 85mg 82.5mg 70mg 72.5 mg 72.5mg   INR   2.3 3.9 4.2 4.5 3.3  3.9 3.6 1.7 3.4 4.7 2.4 3.6 1.3   Notes 1 hold    rec'd 9/18   1  Patient took 85mg (Planned for 80mg)  Self-adjusted dose  lovenox     Date 1/30 2/3 2/18 2/27 4/8 4/29 5/18 6/20  8/3 8/17 10/5 10/19 11/11   Total Weekly Dose 82.5mg 77.5mg 75mg 75mg 70mg 70mg 65mg 65mg Non compliance 65 mg 65 mg 65mg 65mg 65mg   INR 2.3 3.2 3.1 3.2 4.3 4.1 4.5 3.0  3.0 3.8 3.0 2.9 4.1   Notes     Self reduce dose; Levaquin;  Less GLV; covid  recv'd 6/22; mis-dose           Date 12/21  1/20/2021 1/21 1/25 2/1 2/16 3/3 3/22      Total WeeklyDose 65mg Noncompliance 40mg 45mg? 62.5mg 80mg? 80mg   70 mg      INR 2.3  1.2 1.2 (maia) 1.6 2.9 3.7  4.5 3.2      Notes rec 12/22 inc GLV  Self held; augmentin Boost x1; lovenox 2x miss, 1x misdose  amox amox         Phone Interview:  Verbal Release Authorization signed on 11/7/18 -- may speak with Zuleima Alberto (patient's wife)    Tablet Strength: 5mg tablets   Patient Contact: [phone] -- try to call in PM or contact his wife Zuleima at [phone].  His email is [email or web address]    Patient Findings  Positives:  Upcoming dental procedure, Missed doses   Negatives:  Signs/symptoms of thrombosis, Signs/symptoms of bleeding, Laboratory test error suspected, Change in health, Change in alcohol use, Change in activity, Upcoming invasive procedure, Emergency department visit, Extra doses, Change in medications, Change in diet/appetite, Hospital admission, Bruising, Other complaints   Comments:  Mr. Alberto has been seen in H&V clinic early March for a/c chf with iv diuresis.   CHF exacerbation may contribute to an increase in INR as liver may produce less Vit K.   He took the incorrect dose on Tuesdays (took 7.5 mg rather than 10 mg).  Also, Ivis had him take 7.5 mg dose on Friday in anticipation of potential oral surgery, even though it has not yet been scheduled.  Cautioned against self-adjustment of doses. Requested that they contact the clinic after his  DMD appointment tomorrow to let us know when procedure scheduled in order to develop a perioperative plan (with approval from Dr. Hollis).  Discussed the possibility of lovenox for sub therapeutic INR.     DMD office: [place] in [facility]  [phone]   Pt cancelled the procedure in February. We were not aware. It has not yet been rescheduled. Per Grace pt ok to aim for INR < 2.75 day prior to procedure. One tooth extraction.        Plan:   1. INR is therapeutic today at 3.2.  Instructed Mr. Alberto to continue warfarin 10mg oral daily.   2. Patient has been largely noncompliant with repeat INR and self-managing/adjusting his doses-- Repeat INR in 1 week on 3/29 on ; may need to adjust depending upon timing of upcoming procedure  3. Verbal information provided over the phone to Zuleima. Mr. Alberto expresses understanding by teach back and has no further questions at this time.   4. [facility] updated. Procedure to be scheduled tomorrow. Pt cancelled previous procedures. Per Grace pt ok to aim for INR < 2.75 day prior to procedure. One tooth extraction.    Dora Pereira, PharmD  03/22/21   13:54 EDT

## 2021-03-23 ENCOUNTER — TELEPHONE (OUTPATIENT)
Dept: CARDIOLOGY | Facility: CLINIC | Age: 56
End: 2021-03-23

## 2021-03-23 NOTE — PROGRESS NOTES
Zunilda, patient spouse, called back to report dental extraction is rescheduled for this Thursday, 3/25. Spoke with Dora Pereira, PharmD, and instructed Mrs. Alberto to have her  take warfarin 5 mg tonight and 7.5 mg on Wednesday. They will also recheck on  tomorrow per DMD request to ensure INR is <2.7. If it is not, they will call the clinic for further instruction. Also instructed spouse that if DMD is okay with resuming warfarin, to restart 10 mg daily the evening of procedure and check again on Monday (3/29).    Gertrudis Glez, Arsen  3/23/2021  14:06 EDT

## 2021-03-23 NOTE — TELEPHONE ENCOUNTER
Spoke with patient and advised per  that most recent echo is no worse than prior study. PT states well what does he think is causing this? RN stated that wasn't mentioned in his note and to just keep the same current routine as discussed at last office visit

## 2021-04-09 RX ORDER — LISINOPRIL 40 MG/1
TABLET ORAL
Qty: 90 TABLET | Refills: 3 | Status: SHIPPED | OUTPATIENT
Start: 2021-04-09 | End: 2021-07-29 | Stop reason: HOSPADM

## 2021-04-13 ENCOUNTER — ANTICOAGULATION VISIT (OUTPATIENT)
Dept: PHARMACY | Facility: HOSPITAL | Age: 56
End: 2021-04-13

## 2021-04-13 DIAGNOSIS — Z95.2 HX OF AORTIC VALVE REPLACEMENT, MECHANICAL: Primary | ICD-10-CM

## 2021-04-13 LAB — INR PPP: 3.3

## 2021-04-13 NOTE — PROGRESS NOTES
Anticoagulation Clinic - Remote Progress Note  [facility]  Testing frequency: 7 days    Indication: Atrial Fibrillation and Mechanical Aortic Valve; h/o embolic CVA  Referring Provider: Bunny last seen 3/12/21; Next appointment: 6/25/21  Initial Warfarin Start Date: 11/2012  Planned Duration of Therapy: Life  Goal INR: 2.5-3.5  Current Drug Interactions: citalopram  CHADS-VASc: 3 (HTN, h/o CVA)    Vit K Diet: patient eats salad 1-3 times a week, goes through spurts when he tries to eat healthier 3/3/2021  Alcohol: none  Tobacco: none       Anticoagulation Clinic INR History:  Date 4/14 4/20 5/1 5/16 5/27 6/7 6/15 6/22 6/25 7/4 7/31 8/27   Total Weekly Dose 80mg 80mg 90mg 90mg 90mg 75mg 95mg 95mg 107.5  mg ??? 105mg  ?? 65mg  ??   INR 2.3 2.0 3.2 2.9 3.2 4.1 3.1 1.3 2.0 2.7 2.7 1.3   Notes    ED levoflox missed; levoflox levoflox levoflox; refuse enox   self adj self adj; miss; enox     Date 8/31 9/4 9/11 9/27 10/2 10/5 10/15 10/26 11/5 11/7 11/15 12/7   Total Weekly Dose 95mg 100mg 80mg  ??? 70mg 90mg 100mg 100mg 100mg  75mg 105mg 100mg   INR 2.6 5.6 1.6 1.6 2.3 2.4 2.8 4.6 7.9 1.9 5.5; 5.7 2.8   Notes   incr GLV, misdose, refuse enox incorrect doses     recalled test strips 2x hold; clinic clinic      Date 1/11/19 1/24 2/8 2/18 3/4 3/26 4/23 5/7 6/3 6/17 6/24 7/11   Total Weekly Dose 100mg 100mg 100mg 105mg 100mg 100mg 0mg unable to confirm 75mg  ?? 95mg 80mg 95mg   INR 2.4 3.9 2.7 3.6 3.2 4.8 1.14 ED, 1.9  3.5 4.9 2.5 2.3 5.4   Notes   LVM  clinic     doxy start 6/13 1x miss; doxy doxy     Date 7/18 7/22 7/25 8/5 8/12 8/15 8/21 8/26 8/30 9/6 9/12 9/17   Total Weekly Dose 75mg 100mg 100mg  ??? 95mg 95mg 75mg 90mg 70mg 90mg 85mg 85mg 75mg   INR 2.0 2.2 4.4 4.0 6.2 3.0 5.0 2.3 3.9 4.2 4.5 3.3   Notes 2x self held   No GLV VPA restarted 1x hold           Date 10/2 10/10 11/4 11/12 11/22 12/10 1/3/20 1/22 1/30 2/3 2/18 2/27   Total Weekly Dose 80mg 80mg 62.5mg 85mg 82.5mg 70mg 72.5mg 72.5mg 82.5mg 77.5mg  75mg 75mg   INR  3.9 3.6 1.7 3.4 4.7 2.4 3.6 1.3 2.3 3.2 3.1 3.2   Notes   1x miss self adj  self adj  lovenox         Date 4/8 4/29 5/18 6/20  8/3 8/17 10/5 10/19 11/11 12/21    Total Weekly Dose 70mg 70mg 65mg 65mg Non- compliant 65mg 65 mg 65mg 65mg 65mg 65mg Non- compliant   INR 4.3 4.1 4.5 3.0  3.0 3.8 3.0 2.9 4.1 2.3    Notes Self adj; Levaquin decr GLV; COVID+  recv'd 6/22; misdose       incr GLV      Date 1/20/21 1/21 1/25 2/1 2/16 3/3 3/22 4/13       Total WeeklyDose 40mg 45mg? 62.5mg 80mg? 80mg   70mg 67.5mg       INR 1.2 1.2 maia 1.6 2.9 3.7  4.5 3.2 3.3       Notes Self held; augmentin 1x incr dose; lovenox 2x miss,   1x misdose  amox amox  self adj         Phone Interview:  Verbal Release Authorization signed on 11/7/18 -- may speak with Zuleima Alberto (patient's wife)    Tablet Strength: 5mg tablets   Patient Contact: [phone] -- try to call in PM or contact his wife Zuleima at [phone].  His email is [email or web address]    Patient Findings  Negatives:  Signs/symptoms of thrombosis, Signs/symptoms of bleeding, Laboratory test error suspected, Change in health, Change in alcohol use, Change in activity, Upcoming invasive procedure, Emergency department visit, Upcoming dental procedure, Missed doses, Extra doses, Change in medications, Change in diet/appetite, Hospital admission, Bruising, Other complaints   Comments:  Patient reports spouse has been giving him 7.5mg on Tues and 10mg AOD (vs advised 10mg QD at previous encounter). Have updated tracker to reflect. Otherwise denies findings.      Plan:   1. INR is therapeutic today at 3.3. Patient was non-compliant with requested follow up. After consulting with Do Vargas, PharmD, instructed Mr. Alberto to continue warfarin 10mg oral daily except for 7.5mg on Tuesday until recheck  2. Repeat INR in 2 weeks, 4/27. Patient has extensive history of non-compliance with requested follow up.  3. Verbal information provided over the phone. Mitchell  FABIOLA Alberto RBV dosing instructions, expresses understanding by teach back, and has no further questions at this time.    Sam Burleson, NENITA  4/13/2021  14:42 EDT      I, Sonya Bustillos, PharmD, have reviewed the note in full and agree with the assessment and plan.  04/13/21  15:35 EDT

## 2021-04-16 ENCOUNTER — TELEPHONE (OUTPATIENT)
Dept: PHARMACY | Facility: HOSPITAL | Age: 56
End: 2021-04-16

## 2021-04-16 NOTE — TELEPHONE ENCOUNTER
Discussed with patient spouse, Ivis, who wrote down instructions, read back dosing and has no further questions at this time.     She confirmed she has clinic phone number if she has any questions.

## 2021-04-16 NOTE — PROGRESS NOTES
Patient will be having a urology procedure on 4/21/21 with Dr. Homer Silva.    Ok'd by Dr. Hollis on 4/16/21. Patient spouse given following plan:    4/16: hold warfarin  4/17: hold warfarin / lovenox PM only  4/18: hold warfarin / lovenox q12h  4/19: hold warfarin / lovenox q12h  4/20: hold warfarin / lovenox AM only  4/21: procedure / warfarin 15 mg / lovenox PM only  4/22: resume maintenance dose of warfarin / continue lovenox q12h daily  4/26: repeat INR    Discussed with patient and patient spouse today. They have no further questions at this time.     Gertrudis Glez CPhT  4/16/2021  16:22 EDT

## 2021-04-16 NOTE — TELEPHONE ENCOUNTER
Dr. Hollis,    We were recently informed that Mitchell Alberto is undergoing an outpatient urology procedure on 4/21/21 with Dr. Homer Silva. Dr. Silva requested that the patient hold warfarin 5 days prior to procedure and resume warfarin night of procedure.     Mitchell Alberto is a 55 y.o. male on warfarin for atrial fibrillation and mechanical aortic valve; h/o embolic CVA, therefore bridge therapy is recommended.     153 kg as of 3/19/21  Scr: 1.00 as of 3/9/21    4/16/21: Hold warfarin 5 days / Lovenox 150 mg q12h bridge starting PM of 4/17 through AM of 4/20 4/21/21: procedure / resume warfarin [2-3 boosted doses if appropriate] + Lovenox PM dose  4/22/21: continue warfarin and Lovenox q12h    4/26/21: repeat INR on home monitor    Please advise if you are agreeable to plan above or if you prefer an alternative approach to Mitchell Alberto's anticoagulation plan for the upcoming procedure.    Thank you,    Gertrudis Glez, Pharmacy Technician    [facility] Anticoagulation Team  (t) [phone]  (f) [phone]

## 2021-04-26 ENCOUNTER — ANTICOAGULATION VISIT (OUTPATIENT)
Dept: PHARMACY | Facility: HOSPITAL | Age: 56
End: 2021-04-26

## 2021-04-26 DIAGNOSIS — Z95.2 HX OF AORTIC VALVE REPLACEMENT, MECHANICAL: Primary | ICD-10-CM

## 2021-04-26 LAB — INR PPP: 1.8

## 2021-04-26 NOTE — PROGRESS NOTES
Anticoagulation Clinic - Remote Progress Note  [facility]  Testing frequency: 7 days    Indication: Atrial Fibrillation and Mechanical Aortic Valve; h/o embolic CVA  Referring Provider: Bunny last seen 3/12/21; Next appointment: 6/25/21  Initial Warfarin Start Date: 11/2012  Planned Duration of Therapy: Life  Goal INR: 2.5-3.5  Current Drug Interactions: citalopram  CHADS-VASc: 3 (HTN, h/o CVA)    Vit K Diet: patient eats salad 1-3 times a week, goes through spurts when he tries to eat healthier 3/3/2021  Alcohol: none  Tobacco: none       Anticoagulation Clinic INR History:  Date 4/14 4/20 5/1 5/16 5/27 6/7 6/15 6/22 6/25 7/4 7/31 8/27   Total Weekly Dose 80mg 80mg 90mg 90mg 90mg 75mg 95mg 95mg 107.5  mg ??? 105mg  ?? 65mg  ??   INR 2.3 2.0 3.2 2.9 3.2 4.1 3.1 1.3 2.0 2.7 2.7 1.3   Notes    ED levoflox missed; levoflox levoflox levoflox; refuse enox   self adj self adj; miss; enox     Date 8/31 9/4 9/11 9/27 10/2 10/5 10/15 10/26 11/5 11/7 11/15 12/7   Total Weekly Dose 95mg 100mg 80mg  ??? 70mg 90mg 100mg 100mg 100mg  75mg 105mg 100mg   INR 2.6 5.6 1.6 1.6 2.3 2.4 2.8 4.6 7.9 1.9 5.5; 5.7 2.8   Notes   incr GLV, misdose, refuse enox incorrect doses     recalled test strips 2x hold; clinic clinic      Date 1/11/19 1/24 2/8 2/18 3/4 3/26 4/23 5/7 6/3 6/17 6/24 7/11   Total Weekly Dose 100mg 100mg 100mg 105mg 100mg 100mg 0mg unable to confirm 75mg  ?? 95mg 80mg 95mg   INR 2.4 3.9 2.7 3.6 3.2 4.8 1.14 ED, 1.9  3.5 4.9 2.5 2.3 5.4   Notes   LVM  clinic     doxy start 6/13 1x miss; doxy doxy     Date 7/18 7/22 7/25 8/5 8/12 8/15 8/21 8/26 8/30 9/6 9/12 9/17   Total Weekly Dose 75mg 100mg 100mg  ??? 95mg 95mg 75mg 90mg 70mg 90mg 85mg 85mg 75mg   INR 2.0 2.2 4.4 4.0 6.2 3.0 5.0 2.3 3.9 4.2 4.5 3.3   Notes 2x self held   No GLV VPA restarted 1x hold           Date 10/2 10/10 11/4 11/12 11/22 12/10 1/3/20 1/22 1/30 2/3 2/18 2/27   Total Weekly Dose 80mg 80mg 62.5mg 85mg 82.5mg 70mg 72.5mg 72.5mg 82.5mg 77.5mg  75mg 75mg   INR  3.9 3.6 1.7 3.4 4.7 2.4 3.6 1.3 2.3 3.2 3.1 3.2   Notes   1x miss self adj  self adj  lovenox         Date 4/8 4/29 5/18 6/20  8/3 8/17 10/5 10/19 11/11 12/21    Total Weekly Dose 70mg 70mg 65mg 65mg Non- compliant 65mg 65 mg 65mg 65mg 65mg 65mg Non- compliant   INR 4.3 4.1 4.5 3.0  3.0 3.8 3.0 2.9 4.1 2.3    Notes Self adj; Levaquin decr GLV; COVID+  recv'd 6/22; misdose       incr GLV      Date 1/20/21 1/21 1/25 2/1 2/16 3/3 3/22 4/13  4/26     Total WeeklyDose 40mg 45mg? 62.5mg 80mg? 80mg   70mg 67.5mg  47.5mg     INR 1.2 1.2 maia 1.6 2.9 3.7  4.5 3.2 3.3  1.8     Notes Self held; augmentin 1x incr dose; lovenox 2x miss,   1x misdose  amox amox  self adj 5x hold, enox enox/norco, augmentin       Phone Interview:  Verbal Release Authorization signed on 11/7/18 -- may speak with Zuleima Alberto (patient's wife)    Tablet Strength: 5mg tablets   Patient Contact: [phone] -- try to call in PM or contact his wife Zuleima at [phone].  His email is [email or web address]    Patient Findings    Positives:  Missed doses, Extra doses, Change in medications   Negatives:  Signs/symptoms of thrombosis, Signs/symptoms of bleeding, Laboratory test error suspected, Change in health, Change in alcohol use, Change in activity, Upcoming invasive procedure, Emergency department visit, Upcoming dental procedure, Change in diet/appetite, Hospital admission, Bruising, Other complaints   Comments:  Per Zuleima, patient refused AM lovenox and has only taken qd vs BID. Also did not boost warfarin dose to 15mg as directed when resuming warfarin- took 7.5mg   Has swelling and pain from procedure- was discharged with #12 norco 7.5mg/325mg. Has taken all doses   Also taking amox/clav 875/125 bid , has 7 days remaining         4/16/21: Hold warfarin 5 days / Lovenox 150 mg q12h bridge starting PM of 4/17 through AM of 4/20 4/21/21: procedure / resume warfarin [2-3 boosted doses if appropriate] + Lovenox PM  dose  4/22/21: continue warfarin and Lovenox q12h    4/26/21: repeat INR on home monitor       Plan:   1. INR is subtherapeutic today at 1.8 following held doses for procedure. Instructed Mr. Alberto to continue lovenox x2 more days and take warfarin 10mg oral daily until recheck  2. Repeat INR in 4/29. Patient has extensive history of non-compliance with requested follow up.  3. Verbal information provided over the phone. Mitchell Alberto RBV dosing instructions, expresses understanding by teach back, and has no further questions at this time.    Sonya Bustillos, GiovaniD.  04/26/21   13:51 EDT

## 2021-05-14 ENCOUNTER — ANTICOAGULATION VISIT (OUTPATIENT)
Dept: PHARMACY | Facility: HOSPITAL | Age: 56
End: 2021-05-14

## 2021-05-14 DIAGNOSIS — Z95.2 HX OF AORTIC VALVE REPLACEMENT, MECHANICAL: Primary | ICD-10-CM

## 2021-05-24 ENCOUNTER — ANTICOAGULATION VISIT (OUTPATIENT)
Dept: PHARMACY | Facility: HOSPITAL | Age: 56
End: 2021-05-24

## 2021-05-24 DIAGNOSIS — Z95.2 HX OF AORTIC VALVE REPLACEMENT, MECHANICAL: Primary | ICD-10-CM

## 2021-05-24 LAB — INR PPP: 3.1

## 2021-05-24 NOTE — PROGRESS NOTES
Anticoagulation Clinic - Remote Progress Note  [facility]  Testing frequency: 7 days    Indication: Atrial Fibrillation and Mechanical Aortic Valve; h/o embolic CVA  Referring Provider: Bunny last seen 3/12/21; Next appointment: 6/25/21  Initial Warfarin Start Date: 11/2012  Planned Duration of Therapy: Life  Goal INR: 2.5-3.5  Current Drug Interactions: citalopram  CHADS-VASc: 3 (HTN, h/o CVA)    Vit K Diet: patient eats salad 1-3 times a week, goes through spurts when he tries to eat healthier 3/3/2021  Alcohol: none  Tobacco: none       Anticoagulation Clinic INR History:  Date 4/14 4/20 5/1 5/16 5/27 6/7 6/15 6/22 6/25 7/4 7/31 8/27   Total Weekly Dose 80mg 80mg 90mg 90mg 90mg 75mg 95mg 95mg 107.5  mg ??? 105mg  ?? 65mg  ??   INR 2.3 2.0 3.2 2.9 3.2 4.1 3.1 1.3 2.0 2.7 2.7 1.3   Notes    ED levoflox missed; levoflox levoflox levoflox; refuse enox   self adj self adj; miss; enox     Date 8/31 9/4 9/11 9/27 10/2 10/5 10/15 10/26 11/5 11/7 11/15 12/7   Total Weekly Dose 95mg 100mg 80mg  ??? 70mg 90mg 100mg 100mg 100mg  75mg 105mg 100mg   INR 2.6 5.6 1.6 1.6 2.3 2.4 2.8 4.6 7.9 1.9 5.5; 5.7 2.8   Notes   incr GLV, misdose, refuse enox incorrect doses     recalled test strips 2x hold; clinic clinic      Date 1/11/19 1/24 2/8 2/18 3/4 3/26 4/23 5/7 6/3 6/17 6/24 7/11   Total Weekly Dose 100mg 100mg 100mg 105mg 100mg 100mg 0mg unable to confirm 75mg  ?? 95mg 80mg 95mg   INR 2.4 3.9 2.7 3.6 3.2 4.8 1.14 ED, 1.9  3.5 4.9 2.5 2.3 5.4   Notes   LVM  clinic     doxy start 6/13 1x miss; doxy doxy     Date 7/18 7/22 7/25 8/5 8/12 8/15 8/21 8/26 8/30 9/6 9/12 9/17   Total Weekly Dose 75mg 100mg 100mg  ??? 95mg 95mg 75mg 90mg 70mg 90mg 85mg 85mg 75mg   INR 2.0 2.2 4.4 4.0 6.2 3.0 5.0 2.3 3.9 4.2 4.5 3.3   Notes 2x self held   No GLV VPA restarted 1x hold           Date 10/2 10/10 11/4 11/12 11/22 12/10 1/3/20 1/22 1/30 2/3 2/18 2/27   Total Weekly Dose 80mg 80mg 62.5mg 85mg 82.5mg 70mg 72.5mg 72.5mg 82.5mg 77.5mg  75mg 75mg   INR  3.9 3.6 1.7 3.4 4.7 2.4 3.6 1.3 2.3 3.2 3.1 3.2   Notes   1x miss self adj  self adj  lovenox         Date 4/8 4/29 5/18 6/20  8/3 8/17 10/5 10/19 11/11 12/21    Total Weekly Dose 70mg 70mg 65mg 65mg Non- compliant 65mg 65 mg 65mg 65mg 65mg 65mg Non- compliant   INR 4.3 4.1 4.5 3.0  3.0 3.8 3.0 2.9 4.1 2.3    Notes Self adj; Levaquin decr GLV; COVID+  recv'd 6/22; misdose       incr GLV      Date 1/20/21 1/21 1/25 2/1 2/16 3/3 3/22 4/13  4/26 5/14 5/21   Total WeeklyDose 40mg 45mg? 62.5mg 80mg? 80mg   70mg 67.5mg  47.5mg  67.5 mg   INR 1.2 1.2 maia 1.6 2.9 3.7  4.5 3.2 3.3  1.8 4.5 3.1   Notes Self held; augmentin 1x incr dose; lovenox 2x miss,   1x misdose  amox amox  self adj 5x hold, enox enox/norco, augmentin       Phone Interview:  Verbal Release Authorization signed on 11/7/18 -- may speak with Zuleima Alberto (patient's wife)    Tablet Strength: 5mg tablets   Patient Contact: [phone] -- try to call in PM or contact his wife Zuleima at [phone].  His email is [email or web address]    Patient Findings  Negatives:  Signs/symptoms of thrombosis, Signs/symptoms of bleeding, Laboratory test error suspected, Change in health, Change in alcohol use, Change in activity, Upcoming invasive procedure, Emergency department visit, Upcoming dental procedure, Missed doses, Extra doses, Change in medications, Change in diet/appetite, Hospital admission, Bruising, Other complaints   Comments:  Above findings negative.  They deny any changes.     Plan:     1. INR is therapeutic today at 3.1. Instructed Mrs. Alberto to have her  continue warfarin 10mg oral daily except 7.5mg on Tues until recheck  2. Repeat INR on 6/7. Patient has extensive history of non-compliance with requested follow up.  3. Verbal information provided over the phone. Mitchell Alberto RBV dosing instructions, expresses understanding by teach back, and has no further questions at this time.    Dora Pereira,  PharmD  5/24/2021  16:14 EDT

## 2021-06-17 DIAGNOSIS — Z95.2 HX OF AORTIC VALVE REPLACEMENT, MECHANICAL: ICD-10-CM

## 2021-06-17 RX ORDER — WARFARIN SODIUM 5 MG/1
TABLET ORAL
Qty: 100 TABLET | Refills: 0 | Status: SHIPPED | OUTPATIENT
Start: 2021-06-17 | End: 2021-07-29 | Stop reason: HOSPADM

## 2021-06-28 ENCOUNTER — ANTICOAGULATION VISIT (OUTPATIENT)
Dept: PHARMACY | Facility: HOSPITAL | Age: 56
End: 2021-06-28

## 2021-06-28 DIAGNOSIS — Z95.2 HX OF AORTIC VALVE REPLACEMENT, MECHANICAL: Primary | ICD-10-CM

## 2021-06-28 LAB — INR PPP: 3.9

## 2021-06-28 NOTE — PROGRESS NOTES
Anticoagulation Clinic - Remote Progress Note  [facility]  Testing frequency: 7 days    Indication: Atrial Fibrillation and Mechanical Aortic Valve; h/o embolic CVA  Referring Provider: Bunny last seen 3/12/21; Next appointment: 6/25/21  Initial Warfarin Start Date: 11/2012  Planned Duration of Therapy: Life  Goal INR: 2.5-3.5  Current Drug Interactions: citalopram  CHADS-VASc: 3 (HTN, h/o CVA)    Vit K Diet: patient eats salad 1-3 times a week, goes through spurts when he tries to eat healthier 3/3/2021  Alcohol: none  Tobacco: none       Anticoagulation Clinic INR History:  Date 4/14 4/20 5/1 5/16 5/27 6/7 6/15 6/22 6/25 7/4 7/31 8/27   Total Weekly Dose 80mg 80mg 90mg 90mg 90mg 75mg 95mg 95mg 107.5  mg ??? 105mg  ?? 65mg  ??   INR 2.3 2.0 3.2 2.9 3.2 4.1 3.1 1.3 2.0 2.7 2.7 1.3   Notes    ED levoflox missed; levoflox levoflox levoflox; refuse enox   self adj self adj; miss; enox     Date 8/31 9/4 9/11 9/27 10/2 10/5 10/15 10/26 11/5 11/7 11/15 12/7   Total Weekly Dose 95mg 100mg 80mg  ??? 70mg 90mg 100mg 100mg 100mg  75mg 105mg 100mg   INR 2.6 5.6 1.6 1.6 2.3 2.4 2.8 4.6 7.9 1.9 5.5; 5.7 2.8   Notes   incr GLV, misdose, refuse enox incorrect doses     recalled test strips 2x hold; clinic clinic      Date 1/11/19 1/24 2/8 2/18 3/4 3/26 4/23 5/7 6/3 6/17 6/24 7/11   Total Weekly Dose 100mg 100mg 100mg 105mg 100mg 100mg 0mg unable to confirm 75mg  ?? 95mg 80mg 95mg   INR 2.4 3.9 2.7 3.6 3.2 4.8 1.14 ED, 1.9  3.5 4.9 2.5 2.3 5.4   Notes   LVM  clinic     doxy start 6/13 1x miss; doxy doxy     Date 7/18 7/22 7/25 8/5 8/12 8/15 8/21 8/26 8/30 9/6 9/12 9/17   Total Weekly Dose 75mg 100mg 100mg  ??? 95mg 95mg 75mg 90mg 70mg 90mg 85mg 85mg 75mg   INR 2.0 2.2 4.4 4.0 6.2 3.0 5.0 2.3 3.9 4.2 4.5 3.3   Notes 2x self held   No GLV VPA restarted 1x hold           Date 10/2 10/10 11/4 11/12 11/22 12/10 1/3/20 1/22 1/30 2/3 2/18 2/27   Total Weekly Dose 80mg 80mg 62.5mg 85mg 82.5mg 70mg 72.5mg 72.5mg 82.5mg 77.5mg  75mg 75mg   INR  3.9 3.6 1.7 3.4 4.7 2.4 3.6 1.3 2.3 3.2 3.1 3.2   Notes   1x miss self adj  self adj  lovenox         Date 4/8 4/29 5/18 6/20  8/3 8/17 10/5 10/19 11/11 12/21    Total Weekly Dose 70mg 70mg 65mg 65mg Non- compliant 65mg 65 mg 65mg 65mg 65mg 65mg Non- compliant   INR 4.3 4.1 4.5 3.0  3.0 3.8 3.0 2.9 4.1 2.3    Notes Self adj; Levaquin decr GLV; COVID+  recv'd 6/22; misdose       incr GLV      Date 1/20/21 1/21 1/25 2/1 2/16 3/3 3/22 4/13  4/26 5/14 5/21   Total WeeklyDose 40mg 45mg? 62.5mg 80mg? 80mg   70mg 67.5mg  47.5mg  67.5 mg   INR 1.2 1.2 maia 1.6 2.9 3.7  4.5 3.2 3.3  1.8 4.5 3.1   Notes Self held; augmentin 1x incr dose; lovenox 2x miss,   1x misdose  amox amox  self adj 5x hold, enox enox/norco, augmentin       Date 6/28              Total WeeklyDose 67.5 mg 65mg             INR 3.9              Notes                 Phone Interview:  Verbal Release Authorization signed on 11/7/18 -- may speak with Zuleima Alberto (patient's wife)    Tablet Strength: 5mg tablets   Patient Contact: [phone] -- try to call in PM or contact his wife Zuleima at [phone].  His email is [email or web address]    Patient Findings  Negatives:  Signs/symptoms of thrombosis, Signs/symptoms of bleeding, Laboratory test error suspected, Change in health, Change in alcohol use, Change in activity, Upcoming invasive procedure, Emergency department visit, Upcoming dental procedure, Missed doses, Extra doses, Change in medications, Change in diet/appetite, Hospital admission, Bruising, Other complaints   Comments:  Patient generally avoids GLV. Patient's wife Ivis denies all findings.     Plan:     1. INR is SUPRAtherapeutic today at 3.9. Instructed Mrs. Alberto to have her  eat a single serving of GLV (~1/2 cup of broccoli) tonight, then take warfarin 10mg oral daily except 7.5mg on TuesFri until recheck.   2. Repeat INR on 7/6. Patient has extensive history of non-compliance with requested follow up.  Ivis wrote down the recheck date on her calendar.  3. Verbal information provided over the phone. Mitchell Alberto RBV dosing instructions, expresses understanding by teach back, and has no further questions at this time.    Valerie Guerra, PharmD.  Pharmacy Resident  6/29/2021 16:02 EDT

## 2021-06-30 ENCOUNTER — APPOINTMENT (OUTPATIENT)
Dept: GENERAL RADIOLOGY | Facility: HOSPITAL | Age: 56
End: 2021-06-30

## 2021-06-30 ENCOUNTER — APPOINTMENT (OUTPATIENT)
Dept: CT IMAGING | Facility: HOSPITAL | Age: 56
End: 2021-06-30

## 2021-06-30 ENCOUNTER — HOSPITAL ENCOUNTER (INPATIENT)
Facility: HOSPITAL | Age: 56
LOS: 29 days | Discharge: NURSING, REHAB OR LONG-TERM CARE | End: 2021-07-29
Attending: EMERGENCY MEDICINE | Admitting: INTERNAL MEDICINE

## 2021-06-30 DIAGNOSIS — I46.9 CARDIAC ARREST WITH VENTRICULAR FIBRILLATION (HCC): Primary | ICD-10-CM

## 2021-06-30 DIAGNOSIS — I21.09 ACUTE ST ELEVATION MYOCARDIAL INFARCTION (STEMI) OF ANTEROSEPTAL WALL (HCC): ICD-10-CM

## 2021-06-30 DIAGNOSIS — I49.01 CARDIAC ARREST WITH VENTRICULAR FIBRILLATION (HCC): Primary | ICD-10-CM

## 2021-06-30 DIAGNOSIS — I46.9 CARDIAC ARREST (HCC): ICD-10-CM

## 2021-06-30 DIAGNOSIS — J96.01 ACUTE RESPIRATORY FAILURE WITH HYPOXIA (HCC): ICD-10-CM

## 2021-06-30 DIAGNOSIS — I44.7 LBBB (LEFT BUNDLE BRANCH BLOCK): ICD-10-CM

## 2021-06-30 DIAGNOSIS — Z86.79 HISTORY OF CORONARY ARTERY DISEASE: ICD-10-CM

## 2021-06-30 PROBLEM — Z79.01 CHRONIC ANTICOAGULATION: Status: ACTIVE | Noted: 2021-06-30

## 2021-06-30 PROBLEM — I21.3 STEMI (ST ELEVATION MYOCARDIAL INFARCTION) (HCC): Status: ACTIVE | Noted: 2021-06-30

## 2021-06-30 PROBLEM — G93.40 ACUTE ENCEPHALOPATHY: Status: ACTIVE | Noted: 2021-06-30

## 2021-06-30 PROBLEM — I21.3 STEMI (ST ELEVATION MYOCARDIAL INFARCTION) (HCC): Status: RESOLVED | Noted: 2021-06-30 | Resolved: 2021-06-30

## 2021-06-30 PROBLEM — N17.9 AKI (ACUTE KIDNEY INJURY): Status: ACTIVE | Noted: 2021-06-30

## 2021-06-30 PROBLEM — R74.01 ELEVATED TRANSAMINASE LEVEL: Status: ACTIVE | Noted: 2021-06-30

## 2021-06-30 LAB
ALBUMIN SERPL-MCNC: 4.2 G/DL (ref 3.5–5.2)
ALBUMIN/GLOB SERPL: 1.7 G/DL
ALP SERPL-CCNC: 147 U/L (ref 39–117)
ALT SERPL W P-5'-P-CCNC: 80 U/L (ref 1–41)
AMPHET+METHAMPHET UR QL: NEGATIVE
AMPHETAMINES UR QL: NEGATIVE
ANION GAP SERPL CALCULATED.3IONS-SCNC: 15 MMOL/L (ref 5–15)
APTT PPP: 38.8 SECONDS (ref 22–39)
ARTERIAL PATENCY WRIST A: ABNORMAL
AST SERPL-CCNC: 103 U/L (ref 1–40)
ATMOSPHERIC PRESS: ABNORMAL MM[HG]
BARBITURATES UR QL SCN: NEGATIVE
BASE EXCESS BLDA CALC-SCNC: -5 MMOL/L (ref -5–5)
BASE EXCESS BLDA CALC-SCNC: -5.2 MMOL/L (ref 0–2)
BASOPHILS # BLD AUTO: 0.06 10*3/MM3 (ref 0–0.2)
BASOPHILS NFR BLD AUTO: 0.4 % (ref 0–1.5)
BDY SITE: ABNORMAL
BENZODIAZ UR QL SCN: NEGATIVE
BILIRUB SERPL-MCNC: 0.5 MG/DL (ref 0–1.2)
BODY TEMPERATURE: 37 C
BUN SERPL-MCNC: 25 MG/DL (ref 6–20)
BUN/CREAT SERPL: 19.4 (ref 7–25)
BUPRENORPHINE SERPL-MCNC: NEGATIVE NG/ML
CA-I BLDA-SCNC: 1.21 MMOL/L (ref 1.2–1.32)
CALCIUM SPEC-SCNC: 8.9 MG/DL (ref 8.6–10.5)
CANNABINOIDS SERPL QL: POSITIVE
CHLORIDE SERPL-SCNC: 103 MMOL/L (ref 98–107)
CK MB SERPL-CCNC: 3.64 NG/ML
CK SERPL-CCNC: 145 U/L (ref 20–200)
CO2 BLDA-SCNC: 24.7 MMOL/L (ref 22–33)
CO2 BLDA-SCNC: 26 MMOL/L (ref 24–29)
CO2 SERPL-SCNC: 23 MMOL/L (ref 22–29)
COCAINE UR QL: NEGATIVE
COHGB MFR BLD: 0.8 % (ref 0–2)
CREAT BLDA-MCNC: 1.4 MG/DL (ref 0.6–1.3)
CREAT SERPL-MCNC: 1.29 MG/DL (ref 0.76–1.27)
DEPRECATED RDW RBC AUTO: 52.7 FL (ref 37–54)
EOSINOPHIL # BLD AUTO: 0.17 10*3/MM3 (ref 0–0.4)
EOSINOPHIL NFR BLD AUTO: 1.2 % (ref 0.3–6.2)
EPAP: 0
ERYTHROCYTE [DISTWIDTH] IN BLOOD BY AUTOMATED COUNT: 15.9 % (ref 12.3–15.4)
GFR SERPL CREATININE-BSD FRML MDRD: 58 ML/MIN/1.73
GLOBULIN UR ELPH-MCNC: 2.5 GM/DL
GLUCOSE BLDC GLUCOMTR-MCNC: 143 MG/DL (ref 70–130)
GLUCOSE BLDC GLUCOMTR-MCNC: 202 MG/DL (ref 70–130)
GLUCOSE SERPL-MCNC: 215 MG/DL (ref 65–99)
HCO3 BLDA-SCNC: 23 MMOL/L (ref 20–26)
HCO3 BLDA-SCNC: 24 MMOL/L (ref 22–26)
HCT VFR BLD AUTO: 44.1 % (ref 37.5–51)
HCT VFR BLD CALC: 41.8 %
HCT VFR BLDA CALC: 44 % (ref 38–51)
HGB BLD-MCNC: 13.2 G/DL (ref 13–17.7)
HGB BLDA-MCNC: 13.6 G/DL (ref 13.5–17.5)
HGB BLDA-MCNC: 15 G/DL (ref 12–17)
IMM GRANULOCYTES # BLD AUTO: 0.77 10*3/MM3 (ref 0–0.05)
IMM GRANULOCYTES NFR BLD AUTO: 5.3 % (ref 0–0.5)
INHALED O2 CONCENTRATION: 100 %
INR PPP: 2.1 (ref 0.8–1.2)
INR PPP: 2.46 (ref 0.85–1.16)
IPAP: 0
LYMPHOCYTES # BLD AUTO: 4.25 10*3/MM3 (ref 0.7–3.1)
LYMPHOCYTES NFR BLD AUTO: 29.4 % (ref 19.6–45.3)
MAGNESIUM SERPL-MCNC: 2.4 MG/DL (ref 1.6–2.6)
MCH RBC QN AUTO: 27.2 PG (ref 26.6–33)
MCHC RBC AUTO-ENTMCNC: 29.9 G/DL (ref 31.5–35.7)
MCV RBC AUTO: 90.9 FL (ref 79–97)
METHADONE UR QL SCN: NEGATIVE
METHGB BLD QL: 0.4 % (ref 0–1.5)
MODALITY: ABNORMAL
MONOCYTES # BLD AUTO: 0.81 10*3/MM3 (ref 0.1–0.9)
MONOCYTES NFR BLD AUTO: 5.6 % (ref 5–12)
NEUTROPHILS NFR BLD AUTO: 58.1 % (ref 42.7–76)
NEUTROPHILS NFR BLD AUTO: 8.41 10*3/MM3 (ref 1.7–7)
NOTE: ABNORMAL
NRBC BLD AUTO-RTO: 0.2 /100 WBC (ref 0–0.2)
NT-PROBNP SERPL-MCNC: 2285 PG/ML (ref 0–900)
OPIATES UR QL: NEGATIVE
OXYCODONE UR QL SCN: POSITIVE
OXYHGB MFR BLDV: 94.8 % (ref 94–99)
PAW @ PEAK INSP FLOW SETTING VENT: 0 CMH2O
PCO2 BLDA: 55.3 MM HG (ref 35–45)
PCO2 BLDA: 70.4 MM HG (ref 35–45)
PCO2 TEMP ADJ BLD: 55.3 MM HG (ref 35–48)
PCP UR QL SCN: NEGATIVE
PEEP RESPIRATORY: 5 CM[H2O]
PH BLDA: 7.14 PH UNITS (ref 7.35–7.6)
PH BLDA: 7.23 PH UNITS (ref 7.35–7.45)
PH, TEMP CORRECTED: 7.23 PH UNITS
PHOSPHATE SERPL-MCNC: 5.7 MG/DL (ref 2.5–4.5)
PLATELET # BLD AUTO: 210 10*3/MM3 (ref 140–450)
PMV BLD AUTO: 11.8 FL (ref 6–12)
PO2 BLDA: 36 MMHG (ref 80–105)
PO2 BLDA: 99.6 MM HG (ref 83–108)
PO2 TEMP ADJ BLD: 99.6 MM HG (ref 83–108)
POTASSIUM BLDA-SCNC: 4 MMOL/L (ref 3.5–4.9)
POTASSIUM SERPL-SCNC: 4 MMOL/L (ref 3.5–5.2)
PROCALCITONIN SERPL-MCNC: 0.08 NG/ML (ref 0–0.25)
PROPOXYPH UR QL: NEGATIVE
PROT SERPL-MCNC: 6.7 G/DL (ref 6–8.5)
PROTHROMBIN TIME: 24.6 SECONDS (ref 12.8–15.2)
PROTHROMBIN TIME: 25.7 SECONDS (ref 11.4–14.4)
QT INTERVAL: 392 MS
QTC INTERVAL: 540 MS
RBC # BLD AUTO: 4.85 10*6/MM3 (ref 4.14–5.8)
SAO2 % BLDA: 51 % (ref 95–98)
SARS-COV-2 RDRP RESP QL NAA+PROBE: NORMAL
SODIUM BLD-SCNC: 142 MMOL/L (ref 138–146)
SODIUM SERPL-SCNC: 141 MMOL/L (ref 136–145)
TOTAL RATE: 18 BREATHS/MINUTE
TRICYCLICS UR QL SCN: NEGATIVE
TROPONIN T SERPL-MCNC: <0.01 NG/ML (ref 0–0.03)
VENTILATOR MODE: ABNORMAL
WBC # BLD AUTO: 14.47 10*3/MM3 (ref 3.4–10.8)

## 2021-06-30 PROCEDURE — 84100 ASSAY OF PHOSPHORUS: CPT | Performed by: INTERNAL MEDICINE

## 2021-06-30 PROCEDURE — 94002 VENT MGMT INPAT INIT DAY: CPT

## 2021-06-30 PROCEDURE — 83880 ASSAY OF NATRIURETIC PEPTIDE: CPT | Performed by: EMERGENCY MEDICINE

## 2021-06-30 PROCEDURE — 82805 BLOOD GASES W/O2 SATURATION: CPT

## 2021-06-30 PROCEDURE — 94799 UNLISTED PULMONARY SVC/PX: CPT

## 2021-06-30 PROCEDURE — 84295 ASSAY OF SERUM SODIUM: CPT

## 2021-06-30 PROCEDURE — 31500 INSERT EMERGENCY AIRWAY: CPT

## 2021-06-30 PROCEDURE — P9612 CATHETERIZE FOR URINE SPEC: HCPCS

## 2021-06-30 PROCEDURE — 36600 WITHDRAWAL OF ARTERIAL BLOOD: CPT

## 2021-06-30 PROCEDURE — 83735 ASSAY OF MAGNESIUM: CPT | Performed by: INTERNAL MEDICINE

## 2021-06-30 PROCEDURE — 99291 CRITICAL CARE FIRST HOUR: CPT

## 2021-06-30 PROCEDURE — 84132 ASSAY OF SERUM POTASSIUM: CPT

## 2021-06-30 PROCEDURE — 85730 THROMBOPLASTIN TIME PARTIAL: CPT | Performed by: EMERGENCY MEDICINE

## 2021-06-30 PROCEDURE — 80053 COMPREHEN METABOLIC PANEL: CPT | Performed by: EMERGENCY MEDICINE

## 2021-06-30 PROCEDURE — 87635 SARS-COV-2 COVID-19 AMP PRB: CPT | Performed by: EMERGENCY MEDICINE

## 2021-06-30 PROCEDURE — 71275 CT ANGIOGRAPHY CHEST: CPT

## 2021-06-30 PROCEDURE — 82550 ASSAY OF CK (CPK): CPT | Performed by: NURSE PRACTITIONER

## 2021-06-30 PROCEDURE — 82947 ASSAY GLUCOSE BLOOD QUANT: CPT

## 2021-06-30 PROCEDURE — 0BH17EZ INSERTION OF ENDOTRACHEAL AIRWAY INTO TRACHEA, VIA NATURAL OR ARTIFICIAL OPENING: ICD-10-PCS | Performed by: EMERGENCY MEDICINE

## 2021-06-30 PROCEDURE — 25010000002 FENTANYL CITRATE (PF) 2500 MCG/50ML SOLUTION: Performed by: NURSE PRACTITIONER

## 2021-06-30 PROCEDURE — 25010000002 PROPOFOL 1000 MG/ML EMULSION: Performed by: NURSE PRACTITIONER

## 2021-06-30 PROCEDURE — 82803 BLOOD GASES ANY COMBINATION: CPT

## 2021-06-30 PROCEDURE — 5A1955Z RESPIRATORY VENTILATION, GREATER THAN 96 CONSECUTIVE HOURS: ICD-10-PCS | Performed by: EMERGENCY MEDICINE

## 2021-06-30 PROCEDURE — 85610 PROTHROMBIN TIME: CPT

## 2021-06-30 PROCEDURE — 0 IOPAMIDOL PER 1 ML: Performed by: INTERNAL MEDICINE

## 2021-06-30 PROCEDURE — 70450 CT HEAD/BRAIN W/O DYE: CPT

## 2021-06-30 PROCEDURE — 99291 CRITICAL CARE FIRST HOUR: CPT | Performed by: INTERNAL MEDICINE

## 2021-06-30 PROCEDURE — 71045 X-RAY EXAM CHEST 1 VIEW: CPT

## 2021-06-30 PROCEDURE — 82553 CREATINE MB FRACTION: CPT | Performed by: NURSE PRACTITIONER

## 2021-06-30 PROCEDURE — 82565 ASSAY OF CREATININE: CPT

## 2021-06-30 PROCEDURE — 83050 HGB METHEMOGLOBIN QUAN: CPT

## 2021-06-30 PROCEDURE — 84484 ASSAY OF TROPONIN QUANT: CPT | Performed by: EMERGENCY MEDICINE

## 2021-06-30 PROCEDURE — 93005 ELECTROCARDIOGRAM TRACING: CPT | Performed by: EMERGENCY MEDICINE

## 2021-06-30 PROCEDURE — 85014 HEMATOCRIT: CPT

## 2021-06-30 PROCEDURE — 84145 PROCALCITONIN (PCT): CPT | Performed by: INTERNAL MEDICINE

## 2021-06-30 PROCEDURE — 82375 ASSAY CARBOXYHB QUANT: CPT

## 2021-06-30 PROCEDURE — 25010000002 AMIODARONE IN DEXTROSE 5% 360-4.14 MG/200ML-% SOLUTION: Performed by: EMERGENCY MEDICINE

## 2021-06-30 PROCEDURE — 85025 COMPLETE CBC W/AUTO DIFF WBC: CPT | Performed by: EMERGENCY MEDICINE

## 2021-06-30 PROCEDURE — 80306 DRUG TEST PRSMV INSTRMNT: CPT | Performed by: NURSE PRACTITIONER

## 2021-06-30 PROCEDURE — 82330 ASSAY OF CALCIUM: CPT

## 2021-06-30 PROCEDURE — 85610 PROTHROMBIN TIME: CPT | Performed by: EMERGENCY MEDICINE

## 2021-06-30 RX ORDER — VECURONIUM BROMIDE FOR INJECTION 1 MG/ML
10 INJECTION, POWDER, LYOPHILIZED, FOR SOLUTION INTRAVENOUS
Status: DISCONTINUED | OUTPATIENT
Start: 2021-06-30 | End: 2021-07-02

## 2021-06-30 RX ORDER — SODIUM CHLORIDE 9 MG/ML
125 INJECTION, SOLUTION INTRAVENOUS CONTINUOUS
Status: DISCONTINUED | OUTPATIENT
Start: 2021-06-30 | End: 2021-07-03

## 2021-06-30 RX ORDER — BUPIVACAINE HCL/0.9 % NACL/PF 0.25 %
.02-.3 PLASTIC BAG, INJECTION (ML) EPIDURAL CONTINUOUS PRN
Status: DISCONTINUED | OUTPATIENT
Start: 2021-06-30 | End: 2021-07-05

## 2021-06-30 RX ORDER — ATORVASTATIN CALCIUM 40 MG/1
40 TABLET, FILM COATED ORAL NIGHTLY
Status: DISCONTINUED | OUTPATIENT
Start: 2021-06-30 | End: 2021-07-01

## 2021-06-30 RX ORDER — SODIUM CHLORIDE 0.9 % (FLUSH) 0.9 %
10 SYRINGE (ML) INJECTION AS NEEDED
Status: DISCONTINUED | OUTPATIENT
Start: 2021-06-30 | End: 2021-07-29 | Stop reason: HOSPADM

## 2021-06-30 RX ORDER — ROCURONIUM BROMIDE 10 MG/ML
INJECTION, SOLUTION INTRAVENOUS
Status: COMPLETED | OUTPATIENT
Start: 2021-06-30 | End: 2021-06-30

## 2021-06-30 RX ORDER — PANTOPRAZOLE SODIUM 40 MG/10ML
40 INJECTION, POWDER, LYOPHILIZED, FOR SOLUTION INTRAVENOUS
Status: DISCONTINUED | OUTPATIENT
Start: 2021-07-01 | End: 2021-07-20

## 2021-06-30 RX ORDER — BUPROPION HYDROCHLORIDE 100 MG/1
100 TABLET, EXTENDED RELEASE ORAL 2 TIMES DAILY
Status: DISCONTINUED | OUTPATIENT
Start: 2021-06-30 | End: 2021-07-01

## 2021-06-30 RX ORDER — POTASSIUM CHLORIDE 29.8 MG/ML
20 INJECTION INTRAVENOUS
Status: ACTIVE | OUTPATIENT
Start: 2021-06-30 | End: 2021-07-01

## 2021-06-30 RX ORDER — ACETAMINOPHEN 160 MG/5ML
650 SOLUTION ORAL EVERY 6 HOURS
Status: DISCONTINUED | OUTPATIENT
Start: 2021-06-30 | End: 2021-07-02

## 2021-06-30 RX ORDER — MIDAZOLAM IN NACL,ISO-OSMOT/PF 50 MG/50ML
INFUSION BOTTLE (ML) INTRAVENOUS
Status: COMPLETED | OUTPATIENT
Start: 2021-06-30 | End: 2021-06-30

## 2021-06-30 RX ORDER — ASPIRIN 81 MG/1
81 TABLET, CHEWABLE ORAL DAILY
Status: DISCONTINUED | OUTPATIENT
Start: 2021-07-01 | End: 2021-07-01

## 2021-06-30 RX ORDER — ETOMIDATE 2 MG/ML
INJECTION INTRAVENOUS
Status: COMPLETED | OUTPATIENT
Start: 2021-06-30 | End: 2021-06-30

## 2021-06-30 RX ORDER — BUSPIRONE HYDROCHLORIDE 10 MG/1
30 TABLET ORAL EVERY 8 HOURS SCHEDULED
Status: DISCONTINUED | OUTPATIENT
Start: 2021-06-30 | End: 2021-07-02

## 2021-06-30 RX ADMIN — FENTANYL CITRATE 50 MCG/HR: 0.05 INJECTION, SOLUTION INTRAMUSCULAR; INTRAVENOUS at 22:21

## 2021-06-30 RX ADMIN — Medication 5 MG/HR: at 20:07

## 2021-06-30 RX ADMIN — IOPAMIDOL 82 ML: 755 INJECTION, SOLUTION INTRAVENOUS at 21:18

## 2021-06-30 RX ADMIN — PROPOFOL 5 MCG/KG/MIN: 10 INJECTION, EMULSION INTRAVENOUS at 21:27

## 2021-06-30 RX ADMIN — SODIUM CHLORIDE 125 ML/HR: 9 INJECTION, SOLUTION INTRAVENOUS at 21:47

## 2021-06-30 RX ADMIN — ROCURONIUM BROMIDE 50 MG: 10 INJECTION INTRAVENOUS at 19:56

## 2021-06-30 RX ADMIN — AMIODARONE HYDROCHLORIDE 0.5 MG/MIN: 1.8 INJECTION, SOLUTION INTRAVENOUS at 20:06

## 2021-06-30 RX ADMIN — MINERAL OIL AND PETROLATUM: 150; 830 OINTMENT OPHTHALMIC at 22:16

## 2021-06-30 RX ADMIN — ETOMIDATE 20 MG: 2 INJECTION, SOLUTION INTRAVENOUS at 19:55

## 2021-07-01 ENCOUNTER — APPOINTMENT (OUTPATIENT)
Dept: NEUROLOGY | Facility: HOSPITAL | Age: 56
End: 2021-07-01

## 2021-07-01 ENCOUNTER — APPOINTMENT (OUTPATIENT)
Dept: GENERAL RADIOLOGY | Facility: HOSPITAL | Age: 56
End: 2021-07-01

## 2021-07-01 ENCOUNTER — APPOINTMENT (OUTPATIENT)
Dept: CARDIOLOGY | Facility: HOSPITAL | Age: 56
End: 2021-07-01

## 2021-07-01 LAB
ALBUMIN SERPL-MCNC: 2.9 G/DL (ref 3.5–5.2)
ALBUMIN SERPL-MCNC: 3.1 G/DL (ref 3.5–5.2)
ALBUMIN SERPL-MCNC: 3.2 G/DL (ref 3.5–5.2)
ALBUMIN SERPL-MCNC: 3.6 G/DL (ref 3.5–5.2)
ALBUMIN SERPL-MCNC: 3.7 G/DL (ref 3.5–5.2)
ALBUMIN/GLOB SERPL: 1.3 G/DL
ALBUMIN/GLOB SERPL: 1.5 G/DL
ALBUMIN/GLOB SERPL: 1.7 G/DL
ALP SERPL-CCNC: 102 U/L (ref 39–117)
ALP SERPL-CCNC: 119 U/L (ref 39–117)
ALP SERPL-CCNC: 130 U/L (ref 39–117)
ALP SERPL-CCNC: 84 U/L (ref 39–117)
ALP SERPL-CCNC: 90 U/L (ref 39–117)
ALT SERPL W P-5'-P-CCNC: 70 U/L (ref 1–41)
ALT SERPL W P-5'-P-CCNC: 77 U/L (ref 1–41)
ALT SERPL W P-5'-P-CCNC: 80 U/L (ref 1–41)
ALT SERPL W P-5'-P-CCNC: 86 U/L (ref 1–41)
ALT SERPL W P-5'-P-CCNC: 96 U/L (ref 1–41)
ANION GAP SERPL CALCULATED.3IONS-SCNC: 10 MMOL/L (ref 5–15)
ANION GAP SERPL CALCULATED.3IONS-SCNC: 8 MMOL/L (ref 5–15)
APTT PPP: 36.8 SECONDS (ref 22–39)
APTT PPP: 39.8 SECONDS (ref 22–39)
APTT PPP: 41.5 SECONDS (ref 22–39)
APTT PPP: 44.8 SECONDS (ref 22–39)
APTT PPP: 48.7 SECONDS (ref 22–39)
ARTERIAL PATENCY WRIST A: ABNORMAL
AST SERPL-CCNC: 112 U/L (ref 1–40)
AST SERPL-CCNC: 53 U/L (ref 1–40)
AST SERPL-CCNC: 61 U/L (ref 1–40)
AST SERPL-CCNC: 65 U/L (ref 1–40)
AST SERPL-CCNC: 89 U/L (ref 1–40)
ATMOSPHERIC PRESS: ABNORMAL MM[HG]
BASE EXCESS BLDA CALC-SCNC: -0.1 MMOL/L (ref 0–2)
BASOPHILS # BLD AUTO: 0.01 10*3/MM3 (ref 0–0.2)
BASOPHILS # BLD AUTO: 0.02 10*3/MM3 (ref 0–0.2)
BASOPHILS # BLD AUTO: 0.02 10*3/MM3 (ref 0–0.2)
BASOPHILS NFR BLD AUTO: 0.1 % (ref 0–1.5)
BASOPHILS NFR BLD AUTO: 0.2 % (ref 0–1.5)
BDY SITE: ABNORMAL
BILIRUB SERPL-MCNC: 0.5 MG/DL (ref 0–1.2)
BILIRUB SERPL-MCNC: 0.5 MG/DL (ref 0–1.2)
BILIRUB SERPL-MCNC: 0.6 MG/DL (ref 0–1.2)
BILIRUB SERPL-MCNC: 0.6 MG/DL (ref 0–1.2)
BILIRUB SERPL-MCNC: 0.7 MG/DL (ref 0–1.2)
BODY TEMPERATURE: 37 C
BUN SERPL-MCNC: 25 MG/DL (ref 6–20)
BUN SERPL-MCNC: 25 MG/DL (ref 6–20)
BUN SERPL-MCNC: 26 MG/DL (ref 6–20)
BUN SERPL-MCNC: 30 MG/DL (ref 6–20)
BUN SERPL-MCNC: 30 MG/DL (ref 6–20)
BUN/CREAT SERPL: 19.1 (ref 7–25)
BUN/CREAT SERPL: 19.7 (ref 7–25)
BUN/CREAT SERPL: 19.7 (ref 7–25)
BUN/CREAT SERPL: 21.9 (ref 7–25)
BUN/CREAT SERPL: 23.1 (ref 7–25)
CA-I SERPL ISE-MCNC: 1.15 MMOL/L (ref 1.12–1.32)
CA-I SERPL ISE-MCNC: 1.15 MMOL/L (ref 1.12–1.32)
CA-I SERPL ISE-MCNC: 1.16 MMOL/L (ref 1.12–1.32)
CA-I SERPL ISE-MCNC: 1.16 MMOL/L (ref 1.12–1.32)
CA-I SERPL ISE-MCNC: 1.17 MMOL/L (ref 1.12–1.32)
CALCIUM SPEC-SCNC: 7.9 MG/DL (ref 8.6–10.5)
CALCIUM SPEC-SCNC: 8.1 MG/DL (ref 8.6–10.5)
CALCIUM SPEC-SCNC: 8.2 MG/DL (ref 8.6–10.5)
CHLORIDE SERPL-SCNC: 104 MMOL/L (ref 98–107)
CHLORIDE SERPL-SCNC: 107 MMOL/L (ref 98–107)
CHLORIDE SERPL-SCNC: 108 MMOL/L (ref 98–107)
CHLORIDE SERPL-SCNC: 108 MMOL/L (ref 98–107)
CHLORIDE SERPL-SCNC: 109 MMOL/L (ref 98–107)
CHOLEST SERPL-MCNC: 134 MG/DL (ref 0–200)
CO2 BLDA-SCNC: 28 MMOL/L (ref 22–33)
CO2 SERPL-SCNC: 23 MMOL/L (ref 22–29)
CO2 SERPL-SCNC: 24 MMOL/L (ref 22–29)
CO2 SERPL-SCNC: 24 MMOL/L (ref 22–29)
CO2 SERPL-SCNC: 25 MMOL/L (ref 22–29)
CO2 SERPL-SCNC: 26 MMOL/L (ref 22–29)
COHGB MFR BLD: 0.5 % (ref 0–2)
CREAT SERPL-MCNC: 1.27 MG/DL (ref 0.76–1.27)
CREAT SERPL-MCNC: 1.3 MG/DL (ref 0.76–1.27)
CREAT SERPL-MCNC: 1.31 MG/DL (ref 0.76–1.27)
CREAT SERPL-MCNC: 1.32 MG/DL (ref 0.76–1.27)
CREAT SERPL-MCNC: 1.37 MG/DL (ref 0.76–1.27)
D-LACTATE SERPL-SCNC: 0.8 MMOL/L (ref 0.5–2)
D-LACTATE SERPL-SCNC: 1 MMOL/L (ref 0.5–2)
D-LACTATE SERPL-SCNC: 1.1 MMOL/L (ref 0.5–2)
D-LACTATE SERPL-SCNC: 1.2 MMOL/L (ref 0.5–2)
D-LACTATE SERPL-SCNC: 1.3 MMOL/L (ref 0.5–2)
DEPRECATED RDW RBC AUTO: 51.7 FL (ref 37–54)
DEPRECATED RDW RBC AUTO: 52.2 FL (ref 37–54)
DEPRECATED RDW RBC AUTO: 52.3 FL (ref 37–54)
DEPRECATED RDW RBC AUTO: 53.2 FL (ref 37–54)
DEPRECATED RDW RBC AUTO: 54.1 FL (ref 37–54)
EOSINOPHIL # BLD AUTO: 0 10*3/MM3 (ref 0–0.4)
EOSINOPHIL # BLD AUTO: 0.01 10*3/MM3 (ref 0–0.4)
EOSINOPHIL # BLD AUTO: 0.01 10*3/MM3 (ref 0–0.4)
EOSINOPHIL # BLD AUTO: 0.04 10*3/MM3 (ref 0–0.4)
EOSINOPHIL # BLD AUTO: 0.09 10*3/MM3 (ref 0–0.4)
EOSINOPHIL NFR BLD AUTO: 0 % (ref 0.3–6.2)
EOSINOPHIL NFR BLD AUTO: 0.1 % (ref 0.3–6.2)
EOSINOPHIL NFR BLD AUTO: 0.1 % (ref 0.3–6.2)
EOSINOPHIL NFR BLD AUTO: 0.5 % (ref 0.3–6.2)
EOSINOPHIL NFR BLD AUTO: 1.3 % (ref 0.3–6.2)
EPAP: 0
ERYTHROCYTE [DISTWIDTH] IN BLOOD BY AUTOMATED COUNT: 15.9 % (ref 12.3–15.4)
ERYTHROCYTE [DISTWIDTH] IN BLOOD BY AUTOMATED COUNT: 16 % (ref 12.3–15.4)
ERYTHROCYTE [DISTWIDTH] IN BLOOD BY AUTOMATED COUNT: 16.1 % (ref 12.3–15.4)
ERYTHROCYTE [DISTWIDTH] IN BLOOD BY AUTOMATED COUNT: 16.3 % (ref 12.3–15.4)
ERYTHROCYTE [DISTWIDTH] IN BLOOD BY AUTOMATED COUNT: 16.3 % (ref 12.3–15.4)
GFR SERPL CREATININE-BSD FRML MDRD: 54 ML/MIN/1.73
GFR SERPL CREATININE-BSD FRML MDRD: 56 ML/MIN/1.73
GFR SERPL CREATININE-BSD FRML MDRD: 57 ML/MIN/1.73
GFR SERPL CREATININE-BSD FRML MDRD: 57 ML/MIN/1.73
GFR SERPL CREATININE-BSD FRML MDRD: 59 ML/MIN/1.73
GLOBULIN UR ELPH-MCNC: 2.1 GM/DL
GLOBULIN UR ELPH-MCNC: 2.2 GM/DL
GLOBULIN UR ELPH-MCNC: 2.4 GM/DL
GLUCOSE BLDC GLUCOMTR-MCNC: 105 MG/DL (ref 70–130)
GLUCOSE BLDC GLUCOMTR-MCNC: 107 MG/DL (ref 70–130)
GLUCOSE BLDC GLUCOMTR-MCNC: 122 MG/DL (ref 70–130)
GLUCOSE BLDC GLUCOMTR-MCNC: 123 MG/DL (ref 70–130)
GLUCOSE BLDC GLUCOMTR-MCNC: 127 MG/DL (ref 70–130)
GLUCOSE BLDC GLUCOMTR-MCNC: 129 MG/DL (ref 70–130)
GLUCOSE BLDC GLUCOMTR-MCNC: 129 MG/DL (ref 70–130)
GLUCOSE BLDC GLUCOMTR-MCNC: 79 MG/DL (ref 70–130)
GLUCOSE BLDC GLUCOMTR-MCNC: 88 MG/DL (ref 70–130)
GLUCOSE BLDC GLUCOMTR-MCNC: 92 MG/DL (ref 70–130)
GLUCOSE BLDC GLUCOMTR-MCNC: 95 MG/DL (ref 70–130)
GLUCOSE BLDC GLUCOMTR-MCNC: 96 MG/DL (ref 70–130)
GLUCOSE SERPL-MCNC: 102 MG/DL (ref 65–99)
GLUCOSE SERPL-MCNC: 123 MG/DL (ref 65–99)
GLUCOSE SERPL-MCNC: 126 MG/DL (ref 65–99)
GLUCOSE SERPL-MCNC: 129 MG/DL (ref 65–99)
GLUCOSE SERPL-MCNC: 97 MG/DL (ref 65–99)
HCO3 BLDA-SCNC: 26.5 MMOL/L (ref 20–26)
HCT VFR BLD AUTO: 32.8 % (ref 37.5–51)
HCT VFR BLD AUTO: 35.3 % (ref 37.5–51)
HCT VFR BLD AUTO: 36.6 % (ref 37.5–51)
HCT VFR BLD AUTO: 37.7 % (ref 37.5–51)
HCT VFR BLD AUTO: 40.1 % (ref 37.5–51)
HCT VFR BLD CALC: 38.6 %
HDLC SERPL-MCNC: 48 MG/DL (ref 40–60)
HGB BLD-MCNC: 10.2 G/DL (ref 13–17.7)
HGB BLD-MCNC: 10.7 G/DL (ref 13–17.7)
HGB BLD-MCNC: 11 G/DL (ref 13–17.7)
HGB BLD-MCNC: 11.5 G/DL (ref 13–17.7)
HGB BLD-MCNC: 12.2 G/DL (ref 13–17.7)
HGB BLDA-MCNC: 12.6 G/DL (ref 13.5–17.5)
IMM GRANULOCYTES # BLD AUTO: 0.03 10*3/MM3 (ref 0–0.05)
IMM GRANULOCYTES # BLD AUTO: 0.04 10*3/MM3 (ref 0–0.05)
IMM GRANULOCYTES # BLD AUTO: 0.06 10*3/MM3 (ref 0–0.05)
IMM GRANULOCYTES # BLD AUTO: 0.08 10*3/MM3 (ref 0–0.05)
IMM GRANULOCYTES # BLD AUTO: 0.15 10*3/MM3 (ref 0–0.05)
IMM GRANULOCYTES NFR BLD AUTO: 0.4 % (ref 0–0.5)
IMM GRANULOCYTES NFR BLD AUTO: 0.6 % (ref 0–0.5)
IMM GRANULOCYTES NFR BLD AUTO: 0.6 % (ref 0–0.5)
IMM GRANULOCYTES NFR BLD AUTO: 0.7 % (ref 0–0.5)
IMM GRANULOCYTES NFR BLD AUTO: 1 % (ref 0–0.5)
INHALED O2 CONCENTRATION: 100 %
INR PPP: 2.59 (ref 0.85–1.16)
IPAP: 0
LDLC SERPL CALC-MCNC: 66 MG/DL (ref 0–100)
LDLC/HDLC SERPL: 1.34 {RATIO}
LYMPHOCYTES # BLD AUTO: 0.39 10*3/MM3 (ref 0.7–3.1)
LYMPHOCYTES # BLD AUTO: 0.55 10*3/MM3 (ref 0.7–3.1)
LYMPHOCYTES # BLD AUTO: 0.61 10*3/MM3 (ref 0.7–3.1)
LYMPHOCYTES # BLD AUTO: 0.61 10*3/MM3 (ref 0.7–3.1)
LYMPHOCYTES # BLD AUTO: 0.63 10*3/MM3 (ref 0.7–3.1)
LYMPHOCYTES NFR BLD AUTO: 3.6 % (ref 19.6–45.3)
LYMPHOCYTES NFR BLD AUTO: 4.3 % (ref 19.6–45.3)
LYMPHOCYTES NFR BLD AUTO: 5.9 % (ref 19.6–45.3)
LYMPHOCYTES NFR BLD AUTO: 7.7 % (ref 19.6–45.3)
LYMPHOCYTES NFR BLD AUTO: 8.9 % (ref 19.6–45.3)
MAGNESIUM SERPL-MCNC: 1.8 MG/DL (ref 1.6–2.6)
MAGNESIUM SERPL-MCNC: 2 MG/DL (ref 1.6–2.6)
MAGNESIUM SERPL-MCNC: 2 MG/DL (ref 1.6–2.6)
MAGNESIUM SERPL-MCNC: 2.1 MG/DL (ref 1.6–2.6)
MAGNESIUM SERPL-MCNC: 2.1 MG/DL (ref 1.6–2.6)
MCH RBC QN AUTO: 27.1 PG (ref 26.6–33)
MCH RBC QN AUTO: 27.1 PG (ref 26.6–33)
MCH RBC QN AUTO: 27.2 PG (ref 26.6–33)
MCH RBC QN AUTO: 27.3 PG (ref 26.6–33)
MCH RBC QN AUTO: 27.3 PG (ref 26.6–33)
MCHC RBC AUTO-ENTMCNC: 30.1 G/DL (ref 31.5–35.7)
MCHC RBC AUTO-ENTMCNC: 30.3 G/DL (ref 31.5–35.7)
MCHC RBC AUTO-ENTMCNC: 30.4 G/DL (ref 31.5–35.7)
MCHC RBC AUTO-ENTMCNC: 30.5 G/DL (ref 31.5–35.7)
MCHC RBC AUTO-ENTMCNC: 31.1 G/DL (ref 31.5–35.7)
MCV RBC AUTO: 87.5 FL (ref 79–97)
MCV RBC AUTO: 88.9 FL (ref 79–97)
MCV RBC AUTO: 88.9 FL (ref 79–97)
MCV RBC AUTO: 90.1 FL (ref 79–97)
MCV RBC AUTO: 90.8 FL (ref 79–97)
METHGB BLD QL: 0.7 % (ref 0–1.5)
MODALITY: ABNORMAL
MONOCYTES # BLD AUTO: 0.53 10*3/MM3 (ref 0.1–0.9)
MONOCYTES # BLD AUTO: 0.66 10*3/MM3 (ref 0.1–0.9)
MONOCYTES # BLD AUTO: 0.68 10*3/MM3 (ref 0.1–0.9)
MONOCYTES # BLD AUTO: 0.75 10*3/MM3 (ref 0.1–0.9)
MONOCYTES # BLD AUTO: 1.29 10*3/MM3 (ref 0.1–0.9)
MONOCYTES NFR BLD AUTO: 6.9 % (ref 5–12)
MONOCYTES NFR BLD AUTO: 7.3 % (ref 5–12)
MONOCYTES NFR BLD AUTO: 7.8 % (ref 5–12)
MONOCYTES NFR BLD AUTO: 8.4 % (ref 5–12)
MONOCYTES NFR BLD AUTO: 8.8 % (ref 5–12)
NEUTROPHILS NFR BLD AUTO: 12.53 10*3/MM3 (ref 1.7–7)
NEUTROPHILS NFR BLD AUTO: 5.54 10*3/MM3 (ref 1.7–7)
NEUTROPHILS NFR BLD AUTO: 6.55 10*3/MM3 (ref 1.7–7)
NEUTROPHILS NFR BLD AUTO: 7.96 10*3/MM3 (ref 1.7–7)
NEUTROPHILS NFR BLD AUTO: 81.3 % (ref 42.7–76)
NEUTROPHILS NFR BLD AUTO: 82.9 % (ref 42.7–76)
NEUTROPHILS NFR BLD AUTO: 85.7 % (ref 42.7–76)
NEUTROPHILS NFR BLD AUTO: 86.1 % (ref 42.7–76)
NEUTROPHILS NFR BLD AUTO: 88.5 % (ref 42.7–76)
NEUTROPHILS NFR BLD AUTO: 9.56 10*3/MM3 (ref 1.7–7)
NOTE: ABNORMAL
NRBC BLD AUTO-RTO: 0 /100 WBC (ref 0–0.2)
OXYHGB MFR BLDV: 97.5 % (ref 94–99)
PAW @ PEAK INSP FLOW SETTING VENT: 0 CMH2O
PCO2 BLDA: 50.4 MM HG (ref 35–45)
PCO2 TEMP ADJ BLD: 50.4 MM HG (ref 35–48)
PEEP RESPIRATORY: 5 CM[H2O]
PH BLDA: 7.33 PH UNITS (ref 7.35–7.45)
PH, TEMP CORRECTED: 7.33 PH UNITS
PHOSPHATE SERPL-MCNC: 2.3 MG/DL (ref 2.5–4.5)
PHOSPHATE SERPL-MCNC: 2.8 MG/DL (ref 2.5–4.5)
PHOSPHATE SERPL-MCNC: 3.1 MG/DL (ref 2.5–4.5)
PHOSPHATE SERPL-MCNC: 3.1 MG/DL (ref 2.5–4.5)
PHOSPHATE SERPL-MCNC: 3.7 MG/DL (ref 2.5–4.5)
PLATELET # BLD AUTO: 127 10*3/MM3 (ref 140–450)
PLATELET # BLD AUTO: 130 10*3/MM3 (ref 140–450)
PLATELET # BLD AUTO: 133 10*3/MM3 (ref 140–450)
PLATELET # BLD AUTO: 151 10*3/MM3 (ref 140–450)
PLATELET # BLD AUTO: 195 10*3/MM3 (ref 140–450)
PMV BLD AUTO: 11.5 FL (ref 6–12)
PMV BLD AUTO: 11.6 FL (ref 6–12)
PMV BLD AUTO: 11.9 FL (ref 6–12)
PMV BLD AUTO: 11.9 FL (ref 6–12)
PMV BLD AUTO: 12.2 FL (ref 6–12)
PO2 BLDA: 137 MM HG (ref 83–108)
PO2 TEMP ADJ BLD: 137 MM HG (ref 83–108)
POTASSIUM SERPL-SCNC: 3.8 MMOL/L (ref 3.5–5.2)
POTASSIUM SERPL-SCNC: 4.1 MMOL/L (ref 3.5–5.2)
POTASSIUM SERPL-SCNC: 4.3 MMOL/L (ref 3.5–5.2)
POTASSIUM SERPL-SCNC: 4.4 MMOL/L (ref 3.5–5.2)
POTASSIUM SERPL-SCNC: 4.9 MMOL/L (ref 3.5–5.2)
PROT SERPL-MCNC: 5.1 G/DL (ref 6–8.5)
PROT SERPL-MCNC: 5.2 G/DL (ref 6–8.5)
PROT SERPL-MCNC: 5.4 G/DL (ref 6–8.5)
PROT SERPL-MCNC: 5.9 G/DL (ref 6–8.5)
PROT SERPL-MCNC: 6 G/DL (ref 6–8.5)
PROTHROMBIN TIME: 26.7 SECONDS (ref 11.4–14.4)
QT INTERVAL: 574 MS
QTC INTERVAL: 563 MS
RBC # BLD AUTO: 3.75 10*6/MM3 (ref 4.14–5.8)
RBC # BLD AUTO: 3.92 10*6/MM3 (ref 4.14–5.8)
RBC # BLD AUTO: 4.03 10*6/MM3 (ref 4.14–5.8)
RBC # BLD AUTO: 4.24 10*6/MM3 (ref 4.14–5.8)
RBC # BLD AUTO: 4.51 10*6/MM3 (ref 4.14–5.8)
SODIUM SERPL-SCNC: 138 MMOL/L (ref 136–145)
SODIUM SERPL-SCNC: 140 MMOL/L (ref 136–145)
SODIUM SERPL-SCNC: 142 MMOL/L (ref 136–145)
TOTAL RATE: 0 BREATHS/MINUTE
TRIGL SERPL-MCNC: 108 MG/DL (ref 0–150)
TROPONIN T SERPL-MCNC: 0.26 NG/ML (ref 0–0.03)
VENTILATOR MODE: ABNORMAL
VLDLC SERPL-MCNC: 20 MG/DL (ref 5–40)
WBC # BLD AUTO: 10.81 10*3/MM3 (ref 3.4–10.8)
WBC # BLD AUTO: 14.63 10*3/MM3 (ref 3.4–10.8)
WBC # BLD AUTO: 6.82 10*3/MM3 (ref 3.4–10.8)
WBC # BLD AUTO: 7.9 10*3/MM3 (ref 3.4–10.8)
WBC # BLD AUTO: 9.26 10*3/MM3 (ref 3.4–10.8)

## 2021-07-01 PROCEDURE — 84100 ASSAY OF PHOSPHORUS: CPT | Performed by: NURSE PRACTITIONER

## 2021-07-01 PROCEDURE — 85610 PROTHROMBIN TIME: CPT | Performed by: NURSE PRACTITIONER

## 2021-07-01 PROCEDURE — 71045 X-RAY EXAM CHEST 1 VIEW: CPT

## 2021-07-01 PROCEDURE — 85730 THROMBOPLASTIN TIME PARTIAL: CPT | Performed by: NURSE PRACTITIONER

## 2021-07-01 PROCEDURE — 95714 VEEG EA 12-26 HR UNMNTR: CPT

## 2021-07-01 PROCEDURE — 93306 TTE W/DOPPLER COMPLETE: CPT

## 2021-07-01 PROCEDURE — 94003 VENT MGMT INPAT SUBQ DAY: CPT

## 2021-07-01 PROCEDURE — 82962 GLUCOSE BLOOD TEST: CPT

## 2021-07-01 PROCEDURE — 87070 CULTURE OTHR SPECIMN AEROBIC: CPT | Performed by: INTERNAL MEDICINE

## 2021-07-01 PROCEDURE — 85025 COMPLETE CBC W/AUTO DIFF WBC: CPT | Performed by: NURSE PRACTITIONER

## 2021-07-01 PROCEDURE — 82375 ASSAY CARBOXYHB QUANT: CPT

## 2021-07-01 PROCEDURE — 83735 ASSAY OF MAGNESIUM: CPT | Performed by: NURSE PRACTITIONER

## 2021-07-01 PROCEDURE — 80053 COMPREHEN METABOLIC PANEL: CPT | Performed by: NURSE PRACTITIONER

## 2021-07-01 PROCEDURE — 83605 ASSAY OF LACTIC ACID: CPT | Performed by: NURSE PRACTITIONER

## 2021-07-01 PROCEDURE — 80061 LIPID PANEL: CPT | Performed by: INTERNAL MEDICINE

## 2021-07-01 PROCEDURE — 87040 BLOOD CULTURE FOR BACTERIA: CPT | Performed by: NURSE PRACTITIONER

## 2021-07-01 PROCEDURE — 82330 ASSAY OF CALCIUM: CPT | Performed by: NURSE PRACTITIONER

## 2021-07-01 PROCEDURE — B548ZZA ULTRASONOGRAPHY OF SUPERIOR VENA CAVA, GUIDANCE: ICD-10-PCS | Performed by: INTERNAL MEDICINE

## 2021-07-01 PROCEDURE — 87205 SMEAR GRAM STAIN: CPT | Performed by: INTERNAL MEDICINE

## 2021-07-01 PROCEDURE — 36600 WITHDRAWAL OF ARTERIAL BLOOD: CPT

## 2021-07-01 PROCEDURE — 74018 RADEX ABDOMEN 1 VIEW: CPT

## 2021-07-01 PROCEDURE — 76937 US GUIDE VASCULAR ACCESS: CPT | Performed by: NURSE PRACTITIONER

## 2021-07-01 PROCEDURE — 83050 HGB METHEMOGLOBIN QUAN: CPT

## 2021-07-01 PROCEDURE — 02HV33Z INSERTION OF INFUSION DEVICE INTO SUPERIOR VENA CAVA, PERCUTANEOUS APPROACH: ICD-10-PCS | Performed by: INTERNAL MEDICINE

## 2021-07-01 PROCEDURE — 25010000002 FENTANYL CITRATE (PF) 2500 MCG/50ML SOLUTION: Performed by: NURSE PRACTITIONER

## 2021-07-01 PROCEDURE — 99291 CRITICAL CARE FIRST HOUR: CPT | Performed by: INTERNAL MEDICINE

## 2021-07-01 PROCEDURE — 25010000002 PIPERACILLIN SOD-TAZOBACTAM PER 1 G: Performed by: INTERNAL MEDICINE

## 2021-07-01 PROCEDURE — 36556 INSERT NON-TUNNEL CV CATH: CPT | Performed by: NURSE PRACTITIONER

## 2021-07-01 PROCEDURE — 84484 ASSAY OF TROPONIN QUANT: CPT | Performed by: NURSE PRACTITIONER

## 2021-07-01 PROCEDURE — 82805 BLOOD GASES W/O2 SATURATION: CPT

## 2021-07-01 PROCEDURE — 94799 UNLISTED PULMONARY SVC/PX: CPT

## 2021-07-01 PROCEDURE — 25010000002 PROPOFOL 1000 MG/ML EMULSION: Performed by: NURSE PRACTITIONER

## 2021-07-01 PROCEDURE — 93010 ELECTROCARDIOGRAM REPORT: CPT | Performed by: INTERNAL MEDICINE

## 2021-07-01 PROCEDURE — 93005 ELECTROCARDIOGRAM TRACING: CPT | Performed by: NURSE PRACTITIONER

## 2021-07-01 RX ORDER — MAGNESIUM SULFATE 1 G/100ML
1 INJECTION INTRAVENOUS ONCE
Status: COMPLETED | OUTPATIENT
Start: 2021-07-02 | End: 2021-07-02

## 2021-07-01 RX ORDER — ATORVASTATIN CALCIUM 40 MG/1
40 TABLET, FILM COATED ORAL NIGHTLY
Status: DISCONTINUED | OUTPATIENT
Start: 2021-07-01 | End: 2021-07-17

## 2021-07-01 RX ORDER — PHENYLEPHRINE HCL IN 0.9% NACL 0.5 MG/5ML
.5-3 SYRINGE (ML) INTRAVENOUS
Status: DISCONTINUED | OUTPATIENT
Start: 2021-07-01 | End: 2021-07-03

## 2021-07-01 RX ORDER — BUPROPION HYDROCHLORIDE 100 MG/1
100 TABLET ORAL EVERY 12 HOURS SCHEDULED
Status: DISCONTINUED | OUTPATIENT
Start: 2021-07-01 | End: 2021-07-01

## 2021-07-01 RX ORDER — ATROPINE SULFATE 1 MG/ML
INJECTION, SOLUTION INTRAMUSCULAR; INTRAVENOUS; SUBCUTANEOUS
Status: DISPENSED
Start: 2021-07-01 | End: 2021-07-01

## 2021-07-01 RX ORDER — CHLORHEXIDINE GLUCONATE 0.12 MG/ML
15 RINSE ORAL EVERY 12 HOURS SCHEDULED
Status: DISCONTINUED | OUTPATIENT
Start: 2021-07-01 | End: 2021-07-28

## 2021-07-01 RX ORDER — ACETAMINOPHEN 325 MG/1
650 TABLET ORAL EVERY 4 HOURS PRN
Status: DISCONTINUED | OUTPATIENT
Start: 2021-07-01 | End: 2021-07-02

## 2021-07-01 RX ORDER — IPRATROPIUM BROMIDE AND ALBUTEROL SULFATE 2.5; .5 MG/3ML; MG/3ML
3 SOLUTION RESPIRATORY (INHALATION) EVERY 6 HOURS PRN
Status: DISCONTINUED | OUTPATIENT
Start: 2021-07-01 | End: 2021-07-25

## 2021-07-01 RX ORDER — ACETAMINOPHEN 650 MG/1
650 SUPPOSITORY RECTAL EVERY 4 HOURS PRN
Status: DISCONTINUED | OUTPATIENT
Start: 2021-07-01 | End: 2021-07-02

## 2021-07-01 RX ORDER — ASPIRIN 81 MG/1
81 TABLET, CHEWABLE ORAL DAILY
Status: DISCONTINUED | OUTPATIENT
Start: 2021-07-01 | End: 2021-07-17

## 2021-07-01 RX ADMIN — TAZOBACTAM SODIUM AND PIPERACILLIN SODIUM 3.38 G: 375; 3 INJECTION, SOLUTION INTRAVENOUS at 20:04

## 2021-07-01 RX ADMIN — ACETAMINOPHEN ORAL SOLUTION 650 MG: 650 SOLUTION ORAL at 16:28

## 2021-07-01 RX ADMIN — MINERAL OIL AND PETROLATUM: 150; 830 OINTMENT OPHTHALMIC at 23:28

## 2021-07-01 RX ADMIN — MINERAL OIL AND PETROLATUM: 150; 830 OINTMENT OPHTHALMIC at 20:04

## 2021-07-01 RX ADMIN — MINERAL OIL AND PETROLATUM: 150; 830 OINTMENT OPHTHALMIC at 13:54

## 2021-07-01 RX ADMIN — PROPOFOL 30 MCG/KG/MIN: 10 INJECTION, EMULSION INTRAVENOUS at 17:56

## 2021-07-01 RX ADMIN — ACETAMINOPHEN ORAL SOLUTION 649.6 MG: 650 SOLUTION ORAL at 05:00

## 2021-07-01 RX ADMIN — VECURONIUM BROMIDE 10 MG: 10 INJECTION, POWDER, LYOPHILIZED, FOR SOLUTION INTRAVENOUS at 01:13

## 2021-07-01 RX ADMIN — TAZOBACTAM SODIUM AND PIPERACILLIN SODIUM 3.38 G: 375; 3 INJECTION, SOLUTION INTRAVENOUS at 05:00

## 2021-07-01 RX ADMIN — MINERAL OIL AND PETROLATUM: 150; 830 OINTMENT OPHTHALMIC at 08:20

## 2021-07-01 RX ADMIN — ATORVASTATIN CALCIUM 40 MG: 40 TABLET, FILM COATED ORAL at 00:50

## 2021-07-01 RX ADMIN — ASPIRIN 81 MG CHEWABLE TABLET 81 MG: 81 TABLET CHEWABLE at 08:21

## 2021-07-01 RX ADMIN — PANTOPRAZOLE SODIUM 40 MG: 40 INJECTION, POWDER, FOR SOLUTION INTRAVENOUS at 05:00

## 2021-07-01 RX ADMIN — CHLORHEXIDINE GLUCONATE 15 ML: 1.2 SOLUTION ORAL at 10:15

## 2021-07-01 RX ADMIN — BUSPIRONE HYDROCHLORIDE 30 MG: 10 TABLET ORAL at 05:11

## 2021-07-01 RX ADMIN — BUSPIRONE HYDROCHLORIDE 30 MG: 10 TABLET ORAL at 00:50

## 2021-07-01 RX ADMIN — MINERAL OIL AND PETROLATUM: 150; 830 OINTMENT OPHTHALMIC at 17:58

## 2021-07-01 RX ADMIN — MINERAL OIL AND PETROLATUM: 150; 830 OINTMENT OPHTHALMIC at 00:26

## 2021-07-01 RX ADMIN — PROPOFOL 40 MCG/KG/MIN: 10 INJECTION, EMULSION INTRAVENOUS at 05:00

## 2021-07-01 RX ADMIN — PROPOFOL 45 MCG/KG/MIN: 10 INJECTION, EMULSION INTRAVENOUS at 20:29

## 2021-07-01 RX ADMIN — FENTANYL CITRATE 150 MCG/HR: 0.05 INJECTION, SOLUTION INTRAMUSCULAR; INTRAVENOUS at 20:46

## 2021-07-01 RX ADMIN — MAGNESIUM SULFATE HEPTAHYDRATE 1 G: 1 INJECTION, SOLUTION INTRAVENOUS at 23:24

## 2021-07-01 RX ADMIN — BUSPIRONE HYDROCHLORIDE 30 MG: 10 TABLET ORAL at 21:37

## 2021-07-01 RX ADMIN — SODIUM CHLORIDE 125 ML/HR: 9 INJECTION, SOLUTION INTRAVENOUS at 12:50

## 2021-07-01 RX ADMIN — MINERAL OIL AND PETROLATUM: 150; 830 OINTMENT OPHTHALMIC at 10:44

## 2021-07-01 RX ADMIN — ATORVASTATIN CALCIUM 40 MG: 40 TABLET, FILM COATED ORAL at 20:04

## 2021-07-01 RX ADMIN — PROPOFOL 30 MCG/KG/MIN: 10 INJECTION, EMULSION INTRAVENOUS at 00:48

## 2021-07-01 RX ADMIN — PROPOFOL 40 MCG/KG/MIN: 10 INJECTION, EMULSION INTRAVENOUS at 14:59

## 2021-07-01 RX ADMIN — PROPOFOL 25 MCG/KG/MIN: 10 INJECTION, EMULSION INTRAVENOUS at 08:28

## 2021-07-01 RX ADMIN — TAZOBACTAM SODIUM AND PIPERACILLIN SODIUM 3.38 G: 375; 3 INJECTION, SOLUTION INTRAVENOUS at 12:47

## 2021-07-01 RX ADMIN — MINERAL OIL AND PETROLATUM: 150; 830 OINTMENT OPHTHALMIC at 16:28

## 2021-07-01 RX ADMIN — MINERAL OIL AND PETROLATUM: 150; 830 OINTMENT OPHTHALMIC at 05:01

## 2021-07-01 RX ADMIN — MINERAL OIL AND PETROLATUM: 150; 830 OINTMENT OPHTHALMIC at 04:05

## 2021-07-01 RX ADMIN — VECURONIUM BROMIDE 10 MG: 10 INJECTION, POWDER, LYOPHILIZED, FOR SOLUTION INTRAVENOUS at 06:04

## 2021-07-01 RX ADMIN — VECURONIUM BROMIDE 10 MG: 10 INJECTION, POWDER, LYOPHILIZED, FOR SOLUTION INTRAVENOUS at 03:10

## 2021-07-01 RX ADMIN — TAZOBACTAM SODIUM AND PIPERACILLIN SODIUM 3.38 G: 375; 3 INJECTION, SOLUTION INTRAVENOUS at 00:52

## 2021-07-01 RX ADMIN — PROPOFOL 45 MCG/KG/MIN: 10 INJECTION, EMULSION INTRAVENOUS at 22:51

## 2021-07-01 RX ADMIN — CHLORHEXIDINE GLUCONATE 15 ML: 1.2 SOLUTION ORAL at 20:04

## 2021-07-01 RX ADMIN — MINERAL OIL AND PETROLATUM: 150; 830 OINTMENT OPHTHALMIC at 11:51

## 2021-07-01 RX ADMIN — ACETAMINOPHEN ORAL SOLUTION 649.6 MG: 650 SOLUTION ORAL at 00:51

## 2021-07-01 RX ADMIN — PROPOFOL 45 MCG/KG/MIN: 10 INJECTION, EMULSION INTRAVENOUS at 11:53

## 2021-07-01 RX ADMIN — ACETAMINOPHEN ORAL SOLUTION 649.6 MG: 650 SOLUTION ORAL at 21:37

## 2021-07-01 RX ADMIN — BUSPIRONE HYDROCHLORIDE 30 MG: 10 TABLET ORAL at 13:54

## 2021-07-01 RX ADMIN — BUPROPION HYDROCHLORIDE 100 MG: 100 TABLET, FILM COATED ORAL at 10:15

## 2021-07-01 RX ADMIN — ACETAMINOPHEN ORAL SOLUTION 650 MG: 650 SOLUTION ORAL at 10:14

## 2021-07-01 RX ADMIN — MINERAL OIL AND PETROLATUM: 150; 830 OINTMENT OPHTHALMIC at 21:42

## 2021-07-02 LAB
ALBUMIN SERPL-MCNC: 2.9 G/DL (ref 3.5–5.2)
ALBUMIN SERPL-MCNC: 3 G/DL (ref 3.5–5.2)
ALBUMIN SERPL-MCNC: 3.1 G/DL (ref 3.5–5.2)
ALBUMIN SERPL-MCNC: 3.2 G/DL (ref 3.5–5.2)
ALBUMIN/GLOB SERPL: 1.3 G/DL
ALBUMIN/GLOB SERPL: 1.3 G/DL
ALBUMIN/GLOB SERPL: 1.4 G/DL
ALBUMIN/GLOB SERPL: 1.5 G/DL
ALP SERPL-CCNC: 82 U/L (ref 39–117)
ALP SERPL-CCNC: 83 U/L (ref 39–117)
ALP SERPL-CCNC: 87 U/L (ref 39–117)
ALP SERPL-CCNC: 92 U/L (ref 39–117)
ALT SERPL W P-5'-P-CCNC: 79 U/L (ref 1–41)
ALT SERPL W P-5'-P-CCNC: 85 U/L (ref 1–41)
ALT SERPL W P-5'-P-CCNC: 86 U/L (ref 1–41)
ALT SERPL W P-5'-P-CCNC: 87 U/L (ref 1–41)
ANION GAP SERPL CALCULATED.3IONS-SCNC: 11 MMOL/L (ref 5–15)
ANION GAP SERPL CALCULATED.3IONS-SCNC: 6 MMOL/L (ref 5–15)
ANION GAP SERPL CALCULATED.3IONS-SCNC: 9 MMOL/L (ref 5–15)
ANION GAP SERPL CALCULATED.3IONS-SCNC: 9 MMOL/L (ref 5–15)
APTT PPP: 53.8 SECONDS (ref 22–39)
APTT PPP: 53.9 SECONDS (ref 22–39)
AST SERPL-CCNC: 43 U/L (ref 1–40)
AST SERPL-CCNC: 50 U/L (ref 1–40)
AST SERPL-CCNC: 56 U/L (ref 1–40)
AST SERPL-CCNC: 62 U/L (ref 1–40)
BASOPHILS # BLD AUTO: 0.02 10*3/MM3 (ref 0–0.2)
BASOPHILS # BLD AUTO: 0.02 10*3/MM3 (ref 0–0.2)
BASOPHILS NFR BLD AUTO: 0.3 % (ref 0–1.5)
BASOPHILS NFR BLD AUTO: 0.3 % (ref 0–1.5)
BH CV ECHO MEAS - AO MAX PG (FULL): 43.7 MMHG
BH CV ECHO MEAS - AO MAX PG: 47.3 MMHG
BH CV ECHO MEAS - AO MEAN PG (FULL): 23.5 MMHG
BH CV ECHO MEAS - AO MEAN PG: 25.5 MMHG
BH CV ECHO MEAS - AO ROOT AREA (BSA CORRECTED): 1.1
BH CV ECHO MEAS - AO ROOT AREA: 7.7 CM^2
BH CV ECHO MEAS - AO ROOT DIAM: 3.1 CM
BH CV ECHO MEAS - AO V2 MAX: 343.9 CM/SEC
BH CV ECHO MEAS - AO V2 MEAN: 235.6 CM/SEC
BH CV ECHO MEAS - AO V2 VTI: 82.1 CM
BH CV ECHO MEAS - ASC AORTA: 2.9 CM
BH CV ECHO MEAS - AVA(I,A): 0.94 CM^2
BH CV ECHO MEAS - AVA(I,D): 0.94 CM^2
BH CV ECHO MEAS - AVA(V,A): 0.83 CM^2
BH CV ECHO MEAS - AVA(V,D): 0.83 CM^2
BH CV ECHO MEAS - BSA(HAYCOCK): 3 M^2
BH CV ECHO MEAS - BSA: 2.8 M^2
BH CV ECHO MEAS - BZI_BMI: 40.3 KILOGRAMS/M^2
BH CV ECHO MEAS - BZI_METRIC_HEIGHT: 195.6 CM
BH CV ECHO MEAS - BZI_METRIC_WEIGHT: 154.2 KG
BH CV ECHO MEAS - EDV(CUBED): 200.5 ML
BH CV ECHO MEAS - EDV(MOD-SP2): 168 ML
BH CV ECHO MEAS - EDV(MOD-SP4): 171 ML
BH CV ECHO MEAS - EDV(TEICH): 170.1 ML
BH CV ECHO MEAS - EF(CUBED): 58.5 %
BH CV ECHO MEAS - EF(MOD-SP2): 63.1 %
BH CV ECHO MEAS - EF(MOD-SP4): 63.7 %
BH CV ECHO MEAS - EF(TEICH): 49.4 %
BH CV ECHO MEAS - ESV(CUBED): 83.2 ML
BH CV ECHO MEAS - ESV(MOD-SP2): 62 ML
BH CV ECHO MEAS - ESV(MOD-SP4): 62 ML
BH CV ECHO MEAS - ESV(TEICH): 86.1 ML
BH CV ECHO MEAS - FS: 25.4 %
BH CV ECHO MEAS - IVS/LVPW: 1
BH CV ECHO MEAS - IVSD: 1.7 CM
BH CV ECHO MEAS - LA DIMENSION: 4.9 CM
BH CV ECHO MEAS - LA/AO: 1.6
BH CV ECHO MEAS - LAD MAJOR: 6.5 CM
BH CV ECHO MEAS - LAT PEAK E' VEL: 6 CM/SEC
BH CV ECHO MEAS - LATERAL E/E' RATIO: 18.7
BH CV ECHO MEAS - LV DIASTOLIC VOL/BSA (35-75): 61 ML/M^2
BH CV ECHO MEAS - LV IVRT: 0.05 SEC
BH CV ECHO MEAS - LV MASS(C)D: 475 GRAMS
BH CV ECHO MEAS - LV MASS(C)DI: 169.5 GRAMS/M^2
BH CV ECHO MEAS - LV MAX PG: 3.6 MMHG
BH CV ECHO MEAS - LV MEAN PG: 2.1 MMHG
BH CV ECHO MEAS - LV SYSTOLIC VOL/BSA (12-30): 22.1 ML/M^2
BH CV ECHO MEAS - LV V1 MAX: 94.4 CM/SEC
BH CV ECHO MEAS - LV V1 MEAN: 67.3 CM/SEC
BH CV ECHO MEAS - LV V1 VTI: 25.6 CM
BH CV ECHO MEAS - LVIDD: 5.9 CM
BH CV ECHO MEAS - LVIDS: 4.4 CM
BH CV ECHO MEAS - LVLD AP2: 8.5 CM
BH CV ECHO MEAS - LVLD AP4: 8.5 CM
BH CV ECHO MEAS - LVLS AP2: 6.9 CM
BH CV ECHO MEAS - LVLS AP4: 7.1 CM
BH CV ECHO MEAS - LVOT AREA (M): 3.1 CM^2
BH CV ECHO MEAS - LVOT AREA: 3 CM^2
BH CV ECHO MEAS - LVOT DIAM: 2 CM
BH CV ECHO MEAS - LVPWD: 1.6 CM
BH CV ECHO MEAS - MED PEAK E' VEL: 5.4 CM/SEC
BH CV ECHO MEAS - MEDIAL E/E' RATIO: 20.9
BH CV ECHO MEAS - MR MAX PG: 34 MMHG
BH CV ECHO MEAS - MR MAX VEL: 293.2 CM/SEC
BH CV ECHO MEAS - MR MEAN PG: 21.9 MMHG
BH CV ECHO MEAS - MR MEAN VEL: 215.9 CM/SEC
BH CV ECHO MEAS - MR VTI: 68.4 CM
BH CV ECHO MEAS - MV DEC SLOPE: 334.1 CM/SEC^2
BH CV ECHO MEAS - MV DEC TIME: 0.27 SEC
BH CV ECHO MEAS - MV E MAX VEL: 114.8 CM/SEC
BH CV ECHO MEAS - MV P1/2T MAX VEL: 112.9 CM/SEC
BH CV ECHO MEAS - MV P1/2T: 99 MSEC
BH CV ECHO MEAS - MVA P1/2T LCG: 1.9 CM^2
BH CV ECHO MEAS - MVA(P1/2T): 2.2 CM^2
BH CV ECHO MEAS - PA ACC SLOPE: 841.1 CM/SEC^2
BH CV ECHO MEAS - PA ACC TIME: 0.1 SEC
BH CV ECHO MEAS - PA PR(ACCEL): 36.2 MMHG
BH CV ECHO MEAS - RAP SYSTOLE: 15 MMHG
BH CV ECHO MEAS - RVDD: 3.3 CM
BH CV ECHO MEAS - RVSP: 51 MMHG
BH CV ECHO MEAS - SI(AO): 226.5 ML/M^2
BH CV ECHO MEAS - SI(CUBED): 41.9 ML/M^2
BH CV ECHO MEAS - SI(LVOT): 27.6 ML/M^2
BH CV ECHO MEAS - SI(MOD-SP2): 37.8 ML/M^2
BH CV ECHO MEAS - SI(MOD-SP4): 38.9 ML/M^2
BH CV ECHO MEAS - SI(TEICH): 30 ML/M^2
BH CV ECHO MEAS - SV(AO): 634.8 ML
BH CV ECHO MEAS - SV(CUBED): 117.3 ML
BH CV ECHO MEAS - SV(LVOT): 77.3 ML
BH CV ECHO MEAS - SV(MOD-SP2): 106 ML
BH CV ECHO MEAS - SV(MOD-SP4): 109 ML
BH CV ECHO MEAS - SV(TEICH): 84 ML
BH CV ECHO MEAS - TAPSE (>1.6): 1.4 CM
BH CV ECHO MEAS - TR MAX PG: 36 MMHG
BH CV ECHO MEAS - TR MAX VEL: 297.1 CM/SEC
BH CV ECHO MEASUREMENTS AVERAGE E/E' RATIO: 20.14
BH CV VAS BP RIGHT ARM: NORMAL MMHG
BH CV XLRA - RV BASE: 4.7 CM
BH CV XLRA - RV LENGTH: 9 CM
BH CV XLRA - RV MID: 4 CM
BH CV XLRA - TDI S': 8.47 CM/SEC
BILIRUB SERPL-MCNC: 0.7 MG/DL (ref 0–1.2)
BUN SERPL-MCNC: 23 MG/DL (ref 6–20)
BUN SERPL-MCNC: 23 MG/DL (ref 6–20)
BUN SERPL-MCNC: 24 MG/DL (ref 6–20)
BUN SERPL-MCNC: 24 MG/DL (ref 6–20)
BUN/CREAT SERPL: 16.3 (ref 7–25)
BUN/CREAT SERPL: 16.5 (ref 7–25)
BUN/CREAT SERPL: 17.1 (ref 7–25)
BUN/CREAT SERPL: 17.1 (ref 7–25)
CA-I SERPL ISE-MCNC: 1.16 MMOL/L (ref 1.12–1.32)
CA-I SERPL ISE-MCNC: 1.19 MMOL/L (ref 1.12–1.32)
CALCIUM SPEC-SCNC: 7.9 MG/DL (ref 8.6–10.5)
CALCIUM SPEC-SCNC: 8 MG/DL (ref 8.6–10.5)
CHLORIDE SERPL-SCNC: 108 MMOL/L (ref 98–107)
CHLORIDE SERPL-SCNC: 109 MMOL/L (ref 98–107)
CO2 SERPL-SCNC: 22 MMOL/L (ref 22–29)
CO2 SERPL-SCNC: 22 MMOL/L (ref 22–29)
CO2 SERPL-SCNC: 24 MMOL/L (ref 22–29)
CO2 SERPL-SCNC: 25 MMOL/L (ref 22–29)
CREAT SERPL-MCNC: 1.39 MG/DL (ref 0.76–1.27)
CREAT SERPL-MCNC: 1.4 MG/DL (ref 0.76–1.27)
CREAT SERPL-MCNC: 1.4 MG/DL (ref 0.76–1.27)
CREAT SERPL-MCNC: 1.41 MG/DL (ref 0.76–1.27)
D-LACTATE SERPL-SCNC: 0.8 MMOL/L (ref 0.5–2)
D-LACTATE SERPL-SCNC: 0.9 MMOL/L (ref 0.5–2)
DEPRECATED RDW RBC AUTO: 52 FL (ref 37–54)
DEPRECATED RDW RBC AUTO: 53 FL (ref 37–54)
EOSINOPHIL # BLD AUTO: 0.07 10*3/MM3 (ref 0–0.4)
EOSINOPHIL # BLD AUTO: 0.09 10*3/MM3 (ref 0–0.4)
EOSINOPHIL NFR BLD AUTO: 0.9 % (ref 0.3–6.2)
EOSINOPHIL NFR BLD AUTO: 1.4 % (ref 0.3–6.2)
ERYTHROCYTE [DISTWIDTH] IN BLOOD BY AUTOMATED COUNT: 16.3 % (ref 12.3–15.4)
ERYTHROCYTE [DISTWIDTH] IN BLOOD BY AUTOMATED COUNT: 16.4 % (ref 12.3–15.4)
GFR SERPL CREATININE-BSD FRML MDRD: 52 ML/MIN/1.73
GFR SERPL CREATININE-BSD FRML MDRD: 53 ML/MIN/1.73
GLOBULIN UR ELPH-MCNC: 2.2 GM/DL
GLOBULIN UR ELPH-MCNC: 2.3 GM/DL
GLUCOSE BLDC GLUCOMTR-MCNC: 102 MG/DL (ref 70–130)
GLUCOSE BLDC GLUCOMTR-MCNC: 105 MG/DL (ref 70–130)
GLUCOSE BLDC GLUCOMTR-MCNC: 107 MG/DL (ref 70–130)
GLUCOSE BLDC GLUCOMTR-MCNC: 112 MG/DL (ref 70–130)
GLUCOSE BLDC GLUCOMTR-MCNC: 119 MG/DL (ref 70–130)
GLUCOSE BLDC GLUCOMTR-MCNC: 124 MG/DL (ref 70–130)
GLUCOSE BLDC GLUCOMTR-MCNC: 139 MG/DL (ref 70–130)
GLUCOSE BLDC GLUCOMTR-MCNC: 96 MG/DL (ref 70–130)
GLUCOSE SERPL-MCNC: 115 MG/DL (ref 65–99)
GLUCOSE SERPL-MCNC: 115 MG/DL (ref 65–99)
GLUCOSE SERPL-MCNC: 120 MG/DL (ref 65–99)
GLUCOSE SERPL-MCNC: 121 MG/DL (ref 65–99)
HCT VFR BLD AUTO: 32.3 % (ref 37.5–51)
HCT VFR BLD AUTO: 32.9 % (ref 37.5–51)
HGB BLD-MCNC: 10.1 G/DL (ref 13–17.7)
HGB BLD-MCNC: 10.4 G/DL (ref 13–17.7)
IMM GRANULOCYTES # BLD AUTO: 0.03 10*3/MM3 (ref 0–0.05)
IMM GRANULOCYTES # BLD AUTO: 0.04 10*3/MM3 (ref 0–0.05)
IMM GRANULOCYTES NFR BLD AUTO: 0.5 % (ref 0–0.5)
IMM GRANULOCYTES NFR BLD AUTO: 0.5 % (ref 0–0.5)
LEFT ATRIUM VOLUME INDEX: 39.6 ML/M^2
LEFT ATRIUM VOLUME: 111 ML
LYMPHOCYTES # BLD AUTO: 0.53 10*3/MM3 (ref 0.7–3.1)
LYMPHOCYTES # BLD AUTO: 0.59 10*3/MM3 (ref 0.7–3.1)
LYMPHOCYTES NFR BLD AUTO: 7 % (ref 19.6–45.3)
LYMPHOCYTES NFR BLD AUTO: 8.9 % (ref 19.6–45.3)
MAGNESIUM SERPL-MCNC: 2.1 MG/DL (ref 1.6–2.6)
MAGNESIUM SERPL-MCNC: 2.1 MG/DL (ref 1.6–2.6)
MAGNESIUM SERPL-MCNC: 2.2 MG/DL (ref 1.6–2.6)
MAXIMAL PREDICTED HEART RATE: 164 BPM
MCH RBC QN AUTO: 27.2 PG (ref 26.6–33)
MCH RBC QN AUTO: 27.9 PG (ref 26.6–33)
MCHC RBC AUTO-ENTMCNC: 30.7 G/DL (ref 31.5–35.7)
MCHC RBC AUTO-ENTMCNC: 32.2 G/DL (ref 31.5–35.7)
MCV RBC AUTO: 86.6 FL (ref 79–97)
MCV RBC AUTO: 88.7 FL (ref 79–97)
MONOCYTES # BLD AUTO: 0.53 10*3/MM3 (ref 0.1–0.9)
MONOCYTES # BLD AUTO: 0.56 10*3/MM3 (ref 0.1–0.9)
MONOCYTES NFR BLD AUTO: 7.4 % (ref 5–12)
MONOCYTES NFR BLD AUTO: 8 % (ref 5–12)
NEUTROPHILS NFR BLD AUTO: 5.39 10*3/MM3 (ref 1.7–7)
NEUTROPHILS NFR BLD AUTO: 6.37 10*3/MM3 (ref 1.7–7)
NEUTROPHILS NFR BLD AUTO: 80.9 % (ref 42.7–76)
NEUTROPHILS NFR BLD AUTO: 83.9 % (ref 42.7–76)
NRBC BLD AUTO-RTO: 0 /100 WBC (ref 0–0.2)
NRBC BLD AUTO-RTO: 0 /100 WBC (ref 0–0.2)
PHOSPHATE SERPL-MCNC: 2.1 MG/DL (ref 2.5–4.5)
PHOSPHATE SERPL-MCNC: 2.4 MG/DL (ref 2.5–4.5)
PHOSPHATE SERPL-MCNC: 3.2 MG/DL (ref 2.5–4.5)
PLATELET # BLD AUTO: 114 10*3/MM3 (ref 140–450)
PLATELET # BLD AUTO: 131 10*3/MM3 (ref 140–450)
PMV BLD AUTO: 11.5 FL (ref 6–12)
PMV BLD AUTO: 11.7 FL (ref 6–12)
POTASSIUM SERPL-SCNC: 3.8 MMOL/L (ref 3.5–5.2)
POTASSIUM SERPL-SCNC: 4 MMOL/L (ref 3.5–5.2)
POTASSIUM SERPL-SCNC: 4 MMOL/L (ref 3.5–5.2)
POTASSIUM SERPL-SCNC: 4.3 MMOL/L (ref 3.5–5.2)
PROT SERPL-MCNC: 5.1 G/DL (ref 6–8.5)
PROT SERPL-MCNC: 5.2 G/DL (ref 6–8.5)
PROT SERPL-MCNC: 5.4 G/DL (ref 6–8.5)
PROT SERPL-MCNC: 5.4 G/DL (ref 6–8.5)
QT INTERVAL: 598 MS
QTC INTERVAL: 582 MS
RBC # BLD AUTO: 3.71 10*6/MM3 (ref 4.14–5.8)
RBC # BLD AUTO: 3.73 10*6/MM3 (ref 4.14–5.8)
SODIUM SERPL-SCNC: 140 MMOL/L (ref 136–145)
SODIUM SERPL-SCNC: 140 MMOL/L (ref 136–145)
SODIUM SERPL-SCNC: 141 MMOL/L (ref 136–145)
SODIUM SERPL-SCNC: 142 MMOL/L (ref 136–145)
STRESS TARGET HR: 139 BPM
WBC # BLD AUTO: 6.65 10*3/MM3 (ref 3.4–10.8)
WBC # BLD AUTO: 7.59 10*3/MM3 (ref 3.4–10.8)

## 2021-07-02 PROCEDURE — 94799 UNLISTED PULMONARY SVC/PX: CPT

## 2021-07-02 PROCEDURE — 25010000002 PROPOFOL 1000 MG/ML EMULSION: Performed by: NURSE PRACTITIONER

## 2021-07-02 PROCEDURE — 99233 SBSQ HOSP IP/OBS HIGH 50: CPT | Performed by: INTERNAL MEDICINE

## 2021-07-02 PROCEDURE — 93005 ELECTROCARDIOGRAM TRACING: CPT | Performed by: NURSE PRACTITIONER

## 2021-07-02 PROCEDURE — 25010000002 DOBUTAMINE PER 250 MG: Performed by: NURSE PRACTITIONER

## 2021-07-02 PROCEDURE — 80053 COMPREHEN METABOLIC PANEL: CPT | Performed by: NURSE PRACTITIONER

## 2021-07-02 PROCEDURE — 83605 ASSAY OF LACTIC ACID: CPT | Performed by: NURSE PRACTITIONER

## 2021-07-02 PROCEDURE — 83735 ASSAY OF MAGNESIUM: CPT | Performed by: NURSE PRACTITIONER

## 2021-07-02 PROCEDURE — 84100 ASSAY OF PHOSPHORUS: CPT | Performed by: NURSE PRACTITIONER

## 2021-07-02 PROCEDURE — 25010000002 LIDOCAINE PER 10 MG: Performed by: NURSE PRACTITIONER

## 2021-07-02 PROCEDURE — 82962 GLUCOSE BLOOD TEST: CPT

## 2021-07-02 PROCEDURE — 85730 THROMBOPLASTIN TIME PARTIAL: CPT | Performed by: NURSE PRACTITIONER

## 2021-07-02 PROCEDURE — 93010 ELECTROCARDIOGRAM REPORT: CPT | Performed by: INTERNAL MEDICINE

## 2021-07-02 PROCEDURE — 82330 ASSAY OF CALCIUM: CPT | Performed by: NURSE PRACTITIONER

## 2021-07-02 PROCEDURE — 25010000002 MAGNESIUM SULFATE IN D5W 1G/100ML (PREMIX) 1-5 GM/100ML-% SOLUTION: Performed by: NURSE PRACTITIONER

## 2021-07-02 PROCEDURE — 94003 VENT MGMT INPAT SUBQ DAY: CPT

## 2021-07-02 PROCEDURE — 25010000002 PIPERACILLIN SOD-TAZOBACTAM PER 1 G: Performed by: INTERNAL MEDICINE

## 2021-07-02 PROCEDURE — 25010000002 DOPAMINE PER 40 MG: Performed by: NURSE PRACTITIONER

## 2021-07-02 PROCEDURE — 85025 COMPLETE CBC W/AUTO DIFF WBC: CPT | Performed by: NURSE PRACTITIONER

## 2021-07-02 PROCEDURE — 99232 SBSQ HOSP IP/OBS MODERATE 35: CPT | Performed by: INTERNAL MEDICINE

## 2021-07-02 PROCEDURE — 25010000002 FENTANYL CITRATE (PF) 2500 MCG/50ML SOLUTION: Performed by: NURSE PRACTITIONER

## 2021-07-02 RX ORDER — DOBUTAMINE HYDROCHLORIDE 100 MG/100ML
2-20 INJECTION INTRAVENOUS
Status: DISCONTINUED | OUTPATIENT
Start: 2021-07-02 | End: 2021-07-06

## 2021-07-02 RX ORDER — ACETAMINOPHEN 650 MG/1
650 SUPPOSITORY RECTAL EVERY 4 HOURS PRN
Status: DISCONTINUED | OUTPATIENT
Start: 2021-07-02 | End: 2021-07-17

## 2021-07-02 RX ORDER — AMINO ACIDS/PROTEIN HYDROLYS 15G-100/30
30 LIQUID (ML) ORAL DAILY
Status: DISCONTINUED | OUTPATIENT
Start: 2021-07-02 | End: 2021-07-04

## 2021-07-02 RX ORDER — ACETAMINOPHEN 325 MG/1
650 TABLET ORAL EVERY 4 HOURS PRN
Status: DISCONTINUED | OUTPATIENT
Start: 2021-07-02 | End: 2021-07-17

## 2021-07-02 RX ORDER — AMIODARONE HYDROCHLORIDE 200 MG/1
200 TABLET ORAL EVERY 12 HOURS SCHEDULED
Status: DISCONTINUED | OUTPATIENT
Start: 2021-07-02 | End: 2021-07-02

## 2021-07-02 RX ORDER — AMIODARONE HYDROCHLORIDE 200 MG/1
200 TABLET ORAL EVERY 12 HOURS SCHEDULED
Status: DISCONTINUED | OUTPATIENT
Start: 2021-07-02 | End: 2021-07-03

## 2021-07-02 RX ORDER — DOPAMINE HYDROCHLORIDE 160 MG/100ML
2-20 INJECTION, SOLUTION INTRAVENOUS
Status: DISCONTINUED | OUTPATIENT
Start: 2021-07-02 | End: 2021-07-02

## 2021-07-02 RX ORDER — LIDOCAINE HYDROCHLORIDE ANHYDROUS AND DEXTROSE MONOHYDRATE 5; 400 G/100ML; MG/100ML
1 INJECTION, SOLUTION INTRAVENOUS CONTINUOUS
Status: DISCONTINUED | OUTPATIENT
Start: 2021-07-02 | End: 2021-07-10

## 2021-07-02 RX ADMIN — ACETAMINOPHEN ORAL SOLUTION 649.6 MG: 650 SOLUTION ORAL at 04:36

## 2021-07-02 RX ADMIN — PROPOFOL 35 MCG/KG/MIN: 10 INJECTION, EMULSION INTRAVENOUS at 18:03

## 2021-07-02 RX ADMIN — PROPOFOL 35 MCG/KG/MIN: 10 INJECTION, EMULSION INTRAVENOUS at 15:50

## 2021-07-02 RX ADMIN — ASPIRIN 81 MG CHEWABLE TABLET 81 MG: 81 TABLET CHEWABLE at 08:15

## 2021-07-02 RX ADMIN — FENTANYL CITRATE 150 MCG/HR: 0.05 INJECTION, SOLUTION INTRAMUSCULAR; INTRAVENOUS at 13:51

## 2021-07-02 RX ADMIN — TAZOBACTAM SODIUM AND PIPERACILLIN SODIUM 3.38 G: 375; 3 INJECTION, SOLUTION INTRAVENOUS at 12:05

## 2021-07-02 RX ADMIN — MINERAL OIL AND PETROLATUM: 150; 830 OINTMENT OPHTHALMIC at 05:39

## 2021-07-02 RX ADMIN — TAZOBACTAM SODIUM AND PIPERACILLIN SODIUM 3.38 G: 375; 3 INJECTION, SOLUTION INTRAVENOUS at 20:00

## 2021-07-02 RX ADMIN — LIDOCAINE HYDROCHLORIDE 75 MG: 20 INJECTION INTRAVENOUS at 21:19

## 2021-07-02 RX ADMIN — CHLORHEXIDINE GLUCONATE 15 ML: 1.2 SOLUTION ORAL at 08:15

## 2021-07-02 RX ADMIN — AMIODARONE HYDROCHLORIDE 200 MG: 200 TABLET ORAL at 13:34

## 2021-07-02 RX ADMIN — PROPOFOL 30 MCG/KG/MIN: 10 INJECTION, EMULSION INTRAVENOUS at 04:36

## 2021-07-02 RX ADMIN — SODIUM CHLORIDE 125 ML/HR: 9 INJECTION, SOLUTION INTRAVENOUS at 02:32

## 2021-07-02 RX ADMIN — DOBUTAMINE HYDROCHLORIDE 2 MCG/KG/MIN: 100 INJECTION INTRAVENOUS at 17:49

## 2021-07-02 RX ADMIN — SODIUM CHLORIDE 125 ML/HR: 9 INJECTION, SOLUTION INTRAVENOUS at 13:57

## 2021-07-02 RX ADMIN — PROPOFOL 45 MCG/KG/MIN: 10 INJECTION, EMULSION INTRAVENOUS at 13:32

## 2021-07-02 RX ADMIN — PROPOFOL 45 MCG/KG/MIN: 10 INJECTION, EMULSION INTRAVENOUS at 06:47

## 2021-07-02 RX ADMIN — MINERAL OIL AND PETROLATUM: 150; 830 OINTMENT OPHTHALMIC at 04:36

## 2021-07-02 RX ADMIN — PANTOPRAZOLE SODIUM 40 MG: 40 INJECTION, POWDER, FOR SOLUTION INTRAVENOUS at 05:38

## 2021-07-02 RX ADMIN — TAZOBACTAM SODIUM AND PIPERACILLIN SODIUM 3.38 G: 375; 3 INJECTION, SOLUTION INTRAVENOUS at 04:37

## 2021-07-02 RX ADMIN — SODIUM CHLORIDE 125 ML/HR: 9 INJECTION, SOLUTION INTRAVENOUS at 08:43

## 2021-07-02 RX ADMIN — PROPOFOL 35 MCG/KG/MIN: 10 INJECTION, EMULSION INTRAVENOUS at 22:06

## 2021-07-02 RX ADMIN — PROPOFOL 35 MCG/KG/MIN: 10 INJECTION, EMULSION INTRAVENOUS at 11:15

## 2021-07-02 RX ADMIN — CHLORHEXIDINE GLUCONATE 15 ML: 1.2 SOLUTION ORAL at 20:00

## 2021-07-02 RX ADMIN — MINERAL OIL AND PETROLATUM: 150; 830 OINTMENT OPHTHALMIC at 08:15

## 2021-07-02 RX ADMIN — POTASSIUM & SODIUM PHOSPHATES POWDER PACK 280-160-250 MG 2 PACKET: 280-160-250 PACK at 12:05

## 2021-07-02 RX ADMIN — BUSPIRONE HYDROCHLORIDE 30 MG: 10 TABLET ORAL at 05:38

## 2021-07-02 RX ADMIN — DOBUTAMINE HYDROCHLORIDE 2 MCG/KG/MIN: 100 INJECTION INTRAVENOUS at 05:45

## 2021-07-02 RX ADMIN — ATORVASTATIN CALCIUM 40 MG: 40 TABLET, FILM COATED ORAL at 20:01

## 2021-07-02 RX ADMIN — PROPOFOL 35 MCG/KG/MIN: 10 INJECTION, EMULSION INTRAVENOUS at 08:46

## 2021-07-02 RX ADMIN — LIDOCAINE HYDROCHLORIDE 1 MG/MIN: 4 INJECTION, SOLUTION INTRAVENOUS at 21:25

## 2021-07-02 RX ADMIN — PROPOFOL 45 MCG/KG/MIN: 10 INJECTION, EMULSION INTRAVENOUS at 00:45

## 2021-07-02 RX ADMIN — MINERAL OIL AND PETROLATUM: 150; 830 OINTMENT OPHTHALMIC at 02:31

## 2021-07-02 RX ADMIN — SODIUM CHLORIDE 125 ML/HR: 9 INJECTION, SOLUTION INTRAVENOUS at 15:49

## 2021-07-02 RX ADMIN — Medication 30 ML: at 17:50

## 2021-07-02 RX ADMIN — DOPAMINE HYDROCHLORIDE 2 MCG/KG/MIN: 160 INJECTION, SOLUTION INTRAVENOUS at 04:53

## 2021-07-03 ENCOUNTER — APPOINTMENT (OUTPATIENT)
Dept: GENERAL RADIOLOGY | Facility: HOSPITAL | Age: 56
End: 2021-07-03

## 2021-07-03 PROBLEM — J96.01 ACUTE RESPIRATORY FAILURE WITH HYPOXIA (HCC): Status: ACTIVE | Noted: 2021-07-03

## 2021-07-03 PROBLEM — I50.811 ACUTE SYSTOLIC RIGHT HEART FAILURE: Status: ACTIVE | Noted: 2021-07-03

## 2021-07-03 LAB
ALBUMIN SERPL-MCNC: 3 G/DL (ref 3.5–5.2)
ALBUMIN SERPL-MCNC: 3.1 G/DL (ref 3.5–5.2)
ALBUMIN SERPL-MCNC: 3.3 G/DL (ref 3.5–5.2)
ALBUMIN/GLOB SERPL: 1.3 G/DL
ALP SERPL-CCNC: 84 U/L (ref 39–117)
ALP SERPL-CCNC: 86 U/L (ref 39–117)
ALP SERPL-CCNC: 87 U/L (ref 39–117)
ALT SERPL W P-5'-P-CCNC: 67 U/L (ref 1–41)
ALT SERPL W P-5'-P-CCNC: 67 U/L (ref 1–41)
ALT SERPL W P-5'-P-CCNC: 73 U/L (ref 1–41)
ANION GAP SERPL CALCULATED.3IONS-SCNC: 10 MMOL/L (ref 5–15)
ANION GAP SERPL CALCULATED.3IONS-SCNC: 10 MMOL/L (ref 5–15)
ANION GAP SERPL CALCULATED.3IONS-SCNC: 7 MMOL/L (ref 5–15)
APTT PPP: 41.4 SECONDS (ref 50–95)
ARTERIAL PATENCY WRIST A: ABNORMAL
AST SERPL-CCNC: 30 U/L (ref 1–40)
AST SERPL-CCNC: 30 U/L (ref 1–40)
AST SERPL-CCNC: 36 U/L (ref 1–40)
ATMOSPHERIC PRESS: ABNORMAL MM[HG]
BACTERIA SPEC RESP CULT: NORMAL
BASE EXCESS BLDA CALC-SCNC: -0.2 MMOL/L (ref 0–2)
BASOPHILS # BLD AUTO: 0.01 10*3/MM3 (ref 0–0.2)
BASOPHILS NFR BLD AUTO: 0.1 % (ref 0–1.5)
BDY SITE: ABNORMAL
BILIRUB SERPL-MCNC: 0.7 MG/DL (ref 0–1.2)
BODY TEMPERATURE: 37 C
BUN SERPL-MCNC: 23 MG/DL (ref 6–20)
BUN SERPL-MCNC: 24 MG/DL (ref 6–20)
BUN SERPL-MCNC: 25 MG/DL (ref 6–20)
BUN/CREAT SERPL: 16.9 (ref 7–25)
BUN/CREAT SERPL: 16.9 (ref 7–25)
BUN/CREAT SERPL: 18 (ref 7–25)
CA-I SERPL ISE-MCNC: 1.17 MMOL/L (ref 1.12–1.32)
CALCIUM SPEC-SCNC: 8.1 MG/DL (ref 8.6–10.5)
CALCIUM SPEC-SCNC: 8.2 MG/DL (ref 8.6–10.5)
CALCIUM SPEC-SCNC: 8.4 MG/DL (ref 8.6–10.5)
CHLORIDE SERPL-SCNC: 105 MMOL/L (ref 98–107)
CHLORIDE SERPL-SCNC: 107 MMOL/L (ref 98–107)
CHLORIDE SERPL-SCNC: 109 MMOL/L (ref 98–107)
CO2 BLDA-SCNC: 23.7 MMOL/L (ref 22–33)
CO2 SERPL-SCNC: 21 MMOL/L (ref 22–29)
COHGB MFR BLD: 0.7 % (ref 0–2)
CREAT SERPL-MCNC: 1.36 MG/DL (ref 0.76–1.27)
CREAT SERPL-MCNC: 1.39 MG/DL (ref 0.76–1.27)
CREAT SERPL-MCNC: 1.42 MG/DL (ref 0.76–1.27)
DEPRECATED RDW RBC AUTO: 52.2 FL (ref 37–54)
EOSINOPHIL # BLD AUTO: 0.04 10*3/MM3 (ref 0–0.4)
EOSINOPHIL NFR BLD AUTO: 0.6 % (ref 0.3–6.2)
EPAP: 0
ERYTHROCYTE [DISTWIDTH] IN BLOOD BY AUTOMATED COUNT: 16.6 % (ref 12.3–15.4)
GFR SERPL CREATININE-BSD FRML MDRD: 52 ML/MIN/1.73
GFR SERPL CREATININE-BSD FRML MDRD: 53 ML/MIN/1.73
GFR SERPL CREATININE-BSD FRML MDRD: 54 ML/MIN/1.73
GLOBULIN UR ELPH-MCNC: 2.4 GM/DL
GLOBULIN UR ELPH-MCNC: 2.4 GM/DL
GLOBULIN UR ELPH-MCNC: 2.6 GM/DL
GLUCOSE BLDC GLUCOMTR-MCNC: 106 MG/DL (ref 70–130)
GLUCOSE BLDC GLUCOMTR-MCNC: 117 MG/DL (ref 70–130)
GLUCOSE BLDC GLUCOMTR-MCNC: 97 MG/DL (ref 70–130)
GLUCOSE BLDC GLUCOMTR-MCNC: 99 MG/DL (ref 70–130)
GLUCOSE SERPL-MCNC: 114 MG/DL (ref 65–99)
GLUCOSE SERPL-MCNC: 119 MG/DL (ref 65–99)
GLUCOSE SERPL-MCNC: 125 MG/DL (ref 65–99)
GRAM STN SPEC: NORMAL
GRAM STN SPEC: NORMAL
HCO3 BLDA-SCNC: 22.8 MMOL/L (ref 20–26)
HCT VFR BLD AUTO: 32.9 % (ref 37.5–51)
HCT VFR BLD CALC: 33.1 %
HGB BLD-MCNC: 10.4 G/DL (ref 13–17.7)
HGB BLDA-MCNC: 10.8 G/DL (ref 13.5–17.5)
IMM GRANULOCYTES # BLD AUTO: 0.04 10*3/MM3 (ref 0–0.05)
IMM GRANULOCYTES NFR BLD AUTO: 0.6 % (ref 0–0.5)
INHALED O2 CONCENTRATION: 35 %
IPAP: 0
LYMPHOCYTES # BLD AUTO: 0.53 10*3/MM3 (ref 0.7–3.1)
LYMPHOCYTES NFR BLD AUTO: 7.6 % (ref 19.6–45.3)
MAGNESIUM SERPL-MCNC: 2.1 MG/DL (ref 1.6–2.6)
MAGNESIUM SERPL-MCNC: 2.2 MG/DL (ref 1.6–2.6)
MCH RBC QN AUTO: 27.4 PG (ref 26.6–33)
MCHC RBC AUTO-ENTMCNC: 31.6 G/DL (ref 31.5–35.7)
MCV RBC AUTO: 86.6 FL (ref 79–97)
METHGB BLD QL: 0.8 % (ref 0–1.5)
MODALITY: ABNORMAL
MONOCYTES # BLD AUTO: 0.49 10*3/MM3 (ref 0.1–0.9)
MONOCYTES NFR BLD AUTO: 7.1 % (ref 5–12)
NEUTROPHILS NFR BLD AUTO: 5.84 10*3/MM3 (ref 1.7–7)
NEUTROPHILS NFR BLD AUTO: 84 % (ref 42.7–76)
NOTE: ABNORMAL
NRBC BLD AUTO-RTO: 0 /100 WBC (ref 0–0.2)
OXYHGB MFR BLDV: 96.3 % (ref 94–99)
PAW @ PEAK INSP FLOW SETTING VENT: 0 CMH2O
PCO2 BLDA: 30.7 MM HG (ref 35–45)
PCO2 TEMP ADJ BLD: 30.7 MM HG (ref 35–48)
PH BLDA: 7.48 PH UNITS (ref 7.35–7.45)
PH, TEMP CORRECTED: 7.48 PH UNITS
PHOSPHATE SERPL-MCNC: 3 MG/DL (ref 2.5–4.5)
PHOSPHATE SERPL-MCNC: 3.3 MG/DL (ref 2.5–4.5)
PLATELET # BLD AUTO: 133 10*3/MM3 (ref 140–450)
PMV BLD AUTO: 12.2 FL (ref 6–12)
PO2 BLDA: 98.2 MM HG (ref 83–108)
PO2 TEMP ADJ BLD: 98.2 MM HG (ref 83–108)
POTASSIUM SERPL-SCNC: 3.5 MMOL/L (ref 3.5–5.2)
POTASSIUM SERPL-SCNC: 4 MMOL/L (ref 3.5–5.2)
POTASSIUM SERPL-SCNC: 4 MMOL/L (ref 3.5–5.2)
PROT SERPL-MCNC: 5.4 G/DL (ref 6–8.5)
PROT SERPL-MCNC: 5.5 G/DL (ref 6–8.5)
PROT SERPL-MCNC: 5.9 G/DL (ref 6–8.5)
RBC # BLD AUTO: 3.8 10*6/MM3 (ref 4.14–5.8)
SODIUM SERPL-SCNC: 135 MMOL/L (ref 136–145)
SODIUM SERPL-SCNC: 136 MMOL/L (ref 136–145)
SODIUM SERPL-SCNC: 140 MMOL/L (ref 136–145)
TOTAL RATE: 0 BREATHS/MINUTE
UFH PPP CHRO-ACNC: 0.1 IU/ML (ref 0.3–0.7)
UFH PPP CHRO-ACNC: 0.1 IU/ML (ref 0.3–0.7)
UFH PPP CHRO-ACNC: 0.28 IU/ML (ref 0.3–0.7)
WBC # BLD AUTO: 6.95 10*3/MM3 (ref 3.4–10.8)

## 2021-07-03 PROCEDURE — 84100 ASSAY OF PHOSPHORUS: CPT | Performed by: INTERNAL MEDICINE

## 2021-07-03 PROCEDURE — 82962 GLUCOSE BLOOD TEST: CPT

## 2021-07-03 PROCEDURE — 82805 BLOOD GASES W/O2 SATURATION: CPT

## 2021-07-03 PROCEDURE — 25010000002 HYDRALAZINE PER 20 MG: Performed by: NURSE PRACTITIONER

## 2021-07-03 PROCEDURE — 94003 VENT MGMT INPAT SUBQ DAY: CPT

## 2021-07-03 PROCEDURE — 99291 CRITICAL CARE FIRST HOUR: CPT | Performed by: INTERNAL MEDICINE

## 2021-07-03 PROCEDURE — 85025 COMPLETE CBC W/AUTO DIFF WBC: CPT | Performed by: INTERNAL MEDICINE

## 2021-07-03 PROCEDURE — 83735 ASSAY OF MAGNESIUM: CPT | Performed by: INTERNAL MEDICINE

## 2021-07-03 PROCEDURE — 25010000002 PROPOFOL 1000 MG/ML EMULSION: Performed by: NURSE PRACTITIONER

## 2021-07-03 PROCEDURE — 93005 ELECTROCARDIOGRAM TRACING: CPT | Performed by: NURSE PRACTITIONER

## 2021-07-03 PROCEDURE — 82375 ASSAY CARBOXYHB QUANT: CPT

## 2021-07-03 PROCEDURE — 25010000002 PIPERACILLIN SOD-TAZOBACTAM PER 1 G: Performed by: INTERNAL MEDICINE

## 2021-07-03 PROCEDURE — 85730 THROMBOPLASTIN TIME PARTIAL: CPT | Performed by: NURSE PRACTITIONER

## 2021-07-03 PROCEDURE — 25010000002 HEPARIN (PORCINE) PER 1000 UNITS

## 2021-07-03 PROCEDURE — 80053 COMPREHEN METABOLIC PANEL: CPT | Performed by: NURSE PRACTITIONER

## 2021-07-03 PROCEDURE — 93010 ELECTROCARDIOGRAM REPORT: CPT | Performed by: INTERNAL MEDICINE

## 2021-07-03 PROCEDURE — 25010000002 FENTANYL CITRATE (PF) 2500 MCG/50ML SOLUTION: Performed by: NURSE PRACTITIONER

## 2021-07-03 PROCEDURE — 83050 HGB METHEMOGLOBIN QUAN: CPT

## 2021-07-03 PROCEDURE — 85520 HEPARIN ASSAY: CPT | Performed by: NURSE PRACTITIONER

## 2021-07-03 PROCEDURE — 25010000002 HEPARIN (PORCINE) PER 1000 UNITS: Performed by: NURSE PRACTITIONER

## 2021-07-03 PROCEDURE — 36600 WITHDRAWAL OF ARTERIAL BLOOD: CPT

## 2021-07-03 PROCEDURE — 85520 HEPARIN ASSAY: CPT

## 2021-07-03 PROCEDURE — 25010000002 DOBUTAMINE PER 250 MG: Performed by: NURSE PRACTITIONER

## 2021-07-03 PROCEDURE — 25010000002 HEPARIN (PORCINE) 25000-0.45 UT/250ML-% SOLUTION: Performed by: NURSE PRACTITIONER

## 2021-07-03 PROCEDURE — 94799 UNLISTED PULMONARY SVC/PX: CPT

## 2021-07-03 PROCEDURE — 82330 ASSAY OF CALCIUM: CPT | Performed by: INTERNAL MEDICINE

## 2021-07-03 PROCEDURE — 71045 X-RAY EXAM CHEST 1 VIEW: CPT

## 2021-07-03 PROCEDURE — 99232 SBSQ HOSP IP/OBS MODERATE 35: CPT | Performed by: INTERNAL MEDICINE

## 2021-07-03 RX ORDER — BUMETANIDE 0.25 MG/ML
1 INJECTION INTRAMUSCULAR; INTRAVENOUS ONCE
Status: COMPLETED | OUTPATIENT
Start: 2021-07-03 | End: 2021-07-03

## 2021-07-03 RX ORDER — HEPARIN SODIUM 1000 [USP'U]/ML
4500 INJECTION, SOLUTION INTRAVENOUS; SUBCUTANEOUS ONCE
Status: DISCONTINUED | OUTPATIENT
Start: 2021-07-03 | End: 2021-07-03 | Stop reason: SDUPTHER

## 2021-07-03 RX ORDER — HEPARIN SODIUM 1000 [USP'U]/ML
7500 INJECTION, SOLUTION INTRAVENOUS; SUBCUTANEOUS ONCE
Status: COMPLETED | OUTPATIENT
Start: 2021-07-03 | End: 2021-07-03

## 2021-07-03 RX ORDER — HEPARIN SODIUM 10000 [USP'U]/100ML
16 INJECTION, SOLUTION INTRAVENOUS
Status: DISCONTINUED | OUTPATIENT
Start: 2021-07-03 | End: 2021-07-09

## 2021-07-03 RX ORDER — BUMETANIDE 0.25 MG/ML
1 INJECTION INTRAMUSCULAR; INTRAVENOUS ONCE
Status: DISCONTINUED | OUTPATIENT
Start: 2021-07-03 | End: 2021-07-03

## 2021-07-03 RX ORDER — HYDRALAZINE HYDROCHLORIDE 20 MG/ML
10 INJECTION INTRAMUSCULAR; INTRAVENOUS ONCE
Status: COMPLETED | OUTPATIENT
Start: 2021-07-03 | End: 2021-07-03

## 2021-07-03 RX ORDER — POTASSIUM CHLORIDE 1.5 G/1.77G
40 POWDER, FOR SOLUTION ORAL ONCE
Status: COMPLETED | OUTPATIENT
Start: 2021-07-03 | End: 2021-07-03

## 2021-07-03 RX ORDER — HEPARIN SODIUM 1000 [USP'U]/ML
4000 INJECTION, SOLUTION INTRAVENOUS; SUBCUTANEOUS ONCE
Status: COMPLETED | OUTPATIENT
Start: 2021-07-03 | End: 2021-07-03

## 2021-07-03 RX ADMIN — POTASSIUM CHLORIDE 40 MEQ: 1.5 POWDER, FOR SOLUTION ORAL at 11:54

## 2021-07-03 RX ADMIN — PROPOFOL 40 MCG/KG/MIN: 10 INJECTION, EMULSION INTRAVENOUS at 00:04

## 2021-07-03 RX ADMIN — CHLORHEXIDINE GLUCONATE 15 ML: 1.2 SOLUTION ORAL at 20:12

## 2021-07-03 RX ADMIN — HEPARIN SODIUM 7500 UNITS: 1000 INJECTION INTRAVENOUS; SUBCUTANEOUS at 16:18

## 2021-07-03 RX ADMIN — SODIUM CHLORIDE 125 ML/HR: 9 INJECTION, SOLUTION INTRAVENOUS at 08:24

## 2021-07-03 RX ADMIN — DOBUTAMINE HYDROCHLORIDE 2 MCG/KG/MIN: 100 INJECTION INTRAVENOUS at 07:26

## 2021-07-03 RX ADMIN — NICARDIPINE HYDROCHLORIDE 5 MG/HR: 0.1 INJECTION, SOLUTION INTRAVENOUS at 13:14

## 2021-07-03 RX ADMIN — DOBUTAMINE HYDROCHLORIDE 2 MCG/KG/MIN: 100 INJECTION INTRAVENOUS at 20:21

## 2021-07-03 RX ADMIN — PROPOFOL 35 MCG/KG/MIN: 10 INJECTION, EMULSION INTRAVENOUS at 18:36

## 2021-07-03 RX ADMIN — PROPOFOL 30 MCG/KG/MIN: 10 INJECTION, EMULSION INTRAVENOUS at 22:16

## 2021-07-03 RX ADMIN — PROPOFOL 35 MCG/KG/MIN: 10 INJECTION, EMULSION INTRAVENOUS at 10:29

## 2021-07-03 RX ADMIN — TAZOBACTAM SODIUM AND PIPERACILLIN SODIUM 3.38 G: 375; 3 INJECTION, SOLUTION INTRAVENOUS at 13:15

## 2021-07-03 RX ADMIN — SODIUM CHLORIDE 125 ML/HR: 9 INJECTION, SOLUTION INTRAVENOUS at 00:54

## 2021-07-03 RX ADMIN — PROPOFOL 35 MCG/KG/MIN: 10 INJECTION, EMULSION INTRAVENOUS at 07:25

## 2021-07-03 RX ADMIN — TAZOBACTAM SODIUM AND PIPERACILLIN SODIUM 3.38 G: 375; 3 INJECTION, SOLUTION INTRAVENOUS at 05:00

## 2021-07-03 RX ADMIN — NICARDIPINE HYDROCHLORIDE 2.5 MG/HR: 0.1 INJECTION, SOLUTION INTRAVENOUS at 23:39

## 2021-07-03 RX ADMIN — TAZOBACTAM SODIUM AND PIPERACILLIN SODIUM 3.38 G: 375; 3 INJECTION, SOLUTION INTRAVENOUS at 20:12

## 2021-07-03 RX ADMIN — FENTANYL CITRATE 150 MCG/HR: 0.05 INJECTION, SOLUTION INTRAMUSCULAR; INTRAVENOUS at 22:49

## 2021-07-03 RX ADMIN — NICARDIPINE HYDROCHLORIDE 5 MG/HR: 0.1 INJECTION, SOLUTION INTRAVENOUS at 17:01

## 2021-07-03 RX ADMIN — BUMETANIDE 1 MG: 0.25 INJECTION, SOLUTION INTRAMUSCULAR; INTRAVENOUS at 13:24

## 2021-07-03 RX ADMIN — HYDRALAZINE HYDROCHLORIDE 10 MG: 20 INJECTION INTRAMUSCULAR; INTRAVENOUS at 06:12

## 2021-07-03 RX ADMIN — NICARDIPINE HYDROCHLORIDE 5 MG/HR: 0.1 INJECTION, SOLUTION INTRAVENOUS at 09:24

## 2021-07-03 RX ADMIN — PANTOPRAZOLE SODIUM 40 MG: 40 INJECTION, POWDER, FOR SOLUTION INTRAVENOUS at 05:00

## 2021-07-03 RX ADMIN — CHLORHEXIDINE GLUCONATE 15 ML: 1.2 SOLUTION ORAL at 09:27

## 2021-07-03 RX ADMIN — PROPOFOL 35 MCG/KG/MIN: 10 INJECTION, EMULSION INTRAVENOUS at 13:14

## 2021-07-03 RX ADMIN — HEPARIN SODIUM 7 UNITS/KG/HR: 10000 INJECTION, SOLUTION INTRAVENOUS at 10:01

## 2021-07-03 RX ADMIN — FENTANYL CITRATE 150 MCG/HR: 0.05 INJECTION, SOLUTION INTRAMUSCULAR; INTRAVENOUS at 07:26

## 2021-07-03 RX ADMIN — PROPOFOL 40 MCG/KG/MIN: 10 INJECTION, EMULSION INTRAVENOUS at 05:57

## 2021-07-03 RX ADMIN — ATORVASTATIN CALCIUM 40 MG: 40 TABLET, FILM COATED ORAL at 20:12

## 2021-07-03 RX ADMIN — Medication 30 ML: at 09:43

## 2021-07-03 RX ADMIN — ASPIRIN 81 MG CHEWABLE TABLET 81 MG: 81 TABLET CHEWABLE at 09:27

## 2021-07-03 RX ADMIN — PROPOFOL 45 MCG/KG/MIN: 10 INJECTION, EMULSION INTRAVENOUS at 02:33

## 2021-07-03 RX ADMIN — PROPOFOL 35 MCG/KG/MIN: 10 INJECTION, EMULSION INTRAVENOUS at 15:26

## 2021-07-03 RX ADMIN — HEPARIN SODIUM 4000 UNITS: 1000 INJECTION INTRAVENOUS; SUBCUTANEOUS at 10:01

## 2021-07-04 ENCOUNTER — APPOINTMENT (OUTPATIENT)
Dept: GENERAL RADIOLOGY | Facility: HOSPITAL | Age: 56
End: 2021-07-04

## 2021-07-04 LAB
ALBUMIN SERPL-MCNC: 3.1 G/DL (ref 3.5–5.2)
ALP SERPL-CCNC: 88 U/L (ref 39–117)
ALT SERPL W P-5'-P-CCNC: 50 U/L (ref 1–41)
ANION GAP SERPL CALCULATED.3IONS-SCNC: 16 MMOL/L (ref 5–15)
ANION GAP SERPL CALCULATED.3IONS-SCNC: 8 MMOL/L (ref 5–15)
AST SERPL-CCNC: 23 U/L (ref 1–40)
BASOPHILS # BLD AUTO: 0.02 10*3/MM3 (ref 0–0.2)
BASOPHILS NFR BLD AUTO: 0.2 % (ref 0–1.5)
BILIRUB SERPL-MCNC: 0.6 MG/DL (ref 0–1.2)
BUN SERPL-MCNC: 27 MG/DL (ref 6–20)
BUN SERPL-MCNC: 28 MG/DL (ref 6–20)
BUN/CREAT SERPL: 21.7 (ref 7–25)
CALCIUM SPEC-SCNC: 8.2 MG/DL (ref 8.6–10.5)
CALCIUM SPEC-SCNC: 8.4 MG/DL (ref 8.6–10.5)
CALCIUM SPEC-SCNC: 8.5 MG/DL (ref 8.6–10.5)
CHLORIDE SERPL-SCNC: 108 MMOL/L (ref 98–107)
CHLORIDE SERPL-SCNC: 109 MMOL/L (ref 98–107)
CHOLEST SERPL-MCNC: 110 MG/DL (ref 0–200)
CO2 SERPL-SCNC: 15 MMOL/L (ref 22–29)
CO2 SERPL-SCNC: 22 MMOL/L (ref 22–29)
CREAT SERPL-MCNC: 1.29 MG/DL (ref 0.76–1.27)
CREAT SERPL-MCNC: 1.36 MG/DL (ref 0.76–1.27)
DEPRECATED RDW RBC AUTO: 55.5 FL (ref 37–54)
EOSINOPHIL # BLD AUTO: 0.08 10*3/MM3 (ref 0–0.4)
EOSINOPHIL NFR BLD AUTO: 0.9 % (ref 0.3–6.2)
ERYTHROCYTE [DISTWIDTH] IN BLOOD BY AUTOMATED COUNT: 16.4 % (ref 12.3–15.4)
GFR SERPL CREATININE-BSD FRML MDRD: 58 ML/MIN/1.73
GLUCOSE BLDC GLUCOMTR-MCNC: 101 MG/DL (ref 70–130)
GLUCOSE BLDC GLUCOMTR-MCNC: 101 MG/DL (ref 70–130)
GLUCOSE BLDC GLUCOMTR-MCNC: 110 MG/DL (ref 70–130)
GLUCOSE BLDC GLUCOMTR-MCNC: 93 MG/DL (ref 70–130)
GLUCOSE SERPL-MCNC: 105 MG/DL (ref 65–99)
GLUCOSE SERPL-MCNC: 113 MG/DL (ref 65–99)
HCT VFR BLD AUTO: 35.6 % (ref 37.5–51)
HGB BLD-MCNC: 10.5 G/DL (ref 13–17.7)
IMM GRANULOCYTES # BLD AUTO: 0.03 10*3/MM3 (ref 0–0.05)
IMM GRANULOCYTES NFR BLD AUTO: 0.4 % (ref 0–0.5)
LYMPHOCYTES # BLD AUTO: 0.54 10*3/MM3 (ref 0.7–3.1)
LYMPHOCYTES NFR BLD AUTO: 6.4 % (ref 19.6–45.3)
MAGNESIUM SERPL-MCNC: 2.1 MG/DL (ref 1.6–2.6)
MAGNESIUM SERPL-MCNC: 2.1 MG/DL (ref 1.6–2.6)
MCH RBC QN AUTO: 27.3 PG (ref 26.6–33)
MCHC RBC AUTO-ENTMCNC: 29.5 G/DL (ref 31.5–35.7)
MCV RBC AUTO: 92.5 FL (ref 79–97)
MONOCYTES # BLD AUTO: 0.62 10*3/MM3 (ref 0.1–0.9)
MONOCYTES NFR BLD AUTO: 7.3 % (ref 5–12)
NEUTROPHILS NFR BLD AUTO: 7.18 10*3/MM3 (ref 1.7–7)
NEUTROPHILS NFR BLD AUTO: 84.8 % (ref 42.7–76)
NRBC BLD AUTO-RTO: 0 /100 WBC (ref 0–0.2)
PHOSPHATE SERPL-MCNC: 3.3 MG/DL (ref 2.5–4.5)
PHOSPHATE SERPL-MCNC: 3.5 MG/DL (ref 2.5–4.5)
PLATELET # BLD AUTO: 127 10*3/MM3 (ref 140–450)
PMV BLD AUTO: 11.7 FL (ref 6–12)
POTASSIUM SERPL-SCNC: 4.3 MMOL/L (ref 3.5–5.2)
POTASSIUM SERPL-SCNC: 4.4 MMOL/L (ref 3.5–5.2)
PROT SERPL-MCNC: 5.7 G/DL (ref 6–8.5)
RBC # BLD AUTO: 3.85 10*6/MM3 (ref 4.14–5.8)
SODIUM SERPL-SCNC: 139 MMOL/L (ref 136–145)
SODIUM SERPL-SCNC: 139 MMOL/L (ref 136–145)
TRIGL SERPL-MCNC: 75 MG/DL (ref 0–150)
UFH PPP CHRO-ACNC: 0.1 IU/ML (ref 0.3–0.7)
UFH PPP CHRO-ACNC: 0.62 IU/ML (ref 0.3–0.7)
UFH PPP CHRO-ACNC: 0.69 IU/ML (ref 0.3–0.7)
WBC # BLD AUTO: 8.47 10*3/MM3 (ref 3.4–10.8)

## 2021-07-04 PROCEDURE — 84100 ASSAY OF PHOSPHORUS: CPT | Performed by: INTERNAL MEDICINE

## 2021-07-04 PROCEDURE — 82310 ASSAY OF CALCIUM: CPT | Performed by: INTERNAL MEDICINE

## 2021-07-04 PROCEDURE — 25010000002 HEPARIN (PORCINE) 25000-0.45 UT/250ML-% SOLUTION

## 2021-07-04 PROCEDURE — 85520 HEPARIN ASSAY: CPT

## 2021-07-04 PROCEDURE — 94003 VENT MGMT INPAT SUBQ DAY: CPT

## 2021-07-04 PROCEDURE — 99291 CRITICAL CARE FIRST HOUR: CPT | Performed by: INTERNAL MEDICINE

## 2021-07-04 PROCEDURE — 25010000002 PIPERACILLIN SOD-TAZOBACTAM PER 1 G: Performed by: INTERNAL MEDICINE

## 2021-07-04 PROCEDURE — 99232 SBSQ HOSP IP/OBS MODERATE 35: CPT | Performed by: INTERNAL MEDICINE

## 2021-07-04 PROCEDURE — 25010000002 PROPOFOL 1000 MG/ML EMULSION: Performed by: NURSE PRACTITIONER

## 2021-07-04 PROCEDURE — 83735 ASSAY OF MAGNESIUM: CPT

## 2021-07-04 PROCEDURE — 94799 UNLISTED PULMONARY SVC/PX: CPT

## 2021-07-04 PROCEDURE — 83735 ASSAY OF MAGNESIUM: CPT | Performed by: INTERNAL MEDICINE

## 2021-07-04 PROCEDURE — 25010000002 HEPARIN (PORCINE) PER 1000 UNITS

## 2021-07-04 PROCEDURE — 84478 ASSAY OF TRIGLYCERIDES: CPT

## 2021-07-04 PROCEDURE — 25010000002 DOBUTAMINE PER 250 MG: Performed by: NURSE PRACTITIONER

## 2021-07-04 PROCEDURE — 82962 GLUCOSE BLOOD TEST: CPT

## 2021-07-04 PROCEDURE — 25010000002 FUROSEMIDE PER 20 MG: Performed by: INTERNAL MEDICINE

## 2021-07-04 PROCEDURE — 80053 COMPREHEN METABOLIC PANEL: CPT | Performed by: INTERNAL MEDICINE

## 2021-07-04 PROCEDURE — 82465 ASSAY BLD/SERUM CHOLESTEROL: CPT

## 2021-07-04 PROCEDURE — 25010000002 LIDOCAINE PER 10 MG: Performed by: NURSE PRACTITIONER

## 2021-07-04 PROCEDURE — 25010000002 FENTANYL 10 MCG/1 ML NS: Performed by: NURSE PRACTITIONER

## 2021-07-04 PROCEDURE — 71045 X-RAY EXAM CHEST 1 VIEW: CPT

## 2021-07-04 PROCEDURE — 84100 ASSAY OF PHOSPHORUS: CPT

## 2021-07-04 RX ORDER — HEPARIN SODIUM 1000 [USP'U]/ML
5000 INJECTION, SOLUTION INTRAVENOUS; SUBCUTANEOUS ONCE
Status: COMPLETED | OUTPATIENT
Start: 2021-07-04 | End: 2021-07-04

## 2021-07-04 RX ORDER — AMINO ACIDS/PROTEIN HYDROLYS 15G-100/30
30 LIQUID (ML) ORAL 4 TIMES DAILY
Status: DISCONTINUED | OUTPATIENT
Start: 2021-07-04 | End: 2021-07-14

## 2021-07-04 RX ORDER — FUROSEMIDE 10 MG/ML
40 INJECTION INTRAMUSCULAR; INTRAVENOUS ONCE
Status: COMPLETED | OUTPATIENT
Start: 2021-07-04 | End: 2021-07-04

## 2021-07-04 RX ADMIN — HEPARIN SODIUM 13 UNITS/KG/HR: 10000 INJECTION, SOLUTION INTRAVENOUS at 02:30

## 2021-07-04 RX ADMIN — PROPOFOL 35 MCG/KG/MIN: 10 INJECTION, EMULSION INTRAVENOUS at 01:33

## 2021-07-04 RX ADMIN — PROPOFOL 35 MCG/KG/MIN: 10 INJECTION, EMULSION INTRAVENOUS at 20:44

## 2021-07-04 RX ADMIN — TAZOBACTAM SODIUM AND PIPERACILLIN SODIUM 3.38 G: 375; 3 INJECTION, SOLUTION INTRAVENOUS at 20:44

## 2021-07-04 RX ADMIN — PROPOFOL 35 MCG/KG/MIN: 10 INJECTION, EMULSION INTRAVENOUS at 04:21

## 2021-07-04 RX ADMIN — FENTANYL CITRATE 200 MCG/HR: 0.05 INJECTION, SOLUTION INTRAMUSCULAR; INTRAVENOUS at 12:52

## 2021-07-04 RX ADMIN — PROPOFOL 40 MCG/KG/MIN: 10 INJECTION, EMULSION INTRAVENOUS at 16:17

## 2021-07-04 RX ADMIN — PROPOFOL 35 MCG/KG/MIN: 10 INJECTION, EMULSION INTRAVENOUS at 08:12

## 2021-07-04 RX ADMIN — ATORVASTATIN CALCIUM 40 MG: 40 TABLET, FILM COATED ORAL at 20:44

## 2021-07-04 RX ADMIN — PROPOFOL 40 MCG/KG/MIN: 10 INJECTION, EMULSION INTRAVENOUS at 10:54

## 2021-07-04 RX ADMIN — PROPOFOL 40 MCG/KG/MIN: 10 INJECTION, EMULSION INTRAVENOUS at 12:50

## 2021-07-04 RX ADMIN — Medication 30 ML: at 08:52

## 2021-07-04 RX ADMIN — SODIUM CHLORIDE, PRESERVATIVE FREE 10 ML: 5 INJECTION INTRAVENOUS at 20:44

## 2021-07-04 RX ADMIN — PROPOFOL 35 MCG/KG/MIN: 10 INJECTION, EMULSION INTRAVENOUS at 18:28

## 2021-07-04 RX ADMIN — HEPARIN SODIUM 5000 UNITS: 1000 INJECTION INTRAVENOUS; SUBCUTANEOUS at 08:12

## 2021-07-04 RX ADMIN — ASPIRIN 81 MG CHEWABLE TABLET 81 MG: 81 TABLET CHEWABLE at 08:16

## 2021-07-04 RX ADMIN — LIDOCAINE HYDROCHLORIDE 1 MG/MIN: 4 INJECTION, SOLUTION INTRAVENOUS at 03:43

## 2021-07-04 RX ADMIN — TAZOBACTAM SODIUM AND PIPERACILLIN SODIUM 3.38 G: 375; 3 INJECTION, SOLUTION INTRAVENOUS at 05:50

## 2021-07-04 RX ADMIN — NICARDIPINE HYDROCHLORIDE 5 MG/HR: 0.1 INJECTION, SOLUTION INTRAVENOUS at 13:15

## 2021-07-04 RX ADMIN — CHLORHEXIDINE GLUCONATE 15 ML: 1.2 SOLUTION ORAL at 08:12

## 2021-07-04 RX ADMIN — NICARDIPINE HYDROCHLORIDE 5 MG/HR: 0.1 INJECTION, SOLUTION INTRAVENOUS at 16:18

## 2021-07-04 RX ADMIN — CHLORHEXIDINE GLUCONATE 15 ML: 1.2 SOLUTION ORAL at 20:44

## 2021-07-04 RX ADMIN — PANTOPRAZOLE SODIUM 40 MG: 40 INJECTION, POWDER, FOR SOLUTION INTRAVENOUS at 05:50

## 2021-07-04 RX ADMIN — TAZOBACTAM SODIUM AND PIPERACILLIN SODIUM 3.38 G: 375; 3 INJECTION, SOLUTION INTRAVENOUS at 12:22

## 2021-07-04 RX ADMIN — HEPARIN SODIUM 16 UNITS/KG/HR: 10000 INJECTION, SOLUTION INTRAVENOUS at 22:31

## 2021-07-04 RX ADMIN — FUROSEMIDE 40 MG: 10 INJECTION, SOLUTION INTRAMUSCULAR; INTRAVENOUS at 10:21

## 2021-07-04 RX ADMIN — DOBUTAMINE HYDROCHLORIDE 2 MCG/KG/MIN: 100 INJECTION INTRAVENOUS at 10:54

## 2021-07-04 RX ADMIN — HEPARIN SODIUM 16 UNITS/KG/HR: 10000 INJECTION, SOLUTION INTRAVENOUS at 12:23

## 2021-07-05 ENCOUNTER — APPOINTMENT (OUTPATIENT)
Dept: GENERAL RADIOLOGY | Facility: HOSPITAL | Age: 56
End: 2021-07-05

## 2021-07-05 LAB
ANION GAP SERPL CALCULATED.3IONS-SCNC: 10 MMOL/L (ref 5–15)
APTT PPP: 46.5 SECONDS (ref 50–95)
BASOPHILS # BLD AUTO: 0.02 10*3/MM3 (ref 0–0.2)
BASOPHILS NFR BLD AUTO: 0.3 % (ref 0–1.5)
BUN SERPL-MCNC: 31 MG/DL (ref 6–20)
BUN/CREAT SERPL: 24.4 (ref 7–25)
CALCIUM SPEC-SCNC: 8.4 MG/DL (ref 8.6–10.5)
CHLORIDE SERPL-SCNC: 107 MMOL/L (ref 98–107)
CO2 SERPL-SCNC: 21 MMOL/L (ref 22–29)
CREAT SERPL-MCNC: 1.27 MG/DL (ref 0.76–1.27)
CRP SERPL-MCNC: 7.33 MG/DL (ref 0–0.5)
DEPRECATED RDW RBC AUTO: 51.8 FL (ref 37–54)
EOSINOPHIL # BLD AUTO: 0.29 10*3/MM3 (ref 0–0.4)
EOSINOPHIL NFR BLD AUTO: 4.4 % (ref 0.3–6.2)
ERYTHROCYTE [DISTWIDTH] IN BLOOD BY AUTOMATED COUNT: 16.4 % (ref 12.3–15.4)
GFR SERPL CREATININE-BSD FRML MDRD: 59 ML/MIN/1.73
GLUCOSE BLDC GLUCOMTR-MCNC: 100 MG/DL (ref 70–130)
GLUCOSE BLDC GLUCOMTR-MCNC: 106 MG/DL (ref 70–130)
GLUCOSE BLDC GLUCOMTR-MCNC: 112 MG/DL (ref 70–130)
GLUCOSE BLDC GLUCOMTR-MCNC: 98 MG/DL (ref 70–130)
GLUCOSE SERPL-MCNC: 98 MG/DL (ref 65–99)
HCT VFR BLD AUTO: 31.3 % (ref 37.5–51)
HGB BLD-MCNC: 9.6 G/DL (ref 13–17.7)
IMM GRANULOCYTES # BLD AUTO: 0.04 10*3/MM3 (ref 0–0.05)
IMM GRANULOCYTES NFR BLD AUTO: 0.6 % (ref 0–0.5)
INR PPP: 1.41 (ref 0.85–1.16)
LYMPHOCYTES # BLD AUTO: 0.81 10*3/MM3 (ref 0.7–3.1)
LYMPHOCYTES NFR BLD AUTO: 12.3 % (ref 19.6–45.3)
MAGNESIUM SERPL-MCNC: 2.1 MG/DL (ref 1.6–2.6)
MCH RBC QN AUTO: 26.5 PG (ref 26.6–33)
MCHC RBC AUTO-ENTMCNC: 30.7 G/DL (ref 31.5–35.7)
MCV RBC AUTO: 86.5 FL (ref 79–97)
MONOCYTES # BLD AUTO: 0.61 10*3/MM3 (ref 0.1–0.9)
MONOCYTES NFR BLD AUTO: 9.2 % (ref 5–12)
NEUTROPHILS NFR BLD AUTO: 4.84 10*3/MM3 (ref 1.7–7)
NEUTROPHILS NFR BLD AUTO: 73.2 % (ref 42.7–76)
NRBC BLD AUTO-RTO: 0 /100 WBC (ref 0–0.2)
PHOSPHATE SERPL-MCNC: 3 MG/DL (ref 2.5–4.5)
PLATELET # BLD AUTO: 142 10*3/MM3 (ref 140–450)
PMV BLD AUTO: 11.8 FL (ref 6–12)
POTASSIUM SERPL-SCNC: 4.1 MMOL/L (ref 3.5–5.2)
PREALB SERPL-MCNC: 13.5 MG/DL (ref 20–40)
PROTHROMBIN TIME: 16.7 SECONDS (ref 11.4–14.4)
QT INTERVAL: 536 MS
QTC INTERVAL: 582 MS
RBC # BLD AUTO: 3.62 10*6/MM3 (ref 4.14–5.8)
SODIUM SERPL-SCNC: 138 MMOL/L (ref 136–145)
UFH PPP CHRO-ACNC: 0.49 IU/ML (ref 0.3–0.7)
WBC # BLD AUTO: 6.61 10*3/MM3 (ref 3.4–10.8)

## 2021-07-05 PROCEDURE — 84134 ASSAY OF PREALBUMIN: CPT

## 2021-07-05 PROCEDURE — 25010000002 FENTANYL CITRATE (PF) 2500 MCG/50ML SOLUTION

## 2021-07-05 PROCEDURE — 99232 SBSQ HOSP IP/OBS MODERATE 35: CPT | Performed by: INTERNAL MEDICINE

## 2021-07-05 PROCEDURE — 85730 THROMBOPLASTIN TIME PARTIAL: CPT | Performed by: INTERNAL MEDICINE

## 2021-07-05 PROCEDURE — 94799 UNLISTED PULMONARY SVC/PX: CPT

## 2021-07-05 PROCEDURE — 25010000002 LIDOCAINE PER 10 MG: Performed by: NURSE PRACTITIONER

## 2021-07-05 PROCEDURE — 99291 CRITICAL CARE FIRST HOUR: CPT | Performed by: INTERNAL MEDICINE

## 2021-07-05 PROCEDURE — 94003 VENT MGMT INPAT SUBQ DAY: CPT

## 2021-07-05 PROCEDURE — 25010000002 PIPERACILLIN SOD-TAZOBACTAM PER 1 G: Performed by: INTERNAL MEDICINE

## 2021-07-05 PROCEDURE — 25010000002 FUROSEMIDE PER 20 MG: Performed by: INTERNAL MEDICINE

## 2021-07-05 PROCEDURE — 25010000002 DOBUTAMINE PER 250 MG: Performed by: NURSE PRACTITIONER

## 2021-07-05 PROCEDURE — 25010000002 PROPOFOL 10 MG/ML EMULSION

## 2021-07-05 PROCEDURE — 80048 BASIC METABOLIC PNL TOTAL CA: CPT | Performed by: INTERNAL MEDICINE

## 2021-07-05 PROCEDURE — 85610 PROTHROMBIN TIME: CPT | Performed by: INTERNAL MEDICINE

## 2021-07-05 PROCEDURE — 25010000002 FENTANYL CITRATE (PF) 2500 MCG/50ML SOLUTION: Performed by: NURSE PRACTITIONER

## 2021-07-05 PROCEDURE — 85025 COMPLETE CBC W/AUTO DIFF WBC: CPT | Performed by: INTERNAL MEDICINE

## 2021-07-05 PROCEDURE — 25010000002 PROPOFOL 1000 MG/ML EMULSION: Performed by: NURSE PRACTITIONER

## 2021-07-05 PROCEDURE — 83735 ASSAY OF MAGNESIUM: CPT | Performed by: INTERNAL MEDICINE

## 2021-07-05 PROCEDURE — 82962 GLUCOSE BLOOD TEST: CPT

## 2021-07-05 PROCEDURE — 85520 HEPARIN ASSAY: CPT

## 2021-07-05 PROCEDURE — 25010000002 PROPOFOL 1000 MG/ML EMULSION

## 2021-07-05 PROCEDURE — 86140 C-REACTIVE PROTEIN: CPT

## 2021-07-05 PROCEDURE — 84100 ASSAY OF PHOSPHORUS: CPT | Performed by: INTERNAL MEDICINE

## 2021-07-05 PROCEDURE — 71045 X-RAY EXAM CHEST 1 VIEW: CPT

## 2021-07-05 RX ORDER — BUPIVACAINE HCL/0.9 % NACL/PF 0.25 %
.02-.3 PLASTIC BAG, INJECTION (ML) EPIDURAL
Status: DISCONTINUED | OUTPATIENT
Start: 2021-07-05 | End: 2021-07-07

## 2021-07-05 RX ORDER — OXYMETAZOLINE HYDROCHLORIDE 0.05 G/100ML
2 SPRAY NASAL EVERY 4 HOURS PRN
Status: DISPENSED | OUTPATIENT
Start: 2021-07-05 | End: 2021-07-08

## 2021-07-05 RX ORDER — FUROSEMIDE 10 MG/ML
60 INJECTION INTRAMUSCULAR; INTRAVENOUS EVERY 12 HOURS SCHEDULED
Status: DISCONTINUED | OUTPATIENT
Start: 2021-07-05 | End: 2021-07-08

## 2021-07-05 RX ADMIN — CHLORHEXIDINE GLUCONATE 15 ML: 1.2 SOLUTION ORAL at 20:21

## 2021-07-05 RX ADMIN — NICARDIPINE HYDROCHLORIDE 5 MG/HR: 0.1 INJECTION, SOLUTION INTRAVENOUS at 04:00

## 2021-07-05 RX ADMIN — PROPOFOL 45 MCG/KG/MIN: 10 INJECTION, EMULSION INTRAVENOUS at 21:11

## 2021-07-05 RX ADMIN — PROPOFOL 45 MCG/KG/MIN: 10 INJECTION, EMULSION INTRAVENOUS at 13:30

## 2021-07-05 RX ADMIN — Medication 2 SPRAY: at 08:27

## 2021-07-05 RX ADMIN — Medication 30 ML: at 08:00

## 2021-07-05 RX ADMIN — Medication 30 ML: at 23:15

## 2021-07-05 RX ADMIN — PROPOFOL 45 MCG/KG/MIN: 10 INJECTION, EMULSION INTRAVENOUS at 18:07

## 2021-07-05 RX ADMIN — TAZOBACTAM SODIUM AND PIPERACILLIN SODIUM 3.38 G: 375; 3 INJECTION, SOLUTION INTRAVENOUS at 12:05

## 2021-07-05 RX ADMIN — NICARDIPINE HYDROCHLORIDE 5 MG/HR: 0.1 INJECTION, SOLUTION INTRAVENOUS at 00:27

## 2021-07-05 RX ADMIN — CHLORHEXIDINE GLUCONATE 15 ML: 1.2 SOLUTION ORAL at 08:00

## 2021-07-05 RX ADMIN — PROPOFOL 45 MCG/KG/MIN: 10 INJECTION, EMULSION INTRAVENOUS at 23:13

## 2021-07-05 RX ADMIN — TAZOBACTAM SODIUM AND PIPERACILLIN SODIUM 3.38 G: 375; 3 INJECTION, SOLUTION INTRAVENOUS at 20:21

## 2021-07-05 RX ADMIN — ASPIRIN 81 MG CHEWABLE TABLET 81 MG: 81 TABLET CHEWABLE at 08:00

## 2021-07-05 RX ADMIN — Medication 30 ML: at 12:05

## 2021-07-05 RX ADMIN — PROPOFOL 45 MCG/KG/MIN: 10 INJECTION, EMULSION INTRAVENOUS at 10:57

## 2021-07-05 RX ADMIN — FUROSEMIDE 60 MG: 10 INJECTION INTRAMUSCULAR; INTRAVENOUS at 20:20

## 2021-07-05 RX ADMIN — PANTOPRAZOLE SODIUM 40 MG: 40 INJECTION, POWDER, FOR SOLUTION INTRAVENOUS at 06:02

## 2021-07-05 RX ADMIN — PROPOFOL 35 MCG/KG/MIN: 10 INJECTION, EMULSION INTRAVENOUS at 00:52

## 2021-07-05 RX ADMIN — FENTANYL CITRATE 250 MCG/HR: 0.05 INJECTION, SOLUTION INTRAMUSCULAR; INTRAVENOUS at 23:12

## 2021-07-05 RX ADMIN — LIDOCAINE HYDROCHLORIDE 1 MG/MIN: 4 INJECTION, SOLUTION INTRAVENOUS at 16:10

## 2021-07-05 RX ADMIN — PROPOFOL 45 MCG/KG/MIN: 10 INJECTION, EMULSION INTRAVENOUS at 16:10

## 2021-07-05 RX ADMIN — FUROSEMIDE 60 MG: 10 INJECTION INTRAMUSCULAR; INTRAVENOUS at 10:12

## 2021-07-05 RX ADMIN — Medication 30 ML: at 18:07

## 2021-07-05 RX ADMIN — FENTANYL CITRATE 200 MCG/HR: 0.05 INJECTION, SOLUTION INTRAMUSCULAR; INTRAVENOUS at 00:51

## 2021-07-05 RX ADMIN — ATORVASTATIN CALCIUM 40 MG: 40 TABLET, FILM COATED ORAL at 20:21

## 2021-07-05 RX ADMIN — DOBUTAMINE HYDROCHLORIDE 2 MCG/KG/MIN: 100 INJECTION INTRAVENOUS at 00:27

## 2021-07-05 RX ADMIN — FENTANYL CITRATE 250 MCG/HR: 0.05 INJECTION, SOLUTION INTRAMUSCULAR; INTRAVENOUS at 11:38

## 2021-07-05 RX ADMIN — PROPOFOL 40 MCG/KG/MIN: 10 INJECTION, EMULSION INTRAVENOUS at 08:00

## 2021-07-05 RX ADMIN — PROPOFOL 35 MCG/KG/MIN: 10 INJECTION, EMULSION INTRAVENOUS at 03:59

## 2021-07-05 RX ADMIN — TAZOBACTAM SODIUM AND PIPERACILLIN SODIUM 3.38 G: 375; 3 INJECTION, SOLUTION INTRAVENOUS at 04:00

## 2021-07-05 RX ADMIN — PROPOFOL 40 MCG/KG/MIN: 10 INJECTION, EMULSION INTRAVENOUS at 05:48

## 2021-07-06 ENCOUNTER — APPOINTMENT (OUTPATIENT)
Dept: GENERAL RADIOLOGY | Facility: HOSPITAL | Age: 56
End: 2021-07-06

## 2021-07-06 PROBLEM — R04.0 EPISTAXIS: Status: ACTIVE | Noted: 2021-07-06

## 2021-07-06 LAB
ANION GAP SERPL CALCULATED.3IONS-SCNC: 9 MMOL/L (ref 5–15)
BACTERIA SPEC AEROBE CULT: NORMAL
BACTERIA SPEC AEROBE CULT: NORMAL
BUN SERPL-MCNC: 37 MG/DL (ref 6–20)
BUN/CREAT SERPL: 28.2 (ref 7–25)
CALCIUM SPEC-SCNC: 8.7 MG/DL (ref 8.6–10.5)
CHLORIDE SERPL-SCNC: 107 MMOL/L (ref 98–107)
CO2 SERPL-SCNC: 22 MMOL/L (ref 22–29)
CREAT SERPL-MCNC: 1.31 MG/DL (ref 0.76–1.27)
GFR SERPL CREATININE-BSD FRML MDRD: 57 ML/MIN/1.73
GLUCOSE BLDC GLUCOMTR-MCNC: 101 MG/DL (ref 70–130)
GLUCOSE BLDC GLUCOMTR-MCNC: 105 MG/DL (ref 70–130)
GLUCOSE BLDC GLUCOMTR-MCNC: 113 MG/DL (ref 70–130)
GLUCOSE BLDC GLUCOMTR-MCNC: 120 MG/DL (ref 70–130)
GLUCOSE SERPL-MCNC: 125 MG/DL (ref 65–99)
MAGNESIUM SERPL-MCNC: 2 MG/DL (ref 1.6–2.6)
PHOSPHATE SERPL-MCNC: 3.3 MG/DL (ref 2.5–4.5)
POTASSIUM SERPL-SCNC: 4.2 MMOL/L (ref 3.5–5.2)
SODIUM SERPL-SCNC: 138 MMOL/L (ref 136–145)
UFH PPP CHRO-ACNC: 0.19 IU/ML (ref 0.3–0.7)
UFH PPP CHRO-ACNC: 0.32 IU/ML (ref 0.3–0.7)
UFH PPP CHRO-ACNC: 0.44 IU/ML (ref 0.3–0.7)

## 2021-07-06 PROCEDURE — 87070 CULTURE OTHR SPECIMN AEROBIC: CPT | Performed by: INTERNAL MEDICINE

## 2021-07-06 PROCEDURE — 82962 GLUCOSE BLOOD TEST: CPT

## 2021-07-06 PROCEDURE — 0BC48ZZ EXTIRPATION OF MATTER FROM RIGHT UPPER LOBE BRONCHUS, VIA NATURAL OR ARTIFICIAL OPENING ENDOSCOPIC: ICD-10-PCS | Performed by: INTERNAL MEDICINE

## 2021-07-06 PROCEDURE — 71045 X-RAY EXAM CHEST 1 VIEW: CPT

## 2021-07-06 PROCEDURE — 25010000002 FENTANYL CITRATE (PF) 2500 MCG/50ML SOLUTION

## 2021-07-06 PROCEDURE — 94640 AIRWAY INHALATION TREATMENT: CPT

## 2021-07-06 PROCEDURE — 94799 UNLISTED PULMONARY SVC/PX: CPT

## 2021-07-06 PROCEDURE — 87147 CULTURE TYPE IMMUNOLOGIC: CPT | Performed by: INTERNAL MEDICINE

## 2021-07-06 PROCEDURE — 0BC68ZZ EXTIRPATION OF MATTER FROM RIGHT LOWER LOBE BRONCHUS, VIA NATURAL OR ARTIFICIAL OPENING ENDOSCOPIC: ICD-10-PCS | Performed by: INTERNAL MEDICINE

## 2021-07-06 PROCEDURE — 87205 SMEAR GRAM STAIN: CPT | Performed by: INTERNAL MEDICINE

## 2021-07-06 PROCEDURE — 85025 COMPLETE CBC W/AUTO DIFF WBC: CPT | Performed by: INTERNAL MEDICINE

## 2021-07-06 PROCEDURE — 85520 HEPARIN ASSAY: CPT

## 2021-07-06 PROCEDURE — 25010000002 PIPERACILLIN SOD-TAZOBACTAM PER 1 G: Performed by: INTERNAL MEDICINE

## 2021-07-06 PROCEDURE — 99232 SBSQ HOSP IP/OBS MODERATE 35: CPT | Performed by: INTERNAL MEDICINE

## 2021-07-06 PROCEDURE — 25010000002 HEPARIN (PORCINE) 25000-0.45 UT/250ML-% SOLUTION

## 2021-07-06 PROCEDURE — 99291 CRITICAL CARE FIRST HOUR: CPT | Performed by: INTERNAL MEDICINE

## 2021-07-06 PROCEDURE — 2Y41X5Z PACKING OF NASAL REGION USING PACKING MATERIAL: ICD-10-PCS | Performed by: INTERNAL MEDICINE

## 2021-07-06 PROCEDURE — 25010000002 PROPOFOL 1000 MG/ML EMULSION

## 2021-07-06 PROCEDURE — 83735 ASSAY OF MAGNESIUM: CPT | Performed by: INTERNAL MEDICINE

## 2021-07-06 PROCEDURE — 94003 VENT MGMT INPAT SUBQ DAY: CPT

## 2021-07-06 PROCEDURE — 80048 BASIC METABOLIC PNL TOTAL CA: CPT | Performed by: INTERNAL MEDICINE

## 2021-07-06 PROCEDURE — 87186 SC STD MICRODIL/AGAR DIL: CPT | Performed by: INTERNAL MEDICINE

## 2021-07-06 PROCEDURE — 84100 ASSAY OF PHOSPHORUS: CPT | Performed by: INTERNAL MEDICINE

## 2021-07-06 PROCEDURE — 25010000002 FUROSEMIDE PER 20 MG: Performed by: INTERNAL MEDICINE

## 2021-07-06 RX ORDER — IPRATROPIUM BROMIDE AND ALBUTEROL SULFATE 2.5; .5 MG/3ML; MG/3ML
3 SOLUTION RESPIRATORY (INHALATION)
Status: DISCONTINUED | OUTPATIENT
Start: 2021-07-06 | End: 2021-07-25

## 2021-07-06 RX ORDER — ACETYLCYSTEINE 200 MG/ML
4 SOLUTION ORAL; RESPIRATORY (INHALATION)
Status: DISCONTINUED | OUTPATIENT
Start: 2021-07-06 | End: 2021-07-12

## 2021-07-06 RX ADMIN — PROPOFOL 45 MCG/KG/MIN: 10 INJECTION, EMULSION INTRAVENOUS at 04:11

## 2021-07-06 RX ADMIN — TAZOBACTAM SODIUM AND PIPERACILLIN SODIUM 3.38 G: 375; 3 INJECTION, SOLUTION INTRAVENOUS at 21:02

## 2021-07-06 RX ADMIN — PROPOFOL 45 MCG/KG/MIN: 10 INJECTION, EMULSION INTRAVENOUS at 08:55

## 2021-07-06 RX ADMIN — TAZOBACTAM SODIUM AND PIPERACILLIN SODIUM 3.38 G: 375; 3 INJECTION, SOLUTION INTRAVENOUS at 12:50

## 2021-07-06 RX ADMIN — ATORVASTATIN CALCIUM 40 MG: 40 TABLET, FILM COATED ORAL at 21:01

## 2021-07-06 RX ADMIN — HEPARIN SODIUM 18 UNITS/KG/HR: 10000 INJECTION, SOLUTION INTRAVENOUS at 19:42

## 2021-07-06 RX ADMIN — PROPOFOL 35 MCG/KG/MIN: 10 INJECTION, EMULSION INTRAVENOUS at 21:01

## 2021-07-06 RX ADMIN — IPRATROPIUM BROMIDE AND ALBUTEROL SULFATE 3 ML: 2.5; .5 SOLUTION RESPIRATORY (INHALATION) at 12:12

## 2021-07-06 RX ADMIN — PROPOFOL 45 MCG/KG/MIN: 10 INJECTION, EMULSION INTRAVENOUS at 00:54

## 2021-07-06 RX ADMIN — Medication 30 ML: at 21:29

## 2021-07-06 RX ADMIN — SODIUM CHLORIDE, PRESERVATIVE FREE 10 ML: 5 INJECTION INTRAVENOUS at 21:01

## 2021-07-06 RX ADMIN — PANTOPRAZOLE SODIUM 40 MG: 40 INJECTION, POWDER, FOR SOLUTION INTRAVENOUS at 05:45

## 2021-07-06 RX ADMIN — CHLORHEXIDINE GLUCONATE 15 ML: 1.2 SOLUTION ORAL at 21:01

## 2021-07-06 RX ADMIN — TAZOBACTAM SODIUM AND PIPERACILLIN SODIUM 3.38 G: 375; 3 INJECTION, SOLUTION INTRAVENOUS at 05:45

## 2021-07-06 RX ADMIN — HEPARIN SODIUM 18 UNITS/KG/HR: 10000 INJECTION, SOLUTION INTRAVENOUS at 10:48

## 2021-07-06 RX ADMIN — FENTANYL CITRATE 250 MCG/HR: 0.05 INJECTION, SOLUTION INTRAMUSCULAR; INTRAVENOUS at 22:10

## 2021-07-06 RX ADMIN — Medication 30 ML: at 18:14

## 2021-07-06 RX ADMIN — IPRATROPIUM BROMIDE AND ALBUTEROL SULFATE 3 ML: 2.5; .5 SOLUTION RESPIRATORY (INHALATION) at 19:20

## 2021-07-06 RX ADMIN — FUROSEMIDE 60 MG: 10 INJECTION INTRAMUSCULAR; INTRAVENOUS at 21:01

## 2021-07-06 RX ADMIN — PROPOFOL 35 MCG/KG/MIN: 10 INJECTION, EMULSION INTRAVENOUS at 18:14

## 2021-07-06 RX ADMIN — PROPOFOL 45 MCG/KG/MIN: 10 INJECTION, EMULSION INTRAVENOUS at 06:14

## 2021-07-06 RX ADMIN — PROPOFOL 40 MCG/KG/MIN: 10 INJECTION, EMULSION INTRAVENOUS at 14:19

## 2021-07-06 RX ADMIN — FENTANYL CITRATE 250 MCG/HR: 0.05 INJECTION, SOLUTION INTRAMUSCULAR; INTRAVENOUS at 10:16

## 2021-07-06 RX ADMIN — ASPIRIN 81 MG CHEWABLE TABLET 81 MG: 81 TABLET CHEWABLE at 08:21

## 2021-07-06 RX ADMIN — CHLORHEXIDINE GLUCONATE 15 ML: 1.2 SOLUTION ORAL at 08:21

## 2021-07-06 RX ADMIN — FUROSEMIDE 60 MG: 10 INJECTION INTRAMUSCULAR; INTRAVENOUS at 08:21

## 2021-07-06 RX ADMIN — Medication 30 ML: at 12:47

## 2021-07-06 RX ADMIN — IPRATROPIUM BROMIDE AND ALBUTEROL SULFATE 3 ML: 2.5; .5 SOLUTION RESPIRATORY (INHALATION) at 15:48

## 2021-07-06 RX ADMIN — ACETYLCYSTEINE 4 ML: 200 SOLUTION ORAL; RESPIRATORY (INHALATION) at 19:20

## 2021-07-06 RX ADMIN — HEPARIN SODIUM 16 UNITS/KG/HR: 10000 INJECTION, SOLUTION INTRAVENOUS at 00:07

## 2021-07-06 RX ADMIN — ACETYLCYSTEINE 4 ML: 200 SOLUTION ORAL; RESPIRATORY (INHALATION) at 12:13

## 2021-07-06 RX ADMIN — Medication 30 ML: at 08:22

## 2021-07-06 RX ADMIN — PROPOFOL 45 MCG/KG/MIN: 10 INJECTION, EMULSION INTRAVENOUS at 11:50

## 2021-07-07 ENCOUNTER — APPOINTMENT (OUTPATIENT)
Dept: GENERAL RADIOLOGY | Facility: HOSPITAL | Age: 56
End: 2021-07-07

## 2021-07-07 LAB
ANION GAP SERPL CALCULATED.3IONS-SCNC: 11 MMOL/L (ref 5–15)
BASOPHILS # BLD AUTO: 0.03 10*3/MM3 (ref 0–0.2)
BASOPHILS NFR BLD AUTO: 0.5 % (ref 0–1.5)
BUN SERPL-MCNC: 45 MG/DL (ref 6–20)
BUN/CREAT SERPL: 32.6 (ref 7–25)
CALCIUM SPEC-SCNC: 8.6 MG/DL (ref 8.6–10.5)
CHLORIDE SERPL-SCNC: 107 MMOL/L (ref 98–107)
CO2 SERPL-SCNC: 20 MMOL/L (ref 22–29)
CREAT SERPL-MCNC: 1.38 MG/DL (ref 0.76–1.27)
DEPRECATED RDW RBC AUTO: 51 FL (ref 37–54)
EOSINOPHIL # BLD AUTO: 0.24 10*3/MM3 (ref 0–0.4)
EOSINOPHIL NFR BLD AUTO: 3.6 % (ref 0.3–6.2)
ERYTHROCYTE [DISTWIDTH] IN BLOOD BY AUTOMATED COUNT: 16.2 % (ref 12.3–15.4)
GFR SERPL CREATININE-BSD FRML MDRD: 53 ML/MIN/1.73
GLUCOSE BLDC GLUCOMTR-MCNC: 100 MG/DL (ref 70–130)
GLUCOSE BLDC GLUCOMTR-MCNC: 103 MG/DL (ref 70–130)
GLUCOSE BLDC GLUCOMTR-MCNC: 108 MG/DL (ref 70–130)
GLUCOSE BLDC GLUCOMTR-MCNC: 114 MG/DL (ref 70–130)
GLUCOSE SERPL-MCNC: 112 MG/DL (ref 65–99)
HCT VFR BLD AUTO: 31.7 % (ref 37.5–51)
HGB BLD-MCNC: 9.9 G/DL (ref 13–17.7)
IMM GRANULOCYTES # BLD AUTO: 0.07 10*3/MM3 (ref 0–0.05)
IMM GRANULOCYTES NFR BLD AUTO: 1.1 % (ref 0–0.5)
LYMPHOCYTES # BLD AUTO: 0.8 10*3/MM3 (ref 0.7–3.1)
LYMPHOCYTES NFR BLD AUTO: 12.1 % (ref 19.6–45.3)
MAGNESIUM SERPL-MCNC: 2 MG/DL (ref 1.6–2.6)
MCH RBC QN AUTO: 26.9 PG (ref 26.6–33)
MCHC RBC AUTO-ENTMCNC: 31.2 G/DL (ref 31.5–35.7)
MCV RBC AUTO: 86.1 FL (ref 79–97)
MONOCYTES # BLD AUTO: 0.7 10*3/MM3 (ref 0.1–0.9)
MONOCYTES NFR BLD AUTO: 10.6 % (ref 5–12)
NEUTROPHILS NFR BLD AUTO: 4.78 10*3/MM3 (ref 1.7–7)
NEUTROPHILS NFR BLD AUTO: 72.1 % (ref 42.7–76)
NRBC BLD AUTO-RTO: 0 /100 WBC (ref 0–0.2)
PHOSPHATE SERPL-MCNC: 3.9 MG/DL (ref 2.5–4.5)
PLATELET # BLD AUTO: 171 10*3/MM3 (ref 140–450)
PMV BLD AUTO: 11.5 FL (ref 6–12)
POTASSIUM SERPL-SCNC: 4.1 MMOL/L (ref 3.5–5.2)
RBC # BLD AUTO: 3.68 10*6/MM3 (ref 4.14–5.8)
SODIUM SERPL-SCNC: 138 MMOL/L (ref 136–145)
UFH PPP CHRO-ACNC: 0.35 IU/ML (ref 0.3–0.7)
UFH PPP CHRO-ACNC: 0.55 IU/ML (ref 0.3–0.7)
WBC # BLD AUTO: 6.62 10*3/MM3 (ref 3.4–10.8)

## 2021-07-07 PROCEDURE — 25010000002 PIPERACILLIN SOD-TAZOBACTAM PER 1 G: Performed by: INTERNAL MEDICINE

## 2021-07-07 PROCEDURE — 94003 VENT MGMT INPAT SUBQ DAY: CPT

## 2021-07-07 PROCEDURE — 25010000002 PROPOFOL 1000 MG/ML EMULSION

## 2021-07-07 PROCEDURE — 84100 ASSAY OF PHOSPHORUS: CPT | Performed by: INTERNAL MEDICINE

## 2021-07-07 PROCEDURE — 83735 ASSAY OF MAGNESIUM: CPT | Performed by: INTERNAL MEDICINE

## 2021-07-07 PROCEDURE — 25010000002 HEPARIN (PORCINE) 25000-0.45 UT/250ML-% SOLUTION

## 2021-07-07 PROCEDURE — 80048 BASIC METABOLIC PNL TOTAL CA: CPT | Performed by: INTERNAL MEDICINE

## 2021-07-07 PROCEDURE — 85520 HEPARIN ASSAY: CPT

## 2021-07-07 PROCEDURE — 71045 X-RAY EXAM CHEST 1 VIEW: CPT

## 2021-07-07 PROCEDURE — 94799 UNLISTED PULMONARY SVC/PX: CPT

## 2021-07-07 PROCEDURE — 99291 CRITICAL CARE FIRST HOUR: CPT | Performed by: INTERNAL MEDICINE

## 2021-07-07 PROCEDURE — 25010000002 FENTANYL CITRATE (PF) 2500 MCG/50ML SOLUTION

## 2021-07-07 PROCEDURE — 82962 GLUCOSE BLOOD TEST: CPT

## 2021-07-07 PROCEDURE — 25010000002 FUROSEMIDE PER 20 MG: Performed by: INTERNAL MEDICINE

## 2021-07-07 PROCEDURE — 25010000002 LIDOCAINE PER 10 MG: Performed by: NURSE PRACTITIONER

## 2021-07-07 PROCEDURE — 99232 SBSQ HOSP IP/OBS MODERATE 35: CPT | Performed by: INTERNAL MEDICINE

## 2021-07-07 RX ADMIN — Medication 30 ML: at 11:53

## 2021-07-07 RX ADMIN — Medication 30 ML: at 08:05

## 2021-07-07 RX ADMIN — HEPARIN SODIUM 18 UNITS/KG/HR: 10000 INJECTION, SOLUTION INTRAVENOUS at 12:15

## 2021-07-07 RX ADMIN — IPRATROPIUM BROMIDE AND ALBUTEROL SULFATE 3 ML: 2.5; .5 SOLUTION RESPIRATORY (INHALATION) at 07:50

## 2021-07-07 RX ADMIN — TAZOBACTAM SODIUM AND PIPERACILLIN SODIUM 3.38 G: 375; 3 INJECTION, SOLUTION INTRAVENOUS at 04:17

## 2021-07-07 RX ADMIN — TAZOBACTAM SODIUM AND PIPERACILLIN SODIUM 3.38 G: 375; 3 INJECTION, SOLUTION INTRAVENOUS at 20:10

## 2021-07-07 RX ADMIN — PANTOPRAZOLE SODIUM 40 MG: 40 INJECTION, POWDER, FOR SOLUTION INTRAVENOUS at 06:23

## 2021-07-07 RX ADMIN — HEPARIN SODIUM 18 UNITS/KG/HR: 10000 INJECTION, SOLUTION INTRAVENOUS at 04:15

## 2021-07-07 RX ADMIN — CHLORHEXIDINE GLUCONATE 15 ML: 1.2 SOLUTION ORAL at 08:05

## 2021-07-07 RX ADMIN — LIDOCAINE HYDROCHLORIDE 1 MG/MIN: 4 INJECTION, SOLUTION INTRAVENOUS at 02:49

## 2021-07-07 RX ADMIN — PROPOFOL 35 MCG/KG/MIN: 10 INJECTION, EMULSION INTRAVENOUS at 17:51

## 2021-07-07 RX ADMIN — CHLORHEXIDINE GLUCONATE 15 ML: 1.2 SOLUTION ORAL at 20:08

## 2021-07-07 RX ADMIN — PROPOFOL 40 MCG/KG/MIN: 10 INJECTION, EMULSION INTRAVENOUS at 08:13

## 2021-07-07 RX ADMIN — PROPOFOL 35 MCG/KG/MIN: 10 INJECTION, EMULSION INTRAVENOUS at 14:53

## 2021-07-07 RX ADMIN — PROPOFOL 35 MCG/KG/MIN: 10 INJECTION, EMULSION INTRAVENOUS at 11:53

## 2021-07-07 RX ADMIN — TAZOBACTAM SODIUM AND PIPERACILLIN SODIUM 3.38 G: 375; 3 INJECTION, SOLUTION INTRAVENOUS at 12:01

## 2021-07-07 RX ADMIN — HEPARIN SODIUM 18 UNITS/KG/HR: 10000 INJECTION, SOLUTION INTRAVENOUS at 21:10

## 2021-07-07 RX ADMIN — PROPOFOL 35 MCG/KG/MIN: 10 INJECTION, EMULSION INTRAVENOUS at 20:08

## 2021-07-07 RX ADMIN — Medication 30 ML: at 21:56

## 2021-07-07 RX ADMIN — ACETYLCYSTEINE 4 ML: 200 SOLUTION ORAL; RESPIRATORY (INHALATION) at 20:53

## 2021-07-07 RX ADMIN — IPRATROPIUM BROMIDE AND ALBUTEROL SULFATE 3 ML: 2.5; .5 SOLUTION RESPIRATORY (INHALATION) at 20:53

## 2021-07-07 RX ADMIN — FUROSEMIDE 60 MG: 10 INJECTION INTRAMUSCULAR; INTRAVENOUS at 20:08

## 2021-07-07 RX ADMIN — PROPOFOL 40 MCG/KG/MIN: 10 INJECTION, EMULSION INTRAVENOUS at 00:16

## 2021-07-07 RX ADMIN — FENTANYL CITRATE 250 MCG/HR: 0.05 INJECTION, SOLUTION INTRAMUSCULAR; INTRAVENOUS at 09:59

## 2021-07-07 RX ADMIN — ATORVASTATIN CALCIUM 40 MG: 40 TABLET, FILM COATED ORAL at 20:10

## 2021-07-07 RX ADMIN — ACETYLCYSTEINE 4 ML: 200 SOLUTION ORAL; RESPIRATORY (INHALATION) at 07:51

## 2021-07-07 RX ADMIN — ASPIRIN 81 MG CHEWABLE TABLET 81 MG: 81 TABLET CHEWABLE at 08:05

## 2021-07-07 RX ADMIN — IPRATROPIUM BROMIDE AND ALBUTEROL SULFATE 3 ML: 2.5; .5 SOLUTION RESPIRATORY (INHALATION) at 12:24

## 2021-07-07 RX ADMIN — FENTANYL CITRATE 250 MCG/HR: 0.05 INJECTION, SOLUTION INTRAMUSCULAR; INTRAVENOUS at 20:09

## 2021-07-07 RX ADMIN — Medication 30 ML: at 17:52

## 2021-07-07 RX ADMIN — SODIUM CHLORIDE, PRESERVATIVE FREE 10 ML: 5 INJECTION INTRAVENOUS at 08:05

## 2021-07-07 RX ADMIN — LIDOCAINE HYDROCHLORIDE 1 MG/MIN: 4 INJECTION, SOLUTION INTRAVENOUS at 00:17

## 2021-07-07 RX ADMIN — IPRATROPIUM BROMIDE AND ALBUTEROL SULFATE 3 ML: 2.5; .5 SOLUTION RESPIRATORY (INHALATION) at 15:42

## 2021-07-07 RX ADMIN — PROPOFOL 40 MCG/KG/MIN: 10 INJECTION, EMULSION INTRAVENOUS at 06:23

## 2021-07-07 RX ADMIN — FUROSEMIDE 60 MG: 10 INJECTION INTRAMUSCULAR; INTRAVENOUS at 08:05

## 2021-07-07 RX ADMIN — PROPOFOL 40 MCG/KG/MIN: 10 INJECTION, EMULSION INTRAVENOUS at 02:49

## 2021-07-08 ENCOUNTER — APPOINTMENT (OUTPATIENT)
Dept: GENERAL RADIOLOGY | Facility: HOSPITAL | Age: 56
End: 2021-07-08

## 2021-07-08 LAB
ANION GAP SERPL CALCULATED.3IONS-SCNC: 9 MMOL/L (ref 5–15)
BACTERIA SPEC RESP CULT: ABNORMAL
BACTERIA SPEC RESP CULT: ABNORMAL
BUN SERPL-MCNC: 47 MG/DL (ref 6–20)
BUN/CREAT SERPL: 38.8 (ref 7–25)
CALCIUM SPEC-SCNC: 9 MG/DL (ref 8.6–10.5)
CHLORIDE SERPL-SCNC: 102 MMOL/L (ref 98–107)
CO2 SERPL-SCNC: 24 MMOL/L (ref 22–29)
CREAT SERPL-MCNC: 1.21 MG/DL (ref 0.76–1.27)
GFR SERPL CREATININE-BSD FRML MDRD: 62 ML/MIN/1.73
GLUCOSE BLDC GLUCOMTR-MCNC: 105 MG/DL (ref 70–130)
GLUCOSE BLDC GLUCOMTR-MCNC: 107 MG/DL (ref 70–130)
GLUCOSE BLDC GLUCOMTR-MCNC: 114 MG/DL (ref 70–130)
GLUCOSE BLDC GLUCOMTR-MCNC: 98 MG/DL (ref 70–130)
GLUCOSE SERPL-MCNC: 114 MG/DL (ref 65–99)
GRAM STN SPEC: ABNORMAL
MAGNESIUM SERPL-MCNC: 2.1 MG/DL (ref 1.6–2.6)
PHOSPHATE SERPL-MCNC: 3.5 MG/DL (ref 2.5–4.5)
POTASSIUM SERPL-SCNC: 4 MMOL/L (ref 3.5–5.2)
SODIUM SERPL-SCNC: 135 MMOL/L (ref 136–145)
UFH PPP CHRO-ACNC: 0.49 IU/ML (ref 0.3–0.7)
UFH PPP CHRO-ACNC: 0.58 IU/ML (ref 0.3–0.7)
UFH PPP CHRO-ACNC: 0.97 IU/ML (ref 0.3–0.7)

## 2021-07-08 PROCEDURE — 94799 UNLISTED PULMONARY SVC/PX: CPT

## 2021-07-08 PROCEDURE — 25010000002 PROPOFOL 10 MG/ML EMULSION

## 2021-07-08 PROCEDURE — 94003 VENT MGMT INPAT SUBQ DAY: CPT

## 2021-07-08 PROCEDURE — 99233 SBSQ HOSP IP/OBS HIGH 50: CPT

## 2021-07-08 PROCEDURE — 25010000002 PROPOFOL 1000 MG/ML EMULSION

## 2021-07-08 PROCEDURE — 25010000002 FUROSEMIDE PER 20 MG

## 2021-07-08 PROCEDURE — 84100 ASSAY OF PHOSPHORUS: CPT | Performed by: INTERNAL MEDICINE

## 2021-07-08 PROCEDURE — 80048 BASIC METABOLIC PNL TOTAL CA: CPT | Performed by: INTERNAL MEDICINE

## 2021-07-08 PROCEDURE — 71045 X-RAY EXAM CHEST 1 VIEW: CPT

## 2021-07-08 PROCEDURE — 25010000002 CEFTRIAXONE PER 250 MG: Performed by: INTERNAL MEDICINE

## 2021-07-08 PROCEDURE — 99291 CRITICAL CARE FIRST HOUR: CPT | Performed by: INTERNAL MEDICINE

## 2021-07-08 PROCEDURE — 85520 HEPARIN ASSAY: CPT

## 2021-07-08 PROCEDURE — 25010000002 FENTANYL CITRATE (PF) 2500 MCG/50ML SOLUTION

## 2021-07-08 PROCEDURE — 83735 ASSAY OF MAGNESIUM: CPT | Performed by: INTERNAL MEDICINE

## 2021-07-08 PROCEDURE — 25010000002 HEPARIN (PORCINE) 25000-0.45 UT/250ML-% SOLUTION

## 2021-07-08 PROCEDURE — 82962 GLUCOSE BLOOD TEST: CPT

## 2021-07-08 PROCEDURE — 25010000002 FUROSEMIDE PER 20 MG: Performed by: INTERNAL MEDICINE

## 2021-07-08 RX ORDER — MULTIPLE VITAMINS W/ MINERALS TAB 9MG-400MCG
1 TAB ORAL DAILY
Status: DISCONTINUED | OUTPATIENT
Start: 2021-07-09 | End: 2021-07-08

## 2021-07-08 RX ORDER — MULTIVIT AND MINERALS-FERROUS GLUCONATE 9 MG IRON/15 ML ORAL LIQUID 9 MG/15 ML
15 LIQUID (ML) ORAL DAILY
Status: DISCONTINUED | OUTPATIENT
Start: 2021-07-08 | End: 2021-07-17

## 2021-07-08 RX ORDER — FUROSEMIDE 10 MG/ML
80 INJECTION INTRAMUSCULAR; INTRAVENOUS EVERY 12 HOURS SCHEDULED
Status: DISCONTINUED | OUTPATIENT
Start: 2021-07-08 | End: 2021-07-10

## 2021-07-08 RX ORDER — CEFTRIAXONE SODIUM 1 G/50ML
1 INJECTION, SOLUTION INTRAVENOUS EVERY 24 HOURS
Status: COMPLETED | OUTPATIENT
Start: 2021-07-08 | End: 2021-07-11

## 2021-07-08 RX ADMIN — PANTOPRAZOLE SODIUM 40 MG: 40 INJECTION, POWDER, FOR SOLUTION INTRAVENOUS at 04:49

## 2021-07-08 RX ADMIN — IPRATROPIUM BROMIDE AND ALBUTEROL SULFATE 3 ML: 2.5; .5 SOLUTION RESPIRATORY (INHALATION) at 19:19

## 2021-07-08 RX ADMIN — IPRATROPIUM BROMIDE AND ALBUTEROL SULFATE 3 ML: 2.5; .5 SOLUTION RESPIRATORY (INHALATION) at 07:46

## 2021-07-08 RX ADMIN — IPRATROPIUM BROMIDE AND ALBUTEROL SULFATE 3 ML: 2.5; .5 SOLUTION RESPIRATORY (INHALATION) at 15:36

## 2021-07-08 RX ADMIN — ASPIRIN 81 MG CHEWABLE TABLET 81 MG: 81 TABLET CHEWABLE at 08:35

## 2021-07-08 RX ADMIN — PROPOFOL 30 MCG/KG/MIN: 10 INJECTION, EMULSION INTRAVENOUS at 22:28

## 2021-07-08 RX ADMIN — PROPOFOL 35 MCG/KG/MIN: 10 INJECTION, EMULSION INTRAVENOUS at 03:59

## 2021-07-08 RX ADMIN — PROPOFOL 35 MCG/KG/MIN: 10 INJECTION, EMULSION INTRAVENOUS at 06:45

## 2021-07-08 RX ADMIN — ACETAMINOPHEN 650 MG: 325 TABLET, FILM COATED ORAL at 18:14

## 2021-07-08 RX ADMIN — Medication 30 ML: at 20:25

## 2021-07-08 RX ADMIN — FENTANYL CITRATE 200 MCG/HR: 0.05 INJECTION, SOLUTION INTRAMUSCULAR; INTRAVENOUS at 17:51

## 2021-07-08 RX ADMIN — PROPOFOL 35 MCG/KG/MIN: 10 INJECTION, EMULSION INTRAVENOUS at 00:11

## 2021-07-08 RX ADMIN — Medication 30 ML: at 08:38

## 2021-07-08 RX ADMIN — FENTANYL CITRATE 250 MCG/HR: 0.05 INJECTION, SOLUTION INTRAMUSCULAR; INTRAVENOUS at 04:49

## 2021-07-08 RX ADMIN — PROPOFOL 35 MCG/KG/MIN: 10 INJECTION, EMULSION INTRAVENOUS at 08:35

## 2021-07-08 RX ADMIN — CEFTRIAXONE SODIUM 1 G: 1 INJECTION, SOLUTION INTRAVENOUS at 11:46

## 2021-07-08 RX ADMIN — Medication 15 ML: at 10:51

## 2021-07-08 RX ADMIN — ACETYLCYSTEINE 4 ML: 200 SOLUTION ORAL; RESPIRATORY (INHALATION) at 19:19

## 2021-07-08 RX ADMIN — CHLORHEXIDINE GLUCONATE 15 ML: 1.2 SOLUTION ORAL at 20:24

## 2021-07-08 RX ADMIN — Medication 30 ML: at 18:14

## 2021-07-08 RX ADMIN — PROPOFOL 30 MCG/KG/MIN: 10 INJECTION, EMULSION INTRAVENOUS at 10:50

## 2021-07-08 RX ADMIN — HEPARIN SODIUM 18 UNITS/KG/HR: 10000 INJECTION, SOLUTION INTRAVENOUS at 06:09

## 2021-07-08 RX ADMIN — FUROSEMIDE 80 MG: 10 INJECTION INTRAMUSCULAR; INTRAVENOUS at 20:24

## 2021-07-08 RX ADMIN — ACETYLCYSTEINE 4 ML: 200 SOLUTION ORAL; RESPIRATORY (INHALATION) at 07:46

## 2021-07-08 RX ADMIN — PROPOFOL 30 MCG/KG/MIN: 10 INJECTION, EMULSION INTRAVENOUS at 14:57

## 2021-07-08 RX ADMIN — Medication 30 ML: at 11:46

## 2021-07-08 RX ADMIN — ATORVASTATIN CALCIUM 40 MG: 40 TABLET, FILM COATED ORAL at 20:25

## 2021-07-08 RX ADMIN — IPRATROPIUM BROMIDE AND ALBUTEROL SULFATE 3 ML: 2.5; .5 SOLUTION RESPIRATORY (INHALATION) at 12:10

## 2021-07-08 RX ADMIN — SODIUM CHLORIDE, PRESERVATIVE FREE 10 ML: 5 INJECTION INTRAVENOUS at 08:35

## 2021-07-08 RX ADMIN — CHLORHEXIDINE GLUCONATE 15 ML: 1.2 SOLUTION ORAL at 08:35

## 2021-07-08 RX ADMIN — FUROSEMIDE 60 MG: 10 INJECTION INTRAMUSCULAR; INTRAVENOUS at 08:35

## 2021-07-08 RX ADMIN — PROPOFOL 20 MCG/KG/MIN: 10 INJECTION, EMULSION INTRAVENOUS at 18:53

## 2021-07-09 ENCOUNTER — APPOINTMENT (OUTPATIENT)
Dept: GENERAL RADIOLOGY | Facility: HOSPITAL | Age: 56
End: 2021-07-09

## 2021-07-09 LAB
ANION GAP SERPL CALCULATED.3IONS-SCNC: 12 MMOL/L (ref 5–15)
ARTERIAL PATENCY WRIST A: ABNORMAL
ATMOSPHERIC PRESS: ABNORMAL MM[HG]
BASE EXCESS BLDA CALC-SCNC: 4.4 MMOL/L (ref 0–2)
BDY SITE: ABNORMAL
BODY TEMPERATURE: 37 C
BUN SERPL-MCNC: 47 MG/DL (ref 6–20)
BUN/CREAT SERPL: 48.5 (ref 7–25)
CALCIUM SPEC-SCNC: 9.3 MG/DL (ref 8.6–10.5)
CHLORIDE SERPL-SCNC: 104 MMOL/L (ref 98–107)
CO2 BLDA-SCNC: 31.6 MMOL/L (ref 22–33)
CO2 SERPL-SCNC: 22 MMOL/L (ref 22–29)
COHGB MFR BLD: 0.8 % (ref 0–2)
CREAT SERPL-MCNC: 0.97 MG/DL (ref 0.76–1.27)
EPAP: 0
GFR SERPL CREATININE-BSD FRML MDRD: 80 ML/MIN/1.73
GLUCOSE BLDC GLUCOMTR-MCNC: 106 MG/DL (ref 70–130)
GLUCOSE BLDC GLUCOMTR-MCNC: 108 MG/DL (ref 70–130)
GLUCOSE BLDC GLUCOMTR-MCNC: 125 MG/DL (ref 70–130)
GLUCOSE BLDC GLUCOMTR-MCNC: 143 MG/DL (ref 70–130)
GLUCOSE SERPL-MCNC: 123 MG/DL (ref 65–99)
HCO3 BLDA-SCNC: 30.1 MMOL/L (ref 20–26)
HCT VFR BLD CALC: 37.3 %
HGB BLDA-MCNC: 12.2 G/DL (ref 13.5–17.5)
INHALED O2 CONCENTRATION: 40 %
IPAP: 0
MAGNESIUM SERPL-MCNC: 2.1 MG/DL (ref 1.6–2.6)
METHGB BLD QL: -0.3 % (ref 0–1.5)
MODALITY: ABNORMAL
NOTE: ABNORMAL
OXYHGB MFR BLDV: 91.9 % (ref 94–99)
PAW @ PEAK INSP FLOW SETTING VENT: 0 CMH2O
PCO2 BLDA: 48.9 MM HG (ref 35–45)
PCO2 TEMP ADJ BLD: 48.9 MM HG (ref 35–48)
PH BLDA: 7.4 PH UNITS (ref 7.35–7.45)
PH, TEMP CORRECTED: 7.4 PH UNITS
PHOSPHATE SERPL-MCNC: 3.1 MG/DL (ref 2.5–4.5)
PO2 BLDA: 69.8 MM HG (ref 83–108)
PO2 TEMP ADJ BLD: 69.8 MM HG (ref 83–108)
POTASSIUM SERPL-SCNC: 4.1 MMOL/L (ref 3.5–5.2)
SODIUM SERPL-SCNC: 138 MMOL/L (ref 136–145)
TOTAL RATE: 0 BREATHS/MINUTE
UFH PPP CHRO-ACNC: 0.65 IU/ML (ref 0.3–0.7)

## 2021-07-09 PROCEDURE — 25010000002 FUROSEMIDE PER 20 MG

## 2021-07-09 PROCEDURE — 82805 BLOOD GASES W/O2 SATURATION: CPT

## 2021-07-09 PROCEDURE — 84100 ASSAY OF PHOSPHORUS: CPT | Performed by: INTERNAL MEDICINE

## 2021-07-09 PROCEDURE — 83735 ASSAY OF MAGNESIUM: CPT | Performed by: INTERNAL MEDICINE

## 2021-07-09 PROCEDURE — 25010000002 CEFTRIAXONE PER 250 MG: Performed by: INTERNAL MEDICINE

## 2021-07-09 PROCEDURE — 94003 VENT MGMT INPAT SUBQ DAY: CPT

## 2021-07-09 PROCEDURE — 82962 GLUCOSE BLOOD TEST: CPT

## 2021-07-09 PROCEDURE — 99291 CRITICAL CARE FIRST HOUR: CPT | Performed by: INTERNAL MEDICINE

## 2021-07-09 PROCEDURE — 94799 UNLISTED PULMONARY SVC/PX: CPT

## 2021-07-09 PROCEDURE — 25010000002 FENTANYL CITRATE (PF) 2500 MCG/50ML SOLUTION

## 2021-07-09 PROCEDURE — 25010000002 PROPOFOL 1000 MG/ML EMULSION

## 2021-07-09 PROCEDURE — 71045 X-RAY EXAM CHEST 1 VIEW: CPT

## 2021-07-09 PROCEDURE — 83050 HGB METHEMOGLOBIN QUAN: CPT

## 2021-07-09 PROCEDURE — 85520 HEPARIN ASSAY: CPT

## 2021-07-09 PROCEDURE — 99232 SBSQ HOSP IP/OBS MODERATE 35: CPT | Performed by: INTERNAL MEDICINE

## 2021-07-09 PROCEDURE — 25010000002 HEPARIN (PORCINE) 25000-0.45 UT/250ML-% SOLUTION

## 2021-07-09 PROCEDURE — 36600 WITHDRAWAL OF ARTERIAL BLOOD: CPT

## 2021-07-09 PROCEDURE — 82375 ASSAY CARBOXYHB QUANT: CPT

## 2021-07-09 PROCEDURE — 80048 BASIC METABOLIC PNL TOTAL CA: CPT | Performed by: INTERNAL MEDICINE

## 2021-07-09 RX ORDER — POLYETHYLENE GLYCOL 3350 17 G/17G
17 POWDER, FOR SOLUTION ORAL DAILY
Status: DISCONTINUED | OUTPATIENT
Start: 2021-07-09 | End: 2021-07-16

## 2021-07-09 RX ADMIN — IPRATROPIUM BROMIDE AND ALBUTEROL SULFATE 3 ML: 2.5; .5 SOLUTION RESPIRATORY (INHALATION) at 07:57

## 2021-07-09 RX ADMIN — PROPOFOL 25 MCG/KG/MIN: 10 INJECTION, EMULSION INTRAVENOUS at 09:50

## 2021-07-09 RX ADMIN — PROPOFOL 35 MCG/KG/MIN: 10 INJECTION, EMULSION INTRAVENOUS at 21:47

## 2021-07-09 RX ADMIN — PANTOPRAZOLE SODIUM 40 MG: 40 INJECTION, POWDER, FOR SOLUTION INTRAVENOUS at 05:41

## 2021-07-09 RX ADMIN — FENTANYL CITRATE 200 MCG/HR: 0.05 INJECTION, SOLUTION INTRAMUSCULAR; INTRAVENOUS at 07:05

## 2021-07-09 RX ADMIN — SODIUM CHLORIDE, PRESERVATIVE FREE 10 ML: 5 INJECTION INTRAVENOUS at 08:21

## 2021-07-09 RX ADMIN — HEPARIN SODIUM 16 UNITS/KG/HR: 10000 INJECTION, SOLUTION INTRAVENOUS at 03:11

## 2021-07-09 RX ADMIN — Medication 15 ML: at 08:20

## 2021-07-09 RX ADMIN — ACETAMINOPHEN 650 MG: 325 TABLET, FILM COATED ORAL at 08:20

## 2021-07-09 RX ADMIN — FUROSEMIDE 80 MG: 10 INJECTION INTRAMUSCULAR; INTRAVENOUS at 08:21

## 2021-07-09 RX ADMIN — ACETYLCYSTEINE 4 ML: 200 SOLUTION ORAL; RESPIRATORY (INHALATION) at 07:57

## 2021-07-09 RX ADMIN — POLYETHYLENE GLYCOL 3350 17 G: 17 POWDER, FOR SOLUTION ORAL at 18:27

## 2021-07-09 RX ADMIN — PROPOFOL 30 MCG/KG/MIN: 10 INJECTION, EMULSION INTRAVENOUS at 01:49

## 2021-07-09 RX ADMIN — Medication 30 ML: at 11:27

## 2021-07-09 RX ADMIN — PROPOFOL 35 MCG/KG/MIN: 10 INJECTION, EMULSION INTRAVENOUS at 19:22

## 2021-07-09 RX ADMIN — Medication 30 ML: at 20:41

## 2021-07-09 RX ADMIN — CHLORHEXIDINE GLUCONATE 15 ML: 1.2 SOLUTION ORAL at 20:38

## 2021-07-09 RX ADMIN — ACETYLCYSTEINE 4 ML: 200 SOLUTION ORAL; RESPIRATORY (INHALATION) at 19:21

## 2021-07-09 RX ADMIN — ASPIRIN 81 MG CHEWABLE TABLET 81 MG: 81 TABLET CHEWABLE at 08:20

## 2021-07-09 RX ADMIN — ATORVASTATIN CALCIUM 40 MG: 40 TABLET, FILM COATED ORAL at 20:38

## 2021-07-09 RX ADMIN — CEFTRIAXONE SODIUM 1 G: 1 INJECTION, SOLUTION INTRAVENOUS at 11:24

## 2021-07-09 RX ADMIN — IPRATROPIUM BROMIDE AND ALBUTEROL SULFATE 3 ML: 2.5; .5 SOLUTION RESPIRATORY (INHALATION) at 19:20

## 2021-07-09 RX ADMIN — Medication 30 ML: at 08:21

## 2021-07-09 RX ADMIN — CHLORHEXIDINE GLUCONATE 15 ML: 1.2 SOLUTION ORAL at 08:20

## 2021-07-09 RX ADMIN — ACETAMINOPHEN 650 MG: 325 TABLET, FILM COATED ORAL at 20:38

## 2021-07-09 RX ADMIN — PROPOFOL 30 MCG/KG/MIN: 10 INJECTION, EMULSION INTRAVENOUS at 05:41

## 2021-07-09 RX ADMIN — IPRATROPIUM BROMIDE AND ALBUTEROL SULFATE 3 ML: 2.5; .5 SOLUTION RESPIRATORY (INHALATION) at 13:27

## 2021-07-09 RX ADMIN — Medication 30 ML: at 18:29

## 2021-07-09 RX ADMIN — IPRATROPIUM BROMIDE AND ALBUTEROL SULFATE 3 ML: 2.5; .5 SOLUTION RESPIRATORY (INHALATION) at 16:44

## 2021-07-09 RX ADMIN — FUROSEMIDE 80 MG: 10 INJECTION INTRAMUSCULAR; INTRAVENOUS at 20:38

## 2021-07-10 ENCOUNTER — APPOINTMENT (OUTPATIENT)
Dept: GENERAL RADIOLOGY | Facility: HOSPITAL | Age: 56
End: 2021-07-10

## 2021-07-10 LAB
ANION GAP SERPL CALCULATED.3IONS-SCNC: 9 MMOL/L (ref 5–15)
BASOPHILS # BLD AUTO: 0.03 10*3/MM3 (ref 0–0.2)
BASOPHILS NFR BLD AUTO: 0.2 % (ref 0–1.5)
BUN SERPL-MCNC: 51 MG/DL (ref 6–20)
BUN/CREAT SERPL: 45.5 (ref 7–25)
CALCIUM SPEC-SCNC: 9.7 MG/DL (ref 8.6–10.5)
CHLORIDE SERPL-SCNC: 101 MMOL/L (ref 98–107)
CO2 SERPL-SCNC: 26 MMOL/L (ref 22–29)
CREAT SERPL-MCNC: 1.12 MG/DL (ref 0.76–1.27)
DEPRECATED RDW RBC AUTO: 48.4 FL (ref 37–54)
EOSINOPHIL # BLD AUTO: 0.04 10*3/MM3 (ref 0–0.4)
EOSINOPHIL NFR BLD AUTO: 0.3 % (ref 0.3–6.2)
ERYTHROCYTE [DISTWIDTH] IN BLOOD BY AUTOMATED COUNT: 15.7 % (ref 12.3–15.4)
GFR SERPL CREATININE-BSD FRML MDRD: 68 ML/MIN/1.73
GLUCOSE BLDC GLUCOMTR-MCNC: 125 MG/DL (ref 70–130)
GLUCOSE BLDC GLUCOMTR-MCNC: 126 MG/DL (ref 70–130)
GLUCOSE BLDC GLUCOMTR-MCNC: 136 MG/DL (ref 70–130)
GLUCOSE BLDC GLUCOMTR-MCNC: 149 MG/DL (ref 70–130)
GLUCOSE SERPL-MCNC: 142 MG/DL (ref 65–99)
HCT VFR BLD AUTO: 35 % (ref 37.5–51)
HGB BLD-MCNC: 11.1 G/DL (ref 13–17.7)
IMM GRANULOCYTES # BLD AUTO: 0.17 10*3/MM3 (ref 0–0.05)
IMM GRANULOCYTES NFR BLD AUTO: 1.4 % (ref 0–0.5)
LYMPHOCYTES # BLD AUTO: 0.2 10*3/MM3 (ref 0.7–3.1)
LYMPHOCYTES NFR BLD AUTO: 1.6 % (ref 19.6–45.3)
MAGNESIUM SERPL-MCNC: 2.3 MG/DL (ref 1.6–2.6)
MCH RBC QN AUTO: 27.1 PG (ref 26.6–33)
MCHC RBC AUTO-ENTMCNC: 31.7 G/DL (ref 31.5–35.7)
MCV RBC AUTO: 85.4 FL (ref 79–97)
MONOCYTES # BLD AUTO: 0.51 10*3/MM3 (ref 0.1–0.9)
MONOCYTES NFR BLD AUTO: 4.1 % (ref 5–12)
NEUTROPHILS NFR BLD AUTO: 11.42 10*3/MM3 (ref 1.7–7)
NEUTROPHILS NFR BLD AUTO: 92.4 % (ref 42.7–76)
NRBC BLD AUTO-RTO: 0 /100 WBC (ref 0–0.2)
PHOSPHATE SERPL-MCNC: 2.6 MG/DL (ref 2.5–4.5)
PLATELET # BLD AUTO: 257 10*3/MM3 (ref 140–450)
PMV BLD AUTO: 11.1 FL (ref 6–12)
POTASSIUM SERPL-SCNC: 4.2 MMOL/L (ref 3.5–5.2)
RBC # BLD AUTO: 4.1 10*6/MM3 (ref 4.14–5.8)
SODIUM SERPL-SCNC: 136 MMOL/L (ref 136–145)
WBC # BLD AUTO: 12.37 10*3/MM3 (ref 3.4–10.8)

## 2021-07-10 PROCEDURE — 25010000002 FENTANYL CITRATE (PF) 2500 MCG/50ML SOLUTION

## 2021-07-10 PROCEDURE — 99223 1ST HOSP IP/OBS HIGH 75: CPT | Performed by: INTERNAL MEDICINE

## 2021-07-10 PROCEDURE — 85025 COMPLETE CBC W/AUTO DIFF WBC: CPT | Performed by: INTERNAL MEDICINE

## 2021-07-10 PROCEDURE — 82962 GLUCOSE BLOOD TEST: CPT

## 2021-07-10 PROCEDURE — 94799 UNLISTED PULMONARY SVC/PX: CPT

## 2021-07-10 PROCEDURE — 25010000002 FUROSEMIDE PER 20 MG: Performed by: INTERNAL MEDICINE

## 2021-07-10 PROCEDURE — 84100 ASSAY OF PHOSPHORUS: CPT | Performed by: INTERNAL MEDICINE

## 2021-07-10 PROCEDURE — 94003 VENT MGMT INPAT SUBQ DAY: CPT

## 2021-07-10 PROCEDURE — 25010000002 PROPOFOL 1000 MG/ML EMULSION

## 2021-07-10 PROCEDURE — 83735 ASSAY OF MAGNESIUM: CPT | Performed by: INTERNAL MEDICINE

## 2021-07-10 PROCEDURE — 71045 X-RAY EXAM CHEST 1 VIEW: CPT

## 2021-07-10 PROCEDURE — 25010000002 FUROSEMIDE PER 20 MG

## 2021-07-10 PROCEDURE — 99291 CRITICAL CARE FIRST HOUR: CPT | Performed by: INTERNAL MEDICINE

## 2021-07-10 PROCEDURE — 25010000002 CEFTRIAXONE PER 250 MG: Performed by: INTERNAL MEDICINE

## 2021-07-10 PROCEDURE — 80048 BASIC METABOLIC PNL TOTAL CA: CPT | Performed by: INTERNAL MEDICINE

## 2021-07-10 RX ORDER — FUROSEMIDE 10 MG/ML
60 INJECTION INTRAMUSCULAR; INTRAVENOUS EVERY 12 HOURS SCHEDULED
Status: DISCONTINUED | OUTPATIENT
Start: 2021-07-10 | End: 2021-07-13

## 2021-07-10 RX ADMIN — Medication 30 ML: at 20:52

## 2021-07-10 RX ADMIN — IPRATROPIUM BROMIDE AND ALBUTEROL SULFATE 3 ML: 2.5; .5 SOLUTION RESPIRATORY (INHALATION) at 08:48

## 2021-07-10 RX ADMIN — PROPOFOL 25 MCG/KG/MIN: 10 INJECTION, EMULSION INTRAVENOUS at 15:18

## 2021-07-10 RX ADMIN — ACETAMINOPHEN 650 MG: 325 TABLET, FILM COATED ORAL at 10:09

## 2021-07-10 RX ADMIN — ACETAMINOPHEN 650 MG: 325 TABLET, FILM COATED ORAL at 22:25

## 2021-07-10 RX ADMIN — PROPOFOL 35 MCG/KG/MIN: 10 INJECTION, EMULSION INTRAVENOUS at 04:20

## 2021-07-10 RX ADMIN — ACETYLCYSTEINE 4 ML: 200 SOLUTION ORAL; RESPIRATORY (INHALATION) at 19:39

## 2021-07-10 RX ADMIN — Medication 30 ML: at 18:04

## 2021-07-10 RX ADMIN — Medication 15 ML: at 08:30

## 2021-07-10 RX ADMIN — POLYETHYLENE GLYCOL 3350 17 G: 17 POWDER, FOR SOLUTION ORAL at 08:30

## 2021-07-10 RX ADMIN — PANTOPRAZOLE SODIUM 40 MG: 40 INJECTION, POWDER, FOR SOLUTION INTRAVENOUS at 06:45

## 2021-07-10 RX ADMIN — FUROSEMIDE 80 MG: 10 INJECTION INTRAMUSCULAR; INTRAVENOUS at 08:30

## 2021-07-10 RX ADMIN — Medication 30 ML: at 08:30

## 2021-07-10 RX ADMIN — FUROSEMIDE 60 MG: 10 INJECTION INTRAMUSCULAR; INTRAVENOUS at 21:33

## 2021-07-10 RX ADMIN — ACETAMINOPHEN 650 MG: 325 TABLET, FILM COATED ORAL at 18:04

## 2021-07-10 RX ADMIN — CHLORHEXIDINE GLUCONATE 15 ML: 1.2 SOLUTION ORAL at 08:30

## 2021-07-10 RX ADMIN — IPRATROPIUM BROMIDE AND ALBUTEROL SULFATE 3 ML: 2.5; .5 SOLUTION RESPIRATORY (INHALATION) at 16:19

## 2021-07-10 RX ADMIN — Medication 30 ML: at 12:09

## 2021-07-10 RX ADMIN — PROPOFOL 35 MCG/KG/MIN: 10 INJECTION, EMULSION INTRAVENOUS at 06:50

## 2021-07-10 RX ADMIN — FENTANYL CITRATE 100 MCG/HR: 0.05 INJECTION, SOLUTION INTRAMUSCULAR; INTRAVENOUS at 04:20

## 2021-07-10 RX ADMIN — CHLORHEXIDINE GLUCONATE 15 ML: 1.2 SOLUTION ORAL at 20:52

## 2021-07-10 RX ADMIN — CEFTRIAXONE SODIUM 1 G: 1 INJECTION, SOLUTION INTRAVENOUS at 12:09

## 2021-07-10 RX ADMIN — PROPOFOL 20 MCG/KG/MIN: 10 INJECTION, EMULSION INTRAVENOUS at 21:09

## 2021-07-10 RX ADMIN — ATORVASTATIN CALCIUM 40 MG: 40 TABLET, FILM COATED ORAL at 20:52

## 2021-07-10 RX ADMIN — ASPIRIN 81 MG CHEWABLE TABLET 81 MG: 81 TABLET CHEWABLE at 08:30

## 2021-07-10 RX ADMIN — PROPOFOL 35 MCG/KG/MIN: 10 INJECTION, EMULSION INTRAVENOUS at 12:12

## 2021-07-10 RX ADMIN — PROPOFOL 35 MCG/KG/MIN: 10 INJECTION, EMULSION INTRAVENOUS at 01:27

## 2021-07-10 RX ADMIN — IPRATROPIUM BROMIDE AND ALBUTEROL SULFATE 3 ML: 2.5; .5 SOLUTION RESPIRATORY (INHALATION) at 19:39

## 2021-07-10 RX ADMIN — ACETYLCYSTEINE 4 ML: 200 SOLUTION ORAL; RESPIRATORY (INHALATION) at 08:48

## 2021-07-10 RX ADMIN — PROPOFOL 30 MCG/KG/MIN: 10 INJECTION, EMULSION INTRAVENOUS at 10:08

## 2021-07-11 ENCOUNTER — APPOINTMENT (OUTPATIENT)
Dept: GENERAL RADIOLOGY | Facility: HOSPITAL | Age: 56
End: 2021-07-11

## 2021-07-11 LAB
ANION GAP SERPL CALCULATED.3IONS-SCNC: 9 MMOL/L (ref 5–15)
BACTERIA UR QL AUTO: ABNORMAL /HPF
BILIRUB UR QL STRIP: NEGATIVE
BUN SERPL-MCNC: 54 MG/DL (ref 6–20)
BUN/CREAT SERPL: 53.5 (ref 7–25)
CALCIUM SPEC-SCNC: 10 MG/DL (ref 8.6–10.5)
CHLORIDE SERPL-SCNC: 102 MMOL/L (ref 98–107)
CLARITY UR: CLEAR
CO2 SERPL-SCNC: 28 MMOL/L (ref 22–29)
COLOR UR: YELLOW
CREAT SERPL-MCNC: 1.01 MG/DL (ref 0.76–1.27)
GFR SERPL CREATININE-BSD FRML MDRD: 76 ML/MIN/1.73
GLUCOSE BLDC GLUCOMTR-MCNC: 128 MG/DL (ref 70–130)
GLUCOSE BLDC GLUCOMTR-MCNC: 133 MG/DL (ref 70–130)
GLUCOSE BLDC GLUCOMTR-MCNC: 134 MG/DL (ref 70–130)
GLUCOSE SERPL-MCNC: 147 MG/DL (ref 65–99)
GLUCOSE UR STRIP-MCNC: NEGATIVE MG/DL
HGB UR QL STRIP.AUTO: ABNORMAL
HYALINE CASTS UR QL AUTO: ABNORMAL /LPF
KETONES UR QL STRIP: NEGATIVE
LEUKOCYTE ESTERASE UR QL STRIP.AUTO: ABNORMAL
MAGNESIUM SERPL-MCNC: 2.4 MG/DL (ref 1.6–2.6)
NITRITE UR QL STRIP: NEGATIVE
PH UR STRIP.AUTO: 5.5 [PH] (ref 5–8)
PHOSPHATE SERPL-MCNC: 2.9 MG/DL (ref 2.5–4.5)
POTASSIUM SERPL-SCNC: 3.9 MMOL/L (ref 3.5–5.2)
PROT UR QL STRIP: NEGATIVE
RBC # UR: ABNORMAL /HPF
REF LAB TEST METHOD: ABNORMAL
SODIUM SERPL-SCNC: 139 MMOL/L (ref 136–145)
SP GR UR STRIP: 1.02 (ref 1–1.03)
SQUAMOUS #/AREA URNS HPF: ABNORMAL /HPF
UFH PPP CHRO-ACNC: 0.2 IU/ML (ref 0.3–0.7)
UROBILINOGEN UR QL STRIP: ABNORMAL
WBC UR QL AUTO: ABNORMAL /HPF

## 2021-07-11 PROCEDURE — 81001 URINALYSIS AUTO W/SCOPE: CPT | Performed by: INTERNAL MEDICINE

## 2021-07-11 PROCEDURE — 82962 GLUCOSE BLOOD TEST: CPT

## 2021-07-11 PROCEDURE — 94799 UNLISTED PULMONARY SVC/PX: CPT

## 2021-07-11 PROCEDURE — 87186 SC STD MICRODIL/AGAR DIL: CPT | Performed by: INTERNAL MEDICINE

## 2021-07-11 PROCEDURE — 99291 CRITICAL CARE FIRST HOUR: CPT | Performed by: INTERNAL MEDICINE

## 2021-07-11 PROCEDURE — 99233 SBSQ HOSP IP/OBS HIGH 50: CPT | Performed by: INTERNAL MEDICINE

## 2021-07-11 PROCEDURE — 87040 BLOOD CULTURE FOR BACTERIA: CPT | Performed by: INTERNAL MEDICINE

## 2021-07-11 PROCEDURE — 87205 SMEAR GRAM STAIN: CPT | Performed by: INTERNAL MEDICINE

## 2021-07-11 PROCEDURE — 25010000002 FENTANYL CITRATE (PF) 2500 MCG/50ML SOLUTION

## 2021-07-11 PROCEDURE — 87150 DNA/RNA AMPLIFIED PROBE: CPT | Performed by: INTERNAL MEDICINE

## 2021-07-11 PROCEDURE — 80048 BASIC METABOLIC PNL TOTAL CA: CPT | Performed by: INTERNAL MEDICINE

## 2021-07-11 PROCEDURE — 87070 CULTURE OTHR SPECIMN AEROBIC: CPT | Performed by: INTERNAL MEDICINE

## 2021-07-11 PROCEDURE — 25010000002 FUROSEMIDE PER 20 MG: Performed by: INTERNAL MEDICINE

## 2021-07-11 PROCEDURE — 71045 X-RAY EXAM CHEST 1 VIEW: CPT

## 2021-07-11 PROCEDURE — 87147 CULTURE TYPE IMMUNOLOGIC: CPT | Performed by: INTERNAL MEDICINE

## 2021-07-11 PROCEDURE — 84100 ASSAY OF PHOSPHORUS: CPT | Performed by: INTERNAL MEDICINE

## 2021-07-11 PROCEDURE — 85520 HEPARIN ASSAY: CPT | Performed by: INTERNAL MEDICINE

## 2021-07-11 PROCEDURE — 83735 ASSAY OF MAGNESIUM: CPT | Performed by: INTERNAL MEDICINE

## 2021-07-11 PROCEDURE — 25010000002 HEPARIN (PORCINE) 25000-0.45 UT/250ML-% SOLUTION: Performed by: INTERNAL MEDICINE

## 2021-07-11 PROCEDURE — 25010000002 PROPOFOL 1000 MG/ML EMULSION

## 2021-07-11 PROCEDURE — 94003 VENT MGMT INPAT SUBQ DAY: CPT

## 2021-07-11 PROCEDURE — 25010000002 CEFTRIAXONE PER 250 MG: Performed by: INTERNAL MEDICINE

## 2021-07-11 RX ORDER — ACETAMINOPHEN 160 MG/5ML
650 SOLUTION ORAL EVERY 4 HOURS PRN
Status: DISCONTINUED | OUTPATIENT
Start: 2021-07-11 | End: 2021-07-17

## 2021-07-11 RX ORDER — HEPARIN SODIUM 10000 [USP'U]/100ML
19.5 INJECTION, SOLUTION INTRAVENOUS
Status: DISCONTINUED | OUTPATIENT
Start: 2021-07-11 | End: 2021-07-18

## 2021-07-11 RX ADMIN — PANTOPRAZOLE SODIUM 40 MG: 40 INJECTION, POWDER, FOR SOLUTION INTRAVENOUS at 06:03

## 2021-07-11 RX ADMIN — PROPOFOL 10 MCG/KG/MIN: 10 INJECTION, EMULSION INTRAVENOUS at 17:51

## 2021-07-11 RX ADMIN — IPRATROPIUM BROMIDE AND ALBUTEROL SULFATE 3 ML: 2.5; .5 SOLUTION RESPIRATORY (INHALATION) at 20:14

## 2021-07-11 RX ADMIN — PROPOFOL 20 MCG/KG/MIN: 10 INJECTION, EMULSION INTRAVENOUS at 07:00

## 2021-07-11 RX ADMIN — CHLORHEXIDINE GLUCONATE 15 ML: 1.2 SOLUTION ORAL at 08:19

## 2021-07-11 RX ADMIN — CEFTRIAXONE SODIUM 1 G: 1 INJECTION, SOLUTION INTRAVENOUS at 11:56

## 2021-07-11 RX ADMIN — ACETAMINOPHEN ORAL SOLUTION 649.6 MG: 650 SOLUTION ORAL at 08:19

## 2021-07-11 RX ADMIN — Medication 30 ML: at 17:51

## 2021-07-11 RX ADMIN — ACETYLCYSTEINE 4 ML: 200 SOLUTION ORAL; RESPIRATORY (INHALATION) at 20:15

## 2021-07-11 RX ADMIN — IPRATROPIUM BROMIDE AND ALBUTEROL SULFATE 3 ML: 2.5; .5 SOLUTION RESPIRATORY (INHALATION) at 15:20

## 2021-07-11 RX ADMIN — Medication 15 ML: at 08:19

## 2021-07-11 RX ADMIN — Medication 30 ML: at 11:57

## 2021-07-11 RX ADMIN — ACETYLCYSTEINE 4 ML: 200 SOLUTION ORAL; RESPIRATORY (INHALATION) at 08:32

## 2021-07-11 RX ADMIN — ATORVASTATIN CALCIUM 40 MG: 40 TABLET, FILM COATED ORAL at 20:58

## 2021-07-11 RX ADMIN — FUROSEMIDE 60 MG: 10 INJECTION INTRAMUSCULAR; INTRAVENOUS at 20:58

## 2021-07-11 RX ADMIN — IPRATROPIUM BROMIDE AND ALBUTEROL SULFATE 3 ML: 2.5; .5 SOLUTION RESPIRATORY (INHALATION) at 12:03

## 2021-07-11 RX ADMIN — PROPOFOL 15 MCG/KG/MIN: 10 INJECTION, EMULSION INTRAVENOUS at 11:56

## 2021-07-11 RX ADMIN — FENTANYL CITRATE 50 MCG/HR: 0.05 INJECTION, SOLUTION INTRAMUSCULAR; INTRAVENOUS at 12:58

## 2021-07-11 RX ADMIN — ACETAMINOPHEN ORAL SOLUTION 649.6 MG: 650 SOLUTION ORAL at 16:16

## 2021-07-11 RX ADMIN — PROPOFOL 20 MCG/KG/MIN: 10 INJECTION, EMULSION INTRAVENOUS at 03:45

## 2021-07-11 RX ADMIN — IPRATROPIUM BROMIDE AND ALBUTEROL SULFATE 3 ML: 2.5; .5 SOLUTION RESPIRATORY (INHALATION) at 08:31

## 2021-07-11 RX ADMIN — ASPIRIN 81 MG CHEWABLE TABLET 81 MG: 81 TABLET CHEWABLE at 08:19

## 2021-07-11 RX ADMIN — HEPARIN SODIUM 12 UNITS/KG/HR: 10000 INJECTION, SOLUTION INTRAVENOUS at 14:03

## 2021-07-11 RX ADMIN — FUROSEMIDE 60 MG: 10 INJECTION INTRAMUSCULAR; INTRAVENOUS at 08:19

## 2021-07-11 RX ADMIN — CHLORHEXIDINE GLUCONATE 15 ML: 1.2 SOLUTION ORAL at 20:58

## 2021-07-11 RX ADMIN — Medication 30 ML: at 08:19

## 2021-07-11 RX ADMIN — Medication 30 ML: at 20:58

## 2021-07-11 RX ADMIN — POLYETHYLENE GLYCOL 3350 17 G: 17 POWDER, FOR SOLUTION ORAL at 08:19

## 2021-07-12 ENCOUNTER — APPOINTMENT (OUTPATIENT)
Dept: GENERAL RADIOLOGY | Facility: HOSPITAL | Age: 56
End: 2021-07-12

## 2021-07-12 LAB
ALBUMIN SERPL-MCNC: 3.5 G/DL (ref 3.5–5.2)
ALP SERPL-CCNC: 175 U/L (ref 39–117)
ALT SERPL W P-5'-P-CCNC: 115 U/L (ref 1–41)
ANION GAP SERPL CALCULATED.3IONS-SCNC: 10 MMOL/L (ref 5–15)
AST SERPL-CCNC: 82 U/L (ref 1–40)
BACTERIA BLD CULT: ABNORMAL
BASOPHILS # BLD AUTO: 0.06 10*3/MM3 (ref 0–0.2)
BASOPHILS NFR BLD AUTO: 0.4 % (ref 0–1.5)
BILIRUB SERPL-MCNC: 0.4 MG/DL (ref 0–1.2)
BUN SERPL-MCNC: 52 MG/DL (ref 6–20)
CALCIUM SPEC-SCNC: 9.9 MG/DL (ref 8.6–10.5)
CHLORIDE SERPL-SCNC: 101 MMOL/L (ref 98–107)
CHOLEST SERPL-MCNC: 140 MG/DL (ref 0–200)
CO2 SERPL-SCNC: 25 MMOL/L (ref 22–29)
CREAT SERPL-MCNC: 1.1 MG/DL (ref 0.76–1.27)
CRP SERPL-MCNC: 7.86 MG/DL (ref 0–0.5)
DEPRECATED RDW RBC AUTO: 50.5 FL (ref 37–54)
EOSINOPHIL # BLD AUTO: 0.05 10*3/MM3 (ref 0–0.4)
EOSINOPHIL NFR BLD AUTO: 0.3 % (ref 0.3–6.2)
ERYTHROCYTE [DISTWIDTH] IN BLOOD BY AUTOMATED COUNT: 15.5 % (ref 12.3–15.4)
GLUCOSE BLDC GLUCOMTR-MCNC: 123 MG/DL (ref 70–130)
GLUCOSE BLDC GLUCOMTR-MCNC: 130 MG/DL (ref 70–130)
GLUCOSE BLDC GLUCOMTR-MCNC: 141 MG/DL (ref 70–130)
GLUCOSE BLDC GLUCOMTR-MCNC: 158 MG/DL (ref 70–130)
GLUCOSE BLDC GLUCOMTR-MCNC: 160 MG/DL (ref 70–130)
GLUCOSE SERPL-MCNC: 150 MG/DL (ref 65–99)
HCT VFR BLD AUTO: 38 % (ref 37.5–51)
HGB BLD-MCNC: 11.4 G/DL (ref 13–17.7)
IMM GRANULOCYTES # BLD AUTO: 0.13 10*3/MM3 (ref 0–0.05)
IMM GRANULOCYTES NFR BLD AUTO: 0.8 % (ref 0–0.5)
LYMPHOCYTES # BLD AUTO: 1.24 10*3/MM3 (ref 0.7–3.1)
LYMPHOCYTES NFR BLD AUTO: 7.5 % (ref 19.6–45.3)
MAGNESIUM SERPL-MCNC: 2.3 MG/DL (ref 1.6–2.6)
MAGNESIUM SERPL-MCNC: 2.4 MG/DL (ref 1.6–2.6)
MAGNESIUM SERPL-MCNC: 2.4 MG/DL (ref 1.6–2.6)
MCH RBC QN AUTO: 27 PG (ref 26.6–33)
MCHC RBC AUTO-ENTMCNC: 30 G/DL (ref 31.5–35.7)
MCV RBC AUTO: 89.8 FL (ref 79–97)
MONOCYTES # BLD AUTO: 1.1 10*3/MM3 (ref 0.1–0.9)
MONOCYTES NFR BLD AUTO: 6.7 % (ref 5–12)
MRSA DNA SPEC QL NAA+PROBE: NEGATIVE
NEUTROPHILS NFR BLD AUTO: 13.89 10*3/MM3 (ref 1.7–7)
NEUTROPHILS NFR BLD AUTO: 84.3 % (ref 42.7–76)
NRBC BLD AUTO-RTO: 0 /100 WBC (ref 0–0.2)
PHOSPHATE SERPL-MCNC: 3.2 MG/DL (ref 2.5–4.5)
PHOSPHATE SERPL-MCNC: 3.2 MG/DL (ref 2.5–4.5)
PLATELET # BLD AUTO: 250 10*3/MM3 (ref 140–450)
PMV BLD AUTO: 10.9 FL (ref 6–12)
POTASSIUM SERPL-SCNC: 3.5 MMOL/L (ref 3.5–5.2)
POTASSIUM SERPL-SCNC: 3.8 MMOL/L (ref 3.5–5.2)
PREALB SERPL-MCNC: 27.8 MG/DL (ref 20–40)
PROT SERPL-MCNC: 7.6 G/DL (ref 6–8.5)
QT INTERVAL: 398 MS
QTC INTERVAL: 481 MS
RBC # BLD AUTO: 4.23 10*6/MM3 (ref 4.14–5.8)
SODIUM SERPL-SCNC: 136 MMOL/L (ref 136–145)
TRIGL SERPL-MCNC: 121 MG/DL (ref 0–150)
UFH PPP CHRO-ACNC: 0.2 IU/ML (ref 0.3–0.7)
UFH PPP CHRO-ACNC: 0.47 IU/ML (ref 0.3–0.7)
UFH PPP CHRO-ACNC: 0.56 IU/ML (ref 0.3–0.7)
VANCOMYCIN PEAK SERPL-MCNC: 30.5 MCG/ML (ref 20–40)
WBC # BLD AUTO: 16.47 10*3/MM3 (ref 3.4–10.8)

## 2021-07-12 PROCEDURE — 84132 ASSAY OF SERUM POTASSIUM: CPT | Performed by: INTERNAL MEDICINE

## 2021-07-12 PROCEDURE — 25010000002 FENTANYL CITRATE (PF) 2500 MCG/50ML SOLUTION

## 2021-07-12 PROCEDURE — 84478 ASSAY OF TRIGLYCERIDES: CPT

## 2021-07-12 PROCEDURE — 80202 ASSAY OF VANCOMYCIN: CPT

## 2021-07-12 PROCEDURE — 94003 VENT MGMT INPAT SUBQ DAY: CPT

## 2021-07-12 PROCEDURE — 93005 ELECTROCARDIOGRAM TRACING: CPT | Performed by: INTERNAL MEDICINE

## 2021-07-12 PROCEDURE — 84100 ASSAY OF PHOSPHORUS: CPT

## 2021-07-12 PROCEDURE — 93010 ELECTROCARDIOGRAM REPORT: CPT | Performed by: INTERNAL MEDICINE

## 2021-07-12 PROCEDURE — 94799 UNLISTED PULMONARY SVC/PX: CPT

## 2021-07-12 PROCEDURE — 87641 MR-STAPH DNA AMP PROBE: CPT | Performed by: INTERNAL MEDICINE

## 2021-07-12 PROCEDURE — 25010000002 HEPARIN (PORCINE) 25000-0.45 UT/250ML-% SOLUTION: Performed by: INTERNAL MEDICINE

## 2021-07-12 PROCEDURE — 82962 GLUCOSE BLOOD TEST: CPT

## 2021-07-12 PROCEDURE — 83735 ASSAY OF MAGNESIUM: CPT | Performed by: INTERNAL MEDICINE

## 2021-07-12 PROCEDURE — 87147 CULTURE TYPE IMMUNOLOGIC: CPT | Performed by: INTERNAL MEDICINE

## 2021-07-12 PROCEDURE — 84134 ASSAY OF PREALBUMIN: CPT

## 2021-07-12 PROCEDURE — 83735 ASSAY OF MAGNESIUM: CPT

## 2021-07-12 PROCEDURE — 82465 ASSAY BLD/SERUM CHOLESTEROL: CPT

## 2021-07-12 PROCEDURE — 85520 HEPARIN ASSAY: CPT

## 2021-07-12 PROCEDURE — 25010000002 HEPARIN (PORCINE) 25000-0.45 UT/250ML-% SOLUTION

## 2021-07-12 PROCEDURE — 80053 COMPREHEN METABOLIC PANEL: CPT

## 2021-07-12 PROCEDURE — 99291 CRITICAL CARE FIRST HOUR: CPT | Performed by: INTERNAL MEDICINE

## 2021-07-12 PROCEDURE — 25010000002 FUROSEMIDE PER 20 MG: Performed by: INTERNAL MEDICINE

## 2021-07-12 PROCEDURE — 25010000002 CEFEPIME PER 500 MG: Performed by: INTERNAL MEDICINE

## 2021-07-12 PROCEDURE — 87205 SMEAR GRAM STAIN: CPT | Performed by: INTERNAL MEDICINE

## 2021-07-12 PROCEDURE — 71045 X-RAY EXAM CHEST 1 VIEW: CPT

## 2021-07-12 PROCEDURE — 25010000002 PROPOFOL 1000 MG/ML EMULSION

## 2021-07-12 PROCEDURE — 87040 BLOOD CULTURE FOR BACTERIA: CPT | Performed by: INTERNAL MEDICINE

## 2021-07-12 PROCEDURE — 87070 CULTURE OTHR SPECIMN AEROBIC: CPT | Performed by: INTERNAL MEDICINE

## 2021-07-12 PROCEDURE — 85025 COMPLETE CBC W/AUTO DIFF WBC: CPT | Performed by: INTERNAL MEDICINE

## 2021-07-12 PROCEDURE — 25010000002 VANCOMYCIN 10 G RECONSTITUTED SOLUTION: Performed by: INTERNAL MEDICINE

## 2021-07-12 PROCEDURE — 84100 ASSAY OF PHOSPHORUS: CPT | Performed by: INTERNAL MEDICINE

## 2021-07-12 PROCEDURE — 86140 C-REACTIVE PROTEIN: CPT

## 2021-07-12 RX ORDER — DOCUSATE SODIUM 50 MG/5 ML
100 LIQUID (ML) ORAL DAILY
Status: DISCONTINUED | OUTPATIENT
Start: 2021-07-12 | End: 2021-07-16

## 2021-07-12 RX ORDER — POTASSIUM CHLORIDE 7.45 MG/ML
10 INJECTION INTRAVENOUS
Status: DISCONTINUED | OUTPATIENT
Start: 2021-07-12 | End: 2021-07-17

## 2021-07-12 RX ORDER — POTASSIUM CHLORIDE 1.5 G/1.77G
40 POWDER, FOR SOLUTION ORAL AS NEEDED
Status: DISCONTINUED | OUTPATIENT
Start: 2021-07-12 | End: 2021-07-17

## 2021-07-12 RX ORDER — BISACODYL 10 MG
10 SUPPOSITORY, RECTAL RECTAL DAILY PRN
Status: DISCONTINUED | OUTPATIENT
Start: 2021-07-12 | End: 2021-07-28

## 2021-07-12 RX ORDER — DEXMEDETOMIDINE HYDROCHLORIDE 4 UG/ML
.2-1.5 INJECTION, SOLUTION INTRAVENOUS
Status: DISCONTINUED | OUTPATIENT
Start: 2021-07-12 | End: 2021-07-12

## 2021-07-12 RX ADMIN — ACETYLCYSTEINE 4 ML: 200 SOLUTION ORAL; RESPIRATORY (INHALATION) at 07:29

## 2021-07-12 RX ADMIN — FUROSEMIDE 60 MG: 10 INJECTION INTRAMUSCULAR; INTRAVENOUS at 20:02

## 2021-07-12 RX ADMIN — Medication 30 ML: at 20:25

## 2021-07-12 RX ADMIN — ATORVASTATIN CALCIUM 40 MG: 40 TABLET, FILM COATED ORAL at 20:02

## 2021-07-12 RX ADMIN — Medication: at 11:10

## 2021-07-12 RX ADMIN — DORNASE ALFA 2.5 MG: 1 SOLUTION RESPIRATORY (INHALATION) at 12:45

## 2021-07-12 RX ADMIN — FENTANYL CITRATE 50 MCG/HR: 0.05 INJECTION, SOLUTION INTRAMUSCULAR; INTRAVENOUS at 20:01

## 2021-07-12 RX ADMIN — HEPARIN SODIUM 13 UNITS/KG/HR: 10000 INJECTION, SOLUTION INTRAVENOUS at 01:58

## 2021-07-12 RX ADMIN — POTASSIUM CHLORIDE 40 MEQ: 1.5 POWDER, FOR SOLUTION ORAL at 20:25

## 2021-07-12 RX ADMIN — Medication 15 ML: at 09:07

## 2021-07-12 RX ADMIN — PROPOFOL 10 MCG/KG/MIN: 10 INJECTION, EMULSION INTRAVENOUS at 02:12

## 2021-07-12 RX ADMIN — PANTOPRAZOLE SODIUM 40 MG: 40 INJECTION, POWDER, FOR SOLUTION INTRAVENOUS at 04:40

## 2021-07-12 RX ADMIN — FUROSEMIDE 60 MG: 10 INJECTION INTRAMUSCULAR; INTRAVENOUS at 09:07

## 2021-07-12 RX ADMIN — PROPOFOL 20 MCG/KG/MIN: 10 INJECTION, EMULSION INTRAVENOUS at 20:01

## 2021-07-12 RX ADMIN — DORNASE ALFA 2.5 MG: 1 SOLUTION RESPIRATORY (INHALATION) at 20:29

## 2021-07-12 RX ADMIN — CHLORHEXIDINE GLUCONATE 15 ML: 1.2 SOLUTION ORAL at 09:07

## 2021-07-12 RX ADMIN — IPRATROPIUM BROMIDE AND ALBUTEROL SULFATE 3 ML: 2.5; .5 SOLUTION RESPIRATORY (INHALATION) at 12:45

## 2021-07-12 RX ADMIN — Medication 30 ML: at 18:31

## 2021-07-12 RX ADMIN — CHLORHEXIDINE GLUCONATE 15 ML: 1.2 SOLUTION ORAL at 20:02

## 2021-07-12 RX ADMIN — Medication 30 ML: at 09:08

## 2021-07-12 RX ADMIN — VANCOMYCIN HYDROCHLORIDE 3000 MG: 100 INJECTION, POWDER, LYOPHILIZED, FOR SOLUTION INTRAVENOUS at 11:10

## 2021-07-12 RX ADMIN — CEFEPIME HYDROCHLORIDE 2 G: 2 INJECTION, POWDER, FOR SOLUTION INTRAVENOUS at 11:11

## 2021-07-12 RX ADMIN — IPRATROPIUM BROMIDE AND ALBUTEROL SULFATE 3 ML: 2.5; .5 SOLUTION RESPIRATORY (INHALATION) at 20:29

## 2021-07-12 RX ADMIN — DOCUSATE SODIUM 100 MG: 50 LIQUID ORAL at 11:57

## 2021-07-12 RX ADMIN — IPRATROPIUM BROMIDE AND ALBUTEROL SULFATE 3 ML: 2.5; .5 SOLUTION RESPIRATORY (INHALATION) at 07:28

## 2021-07-12 RX ADMIN — HEPARIN SODIUM 15 UNITS/KG/HR: 10000 INJECTION, SOLUTION INTRAVENOUS at 14:19

## 2021-07-12 RX ADMIN — CEFEPIME HYDROCHLORIDE 2 G: 2 INJECTION, POWDER, FOR SOLUTION INTRAVENOUS at 20:01

## 2021-07-12 RX ADMIN — PROPOFOL 40 MCG/KG/MIN: 10 INJECTION, EMULSION INTRAVENOUS at 11:57

## 2021-07-12 RX ADMIN — ACETAMINOPHEN ORAL SOLUTION 649.6 MG: 650 SOLUTION ORAL at 04:41

## 2021-07-12 RX ADMIN — POLYETHYLENE GLYCOL 3350 17 G: 17 POWDER, FOR SOLUTION ORAL at 09:07

## 2021-07-12 RX ADMIN — Medication 30 ML: at 11:57

## 2021-07-12 RX ADMIN — IPRATROPIUM BROMIDE AND ALBUTEROL SULFATE 3 ML: 2.5; .5 SOLUTION RESPIRATORY (INHALATION) at 15:51

## 2021-07-12 RX ADMIN — ASPIRIN 81 MG CHEWABLE TABLET 81 MG: 81 TABLET CHEWABLE at 09:07

## 2021-07-13 ENCOUNTER — APPOINTMENT (OUTPATIENT)
Dept: GENERAL RADIOLOGY | Facility: HOSPITAL | Age: 56
End: 2021-07-13

## 2021-07-13 ENCOUNTER — ANESTHESIA (OUTPATIENT)
Dept: PERIOP | Facility: HOSPITAL | Age: 56
End: 2021-07-13

## 2021-07-13 ENCOUNTER — ANESTHESIA EVENT (OUTPATIENT)
Dept: PERIOP | Facility: HOSPITAL | Age: 56
End: 2021-07-13

## 2021-07-13 LAB
ANION GAP SERPL CALCULATED.3IONS-SCNC: 13 MMOL/L (ref 5–15)
BASOPHILS # BLD AUTO: 0.05 10*3/MM3 (ref 0–0.2)
BASOPHILS NFR BLD AUTO: 0.4 % (ref 0–1.5)
BUN SERPL-MCNC: 59 MG/DL (ref 6–20)
BUN/CREAT SERPL: 59.6 (ref 7–25)
CALCIUM SPEC-SCNC: 9.8 MG/DL (ref 8.6–10.5)
CHLORIDE SERPL-SCNC: 111 MMOL/L (ref 98–107)
CO2 SERPL-SCNC: 24 MMOL/L (ref 22–29)
CREAT SERPL-MCNC: 0.99 MG/DL (ref 0.76–1.27)
DEPRECATED RDW RBC AUTO: 50.3 FL (ref 37–54)
EOSINOPHIL # BLD AUTO: 0.26 10*3/MM3 (ref 0–0.4)
EOSINOPHIL NFR BLD AUTO: 2.1 % (ref 0.3–6.2)
ERYTHROCYTE [DISTWIDTH] IN BLOOD BY AUTOMATED COUNT: 15.2 % (ref 12.3–15.4)
GFR SERPL CREATININE-BSD FRML MDRD: 78 ML/MIN/1.73
GLUCOSE BLDC GLUCOMTR-MCNC: 116 MG/DL (ref 70–130)
GLUCOSE BLDC GLUCOMTR-MCNC: 119 MG/DL (ref 70–130)
GLUCOSE BLDC GLUCOMTR-MCNC: 129 MG/DL (ref 70–130)
GLUCOSE BLDC GLUCOMTR-MCNC: 136 MG/DL (ref 70–130)
GLUCOSE SERPL-MCNC: 134 MG/DL (ref 65–99)
HCT VFR BLD AUTO: 35.1 % (ref 37.5–51)
HGB BLD-MCNC: 10.6 G/DL (ref 13–17.7)
IMM GRANULOCYTES # BLD AUTO: 0.12 10*3/MM3 (ref 0–0.05)
IMM GRANULOCYTES NFR BLD AUTO: 1 % (ref 0–0.5)
LYMPHOCYTES # BLD AUTO: 1.19 10*3/MM3 (ref 0.7–3.1)
LYMPHOCYTES NFR BLD AUTO: 9.5 % (ref 19.6–45.3)
MAGNESIUM SERPL-MCNC: 2.5 MG/DL (ref 1.6–2.6)
MCH RBC QN AUTO: 27 PG (ref 26.6–33)
MCHC RBC AUTO-ENTMCNC: 30.2 G/DL (ref 31.5–35.7)
MCV RBC AUTO: 89.3 FL (ref 79–97)
MONOCYTES # BLD AUTO: 0.91 10*3/MM3 (ref 0.1–0.9)
MONOCYTES NFR BLD AUTO: 7.3 % (ref 5–12)
NEUTROPHILS NFR BLD AUTO: 10.02 10*3/MM3 (ref 1.7–7)
NEUTROPHILS NFR BLD AUTO: 79.7 % (ref 42.7–76)
NRBC BLD AUTO-RTO: 0 /100 WBC (ref 0–0.2)
PHOSPHATE SERPL-MCNC: 3.2 MG/DL (ref 2.5–4.5)
PLATELET # BLD AUTO: 246 10*3/MM3 (ref 140–450)
PMV BLD AUTO: 11.4 FL (ref 6–12)
POTASSIUM SERPL-SCNC: 4.5 MMOL/L (ref 3.5–5.2)
RBC # BLD AUTO: 3.93 10*6/MM3 (ref 4.14–5.8)
SODIUM SERPL-SCNC: 148 MMOL/L (ref 136–145)
UFH PPP CHRO-ACNC: 0.1 IU/ML (ref 0.3–0.7)
VANCOMYCIN TROUGH SERPL-MCNC: 17.4 MCG/ML (ref 5–20)
WBC # BLD AUTO: 12.55 10*3/MM3 (ref 3.4–10.8)

## 2021-07-13 PROCEDURE — 71045 X-RAY EXAM CHEST 1 VIEW: CPT

## 2021-07-13 PROCEDURE — 25010000002 LORAZEPAM PER 2 MG: Performed by: SURGERY

## 2021-07-13 PROCEDURE — 25010000002 FENTANYL CITRATE (PF) 2500 MCG/50ML SOLUTION: Performed by: SURGERY

## 2021-07-13 PROCEDURE — 83735 ASSAY OF MAGNESIUM: CPT | Performed by: NURSE PRACTITIONER

## 2021-07-13 PROCEDURE — 80048 BASIC METABOLIC PNL TOTAL CA: CPT | Performed by: NURSE PRACTITIONER

## 2021-07-13 PROCEDURE — 25010000002 CEFEPIME PER 500 MG: Performed by: INTERNAL MEDICINE

## 2021-07-13 PROCEDURE — 25010000002 FUROSEMIDE PER 20 MG: Performed by: INTERNAL MEDICINE

## 2021-07-13 PROCEDURE — 94799 UNLISTED PULMONARY SVC/PX: CPT

## 2021-07-13 PROCEDURE — 0B110F4 BYPASS TRACHEA TO CUTANEOUS WITH TRACHEOSTOMY DEVICE, OPEN APPROACH: ICD-10-PCS | Performed by: SURGERY

## 2021-07-13 PROCEDURE — 25010000002 PROPOFOL 10 MG/ML EMULSION: Performed by: NURSE ANESTHETIST, CERTIFIED REGISTERED

## 2021-07-13 PROCEDURE — 85520 HEPARIN ASSAY: CPT

## 2021-07-13 PROCEDURE — 25010000002 LORAZEPAM PER 2 MG: Performed by: INTERNAL MEDICINE

## 2021-07-13 PROCEDURE — 0DH63UZ INSERTION OF FEEDING DEVICE INTO STOMACH, PERCUTANEOUS APPROACH: ICD-10-PCS | Performed by: SURGERY

## 2021-07-13 PROCEDURE — 25010000002 DEXAMETHASONE PER 1 MG: Performed by: NURSE ANESTHETIST, CERTIFIED REGISTERED

## 2021-07-13 PROCEDURE — 25010000002 ONDANSETRON PER 1 MG: Performed by: NURSE ANESTHETIST, CERTIFIED REGISTERED

## 2021-07-13 PROCEDURE — 25010000002 VANCOMYCIN 10 G RECONSTITUTED SOLUTION

## 2021-07-13 PROCEDURE — 99291 CRITICAL CARE FIRST HOUR: CPT | Performed by: INTERNAL MEDICINE

## 2021-07-13 PROCEDURE — 25010000002 PROPOFOL 1000 MG/ML EMULSION

## 2021-07-13 PROCEDURE — 82962 GLUCOSE BLOOD TEST: CPT

## 2021-07-13 PROCEDURE — 84100 ASSAY OF PHOSPHORUS: CPT | Performed by: INTERNAL MEDICINE

## 2021-07-13 PROCEDURE — 25010000002 CEFEPIME PER 500 MG: Performed by: SURGERY

## 2021-07-13 PROCEDURE — 99232 SBSQ HOSP IP/OBS MODERATE 35: CPT | Performed by: INTERNAL MEDICINE

## 2021-07-13 PROCEDURE — 94003 VENT MGMT INPAT SUBQ DAY: CPT

## 2021-07-13 RX ORDER — SODIUM CHLORIDE, SODIUM LACTATE, POTASSIUM CHLORIDE, CALCIUM CHLORIDE 600; 310; 30; 20 MG/100ML; MG/100ML; MG/100ML; MG/100ML
INJECTION, SOLUTION INTRAVENOUS CONTINUOUS PRN
Status: DISCONTINUED | OUTPATIENT
Start: 2021-07-13 | End: 2021-07-13 | Stop reason: SURG

## 2021-07-13 RX ORDER — EPHEDRINE SULFATE 50 MG/ML
INJECTION, SOLUTION INTRAVENOUS AS NEEDED
Status: DISCONTINUED | OUTPATIENT
Start: 2021-07-13 | End: 2021-07-13 | Stop reason: SURG

## 2021-07-13 RX ORDER — PROPOFOL 10 MG/ML
VIAL (ML) INTRAVENOUS AS NEEDED
Status: DISCONTINUED | OUTPATIENT
Start: 2021-07-13 | End: 2021-07-13 | Stop reason: SURG

## 2021-07-13 RX ORDER — GLYCOPYRROLATE 0.2 MG/ML
INJECTION INTRAMUSCULAR; INTRAVENOUS AS NEEDED
Status: DISCONTINUED | OUTPATIENT
Start: 2021-07-13 | End: 2021-07-13 | Stop reason: SURG

## 2021-07-13 RX ORDER — ROCURONIUM BROMIDE 10 MG/ML
INJECTION, SOLUTION INTRAVENOUS AS NEEDED
Status: DISCONTINUED | OUTPATIENT
Start: 2021-07-13 | End: 2021-07-13 | Stop reason: SURG

## 2021-07-13 RX ORDER — LORAZEPAM 2 MG/ML
2 INJECTION INTRAMUSCULAR EVERY 4 HOURS PRN
Status: DISCONTINUED | OUTPATIENT
Start: 2021-07-13 | End: 2021-07-16

## 2021-07-13 RX ORDER — ONDANSETRON 2 MG/ML
INJECTION INTRAMUSCULAR; INTRAVENOUS AS NEEDED
Status: DISCONTINUED | OUTPATIENT
Start: 2021-07-13 | End: 2021-07-13 | Stop reason: SURG

## 2021-07-13 RX ORDER — DEXAMETHASONE SODIUM PHOSPHATE 4 MG/ML
INJECTION, SOLUTION INTRA-ARTICULAR; INTRALESIONAL; INTRAMUSCULAR; INTRAVENOUS; SOFT TISSUE AS NEEDED
Status: DISCONTINUED | OUTPATIENT
Start: 2021-07-13 | End: 2021-07-13 | Stop reason: SURG

## 2021-07-13 RX ORDER — MAGNESIUM HYDROXIDE 1200 MG/15ML
LIQUID ORAL AS NEEDED
Status: DISCONTINUED | OUTPATIENT
Start: 2021-07-13 | End: 2021-07-13 | Stop reason: HOSPADM

## 2021-07-13 RX ORDER — BUPIVACAINE HYDROCHLORIDE AND EPINEPHRINE 2.5; 5 MG/ML; UG/ML
INJECTION, SOLUTION EPIDURAL; INFILTRATION; INTRACAUDAL; PERINEURAL AS NEEDED
Status: DISCONTINUED | OUTPATIENT
Start: 2021-07-13 | End: 2021-07-13 | Stop reason: HOSPADM

## 2021-07-13 RX ORDER — LIDOCAINE HYDROCHLORIDE 10 MG/ML
INJECTION, SOLUTION EPIDURAL; INFILTRATION; INTRACAUDAL; PERINEURAL AS NEEDED
Status: DISCONTINUED | OUTPATIENT
Start: 2021-07-13 | End: 2021-07-13 | Stop reason: SURG

## 2021-07-13 RX ADMIN — FUROSEMIDE 60 MG: 10 INJECTION INTRAMUSCULAR; INTRAVENOUS at 08:12

## 2021-07-13 RX ADMIN — CEFEPIME HYDROCHLORIDE 2 G: 2 INJECTION, POWDER, FOR SOLUTION INTRAVENOUS at 21:24

## 2021-07-13 RX ADMIN — ATORVASTATIN CALCIUM 40 MG: 40 TABLET, FILM COATED ORAL at 20:23

## 2021-07-13 RX ADMIN — GLYCOPYRROLATE 0.1 MCG: 0.4 INJECTION INTRAMUSCULAR; INTRAVENOUS at 13:34

## 2021-07-13 RX ADMIN — FENTANYL CITRATE 250 MCG/HR: 0.05 INJECTION, SOLUTION INTRAMUSCULAR; INTRAVENOUS at 17:45

## 2021-07-13 RX ADMIN — LORAZEPAM 2 MG: 2 INJECTION INTRAMUSCULAR; INTRAVENOUS at 10:29

## 2021-07-13 RX ADMIN — SODIUM CHLORIDE, POTASSIUM CHLORIDE, SODIUM LACTATE AND CALCIUM CHLORIDE: 600; 310; 30; 20 INJECTION, SOLUTION INTRAVENOUS at 13:11

## 2021-07-13 RX ADMIN — IPRATROPIUM BROMIDE AND ALBUTEROL SULFATE 3 ML: 2.5; .5 SOLUTION RESPIRATORY (INHALATION) at 15:38

## 2021-07-13 RX ADMIN — PROPOFOL 15 MCG/KG/MIN: 10 INJECTION, EMULSION INTRAVENOUS at 01:56

## 2021-07-13 RX ADMIN — IPRATROPIUM BROMIDE AND ALBUTEROL SULFATE 3 ML: 2.5; .5 SOLUTION RESPIRATORY (INHALATION) at 07:27

## 2021-07-13 RX ADMIN — ROCURONIUM BROMIDE 30 MG: 10 INJECTION, SOLUTION INTRAVENOUS at 13:17

## 2021-07-13 RX ADMIN — PANTOPRAZOLE SODIUM 40 MG: 40 INJECTION, POWDER, FOR SOLUTION INTRAVENOUS at 05:38

## 2021-07-13 RX ADMIN — PROPOFOL 150 MG: 10 INJECTION, EMULSION INTRAVENOUS at 13:17

## 2021-07-13 RX ADMIN — ROCURONIUM BROMIDE 20 MG: 10 INJECTION, SOLUTION INTRAVENOUS at 13:28

## 2021-07-13 RX ADMIN — Medication 30 ML: at 17:47

## 2021-07-13 RX ADMIN — IPRATROPIUM BROMIDE AND ALBUTEROL SULFATE 3 ML: 2.5; .5 SOLUTION RESPIRATORY (INHALATION) at 19:29

## 2021-07-13 RX ADMIN — LORAZEPAM 2 MG: 2 INJECTION INTRAMUSCULAR; INTRAVENOUS at 18:27

## 2021-07-13 RX ADMIN — LIDOCAINE HYDROCHLORIDE 50 MG: 10 INJECTION, SOLUTION EPIDURAL; INFILTRATION; INTRACAUDAL; PERINEURAL at 13:17

## 2021-07-13 RX ADMIN — ROCURONIUM BROMIDE 30 MG: 10 INJECTION, SOLUTION INTRAVENOUS at 13:43

## 2021-07-13 RX ADMIN — LORAZEPAM 2 MG: 2 INJECTION INTRAMUSCULAR; INTRAVENOUS at 14:58

## 2021-07-13 RX ADMIN — BISACODYL 10 MG: 10 SUPPOSITORY RECTAL at 14:49

## 2021-07-13 RX ADMIN — CHLORHEXIDINE GLUCONATE 15 ML: 1.2 SOLUTION ORAL at 08:13

## 2021-07-13 RX ADMIN — CHLORHEXIDINE GLUCONATE 15 ML: 1.2 SOLUTION ORAL at 20:23

## 2021-07-13 RX ADMIN — CEFEPIME HYDROCHLORIDE 2 G: 2 INJECTION, POWDER, FOR SOLUTION INTRAVENOUS at 12:00

## 2021-07-13 RX ADMIN — DEXAMETHASONE SODIUM PHOSPHATE 4 MG: 4 INJECTION, SOLUTION INTRA-ARTICULAR; INTRALESIONAL; INTRAMUSCULAR; INTRAVENOUS; SOFT TISSUE at 13:43

## 2021-07-13 RX ADMIN — CEFEPIME HYDROCHLORIDE 2 G: 2 INJECTION, POWDER, FOR SOLUTION INTRAVENOUS at 05:34

## 2021-07-13 RX ADMIN — ROCURONIUM BROMIDE 20 MG: 10 INJECTION, SOLUTION INTRAVENOUS at 13:55

## 2021-07-13 RX ADMIN — POTASSIUM CHLORIDE 40 MEQ: 1.5 POWDER, FOR SOLUTION ORAL at 01:58

## 2021-07-13 RX ADMIN — ONDANSETRON 4 MG: 2 INJECTION INTRAMUSCULAR; INTRAVENOUS at 13:43

## 2021-07-13 RX ADMIN — EPHEDRINE SULFATE 10 MG: 50 INJECTION INTRAVENOUS at 13:29

## 2021-07-13 RX ADMIN — VANCOMYCIN HYDROCHLORIDE 1250 MG: 100 INJECTION, POWDER, LYOPHILIZED, FOR SOLUTION INTRAVENOUS at 02:09

## 2021-07-13 RX ADMIN — GLYCOPYRROLATE 0.1 MCG: 0.4 INJECTION INTRAMUSCULAR; INTRAVENOUS at 14:46

## 2021-07-13 RX ADMIN — VANCOMYCIN HYDROCHLORIDE 1250 MG: 100 INJECTION, POWDER, LYOPHILIZED, FOR SOLUTION INTRAVENOUS at 12:00

## 2021-07-13 RX ADMIN — Medication 30 ML: at 20:23

## 2021-07-13 RX ADMIN — DORNASE ALFA 2.5 MG: 1 SOLUTION RESPIRATORY (INHALATION) at 07:26

## 2021-07-13 NOTE — ANESTHESIA PREPROCEDURE EVALUATION
Anesthesia Evaluation     Patient summary reviewed and Nursing notes reviewed     NPO Liquid Status: > 8 hours           Airway   Dental      Pulmonary    (+) shortness of breath,   (-) not a smoker    ROS comment:  VENTILATED since 6/30/21   Acute respiratory failure with hypoxia  50% FiO2, he might require increasing his PEEP  CXR persistent infiltrate -staph aureus growing from his sputum  Cardiovascular     ECG reviewed    (+) hypertension, valvular problems/murmurs (sp AVR 2012) AS, past MI , dysrhythmias (VFib arrest ), CHF , PVD (Ascending AA),   (-) cardiac stentsCABG: sp AVR     ROS comment: ECG SR c PVC's bigeminy   LAE LBBB   ECHO EF >41% global HK  moderate concentric LVH   Mechanical AV prosthesis   RV SP (TR) moderately elevated (45-55 mmHg).  RV LV dilated. LA RA mildly increased.        Neuro/Psych  (+) CVA (2012 LUE weak ),       ROS Comment:   Acute encephalopathy  GI/Hepatic/Renal/Endo    (+) morbid obesity,  renal disease (creat <1 ) ARF,   (-)  obesity    Musculoskeletal     Abdominal    Substance History      OB/GYN          Other        ROS/Med Hx Other: Cardiac arrest with V-Fib    Atrial fibrillation    S/P AVR (aortic valve replacement)   Elevated transaminase level  Chronic anticoagulation  Acute systolic right heart failure   Epistaxis      Phys Exam Other: INTUBATED ON VENTILATOR               Anesthesia Plan    ASA 4     general and MAC     intravenous induction     Anesthetic plan, all risks, benefits, and alternatives have been provided, discussed and informed consent has been obtained with: patient.    Plan discussed with CRNA.

## 2021-07-13 NOTE — ANESTHESIA POSTPROCEDURE EVALUATION
Patient: Mitchell Alberto    Procedure Summary     Date: 07/13/21 Room / [facility]:  [facility]    Anesthesia Start: 1311 Anesthesia Stop: 1449    Procedure: TRACHEOSTOMY AND PERCUTANEOUS ENDOSCOPIC GASTROSTOMY TUBE INSERTION (N/A ) Diagnosis:     Surgeons: Hayden Centeno MD Provider: Smith Lovelace MD    Anesthesia Type: general, MAC ASA Status: 4          Anesthesia Type: general, MAC    Vitals  Vitals Value Taken Time   /100 07/13/21 1449   Temp 97.5 °F (36.4 °C) 07/13/21 1449   Pulse 61 07/13/21 1449   Resp 16 07/13/21 1449   SpO2 100 % 07/13/21 1449           Post Anesthesia Care and Evaluation    Patient location during evaluation: ICU  Patient participation: waiting for patient participation  Post-procedure mental status: trach in place and sedated.  Pain management: adequate  Airway patency: patent  Anesthetic complications: No anesthetic complications  PONV Status: none  Cardiovascular status: hemodynamically stable and acceptable  Respiratory status: acceptable and trach  Hydration status: acceptable    Comments: Report given to ICU RN, VSS.

## 2021-07-14 ENCOUNTER — APPOINTMENT (OUTPATIENT)
Dept: GENERAL RADIOLOGY | Facility: HOSPITAL | Age: 56
End: 2021-07-14

## 2021-07-14 LAB
ANION GAP SERPL CALCULATED.3IONS-SCNC: 10 MMOL/L (ref 5–15)
ARTERIAL PATENCY WRIST A: ABNORMAL
ATMOSPHERIC PRESS: ABNORMAL MM[HG]
BACTERIA SPEC AEROBE CULT: ABNORMAL
BACTERIA SPEC RESP CULT: ABNORMAL
BASE EXCESS BLDA CALC-SCNC: 0.6 MMOL/L (ref 0–2)
BDY SITE: ABNORMAL
BODY TEMPERATURE: 37 C
BUN SERPL-MCNC: 60 MG/DL (ref 6–20)
BUN/CREAT SERPL: 61.2 (ref 7–25)
CALCIUM SPEC-SCNC: 10 MG/DL (ref 8.6–10.5)
CHLORIDE SERPL-SCNC: 111 MMOL/L (ref 98–107)
CO2 BLDA-SCNC: 27.6 MMOL/L (ref 22–33)
CO2 SERPL-SCNC: 23 MMOL/L (ref 22–29)
COHGB MFR BLD: 0.5 % (ref 0–2)
CREAT SERPL-MCNC: 0.98 MG/DL (ref 0.76–1.27)
GFR SERPL CREATININE-BSD FRML MDRD: 79 ML/MIN/1.73
GLUCOSE BLDC GLUCOMTR-MCNC: 114 MG/DL (ref 70–130)
GLUCOSE BLDC GLUCOMTR-MCNC: 120 MG/DL (ref 70–130)
GLUCOSE BLDC GLUCOMTR-MCNC: 132 MG/DL (ref 70–130)
GLUCOSE BLDC GLUCOMTR-MCNC: 137 MG/DL (ref 70–130)
GLUCOSE SERPL-MCNC: 134 MG/DL (ref 65–99)
GRAM STN SPEC: ABNORMAL
HCO3 BLDA-SCNC: 26.2 MMOL/L (ref 20–26)
HCT VFR BLD CALC: 36.1 %
HGB BLDA-MCNC: 11.8 G/DL (ref 13.5–17.5)
INHALED O2 CONCENTRATION: 45 %
ISOLATED FROM: ABNORMAL
MAGNESIUM SERPL-MCNC: 2.6 MG/DL (ref 1.6–2.6)
METHGB BLD QL: 0.6 % (ref 0–1.5)
MODALITY: ABNORMAL
NOTE: ABNORMAL
OXYHGB MFR BLDV: 98.1 % (ref 94–99)
PCO2 BLDA: 44.9 MM HG (ref 35–45)
PCO2 TEMP ADJ BLD: 44.9 MM HG (ref 35–48)
PH BLDA: 7.37 PH UNITS (ref 7.35–7.45)
PH, TEMP CORRECTED: 7.37 PH UNITS
PHOSPHATE SERPL-MCNC: 3.1 MG/DL (ref 2.5–4.5)
PO2 BLDA: 177 MM HG (ref 83–108)
PO2 TEMP ADJ BLD: 177 MM HG (ref 83–108)
POTASSIUM SERPL-SCNC: 4 MMOL/L (ref 3.5–5.2)
SODIUM SERPL-SCNC: 144 MMOL/L (ref 136–145)
UFH PPP CHRO-ACNC: 0.12 IU/ML (ref 0.3–0.7)
VANCOMYCIN TROUGH SERPL-MCNC: 15.6 MCG/ML (ref 5–20)
VENTILATOR MODE: ABNORMAL

## 2021-07-14 PROCEDURE — 25010000002 FENTANYL CITRATE (PF) 2500 MCG/50ML SOLUTION: Performed by: SURGERY

## 2021-07-14 PROCEDURE — 99232 SBSQ HOSP IP/OBS MODERATE 35: CPT | Performed by: INTERNAL MEDICINE

## 2021-07-14 PROCEDURE — C1751 CATH, INF, PER/CENT/MIDLINE: HCPCS

## 2021-07-14 PROCEDURE — 25010000002 FUROSEMIDE PER 20 MG: Performed by: INTERNAL MEDICINE

## 2021-07-14 PROCEDURE — 71045 X-RAY EXAM CHEST 1 VIEW: CPT

## 2021-07-14 PROCEDURE — 94799 UNLISTED PULMONARY SVC/PX: CPT

## 2021-07-14 PROCEDURE — B548ZZA ULTRASONOGRAPHY OF SUPERIOR VENA CAVA, GUIDANCE: ICD-10-PCS | Performed by: INTERNAL MEDICINE

## 2021-07-14 PROCEDURE — 94003 VENT MGMT INPAT SUBQ DAY: CPT

## 2021-07-14 PROCEDURE — 25010000002 CEFEPIME PER 500 MG: Performed by: SURGERY

## 2021-07-14 PROCEDURE — 02HV33Z INSERTION OF INFUSION DEVICE INTO SUPERIOR VENA CAVA, PERCUTANEOUS APPROACH: ICD-10-PCS | Performed by: INTERNAL MEDICINE

## 2021-07-14 PROCEDURE — 82962 GLUCOSE BLOOD TEST: CPT

## 2021-07-14 PROCEDURE — 83050 HGB METHEMOGLOBIN QUAN: CPT

## 2021-07-14 PROCEDURE — 25010000002 HYDROMORPHONE 1 MG/ML SOLUTION: Performed by: INTERNAL MEDICINE

## 2021-07-14 PROCEDURE — 36600 WITHDRAWAL OF ARTERIAL BLOOD: CPT

## 2021-07-14 PROCEDURE — 25010000002 METHYLNALTREXONE 12 MG/0.6ML SOLUTION: Performed by: INTERNAL MEDICINE

## 2021-07-14 PROCEDURE — 82375 ASSAY CARBOXYHB QUANT: CPT

## 2021-07-14 PROCEDURE — 82805 BLOOD GASES W/O2 SATURATION: CPT

## 2021-07-14 PROCEDURE — 80048 BASIC METABOLIC PNL TOTAL CA: CPT | Performed by: SURGERY

## 2021-07-14 PROCEDURE — 83735 ASSAY OF MAGNESIUM: CPT | Performed by: SURGERY

## 2021-07-14 PROCEDURE — 99291 CRITICAL CARE FIRST HOUR: CPT | Performed by: INTERNAL MEDICINE

## 2021-07-14 PROCEDURE — C1894 INTRO/SHEATH, NON-LASER: HCPCS

## 2021-07-14 PROCEDURE — 25010000002 LORAZEPAM PER 2 MG: Performed by: SURGERY

## 2021-07-14 PROCEDURE — 25010000002 VANCOMYCIN 10 G RECONSTITUTED SOLUTION: Performed by: SURGERY

## 2021-07-14 PROCEDURE — 84100 ASSAY OF PHOSPHORUS: CPT | Performed by: SURGERY

## 2021-07-14 PROCEDURE — 80202 ASSAY OF VANCOMYCIN: CPT | Performed by: SURGERY

## 2021-07-14 PROCEDURE — 25010000002 HEPARIN (PORCINE) 25000-0.45 UT/250ML-% SOLUTION: Performed by: SURGERY

## 2021-07-14 PROCEDURE — 85520 HEPARIN ASSAY: CPT

## 2021-07-14 RX ORDER — AMINO ACIDS/PROTEIN HYDROLYS 15G-100/30
30 LIQUID (ML) ORAL 2 TIMES DAILY
Status: DISCONTINUED | OUTPATIENT
Start: 2021-07-15 | End: 2021-07-17

## 2021-07-14 RX ORDER — OXYCODONE HCL 5 MG/5 ML
10 SOLUTION, ORAL ORAL EVERY 6 HOURS
Status: DISCONTINUED | OUTPATIENT
Start: 2021-07-14 | End: 2021-07-14

## 2021-07-14 RX ORDER — OXYCODONE HCL 5 MG/5 ML
10 SOLUTION, ORAL ORAL EVERY 6 HOURS
Status: COMPLETED | OUTPATIENT
Start: 2021-07-14 | End: 2021-07-15

## 2021-07-14 RX ORDER — SODIUM CHLORIDE 0.9 % (FLUSH) 0.9 %
10 SYRINGE (ML) INJECTION EVERY 12 HOURS SCHEDULED
Status: DISCONTINUED | OUTPATIENT
Start: 2021-07-14 | End: 2021-07-29

## 2021-07-14 RX ORDER — SODIUM CHLORIDE 0.9 % (FLUSH) 0.9 %
10 SYRINGE (ML) INJECTION AS NEEDED
Status: DISCONTINUED | OUTPATIENT
Start: 2021-07-14 | End: 2021-07-28

## 2021-07-14 RX ORDER — FUROSEMIDE 10 MG/ML
40 INJECTION INTRAMUSCULAR; INTRAVENOUS ONCE
Status: COMPLETED | OUTPATIENT
Start: 2021-07-14 | End: 2021-07-14

## 2021-07-14 RX ADMIN — HYDROMORPHONE HYDROCHLORIDE 1 MG: 1 INJECTION, SOLUTION INTRAMUSCULAR; INTRAVENOUS; SUBCUTANEOUS at 18:03

## 2021-07-14 RX ADMIN — IPRATROPIUM BROMIDE AND ALBUTEROL SULFATE 3 ML: 2.5; .5 SOLUTION RESPIRATORY (INHALATION) at 07:30

## 2021-07-14 RX ADMIN — OXYCODONE HYDROCHLORIDE 10 MG: 5 SOLUTION ORAL at 23:35

## 2021-07-14 RX ADMIN — Medication 30 ML: at 11:46

## 2021-07-14 RX ADMIN — FENTANYL CITRATE 250 MCG/HR: 0.05 INJECTION, SOLUTION INTRAMUSCULAR; INTRAVENOUS at 01:49

## 2021-07-14 RX ADMIN — LORAZEPAM 2 MG: 2 INJECTION INTRAMUSCULAR; INTRAVENOUS at 22:00

## 2021-07-14 RX ADMIN — FUROSEMIDE 40 MG: 10 INJECTION, SOLUTION INTRAMUSCULAR; INTRAVENOUS at 11:11

## 2021-07-14 RX ADMIN — OXYCODONE HYDROCHLORIDE 10 MG: 5 SOLUTION ORAL at 11:09

## 2021-07-14 RX ADMIN — IPRATROPIUM BROMIDE AND ALBUTEROL SULFATE 3 ML: 2.5; .5 SOLUTION RESPIRATORY (INHALATION) at 20:02

## 2021-07-14 RX ADMIN — SODIUM CHLORIDE, PRESERVATIVE FREE 10 ML: 5 INJECTION INTRAVENOUS at 11:19

## 2021-07-14 RX ADMIN — FENTANYL CITRATE 200 MCG/HR: 0.05 INJECTION, SOLUTION INTRAMUSCULAR; INTRAVENOUS at 11:12

## 2021-07-14 RX ADMIN — CHLORHEXIDINE GLUCONATE 15 ML: 1.2 SOLUTION ORAL at 08:12

## 2021-07-14 RX ADMIN — HEPARIN SODIUM 15 UNITS/KG/HR: 10000 INJECTION, SOLUTION INTRAVENOUS at 11:10

## 2021-07-14 RX ADMIN — VANCOMYCIN HYDROCHLORIDE 1250 MG: 100 INJECTION, POWDER, LYOPHILIZED, FOR SOLUTION INTRAVENOUS at 00:08

## 2021-07-14 RX ADMIN — Medication 15 ML: at 08:12

## 2021-07-14 RX ADMIN — IPRATROPIUM BROMIDE AND ALBUTEROL SULFATE 3 ML: 2.5; .5 SOLUTION RESPIRATORY (INHALATION) at 15:32

## 2021-07-14 RX ADMIN — Medication 30 ML: at 08:13

## 2021-07-14 RX ADMIN — DOCUSATE SODIUM 100 MG: 50 LIQUID ORAL at 08:12

## 2021-07-14 RX ADMIN — OXYCODONE HYDROCHLORIDE 10 MG: 5 SOLUTION ORAL at 16:56

## 2021-07-14 RX ADMIN — VANCOMYCIN HYDROCHLORIDE 1250 MG: 100 INJECTION, POWDER, LYOPHILIZED, FOR SOLUTION INTRAVENOUS at 13:23

## 2021-07-14 RX ADMIN — POLYETHYLENE GLYCOL 3350 17 G: 17 POWDER, FOR SOLUTION ORAL at 08:12

## 2021-07-14 RX ADMIN — LORAZEPAM 2 MG: 2 INJECTION INTRAMUSCULAR; INTRAVENOUS at 00:08

## 2021-07-14 RX ADMIN — CHLORHEXIDINE GLUCONATE 15 ML: 1.2 SOLUTION ORAL at 20:19

## 2021-07-14 RX ADMIN — METHYLNALTREXONE BROMIDE 12 MG: 12 INJECTION, SOLUTION SUBCUTANEOUS at 11:09

## 2021-07-14 RX ADMIN — CEFEPIME HYDROCHLORIDE 2 G: 2 INJECTION, POWDER, FOR SOLUTION INTRAVENOUS at 11:27

## 2021-07-14 RX ADMIN — PANTOPRAZOLE SODIUM 40 MG: 40 INJECTION, POWDER, FOR SOLUTION INTRAVENOUS at 04:29

## 2021-07-14 RX ADMIN — Medication 30 ML: at 17:19

## 2021-07-14 RX ADMIN — IPRATROPIUM BROMIDE AND ALBUTEROL SULFATE 3 ML: 2.5; .5 SOLUTION RESPIRATORY (INHALATION) at 12:43

## 2021-07-14 RX ADMIN — CEFEPIME HYDROCHLORIDE 2 G: 2 INJECTION, POWDER, FOR SOLUTION INTRAVENOUS at 04:51

## 2021-07-14 RX ADMIN — ATORVASTATIN CALCIUM 40 MG: 40 TABLET, FILM COATED ORAL at 20:19

## 2021-07-14 RX ADMIN — ASPIRIN 81 MG CHEWABLE TABLET 81 MG: 81 TABLET CHEWABLE at 08:12

## 2021-07-14 RX ADMIN — HYDROMORPHONE HYDROCHLORIDE 1 MG: 1 INJECTION, SOLUTION INTRAMUSCULAR; INTRAVENOUS; SUBCUTANEOUS at 13:23

## 2021-07-14 RX ADMIN — SODIUM CHLORIDE, PRESERVATIVE FREE 10 ML: 5 INJECTION INTRAVENOUS at 20:19

## 2021-07-15 ENCOUNTER — APPOINTMENT (OUTPATIENT)
Dept: GENERAL RADIOLOGY | Facility: HOSPITAL | Age: 56
End: 2021-07-15

## 2021-07-15 LAB
ANION GAP SERPL CALCULATED.3IONS-SCNC: 9 MMOL/L (ref 5–15)
BASOPHILS # BLD AUTO: 0.06 10*3/MM3 (ref 0–0.2)
BASOPHILS NFR BLD AUTO: 0.4 % (ref 0–1.5)
BUN SERPL-MCNC: 53 MG/DL (ref 6–20)
BUN/CREAT SERPL: 54.6 (ref 7–25)
CALCIUM SPEC-SCNC: 10.2 MG/DL (ref 8.6–10.5)
CHLORIDE SERPL-SCNC: 114 MMOL/L (ref 98–107)
CO2 SERPL-SCNC: 24 MMOL/L (ref 22–29)
CREAT SERPL-MCNC: 0.97 MG/DL (ref 0.76–1.27)
DEPRECATED RDW RBC AUTO: 50.4 FL (ref 37–54)
EOSINOPHIL # BLD AUTO: 0.36 10*3/MM3 (ref 0–0.4)
EOSINOPHIL NFR BLD AUTO: 2.6 % (ref 0.3–6.2)
ERYTHROCYTE [DISTWIDTH] IN BLOOD BY AUTOMATED COUNT: 15.3 % (ref 12.3–15.4)
GFR SERPL CREATININE-BSD FRML MDRD: 80 ML/MIN/1.73
GLUCOSE BLDC GLUCOMTR-MCNC: 114 MG/DL (ref 70–130)
GLUCOSE BLDC GLUCOMTR-MCNC: 123 MG/DL (ref 70–130)
GLUCOSE BLDC GLUCOMTR-MCNC: 132 MG/DL (ref 70–130)
GLUCOSE BLDC GLUCOMTR-MCNC: 99 MG/DL (ref 70–130)
GLUCOSE SERPL-MCNC: 129 MG/DL (ref 65–99)
HCT VFR BLD AUTO: 36.8 % (ref 37.5–51)
HGB BLD-MCNC: 10.9 G/DL (ref 13–17.7)
IMM GRANULOCYTES # BLD AUTO: 0.2 10*3/MM3 (ref 0–0.05)
IMM GRANULOCYTES NFR BLD AUTO: 1.4 % (ref 0–0.5)
INR PPP: 1.26 (ref 0.85–1.16)
LYMPHOCYTES # BLD AUTO: 1.45 10*3/MM3 (ref 0.7–3.1)
LYMPHOCYTES NFR BLD AUTO: 10.3 % (ref 19.6–45.3)
MAGNESIUM SERPL-MCNC: 2.6 MG/DL (ref 1.6–2.6)
MCH RBC QN AUTO: 26.6 PG (ref 26.6–33)
MCHC RBC AUTO-ENTMCNC: 29.6 G/DL (ref 31.5–35.7)
MCV RBC AUTO: 89.8 FL (ref 79–97)
MONOCYTES # BLD AUTO: 0.92 10*3/MM3 (ref 0.1–0.9)
MONOCYTES NFR BLD AUTO: 6.6 % (ref 5–12)
NEUTROPHILS NFR BLD AUTO: 11.03 10*3/MM3 (ref 1.7–7)
NEUTROPHILS NFR BLD AUTO: 78.7 % (ref 42.7–76)
NRBC BLD AUTO-RTO: 0 /100 WBC (ref 0–0.2)
PHOSPHATE SERPL-MCNC: 3.5 MG/DL (ref 2.5–4.5)
PLATELET # BLD AUTO: 274 10*3/MM3 (ref 140–450)
PMV BLD AUTO: 11.4 FL (ref 6–12)
POTASSIUM SERPL-SCNC: 4 MMOL/L (ref 3.5–5.2)
PROTHROMBIN TIME: 15.4 SECONDS (ref 11.4–14.4)
RBC # BLD AUTO: 4.1 10*6/MM3 (ref 4.14–5.8)
SODIUM SERPL-SCNC: 147 MMOL/L (ref 136–145)
UFH PPP CHRO-ACNC: 0.16 IU/ML (ref 0.3–0.7)
UFH PPP CHRO-ACNC: 0.3 IU/ML (ref 0.3–0.7)
UFH PPP CHRO-ACNC: 0.49 IU/ML (ref 0.3–0.7)
WBC # BLD AUTO: 14.02 10*3/MM3 (ref 3.4–10.8)

## 2021-07-15 PROCEDURE — 83735 ASSAY OF MAGNESIUM: CPT | Performed by: SURGERY

## 2021-07-15 PROCEDURE — 25010000002 HEPARIN (PORCINE) 25000-0.45 UT/250ML-% SOLUTION

## 2021-07-15 PROCEDURE — 84100 ASSAY OF PHOSPHORUS: CPT | Performed by: SURGERY

## 2021-07-15 PROCEDURE — 25010000002 HYDROMORPHONE 1 MG/ML SOLUTION: Performed by: INTERNAL MEDICINE

## 2021-07-15 PROCEDURE — 93454 CORONARY ARTERY ANGIO S&I: CPT | Performed by: INTERNAL MEDICINE

## 2021-07-15 PROCEDURE — 80048 BASIC METABOLIC PNL TOTAL CA: CPT | Performed by: SURGERY

## 2021-07-15 PROCEDURE — 25010000002 PROPOFOL 10 MG/ML EMULSION: Performed by: INTERNAL MEDICINE

## 2021-07-15 PROCEDURE — 85520 HEPARIN ASSAY: CPT

## 2021-07-15 PROCEDURE — 82962 GLUCOSE BLOOD TEST: CPT

## 2021-07-15 PROCEDURE — 94799 UNLISTED PULMONARY SVC/PX: CPT

## 2021-07-15 PROCEDURE — 25010000003 LIDOCAINE 1 % SOLUTION: Performed by: INTERNAL MEDICINE

## 2021-07-15 PROCEDURE — 25010000002 LORAZEPAM PER 2 MG: Performed by: NURSE PRACTITIONER

## 2021-07-15 PROCEDURE — 85610 PROTHROMBIN TIME: CPT

## 2021-07-15 PROCEDURE — 25010000002 PROPOFOL 10 MG/ML EMULSION: Performed by: NURSE PRACTITIONER

## 2021-07-15 PROCEDURE — 25010000002 VANCOMYCIN 10 G RECONSTITUTED SOLUTION: Performed by: NURSE PRACTITIONER

## 2021-07-15 PROCEDURE — 25010000002 VANCOMYCIN 10 G RECONSTITUTED SOLUTION: Performed by: SURGERY

## 2021-07-15 PROCEDURE — 25010000002 LORAZEPAM PER 2 MG: Performed by: SURGERY

## 2021-07-15 PROCEDURE — 25010000002 HYDROMORPHONE 1 MG/ML SOLUTION: Performed by: NURSE PRACTITIONER

## 2021-07-15 PROCEDURE — B2111ZZ FLUOROSCOPY OF MULTIPLE CORONARY ARTERIES USING LOW OSMOLAR CONTRAST: ICD-10-PCS | Performed by: INTERNAL MEDICINE

## 2021-07-15 PROCEDURE — 99291 CRITICAL CARE FIRST HOUR: CPT | Performed by: INTERNAL MEDICINE

## 2021-07-15 PROCEDURE — 85025 COMPLETE CBC W/AUTO DIFF WBC: CPT | Performed by: SURGERY

## 2021-07-15 PROCEDURE — C1769 GUIDE WIRE: HCPCS | Performed by: INTERNAL MEDICINE

## 2021-07-15 PROCEDURE — 0 IOPAMIDOL PER 1 ML: Performed by: INTERNAL MEDICINE

## 2021-07-15 PROCEDURE — C1894 INTRO/SHEATH, NON-LASER: HCPCS | Performed by: INTERNAL MEDICINE

## 2021-07-15 PROCEDURE — 99233 SBSQ HOSP IP/OBS HIGH 50: CPT | Performed by: INTERNAL MEDICINE

## 2021-07-15 PROCEDURE — 71045 X-RAY EXAM CHEST 1 VIEW: CPT

## 2021-07-15 PROCEDURE — 94003 VENT MGMT INPAT SUBQ DAY: CPT

## 2021-07-15 PROCEDURE — C1760 CLOSURE DEV, VASC: HCPCS | Performed by: INTERNAL MEDICINE

## 2021-07-15 PROCEDURE — 85520 HEPARIN ASSAY: CPT | Performed by: NURSE PRACTITIONER

## 2021-07-15 RX ORDER — LIDOCAINE HYDROCHLORIDE 10 MG/ML
INJECTION, SOLUTION INFILTRATION; PERINEURAL AS NEEDED
Status: DISCONTINUED | OUTPATIENT
Start: 2021-07-15 | End: 2021-07-15 | Stop reason: HOSPADM

## 2021-07-15 RX ORDER — OXYCODONE HCL 5 MG/5 ML
5 SOLUTION, ORAL ORAL EVERY 8 HOURS
Status: COMPLETED | OUTPATIENT
Start: 2021-07-18 | End: 2021-07-18

## 2021-07-15 RX ORDER — OXYCODONE HCL 5 MG/5 ML
10 SOLUTION, ORAL ORAL EVERY 8 HOURS
Status: COMPLETED | OUTPATIENT
Start: 2021-07-16 | End: 2021-07-16

## 2021-07-15 RX ORDER — WARFARIN SODIUM 5 MG/1
10 TABLET ORAL
Status: DISCONTINUED | OUTPATIENT
Start: 2021-07-15 | End: 2021-07-16

## 2021-07-15 RX ORDER — OXYCODONE HCL 5 MG/5 ML
5 SOLUTION, ORAL ORAL EVERY 6 HOURS
Status: COMPLETED | OUTPATIENT
Start: 2021-07-17 | End: 2021-07-17

## 2021-07-15 RX ORDER — MAGNESIUM CARB/ALUMINUM HYDROX 105-160MG
150 TABLET,CHEWABLE ORAL ONCE
Status: COMPLETED | OUTPATIENT
Start: 2021-07-15 | End: 2021-07-15

## 2021-07-15 RX ORDER — OXYCODONE HCL 5 MG/5 ML
10 SOLUTION, ORAL ORAL EVERY 8 HOURS
Status: DISCONTINUED | OUTPATIENT
Start: 2021-07-16 | End: 2021-07-15

## 2021-07-15 RX ADMIN — ATORVASTATIN CALCIUM 40 MG: 40 TABLET, FILM COATED ORAL at 20:05

## 2021-07-15 RX ADMIN — IPRATROPIUM BROMIDE AND ALBUTEROL SULFATE 3 ML: 2.5; .5 SOLUTION RESPIRATORY (INHALATION) at 20:28

## 2021-07-15 RX ADMIN — PROPOFOL 50 MCG/KG/MIN: 10 INJECTION, EMULSION INTRAVENOUS at 15:05

## 2021-07-15 RX ADMIN — CHLORHEXIDINE GLUCONATE 15 ML: 1.2 SOLUTION ORAL at 20:04

## 2021-07-15 RX ADMIN — HYDROMORPHONE HYDROCHLORIDE 1 MG: 1 INJECTION, SOLUTION INTRAMUSCULAR; INTRAVENOUS; SUBCUTANEOUS at 20:05

## 2021-07-15 RX ADMIN — OXYCODONE HYDROCHLORIDE 10 MG: 5 SOLUTION ORAL at 06:34

## 2021-07-15 RX ADMIN — LORAZEPAM 2 MG: 2 INJECTION INTRAMUSCULAR; INTRAVENOUS at 22:16

## 2021-07-15 RX ADMIN — PROPOFOL 50 MCG/KG/MIN: 10 INJECTION, EMULSION INTRAVENOUS at 11:53

## 2021-07-15 RX ADMIN — VANCOMYCIN HYDROCHLORIDE 1250 MG: 100 INJECTION, POWDER, LYOPHILIZED, FOR SOLUTION INTRAVENOUS at 00:17

## 2021-07-15 RX ADMIN — LORAZEPAM 2 MG: 2 INJECTION INTRAMUSCULAR; INTRAVENOUS at 17:36

## 2021-07-15 RX ADMIN — HEPARIN SODIUM 18 UNITS/KG/HR: 10000 INJECTION, SOLUTION INTRAVENOUS at 00:01

## 2021-07-15 RX ADMIN — ASPIRIN 81 MG CHEWABLE TABLET 81 MG: 81 TABLET CHEWABLE at 10:08

## 2021-07-15 RX ADMIN — IPRATROPIUM BROMIDE AND ALBUTEROL SULFATE 3 ML: 2.5; .5 SOLUTION RESPIRATORY (INHALATION) at 07:05

## 2021-07-15 RX ADMIN — PANTOPRAZOLE SODIUM 40 MG: 40 INJECTION, POWDER, FOR SOLUTION INTRAVENOUS at 06:34

## 2021-07-15 RX ADMIN — CHLORHEXIDINE GLUCONATE 15 ML: 1.2 SOLUTION ORAL at 08:00

## 2021-07-15 RX ADMIN — LORAZEPAM 2 MG: 2 INJECTION INTRAMUSCULAR; INTRAVENOUS at 02:08

## 2021-07-15 RX ADMIN — PROPOFOL 20 MCG/KG/MIN: 10 INJECTION, EMULSION INTRAVENOUS at 08:00

## 2021-07-15 RX ADMIN — HEPARIN SODIUM 21 UNITS/KG/HR: 10000 INJECTION, SOLUTION INTRAVENOUS at 10:34

## 2021-07-15 RX ADMIN — VANCOMYCIN HYDROCHLORIDE 1250 MG: 100 INJECTION, POWDER, LYOPHILIZED, FOR SOLUTION INTRAVENOUS at 23:41

## 2021-07-15 RX ADMIN — OXYCODONE HYDROCHLORIDE 10 MG: 5 SOLUTION ORAL at 23:33

## 2021-07-15 RX ADMIN — SODIUM CHLORIDE, PRESERVATIVE FREE 10 ML: 5 INJECTION INTRAVENOUS at 20:05

## 2021-07-15 RX ADMIN — PROPOFOL 50 MCG/KG/MIN: 10 INJECTION, EMULSION INTRAVENOUS at 10:12

## 2021-07-15 RX ADMIN — Medication 150 ML: at 11:53

## 2021-07-15 RX ADMIN — Medication 30 ML: at 20:06

## 2021-07-15 RX ADMIN — HYDROMORPHONE HYDROCHLORIDE 1 MG: 1 INJECTION, SOLUTION INTRAMUSCULAR; INTRAVENOUS; SUBCUTANEOUS at 03:58

## 2021-07-15 RX ADMIN — OXYCODONE HYDROCHLORIDE 10 MG: 5 SOLUTION ORAL at 17:43

## 2021-07-15 RX ADMIN — VANCOMYCIN HYDROCHLORIDE 1250 MG: 100 INJECTION, POWDER, LYOPHILIZED, FOR SOLUTION INTRAVENOUS at 14:03

## 2021-07-15 RX ADMIN — WARFARIN SODIUM 10 MG: 5 TABLET ORAL at 17:37

## 2021-07-15 RX ADMIN — IPRATROPIUM BROMIDE AND ALBUTEROL SULFATE 3 ML: 2.5; .5 SOLUTION RESPIRATORY (INHALATION) at 15:30

## 2021-07-15 RX ADMIN — OXYCODONE HYDROCHLORIDE 10 MG: 5 SOLUTION ORAL at 14:08

## 2021-07-16 ENCOUNTER — DOCUMENTATION (OUTPATIENT)
Dept: CARDIAC REHAB | Facility: HOSPITAL | Age: 56
End: 2021-07-16

## 2021-07-16 LAB
ALBUMIN SERPL-MCNC: 3.5 G/DL (ref 3.5–5.2)
ALBUMIN/GLOB SERPL: 0.9 G/DL
ALP SERPL-CCNC: 188 U/L (ref 39–117)
ALT SERPL W P-5'-P-CCNC: 167 U/L (ref 1–41)
ANION GAP SERPL CALCULATED.3IONS-SCNC: 10 MMOL/L (ref 5–15)
AST SERPL-CCNC: 49 U/L (ref 1–40)
BACTERIA SPEC AEROBE CULT: NORMAL
BACTERIA UR QL AUTO: ABNORMAL /HPF
BASOPHILS # BLD AUTO: 0.05 10*3/MM3 (ref 0–0.2)
BASOPHILS NFR BLD AUTO: 0.3 % (ref 0–1.5)
BILIRUB SERPL-MCNC: 0.6 MG/DL (ref 0–1.2)
BILIRUB UR QL STRIP: NEGATIVE
BUN SERPL-MCNC: 40 MG/DL (ref 6–20)
BUN/CREAT SERPL: 46.5 (ref 7–25)
CALCIUM SPEC-SCNC: 9.8 MG/DL (ref 8.6–10.5)
CHLORIDE SERPL-SCNC: 114 MMOL/L (ref 98–107)
CLARITY UR: ABNORMAL
CO2 SERPL-SCNC: 23 MMOL/L (ref 22–29)
COLOR UR: ABNORMAL
CREAT SERPL-MCNC: 0.86 MG/DL (ref 0.76–1.27)
DEPRECATED RDW RBC AUTO: 49.9 FL (ref 37–54)
EOSINOPHIL # BLD AUTO: 0.61 10*3/MM3 (ref 0–0.4)
EOSINOPHIL NFR BLD AUTO: 4.2 % (ref 0.3–6.2)
ERYTHROCYTE [DISTWIDTH] IN BLOOD BY AUTOMATED COUNT: 15.3 % (ref 12.3–15.4)
GFR SERPL CREATININE-BSD FRML MDRD: 92 ML/MIN/1.73
GLOBULIN UR ELPH-MCNC: 3.8 GM/DL
GLUCOSE BLDC GLUCOMTR-MCNC: 105 MG/DL (ref 70–130)
GLUCOSE BLDC GLUCOMTR-MCNC: 117 MG/DL (ref 70–130)
GLUCOSE BLDC GLUCOMTR-MCNC: 128 MG/DL (ref 70–130)
GLUCOSE SERPL-MCNC: 126 MG/DL (ref 65–99)
GLUCOSE UR STRIP-MCNC: NEGATIVE MG/DL
HCT VFR BLD AUTO: 34.8 % (ref 37.5–51)
HGB BLD-MCNC: 10.6 G/DL (ref 13–17.7)
HGB UR QL STRIP.AUTO: ABNORMAL
HYALINE CASTS UR QL AUTO: ABNORMAL /LPF
IMM GRANULOCYTES # BLD AUTO: 0.18 10*3/MM3 (ref 0–0.05)
IMM GRANULOCYTES NFR BLD AUTO: 1.3 % (ref 0–0.5)
INR PPP: 1.39 (ref 0.85–1.16)
KETONES UR QL STRIP: NEGATIVE
LEUKOCYTE ESTERASE UR QL STRIP.AUTO: ABNORMAL
LYMPHOCYTES # BLD AUTO: 1.14 10*3/MM3 (ref 0.7–3.1)
LYMPHOCYTES NFR BLD AUTO: 7.9 % (ref 19.6–45.3)
MAGNESIUM SERPL-MCNC: 2.5 MG/DL (ref 1.6–2.6)
MCH RBC QN AUTO: 27.1 PG (ref 26.6–33)
MCHC RBC AUTO-ENTMCNC: 30.5 G/DL (ref 31.5–35.7)
MCV RBC AUTO: 89 FL (ref 79–97)
MONOCYTES # BLD AUTO: 0.83 10*3/MM3 (ref 0.1–0.9)
MONOCYTES NFR BLD AUTO: 5.8 % (ref 5–12)
MUCOUS THREADS URNS QL MICRO: ABNORMAL /HPF
NEUTROPHILS NFR BLD AUTO: 11.57 10*3/MM3 (ref 1.7–7)
NEUTROPHILS NFR BLD AUTO: 80.5 % (ref 42.7–76)
NITRITE UR QL STRIP: NEGATIVE
NRBC BLD AUTO-RTO: 0 /100 WBC (ref 0–0.2)
PH UR STRIP.AUTO: <=5 [PH] (ref 5–8)
PHOSPHATE SERPL-MCNC: 2.7 MG/DL (ref 2.5–4.5)
PLATELET # BLD AUTO: 271 10*3/MM3 (ref 140–450)
PMV BLD AUTO: 11.4 FL (ref 6–12)
POTASSIUM SERPL-SCNC: 4.1 MMOL/L (ref 3.5–5.2)
PROT SERPL-MCNC: 7.3 G/DL (ref 6–8.5)
PROT UR QL STRIP: ABNORMAL
PROTHROMBIN TIME: 16.6 SECONDS (ref 11.4–14.4)
RBC # BLD AUTO: 3.91 10*6/MM3 (ref 4.14–5.8)
RBC # UR: ABNORMAL /HPF
REF LAB TEST METHOD: ABNORMAL
SODIUM SERPL-SCNC: 147 MMOL/L (ref 136–145)
SP GR UR STRIP: 1.02 (ref 1–1.03)
SQUAMOUS #/AREA URNS HPF: ABNORMAL /HPF
UFH PPP CHRO-ACNC: 0.67 IU/ML (ref 0.3–0.7)
UFH PPP CHRO-ACNC: 0.7 IU/ML (ref 0.3–0.7)
UFH PPP CHRO-ACNC: 0.74 IU/ML (ref 0.3–0.7)
URATE CRY URNS QL MICRO: ABNORMAL /HPF
UROBILINOGEN UR QL STRIP: ABNORMAL
WBC # BLD AUTO: 14.38 10*3/MM3 (ref 3.4–10.8)
WBC UR QL AUTO: ABNORMAL /HPF

## 2021-07-16 PROCEDURE — 25010000002 CEFEPIME PER 500 MG: Performed by: NURSE PRACTITIONER

## 2021-07-16 PROCEDURE — 87070 CULTURE OTHR SPECIMN AEROBIC: CPT | Performed by: NURSE PRACTITIONER

## 2021-07-16 PROCEDURE — 25010000002 HEPARIN (PORCINE) 25000-0.45 UT/250ML-% SOLUTION

## 2021-07-16 PROCEDURE — 25010000002 LORAZEPAM PER 2 MG: Performed by: NURSE PRACTITIONER

## 2021-07-16 PROCEDURE — 82962 GLUCOSE BLOOD TEST: CPT

## 2021-07-16 PROCEDURE — 84100 ASSAY OF PHOSPHORUS: CPT | Performed by: NURSE PRACTITIONER

## 2021-07-16 PROCEDURE — 94799 UNLISTED PULMONARY SVC/PX: CPT

## 2021-07-16 PROCEDURE — 85610 PROTHROMBIN TIME: CPT | Performed by: NURSE PRACTITIONER

## 2021-07-16 PROCEDURE — 87040 BLOOD CULTURE FOR BACTERIA: CPT | Performed by: NURSE PRACTITIONER

## 2021-07-16 PROCEDURE — 80053 COMPREHEN METABOLIC PANEL: CPT | Performed by: NURSE PRACTITIONER

## 2021-07-16 PROCEDURE — 25010000002 VANCOMYCIN 10 G RECONSTITUTED SOLUTION: Performed by: NURSE PRACTITIONER

## 2021-07-16 PROCEDURE — 25010000002 FENTANYL CITRATE (PF) 50 MCG/ML SOLUTION: Performed by: INTERNAL MEDICINE

## 2021-07-16 PROCEDURE — 99291 CRITICAL CARE FIRST HOUR: CPT | Performed by: INTERNAL MEDICINE

## 2021-07-16 PROCEDURE — 25010000002 FUROSEMIDE PER 20 MG: Performed by: NURSE PRACTITIONER

## 2021-07-16 PROCEDURE — 87186 SC STD MICRODIL/AGAR DIL: CPT | Performed by: NURSE PRACTITIONER

## 2021-07-16 PROCEDURE — 81001 URINALYSIS AUTO W/SCOPE: CPT | Performed by: NURSE PRACTITIONER

## 2021-07-16 PROCEDURE — 25010000002 HYDROMORPHONE 1 MG/ML SOLUTION: Performed by: NURSE PRACTITIONER

## 2021-07-16 PROCEDURE — 87147 CULTURE TYPE IMMUNOLOGIC: CPT | Performed by: NURSE PRACTITIONER

## 2021-07-16 PROCEDURE — 94003 VENT MGMT INPAT SUBQ DAY: CPT

## 2021-07-16 PROCEDURE — 85520 HEPARIN ASSAY: CPT

## 2021-07-16 PROCEDURE — 25010000002 HEPARIN (PORCINE) 25000-0.45 UT/250ML-% SOLUTION: Performed by: NURSE PRACTITIONER

## 2021-07-16 PROCEDURE — 85025 COMPLETE CBC W/AUTO DIFF WBC: CPT | Performed by: NURSE PRACTITIONER

## 2021-07-16 PROCEDURE — 87205 SMEAR GRAM STAIN: CPT | Performed by: NURSE PRACTITIONER

## 2021-07-16 PROCEDURE — 87086 URINE CULTURE/COLONY COUNT: CPT | Performed by: NURSE PRACTITIONER

## 2021-07-16 PROCEDURE — 83735 ASSAY OF MAGNESIUM: CPT | Performed by: NURSE PRACTITIONER

## 2021-07-16 RX ORDER — FUROSEMIDE 10 MG/ML
40 INJECTION INTRAMUSCULAR; INTRAVENOUS ONCE
Status: COMPLETED | OUTPATIENT
Start: 2021-07-16 | End: 2021-07-16

## 2021-07-16 RX ORDER — TEMAZEPAM 15 MG/1
30 CAPSULE ORAL NIGHTLY PRN
Status: DISCONTINUED | OUTPATIENT
Start: 2021-07-16 | End: 2021-07-17

## 2021-07-16 RX ORDER — LORAZEPAM 2 MG/ML
2 INJECTION INTRAMUSCULAR EVERY 4 HOURS PRN
Status: DISCONTINUED | OUTPATIENT
Start: 2021-07-16 | End: 2021-07-28

## 2021-07-16 RX ORDER — FENTANYL CITRATE 50 UG/ML
50 INJECTION, SOLUTION INTRAMUSCULAR; INTRAVENOUS ONCE
Status: COMPLETED | OUTPATIENT
Start: 2021-07-16 | End: 2021-07-16

## 2021-07-16 RX ORDER — LORAZEPAM 2 MG/ML
2 INJECTION INTRAMUSCULAR ONCE
Status: COMPLETED | OUTPATIENT
Start: 2021-07-16 | End: 2021-07-16

## 2021-07-16 RX ORDER — WARFARIN SODIUM 5 MG/1
10 TABLET ORAL
Status: DISCONTINUED | OUTPATIENT
Start: 2021-07-16 | End: 2021-07-17

## 2021-07-16 RX ADMIN — PANTOPRAZOLE SODIUM 40 MG: 40 INJECTION, POWDER, FOR SOLUTION INTRAVENOUS at 06:26

## 2021-07-16 RX ADMIN — OXYCODONE HYDROCHLORIDE 10 MG: 5 SOLUTION ORAL at 21:17

## 2021-07-16 RX ADMIN — LORAZEPAM 2 MG: 2 INJECTION INTRAMUSCULAR; INTRAVENOUS at 01:51

## 2021-07-16 RX ADMIN — LORAZEPAM 2 MG: 2 INJECTION INTRAMUSCULAR; INTRAVENOUS at 18:14

## 2021-07-16 RX ADMIN — TEMAZEPAM 30 MG: 15 CAPSULE ORAL at 21:17

## 2021-07-16 RX ADMIN — CHLORHEXIDINE GLUCONATE 15 ML: 1.2 SOLUTION ORAL at 08:13

## 2021-07-16 RX ADMIN — SODIUM CHLORIDE, PRESERVATIVE FREE 10 ML: 5 INJECTION INTRAVENOUS at 08:13

## 2021-07-16 RX ADMIN — LORAZEPAM 2 MG: 2 INJECTION INTRAMUSCULAR; INTRAVENOUS at 14:20

## 2021-07-16 RX ADMIN — Medication 30 ML: at 21:17

## 2021-07-16 RX ADMIN — OXYCODONE HYDROCHLORIDE 10 MG: 5 SOLUTION ORAL at 06:26

## 2021-07-16 RX ADMIN — SODIUM CHLORIDE, PRESERVATIVE FREE 10 ML: 5 INJECTION INTRAVENOUS at 21:17

## 2021-07-16 RX ADMIN — IPRATROPIUM BROMIDE AND ALBUTEROL SULFATE 3 ML: 2.5; .5 SOLUTION RESPIRATORY (INHALATION) at 12:21

## 2021-07-16 RX ADMIN — ASPIRIN 81 MG CHEWABLE TABLET 81 MG: 81 TABLET CHEWABLE at 08:13

## 2021-07-16 RX ADMIN — WARFARIN SODIUM 10 MG: 5 TABLET ORAL at 18:14

## 2021-07-16 RX ADMIN — ACETAMINOPHEN ORAL SOLUTION 650 MG: 650 SOLUTION ORAL at 16:22

## 2021-07-16 RX ADMIN — IPRATROPIUM BROMIDE AND ALBUTEROL SULFATE 3 ML: 2.5; .5 SOLUTION RESPIRATORY (INHALATION) at 07:03

## 2021-07-16 RX ADMIN — Medication 15 ML: at 08:13

## 2021-07-16 RX ADMIN — IPRATROPIUM BROMIDE AND ALBUTEROL SULFATE 3 ML: 2.5; .5 SOLUTION RESPIRATORY (INHALATION) at 19:49

## 2021-07-16 RX ADMIN — HYDROMORPHONE HYDROCHLORIDE 1 MG: 1 INJECTION, SOLUTION INTRAMUSCULAR; INTRAVENOUS; SUBCUTANEOUS at 18:14

## 2021-07-16 RX ADMIN — LORAZEPAM 2 MG: 2 INJECTION INTRAMUSCULAR; INTRAVENOUS at 12:16

## 2021-07-16 RX ADMIN — CEFEPIME HYDROCHLORIDE 2 G: 2 INJECTION, POWDER, FOR SOLUTION INTRAVENOUS at 18:13

## 2021-07-16 RX ADMIN — HYDROMORPHONE HYDROCHLORIDE 1 MG: 1 INJECTION, SOLUTION INTRAMUSCULAR; INTRAVENOUS; SUBCUTANEOUS at 14:08

## 2021-07-16 RX ADMIN — OXYCODONE HYDROCHLORIDE 10 MG: 5 SOLUTION ORAL at 14:07

## 2021-07-16 RX ADMIN — HEPARIN SODIUM 21 UNITS/KG/HR: 10000 INJECTION, SOLUTION INTRAVENOUS at 01:21

## 2021-07-16 RX ADMIN — CHLORHEXIDINE GLUCONATE 15 ML: 1.2 SOLUTION ORAL at 20:16

## 2021-07-16 RX ADMIN — Medication 30 ML: at 12:16

## 2021-07-16 RX ADMIN — LORAZEPAM 2 MG: 2 INJECTION INTRAMUSCULAR; INTRAVENOUS at 22:13

## 2021-07-16 RX ADMIN — ATORVASTATIN CALCIUM 40 MG: 40 TABLET, FILM COATED ORAL at 21:17

## 2021-07-16 RX ADMIN — HYDROMORPHONE HYDROCHLORIDE 1 MG: 1 INJECTION, SOLUTION INTRAMUSCULAR; INTRAVENOUS; SUBCUTANEOUS at 01:13

## 2021-07-16 RX ADMIN — HYDROMORPHONE HYDROCHLORIDE 1 MG: 1 INJECTION, SOLUTION INTRAMUSCULAR; INTRAVENOUS; SUBCUTANEOUS at 08:13

## 2021-07-16 RX ADMIN — HEPARIN SODIUM 18 UNITS/KG/HR: 10000 INJECTION, SOLUTION INTRAVENOUS at 22:13

## 2021-07-16 RX ADMIN — VANCOMYCIN HYDROCHLORIDE 1250 MG: 100 INJECTION, POWDER, LYOPHILIZED, FOR SOLUTION INTRAVENOUS at 23:56

## 2021-07-16 RX ADMIN — FENTANYL CITRATE 50 MCG: 50 INJECTION INTRAMUSCULAR; INTRAVENOUS at 04:27

## 2021-07-16 RX ADMIN — HEPARIN SODIUM 20 UNITS/KG/HR: 10000 INJECTION, SOLUTION INTRAVENOUS at 11:02

## 2021-07-16 RX ADMIN — FUROSEMIDE 40 MG: 10 INJECTION, SOLUTION INTRAMUSCULAR; INTRAVENOUS at 14:19

## 2021-07-16 RX ADMIN — IPRATROPIUM BROMIDE AND ALBUTEROL SULFATE 3 ML: 2.5; .5 SOLUTION RESPIRATORY (INHALATION) at 15:55

## 2021-07-16 RX ADMIN — VANCOMYCIN HYDROCHLORIDE 1250 MG: 100 INJECTION, POWDER, LYOPHILIZED, FOR SOLUTION INTRAVENOUS at 12:16

## 2021-07-16 NOTE — PROGRESS NOTES
Patient identified as qualifier for Phase II Cardiac Rehab. Staff discussed benefits of exercise, program protocol, and educational material provided to patient and his daughter. Teach back verified. Family will contact [facility] to schedule orientation.

## 2021-07-17 LAB
ANION GAP SERPL CALCULATED.3IONS-SCNC: 13 MMOL/L (ref 5–15)
BACTERIA SPEC AEROBE CULT: NO GROWTH
BACTERIA SPEC AEROBE CULT: NORMAL
BACTERIA SPEC AEROBE CULT: NORMAL
BUN SERPL-MCNC: 38 MG/DL (ref 6–20)
BUN/CREAT SERPL: 36.9 (ref 7–25)
CALCIUM SPEC-SCNC: 10.1 MG/DL (ref 8.6–10.5)
CHLORIDE SERPL-SCNC: 113 MMOL/L (ref 98–107)
CO2 SERPL-SCNC: 23 MMOL/L (ref 22–29)
CREAT SERPL-MCNC: 1.03 MG/DL (ref 0.76–1.27)
GFR SERPL CREATININE-BSD FRML MDRD: 75 ML/MIN/1.73
GLUCOSE BLDC GLUCOMTR-MCNC: 111 MG/DL (ref 70–130)
GLUCOSE BLDC GLUCOMTR-MCNC: 115 MG/DL (ref 70–130)
GLUCOSE BLDC GLUCOMTR-MCNC: 116 MG/DL (ref 70–130)
GLUCOSE BLDC GLUCOMTR-MCNC: 121 MG/DL (ref 70–130)
GLUCOSE BLDC GLUCOMTR-MCNC: 123 MG/DL (ref 70–130)
GLUCOSE SERPL-MCNC: 124 MG/DL (ref 65–99)
INR PPP: 1.72 (ref 0.85–1.16)
MAGNESIUM SERPL-MCNC: 2.6 MG/DL (ref 1.6–2.6)
PHOSPHATE SERPL-MCNC: 2.9 MG/DL (ref 2.5–4.5)
POTASSIUM SERPL-SCNC: 4.1 MMOL/L (ref 3.5–5.2)
PROTHROMBIN TIME: 19.6 SECONDS (ref 11.4–14.4)
SODIUM SERPL-SCNC: 149 MMOL/L (ref 136–145)
UFH PPP CHRO-ACNC: 0.36 IU/ML (ref 0.3–0.7)
UFH PPP CHRO-ACNC: 0.48 IU/ML (ref 0.3–0.7)

## 2021-07-17 PROCEDURE — 99291 CRITICAL CARE FIRST HOUR: CPT | Performed by: INTERNAL MEDICINE

## 2021-07-17 PROCEDURE — 94003 VENT MGMT INPAT SUBQ DAY: CPT

## 2021-07-17 PROCEDURE — 94799 UNLISTED PULMONARY SVC/PX: CPT

## 2021-07-17 PROCEDURE — 85610 PROTHROMBIN TIME: CPT | Performed by: NURSE PRACTITIONER

## 2021-07-17 PROCEDURE — 85520 HEPARIN ASSAY: CPT

## 2021-07-17 PROCEDURE — 25010000002 HYDROMORPHONE 1 MG/ML SOLUTION: Performed by: NURSE PRACTITIONER

## 2021-07-17 PROCEDURE — 97530 THERAPEUTIC ACTIVITIES: CPT

## 2021-07-17 PROCEDURE — 83735 ASSAY OF MAGNESIUM: CPT | Performed by: NURSE PRACTITIONER

## 2021-07-17 PROCEDURE — 25010000002 CEFEPIME PER 500 MG: Performed by: NURSE PRACTITIONER

## 2021-07-17 PROCEDURE — 80048 BASIC METABOLIC PNL TOTAL CA: CPT | Performed by: NURSE PRACTITIONER

## 2021-07-17 PROCEDURE — 25010000002 HEPARIN (PORCINE) 25000-0.45 UT/250ML-% SOLUTION

## 2021-07-17 PROCEDURE — 82962 GLUCOSE BLOOD TEST: CPT

## 2021-07-17 PROCEDURE — 25010000002 VANCOMYCIN 10 G RECONSTITUTED SOLUTION: Performed by: NURSE PRACTITIONER

## 2021-07-17 PROCEDURE — 84100 ASSAY OF PHOSPHORUS: CPT | Performed by: NURSE PRACTITIONER

## 2021-07-17 PROCEDURE — 97167 OT EVAL HIGH COMPLEX 60 MIN: CPT

## 2021-07-17 PROCEDURE — 97163 PT EVAL HIGH COMPLEX 45 MIN: CPT

## 2021-07-17 PROCEDURE — 25010000002 LORAZEPAM PER 2 MG: Performed by: NURSE PRACTITIONER

## 2021-07-17 PROCEDURE — 99232 SBSQ HOSP IP/OBS MODERATE 35: CPT | Performed by: INTERNAL MEDICINE

## 2021-07-17 RX ORDER — ASPIRIN 81 MG/1
81 TABLET, CHEWABLE ORAL DAILY
Status: DISCONTINUED | OUTPATIENT
Start: 2021-07-18 | End: 2021-07-29 | Stop reason: HOSPADM

## 2021-07-17 RX ORDER — ATORVASTATIN CALCIUM 40 MG/1
40 TABLET, FILM COATED ORAL NIGHTLY
Status: DISCONTINUED | OUTPATIENT
Start: 2021-07-17 | End: 2021-07-29 | Stop reason: HOSPADM

## 2021-07-17 RX ORDER — ACETAMINOPHEN 325 MG/1
650 TABLET ORAL EVERY 4 HOURS PRN
Status: DISCONTINUED | OUTPATIENT
Start: 2021-07-17 | End: 2021-07-25

## 2021-07-17 RX ORDER — WARFARIN SODIUM 5 MG/1
10 TABLET ORAL
Status: DISCONTINUED | OUTPATIENT
Start: 2021-07-17 | End: 2021-07-27

## 2021-07-17 RX ORDER — TEMAZEPAM 15 MG/1
30 CAPSULE ORAL NIGHTLY PRN
Status: DISCONTINUED | OUTPATIENT
Start: 2021-07-17 | End: 2021-07-28

## 2021-07-17 RX ORDER — ACETAMINOPHEN 650 MG/1
650 SUPPOSITORY RECTAL EVERY 4 HOURS PRN
Status: DISCONTINUED | OUTPATIENT
Start: 2021-07-17 | End: 2021-07-25

## 2021-07-17 RX ORDER — POTASSIUM CHLORIDE 7.45 MG/ML
10 INJECTION INTRAVENOUS
Status: DISCONTINUED | OUTPATIENT
Start: 2021-07-17 | End: 2021-07-26

## 2021-07-17 RX ORDER — MULTIVIT AND MINERALS-FERROUS GLUCONATE 9 MG IRON/15 ML ORAL LIQUID 9 MG/15 ML
15 LIQUID (ML) ORAL DAILY
Status: DISCONTINUED | OUTPATIENT
Start: 2021-07-18 | End: 2021-07-23

## 2021-07-17 RX ORDER — ACETAMINOPHEN 160 MG/5ML
650 SOLUTION ORAL EVERY 4 HOURS PRN
Status: DISCONTINUED | OUTPATIENT
Start: 2021-07-17 | End: 2021-07-28

## 2021-07-17 RX ORDER — AMINO ACIDS/PROTEIN HYDROLYS 15G-100/30
30 LIQUID (ML) ORAL 2 TIMES DAILY
Status: DISCONTINUED | OUTPATIENT
Start: 2021-07-17 | End: 2021-07-19

## 2021-07-17 RX ORDER — POTASSIUM CHLORIDE 1.5 G/1.77G
40 POWDER, FOR SOLUTION ORAL AS NEEDED
Status: DISCONTINUED | OUTPATIENT
Start: 2021-07-17 | End: 2021-07-29 | Stop reason: HOSPADM

## 2021-07-17 RX ADMIN — OXYCODONE HYDROCHLORIDE 5 MG: 5 SOLUTION ORAL at 05:35

## 2021-07-17 RX ADMIN — HYDROMORPHONE HYDROCHLORIDE 1 MG: 1 INJECTION, SOLUTION INTRAMUSCULAR; INTRAVENOUS; SUBCUTANEOUS at 12:46

## 2021-07-17 RX ADMIN — HEPARIN SODIUM 18 UNITS/KG/HR: 10000 INJECTION, SOLUTION INTRAVENOUS at 17:18

## 2021-07-17 RX ADMIN — SODIUM CHLORIDE, PRESERVATIVE FREE 10 ML: 5 INJECTION INTRAVENOUS at 20:27

## 2021-07-17 RX ADMIN — IPRATROPIUM BROMIDE AND ALBUTEROL SULFATE 3 ML: 2.5; .5 SOLUTION RESPIRATORY (INHALATION) at 07:17

## 2021-07-17 RX ADMIN — CHLORHEXIDINE GLUCONATE 15 ML: 1.2 SOLUTION ORAL at 20:26

## 2021-07-17 RX ADMIN — CHLORHEXIDINE GLUCONATE 15 ML: 1.2 SOLUTION ORAL at 08:11

## 2021-07-17 RX ADMIN — CEFEPIME HYDROCHLORIDE 2 G: 2 INJECTION, POWDER, FOR SOLUTION INTRAVENOUS at 08:11

## 2021-07-17 RX ADMIN — Medication 30 ML: at 08:17

## 2021-07-17 RX ADMIN — LORAZEPAM 2 MG: 2 INJECTION INTRAMUSCULAR; INTRAVENOUS at 16:23

## 2021-07-17 RX ADMIN — Medication 30 ML: at 20:27

## 2021-07-17 RX ADMIN — Medication 15 ML: at 08:11

## 2021-07-17 RX ADMIN — LORAZEPAM 2 MG: 2 INJECTION INTRAMUSCULAR; INTRAVENOUS at 20:26

## 2021-07-17 RX ADMIN — CEFEPIME HYDROCHLORIDE 2 G: 2 INJECTION, POWDER, FOR SOLUTION INTRAVENOUS at 16:26

## 2021-07-17 RX ADMIN — TEMAZEPAM 30 MG: 15 CAPSULE ORAL at 20:26

## 2021-07-17 RX ADMIN — ATORVASTATIN CALCIUM 40 MG: 40 TABLET, FILM COATED ORAL at 20:26

## 2021-07-17 RX ADMIN — CEFEPIME HYDROCHLORIDE 2 G: 2 INJECTION, POWDER, FOR SOLUTION INTRAVENOUS at 01:11

## 2021-07-17 RX ADMIN — LORAZEPAM 2 MG: 2 INJECTION INTRAMUSCULAR; INTRAVENOUS at 05:36

## 2021-07-17 RX ADMIN — LORAZEPAM 2 MG: 2 INJECTION INTRAMUSCULAR; INTRAVENOUS at 11:48

## 2021-07-17 RX ADMIN — HYDROMORPHONE HYDROCHLORIDE 1 MG: 1 INJECTION, SOLUTION INTRAMUSCULAR; INTRAVENOUS; SUBCUTANEOUS at 22:06

## 2021-07-17 RX ADMIN — VANCOMYCIN HYDROCHLORIDE 1250 MG: 100 INJECTION, POWDER, LYOPHILIZED, FOR SOLUTION INTRAVENOUS at 12:45

## 2021-07-17 RX ADMIN — OXYCODONE HYDROCHLORIDE 5 MG: 5 SOLUTION ORAL at 12:45

## 2021-07-17 RX ADMIN — SODIUM CHLORIDE, PRESERVATIVE FREE 10 ML: 5 INJECTION INTRAVENOUS at 08:11

## 2021-07-17 RX ADMIN — HYDROMORPHONE HYDROCHLORIDE 1 MG: 1 INJECTION, SOLUTION INTRAMUSCULAR; INTRAVENOUS; SUBCUTANEOUS at 18:15

## 2021-07-17 RX ADMIN — IPRATROPIUM BROMIDE AND ALBUTEROL SULFATE 3 ML: 2.5; .5 SOLUTION RESPIRATORY (INHALATION) at 12:29

## 2021-07-17 RX ADMIN — PANTOPRAZOLE SODIUM 40 MG: 40 INJECTION, POWDER, FOR SOLUTION INTRAVENOUS at 05:35

## 2021-07-17 RX ADMIN — IPRATROPIUM BROMIDE AND ALBUTEROL SULFATE 3 ML: 2.5; .5 SOLUTION RESPIRATORY (INHALATION) at 19:56

## 2021-07-17 RX ADMIN — HEPARIN SODIUM 18 UNITS/KG/HR: 10000 INJECTION, SOLUTION INTRAVENOUS at 08:11

## 2021-07-17 RX ADMIN — IPRATROPIUM BROMIDE AND ALBUTEROL SULFATE 3 ML: 2.5; .5 SOLUTION RESPIRATORY (INHALATION) at 15:23

## 2021-07-17 RX ADMIN — ASPIRIN 81 MG CHEWABLE TABLET 81 MG: 81 TABLET CHEWABLE at 08:11

## 2021-07-17 RX ADMIN — OXYCODONE HYDROCHLORIDE 5 MG: 5 SOLUTION ORAL at 17:18

## 2021-07-17 RX ADMIN — HYDROMORPHONE HYDROCHLORIDE 1 MG: 1 INJECTION, SOLUTION INTRAMUSCULAR; INTRAVENOUS; SUBCUTANEOUS at 08:11

## 2021-07-17 RX ADMIN — WARFARIN SODIUM 10 MG: 5 TABLET ORAL at 17:18

## 2021-07-18 LAB
ANION GAP SERPL CALCULATED.3IONS-SCNC: 10 MMOL/L (ref 5–15)
BACTERIA SPEC RESP CULT: ABNORMAL
BACTERIA SPEC RESP CULT: ABNORMAL
BASOPHILS # BLD AUTO: 0.05 10*3/MM3 (ref 0–0.2)
BASOPHILS NFR BLD AUTO: 0.5 % (ref 0–1.5)
BUN SERPL-MCNC: 39 MG/DL (ref 6–20)
BUN/CREAT SERPL: 53.4 (ref 7–25)
CALCIUM SPEC-SCNC: 9.9 MG/DL (ref 8.6–10.5)
CHLORIDE SERPL-SCNC: 115 MMOL/L (ref 98–107)
CO2 SERPL-SCNC: 22 MMOL/L (ref 22–29)
CREAT SERPL-MCNC: 0.73 MG/DL (ref 0.76–1.27)
DEPRECATED RDW RBC AUTO: 50.3 FL (ref 37–54)
EOSINOPHIL # BLD AUTO: 0.49 10*3/MM3 (ref 0–0.4)
EOSINOPHIL NFR BLD AUTO: 4.8 % (ref 0.3–6.2)
ERYTHROCYTE [DISTWIDTH] IN BLOOD BY AUTOMATED COUNT: 15.5 % (ref 12.3–15.4)
GFR SERPL CREATININE-BSD FRML MDRD: 111 ML/MIN/1.73
GLUCOSE BLDC GLUCOMTR-MCNC: 123 MG/DL (ref 70–130)
GLUCOSE BLDC GLUCOMTR-MCNC: 125 MG/DL (ref 70–130)
GLUCOSE BLDC GLUCOMTR-MCNC: 126 MG/DL (ref 70–130)
GLUCOSE BLDC GLUCOMTR-MCNC: 143 MG/DL (ref 70–130)
GLUCOSE SERPL-MCNC: 130 MG/DL (ref 65–99)
GRAM STN SPEC: ABNORMAL
HCT VFR BLD AUTO: 34.2 % (ref 37.5–51)
HGB BLD-MCNC: 10.3 G/DL (ref 13–17.7)
IMM GRANULOCYTES # BLD AUTO: 0.19 10*3/MM3 (ref 0–0.05)
IMM GRANULOCYTES NFR BLD AUTO: 1.8 % (ref 0–0.5)
INR PPP: 2.07 (ref 0.85–1.16)
LYMPHOCYTES # BLD AUTO: 1.11 10*3/MM3 (ref 0.7–3.1)
LYMPHOCYTES NFR BLD AUTO: 10.8 % (ref 19.6–45.3)
MAGNESIUM SERPL-MCNC: 2.5 MG/DL (ref 1.6–2.6)
MCH RBC QN AUTO: 26.8 PG (ref 26.6–33)
MCHC RBC AUTO-ENTMCNC: 30.1 G/DL (ref 31.5–35.7)
MCV RBC AUTO: 88.8 FL (ref 79–97)
MONOCYTES # BLD AUTO: 0.63 10*3/MM3 (ref 0.1–0.9)
MONOCYTES NFR BLD AUTO: 6.1 % (ref 5–12)
NEUTROPHILS NFR BLD AUTO: 7.83 10*3/MM3 (ref 1.7–7)
NEUTROPHILS NFR BLD AUTO: 76 % (ref 42.7–76)
NRBC BLD AUTO-RTO: 0 /100 WBC (ref 0–0.2)
PHOSPHATE SERPL-MCNC: 2.9 MG/DL (ref 2.5–4.5)
PLATELET # BLD AUTO: 300 10*3/MM3 (ref 140–450)
PMV BLD AUTO: 11.4 FL (ref 6–12)
POTASSIUM SERPL-SCNC: 4 MMOL/L (ref 3.5–5.2)
PROTHROMBIN TIME: 22.4 SECONDS (ref 11.4–14.4)
RBC # BLD AUTO: 3.85 10*6/MM3 (ref 4.14–5.8)
SODIUM SERPL-SCNC: 147 MMOL/L (ref 136–145)
UFH PPP CHRO-ACNC: 0.18 IU/ML (ref 0.3–0.7)
UFH PPP CHRO-ACNC: 0.19 IU/ML (ref 0.3–0.7)
UFH PPP CHRO-ACNC: 0.33 IU/ML (ref 0.3–0.7)
WBC # BLD AUTO: 10.3 10*3/MM3 (ref 3.4–10.8)

## 2021-07-18 PROCEDURE — 25010000002 FUROSEMIDE PER 20 MG: Performed by: INTERNAL MEDICINE

## 2021-07-18 PROCEDURE — 25010000002 HEPARIN (PORCINE) 25000-0.45 UT/250ML-% SOLUTION

## 2021-07-18 PROCEDURE — 83735 ASSAY OF MAGNESIUM: CPT | Performed by: NURSE PRACTITIONER

## 2021-07-18 PROCEDURE — 85520 HEPARIN ASSAY: CPT

## 2021-07-18 PROCEDURE — 99291 CRITICAL CARE FIRST HOUR: CPT | Performed by: INTERNAL MEDICINE

## 2021-07-18 PROCEDURE — 94799 UNLISTED PULMONARY SVC/PX: CPT

## 2021-07-18 PROCEDURE — 84100 ASSAY OF PHOSPHORUS: CPT | Performed by: NURSE PRACTITIONER

## 2021-07-18 PROCEDURE — 85610 PROTHROMBIN TIME: CPT | Performed by: NURSE PRACTITIONER

## 2021-07-18 PROCEDURE — 25010000002 LORAZEPAM PER 2 MG: Performed by: NURSE PRACTITIONER

## 2021-07-18 PROCEDURE — 82962 GLUCOSE BLOOD TEST: CPT

## 2021-07-18 PROCEDURE — 25010000002 CEFEPIME PER 500 MG: Performed by: NURSE PRACTITIONER

## 2021-07-18 PROCEDURE — 25010000002 HYDROMORPHONE 1 MG/ML SOLUTION: Performed by: NURSE PRACTITIONER

## 2021-07-18 PROCEDURE — 25010000002 VANCOMYCIN 10 G RECONSTITUTED SOLUTION: Performed by: NURSE PRACTITIONER

## 2021-07-18 PROCEDURE — 25010000002 AMIODARONE IN DEXTROSE 5% 150-4.21 MG/100ML-% SOLUTION: Performed by: NURSE PRACTITIONER

## 2021-07-18 PROCEDURE — 80048 BASIC METABOLIC PNL TOTAL CA: CPT | Performed by: NURSE PRACTITIONER

## 2021-07-18 PROCEDURE — 99232 SBSQ HOSP IP/OBS MODERATE 35: CPT | Performed by: INTERNAL MEDICINE

## 2021-07-18 PROCEDURE — 25010000002 LINEZOLID 600 MG/300ML SOLUTION: Performed by: INTERNAL MEDICINE

## 2021-07-18 PROCEDURE — 94003 VENT MGMT INPAT SUBQ DAY: CPT

## 2021-07-18 PROCEDURE — 85025 COMPLETE CBC W/AUTO DIFF WBC: CPT | Performed by: NURSE PRACTITIONER

## 2021-07-18 PROCEDURE — 25010000002 AMIODARONE IN DEXTROSE 5% 360-4.14 MG/200ML-% SOLUTION: Performed by: NURSE PRACTITIONER

## 2021-07-18 RX ORDER — LINEZOLID 2 MG/ML
600 INJECTION, SOLUTION INTRAVENOUS EVERY 12 HOURS
Status: COMPLETED | OUTPATIENT
Start: 2021-07-18 | End: 2021-07-28

## 2021-07-18 RX ORDER — AMIODARONE HYDROCHLORIDE 200 MG/1
200 TABLET ORAL EVERY 8 HOURS
Status: DISCONTINUED | OUTPATIENT
Start: 2021-07-19 | End: 2021-07-19

## 2021-07-18 RX ORDER — HEPARIN SODIUM 10000 [USP'U]/100ML
18 INJECTION, SOLUTION INTRAVENOUS
Status: DISCONTINUED | OUTPATIENT
Start: 2021-07-18 | End: 2021-07-20

## 2021-07-18 RX ORDER — AMIODARONE HYDROCHLORIDE 200 MG/1
200 TABLET ORAL ONCE
Status: COMPLETED | OUTPATIENT
Start: 2021-07-19 | End: 2021-07-19

## 2021-07-18 RX ORDER — FUROSEMIDE 10 MG/ML
60 INJECTION INTRAMUSCULAR; INTRAVENOUS ONCE
Status: COMPLETED | OUTPATIENT
Start: 2021-07-18 | End: 2021-07-18

## 2021-07-18 RX ORDER — AMIODARONE HYDROCHLORIDE 200 MG/1
200 TABLET ORAL EVERY 12 HOURS
Status: DISCONTINUED | OUTPATIENT
Start: 2021-07-26 | End: 2021-07-19

## 2021-07-18 RX ORDER — AMIODARONE HYDROCHLORIDE 200 MG/1
200 TABLET ORAL DAILY
Status: DISCONTINUED | OUTPATIENT
Start: 2021-08-09 | End: 2021-07-19

## 2021-07-18 RX ADMIN — AMIODARONE HYDROCHLORIDE 150 MG: 1.5 INJECTION, SOLUTION INTRAVENOUS at 05:41

## 2021-07-18 RX ADMIN — ASPIRIN 81 MG CHEWABLE TABLET 81 MG: 81 TABLET CHEWABLE at 08:12

## 2021-07-18 RX ADMIN — Medication 30 ML: at 08:12

## 2021-07-18 RX ADMIN — HYDROMORPHONE HYDROCHLORIDE 1 MG: 1 INJECTION, SOLUTION INTRAMUSCULAR; INTRAVENOUS; SUBCUTANEOUS at 16:08

## 2021-07-18 RX ADMIN — LORAZEPAM 2 MG: 2 INJECTION INTRAMUSCULAR; INTRAVENOUS at 23:50

## 2021-07-18 RX ADMIN — ACETAMINOPHEN ORAL SOLUTION 649.6 MG: 650 SOLUTION ORAL at 15:37

## 2021-07-18 RX ADMIN — LORAZEPAM 2 MG: 2 INJECTION INTRAMUSCULAR; INTRAVENOUS at 00:21

## 2021-07-18 RX ADMIN — ACETAMINOPHEN ORAL SOLUTION 650 MG: 650 SOLUTION ORAL at 19:56

## 2021-07-18 RX ADMIN — HEPARIN SODIUM 18 UNITS/KG/HR: 10000 INJECTION, SOLUTION INTRAVENOUS at 05:08

## 2021-07-18 RX ADMIN — HYDROMORPHONE HYDROCHLORIDE 1 MG: 1 INJECTION, SOLUTION INTRAMUSCULAR; INTRAVENOUS; SUBCUTANEOUS at 12:24

## 2021-07-18 RX ADMIN — CEFEPIME HYDROCHLORIDE 2 G: 2 INJECTION, POWDER, FOR SOLUTION INTRAVENOUS at 00:21

## 2021-07-18 RX ADMIN — CHLORHEXIDINE GLUCONATE 15 ML: 1.2 SOLUTION ORAL at 08:12

## 2021-07-18 RX ADMIN — Medication 15 ML: at 08:12

## 2021-07-18 RX ADMIN — HYDROMORPHONE HYDROCHLORIDE 1 MG: 1 INJECTION, SOLUTION INTRAMUSCULAR; INTRAVENOUS; SUBCUTANEOUS at 08:21

## 2021-07-18 RX ADMIN — ACETAMINOPHEN ORAL SOLUTION 649.6 MG: 650 SOLUTION ORAL at 11:30

## 2021-07-18 RX ADMIN — AMIODARONE HYDROCHLORIDE 0.5 MG/MIN: 1.8 INJECTION, SOLUTION INTRAVENOUS at 23:57

## 2021-07-18 RX ADMIN — LORAZEPAM 2 MG: 2 INJECTION INTRAMUSCULAR; INTRAVENOUS at 16:08

## 2021-07-18 RX ADMIN — OXYCODONE HYDROCHLORIDE 5 MG: 5 SOLUTION ORAL at 22:20

## 2021-07-18 RX ADMIN — Medication 30 ML: at 19:56

## 2021-07-18 RX ADMIN — HYDROMORPHONE HYDROCHLORIDE 1 MG: 1 INJECTION, SOLUTION INTRAMUSCULAR; INTRAVENOUS; SUBCUTANEOUS at 23:50

## 2021-07-18 RX ADMIN — LORAZEPAM 2 MG: 2 INJECTION INTRAMUSCULAR; INTRAVENOUS at 04:37

## 2021-07-18 RX ADMIN — METOPROLOL TARTRATE 5 MG: 5 INJECTION INTRAVENOUS at 05:06

## 2021-07-18 RX ADMIN — WARFARIN SODIUM 10 MG: 5 TABLET ORAL at 17:11

## 2021-07-18 RX ADMIN — AMIODARONE HYDROCHLORIDE 0.5 MG/MIN: 1.8 INJECTION, SOLUTION INTRAVENOUS at 11:30

## 2021-07-18 RX ADMIN — VANCOMYCIN HYDROCHLORIDE 1250 MG: 100 INJECTION, POWDER, LYOPHILIZED, FOR SOLUTION INTRAVENOUS at 00:21

## 2021-07-18 RX ADMIN — AMIODARONE HYDROCHLORIDE 1 MG/MIN: 1.8 INJECTION, SOLUTION INTRAVENOUS at 05:58

## 2021-07-18 RX ADMIN — LORAZEPAM 2 MG: 2 INJECTION INTRAMUSCULAR; INTRAVENOUS at 08:15

## 2021-07-18 RX ADMIN — CEFEPIME HYDROCHLORIDE 2 G: 2 INJECTION, POWDER, FOR SOLUTION INTRAVENOUS at 23:58

## 2021-07-18 RX ADMIN — LINEZOLID 600 MG: 600 INJECTION, SOLUTION INTRAVENOUS at 13:36

## 2021-07-18 RX ADMIN — CHLORHEXIDINE GLUCONATE 15 ML: 1.2 SOLUTION ORAL at 19:55

## 2021-07-18 RX ADMIN — HYDROMORPHONE HYDROCHLORIDE 1 MG: 1 INJECTION, SOLUTION INTRAMUSCULAR; INTRAVENOUS; SUBCUTANEOUS at 04:48

## 2021-07-18 RX ADMIN — HEPARIN SODIUM 19.5 UNITS/KG/HR: 10000 INJECTION, SOLUTION INTRAVENOUS at 13:36

## 2021-07-18 RX ADMIN — LORAZEPAM 2 MG: 2 INJECTION INTRAMUSCULAR; INTRAVENOUS at 19:55

## 2021-07-18 RX ADMIN — TEMAZEPAM 30 MG: 15 CAPSULE ORAL at 19:54

## 2021-07-18 RX ADMIN — PANTOPRAZOLE SODIUM 40 MG: 40 INJECTION, POWDER, FOR SOLUTION INTRAVENOUS at 06:01

## 2021-07-18 RX ADMIN — HEPARIN SODIUM 22 UNITS/KG/HR: 10000 INJECTION, SOLUTION INTRAVENOUS at 23:57

## 2021-07-18 RX ADMIN — FUROSEMIDE 60 MG: 10 INJECTION INTRAMUSCULAR; INTRAVENOUS at 10:32

## 2021-07-18 RX ADMIN — CEFEPIME HYDROCHLORIDE 2 G: 2 INJECTION, POWDER, FOR SOLUTION INTRAVENOUS at 17:11

## 2021-07-18 RX ADMIN — HYDROMORPHONE HYDROCHLORIDE 1 MG: 1 INJECTION, SOLUTION INTRAMUSCULAR; INTRAVENOUS; SUBCUTANEOUS at 19:55

## 2021-07-18 RX ADMIN — SODIUM CHLORIDE, PRESERVATIVE FREE 10 ML: 5 INJECTION INTRAVENOUS at 08:09

## 2021-07-18 RX ADMIN — OXYCODONE HYDROCHLORIDE 5 MG: 5 SOLUTION ORAL at 06:01

## 2021-07-18 RX ADMIN — ATORVASTATIN CALCIUM 40 MG: 40 TABLET, FILM COATED ORAL at 19:55

## 2021-07-18 RX ADMIN — OXYCODONE HYDROCHLORIDE 5 MG: 5 SOLUTION ORAL at 13:36

## 2021-07-18 RX ADMIN — CEFEPIME HYDROCHLORIDE 2 G: 2 INJECTION, POWDER, FOR SOLUTION INTRAVENOUS at 08:13

## 2021-07-18 RX ADMIN — VANCOMYCIN HYDROCHLORIDE 1250 MG: 100 INJECTION, POWDER, LYOPHILIZED, FOR SOLUTION INTRAVENOUS at 11:30

## 2021-07-18 RX ADMIN — LORAZEPAM 2 MG: 2 INJECTION INTRAMUSCULAR; INTRAVENOUS at 12:24

## 2021-07-18 RX ADMIN — SODIUM CHLORIDE, PRESERVATIVE FREE 10 ML: 5 INJECTION INTRAVENOUS at 19:57

## 2021-07-19 LAB
ALBUMIN SERPL-MCNC: 3.2 G/DL (ref 3.5–5.2)
ALP SERPL-CCNC: 184 U/L (ref 39–117)
ALT SERPL W P-5'-P-CCNC: 114 U/L (ref 1–41)
ANION GAP SERPL CALCULATED.3IONS-SCNC: 10 MMOL/L (ref 5–15)
AST SERPL-CCNC: 53 U/L (ref 1–40)
BACTERIA SPEC AEROBE CULT: ABNORMAL
BILIRUB SERPL-MCNC: 0.3 MG/DL (ref 0–1.2)
BUN SERPL-MCNC: 44 MG/DL (ref 6–20)
CALCIUM SPEC-SCNC: 9.6 MG/DL (ref 8.6–10.5)
CHLORIDE SERPL-SCNC: 111 MMOL/L (ref 98–107)
CHOLEST SERPL-MCNC: 112 MG/DL (ref 0–200)
CO2 SERPL-SCNC: 24 MMOL/L (ref 22–29)
CREAT SERPL-MCNC: 1.02 MG/DL (ref 0.76–1.27)
CRP SERPL-MCNC: 10.89 MG/DL (ref 0–0.5)
GLUCOSE BLDC GLUCOMTR-MCNC: 136 MG/DL (ref 70–130)
GLUCOSE BLDC GLUCOMTR-MCNC: 141 MG/DL (ref 70–130)
GLUCOSE BLDC GLUCOMTR-MCNC: 148 MG/DL (ref 70–130)
GLUCOSE BLDC GLUCOMTR-MCNC: 183 MG/DL (ref 70–130)
GLUCOSE SERPL-MCNC: 166 MG/DL (ref 65–99)
GRAM STN SPEC: ABNORMAL
GRAM STN SPEC: ABNORMAL
INR PPP: 2.29 (ref 0.85–1.16)
MAGNESIUM SERPL-MCNC: 2.3 MG/DL (ref 1.6–2.6)
PHOSPHATE SERPL-MCNC: 2.8 MG/DL (ref 2.5–4.5)
POTASSIUM SERPL-SCNC: 3.8 MMOL/L (ref 3.5–5.2)
PREALB SERPL-MCNC: 15.4 MG/DL (ref 20–40)
PROT SERPL-MCNC: 7.2 G/DL (ref 6–8.5)
PROTHROMBIN TIME: 24.2 SECONDS (ref 11.4–14.4)
QT INTERVAL: 288 MS
QT INTERVAL: 294 MS
QTC INTERVAL: 447 MS
QTC INTERVAL: 463 MS
SODIUM SERPL-SCNC: 145 MMOL/L (ref 136–145)
TRIGL SERPL-MCNC: 124 MG/DL (ref 0–150)
UFH PPP CHRO-ACNC: 0.51 IU/ML (ref 0.3–0.7)
UFH PPP CHRO-ACNC: 0.63 IU/ML (ref 0.3–0.7)
UFH PPP CHRO-ACNC: 0.84 IU/ML (ref 0.3–0.7)

## 2021-07-19 PROCEDURE — 93005 ELECTROCARDIOGRAM TRACING: CPT | Performed by: INTERNAL MEDICINE

## 2021-07-19 PROCEDURE — 94002 VENT MGMT INPAT INIT DAY: CPT

## 2021-07-19 PROCEDURE — 94003 VENT MGMT INPAT SUBQ DAY: CPT

## 2021-07-19 PROCEDURE — 25010000002 HEPARIN (PORCINE) 25000-0.45 UT/250ML-% SOLUTION

## 2021-07-19 PROCEDURE — 84478 ASSAY OF TRIGLYCERIDES: CPT | Performed by: NURSE PRACTITIONER

## 2021-07-19 PROCEDURE — 80053 COMPREHEN METABOLIC PANEL: CPT | Performed by: NURSE PRACTITIONER

## 2021-07-19 PROCEDURE — 93010 ELECTROCARDIOGRAM REPORT: CPT | Performed by: INTERNAL MEDICINE

## 2021-07-19 PROCEDURE — 25010000002 LORAZEPAM PER 2 MG: Performed by: NURSE PRACTITIONER

## 2021-07-19 PROCEDURE — 99291 CRITICAL CARE FIRST HOUR: CPT | Performed by: INTERNAL MEDICINE

## 2021-07-19 PROCEDURE — 25010000002 CEFEPIME PER 500 MG: Performed by: NURSE PRACTITIONER

## 2021-07-19 PROCEDURE — 25010000002 AMIODARONE IN DEXTROSE 5% 360-4.14 MG/200ML-% SOLUTION: Performed by: INTERNAL MEDICINE

## 2021-07-19 PROCEDURE — 84134 ASSAY OF PREALBUMIN: CPT | Performed by: NURSE PRACTITIONER

## 2021-07-19 PROCEDURE — 84100 ASSAY OF PHOSPHORUS: CPT | Performed by: NURSE PRACTITIONER

## 2021-07-19 PROCEDURE — 94799 UNLISTED PULMONARY SVC/PX: CPT

## 2021-07-19 PROCEDURE — 86140 C-REACTIVE PROTEIN: CPT | Performed by: NURSE PRACTITIONER

## 2021-07-19 PROCEDURE — 25010000002 CEFEPIME 2 G/NS 100 ML SOLUTION: Performed by: NURSE PRACTITIONER

## 2021-07-19 PROCEDURE — 82465 ASSAY BLD/SERUM CHOLESTEROL: CPT | Performed by: NURSE PRACTITIONER

## 2021-07-19 PROCEDURE — 85520 HEPARIN ASSAY: CPT

## 2021-07-19 PROCEDURE — 85610 PROTHROMBIN TIME: CPT | Performed by: NURSE PRACTITIONER

## 2021-07-19 PROCEDURE — 25010000002 LINEZOLID 600 MG/300ML SOLUTION: Performed by: INTERNAL MEDICINE

## 2021-07-19 PROCEDURE — 25010000002 HYDROMORPHONE 1 MG/ML SOLUTION: Performed by: NURSE PRACTITIONER

## 2021-07-19 PROCEDURE — 93005 ELECTROCARDIOGRAM TRACING: CPT | Performed by: NURSE PRACTITIONER

## 2021-07-19 PROCEDURE — 83735 ASSAY OF MAGNESIUM: CPT | Performed by: NURSE PRACTITIONER

## 2021-07-19 PROCEDURE — 82962 GLUCOSE BLOOD TEST: CPT

## 2021-07-19 RX ORDER — CHOLECALCIFEROL (VITAMIN D3) 125 MCG
10 CAPSULE ORAL NIGHTLY
Status: DISCONTINUED | OUTPATIENT
Start: 2021-07-19 | End: 2021-07-20

## 2021-07-19 RX ORDER — RISPERIDONE 1 MG/ML
0.5 SOLUTION ORAL NIGHTLY
Status: DISCONTINUED | OUTPATIENT
Start: 2021-07-19 | End: 2021-07-20

## 2021-07-19 RX ORDER — AMINO ACIDS/PROTEIN HYDROLYS 15G-100/30
30 LIQUID (ML) ORAL 3 TIMES DAILY
Status: DISCONTINUED | OUTPATIENT
Start: 2021-07-19 | End: 2021-07-22

## 2021-07-19 RX ADMIN — IPRATROPIUM BROMIDE AND ALBUTEROL SULFATE 3 ML: 2.5; .5 SOLUTION RESPIRATORY (INHALATION) at 16:04

## 2021-07-19 RX ADMIN — LINEZOLID 600 MG: 600 INJECTION, SOLUTION INTRAVENOUS at 15:08

## 2021-07-19 RX ADMIN — PANTOPRAZOLE SODIUM 40 MG: 40 INJECTION, POWDER, FOR SOLUTION INTRAVENOUS at 06:06

## 2021-07-19 RX ADMIN — IPRATROPIUM BROMIDE AND ALBUTEROL SULFATE 3 ML: 2.5; .5 SOLUTION RESPIRATORY (INHALATION) at 19:32

## 2021-07-19 RX ADMIN — HYDROMORPHONE HYDROCHLORIDE 1 MG: 1 INJECTION, SOLUTION INTRAMUSCULAR; INTRAVENOUS; SUBCUTANEOUS at 04:08

## 2021-07-19 RX ADMIN — LORAZEPAM 2 MG: 2 INJECTION INTRAMUSCULAR; INTRAVENOUS at 23:59

## 2021-07-19 RX ADMIN — LORAZEPAM 2 MG: 2 INJECTION INTRAMUSCULAR; INTRAVENOUS at 16:40

## 2021-07-19 RX ADMIN — HYDROMORPHONE HYDROCHLORIDE 1 MG: 1 INJECTION, SOLUTION INTRAMUSCULAR; INTRAVENOUS; SUBCUTANEOUS at 08:05

## 2021-07-19 RX ADMIN — LORAZEPAM 2 MG: 2 INJECTION INTRAMUSCULAR; INTRAVENOUS at 12:13

## 2021-07-19 RX ADMIN — AMIODARONE HYDROCHLORIDE 200 MG: 200 TABLET ORAL at 06:06

## 2021-07-19 RX ADMIN — AMIODARONE HYDROCHLORIDE 0.5 MG/MIN: 1.8 INJECTION, SOLUTION INTRAVENOUS at 08:13

## 2021-07-19 RX ADMIN — CHLORHEXIDINE GLUCONATE 15 ML: 1.2 SOLUTION ORAL at 08:23

## 2021-07-19 RX ADMIN — WARFARIN SODIUM 10 MG: 5 TABLET ORAL at 18:17

## 2021-07-19 RX ADMIN — Medication 30 ML: at 18:17

## 2021-07-19 RX ADMIN — LINEZOLID 600 MG: 600 INJECTION, SOLUTION INTRAVENOUS at 02:13

## 2021-07-19 RX ADMIN — RISPERIDONE 0.5 MG: 1 SOLUTION ORAL at 20:14

## 2021-07-19 RX ADMIN — LORAZEPAM 2 MG: 2 INJECTION INTRAMUSCULAR; INTRAVENOUS at 08:05

## 2021-07-19 RX ADMIN — LORAZEPAM 2 MG: 2 INJECTION INTRAMUSCULAR; INTRAVENOUS at 04:09

## 2021-07-19 RX ADMIN — IPRATROPIUM BROMIDE AND ALBUTEROL SULFATE 3 ML: 2.5; .5 SOLUTION RESPIRATORY (INHALATION) at 07:21

## 2021-07-19 RX ADMIN — Medication 30 ML: at 08:29

## 2021-07-19 RX ADMIN — CEFEPIME HYDROCHLORIDE 2 G: 2 INJECTION, POWDER, FOR SOLUTION INTRAVENOUS at 16:40

## 2021-07-19 RX ADMIN — HEPARIN SODIUM 22 UNITS/KG/HR: 10000 INJECTION, SOLUTION INTRAVENOUS at 16:39

## 2021-07-19 RX ADMIN — Medication 30 ML: at 20:20

## 2021-07-19 RX ADMIN — CHLORHEXIDINE GLUCONATE 15 ML: 1.2 SOLUTION ORAL at 20:16

## 2021-07-19 RX ADMIN — HYDROMORPHONE HYDROCHLORIDE 1 MG: 1 INJECTION, SOLUTION INTRAMUSCULAR; INTRAVENOUS; SUBCUTANEOUS at 16:40

## 2021-07-19 RX ADMIN — Medication 10 MG: at 20:14

## 2021-07-19 RX ADMIN — TEMAZEPAM 30 MG: 15 CAPSULE ORAL at 20:14

## 2021-07-19 RX ADMIN — SODIUM CHLORIDE, PRESERVATIVE FREE 10 ML: 5 INJECTION INTRAVENOUS at 08:23

## 2021-07-19 RX ADMIN — SODIUM CHLORIDE, PRESERVATIVE FREE 10 ML: 5 INJECTION INTRAVENOUS at 20:14

## 2021-07-19 RX ADMIN — HYDROMORPHONE HYDROCHLORIDE 1 MG: 1 INJECTION, SOLUTION INTRAMUSCULAR; INTRAVENOUS; SUBCUTANEOUS at 12:13

## 2021-07-19 RX ADMIN — ASPIRIN 81 MG CHEWABLE TABLET 81 MG: 81 TABLET CHEWABLE at 08:23

## 2021-07-19 RX ADMIN — HEPARIN SODIUM 22 UNITS/KG/HR: 10000 INJECTION, SOLUTION INTRAVENOUS at 08:44

## 2021-07-19 RX ADMIN — Medication 15 ML: at 08:23

## 2021-07-19 RX ADMIN — CEFEPIME HYDROCHLORIDE 2 G: 2 INJECTION, POWDER, FOR SOLUTION INTRAVENOUS at 08:25

## 2021-07-19 RX ADMIN — AMIODARONE HYDROCHLORIDE 0.5 MG/MIN: 1.8 INJECTION, SOLUTION INTRAVENOUS at 19:21

## 2021-07-19 RX ADMIN — ATORVASTATIN CALCIUM 40 MG: 40 TABLET, FILM COATED ORAL at 20:14

## 2021-07-19 RX ADMIN — IPRATROPIUM BROMIDE AND ALBUTEROL SULFATE 3 ML: 2.5; .5 SOLUTION RESPIRATORY (INHALATION) at 12:27

## 2021-07-20 LAB
ANION GAP SERPL CALCULATED.3IONS-SCNC: 10 MMOL/L (ref 5–15)
BUN SERPL-MCNC: 42 MG/DL (ref 6–20)
BUN/CREAT SERPL: 42.9 (ref 7–25)
CALCIUM SPEC-SCNC: 9.5 MG/DL (ref 8.6–10.5)
CHLORIDE SERPL-SCNC: 110 MMOL/L (ref 98–107)
CO2 SERPL-SCNC: 24 MMOL/L (ref 22–29)
CREAT SERPL-MCNC: 0.98 MG/DL (ref 0.76–1.27)
GFR SERPL CREATININE-BSD FRML MDRD: 79 ML/MIN/1.73
GLUCOSE BLDC GLUCOMTR-MCNC: 138 MG/DL (ref 70–130)
GLUCOSE BLDC GLUCOMTR-MCNC: 147 MG/DL (ref 70–130)
GLUCOSE BLDC GLUCOMTR-MCNC: 160 MG/DL (ref 70–130)
GLUCOSE BLDC GLUCOMTR-MCNC: 163 MG/DL (ref 70–130)
GLUCOSE SERPL-MCNC: 157 MG/DL (ref 65–99)
INR PPP: 2.38 (ref 0.85–1.16)
MAGNESIUM SERPL-MCNC: 2.3 MG/DL (ref 1.6–2.6)
PHOSPHATE SERPL-MCNC: 2.7 MG/DL (ref 2.5–4.5)
POTASSIUM SERPL-SCNC: 4.1 MMOL/L (ref 3.5–5.2)
PROTHROMBIN TIME: 25 SECONDS (ref 11.4–14.4)
QT INTERVAL: 346 MS
QTC INTERVAL: 491 MS
SODIUM SERPL-SCNC: 144 MMOL/L (ref 136–145)
UFH PPP CHRO-ACNC: 0.72 IU/ML (ref 0.3–0.7)
UFH PPP CHRO-ACNC: 0.76 IU/ML (ref 0.3–0.7)

## 2021-07-20 PROCEDURE — 84100 ASSAY OF PHOSPHORUS: CPT | Performed by: NURSE PRACTITIONER

## 2021-07-20 PROCEDURE — 83735 ASSAY OF MAGNESIUM: CPT | Performed by: NURSE PRACTITIONER

## 2021-07-20 PROCEDURE — 94799 UNLISTED PULMONARY SVC/PX: CPT

## 2021-07-20 PROCEDURE — 25010000002 CEFEPIME PER 500 MG: Performed by: NURSE PRACTITIONER

## 2021-07-20 PROCEDURE — 94003 VENT MGMT INPAT SUBQ DAY: CPT

## 2021-07-20 PROCEDURE — 93010 ELECTROCARDIOGRAM REPORT: CPT | Performed by: INTERNAL MEDICINE

## 2021-07-20 PROCEDURE — 85610 PROTHROMBIN TIME: CPT | Performed by: NURSE PRACTITIONER

## 2021-07-20 PROCEDURE — 80048 BASIC METABOLIC PNL TOTAL CA: CPT | Performed by: NURSE PRACTITIONER

## 2021-07-20 PROCEDURE — 82962 GLUCOSE BLOOD TEST: CPT

## 2021-07-20 PROCEDURE — 93005 ELECTROCARDIOGRAM TRACING: CPT | Performed by: NURSE PRACTITIONER

## 2021-07-20 PROCEDURE — 85520 HEPARIN ASSAY: CPT

## 2021-07-20 PROCEDURE — 99233 SBSQ HOSP IP/OBS HIGH 50: CPT | Performed by: INTERNAL MEDICINE

## 2021-07-20 PROCEDURE — 25010000002 LINEZOLID 600 MG/300ML SOLUTION: Performed by: INTERNAL MEDICINE

## 2021-07-20 PROCEDURE — 25010000002 AMIODARONE IN DEXTROSE 5% 360-4.14 MG/200ML-% SOLUTION: Performed by: INTERNAL MEDICINE

## 2021-07-20 PROCEDURE — 97110 THERAPEUTIC EXERCISES: CPT

## 2021-07-20 PROCEDURE — 25010000002 LORAZEPAM PER 2 MG: Performed by: NURSE PRACTITIONER

## 2021-07-20 PROCEDURE — 25010000002 HEPARIN (PORCINE) 25000-0.45 UT/250ML-% SOLUTION

## 2021-07-20 PROCEDURE — 99232 SBSQ HOSP IP/OBS MODERATE 35: CPT | Performed by: NURSE PRACTITIONER

## 2021-07-20 PROCEDURE — 25010000002 HYDROMORPHONE 1 MG/ML SOLUTION: Performed by: NURSE PRACTITIONER

## 2021-07-20 RX ORDER — RISPERIDONE 1 MG/ML
0.5 SOLUTION ORAL NIGHTLY
Status: DISCONTINUED | OUTPATIENT
Start: 2021-07-20 | End: 2021-07-23

## 2021-07-20 RX ORDER — WARFARIN SODIUM 2.5 MG/1
2.5 TABLET ORAL
Status: DISCONTINUED | OUTPATIENT
Start: 2021-07-20 | End: 2021-07-20

## 2021-07-20 RX ORDER — WARFARIN SODIUM 2.5 MG/1
2.5 TABLET ORAL
Status: COMPLETED | OUTPATIENT
Start: 2021-07-20 | End: 2021-07-20

## 2021-07-20 RX ORDER — DILTIAZEM HCL IN NACL,ISO-OSM 125 MG/125
5-15 PLASTIC BAG, INJECTION (ML) INTRAVENOUS
Status: DISCONTINUED | OUTPATIENT
Start: 2021-07-20 | End: 2021-07-29

## 2021-07-20 RX ORDER — LANSOPRAZOLE
30 KIT EVERY MORNING
Status: DISCONTINUED | OUTPATIENT
Start: 2021-07-21 | End: 2021-07-29 | Stop reason: HOSPADM

## 2021-07-20 RX ORDER — LORAZEPAM 2 MG/ML
2 INJECTION INTRAMUSCULAR ONCE
Status: COMPLETED | OUTPATIENT
Start: 2021-07-20 | End: 2021-07-20

## 2021-07-20 RX ORDER — CHOLECALCIFEROL (VITAMIN D3) 125 MCG
10 CAPSULE ORAL NIGHTLY
Status: DISCONTINUED | OUTPATIENT
Start: 2021-07-20 | End: 2021-07-26

## 2021-07-20 RX ADMIN — AMIODARONE HYDROCHLORIDE 0.5 MG/MIN: 1.8 INJECTION, SOLUTION INTRAVENOUS at 06:53

## 2021-07-20 RX ADMIN — CEFEPIME HYDROCHLORIDE 2 G: 2 INJECTION, POWDER, FOR SOLUTION INTRAVENOUS at 08:18

## 2021-07-20 RX ADMIN — LINEZOLID 600 MG: 600 INJECTION, SOLUTION INTRAVENOUS at 17:16

## 2021-07-20 RX ADMIN — Medication 5 MG/HR: at 10:42

## 2021-07-20 RX ADMIN — LORAZEPAM 2 MG: 2 INJECTION INTRAMUSCULAR; INTRAVENOUS at 17:16

## 2021-07-20 RX ADMIN — LORAZEPAM 2 MG: 2 INJECTION INTRAMUSCULAR; INTRAVENOUS at 21:58

## 2021-07-20 RX ADMIN — ASPIRIN 81 MG CHEWABLE TABLET 81 MG: 81 TABLET CHEWABLE at 08:18

## 2021-07-20 RX ADMIN — WARFARIN SODIUM 2.5 MG: 2.5 TABLET ORAL at 17:48

## 2021-07-20 RX ADMIN — Medication 15 ML: at 08:18

## 2021-07-20 RX ADMIN — IPRATROPIUM BROMIDE AND ALBUTEROL SULFATE 3 ML: 2.5; .5 SOLUTION RESPIRATORY (INHALATION) at 06:47

## 2021-07-20 RX ADMIN — CHLORHEXIDINE GLUCONATE 15 ML: 1.2 SOLUTION ORAL at 08:18

## 2021-07-20 RX ADMIN — HYDROMORPHONE HYDROCHLORIDE 1 MG: 1 INJECTION, SOLUTION INTRAMUSCULAR; INTRAVENOUS; SUBCUTANEOUS at 23:39

## 2021-07-20 RX ADMIN — WARFARIN SODIUM 10 MG: 5 TABLET ORAL at 17:48

## 2021-07-20 RX ADMIN — LINEZOLID 600 MG: 600 INJECTION, SOLUTION INTRAVENOUS at 03:12

## 2021-07-20 RX ADMIN — Medication 30 ML: at 20:40

## 2021-07-20 RX ADMIN — IPRATROPIUM BROMIDE AND ALBUTEROL SULFATE 3 ML: 2.5; .5 SOLUTION RESPIRATORY (INHALATION) at 19:17

## 2021-07-20 RX ADMIN — AMIODARONE HYDROCHLORIDE 0.5 MG/MIN: 1.8 INJECTION, SOLUTION INTRAVENOUS at 17:16

## 2021-07-20 RX ADMIN — HEPARIN SODIUM 20 UNITS/KG/HR: 10000 INJECTION, SOLUTION INTRAVENOUS at 02:02

## 2021-07-20 RX ADMIN — Medication 10 MG: at 20:40

## 2021-07-20 RX ADMIN — HYDROMORPHONE HYDROCHLORIDE 1 MG: 1 INJECTION, SOLUTION INTRAMUSCULAR; INTRAVENOUS; SUBCUTANEOUS at 08:17

## 2021-07-20 RX ADMIN — CEFEPIME HYDROCHLORIDE 2 G: 2 INJECTION, POWDER, FOR SOLUTION INTRAVENOUS at 00:00

## 2021-07-20 RX ADMIN — TEMAZEPAM 30 MG: 15 CAPSULE ORAL at 20:40

## 2021-07-20 RX ADMIN — IPRATROPIUM BROMIDE AND ALBUTEROL SULFATE 3 ML: 2.5; .5 SOLUTION RESPIRATORY (INHALATION) at 12:47

## 2021-07-20 RX ADMIN — HYDROMORPHONE HYDROCHLORIDE 1 MG: 1 INJECTION, SOLUTION INTRAMUSCULAR; INTRAVENOUS; SUBCUTANEOUS at 02:01

## 2021-07-20 RX ADMIN — CHLORHEXIDINE GLUCONATE 15 ML: 1.2 SOLUTION ORAL at 20:40

## 2021-07-20 RX ADMIN — Medication 30 ML: at 08:18

## 2021-07-20 RX ADMIN — RISPERIDONE 0.5 MG: 1 SOLUTION ORAL at 20:40

## 2021-07-20 RX ADMIN — HYDROMORPHONE HYDROCHLORIDE 1 MG: 1 INJECTION, SOLUTION INTRAMUSCULAR; INTRAVENOUS; SUBCUTANEOUS at 17:16

## 2021-07-20 RX ADMIN — Medication 15 MG/HR: at 17:15

## 2021-07-20 RX ADMIN — LORAZEPAM 2 MG: 2 INJECTION INTRAMUSCULAR; INTRAVENOUS at 17:59

## 2021-07-20 RX ADMIN — LORAZEPAM 2 MG: 2 INJECTION INTRAMUSCULAR; INTRAVENOUS at 12:24

## 2021-07-20 RX ADMIN — IPRATROPIUM BROMIDE AND ALBUTEROL SULFATE 3 ML: 2.5; .5 SOLUTION RESPIRATORY (INHALATION) at 16:07

## 2021-07-20 RX ADMIN — Medication 30 ML: at 17:49

## 2021-07-20 RX ADMIN — PANTOPRAZOLE SODIUM 40 MG: 40 INJECTION, POWDER, FOR SOLUTION INTRAVENOUS at 05:21

## 2021-07-20 RX ADMIN — SODIUM CHLORIDE, PRESERVATIVE FREE 10 ML: 5 INJECTION INTRAVENOUS at 20:40

## 2021-07-20 RX ADMIN — ATORVASTATIN CALCIUM 40 MG: 40 TABLET, FILM COATED ORAL at 20:40

## 2021-07-20 RX ADMIN — LORAZEPAM 2 MG: 2 INJECTION INTRAMUSCULAR; INTRAVENOUS at 08:17

## 2021-07-21 LAB
ANION GAP SERPL CALCULATED.3IONS-SCNC: 8 MMOL/L (ref 5–15)
BACTERIA SPEC AEROBE CULT: NORMAL
BACTERIA SPEC AEROBE CULT: NORMAL
BUN SERPL-MCNC: 30 MG/DL (ref 6–20)
BUN/CREAT SERPL: 31.6 (ref 7–25)
CALCIUM SPEC-SCNC: 9.3 MG/DL (ref 8.6–10.5)
CHLORIDE SERPL-SCNC: 106 MMOL/L (ref 98–107)
CO2 SERPL-SCNC: 27 MMOL/L (ref 22–29)
CREAT SERPL-MCNC: 0.95 MG/DL (ref 0.76–1.27)
GFR SERPL CREATININE-BSD FRML MDRD: 82 ML/MIN/1.73
GLUCOSE BLDC GLUCOMTR-MCNC: 116 MG/DL (ref 70–130)
GLUCOSE BLDC GLUCOMTR-MCNC: 129 MG/DL (ref 70–130)
GLUCOSE BLDC GLUCOMTR-MCNC: 135 MG/DL (ref 70–130)
GLUCOSE BLDC GLUCOMTR-MCNC: 151 MG/DL (ref 70–130)
GLUCOSE SERPL-MCNC: 147 MG/DL (ref 65–99)
INR PPP: 2.63 (ref 0.85–1.16)
MAGNESIUM SERPL-MCNC: 2.1 MG/DL (ref 1.6–2.6)
PHOSPHATE SERPL-MCNC: 3 MG/DL (ref 2.5–4.5)
POTASSIUM SERPL-SCNC: 4.2 MMOL/L (ref 3.5–5.2)
PROTHROMBIN TIME: 27 SECONDS (ref 11.4–14.4)
QT INTERVAL: 418 MS
QTC INTERVAL: 516 MS
SODIUM SERPL-SCNC: 141 MMOL/L (ref 136–145)

## 2021-07-21 PROCEDURE — 84100 ASSAY OF PHOSPHORUS: CPT | Performed by: NURSE PRACTITIONER

## 2021-07-21 PROCEDURE — 25010000002 HYDROMORPHONE 1 MG/ML SOLUTION: Performed by: INTERNAL MEDICINE

## 2021-07-21 PROCEDURE — 94002 VENT MGMT INPAT INIT DAY: CPT

## 2021-07-21 PROCEDURE — 82962 GLUCOSE BLOOD TEST: CPT

## 2021-07-21 PROCEDURE — 93005 ELECTROCARDIOGRAM TRACING: CPT | Performed by: NURSE PRACTITIONER

## 2021-07-21 PROCEDURE — 97110 THERAPEUTIC EXERCISES: CPT

## 2021-07-21 PROCEDURE — 94799 UNLISTED PULMONARY SVC/PX: CPT

## 2021-07-21 PROCEDURE — 83735 ASSAY OF MAGNESIUM: CPT | Performed by: NURSE PRACTITIONER

## 2021-07-21 PROCEDURE — 25010000002 LORAZEPAM PER 2 MG: Performed by: NURSE PRACTITIONER

## 2021-07-21 PROCEDURE — 99233 SBSQ HOSP IP/OBS HIGH 50: CPT | Performed by: INTERNAL MEDICINE

## 2021-07-21 PROCEDURE — 94003 VENT MGMT INPAT SUBQ DAY: CPT

## 2021-07-21 PROCEDURE — 25010000002 LINEZOLID 600 MG/300ML SOLUTION: Performed by: INTERNAL MEDICINE

## 2021-07-21 PROCEDURE — 99232 SBSQ HOSP IP/OBS MODERATE 35: CPT | Performed by: INTERNAL MEDICINE

## 2021-07-21 PROCEDURE — 85610 PROTHROMBIN TIME: CPT | Performed by: NURSE PRACTITIONER

## 2021-07-21 PROCEDURE — 25010000002 HYDROMORPHONE 1 MG/ML SOLUTION: Performed by: NURSE PRACTITIONER

## 2021-07-21 PROCEDURE — 93010 ELECTROCARDIOGRAM REPORT: CPT | Performed by: INTERNAL MEDICINE

## 2021-07-21 PROCEDURE — 25010000002 AMIODARONE IN DEXTROSE 5% 360-4.14 MG/200ML-% SOLUTION: Performed by: INTERNAL MEDICINE

## 2021-07-21 PROCEDURE — 80048 BASIC METABOLIC PNL TOTAL CA: CPT | Performed by: NURSE PRACTITIONER

## 2021-07-21 RX ORDER — DIPHENHYDRAMINE HCL 12.5MG/5ML
25 LIQUID (ML) ORAL ONCE
Status: COMPLETED | OUTPATIENT
Start: 2021-07-21 | End: 2021-07-21

## 2021-07-21 RX ADMIN — AMIODARONE HYDROCHLORIDE 0.5 MG/MIN: 1.8 INJECTION, SOLUTION INTRAVENOUS at 05:18

## 2021-07-21 RX ADMIN — TEMAZEPAM 30 MG: 15 CAPSULE ORAL at 22:36

## 2021-07-21 RX ADMIN — LINEZOLID 600 MG: 600 INJECTION, SOLUTION INTRAVENOUS at 13:12

## 2021-07-21 RX ADMIN — Medication 30 ML: at 18:05

## 2021-07-21 RX ADMIN — LORAZEPAM 2 MG: 2 INJECTION INTRAMUSCULAR; INTRAVENOUS at 12:58

## 2021-07-21 RX ADMIN — LANSOPRAZOLE 30 MG: KIT at 08:32

## 2021-07-21 RX ADMIN — HYDROMORPHONE HYDROCHLORIDE 1 MG: 1 INJECTION, SOLUTION INTRAMUSCULAR; INTRAVENOUS; SUBCUTANEOUS at 21:04

## 2021-07-21 RX ADMIN — ASPIRIN 81 MG CHEWABLE TABLET 81 MG: 81 TABLET CHEWABLE at 08:45

## 2021-07-21 RX ADMIN — WARFARIN SODIUM 10 MG: 5 TABLET ORAL at 18:03

## 2021-07-21 RX ADMIN — HYDROMORPHONE HYDROCHLORIDE 1 MG: 1 INJECTION, SOLUTION INTRAMUSCULAR; INTRAVENOUS; SUBCUTANEOUS at 15:33

## 2021-07-21 RX ADMIN — Medication 10 MG: at 21:03

## 2021-07-21 RX ADMIN — AMIODARONE HYDROCHLORIDE 0.5 MG/MIN: 1.8 INJECTION, SOLUTION INTRAVENOUS at 17:59

## 2021-07-21 RX ADMIN — Medication 30 ML: at 08:32

## 2021-07-21 RX ADMIN — IPRATROPIUM BROMIDE AND ALBUTEROL SULFATE 3 ML: 2.5; .5 SOLUTION RESPIRATORY (INHALATION) at 20:15

## 2021-07-21 RX ADMIN — ATORVASTATIN CALCIUM 40 MG: 40 TABLET, FILM COATED ORAL at 21:03

## 2021-07-21 RX ADMIN — LORAZEPAM 2 MG: 2 INJECTION INTRAMUSCULAR; INTRAVENOUS at 04:26

## 2021-07-21 RX ADMIN — IPRATROPIUM BROMIDE AND ALBUTEROL SULFATE 3 ML: 2.5; .5 SOLUTION RESPIRATORY (INHALATION) at 11:28

## 2021-07-21 RX ADMIN — SODIUM CHLORIDE, PRESERVATIVE FREE 10 ML: 5 INJECTION INTRAVENOUS at 08:32

## 2021-07-21 RX ADMIN — LORAZEPAM 2 MG: 2 INJECTION INTRAMUSCULAR; INTRAVENOUS at 22:41

## 2021-07-21 RX ADMIN — LINEZOLID 600 MG: 600 INJECTION, SOLUTION INTRAVENOUS at 01:02

## 2021-07-21 RX ADMIN — CHLORHEXIDINE GLUCONATE 15 ML: 1.2 SOLUTION ORAL at 08:32

## 2021-07-21 RX ADMIN — LORAZEPAM 2 MG: 2 INJECTION INTRAMUSCULAR; INTRAVENOUS at 08:32

## 2021-07-21 RX ADMIN — HYDROMORPHONE HYDROCHLORIDE 1 MG: 1 INJECTION, SOLUTION INTRAMUSCULAR; INTRAVENOUS; SUBCUTANEOUS at 08:32

## 2021-07-21 RX ADMIN — HYDROMORPHONE HYDROCHLORIDE 1 MG: 1 INJECTION, SOLUTION INTRAMUSCULAR; INTRAVENOUS; SUBCUTANEOUS at 04:26

## 2021-07-21 RX ADMIN — IPRATROPIUM BROMIDE AND ALBUTEROL SULFATE 3 ML: 2.5; .5 SOLUTION RESPIRATORY (INHALATION) at 15:47

## 2021-07-21 RX ADMIN — RISPERIDONE 0.5 MG: 1 SOLUTION ORAL at 21:04

## 2021-07-21 RX ADMIN — Medication 15 ML: at 08:32

## 2021-07-21 RX ADMIN — IPRATROPIUM BROMIDE AND ALBUTEROL SULFATE 3 ML: 2.5; .5 SOLUTION RESPIRATORY (INHALATION) at 07:51

## 2021-07-21 RX ADMIN — LORAZEPAM 2 MG: 2 INJECTION INTRAMUSCULAR; INTRAVENOUS at 18:03

## 2021-07-21 RX ADMIN — CHLORHEXIDINE GLUCONATE 15 ML: 1.2 SOLUTION ORAL at 21:03

## 2021-07-21 RX ADMIN — SODIUM CHLORIDE, PRESERVATIVE FREE 10 ML: 5 INJECTION INTRAVENOUS at 21:04

## 2021-07-21 RX ADMIN — DIPHENHYDRAMINE HYDROCHLORIDE 25 MG: 25 SOLUTION ORAL at 00:58

## 2021-07-21 RX ADMIN — Medication 15 MG/HR: at 00:25

## 2021-07-22 LAB
ANION GAP SERPL CALCULATED.3IONS-SCNC: 10 MMOL/L (ref 5–15)
ARTERIAL PATENCY WRIST A: ABNORMAL
ATMOSPHERIC PRESS: ABNORMAL MM[HG]
BASE EXCESS BLDA CALC-SCNC: 6.6 MMOL/L (ref 0–2)
BDY SITE: ABNORMAL
BODY TEMPERATURE: 37 C
BUN SERPL-MCNC: 29 MG/DL (ref 6–20)
BUN/CREAT SERPL: 34.5 (ref 7–25)
CALCIUM SPEC-SCNC: 9.1 MG/DL (ref 8.6–10.5)
CHLORIDE SERPL-SCNC: 102 MMOL/L (ref 98–107)
CO2 BLDA-SCNC: 31.2 MMOL/L (ref 22–33)
CO2 SERPL-SCNC: 26 MMOL/L (ref 22–29)
COHGB MFR BLD: 0.9 % (ref 0–2)
CREAT SERPL-MCNC: 0.84 MG/DL (ref 0.76–1.27)
EPAP: 0
GFR SERPL CREATININE-BSD FRML MDRD: 95 ML/MIN/1.73
GLUCOSE BLDC GLUCOMTR-MCNC: 131 MG/DL (ref 70–130)
GLUCOSE BLDC GLUCOMTR-MCNC: 140 MG/DL (ref 70–130)
GLUCOSE BLDC GLUCOMTR-MCNC: 145 MG/DL (ref 70–130)
GLUCOSE BLDC GLUCOMTR-MCNC: 153 MG/DL (ref 70–130)
GLUCOSE SERPL-MCNC: 131 MG/DL (ref 65–99)
HCO3 BLDA-SCNC: 30 MMOL/L (ref 20–26)
HCT VFR BLD CALC: 33.4 %
HGB BLDA-MCNC: 10.9 G/DL (ref 13.5–17.5)
INHALED O2 CONCENTRATION: 35 %
INR PPP: 2.68 (ref 0.85–1.16)
IPAP: 0
MAGNESIUM SERPL-MCNC: 2.1 MG/DL (ref 1.6–2.6)
METHGB BLD QL: 0.4 % (ref 0–1.5)
MODALITY: ABNORMAL
NOTE: ABNORMAL
OXYHGB MFR BLDV: 94.7 % (ref 94–99)
PAW @ PEAK INSP FLOW SETTING VENT: 0 CMH2O
PCO2 BLDA: 37.5 MM HG (ref 35–45)
PCO2 TEMP ADJ BLD: 37.5 MM HG (ref 35–48)
PH BLDA: 7.51 PH UNITS (ref 7.35–7.45)
PH, TEMP CORRECTED: 7.51 PH UNITS
PHOSPHATE SERPL-MCNC: 3.1 MG/DL (ref 2.5–4.5)
PO2 BLDA: 74.8 MM HG (ref 83–108)
PO2 TEMP ADJ BLD: 74.8 MM HG (ref 83–108)
POTASSIUM SERPL-SCNC: 4.6 MMOL/L (ref 3.5–5.2)
PROTHROMBIN TIME: 27.4 SECONDS (ref 11.4–14.4)
QT INTERVAL: 392 MS
QTC INTERVAL: 530 MS
SODIUM SERPL-SCNC: 138 MMOL/L (ref 136–145)
TOTAL RATE: 0 BREATHS/MINUTE

## 2021-07-22 PROCEDURE — 93010 ELECTROCARDIOGRAM REPORT: CPT | Performed by: INTERNAL MEDICINE

## 2021-07-22 PROCEDURE — 25010000002 LORAZEPAM PER 2 MG: Performed by: NURSE PRACTITIONER

## 2021-07-22 PROCEDURE — 97535 SELF CARE MNGMENT TRAINING: CPT

## 2021-07-22 PROCEDURE — 99233 SBSQ HOSP IP/OBS HIGH 50: CPT | Performed by: INTERNAL MEDICINE

## 2021-07-22 PROCEDURE — 82962 GLUCOSE BLOOD TEST: CPT

## 2021-07-22 PROCEDURE — 83735 ASSAY OF MAGNESIUM: CPT | Performed by: NURSE PRACTITIONER

## 2021-07-22 PROCEDURE — 36600 WITHDRAWAL OF ARTERIAL BLOOD: CPT

## 2021-07-22 PROCEDURE — 82375 ASSAY CARBOXYHB QUANT: CPT

## 2021-07-22 PROCEDURE — 94003 VENT MGMT INPAT SUBQ DAY: CPT

## 2021-07-22 PROCEDURE — 80048 BASIC METABOLIC PNL TOTAL CA: CPT | Performed by: NURSE PRACTITIONER

## 2021-07-22 PROCEDURE — 94002 VENT MGMT INPAT INIT DAY: CPT

## 2021-07-22 PROCEDURE — 97530 THERAPEUTIC ACTIVITIES: CPT

## 2021-07-22 PROCEDURE — 93005 ELECTROCARDIOGRAM TRACING: CPT | Performed by: INTERNAL MEDICINE

## 2021-07-22 PROCEDURE — 25010000002 LINEZOLID 600 MG/300ML SOLUTION: Performed by: INTERNAL MEDICINE

## 2021-07-22 PROCEDURE — 99232 SBSQ HOSP IP/OBS MODERATE 35: CPT | Performed by: INTERNAL MEDICINE

## 2021-07-22 PROCEDURE — 25010000002 HYDROMORPHONE 1 MG/ML SOLUTION: Performed by: INTERNAL MEDICINE

## 2021-07-22 PROCEDURE — 94799 UNLISTED PULMONARY SVC/PX: CPT

## 2021-07-22 PROCEDURE — 84100 ASSAY OF PHOSPHORUS: CPT | Performed by: NURSE PRACTITIONER

## 2021-07-22 PROCEDURE — 85610 PROTHROMBIN TIME: CPT | Performed by: NURSE PRACTITIONER

## 2021-07-22 PROCEDURE — 82805 BLOOD GASES W/O2 SATURATION: CPT

## 2021-07-22 PROCEDURE — 25010000002 AMIODARONE IN DEXTROSE 5% 360-4.14 MG/200ML-% SOLUTION: Performed by: INTERNAL MEDICINE

## 2021-07-22 PROCEDURE — 83050 HGB METHEMOGLOBIN QUAN: CPT

## 2021-07-22 RX ORDER — AMINO ACIDS/PROTEIN HYDROLYS 15G-100/30
30 LIQUID (ML) ORAL 4 TIMES DAILY
Status: DISCONTINUED | OUTPATIENT
Start: 2021-07-22 | End: 2021-07-27

## 2021-07-22 RX ORDER — METOPROLOL TARTRATE 5 MG/5ML
2.5 INJECTION INTRAVENOUS ONCE
Status: COMPLETED | OUTPATIENT
Start: 2021-07-22 | End: 2021-07-22

## 2021-07-22 RX ADMIN — Medication 30 ML: at 20:02

## 2021-07-22 RX ADMIN — IPRATROPIUM BROMIDE AND ALBUTEROL SULFATE 3 ML: 2.5; .5 SOLUTION RESPIRATORY (INHALATION) at 12:02

## 2021-07-22 RX ADMIN — LINEZOLID 600 MG: 600 INJECTION, SOLUTION INTRAVENOUS at 13:33

## 2021-07-22 RX ADMIN — CHLORHEXIDINE GLUCONATE 15 ML: 1.2 SOLUTION ORAL at 08:04

## 2021-07-22 RX ADMIN — ASPIRIN 81 MG CHEWABLE TABLET 81 MG: 81 TABLET CHEWABLE at 08:04

## 2021-07-22 RX ADMIN — SODIUM CHLORIDE, PRESERVATIVE FREE 10 ML: 5 INJECTION INTRAVENOUS at 20:04

## 2021-07-22 RX ADMIN — HYDROMORPHONE HYDROCHLORIDE 1 MG: 1 INJECTION, SOLUTION INTRAMUSCULAR; INTRAVENOUS; SUBCUTANEOUS at 15:57

## 2021-07-22 RX ADMIN — IPRATROPIUM BROMIDE AND ALBUTEROL SULFATE 3 ML: 2.5; .5 SOLUTION RESPIRATORY (INHALATION) at 15:20

## 2021-07-22 RX ADMIN — Medication 15 MG/HR: at 10:12

## 2021-07-22 RX ADMIN — AMIODARONE HYDROCHLORIDE 0.5 MG/MIN: 1.8 INJECTION, SOLUTION INTRAVENOUS at 06:03

## 2021-07-22 RX ADMIN — LORAZEPAM 2 MG: 2 INJECTION INTRAMUSCULAR; INTRAVENOUS at 07:52

## 2021-07-22 RX ADMIN — LORAZEPAM 2 MG: 2 INJECTION INTRAMUSCULAR; INTRAVENOUS at 11:57

## 2021-07-22 RX ADMIN — MUPIROCIN: 20 OINTMENT TOPICAL at 21:33

## 2021-07-22 RX ADMIN — METOPROLOL TARTRATE 2.5 MG: 5 INJECTION INTRAVENOUS at 22:07

## 2021-07-22 RX ADMIN — LANSOPRAZOLE 30 MG: KIT at 06:03

## 2021-07-22 RX ADMIN — MUPIROCIN: 20 OINTMENT TOPICAL at 08:09

## 2021-07-22 RX ADMIN — HYDROMORPHONE HYDROCHLORIDE 1 MG: 1 INJECTION, SOLUTION INTRAMUSCULAR; INTRAVENOUS; SUBCUTANEOUS at 01:00

## 2021-07-22 RX ADMIN — LORAZEPAM 2 MG: 2 INJECTION INTRAMUSCULAR; INTRAVENOUS at 03:48

## 2021-07-22 RX ADMIN — LINEZOLID 600 MG: 600 INJECTION, SOLUTION INTRAVENOUS at 01:01

## 2021-07-22 RX ADMIN — IPRATROPIUM BROMIDE AND ALBUTEROL SULFATE 3 ML: 2.5; .5 SOLUTION RESPIRATORY (INHALATION) at 07:36

## 2021-07-22 RX ADMIN — HYDROMORPHONE HYDROCHLORIDE 1 MG: 1 INJECTION, SOLUTION INTRAMUSCULAR; INTRAVENOUS; SUBCUTANEOUS at 21:16

## 2021-07-22 RX ADMIN — IPRATROPIUM BROMIDE AND ALBUTEROL SULFATE 3 ML: 2.5; .5 SOLUTION RESPIRATORY (INHALATION) at 19:57

## 2021-07-22 RX ADMIN — TEMAZEPAM 30 MG: 15 CAPSULE ORAL at 21:16

## 2021-07-22 RX ADMIN — HYDROMORPHONE HYDROCHLORIDE 1 MG: 1 INJECTION, SOLUTION INTRAMUSCULAR; INTRAVENOUS; SUBCUTANEOUS at 05:59

## 2021-07-22 RX ADMIN — LORAZEPAM 2 MG: 2 INJECTION INTRAMUSCULAR; INTRAVENOUS at 15:57

## 2021-07-22 RX ADMIN — Medication 15 MG/HR: at 17:30

## 2021-07-22 RX ADMIN — AMIODARONE HYDROCHLORIDE 0.5 MG/MIN: 1.8 INJECTION, SOLUTION INTRAVENOUS at 17:31

## 2021-07-22 RX ADMIN — SODIUM CHLORIDE, PRESERVATIVE FREE 10 ML: 5 INJECTION INTRAVENOUS at 08:04

## 2021-07-22 RX ADMIN — LORAZEPAM 2 MG: 2 INJECTION INTRAMUSCULAR; INTRAVENOUS at 20:02

## 2021-07-22 RX ADMIN — Medication 10 MG: at 20:02

## 2021-07-22 RX ADMIN — ATORVASTATIN CALCIUM 40 MG: 40 TABLET, FILM COATED ORAL at 20:02

## 2021-07-22 RX ADMIN — Medication 30 ML: at 10:03

## 2021-07-22 RX ADMIN — HYDROMORPHONE HYDROCHLORIDE 1 MG: 1 INJECTION, SOLUTION INTRAMUSCULAR; INTRAVENOUS; SUBCUTANEOUS at 10:03

## 2021-07-22 RX ADMIN — Medication 30 ML: at 15:47

## 2021-07-22 RX ADMIN — CHLORHEXIDINE GLUCONATE 15 ML: 1.2 SOLUTION ORAL at 20:02

## 2021-07-22 RX ADMIN — Medication 15 ML: at 08:04

## 2021-07-22 RX ADMIN — MUPIROCIN: 20 OINTMENT TOPICAL at 13:33

## 2021-07-22 RX ADMIN — WARFARIN SODIUM 10 MG: 5 TABLET ORAL at 17:31

## 2021-07-22 RX ADMIN — RISPERIDONE 0.5 MG: 1 SOLUTION ORAL at 20:04

## 2021-07-23 LAB
ANION GAP SERPL CALCULATED.3IONS-SCNC: 10 MMOL/L (ref 5–15)
BUN SERPL-MCNC: 27 MG/DL (ref 6–20)
BUN/CREAT SERPL: 27.8 (ref 7–25)
CALCIUM SPEC-SCNC: 9.8 MG/DL (ref 8.6–10.5)
CHLORIDE SERPL-SCNC: 101 MMOL/L (ref 98–107)
CO2 SERPL-SCNC: 26 MMOL/L (ref 22–29)
CREAT SERPL-MCNC: 0.97 MG/DL (ref 0.76–1.27)
GFR SERPL CREATININE-BSD FRML MDRD: 80 ML/MIN/1.73
GLUCOSE BLDC GLUCOMTR-MCNC: 115 MG/DL (ref 70–130)
GLUCOSE BLDC GLUCOMTR-MCNC: 125 MG/DL (ref 70–130)
GLUCOSE BLDC GLUCOMTR-MCNC: 133 MG/DL (ref 70–130)
GLUCOSE BLDC GLUCOMTR-MCNC: 135 MG/DL (ref 70–130)
GLUCOSE SERPL-MCNC: 191 MG/DL (ref 65–99)
INR PPP: 2.67 (ref 0.85–1.16)
MAGNESIUM SERPL-MCNC: 2 MG/DL (ref 1.6–2.6)
PHOSPHATE SERPL-MCNC: 3 MG/DL (ref 2.5–4.5)
POTASSIUM SERPL-SCNC: 4.6 MMOL/L (ref 3.5–5.2)
PROTHROMBIN TIME: 27.3 SECONDS (ref 11.4–14.4)
SODIUM SERPL-SCNC: 137 MMOL/L (ref 136–145)

## 2021-07-23 PROCEDURE — 99233 SBSQ HOSP IP/OBS HIGH 50: CPT | Performed by: INTERNAL MEDICINE

## 2021-07-23 PROCEDURE — 97530 THERAPEUTIC ACTIVITIES: CPT

## 2021-07-23 PROCEDURE — 25010000002 LINEZOLID 600 MG/300ML SOLUTION: Performed by: INTERNAL MEDICINE

## 2021-07-23 PROCEDURE — 94799 UNLISTED PULMONARY SVC/PX: CPT

## 2021-07-23 PROCEDURE — 97110 THERAPEUTIC EXERCISES: CPT

## 2021-07-23 PROCEDURE — 85610 PROTHROMBIN TIME: CPT | Performed by: NURSE PRACTITIONER

## 2021-07-23 PROCEDURE — 25010000002 HYDROMORPHONE 1 MG/ML SOLUTION: Performed by: INTERNAL MEDICINE

## 2021-07-23 PROCEDURE — 84100 ASSAY OF PHOSPHORUS: CPT | Performed by: NURSE PRACTITIONER

## 2021-07-23 PROCEDURE — 25010000002 AMIODARONE IN DEXTROSE 5% 360-4.14 MG/200ML-% SOLUTION: Performed by: INTERNAL MEDICINE

## 2021-07-23 PROCEDURE — 25010000002 LORAZEPAM PER 2 MG: Performed by: NURSE PRACTITIONER

## 2021-07-23 PROCEDURE — 82962 GLUCOSE BLOOD TEST: CPT

## 2021-07-23 PROCEDURE — 80048 BASIC METABOLIC PNL TOTAL CA: CPT | Performed by: NURSE PRACTITIONER

## 2021-07-23 PROCEDURE — 25010000002 LORAZEPAM PER 2 MG: Performed by: INTERNAL MEDICINE

## 2021-07-23 PROCEDURE — 83735 ASSAY OF MAGNESIUM: CPT | Performed by: NURSE PRACTITIONER

## 2021-07-23 PROCEDURE — 94003 VENT MGMT INPAT SUBQ DAY: CPT

## 2021-07-23 RX ORDER — RISPERIDONE 1 MG/ML
0.5 SOLUTION ORAL EVERY 12 HOURS SCHEDULED
Status: DISCONTINUED | OUTPATIENT
Start: 2021-07-23 | End: 2021-07-26

## 2021-07-23 RX ADMIN — LANSOPRAZOLE 30 MG: KIT at 08:05

## 2021-07-23 RX ADMIN — LINEZOLID 600 MG: 600 INJECTION, SOLUTION INTRAVENOUS at 13:24

## 2021-07-23 RX ADMIN — LORAZEPAM 2 MG: 2 INJECTION INTRAMUSCULAR; INTRAVENOUS at 05:05

## 2021-07-23 RX ADMIN — Medication 30 ML: at 20:31

## 2021-07-23 RX ADMIN — ATORVASTATIN CALCIUM 40 MG: 40 TABLET, FILM COATED ORAL at 20:11

## 2021-07-23 RX ADMIN — Medication 30 ML: at 17:35

## 2021-07-23 RX ADMIN — LINEZOLID 600 MG: 600 INJECTION, SOLUTION INTRAVENOUS at 02:03

## 2021-07-23 RX ADMIN — Medication 30 ML: at 13:25

## 2021-07-23 RX ADMIN — RISPERIDONE 0.5 MG: 1 SOLUTION ORAL at 08:33

## 2021-07-23 RX ADMIN — SODIUM CHLORIDE, PRESERVATIVE FREE 10 ML: 5 INJECTION INTRAVENOUS at 20:11

## 2021-07-23 RX ADMIN — IPRATROPIUM BROMIDE AND ALBUTEROL SULFATE 3 ML: 2.5; .5 SOLUTION RESPIRATORY (INHALATION) at 12:34

## 2021-07-23 RX ADMIN — LORAZEPAM 2 MG: 2 INJECTION INTRAMUSCULAR; INTRAVENOUS at 16:24

## 2021-07-23 RX ADMIN — Medication 30 ML: at 08:05

## 2021-07-23 RX ADMIN — MUPIROCIN: 20 OINTMENT TOPICAL at 05:11

## 2021-07-23 RX ADMIN — ASPIRIN 81 MG CHEWABLE TABLET 81 MG: 81 TABLET CHEWABLE at 08:05

## 2021-07-23 RX ADMIN — AMIODARONE HYDROCHLORIDE 0.5 MG/MIN: 1.8 INJECTION, SOLUTION INTRAVENOUS at 05:48

## 2021-07-23 RX ADMIN — LORAZEPAM 2 MG: 2 INJECTION INTRAMUSCULAR; INTRAVENOUS at 20:10

## 2021-07-23 RX ADMIN — IPRATROPIUM BROMIDE AND ALBUTEROL SULFATE 3 ML: 2.5; .5 SOLUTION RESPIRATORY (INHALATION) at 16:51

## 2021-07-23 RX ADMIN — MUPIROCIN: 20 OINTMENT TOPICAL at 22:01

## 2021-07-23 RX ADMIN — HYDROMORPHONE HYDROCHLORIDE 1 MG: 1 INJECTION, SOLUTION INTRAMUSCULAR; INTRAVENOUS; SUBCUTANEOUS at 15:12

## 2021-07-23 RX ADMIN — ACETAMINOPHEN ORAL SOLUTION 649.6 MG: 650 SOLUTION ORAL at 22:10

## 2021-07-23 RX ADMIN — CHLORHEXIDINE GLUCONATE 15 ML: 1.2 SOLUTION ORAL at 20:10

## 2021-07-23 RX ADMIN — LORAZEPAM 2 MG: 2 INJECTION INTRAMUSCULAR; INTRAVENOUS at 08:42

## 2021-07-23 RX ADMIN — IPRATROPIUM BROMIDE AND ALBUTEROL SULFATE 3 ML: 2.5; .5 SOLUTION RESPIRATORY (INHALATION) at 07:40

## 2021-07-23 RX ADMIN — CHLORHEXIDINE GLUCONATE 15 ML: 1.2 SOLUTION ORAL at 08:05

## 2021-07-23 RX ADMIN — LORAZEPAM 2 MG: 2 INJECTION INTRAMUSCULAR; INTRAVENOUS at 00:06

## 2021-07-23 RX ADMIN — Medication 15 ML: at 08:05

## 2021-07-23 RX ADMIN — Medication 10 MG: at 20:11

## 2021-07-23 RX ADMIN — Medication 15 MG/HR: at 02:02

## 2021-07-23 RX ADMIN — Medication 5 MG/HR: at 21:59

## 2021-07-23 RX ADMIN — RISPERIDONE 0.5 MG: 1 SOLUTION ORAL at 20:11

## 2021-07-23 RX ADMIN — MUPIROCIN: 20 OINTMENT TOPICAL at 13:24

## 2021-07-23 RX ADMIN — WARFARIN SODIUM 10 MG: 5 TABLET ORAL at 17:35

## 2021-07-24 LAB
ANION GAP SERPL CALCULATED.3IONS-SCNC: 7 MMOL/L (ref 5–15)
ARTERIAL PATENCY WRIST A: ABNORMAL
ATMOSPHERIC PRESS: ABNORMAL MM[HG]
BASE EXCESS BLDA CALC-SCNC: 6.8 MMOL/L (ref 0–2)
BDY SITE: ABNORMAL
BODY TEMPERATURE: 37 C
BUN SERPL-MCNC: 27 MG/DL (ref 6–20)
BUN/CREAT SERPL: 29 (ref 7–25)
CALCIUM SPEC-SCNC: 9.6 MG/DL (ref 8.6–10.5)
CHLORIDE SERPL-SCNC: 100 MMOL/L (ref 98–107)
CO2 BLDA-SCNC: 32.6 MMOL/L (ref 22–33)
CO2 SERPL-SCNC: 29 MMOL/L (ref 22–29)
COHGB MFR BLD: 0.7 % (ref 0–2)
CREAT SERPL-MCNC: 0.93 MG/DL (ref 0.76–1.27)
EPAP: 0
GFR SERPL CREATININE-BSD FRML MDRD: 84 ML/MIN/1.73
GLUCOSE BLDC GLUCOMTR-MCNC: 130 MG/DL (ref 70–130)
GLUCOSE BLDC GLUCOMTR-MCNC: 130 MG/DL (ref 70–130)
GLUCOSE BLDC GLUCOMTR-MCNC: 131 MG/DL (ref 70–130)
GLUCOSE BLDC GLUCOMTR-MCNC: 139 MG/DL (ref 70–130)
GLUCOSE SERPL-MCNC: 155 MG/DL (ref 65–99)
HCO3 BLDA-SCNC: 31.3 MMOL/L (ref 20–26)
HCT VFR BLD CALC: 33.7 %
HGB BLDA-MCNC: 11 G/DL (ref 13.5–17.5)
INHALED O2 CONCENTRATION: 35 %
INR PPP: 2.87 (ref 0.85–1.16)
IPAP: 0
MAGNESIUM SERPL-MCNC: 2.1 MG/DL (ref 1.6–2.6)
METHGB BLD QL: 0.6 % (ref 0–1.5)
MODALITY: ABNORMAL
NOTE: ABNORMAL
OXYHGB MFR BLDV: 94.2 % (ref 94–99)
PAW @ PEAK INSP FLOW SETTING VENT: 0 CMH2O
PCO2 BLDA: 43.5 MM HG (ref 35–45)
PCO2 TEMP ADJ BLD: 43.5 MM HG (ref 35–48)
PH BLDA: 7.46 PH UNITS (ref 7.35–7.45)
PH, TEMP CORRECTED: 7.46 PH UNITS
PHOSPHATE SERPL-MCNC: 3.5 MG/DL (ref 2.5–4.5)
PO2 BLDA: 75.5 MM HG (ref 83–108)
PO2 TEMP ADJ BLD: 75.5 MM HG (ref 83–108)
POTASSIUM SERPL-SCNC: 4.5 MMOL/L (ref 3.5–5.2)
PROTHROMBIN TIME: 28.8 SECONDS (ref 11.4–14.4)
SODIUM SERPL-SCNC: 136 MMOL/L (ref 136–145)
TOTAL RATE: 0 BREATHS/MINUTE

## 2021-07-24 PROCEDURE — 82375 ASSAY CARBOXYHB QUANT: CPT

## 2021-07-24 PROCEDURE — 82962 GLUCOSE BLOOD TEST: CPT

## 2021-07-24 PROCEDURE — 25010000002 LORAZEPAM PER 2 MG: Performed by: INTERNAL MEDICINE

## 2021-07-24 PROCEDURE — 82805 BLOOD GASES W/O2 SATURATION: CPT

## 2021-07-24 PROCEDURE — 84100 ASSAY OF PHOSPHORUS: CPT | Performed by: NURSE PRACTITIONER

## 2021-07-24 PROCEDURE — 99233 SBSQ HOSP IP/OBS HIGH 50: CPT | Performed by: INTERNAL MEDICINE

## 2021-07-24 PROCEDURE — 25010000002 LINEZOLID 600 MG/300ML SOLUTION: Performed by: INTERNAL MEDICINE

## 2021-07-24 PROCEDURE — 94799 UNLISTED PULMONARY SVC/PX: CPT

## 2021-07-24 PROCEDURE — 25010000002 HYDROMORPHONE 1 MG/ML SOLUTION: Performed by: INTERNAL MEDICINE

## 2021-07-24 PROCEDURE — 36600 WITHDRAWAL OF ARTERIAL BLOOD: CPT

## 2021-07-24 PROCEDURE — 25010000002 AMIODARONE IN DEXTROSE 5% 360-4.14 MG/200ML-% SOLUTION: Performed by: INTERNAL MEDICINE

## 2021-07-24 PROCEDURE — 85610 PROTHROMBIN TIME: CPT | Performed by: NURSE PRACTITIONER

## 2021-07-24 PROCEDURE — 83050 HGB METHEMOGLOBIN QUAN: CPT

## 2021-07-24 PROCEDURE — 83735 ASSAY OF MAGNESIUM: CPT | Performed by: NURSE PRACTITIONER

## 2021-07-24 PROCEDURE — 80048 BASIC METABOLIC PNL TOTAL CA: CPT | Performed by: NURSE PRACTITIONER

## 2021-07-24 RX ADMIN — CHLORHEXIDINE GLUCONATE 15 ML: 1.2 SOLUTION ORAL at 20:17

## 2021-07-24 RX ADMIN — AMIODARONE HYDROCHLORIDE 0.5 MG/MIN: 1.8 INJECTION, SOLUTION INTRAVENOUS at 14:01

## 2021-07-24 RX ADMIN — Medication 30 ML: at 08:21

## 2021-07-24 RX ADMIN — LORAZEPAM 2 MG: 2 INJECTION INTRAMUSCULAR; INTRAVENOUS at 10:36

## 2021-07-24 RX ADMIN — SODIUM CHLORIDE, PRESERVATIVE FREE 10 ML: 5 INJECTION INTRAVENOUS at 20:18

## 2021-07-24 RX ADMIN — AMIODARONE HYDROCHLORIDE 0.5 MG/MIN: 1.8 INJECTION, SOLUTION INTRAVENOUS at 04:38

## 2021-07-24 RX ADMIN — IPRATROPIUM BROMIDE AND ALBUTEROL SULFATE 3 ML: 2.5; .5 SOLUTION RESPIRATORY (INHALATION) at 20:54

## 2021-07-24 RX ADMIN — LANSOPRAZOLE 30 MG: KIT at 06:23

## 2021-07-24 RX ADMIN — IPRATROPIUM BROMIDE AND ALBUTEROL SULFATE 3 ML: 2.5; .5 SOLUTION RESPIRATORY (INHALATION) at 17:02

## 2021-07-24 RX ADMIN — ATORVASTATIN CALCIUM 40 MG: 40 TABLET, FILM COATED ORAL at 20:17

## 2021-07-24 RX ADMIN — Medication 10 MG: at 20:17

## 2021-07-24 RX ADMIN — ACETAMINOPHEN ORAL SOLUTION 649.6 MG: 650 SOLUTION ORAL at 22:31

## 2021-07-24 RX ADMIN — HYDROMORPHONE HYDROCHLORIDE 1 MG: 1 INJECTION, SOLUTION INTRAMUSCULAR; INTRAVENOUS; SUBCUTANEOUS at 22:53

## 2021-07-24 RX ADMIN — LINEZOLID 600 MG: 600 INJECTION, SOLUTION INTRAVENOUS at 14:01

## 2021-07-24 RX ADMIN — HYDROMORPHONE HYDROCHLORIDE 1 MG: 1 INJECTION, SOLUTION INTRAMUSCULAR; INTRAVENOUS; SUBCUTANEOUS at 19:10

## 2021-07-24 RX ADMIN — LINEZOLID 600 MG: 600 INJECTION, SOLUTION INTRAVENOUS at 02:16

## 2021-07-24 RX ADMIN — CHLORHEXIDINE GLUCONATE 15 ML: 1.2 SOLUTION ORAL at 08:20

## 2021-07-24 RX ADMIN — TEMAZEPAM 30 MG: 15 CAPSULE ORAL at 22:54

## 2021-07-24 RX ADMIN — Medication 30 ML: at 20:18

## 2021-07-24 RX ADMIN — Medication 15 MG/HR: at 17:53

## 2021-07-24 RX ADMIN — Medication 30 ML: at 17:53

## 2021-07-24 RX ADMIN — Medication 30 ML: at 12:15

## 2021-07-24 RX ADMIN — HYDROMORPHONE HYDROCHLORIDE 1 MG: 1 INJECTION, SOLUTION INTRAMUSCULAR; INTRAVENOUS; SUBCUTANEOUS at 14:09

## 2021-07-24 RX ADMIN — IPRATROPIUM BROMIDE AND ALBUTEROL SULFATE 3 ML: 2.5; .5 SOLUTION RESPIRATORY (INHALATION) at 13:17

## 2021-07-24 RX ADMIN — RISPERIDONE 0.5 MG: 1 SOLUTION ORAL at 20:17

## 2021-07-24 RX ADMIN — RISPERIDONE 0.5 MG: 1 SOLUTION ORAL at 08:20

## 2021-07-24 RX ADMIN — IPRATROPIUM BROMIDE AND ALBUTEROL SULFATE 3 ML: 2.5; .5 SOLUTION RESPIRATORY (INHALATION) at 09:17

## 2021-07-24 RX ADMIN — WARFARIN SODIUM 10 MG: 5 TABLET ORAL at 17:52

## 2021-07-24 RX ADMIN — ASPIRIN 81 MG CHEWABLE TABLET 81 MG: 81 TABLET CHEWABLE at 08:20

## 2021-07-25 LAB
GLUCOSE BLDC GLUCOMTR-MCNC: 115 MG/DL (ref 70–130)
GLUCOSE BLDC GLUCOMTR-MCNC: 130 MG/DL (ref 70–130)
GLUCOSE BLDC GLUCOMTR-MCNC: 135 MG/DL (ref 70–130)
GLUCOSE BLDC GLUCOMTR-MCNC: 137 MG/DL (ref 70–130)
INR PPP: 3.05 (ref 0.85–1.16)
PROTHROMBIN TIME: 30.2 SECONDS (ref 11.4–14.4)

## 2021-07-25 PROCEDURE — 85610 PROTHROMBIN TIME: CPT | Performed by: NURSE PRACTITIONER

## 2021-07-25 PROCEDURE — 82962 GLUCOSE BLOOD TEST: CPT

## 2021-07-25 PROCEDURE — 25010000002 HYDROMORPHONE 1 MG/ML SOLUTION: Performed by: INTERNAL MEDICINE

## 2021-07-25 PROCEDURE — 99233 SBSQ HOSP IP/OBS HIGH 50: CPT | Performed by: INTERNAL MEDICINE

## 2021-07-25 PROCEDURE — 25010000002 LINEZOLID 600 MG/300ML SOLUTION: Performed by: INTERNAL MEDICINE

## 2021-07-25 PROCEDURE — 94799 UNLISTED PULMONARY SVC/PX: CPT

## 2021-07-25 PROCEDURE — 25010000002 AMIODARONE IN DEXTROSE 5% 360-4.14 MG/200ML-% SOLUTION: Performed by: INTERNAL MEDICINE

## 2021-07-25 RX ORDER — IPRATROPIUM BROMIDE AND ALBUTEROL SULFATE 2.5; .5 MG/3ML; MG/3ML
3 SOLUTION RESPIRATORY (INHALATION) EVERY 4 HOURS PRN
Status: DISCONTINUED | OUTPATIENT
Start: 2021-07-25 | End: 2021-07-28

## 2021-07-25 RX ORDER — HYDROCODONE BITARTRATE AND ACETAMINOPHEN 5; 325 MG/1; MG/1
1 TABLET ORAL EVERY 6 HOURS PRN
Status: DISCONTINUED | OUTPATIENT
Start: 2021-07-25 | End: 2021-07-27

## 2021-07-25 RX ADMIN — TEMAZEPAM 30 MG: 15 CAPSULE ORAL at 19:57

## 2021-07-25 RX ADMIN — ASPIRIN 81 MG CHEWABLE TABLET 81 MG: 81 TABLET CHEWABLE at 08:45

## 2021-07-25 RX ADMIN — LINEZOLID 600 MG: 600 INJECTION, SOLUTION INTRAVENOUS at 13:15

## 2021-07-25 RX ADMIN — HYDROMORPHONE HYDROCHLORIDE 1 MG: 1 INJECTION, SOLUTION INTRAMUSCULAR; INTRAVENOUS; SUBCUTANEOUS at 17:19

## 2021-07-25 RX ADMIN — HYDROMORPHONE HYDROCHLORIDE 1 MG: 1 INJECTION, SOLUTION INTRAMUSCULAR; INTRAVENOUS; SUBCUTANEOUS at 13:15

## 2021-07-25 RX ADMIN — ACETAMINOPHEN ORAL SOLUTION 649.6 MG: 650 SOLUTION ORAL at 17:20

## 2021-07-25 RX ADMIN — CHLORHEXIDINE GLUCONATE 15 ML: 1.2 SOLUTION ORAL at 08:45

## 2021-07-25 RX ADMIN — Medication 30 ML: at 20:04

## 2021-07-25 RX ADMIN — HYDROCODONE BITARTRATE AND ACETAMINOPHEN 1 TABLET: 5; 325 TABLET ORAL at 21:32

## 2021-07-25 RX ADMIN — AMIODARONE HYDROCHLORIDE 0.5 MG/MIN: 1.8 INJECTION, SOLUTION INTRAVENOUS at 13:15

## 2021-07-25 RX ADMIN — LANSOPRAZOLE 30 MG: KIT at 06:01

## 2021-07-25 RX ADMIN — Medication 30 ML: at 08:46

## 2021-07-25 RX ADMIN — WARFARIN SODIUM 10 MG: 5 TABLET ORAL at 17:19

## 2021-07-25 RX ADMIN — HYDROMORPHONE HYDROCHLORIDE 1 MG: 1 INJECTION, SOLUTION INTRAMUSCULAR; INTRAVENOUS; SUBCUTANEOUS at 03:51

## 2021-07-25 RX ADMIN — Medication 10 MG/HR: at 15:33

## 2021-07-25 RX ADMIN — ACETAMINOPHEN ORAL SOLUTION 649.6 MG: 650 SOLUTION ORAL at 06:51

## 2021-07-25 RX ADMIN — SODIUM CHLORIDE, PRESERVATIVE FREE 10 ML: 5 INJECTION INTRAVENOUS at 20:04

## 2021-07-25 RX ADMIN — IPRATROPIUM BROMIDE AND ALBUTEROL SULFATE 3 ML: 2.5; .5 SOLUTION RESPIRATORY (INHALATION) at 12:03

## 2021-07-25 RX ADMIN — RISPERIDONE 0.5 MG: 1 SOLUTION ORAL at 08:45

## 2021-07-25 RX ADMIN — HYDROMORPHONE HYDROCHLORIDE 1 MG: 1 INJECTION, SOLUTION INTRAMUSCULAR; INTRAVENOUS; SUBCUTANEOUS at 23:21

## 2021-07-25 RX ADMIN — Medication 10 MG: at 20:03

## 2021-07-25 RX ADMIN — HYDROMORPHONE HYDROCHLORIDE 1 MG: 1 INJECTION, SOLUTION INTRAMUSCULAR; INTRAVENOUS; SUBCUTANEOUS at 08:45

## 2021-07-25 RX ADMIN — RISPERIDONE 0.5 MG: 1 SOLUTION ORAL at 20:03

## 2021-07-25 RX ADMIN — AMIODARONE HYDROCHLORIDE 0.5 MG/MIN: 1.8 INJECTION, SOLUTION INTRAVENOUS at 02:01

## 2021-07-25 RX ADMIN — HYDROCODONE BITARTRATE AND ACETAMINOPHEN 1 TABLET: 5; 325 TABLET ORAL at 15:37

## 2021-07-25 RX ADMIN — Medication 30 ML: at 11:31

## 2021-07-25 RX ADMIN — ACETAMINOPHEN ORAL SOLUTION 649.6 MG: 650 SOLUTION ORAL at 11:31

## 2021-07-25 RX ADMIN — ATORVASTATIN CALCIUM 40 MG: 40 TABLET, FILM COATED ORAL at 20:03

## 2021-07-25 RX ADMIN — CHLORHEXIDINE GLUCONATE 15 ML: 1.2 SOLUTION ORAL at 20:03

## 2021-07-25 RX ADMIN — LINEZOLID 600 MG: 600 INJECTION, SOLUTION INTRAVENOUS at 02:01

## 2021-07-25 RX ADMIN — SODIUM CHLORIDE, PRESERVATIVE FREE 10 ML: 5 INJECTION INTRAVENOUS at 08:45

## 2021-07-26 ENCOUNTER — APPOINTMENT (OUTPATIENT)
Dept: CARDIOLOGY | Facility: HOSPITAL | Age: 56
End: 2021-07-26

## 2021-07-26 LAB
ALBUMIN SERPL-MCNC: 3.4 G/DL (ref 3.5–5.2)
ALP SERPL-CCNC: 157 U/L (ref 39–117)
ALT SERPL W P-5'-P-CCNC: 33 U/L (ref 1–41)
ANION GAP SERPL CALCULATED.3IONS-SCNC: 8 MMOL/L (ref 5–15)
AST SERPL-CCNC: 21 U/L (ref 1–40)
BH CV ECHO MEAS - BSA(HAYCOCK): 2.9 M^2
BH CV ECHO MEAS - BSA: 2.8 M^2
BH CV ECHO MEAS - BZI_BMI: 38.7 KILOGRAMS/M^2
BH CV ECHO MEAS - BZI_METRIC_HEIGHT: 195.6 CM
BH CV ECHO MEAS - BZI_METRIC_WEIGHT: 147.9 KG
BH CV ECHO MEAS - EF_3D-VOL: 33 %
BH CV ECHO MEAS - IVSD: 1.3 CM
BH CV ECHO MEAS - LVIDD: 5.6 CM
BH CV ECHO MEAS - LVIDS: 4.6 CM
BH CV ECHO MEAS - LVPWD: 1.3 CM
BILIRUB SERPL-MCNC: 0.4 MG/DL (ref 0–1.2)
BUN SERPL-MCNC: 29 MG/DL (ref 6–20)
CALCIUM SPEC-SCNC: 9.4 MG/DL (ref 8.6–10.5)
CHLORIDE SERPL-SCNC: 95 MMOL/L (ref 98–107)
CHOLEST SERPL-MCNC: 96 MG/DL (ref 0–200)
CO2 SERPL-SCNC: 29 MMOL/L (ref 22–29)
CREAT SERPL-MCNC: 0.89 MG/DL (ref 0.76–1.27)
CRP SERPL-MCNC: 0.83 MG/DL (ref 0–0.5)
GLUCOSE BLDC GLUCOMTR-MCNC: 119 MG/DL (ref 70–130)
GLUCOSE BLDC GLUCOMTR-MCNC: 124 MG/DL (ref 70–130)
GLUCOSE BLDC GLUCOMTR-MCNC: 124 MG/DL (ref 70–130)
GLUCOSE BLDC GLUCOMTR-MCNC: 127 MG/DL (ref 70–130)
GLUCOSE SERPL-MCNC: 122 MG/DL (ref 65–99)
INR PPP: 3 (ref 0.85–1.16)
LV EF 2D ECHO EST: 30 %
MAGNESIUM SERPL-MCNC: 2.1 MG/DL (ref 1.6–2.6)
PHOSPHATE SERPL-MCNC: 3.3 MG/DL (ref 2.5–4.5)
POTASSIUM SERPL-SCNC: 4.7 MMOL/L (ref 3.5–5.2)
PREALB SERPL-MCNC: 29.9 MG/DL (ref 20–40)
PROT SERPL-MCNC: 7.2 G/DL (ref 6–8.5)
PROTHROMBIN TIME: 29.8 SECONDS (ref 11.4–14.4)
SODIUM SERPL-SCNC: 132 MMOL/L (ref 136–145)
TRIGL SERPL-MCNC: 98 MG/DL (ref 0–150)

## 2021-07-26 PROCEDURE — 97116 GAIT TRAINING THERAPY: CPT

## 2021-07-26 PROCEDURE — 25010000002 AMIODARONE IN DEXTROSE 5% 360-4.14 MG/200ML-% SOLUTION: Performed by: INTERNAL MEDICINE

## 2021-07-26 PROCEDURE — 97530 THERAPEUTIC ACTIVITIES: CPT

## 2021-07-26 PROCEDURE — 86140 C-REACTIVE PROTEIN: CPT | Performed by: NURSE PRACTITIONER

## 2021-07-26 PROCEDURE — 84100 ASSAY OF PHOSPHORUS: CPT | Performed by: NURSE PRACTITIONER

## 2021-07-26 PROCEDURE — 82962 GLUCOSE BLOOD TEST: CPT

## 2021-07-26 PROCEDURE — 93325 DOPPLER ECHO COLOR FLOW MAPG: CPT | Performed by: INTERNAL MEDICINE

## 2021-07-26 PROCEDURE — 93325 DOPPLER ECHO COLOR FLOW MAPG: CPT

## 2021-07-26 PROCEDURE — 82465 ASSAY BLD/SERUM CHOLESTEROL: CPT | Performed by: NURSE PRACTITIONER

## 2021-07-26 PROCEDURE — 97535 SELF CARE MNGMENT TRAINING: CPT

## 2021-07-26 PROCEDURE — 99232 SBSQ HOSP IP/OBS MODERATE 35: CPT | Performed by: INTERNAL MEDICINE

## 2021-07-26 PROCEDURE — 84478 ASSAY OF TRIGLYCERIDES: CPT | Performed by: NURSE PRACTITIONER

## 2021-07-26 PROCEDURE — 83735 ASSAY OF MAGNESIUM: CPT | Performed by: NURSE PRACTITIONER

## 2021-07-26 PROCEDURE — 93312 ECHO TRANSESOPHAGEAL: CPT | Performed by: INTERNAL MEDICINE

## 2021-07-26 PROCEDURE — 80053 COMPREHEN METABOLIC PANEL: CPT | Performed by: NURSE PRACTITIONER

## 2021-07-26 PROCEDURE — 25010000002 LINEZOLID 600 MG/300ML SOLUTION: Performed by: INTERNAL MEDICINE

## 2021-07-26 PROCEDURE — 84134 ASSAY OF PREALBUMIN: CPT | Performed by: NURSE PRACTITIONER

## 2021-07-26 PROCEDURE — 94799 UNLISTED PULMONARY SVC/PX: CPT

## 2021-07-26 PROCEDURE — 85610 PROTHROMBIN TIME: CPT | Performed by: NURSE PRACTITIONER

## 2021-07-26 PROCEDURE — 25010000002 HYDROMORPHONE 1 MG/ML SOLUTION: Performed by: INTERNAL MEDICINE

## 2021-07-26 PROCEDURE — 93312 ECHO TRANSESOPHAGEAL: CPT

## 2021-07-26 PROCEDURE — 93321 DOPPLER ECHO F-UP/LMTD STD: CPT

## 2021-07-26 PROCEDURE — 93320 DOPPLER ECHO COMPLETE: CPT | Performed by: INTERNAL MEDICINE

## 2021-07-26 RX ORDER — RISPERIDONE 1 MG/ML
0.5 SOLUTION ORAL NIGHTLY
Status: DISCONTINUED | OUTPATIENT
Start: 2021-07-26 | End: 2021-07-27

## 2021-07-26 RX ADMIN — TEMAZEPAM 30 MG: 15 CAPSULE ORAL at 20:28

## 2021-07-26 RX ADMIN — HYDROCODONE BITARTRATE AND ACETAMINOPHEN 1 TABLET: 5; 325 TABLET ORAL at 15:13

## 2021-07-26 RX ADMIN — HYDROCODONE BITARTRATE AND ACETAMINOPHEN 1 TABLET: 5; 325 TABLET ORAL at 20:29

## 2021-07-26 RX ADMIN — LINEZOLID 600 MG: 600 INJECTION, SOLUTION INTRAVENOUS at 02:07

## 2021-07-26 RX ADMIN — RISPERIDONE 0.5 MG: 1 SOLUTION ORAL at 20:28

## 2021-07-26 RX ADMIN — ACETAMINOPHEN ORAL SOLUTION 649.6 MG: 650 SOLUTION ORAL at 02:07

## 2021-07-26 RX ADMIN — RISPERIDONE 1 MG: 1 SOLUTION ORAL at 08:16

## 2021-07-26 RX ADMIN — SODIUM CHLORIDE, PRESERVATIVE FREE 10 ML: 5 INJECTION INTRAVENOUS at 20:29

## 2021-07-26 RX ADMIN — Medication 30 ML: at 15:13

## 2021-07-26 RX ADMIN — Medication 30 ML: at 20:29

## 2021-07-26 RX ADMIN — CHLORHEXIDINE GLUCONATE 15 ML: 1.2 SOLUTION ORAL at 08:16

## 2021-07-26 RX ADMIN — ACETAMINOPHEN ORAL SOLUTION 649.6 MG: 650 SOLUTION ORAL at 08:16

## 2021-07-26 RX ADMIN — CHLORHEXIDINE GLUCONATE 15 ML: 1.2 SOLUTION ORAL at 20:28

## 2021-07-26 RX ADMIN — ATORVASTATIN CALCIUM 40 MG: 40 TABLET, FILM COATED ORAL at 20:28

## 2021-07-26 RX ADMIN — LANSOPRAZOLE 30 MG: KIT at 05:35

## 2021-07-26 RX ADMIN — HYDROCODONE BITARTRATE AND ACETAMINOPHEN 1 TABLET: 5; 325 TABLET ORAL at 04:01

## 2021-07-26 RX ADMIN — ASPIRIN 81 MG CHEWABLE TABLET 81 MG: 81 TABLET CHEWABLE at 08:16

## 2021-07-26 RX ADMIN — AMIODARONE HYDROCHLORIDE 0.5 MG/MIN: 1.8 INJECTION, SOLUTION INTRAVENOUS at 02:07

## 2021-07-26 RX ADMIN — Medication 30 ML: at 08:30

## 2021-07-26 RX ADMIN — SODIUM CHLORIDE, PRESERVATIVE FREE 10 ML: 5 INJECTION INTRAVENOUS at 08:16

## 2021-07-26 RX ADMIN — LINEZOLID 600 MG: 600 INJECTION, SOLUTION INTRAVENOUS at 14:47

## 2021-07-26 RX ADMIN — HYDROMORPHONE HYDROCHLORIDE 1 MG: 1 INJECTION, SOLUTION INTRAMUSCULAR; INTRAVENOUS; SUBCUTANEOUS at 11:30

## 2021-07-26 RX ADMIN — AMIODARONE HYDROCHLORIDE 0.5 MG/MIN: 1.8 INJECTION, SOLUTION INTRAVENOUS at 23:53

## 2021-07-26 RX ADMIN — HYDROMORPHONE HYDROCHLORIDE 1 MG: 1 INJECTION, SOLUTION INTRAMUSCULAR; INTRAVENOUS; SUBCUTANEOUS at 05:35

## 2021-07-26 RX ADMIN — ACETAMINOPHEN ORAL SOLUTION 649.6 MG: 650 SOLUTION ORAL at 17:23

## 2021-07-26 RX ADMIN — AMIODARONE HYDROCHLORIDE 0.5 MG/MIN: 1.8 INJECTION, SOLUTION INTRAVENOUS at 11:30

## 2021-07-27 ENCOUNTER — ANCILLARY PROCEDURE (OUTPATIENT)
Dept: SPEECH THERAPY | Facility: HOSPITAL | Age: 56
End: 2021-07-27

## 2021-07-27 LAB
ANION GAP SERPL CALCULATED.3IONS-SCNC: 8 MMOL/L (ref 5–15)
BASOPHILS # BLD AUTO: 0.03 10*3/MM3 (ref 0–0.2)
BASOPHILS NFR BLD AUTO: 0.6 % (ref 0–1.5)
BUN SERPL-MCNC: 24 MG/DL (ref 6–20)
BUN/CREAT SERPL: 27 (ref 7–25)
CALCIUM SPEC-SCNC: 9.5 MG/DL (ref 8.6–10.5)
CHLORIDE SERPL-SCNC: 97 MMOL/L (ref 98–107)
CO2 SERPL-SCNC: 28 MMOL/L (ref 22–29)
CREAT SERPL-MCNC: 0.89 MG/DL (ref 0.76–1.27)
DEPRECATED RDW RBC AUTO: 50.9 FL (ref 37–54)
EOSINOPHIL # BLD AUTO: 0.23 10*3/MM3 (ref 0–0.4)
EOSINOPHIL NFR BLD AUTO: 4.5 % (ref 0.3–6.2)
ERYTHROCYTE [DISTWIDTH] IN BLOOD BY AUTOMATED COUNT: 15.6 % (ref 12.3–15.4)
GFR SERPL CREATININE-BSD FRML MDRD: 88 ML/MIN/1.73
GLUCOSE BLDC GLUCOMTR-MCNC: 112 MG/DL (ref 70–130)
GLUCOSE BLDC GLUCOMTR-MCNC: 120 MG/DL (ref 70–130)
GLUCOSE BLDC GLUCOMTR-MCNC: 123 MG/DL (ref 70–130)
GLUCOSE BLDC GLUCOMTR-MCNC: 126 MG/DL (ref 70–130)
GLUCOSE SERPL-MCNC: 120 MG/DL (ref 65–99)
HCT VFR BLD AUTO: 35.5 % (ref 37.5–51)
HGB BLD-MCNC: 10.4 G/DL (ref 13–17.7)
IMM GRANULOCYTES # BLD AUTO: 0.08 10*3/MM3 (ref 0–0.05)
IMM GRANULOCYTES NFR BLD AUTO: 1.6 % (ref 0–0.5)
INR PPP: 3.24 (ref 0.85–1.16)
LYMPHOCYTES # BLD AUTO: 0.84 10*3/MM3 (ref 0.7–3.1)
LYMPHOCYTES NFR BLD AUTO: 16.5 % (ref 19.6–45.3)
MCH RBC QN AUTO: 26.7 PG (ref 26.6–33)
MCHC RBC AUTO-ENTMCNC: 29.3 G/DL (ref 31.5–35.7)
MCV RBC AUTO: 91.3 FL (ref 79–97)
MONOCYTES # BLD AUTO: 0.43 10*3/MM3 (ref 0.1–0.9)
MONOCYTES NFR BLD AUTO: 8.4 % (ref 5–12)
NEUTROPHILS NFR BLD AUTO: 3.48 10*3/MM3 (ref 1.7–7)
NEUTROPHILS NFR BLD AUTO: 68.4 % (ref 42.7–76)
NRBC BLD AUTO-RTO: 0 /100 WBC (ref 0–0.2)
NT-PROBNP SERPL-MCNC: 1411 PG/ML (ref 0–900)
PLATELET # BLD AUTO: 240 10*3/MM3 (ref 140–450)
PMV BLD AUTO: 11.1 FL (ref 6–12)
POTASSIUM SERPL-SCNC: 4.5 MMOL/L (ref 3.5–5.2)
PROCALCITONIN SERPL-MCNC: 0.08 NG/ML (ref 0–0.25)
PROTHROMBIN TIME: 31.6 SECONDS (ref 11.4–14.4)
RBC # BLD AUTO: 3.89 10*6/MM3 (ref 4.14–5.8)
SODIUM SERPL-SCNC: 133 MMOL/L (ref 136–145)
TROPONIN T SERPL-MCNC: <0.01 NG/ML (ref 0–0.03)
WBC # BLD AUTO: 5.09 10*3/MM3 (ref 3.4–10.8)

## 2021-07-27 PROCEDURE — 92610 EVALUATE SWALLOWING FUNCTION: CPT

## 2021-07-27 PROCEDURE — 92612 ENDOSCOPY SWALLOW (FEES) VID: CPT

## 2021-07-27 PROCEDURE — 84145 PROCALCITONIN (PCT): CPT | Performed by: INTERNAL MEDICINE

## 2021-07-27 PROCEDURE — 94799 UNLISTED PULMONARY SVC/PX: CPT

## 2021-07-27 PROCEDURE — 25010000002 HYDRALAZINE PER 20 MG: Performed by: NURSE PRACTITIONER

## 2021-07-27 PROCEDURE — 97116 GAIT TRAINING THERAPY: CPT

## 2021-07-27 PROCEDURE — 25010000002 LINEZOLID 600 MG/300ML SOLUTION: Performed by: INTERNAL MEDICINE

## 2021-07-27 PROCEDURE — 83880 ASSAY OF NATRIURETIC PEPTIDE: CPT | Performed by: INTERNAL MEDICINE

## 2021-07-27 PROCEDURE — 97110 THERAPEUTIC EXERCISES: CPT

## 2021-07-27 PROCEDURE — 99232 SBSQ HOSP IP/OBS MODERATE 35: CPT | Performed by: INTERNAL MEDICINE

## 2021-07-27 PROCEDURE — 85610 PROTHROMBIN TIME: CPT | Performed by: NURSE PRACTITIONER

## 2021-07-27 PROCEDURE — 92597 ORAL SPEECH DEVICE EVAL: CPT

## 2021-07-27 PROCEDURE — 85025 COMPLETE CBC W/AUTO DIFF WBC: CPT | Performed by: INTERNAL MEDICINE

## 2021-07-27 PROCEDURE — 97530 THERAPEUTIC ACTIVITIES: CPT

## 2021-07-27 PROCEDURE — 82962 GLUCOSE BLOOD TEST: CPT

## 2021-07-27 PROCEDURE — 84484 ASSAY OF TROPONIN QUANT: CPT | Performed by: INTERNAL MEDICINE

## 2021-07-27 PROCEDURE — 80048 BASIC METABOLIC PNL TOTAL CA: CPT | Performed by: INTERNAL MEDICINE

## 2021-07-27 PROCEDURE — L8501 TRACHEOSTOMY SPEAKING VALVE: HCPCS

## 2021-07-27 RX ORDER — HYDRALAZINE HYDROCHLORIDE 20 MG/ML
20 INJECTION INTRAMUSCULAR; INTRAVENOUS EVERY 4 HOURS PRN
Status: DISCONTINUED | OUTPATIENT
Start: 2021-07-27 | End: 2021-07-29

## 2021-07-27 RX ORDER — HYDROCODONE BITARTRATE AND ACETAMINOPHEN 5; 325 MG/1; MG/1
1 TABLET ORAL EVERY 6 HOURS PRN
Status: DISCONTINUED | OUTPATIENT
Start: 2021-07-27 | End: 2021-07-28

## 2021-07-27 RX ORDER — AMIODARONE HYDROCHLORIDE 200 MG/1
200 TABLET ORAL 3 TIMES DAILY
Status: DISCONTINUED | OUTPATIENT
Start: 2021-07-27 | End: 2021-07-27

## 2021-07-27 RX ORDER — AMIODARONE HYDROCHLORIDE 200 MG/1
200 TABLET ORAL
Status: DISCONTINUED | OUTPATIENT
Start: 2021-07-27 | End: 2021-07-27 | Stop reason: SDUPTHER

## 2021-07-27 RX ORDER — AMIODARONE HYDROCHLORIDE 200 MG/1
200 TABLET ORAL 3 TIMES DAILY
Status: DISCONTINUED | OUTPATIENT
Start: 2021-07-27 | End: 2021-07-29 | Stop reason: HOSPADM

## 2021-07-27 RX ORDER — ESCITALOPRAM OXALATE 10 MG/1
10 TABLET ORAL DAILY
Status: DISCONTINUED | OUTPATIENT
Start: 2021-07-27 | End: 2021-07-27

## 2021-07-27 RX ORDER — ESCITALOPRAM OXALATE 10 MG/1
10 TABLET ORAL DAILY
Status: DISCONTINUED | OUTPATIENT
Start: 2021-07-27 | End: 2021-07-29 | Stop reason: HOSPADM

## 2021-07-27 RX ADMIN — AMIODARONE HYDROCHLORIDE 200 MG: 200 TABLET ORAL at 15:11

## 2021-07-27 RX ADMIN — Medication 30 ML: at 07:49

## 2021-07-27 RX ADMIN — AMIODARONE HYDROCHLORIDE 200 MG: 200 TABLET ORAL at 21:20

## 2021-07-27 RX ADMIN — LINEZOLID 600 MG: 600 INJECTION, SOLUTION INTRAVENOUS at 13:01

## 2021-07-27 RX ADMIN — Medication 30 ML: at 13:02

## 2021-07-27 RX ADMIN — HYDRALAZINE HYDROCHLORIDE 20 MG: 20 INJECTION INTRAMUSCULAR; INTRAVENOUS at 22:03

## 2021-07-27 RX ADMIN — ACETAMINOPHEN ORAL SOLUTION 649.6 MG: 650 SOLUTION ORAL at 07:49

## 2021-07-27 RX ADMIN — SODIUM CHLORIDE, PRESERVATIVE FREE 10 ML: 5 INJECTION INTRAVENOUS at 21:20

## 2021-07-27 RX ADMIN — LINEZOLID 600 MG: 600 INJECTION, SOLUTION INTRAVENOUS at 02:20

## 2021-07-27 RX ADMIN — ASPIRIN 81 MG CHEWABLE TABLET 81 MG: 81 TABLET CHEWABLE at 07:49

## 2021-07-27 RX ADMIN — HYDROCODONE BITARTRATE AND ACETAMINOPHEN 1 TABLET: 5; 325 TABLET ORAL at 04:02

## 2021-07-27 RX ADMIN — ESCITALOPRAM OXALATE 10 MG: 10 TABLET ORAL at 13:01

## 2021-07-27 RX ADMIN — LANSOPRAZOLE 30 MG: KIT at 05:44

## 2021-07-27 RX ADMIN — ATORVASTATIN CALCIUM 40 MG: 40 TABLET, FILM COATED ORAL at 21:20

## 2021-07-27 RX ADMIN — AMIODARONE HYDROCHLORIDE 200 MG: 200 TABLET ORAL at 10:23

## 2021-07-27 RX ADMIN — CHLORHEXIDINE GLUCONATE 15 ML: 1.2 SOLUTION ORAL at 21:20

## 2021-07-27 RX ADMIN — HYDROCODONE BITARTRATE AND ACETAMINOPHEN 1 TABLET: 5; 325 TABLET ORAL at 22:03

## 2021-07-27 RX ADMIN — ACETAMINOPHEN ORAL SOLUTION 649.6 MG: 650 SOLUTION ORAL at 15:10

## 2021-07-27 RX ADMIN — HYDROCODONE BITARTRATE AND ACETAMINOPHEN 1 TABLET: 5; 325 TABLET ORAL at 09:57

## 2021-07-27 RX ADMIN — CHLORHEXIDINE GLUCONATE 15 ML: 1.2 SOLUTION ORAL at 07:49

## 2021-07-27 RX ADMIN — ACETAMINOPHEN ORAL SOLUTION 649.6 MG: 650 SOLUTION ORAL at 19:52

## 2021-07-27 RX ADMIN — HYDROCODONE BITARTRATE AND ACETAMINOPHEN 1 TABLET: 5; 325 TABLET ORAL at 16:44

## 2021-07-27 RX ADMIN — WARFARIN SODIUM 10 MG: 5 TABLET ORAL at 17:23

## 2021-07-27 RX ADMIN — WARFARIN SODIUM 10 MG: 5 TABLET ORAL at 16:57

## 2021-07-28 LAB
ABO GROUP BLD: NORMAL
ANION GAP SERPL CALCULATED.3IONS-SCNC: 10 MMOL/L (ref 5–15)
BASOPHILS # BLD AUTO: 0.03 10*3/MM3 (ref 0–0.2)
BASOPHILS NFR BLD AUTO: 0.5 % (ref 0–1.5)
BLD GP AB SCN SERPL QL: NEGATIVE
BUN SERPL-MCNC: 21 MG/DL (ref 6–20)
BUN/CREAT SERPL: 22.3 (ref 7–25)
CALCIUM SPEC-SCNC: 9.4 MG/DL (ref 8.6–10.5)
CHLORIDE SERPL-SCNC: 97 MMOL/L (ref 98–107)
CO2 SERPL-SCNC: 27 MMOL/L (ref 22–29)
CREAT SERPL-MCNC: 0.94 MG/DL (ref 0.76–1.27)
DEPRECATED RDW RBC AUTO: 48.7 FL (ref 37–54)
EOSINOPHIL # BLD AUTO: 0.22 10*3/MM3 (ref 0–0.4)
EOSINOPHIL NFR BLD AUTO: 3.9 % (ref 0.3–6.2)
ERYTHROCYTE [DISTWIDTH] IN BLOOD BY AUTOMATED COUNT: 15.6 % (ref 12.3–15.4)
GFR SERPL CREATININE-BSD FRML MDRD: 83 ML/MIN/1.73
GLUCOSE BLDC GLUCOMTR-MCNC: 108 MG/DL (ref 70–130)
GLUCOSE BLDC GLUCOMTR-MCNC: 113 MG/DL (ref 70–130)
GLUCOSE BLDC GLUCOMTR-MCNC: 98 MG/DL (ref 70–130)
GLUCOSE SERPL-MCNC: 116 MG/DL (ref 65–99)
HCT VFR BLD AUTO: 34.1 % (ref 37.5–51)
HGB BLD-MCNC: 10.4 G/DL (ref 13–17.7)
IMM GRANULOCYTES # BLD AUTO: 0.06 10*3/MM3 (ref 0–0.05)
IMM GRANULOCYTES NFR BLD AUTO: 1.1 % (ref 0–0.5)
INR PPP: 3.5 (ref 0.85–1.16)
LYMPHOCYTES # BLD AUTO: 0.99 10*3/MM3 (ref 0.7–3.1)
LYMPHOCYTES NFR BLD AUTO: 17.7 % (ref 19.6–45.3)
MCH RBC QN AUTO: 26.7 PG (ref 26.6–33)
MCHC RBC AUTO-ENTMCNC: 30.5 G/DL (ref 31.5–35.7)
MCV RBC AUTO: 87.7 FL (ref 79–97)
MONOCYTES # BLD AUTO: 0.62 10*3/MM3 (ref 0.1–0.9)
MONOCYTES NFR BLD AUTO: 11.1 % (ref 5–12)
NEUTROPHILS NFR BLD AUTO: 3.67 10*3/MM3 (ref 1.7–7)
NEUTROPHILS NFR BLD AUTO: 65.7 % (ref 42.7–76)
NRBC BLD AUTO-RTO: 0 /100 WBC (ref 0–0.2)
PLATELET # BLD AUTO: 250 10*3/MM3 (ref 140–450)
PMV BLD AUTO: 11.3 FL (ref 6–12)
POTASSIUM SERPL-SCNC: 4.1 MMOL/L (ref 3.5–5.2)
PROTHROMBIN TIME: 33.6 SECONDS (ref 11.4–14.4)
RBC # BLD AUTO: 3.89 10*6/MM3 (ref 4.14–5.8)
RH BLD: POSITIVE
SODIUM SERPL-SCNC: 134 MMOL/L (ref 136–145)
T&S EXPIRATION DATE: NORMAL
WBC # BLD AUTO: 5.59 10*3/MM3 (ref 3.4–10.8)

## 2021-07-28 PROCEDURE — 93650 ICAR CATH ABLTJ AV NODE FUNC: CPT | Performed by: INTERNAL MEDICINE

## 2021-07-28 PROCEDURE — 33225 L VENTRIC PACING LEAD ADD-ON: CPT | Performed by: INTERNAL MEDICINE

## 2021-07-28 PROCEDURE — 86900 BLOOD TYPING SEROLOGIC ABO: CPT | Performed by: INTERNAL MEDICINE

## 2021-07-28 PROCEDURE — C1769 GUIDE WIRE: HCPCS | Performed by: INTERNAL MEDICINE

## 2021-07-28 PROCEDURE — C1898 LEAD, PMKR, OTHER THAN TRANS: HCPCS | Performed by: INTERNAL MEDICINE

## 2021-07-28 PROCEDURE — 85025 COMPLETE CBC W/AUTO DIFF WBC: CPT | Performed by: INTERNAL MEDICINE

## 2021-07-28 PROCEDURE — 33249 INSJ/RPLCMT DEFIB W/LEAD(S): CPT | Performed by: INTERNAL MEDICINE

## 2021-07-28 PROCEDURE — 25010000003 LIDOCAINE 1 % SOLUTION: Performed by: INTERNAL MEDICINE

## 2021-07-28 PROCEDURE — 99153 MOD SED SAME PHYS/QHP EA: CPT | Performed by: INTERNAL MEDICINE

## 2021-07-28 PROCEDURE — 99152 MOD SED SAME PHYS/QHP 5/>YRS: CPT | Performed by: INTERNAL MEDICINE

## 2021-07-28 PROCEDURE — C1892 INTRO/SHEATH,FIXED,PEEL-AWAY: HCPCS | Performed by: INTERNAL MEDICINE

## 2021-07-28 PROCEDURE — 85610 PROTHROMBIN TIME: CPT | Performed by: NURSE PRACTITIONER

## 2021-07-28 PROCEDURE — 02H43KZ INSERTION OF DEFIBRILLATOR LEAD INTO CORONARY VEIN, PERCUTANEOUS APPROACH: ICD-10-PCS | Performed by: INTERNAL MEDICINE

## 2021-07-28 PROCEDURE — C1900 LEAD, CORONARY VENOUS: HCPCS | Performed by: INTERNAL MEDICINE

## 2021-07-28 PROCEDURE — 0 IOPAMIDOL PER 1 ML: Performed by: INTERNAL MEDICINE

## 2021-07-28 PROCEDURE — 0JH609Z INSERTION OF CARDIAC RESYNCHRONIZATION DEFIBRILLATOR PULSE GENERATOR INTO CHEST SUBCUTANEOUS TISSUE AND FASCIA, OPEN APPROACH: ICD-10-PCS | Performed by: INTERNAL MEDICINE

## 2021-07-28 PROCEDURE — C1733 CATH, EP, OTHR THAN COOL-TIP: HCPCS | Performed by: INTERNAL MEDICINE

## 2021-07-28 PROCEDURE — 86901 BLOOD TYPING SEROLOGIC RH(D): CPT | Performed by: INTERNAL MEDICINE

## 2021-07-28 PROCEDURE — 02H63KZ INSERTION OF DEFIBRILLATOR LEAD INTO RIGHT ATRIUM, PERCUTANEOUS APPROACH: ICD-10-PCS | Performed by: INTERNAL MEDICINE

## 2021-07-28 PROCEDURE — C1777 LEAD, AICD, ENDO SINGLE COIL: HCPCS | Performed by: INTERNAL MEDICINE

## 2021-07-28 PROCEDURE — 80048 BASIC METABOLIC PNL TOTAL CA: CPT | Performed by: INTERNAL MEDICINE

## 2021-07-28 PROCEDURE — 86850 RBC ANTIBODY SCREEN: CPT | Performed by: INTERNAL MEDICINE

## 2021-07-28 PROCEDURE — 02HK3KZ INSERTION OF DEFIBRILLATOR LEAD INTO RIGHT VENTRICLE, PERCUTANEOUS APPROACH: ICD-10-PCS | Performed by: INTERNAL MEDICINE

## 2021-07-28 PROCEDURE — C1730 CATH, EP, 19 OR FEW ELECT: HCPCS | Performed by: INTERNAL MEDICINE

## 2021-07-28 PROCEDURE — C1882 AICD, OTHER THAN SING/DUAL: HCPCS | Performed by: INTERNAL MEDICINE

## 2021-07-28 PROCEDURE — 25010000002 MIDAZOLAM PER 1 MG: Performed by: INTERNAL MEDICINE

## 2021-07-28 PROCEDURE — C1894 INTRO/SHEATH, NON-LASER: HCPCS | Performed by: INTERNAL MEDICINE

## 2021-07-28 PROCEDURE — 25010000002 FENTANYL CITRATE (PF) 50 MCG/ML SOLUTION: Performed by: INTERNAL MEDICINE

## 2021-07-28 PROCEDURE — 82962 GLUCOSE BLOOD TEST: CPT

## 2021-07-28 PROCEDURE — 99232 SBSQ HOSP IP/OBS MODERATE 35: CPT | Performed by: INTERNAL MEDICINE

## 2021-07-28 PROCEDURE — 25010000002 LINEZOLID 600 MG/300ML SOLUTION: Performed by: INTERNAL MEDICINE

## 2021-07-28 PROCEDURE — 97110 THERAPEUTIC EXERCISES: CPT

## 2021-07-28 PROCEDURE — 97116 GAIT TRAINING THERAPY: CPT

## 2021-07-28 PROCEDURE — 25010000002 ONDANSETRON PER 1 MG: Performed by: INTERNAL MEDICINE

## 2021-07-28 DEVICE — IMPLANTABLE DEVICE: Type: IMPLANTABLE DEVICE | Status: FUNCTIONAL

## 2021-07-28 DEVICE — LD PM TENDRIL STS 6F52CM 2088TC52: Type: IMPLANTABLE DEVICE | Status: FUNCTIONAL

## 2021-07-28 DEVICE — LD QUARTET CRT VENT LNG SPACING 86CM: Type: IMPLANTABLE DEVICE | Status: FUNCTIONAL

## 2021-07-28 DEVICE — LD DEFIB DURATA SJ4 65CM 7122Q65: Type: IMPLANTABLE DEVICE | Status: FUNCTIONAL

## 2021-07-28 RX ORDER — ACETAMINOPHEN 650 MG/1
650 SUPPOSITORY RECTAL EVERY 4 HOURS PRN
Status: DISCONTINUED | OUTPATIENT
Start: 2021-07-28 | End: 2021-07-28

## 2021-07-28 RX ORDER — ONDANSETRON 2 MG/ML
4 INJECTION INTRAMUSCULAR; INTRAVENOUS EVERY 6 HOURS PRN
Status: DISCONTINUED | OUTPATIENT
Start: 2021-07-28 | End: 2021-07-29 | Stop reason: HOSPADM

## 2021-07-28 RX ORDER — MIDAZOLAM HYDROCHLORIDE 1 MG/ML
INJECTION INTRAMUSCULAR; INTRAVENOUS AS NEEDED
Status: DISCONTINUED | OUTPATIENT
Start: 2021-07-28 | End: 2021-07-28 | Stop reason: HOSPADM

## 2021-07-28 RX ORDER — FENTANYL CITRATE 50 UG/ML
INJECTION, SOLUTION INTRAMUSCULAR; INTRAVENOUS AS NEEDED
Status: DISCONTINUED | OUTPATIENT
Start: 2021-07-28 | End: 2021-07-28 | Stop reason: HOSPADM

## 2021-07-28 RX ORDER — ONDANSETRON 2 MG/ML
INJECTION INTRAMUSCULAR; INTRAVENOUS AS NEEDED
Status: DISCONTINUED | OUTPATIENT
Start: 2021-07-28 | End: 2021-07-28 | Stop reason: HOSPADM

## 2021-07-28 RX ORDER — SODIUM CHLORIDE 0.9 % (FLUSH) 0.9 %
3 SYRINGE (ML) INJECTION EVERY 12 HOURS SCHEDULED
Status: DISCONTINUED | OUTPATIENT
Start: 2021-07-28 | End: 2021-07-29

## 2021-07-28 RX ORDER — MAGNESIUM HYDROXIDE 1200 MG/15ML
LIQUID ORAL AS NEEDED
Status: DISCONTINUED | OUTPATIENT
Start: 2021-07-28 | End: 2021-07-28 | Stop reason: HOSPADM

## 2021-07-28 RX ORDER — BUPIVACAINE HYDROCHLORIDE 5 MG/ML
INJECTION, SOLUTION EPIDURAL; INTRACAUDAL AS NEEDED
Status: DISCONTINUED | OUTPATIENT
Start: 2021-07-28 | End: 2021-07-28 | Stop reason: HOSPADM

## 2021-07-28 RX ORDER — SODIUM CHLORIDE 9 MG/ML
INJECTION, SOLUTION INTRAVENOUS CONTINUOUS PRN
Status: COMPLETED | OUTPATIENT
Start: 2021-07-28 | End: 2021-07-28

## 2021-07-28 RX ORDER — HYDROCODONE BITARTRATE AND ACETAMINOPHEN 5; 325 MG/1; MG/1
1 TABLET ORAL EVERY 4 HOURS PRN
Status: DISCONTINUED | OUTPATIENT
Start: 2021-07-28 | End: 2021-07-29

## 2021-07-28 RX ORDER — LIDOCAINE HYDROCHLORIDE 10 MG/ML
INJECTION, SOLUTION INFILTRATION; PERINEURAL AS NEEDED
Status: DISCONTINUED | OUTPATIENT
Start: 2021-07-28 | End: 2021-07-28 | Stop reason: HOSPADM

## 2021-07-28 RX ORDER — ACETAMINOPHEN 325 MG/1
650 TABLET ORAL EVERY 4 HOURS PRN
Status: DISCONTINUED | OUTPATIENT
Start: 2021-07-28 | End: 2021-07-29

## 2021-07-28 RX ORDER — SODIUM CHLORIDE 0.9 % (FLUSH) 0.9 %
10 SYRINGE (ML) INJECTION AS NEEDED
Status: DISCONTINUED | OUTPATIENT
Start: 2021-07-28 | End: 2021-07-29

## 2021-07-28 RX ORDER — CEFAZOLIN SODIUM 2 G/100ML
2 INJECTION, SOLUTION INTRAVENOUS ONCE
Status: DISCONTINUED | OUTPATIENT
Start: 2021-07-28 | End: 2021-07-29

## 2021-07-28 RX ADMIN — AMIODARONE HYDROCHLORIDE 200 MG: 200 TABLET ORAL at 20:27

## 2021-07-28 RX ADMIN — ASPIRIN 81 MG CHEWABLE TABLET 81 MG: 81 TABLET CHEWABLE at 11:45

## 2021-07-28 RX ADMIN — ESCITALOPRAM OXALATE 10 MG: 10 TABLET ORAL at 11:45

## 2021-07-28 RX ADMIN — AMIODARONE HYDROCHLORIDE 200 MG: 200 TABLET ORAL at 16:43

## 2021-07-28 RX ADMIN — LINEZOLID 600 MG: 600 INJECTION, SOLUTION INTRAVENOUS at 04:09

## 2021-07-28 RX ADMIN — ATORVASTATIN CALCIUM 40 MG: 40 TABLET, FILM COATED ORAL at 20:27

## 2021-07-28 RX ADMIN — HYDROCODONE BITARTRATE AND ACETAMINOPHEN 1 TABLET: 5; 325 TABLET ORAL at 20:27

## 2021-07-28 RX ADMIN — HYDROCODONE BITARTRATE AND ACETAMINOPHEN 1 TABLET: 5; 325 TABLET ORAL at 16:43

## 2021-07-28 RX ADMIN — HYDROCODONE BITARTRATE AND ACETAMINOPHEN 1 TABLET: 5; 325 TABLET ORAL at 11:46

## 2021-07-28 RX ADMIN — SODIUM CHLORIDE, PRESERVATIVE FREE 10 ML: 5 INJECTION INTRAVENOUS at 20:28

## 2021-07-28 RX ADMIN — HYDROCODONE BITARTRATE AND ACETAMINOPHEN 1 TABLET: 5; 325 TABLET ORAL at 04:09

## 2021-07-28 RX ADMIN — SODIUM CHLORIDE, PRESERVATIVE FREE 3 ML: 5 INJECTION INTRAVENOUS at 20:29

## 2021-07-29 ENCOUNTER — APPOINTMENT (OUTPATIENT)
Dept: GENERAL RADIOLOGY | Facility: HOSPITAL | Age: 56
End: 2021-07-29

## 2021-07-29 VITALS
TEMPERATURE: 97.7 F | SYSTOLIC BLOOD PRESSURE: 146 MMHG | OXYGEN SATURATION: 94 % | HEART RATE: 80 BPM | DIASTOLIC BLOOD PRESSURE: 96 MMHG | HEIGHT: 77 IN | WEIGHT: 315 LBS | RESPIRATION RATE: 18 BRPM | BODY MASS INDEX: 37.19 KG/M2

## 2021-07-29 LAB
ANION GAP SERPL CALCULATED.3IONS-SCNC: 7 MMOL/L (ref 5–15)
BASOPHILS # BLD AUTO: 0.03 10*3/MM3 (ref 0–0.2)
BASOPHILS NFR BLD AUTO: 0.4 % (ref 0–1.5)
BUN SERPL-MCNC: 15 MG/DL (ref 6–20)
BUN/CREAT SERPL: 16.7 (ref 7–25)
CALCIUM SPEC-SCNC: 9.1 MG/DL (ref 8.6–10.5)
CHLORIDE SERPL-SCNC: 97 MMOL/L (ref 98–107)
CO2 SERPL-SCNC: 28 MMOL/L (ref 22–29)
CREAT SERPL-MCNC: 0.9 MG/DL (ref 0.76–1.27)
DEPRECATED RDW RBC AUTO: 49.8 FL (ref 37–54)
EOSINOPHIL # BLD AUTO: 0.26 10*3/MM3 (ref 0–0.4)
EOSINOPHIL NFR BLD AUTO: 3.4 % (ref 0.3–6.2)
ERYTHROCYTE [DISTWIDTH] IN BLOOD BY AUTOMATED COUNT: 15.8 % (ref 12.3–15.4)
GFR SERPL CREATININE-BSD FRML MDRD: 87 ML/MIN/1.73
GLUCOSE BLDC GLUCOMTR-MCNC: 107 MG/DL (ref 70–130)
GLUCOSE SERPL-MCNC: 113 MG/DL (ref 65–99)
HCT VFR BLD AUTO: 33 % (ref 37.5–51)
HGB BLD-MCNC: 10.3 G/DL (ref 13–17.7)
IMM GRANULOCYTES # BLD AUTO: 0.06 10*3/MM3 (ref 0–0.05)
IMM GRANULOCYTES NFR BLD AUTO: 0.8 % (ref 0–0.5)
INR PPP: 4.09 (ref 0.85–1.16)
LYMPHOCYTES # BLD AUTO: 0.78 10*3/MM3 (ref 0.7–3.1)
LYMPHOCYTES NFR BLD AUTO: 10.2 % (ref 19.6–45.3)
MAGNESIUM SERPL-MCNC: 1.9 MG/DL (ref 1.6–2.6)
MCH RBC QN AUTO: 27.1 PG (ref 26.6–33)
MCHC RBC AUTO-ENTMCNC: 31.2 G/DL (ref 31.5–35.7)
MCV RBC AUTO: 86.8 FL (ref 79–97)
MONOCYTES # BLD AUTO: 0.58 10*3/MM3 (ref 0.1–0.9)
MONOCYTES NFR BLD AUTO: 7.6 % (ref 5–12)
NEUTROPHILS NFR BLD AUTO: 5.9 10*3/MM3 (ref 1.7–7)
NEUTROPHILS NFR BLD AUTO: 77.6 % (ref 42.7–76)
NRBC BLD AUTO-RTO: 0 /100 WBC (ref 0–0.2)
PLATELET # BLD AUTO: 214 10*3/MM3 (ref 140–450)
PMV BLD AUTO: 10.7 FL (ref 6–12)
POTASSIUM SERPL-SCNC: 4.5 MMOL/L (ref 3.5–5.2)
PROTHROMBIN TIME: 37.8 SECONDS (ref 11.4–14.4)
QT INTERVAL: 452 MS
QTC INTERVAL: 540 MS
RBC # BLD AUTO: 3.8 10*6/MM3 (ref 4.14–5.8)
SODIUM SERPL-SCNC: 132 MMOL/L (ref 136–145)
WBC # BLD AUTO: 7.61 10*3/MM3 (ref 3.4–10.8)

## 2021-07-29 PROCEDURE — 80048 BASIC METABOLIC PNL TOTAL CA: CPT | Performed by: INTERNAL MEDICINE

## 2021-07-29 PROCEDURE — 99024 POSTOP FOLLOW-UP VISIT: CPT | Performed by: INTERNAL MEDICINE

## 2021-07-29 PROCEDURE — 99238 HOSP IP/OBS DSCHRG MGMT 30/<: CPT | Performed by: INTERNAL MEDICINE

## 2021-07-29 PROCEDURE — 85025 COMPLETE CBC W/AUTO DIFF WBC: CPT | Performed by: INTERNAL MEDICINE

## 2021-07-29 PROCEDURE — 71046 X-RAY EXAM CHEST 2 VIEWS: CPT

## 2021-07-29 PROCEDURE — 82962 GLUCOSE BLOOD TEST: CPT

## 2021-07-29 PROCEDURE — 83735 ASSAY OF MAGNESIUM: CPT | Performed by: INTERNAL MEDICINE

## 2021-07-29 PROCEDURE — 85610 PROTHROMBIN TIME: CPT | Performed by: NURSE PRACTITIONER

## 2021-07-29 PROCEDURE — 93010 ELECTROCARDIOGRAM REPORT: CPT | Performed by: INTERNAL MEDICINE

## 2021-07-29 PROCEDURE — 25010000002 HYDROMORPHONE PER 4 MG: Performed by: INTERNAL MEDICINE

## 2021-07-29 PROCEDURE — 93005 ELECTROCARDIOGRAM TRACING: CPT | Performed by: INTERNAL MEDICINE

## 2021-07-29 RX ORDER — HYDROMORPHONE HYDROCHLORIDE 1 MG/ML
0.5 INJECTION, SOLUTION INTRAMUSCULAR; INTRAVENOUS; SUBCUTANEOUS
Status: DISCONTINUED | OUTPATIENT
Start: 2021-07-29 | End: 2021-07-29

## 2021-07-29 RX ORDER — HYDROCODONE BITARTRATE AND ACETAMINOPHEN 7.5; 325 MG/1; MG/1
1 TABLET ORAL EVERY 4 HOURS PRN
Start: 2021-07-29 | End: 2021-08-04

## 2021-07-29 RX ORDER — HYDROMORPHONE HYDROCHLORIDE 1 MG/ML
0.25 INJECTION, SOLUTION INTRAMUSCULAR; INTRAVENOUS; SUBCUTANEOUS
Start: 2021-07-29 | End: 2021-08-04

## 2021-07-29 RX ORDER — AMIODARONE HYDROCHLORIDE 200 MG/1
200 TABLET ORAL 3 TIMES DAILY
Start: 2021-07-29 | End: 2022-03-03 | Stop reason: SDUPTHER

## 2021-07-29 RX ORDER — HYDROMORPHONE HYDROCHLORIDE 1 MG/ML
0.25 INJECTION, SOLUTION INTRAMUSCULAR; INTRAVENOUS; SUBCUTANEOUS
Status: DISCONTINUED | OUTPATIENT
Start: 2021-07-29 | End: 2021-07-29 | Stop reason: HOSPADM

## 2021-07-29 RX ORDER — LANSOPRAZOLE
30 KIT EVERY MORNING
Qty: 300 ML
Start: 2021-07-30 | End: 2021-08-04 | Stop reason: ALTCHOICE

## 2021-07-29 RX ORDER — ESCITALOPRAM OXALATE 10 MG/1
10 TABLET ORAL DAILY
Start: 2021-07-30 | End: 2021-10-26

## 2021-07-29 RX ORDER — HYDROCODONE BITARTRATE AND ACETAMINOPHEN 7.5; 325 MG/1; MG/1
1 TABLET ORAL EVERY 4 HOURS PRN
Status: DISCONTINUED | OUTPATIENT
Start: 2021-07-29 | End: 2021-07-29 | Stop reason: HOSPADM

## 2021-07-29 RX ORDER — ASPIRIN 81 MG/1
81 TABLET, CHEWABLE ORAL DAILY
Start: 2021-07-30 | End: 2022-07-29

## 2021-07-29 RX ORDER — ATORVASTATIN CALCIUM 40 MG/1
40 TABLET, FILM COATED ORAL NIGHTLY
Start: 2021-07-29 | End: 2023-03-31

## 2021-07-29 RX ORDER — ONDANSETRON 2 MG/ML
4 INJECTION INTRAMUSCULAR; INTRAVENOUS EVERY 6 HOURS PRN
Start: 2021-07-29 | End: 2021-08-30

## 2021-07-29 RX ORDER — POTASSIUM CHLORIDE 1.5 G/1.77G
40 POWDER, FOR SOLUTION ORAL AS NEEDED
Start: 2021-07-29 | End: 2021-08-04

## 2021-07-29 RX ADMIN — ESCITALOPRAM OXALATE 10 MG: 10 TABLET ORAL at 08:44

## 2021-07-29 RX ADMIN — AMIODARONE HYDROCHLORIDE 200 MG: 200 TABLET ORAL at 08:44

## 2021-07-29 RX ADMIN — ASPIRIN 81 MG CHEWABLE TABLET 81 MG: 81 TABLET CHEWABLE at 08:44

## 2021-07-29 RX ADMIN — HYDROMORPHONE HYDROCHLORIDE 0.25 MG: 1 INJECTION, SOLUTION INTRAMUSCULAR; INTRAVENOUS; SUBCUTANEOUS at 11:21

## 2021-07-29 RX ADMIN — LANSOPRAZOLE 30 MG: KIT at 08:43

## 2021-07-29 RX ADMIN — HYDROCODONE BITARTRATE AND ACETAMINOPHEN 1 TABLET: 5; 325 TABLET ORAL at 00:00

## 2021-07-29 RX ADMIN — HYDROCODONE BITARTRATE AND ACETAMINOPHEN 1 TABLET: 7.5; 325 TABLET ORAL at 09:48

## 2021-07-29 RX ADMIN — HYDROMORPHONE HYDROCHLORIDE 0.25 MG: 1 INJECTION, SOLUTION INTRAMUSCULAR; INTRAVENOUS; SUBCUTANEOUS at 08:52

## 2021-07-29 RX ADMIN — HYDROCODONE BITARTRATE AND ACETAMINOPHEN 1 TABLET: 5; 325 TABLET ORAL at 05:15

## 2021-07-30 ENCOUNTER — TELEPHONE (OUTPATIENT)
Dept: PHARMACY | Facility: HOSPITAL | Age: 56
End: 2021-07-30

## 2021-07-30 NOTE — TELEPHONE ENCOUNTER
Patient has been transferred from [facility] to [facility]. STACY Fontanez is agreeable to put note in patient's chart that we be contacted when he is due to be discharged.

## 2021-08-02 ENCOUNTER — TELEPHONE (OUTPATIENT)
Dept: PHARMACY | Facility: HOSPITAL | Age: 56
End: 2021-08-02

## 2021-08-02 NOTE — TELEPHONE ENCOUNTER
Per ERWIN YOU, patient will likely be discharged home tomorrow, 8/3. She is agreeable to fax PT/INR and warfarin dosing records when he is d/c. She will also ask that he self test on HM day after he returns home

## 2021-08-04 ENCOUNTER — ANTICOAGULATION VISIT (OUTPATIENT)
Dept: PHARMACY | Facility: HOSPITAL | Age: 56
End: 2021-08-04

## 2021-08-04 DIAGNOSIS — Z95.2 S/P AVR (AORTIC VALVE REPLACEMENT): Primary | ICD-10-CM

## 2021-08-04 LAB
INR PPP: 1.3
INR PPP: 1.4
INR PPP: 2.2
INR PPP: 3.5
INR PPP: 5.5
INR PPP: 5.5

## 2021-08-04 RX ORDER — LISINOPRIL 40 MG/1
40 TABLET ORAL DAILY
COMMUNITY
End: 2022-04-12

## 2021-08-04 RX ORDER — BUPROPION HYDROCHLORIDE 100 MG/1
100 TABLET ORAL 2 TIMES DAILY
COMMUNITY
End: 2021-10-26

## 2021-08-04 RX ORDER — PANTOPRAZOLE SODIUM 40 MG/1
40 TABLET, DELAYED RELEASE ORAL DAILY
COMMUNITY
End: 2021-12-29

## 2021-08-04 RX ORDER — GUAIFENESIN 600 MG/1
1200 TABLET, EXTENDED RELEASE ORAL 2 TIMES DAILY
COMMUNITY
End: 2021-08-30

## 2021-08-04 RX ORDER — FUROSEMIDE 40 MG/1
120 TABLET ORAL DAILY
COMMUNITY
End: 2021-10-04

## 2021-08-04 NOTE — PROGRESS NOTES
Anticoagulation Clinic - Remote Progress Note  [facility]  Testing frequency: 7 days    Indication: Atrial Fibrillation and Mechanical Aortic Valve; h/o embolic CVA  Referring Provider: Bunny last seen 3/12/21; Next appointment: 6/25/21;  Initial Warfarin Start Date: 11/2012  Planned Duration of Therapy: Life  Goal INR: 2.5-3.5  Current Drug Interactions: escitalopram, aspirin, amiodarone, pantoprazole  CHADS-VASc: 3 (HTN, h/o CVA) - recheck with MD, sees Dr. Davis tomorrow at 1330.    Vit K Diet: patient eats salad 1-3 times a week, goes through spurts when he tries to eat healthier 3/3/2021  Alcohol: none  Tobacco: none       Anticoagulation Clinic INR History:  Date 4/14 4/20 5/1 5/16 5/27 6/7 6/15 6/22 6/25 7/4 7/31 8/27   Total Weekly Dose 80mg 80mg 90mg 90mg 90mg 75mg 95mg 95mg 107.5  mg ??? 105mg  ?? 65mg  ??   INR 2.3 2.0 3.2 2.9 3.2 4.1 3.1 1.3 2.0 2.7 2.7 1.3   Notes    ED levoflox missed; levoflox levoflox levoflox; refuse enox   self adj self adj; miss; enox     Date 8/31 9/4 9/11 9/27 10/2 10/5 10/15 10/26 11/5 11/7 11/15 12/7   Total Weekly Dose 95mg 100mg 80mg  ??? 70mg 90mg 100mg 100mg 100mg  75mg 105mg 100mg   INR 2.6 5.6 1.6 1.6 2.3 2.4 2.8 4.6 7.9 1.9 5.5; 5.7 2.8   Notes   incr GLV, misdose, refuse enox incorrect doses     recalled test strips 2x hold; clinic clinic      Date 1/11/19 1/24 2/8 2/18 3/4 3/26 4/23 5/7 6/3 6/17 6/24 7/11   Total Weekly Dose 100mg 100mg 100mg 105mg 100mg 100mg 0mg unable to confirm 75mg  ?? 95mg 80mg 95mg   INR 2.4 3.9 2.7 3.6 3.2 4.8 1.14 ED, 1.9 HM 3.5 4.9 2.5 2.3 5.4   Notes   LVM  clinic     doxy start 6/13 1x miss; doxy doxy     Date 7/18 7/22 7/25 8/5 8/12 8/15 8/21 8/26 8/30 9/6 9/12 9/17   Total Weekly Dose 75mg 100mg 100mg  ??? 95mg 95mg 75mg 90mg 70mg 90mg 85mg 85mg 75mg   INR 2.0 2.2 4.4 4.0 6.2 3.0 5.0 2.3 3.9 4.2 4.5 3.3   Notes 2x self held   No GLV VPA restarted 1x hold           Date 10/2 10/10 11/4 11/12 11/22 12/10 1/3/20 1/22 1/30 2/3  2/18 2/27   Total Weekly Dose 80mg 80mg 62.5mg 85mg 82.5mg 70mg 72.5mg 72.5mg 82.5mg 77.5mg 75mg 75mg   INR  3.9 3.6 1.7 3.4 4.7 2.4 3.6 1.3 2.3 3.2 3.1 3.2   Notes   1x miss self adj  self adj  lovenox         Date 4/8 4/29 5/18 6/20  8/3 8/17 10/5 10/19 11/11 12/21    Total Weekly Dose 70mg 70mg 65mg 65mg Non- compliant 65mg 65 mg 65mg 65mg 65mg 65mg Non- compliant   INR 4.3 4.1 4.5 3.0  3.0 3.8 3.0 2.9 4.1 2.3    Notes Self adj; Levaquin decr GLV; COVID+  recv'd 6/22; misdose       incr GLV      Date 1/20/21 1/21 1/25 2/1 2/16 3/3 3/22 4/13  4/26 5/14 5/21   Total WeeklyDose 40mg 45mg? 62.5mg 80mg? 80mg   70mg 67.5mg  47.5mg  67.5 mg   INR 1.2 1.2 maia 1.6 2.9 3.7  4.5 3.2 3.3  1.8 4.5 3.1   Notes Self held; augmentin 1x incr dose; lovenox 2x miss,   1x misdose  amox amox  self adj 5x hold, enox enox/norco, augmentin       Date 6/28 8/4            Total WeeklyDose 67.5 mg BH Barrett   6/30-7/29 [facility] 32.5 mg            INR 3.9  2.2            Notes  amio init               Warfarin Dosing During Admission:  Date  7/15 7/16 7/17 7/18 7/19 7/20 7/21 7/22 7/23 7/24 7/25  7/26  7/27  7/28  7/29   INR  1.26 1.39 1.72 2.07 2.29 2.38 2.63 2.68 2.67 2.87  3.05  3 3.24  3.5  4.09   Dose  10 mg 10 mg 10 mg 10 mg 10 mg 12.5 mg 10 mg 10 mg 10 mg 10 mg  10 mg  10 mg  10 ng  hold hold     Phone Interview:  Verbal Release Authorization signed on 11/7/18 -- may speak with Zuleima Alberto (patient's wife)    Tablet Strength:   7.5 mg  Patient Contact: [phone] -- try to call in PM or contact his wife Zuleima at [phone].  His email is [email or web address]    Patient Findings  Positives:  Change in medications   Negatives:  Signs/symptoms of thrombosis, Signs/symptoms of bleeding, Laboratory test error suspected, Change in health, Change in alcohol use, Change in activity, Upcoming invasive procedure, Emergency department visit, Upcoming dental procedure, Missed doses, Extra doses, Change in diet/appetite, Hospital  admission, Bruising, Other complaints   Comments:  Mr. Alberto was an inpatient at [facility] beds [facility] from 6/30 to 7/29.  He was initiated on amiodarone while an inpt at [facility] at the [facility] (MAJOR interaction, may result in an increased INR)   He will see Dr. Davis tomorrow (? If will reduce amiodarone from TID)  He will  his new Rx for enoxaparin tomorrow.  He had some at home and will continue through Friday.   Reviewed his discharge medication list and updated.  He had a large serving of broccoli casserole last night.      Plan:     1. INR is SUPRAtherapeutic today at 3.9. Instructed Mr. Alberto to take take warfarin 7.5 mg oral daily until recheck Friday.    2. Repeat INR on 8/6. Patient has extensive history of non-compliance with requested follow up. Ivis wrote down the recheck date on her calendar.  3. Verbal information provided over the phone. Mitchell Alberto RBV dosing instructions, expresses understanding by teach back, and has no further questions at this time.  4. He only has warfarin 7.5 mg tablets; may require new Rx     Dora Pereira, PharmD    08/04/21  15:36 EDT

## 2021-08-05 ENCOUNTER — OFFICE VISIT (OUTPATIENT)
Dept: CARDIOLOGY | Facility: CLINIC | Age: 56
End: 2021-08-05

## 2021-08-05 DIAGNOSIS — I49.01 CARDIAC ARREST WITH VENTRICULAR FIBRILLATION (HCC): Primary | ICD-10-CM

## 2021-08-05 DIAGNOSIS — I46.9 CARDIAC ARREST WITH VENTRICULAR FIBRILLATION (HCC): Primary | ICD-10-CM

## 2021-08-05 PROCEDURE — 99024 POSTOP FOLLOW-UP VISIT: CPT | Performed by: INTERNAL MEDICINE

## 2021-08-05 NOTE — PROGRESS NOTES
2021    Mitchell Alberto, : 1965    WOUND CHECK    B/P: (Sitting)  (Standing)     Pulse:     Patient has fever: [] Temperature if indicated:     Wound Location: Left Intraclavicular    Dressing Removed [x]         Old Dressing Appearance:  Clean, dry []                 Old, bloody drainage [x]                            Moist, serous drainage []                Moist, thick yellow/green drainage []       Wound Appearance: Redness []                  Drainage []                  Culture obtained []        Color: Clear     Consistency:    Amount: none         Gloves used, wound cleansed with sterile 4x4 and peroxide [x]       MD notified [] MD orders:     Antibiotic started []  If checked, type   Other:    Appointment for follow-up scheduled for 3 months post procedure []    Future Appointments   Date Time Provider Department Center   10/29/2021  1:30 PM Low Olivo PA [facility], 21      MD Signature:______________________________ Completed By/Date:   Chief Complaint  No chief complaint on file.    Subjective          Mitchell Alberto presents to [facility]  History of Present Illness    Objective   Vital Signs:   There were no vitals taken for this visit.    Physical Exam   Result Review :                 Assessment and Plan    There are no diagnoses linked to this encounter.    Follow Up   No follow-ups on file.  Patient was given instructions and counseling regarding his condition or for health maintenance advice. Please see specific information pulled into the AVS if appropriate.

## 2021-08-06 ENCOUNTER — TELEPHONE (OUTPATIENT)
Dept: PHARMACY | Facility: HOSPITAL | Age: 56
End: 2021-08-06

## 2021-08-06 ENCOUNTER — ANTICOAGULATION VISIT (OUTPATIENT)
Dept: PHARMACY | Facility: HOSPITAL | Age: 56
End: 2021-08-06

## 2021-08-06 DIAGNOSIS — Z95.2 S/P AVR (AORTIC VALVE REPLACEMENT): Primary | ICD-10-CM

## 2021-08-06 LAB — INR PPP: 3

## 2021-08-06 NOTE — TELEPHONE ENCOUNTER
LVM for both Mr. Alberto and Zuleima to discuss INR for today. Pt has not tested. Called to remind them to test so we can discuss warfarin dosing through the weekend.

## 2021-08-06 NOTE — PROGRESS NOTES
Anticoagulation Clinic - Remote Progress Note  [facility]  Testing frequency: 7 days    Indication: Atrial Fibrillation and Mechanical Aortic Valve; h/o embolic CVA  Referring Provider: Bunny last seen 3/12/21; Next appointment: 6/25/21;  Initial Warfarin Start Date: 11/2012  Planned Duration of Therapy: Life  Goal INR: 2.5-3.5  Current Drug Interactions: escitalopram, aspirin, amiodarone, pantoprazole  CHADS-VASc: 3 (HTN, h/o CVA) - recheck with MD, sees Dr. Davis tomorrow at 1330.    Vit K Diet: patient eats salad 1-3 times a week, goes through spurts when he tries to eat healthier 3/3/2021  Alcohol: none  Tobacco: none       Anticoagulation Clinic INR History:  Date 4/14 4/20 5/1 5/16 5/27 6/7 6/15 6/22 6/25 7/4 7/31 8/27   Total Weekly Dose 80mg 80mg 90mg 90mg 90mg 75mg 95mg 95mg 107.5  mg ??? 105mg  ?? 65mg  ??   INR 2.3 2.0 3.2 2.9 3.2 4.1 3.1 1.3 2.0 2.7 2.7 1.3   Notes    ED levoflox missed; levoflox levoflox levoflox; refuse enox   self adj self adj; miss; enox     Date 8/31 9/4 9/11 9/27 10/2 10/5 10/15 10/26 11/5 11/7 11/15 12/7   Total Weekly Dose 95mg 100mg 80mg  ??? 70mg 90mg 100mg 100mg 100mg  75mg 105mg 100mg   INR 2.6 5.6 1.6 1.6 2.3 2.4 2.8 4.6 7.9 1.9 5.5; 5.7 2.8   Notes   incr GLV, misdose, refuse enox incorrect doses     recalled test strips 2x hold; clinic clinic      Date 1/11/19 1/24 2/8 2/18 3/4 3/26 4/23 5/7 6/3 6/17 6/24 7/11   Total Weekly Dose 100mg 100mg 100mg 105mg 100mg 100mg 0mg unable to confirm 75mg  ?? 95mg 80mg 95mg   INR 2.4 3.9 2.7 3.6 3.2 4.8 1.14 ED, 1.9 HM 3.5 4.9 2.5 2.3 5.4   Notes   LVM  clinic     doxy start 6/13 1x miss; doxy doxy     Date 7/18 7/22 7/25 8/5 8/12 8/15 8/21 8/26 8/30 9/6 9/12 9/17   Total Weekly Dose 75mg 100mg 100mg  ??? 95mg 95mg 75mg 90mg 70mg 90mg 85mg 85mg 75mg   INR 2.0 2.2 4.4 4.0 6.2 3.0 5.0 2.3 3.9 4.2 4.5 3.3   Notes 2x self held   No GLV VPA restarted 1x hold           Date 10/2 10/10 11/4 11/12 11/22 12/10 1/3/20 1/22 1/30 2/3  2/18 2/27   Total Weekly Dose 80mg 80mg 62.5mg 85mg 82.5mg 70mg 72.5mg 72.5mg 82.5mg 77.5mg 75mg 75mg   INR  3.9 3.6 1.7 3.4 4.7 2.4 3.6 1.3 2.3 3.2 3.1 3.2   Notes   1x miss self adj  self adj  lovenox         Date 4/8 4/29 5/18 6/20  8/3 8/17 10/5 10/19 11/11 12/21    Total Weekly Dose 70mg 70mg 65mg 65mg Non- compliant 65mg 65 mg 65mg 65mg 65mg 65mg Non- compliant   INR 4.3 4.1 4.5 3.0  3.0 3.8 3.0 2.9 4.1 2.3    Notes Self adj; Levaquin decr GLV; COVID+  recv'd 6/22; misdose       incr GLV      Date 1/20/21 1/21 1/25 2/1 2/16 3/3 3/22 4/13  4/26 5/14 5/21   Total WeeklyDose 40mg 45mg? 62.5mg 80mg? 80mg   70mg 67.5mg  47.5mg  67.5 mg   INR 1.2 1.2 maia 1.6 2.9 3.7  4.5 3.2 3.3  1.8 4.5 3.1   Notes Self held; augmentin 1x incr dose; lovenox 2x miss,   1x misdose  amox amox  self adj 5x hold, enox enox/norco, augmentin       Date 6/28 8/4 8/6           Total WeeklyDose 67.5 mg BH Barrett   6/30-7/29 [facility] 32.5 mg 47.5mg           INR 3.9  2.2 3.0           Notes  amio init enox enox             Warfarin Dosing During Admission:  Date  7/15 7/16 7/17 7/18 7/19 7/20 7/21 7/22 7/23 7/24 7/25  7/26  7/27  7/28  7/29   INR  1.26 1.39 1.72 2.07 2.29 2.38 2.63 2.68 2.67 2.87  3.05  3 3.24  3.5  4.09   Dose  10 mg 10 mg 10 mg 10 mg 10 mg 12.5 mg 10 mg 10 mg 10 mg 10 mg  10 mg  10 mg  10 ng  hold hold     Phone Interview:  Verbal Release Authorization signed on 11/7/18 -- may speak with Zuleima Alberto (patient's wife)    Tablet Strength:   7.5 mg  Patient Contact: [phone] -- try to call in PM or contact his wife Zuleima at [phone].  His email is [email or web address]    Patient Findings  Positives:  Signs/symptoms of bleeding, Other complaints   Negatives:  Signs/symptoms of thrombosis, Laboratory test error suspected, Change in health, Change in alcohol use, Change in activity, Upcoming invasive procedure, Emergency department visit, Upcoming dental procedure, Missed doses, Extra doses, Change in medications,  Change in diet/appetite, Hospital admission, Bruising   Comments:  Patient reports he injected Lovenox q12h on Wed as directed. He reports injecting Lovenox AM only on Thurs but self discontinued due to hematuria and concern for SUPRAtherapeutic INR. No Lovenox today. Have instructed him to discontinue Lovenox for now. He voiced appreciation for the service we provide and apologized for not taking his anticoagulation therapy serious prior to his recent admit.        Plan:   1. INR is therapeutic and back WNL today at 3.0. After consulting with Do Vargas, PharmD, instructed Mr. Alberto to DECREASE tonight's dose to 3.75mg and then take warfarin 7.5mg oral daily UNTIL RECHECK ONLY  2. Repeat INR on Mon, 8/9. Have stressed the need to check INR on this day and as early in the day as possible. He voiced understanding. Patient has extensive history of non-compliance with requested follow up.  3. Verbal information provided over the phone. Mitchell Alberto RBV dosing instructions, expresses understanding by teach back, and has no further questions at this time.  4. He only has warfarin 7.5 mg tablets; may require new Rx    Sam Burleson CPhT  8/6/2021  16:16 EDT    He will seek medical attention if bleeding occurs.  I, Do Vargas, PharmD, have reviewed the note in full and agree with the assessment and plan.  08/06/21  16:26 EDT

## 2021-08-09 ENCOUNTER — TELEPHONE (OUTPATIENT)
Dept: CARDIOLOGY | Facility: CLINIC | Age: 56
End: 2021-08-09

## 2021-08-09 ENCOUNTER — ANTICOAGULATION VISIT (OUTPATIENT)
Dept: PHARMACY | Facility: HOSPITAL | Age: 56
End: 2021-08-09

## 2021-08-09 ENCOUNTER — TELEPHONE (OUTPATIENT)
Dept: PHARMACY | Facility: HOSPITAL | Age: 56
End: 2021-08-09

## 2021-08-09 DIAGNOSIS — I48.91 ATRIAL FIBRILLATION, UNSPECIFIED TYPE (HCC): Primary | ICD-10-CM

## 2021-08-09 DIAGNOSIS — Z95.2 S/P AVR (AORTIC VALVE REPLACEMENT): ICD-10-CM

## 2021-08-09 LAB — INR PPP: 2.9

## 2021-08-09 NOTE — PROGRESS NOTES
Anticoagulation Clinic - Remote Progress Note  [facility]  Testing frequency: 7 days    Indication: Atrial Fibrillation and Mechanical Aortic Valve; h/o embolic CVA  Referring Provider: Bunny last seen 3/12/21; Next appointment: 6/25/21;  Initial Warfarin Start Date: 11/2012  Planned Duration of Therapy: Life  Goal INR: 2.5-3.5  Current Drug Interactions: escitalopram, aspirin, amiodarone, pantoprazole  CHADS-VASc: 3 (HTN, h/o CVA) - recheck with MD, sees Dr. Davis tomorrow at 1330.    Vit K Diet: patient eats salad 1-3 times a week, goes through spurts when he tries to eat healthier 3/3/2021  Alcohol: none  Tobacco: none       Anticoagulation Clinic INR History:  Date 4/14 4/20 5/1 5/16 5/27 6/7 6/15 6/22 6/25 7/4 7/31 8/27   Total Weekly Dose 80mg 80mg 90mg 90mg 90mg 75mg 95mg 95mg 107.5  mg ??? 105mg  ?? 65mg  ??   INR 2.3 2.0 3.2 2.9 3.2 4.1 3.1 1.3 2.0 2.7 2.7 1.3   Notes    ED levoflox missed; levoflox levoflox levoflox; refuse enox   self adj self adj; miss; enox     Date 8/31 9/4 9/11 9/27 10/2 10/5 10/15 10/26 11/5 11/7 11/15 12/7   Total Weekly Dose 95mg 100mg 80mg  ??? 70mg 90mg 100mg 100mg 100mg  75mg 105mg 100mg   INR 2.6 5.6 1.6 1.6 2.3 2.4 2.8 4.6 7.9 1.9 5.5; 5.7 2.8   Notes   incr GLV, misdose, refuse enox incorrect doses     recalled test strips 2x hold; clinic clinic      Date 1/11/19 1/24 2/8 2/18 3/4 3/26 4/23 5/7 6/3 6/17 6/24 7/11   Total Weekly Dose 100mg 100mg 100mg 105mg 100mg 100mg 0mg unable to confirm 75mg  ?? 95mg 80mg 95mg   INR 2.4 3.9 2.7 3.6 3.2 4.8 1.14 ED, 1.9 HM 3.5 4.9 2.5 2.3 5.4   Notes   LVM  clinic     doxy start 6/13 1x miss; doxy doxy     Date 7/18 7/22 7/25 8/5 8/12 8/15 8/21 8/26 8/30 9/6 9/12 9/17   Total Weekly Dose 75mg 100mg 100mg  ??? 95mg 95mg 75mg 90mg 70mg 90mg 85mg 85mg 75mg   INR 2.0 2.2 4.4 4.0 6.2 3.0 5.0 2.3 3.9 4.2 4.5 3.3   Notes 2x self held   No GLV VPA restarted 1x hold           Date 10/2 10/10 11/4 11/12 11/22 12/10 1/3/20 1/22 1/30 2/3  2/18 2/27   Total Weekly Dose 80mg 80mg 62.5mg 85mg 82.5mg 70mg 72.5mg 72.5mg 82.5mg 77.5mg 75mg 75mg   INR  3.9 3.6 1.7 3.4 4.7 2.4 3.6 1.3 2.3 3.2 3.1 3.2   Notes   1x miss self adj  self adj  lovenox         Date 4/8 4/29 5/18 6/20  8/3 8/17 10/5 10/19 11/11 12/21    Total Weekly Dose 70mg 70mg 65mg 65mg Non- compliant 65mg 65 mg 65mg 65mg 65mg 65mg Non- compliant   INR 4.3 4.1 4.5 3.0  3.0 3.8 3.0 2.9 4.1 2.3    Notes Self adj; Levaquin decr GLV; COVID+  recv'd 6/22; misdose       incr GLV      Date 1/20/21 1/21 1/25 2/1 2/16 3/3 3/22 4/13  4/26 5/14 5/21   Total WeeklyDose 40mg 45mg? 62.5mg 80mg? 80mg   70mg 67.5mg  47.5mg  67.5 mg   INR 1.2 1.2 maia 1.6 2.9 3.7  4.5 3.2 3.3  1.8 4.5 3.1   Notes Self held; augmentin 1x incr dose; lovenox 2x miss,   1x misdose  amox amox  self adj 5x hold, enox enox/norco, augmentin       Date 6/28 8/4 8/6 8/9          Total WeeklyDose 67.5 mg BH Barrett   6/30-7/29 [facility] 32.5 mg 47.5mg 51.25 mg          INR 3.9  2.2 3.0 2.9          Notes  amio init enox enox enox            Warfarin Dosing During Admission:  Date  7/15 7/16 7/17 7/18 7/19 7/20 7/21 7/22 7/23 7/24 7/25  7/26  7/27  7/28  7/29   INR  1.26 1.39 1.72 2.07 2.29 2.38 2.63 2.68 2.67 2.87  3.05  3 3.24  3.5  4.09   Dose  10 mg 10 mg 10 mg 10 mg 10 mg 12.5 mg 10 mg 10 mg 10 mg 10 mg  10 mg  10 mg  10 ng  hold hold     Phone Interview:  Verbal Release Authorization signed on 11/7/18 -- may speak with Zuleima Alberto (patient's wife)    Tablet Strength:   7.5 mg  Patient Contact: [phone] -- try to call in PM or contact his wife Zuleima at [phone].  His email is [email or web address]    Patient Findings  Positives:  Other complaints   Negatives:  Signs/symptoms of thrombosis, Signs/symptoms of bleeding, Laboratory test error suspected, Change in health, Change in alcohol use, Change in activity, Upcoming invasive procedure, Emergency department visit, Upcoming dental procedure, Missed doses, Extra doses, Change  in medications, Change in diet/appetite, Hospital admission, Bruising   Comments:  Continues on amiodarone 200 mg tid. His cardiology appointment today has been rescheduled for Friday but doesn't show for [employer] provider.         Plan:   1. INR is therapeutic today at 2.9. Instructed Mr. Alberto to take warfarin 7.5mg oral daily UNTIL RECHECK ONLY. He may discontinue enoxaparin.   2. Repeat INR on Friday, 8/13. check INR on this day and as early in the day as possible. He voiced understanding. Patient has extensive history of non-compliance with requested follow up.  3. Verbal information provided over the phone. Mitchell Alberto RBV dosing instructions, expresses understanding by teach back, and has no further questions at this time.  4. He only has warfarin 7.5 mg tablets; may require new Rx      Sam Zaldivar, PharmD  8/9/2021  15:47 EDT

## 2021-08-09 NOTE — TELEPHONE ENCOUNTER
Patient just wants to clarify the dose of Amiodarone. Is he just supposed to continue the maintenance dose of 200 mg daily?

## 2021-08-11 NOTE — TELEPHONE ENCOUNTER
I called and spoke with the patient. He is aware of the correct dose of Amiodarone. Although,he states that he has a pin size hole with drainage in the center of his incision. He denies redness, fever, chills, red streaking or swelling. He is going to come in tomorrow for a wound check at 11:00. Scheduling aware.

## 2021-08-12 ENCOUNTER — TELEPHONE (OUTPATIENT)
Dept: CARDIOLOGY | Facility: CLINIC | Age: 56
End: 2021-08-12

## 2021-08-12 RX ORDER — DOXYCYCLINE HYCLATE 100 MG
100 TABLET ORAL 2 TIMES DAILY
Qty: 28 TABLET | Refills: 0 | Status: SHIPPED | OUTPATIENT
Start: 2021-08-12 | End: 2021-08-13 | Stop reason: ALTCHOICE

## 2021-08-12 NOTE — TELEPHONE ENCOUNTER
Called pt no voicemail it would let me send my number to a SMS X2. I also called his emergency contact his wife twice and left a voicemail both times. Pt no showed for his wound check today. Per Dr. Davis he wants the pt to be seen asap and to start him on doxycyline 100mg 1 BID X14 days. I will attempt to call them back this afternoon.

## 2021-08-13 ENCOUNTER — TELEPHONE (OUTPATIENT)
Dept: CARDIOLOGY | Facility: CLINIC | Age: 56
End: 2021-08-13

## 2021-08-13 ENCOUNTER — OFFICE VISIT (OUTPATIENT)
Dept: CARDIOLOGY | Facility: CLINIC | Age: 56
End: 2021-08-13

## 2021-08-13 DIAGNOSIS — I46.9 CARDIAC ARREST (HCC): Primary | ICD-10-CM

## 2021-08-13 PROCEDURE — 99024 POSTOP FOLLOW-UP VISIT: CPT | Performed by: INTERNAL MEDICINE

## 2021-08-13 RX ORDER — DOXYCYCLINE 100 MG/1
100 CAPSULE ORAL 2 TIMES DAILY
Qty: 28 CAPSULE | Refills: 0 | Status: SHIPPED | OUTPATIENT
Start: 2021-08-13 | End: 2021-10-26

## 2021-08-13 NOTE — TELEPHONE ENCOUNTER
Patient called to report he thought his PCP would take his PEG tube in office. He was told today they won't. I looked up in the medical record that Dr. Centeno placed it so he should call the office for f/u. I gave him the office number.

## 2021-08-13 NOTE — PROGRESS NOTES
WOUND CHECK    2021    Mitchell Alberto, : 1965    WOUND CHECK        Patient has fever: 97.1F    Wound Location: L infraclavicular fossa    Dressing Removed [x]        Old Dressing Appearance:  Clean, dry []                 Old, bloody drainage [x]                            Moist, serous drainage []                Moist, thick yellow/green drainage []       Wound Appearance: Redness []                  Drainage []                  Culture obtained []        Color:      Consistency:      Amount:        Gloves used, wound cleansed with sterile 4x4 and peroxide [x]       MD notified [x] MD orders:    Antibiotic started [x]  Doxycycline mono 100mg 1 BID # 28   Other: Dr. Davis saw the pt and steri stripped the incision at the lateral,  distal and medial. Pressure dressage applies with 4X4 and tegaderm. Pt advised that doxycyline may alter his INR. Pt will take his machine with him to the hospital to watch it and call the office on Monday with readings. Pt will also call Dr. Davis on his cell this weekend if his bandage becomes soaked with blood or if he has any other issues.    Appointment for follow-up scheduled for 3 months post procedure []    Future Appointments   Date Time Provider Department Center   2021  2:30 PM WOUND CHECK [facility]   10/29/2021  1:30 PM Low Olivo PA [facility]           Mary Jane Vuong RN, 21      MD Signature:______________________________ Completed By/Date:

## 2021-08-16 ENCOUNTER — TELEPHONE (OUTPATIENT)
Dept: CARDIOLOGY | Facility: CLINIC | Age: 56
End: 2021-08-16

## 2021-08-16 ENCOUNTER — OFFICE VISIT (OUTPATIENT)
Dept: CARDIOLOGY | Facility: CLINIC | Age: 56
End: 2021-08-16

## 2021-08-16 DIAGNOSIS — I46.9 CARDIAC ARREST (HCC): Primary | ICD-10-CM

## 2021-08-16 PROCEDURE — 99024 POSTOP FOLLOW-UP VISIT: CPT | Performed by: INTERNAL MEDICINE

## 2021-08-16 NOTE — TELEPHONE ENCOUNTER
Patient called to report that he woke this morning to his dressing saturated on one side with blood leaking out. He verified he did start his antibiotic and his PEG removal is scheduled for tomorrow morning. I will have the patient to come in today for a wound check with Dr. Davis.

## 2021-08-16 NOTE — PROGRESS NOTES
Dr. Davis at patient's bedside. Patient arrived with tegaderm, 4x4 gauze and steri-strips. Serosanguinous drainage noted on the 4x4 gauze. Left side of tegaderm pulled away from skin. Shoulder area, near axilla on outer edge of dressing has fluid filled blisters. Dr. Davis removed dressing, cleansed with peroxide and 4x4's. Scant sanguinous drainage noted. Steri-strips, 4x4, and Tegaderm reapplied. Blisters left open to air.

## 2021-08-19 ENCOUNTER — TELEPHONE (OUTPATIENT)
Dept: CARDIOLOGY | Facility: CLINIC | Age: 56
End: 2021-08-19

## 2021-08-19 NOTE — TELEPHONE ENCOUNTER
Pt called and his INR machine is broke. He will come to [facility] clinic tomorrow. He has a small amt of dark red blood on his bandaged. It is not soaked. Per GFT pt to stop by and see us while he is here for a wound check. I called Archana Hawk to put him on for 100pm on 8/20/21.

## 2021-08-20 ENCOUNTER — OFFICE VISIT (OUTPATIENT)
Dept: CARDIOLOGY | Facility: CLINIC | Age: 56
End: 2021-08-20

## 2021-08-20 ENCOUNTER — TELEPHONE (OUTPATIENT)
Dept: PHARMACY | Facility: HOSPITAL | Age: 56
End: 2021-08-20

## 2021-08-20 ENCOUNTER — ANTICOAGULATION VISIT (OUTPATIENT)
Dept: PHARMACY | Facility: HOSPITAL | Age: 56
End: 2021-08-20

## 2021-08-20 DIAGNOSIS — Z95.2 S/P AVR (AORTIC VALVE REPLACEMENT): Primary | ICD-10-CM

## 2021-08-20 DIAGNOSIS — I48.91 ATRIAL FIBRILLATION, UNSPECIFIED TYPE (HCC): ICD-10-CM

## 2021-08-20 LAB
INR PPP: 4.8 (ref 0.91–1.09)
PROTHROMBIN TIME: 57.6 SECONDS (ref 10–13.8)

## 2021-08-20 PROCEDURE — 36416 COLLJ CAPILLARY BLOOD SPEC: CPT

## 2021-08-20 PROCEDURE — G0463 HOSPITAL OUTPT CLINIC VISIT: HCPCS

## 2021-08-20 PROCEDURE — 99024 POSTOP FOLLOW-UP VISIT: CPT | Performed by: INTERNAL MEDICINE

## 2021-08-20 PROCEDURE — 85610 PROTHROMBIN TIME: CPT

## 2021-08-20 RX ORDER — WARFARIN SODIUM 7.5 MG/1
TABLET ORAL
COMMUNITY
Start: 2021-08-03 | End: 2022-03-25 | Stop reason: SDUPTHER

## 2021-08-20 NOTE — TELEPHONE ENCOUNTER
Dr. Davis,    We just saw Mr. Alberto today in the warfarin clinic and there appear's to be some confusion around his amiodarone dose. He says he has been taking it twice daily but the most recent telephone encounter from 8/9 says he is supposed to be taking it once daily. Is once daily dosing correct?    He did not have his medication bottles with him so we were unable to do a full medication reconciliation. He will have them during our next encounter on Tuesday next week so the chart should be accurate at that time.

## 2021-08-20 NOTE — PROGRESS NOTES
WOUND CHECK    2021    Mitchell Alberto, : 1965    WOUND CHECK      Patient has fever: [] Temperature if indicated: 98.1F    Wound Location: L infraclavicular fossa    Dressing Removed [x]        Old Dressing Appearance:  Clean, dry []                 Old, bloody drainage [x]                            Moist, serous drainage []                Moist, thick yellow/green drainage []       Wound Appearance: Redness []                  Drainage []                  Culture obtained []        Color:    Consistency:      Amount:          Gloves used, wound cleansed with sterile 4x4 and peroxide [x]       MD notified [] MD orders:     Antibiotic started [x]  If checked, type  Other: Pt still taking his Doxycyline. Incision looks well approximated. Steri strips removed with old dried blood. Pictures sent to Dr. Davis. Pt went to  Anticoag clinic to check INR he is 4.8. Anticoag clinic giving instructions.    Appointment for follow-up scheduled for 3 months post procedure [x]    Future Appointments   Date Time Provider Department Center   2021  2:00 PM ANTI COAG CLINIC  [facility]   10/29/2021  1:30 PM Low Olivo PA [facility]           April  STACY Vuong, 21      MD Signature:______________________________ Completed By/Date:

## 2021-08-20 NOTE — PROGRESS NOTES
Anticoagulation Clinic - Remote Progress Note  [facility]  Testing frequency: 7 days    Indication: Atrial Fibrillation and Mechanical Aortic Valve; h/o embolic CVA  Referring Provider: Bunny last seen 3/12/21; Next appointment: 6/25/21;  Initial Warfarin Start Date: 11/2012  Planned Duration of Therapy: Life  Goal INR: 2.5-3.5  Current Drug Interactions: escitalopram, aspirin, amiodarone, pantoprazole  CHADS-VASc: 3 (HTN, h/o CVA) - recheck with MD, sees Dr. Davis tomorrow at 1330.    Vit K Diet: patient eats salad 1-3 times a week, goes through spurts when he tries to eat healthier 3/3/2021  Alcohol: none  Tobacco: none       Anticoagulation Clinic INR History:  Date 4/14 4/20 5/1 5/16 5/27 6/7 6/15 6/22 6/25 7/4 7/31 8/27   Total Weekly Dose 80mg 80mg 90mg 90mg 90mg 75mg 95mg 95mg 107.5  mg ??? 105mg  ?? 65mg  ??   INR 2.3 2.0 3.2 2.9 3.2 4.1 3.1 1.3 2.0 2.7 2.7 1.3   Notes    ED levoflox missed; levoflox levoflox levoflox; refuse enox   self adj self adj; miss; enox     Date 8/31 9/4 9/11 9/27 10/2 10/5 10/15 10/26 11/5 11/7 11/15 12/7   Total Weekly Dose 95mg 100mg 80mg  ??? 70mg 90mg 100mg 100mg 100mg  75mg 105mg 100mg   INR 2.6 5.6 1.6 1.6 2.3 2.4 2.8 4.6 7.9 1.9 5.5; 5.7 2.8   Notes   incr GLV, misdose, refuse enox incorrect doses     recalled test strips 2x hold; clinic clinic      Date 1/11/19 1/24 2/8 2/18 3/4 3/26 4/23 5/7 6/3 6/17 6/24 7/11   Total Weekly Dose 100mg 100mg 100mg 105mg 100mg 100mg 0mg unable to confirm 75mg  ?? 95mg 80mg 95mg   INR 2.4 3.9 2.7 3.6 3.2 4.8 1.14 ED, 1.9 HM 3.5 4.9 2.5 2.3 5.4   Notes   LVM  clinic     doxy start 6/13 1x miss; doxy doxy     Date 7/18 7/22 7/25 8/5 8/12 8/15 8/21 8/26 8/30 9/6 9/12 9/17   Total Weekly Dose 75mg 100mg 100mg  ??? 95mg 95mg 75mg 90mg 70mg 90mg 85mg 85mg 75mg   INR 2.0 2.2 4.4 4.0 6.2 3.0 5.0 2.3 3.9 4.2 4.5 3.3   Notes 2x self held   No GLV VPA restarted 1x hold           Date 10/2 10/10 11/4 11/12 11/22 12/10 1/3/20 1/22 1/30 2/3  2/18 2/27   Total Weekly Dose 80mg 80mg 62.5mg 85mg 82.5mg 70mg 72.5mg 72.5mg 82.5mg 77.5mg 75mg 75mg   INR  3.9 3.6 1.7 3.4 4.7 2.4 3.6 1.3 2.3 3.2 3.1 3.2   Notes   1x miss self adj  self adj  lovenox         Date 4/8 4/29 5/18 6/20  8/3 8/17 10/5 10/19 11/11 12/21    Total Weekly Dose 70mg 70mg 65mg 65mg Non- compliant 65mg 65 mg 65mg 65mg 65mg 65mg Non- compliant   INR 4.3 4.1 4.5 3.0  3.0 3.8 3.0 2.9 4.1 2.3    Notes Self adj; Levaquin decr GLV; COVID+  recv'd 6/22; misdose       incr GLV      Date 1/20/21 1/21 1/25 2/1 2/16 3/3 3/22 4/13  4/26 5/14 5/21   Total WeeklyDose 40mg 45mg? 62.5mg 80mg? 80mg   70mg 67.5mg  47.5mg  67.5 mg   INR 1.2 1.2 maia 1.6 2.9 3.7  4.5 3.2 3.3  1.8 4.5 3.1   Notes Self held; augmentin 1x incr dose; lovenox 2x miss,   1x misdose  amox amox  self adj 5x hold, enox enox/norco, augmentin       Date 6/28 8/4 8/6 8/9 8/20         Total WeeklyDose 67.5 mg BH Barrett   6/30-7/29 S[facility] 32.5 mg 47.5mg 51.25 mg 52.5mg         INR 3.9  2.2 3.0 2.9 4.8         Notes  amio init enox enox enox            Warfarin Dosing During Admission:  Date  7/15 7/16 7/17 7/18 7/19 7/20 7/21 7/22 7/23 7/24 7/25  7/26  7/27  7/28  7/29   INR  1.26 1.39 1.72 2.07 2.29 2.38 2.63 2.68 2.67 2.87  3.05  3 3.24  3.5  4.09   Dose  10 mg 10 mg 10 mg 10 mg 10 mg 12.5 mg 10 mg 10 mg 10 mg 10 mg  10 mg  10 mg  10 ng  hold hold     Phone Interview:  Verbal Release Authorization signed on 11/7/18 -- may speak with Zuleima Alberto (patient's wife)    Tablet Strength:   7.5 mg  Patient Contact: [phone] -- try to call in PM or contact his wife Zuleima at [phone].  His email is [email or web address]    Patient Findings    Positives:  Change in health, Change in medications, Change in diet/appetite, Hospital admission   Negatives:  Signs/symptoms of thrombosis, Signs/symptoms of bleeding, Laboratory test error suspected, Change in alcohol use, Change in activity, Upcoming invasive procedure, Emergency department  visit, Upcoming dental procedure, Missed doses, Extra doses, Bruising, Other complaints   Comments:  Mr. Alberto returns to the clinic after a prolonged hospital stay for Vfib arrest. Relevant information from hospitalization includes inititation of amiodarone, which is now being tapered, and placement of a BiV ICD. He states he feels a little better every day. He has persistent headaches which are worse in the morning and improve over the course of the day. He does not report having fallen and hitting his head. He still has trouble walking and needs a walker. He is currently completing a 14 day course of doxycycline for his ICD wound (8/12-26).     His wife is admitted to [facility] in the ICU and he has not been able to keep a consistent diet or test his INR regularly. His sister is coming to stay with his wife tonight and he is going to home to sleep in his house tonight. He will be able to get test strips for his home monitor at that time.              Plan:   1. INR is supratherapeutic today at 4.8 after taking 7.5mg daily while unable to check his INR. Instructed Mr. Alberto to  HOLD warfarin today and take warfarin 7.5mg tomorrow, 3.75mg Sunday, and 7.5mg Monday.   2. Repeat INR on Friday, 8/24. check INR on this day and as early in the day as possible. He voiced understanding. Patient has extensive history of non-compliance with requested follow up.  3. Verbal information provided over the phone. Mitchell Alberto RBV dosing instructions, expresses understanding by teach back, and has no further questions at this time.      Thank you,    Brice MatuteD, MARYAM, BCPS  8/20/2021  14:06 EDT     normal...

## 2021-08-24 ENCOUNTER — TELEPHONE (OUTPATIENT)
Dept: PHARMACY | Facility: HOSPITAL | Age: 56
End: 2021-08-24

## 2021-08-25 ENCOUNTER — TELEPHONE (OUTPATIENT)
Dept: CARDIOLOGY | Facility: CLINIC | Age: 56
End: 2021-08-25

## 2021-08-25 NOTE — TELEPHONE ENCOUNTER
Patient states that he has been taking his medications as prescribed and elevating his feet and legs as directed to prevent lower extremity edema. He states that it does help until he stands up and walks around. He states that soon after he walks, his feet are so swollen that they are almost double in size. He feels like he may need to be evaluated and receive IV diuresis. I transferred him to the [facility] to schedule an appointment.

## 2021-08-25 NOTE — TELEPHONE ENCOUNTER
Called and M for Mr. Alberto to call us back. Also called Dipti, patient's sister, who says she talked to him yesterday and would reach out for him to contact us when she could.

## 2021-08-30 ENCOUNTER — TELEPHONE (OUTPATIENT)
Dept: PHARMACY | Facility: HOSPITAL | Age: 56
End: 2021-08-30

## 2021-08-30 ENCOUNTER — OFFICE VISIT (OUTPATIENT)
Dept: CARDIOLOGY | Facility: HOSPITAL | Age: 56
End: 2021-08-30

## 2021-08-30 VITALS
WEIGHT: 315 LBS | DIASTOLIC BLOOD PRESSURE: 79 MMHG | TEMPERATURE: 97.5 F | BODY MASS INDEX: 37.19 KG/M2 | RESPIRATION RATE: 20 BRPM | HEART RATE: 80 BPM | OXYGEN SATURATION: 94 % | HEIGHT: 77 IN | SYSTOLIC BLOOD PRESSURE: 132 MMHG

## 2021-08-30 DIAGNOSIS — I10 ESSENTIAL HYPERTENSION: ICD-10-CM

## 2021-08-30 DIAGNOSIS — I50.811 ACUTE SYSTOLIC RIGHT HEART FAILURE (HCC): Primary | ICD-10-CM

## 2021-08-30 DIAGNOSIS — E78.2 MIXED HYPERLIPIDEMIA: ICD-10-CM

## 2021-08-30 DIAGNOSIS — I48.0 PAF (PAROXYSMAL ATRIAL FIBRILLATION) (HCC): ICD-10-CM

## 2021-08-30 DIAGNOSIS — R06.02 SHORTNESS OF BREATH: ICD-10-CM

## 2021-08-30 LAB
ANION GAP SERPL CALCULATED.3IONS-SCNC: 8.5 MMOL/L (ref 5–15)
BUN SERPL-MCNC: 22 MG/DL (ref 6–20)
BUN/CREAT SERPL: 19.3 (ref 7–25)
CALCIUM SPEC-SCNC: 8.6 MG/DL (ref 8.6–10.5)
CHLORIDE SERPL-SCNC: 104 MMOL/L (ref 98–107)
CO2 SERPL-SCNC: 26.5 MMOL/L (ref 22–29)
CREAT SERPL-MCNC: 1.14 MG/DL (ref 0.76–1.27)
GFR SERPL CREATININE-BSD FRML MDRD: 66 ML/MIN/1.73
GLUCOSE SERPL-MCNC: 110 MG/DL (ref 65–99)
NT-PROBNP SERPL-MCNC: 1308 PG/ML (ref 0–900)
POTASSIUM SERPL-SCNC: 4.1 MMOL/L (ref 3.5–5.2)
SODIUM SERPL-SCNC: 139 MMOL/L (ref 136–145)

## 2021-08-30 PROCEDURE — 80048 BASIC METABOLIC PNL TOTAL CA: CPT | Performed by: NURSE PRACTITIONER

## 2021-08-30 PROCEDURE — 99214 OFFICE O/P EST MOD 30 MIN: CPT | Performed by: NURSE PRACTITIONER

## 2021-08-30 PROCEDURE — 83880 ASSAY OF NATRIURETIC PEPTIDE: CPT | Performed by: NURSE PRACTITIONER

## 2021-08-30 NOTE — PROGRESS NOTES
"Chief Complaint  Edema and Follow-up    Subjective    History of Present Illness {CC  Problem List  Visit  Diagnosis   Encounters  Notes  Medications  Labs  Result Review Imaging  Media :23}       History of Present Illness   56-year-old male presents the office today for ongoing evaluation of his acute on chronic systolic heart failure.  He reports worsening dyspnea, abdominal fullness and pedal edema over the last few weeks.  He does note compliance with his medications.  He reports that his wife is currently in the ICU at [facility] and is not expected to make it.  Patient underwent a radiofrequency ablation of the AV node per Dr. Davis and implantation of biventricular pacemaker/ICD on 7/28/2021.  He reports he has been experiencing pedal edema and heart failure symptoms since the procedure.  Objective     Vital Signs:   Vitals:    08/30/21 0918   BP: 132/79   BP Location: Left arm   Patient Position: Sitting   Cuff Size: Adult   Pulse: 80   Resp: 20   Temp: 97.5 °F (36.4 °C)   TempSrc: Temporal   SpO2: 94%   Weight: (!) 151 kg (332 lb)   Height: 195.6 cm (77.01\")     Body mass index is 39.36 kg/m².  Physical Exam  Vitals and nursing note reviewed.   Constitutional:       Appearance: Normal appearance.   HENT:      Head: Normocephalic.   Eyes:      Pupils: Pupils are equal, round, and reactive to light.   Cardiovascular:      Rate and Rhythm: Normal rate and regular rhythm.      Pulses: Normal pulses.      Heart sounds: Normal heart sounds. No murmur heard.     Pulmonary:      Effort: Pulmonary effort is normal.      Breath sounds: Rales present.   Abdominal:      General: Bowel sounds are normal.      Palpations: Abdomen is soft.   Musculoskeletal:         General: Normal range of motion.      Cervical back: Normal range of motion.      Right lower leg: Edema (2+ pitting ankles to knees ) present.      Left lower leg: Edema (2+ pitting ankles to knees) present.   Skin:     General: Skin is warm " and dry.      Capillary Refill: Capillary refill takes less than 2 seconds.   Neurological:      Mental Status: He is alert and oriented to person, place, and time.   Psychiatric:         Mood and Affect: Mood normal.         Thought Content: Thought content normal.              Result Review  Data Reviewed:{ Labs  Result Review  Imaging  Med Tab  Media :23}     Lab Results   Component Value Date    GLUCOSE 113 (H) 07/29/2021    CALCIUM 9.1 07/29/2021     (L) 07/29/2021    K 4.5 07/29/2021    CO2 28.0 07/29/2021    CL 97 (L) 07/29/2021    BUN 15 07/29/2021    CREATININE 0.90 07/29/2021    EGFRIFNONA 87 07/29/2021    BCR 16.7 07/29/2021    ANIONGAP 7.0 07/29/2021     Lab Results   Component Value Date    WBC 7.61 07/29/2021    HGB 10.3 (L) 07/29/2021    HCT 33.0 (L) 07/29/2021    MCV 86.8 07/29/2021     07/29/2021   labs pending from today     ·   echo: Calculated left ventricular 3D EF = 33% Estimated left ventricular EF = 30% Estimated left ventricular EF was in agreement with the calculated left ventricular EF. Left ventricular ejection fraction appears to be 26 - 30%. Left ventricular systolic function is severely decreased.  · Saline test results are negative.  · There is a mechanical aortic valve prosthesis present.  · The following left ventricular wall segments are hypokinetic: mid anterior, apical anterior, basal anterolateral, mid anterolateral, apical lateral, basal inferolateral, mid inferolateral, apical inferior, mid inferior, apical septal, basal inferoseptal, mid inferoseptal, apex hypokinetic, mid anteroseptal, basal anterior, basal inferior and basal inferoseptal.  · The findings are consistent with dilated cardiomyopathy.  · The left ventricular cavity is borderline dilated.  · Left ventricular wall thickness is consistent with mild concentric hypertrophy.  · Estimated right ventricular systolic pressure from tricuspid regurgitation is normal (<35 mmHg).  · There is (grade 1),  layered plaque in the descending aorta present.  · Normal right ventricular wall thickness, systolic function and septal motion noted; the right ventricular cavity is borderline dilated.  · No evidence of a left atrial appendage thrombus was present.  · No evidence of a patent foramen ovale.  · There is no evidence of pericardial effusion.  · No evidence of pulmonary hypertension is present.  No valvular vegetations demonstrated.     Assessment and Plan {CC Problem List  Visit Diagnosis  ROS  Review (Popup)  [facility] Maintenance  Quality  BestPractice  Medications  SmartSets  SnapShot Encounters  Media :23}   1. Acute systolic right heart failure (CMS/HCC)  IV diuresis today in office. Patient received 2 mg bumex  today through a butterfly in the right hand over slow IV push. During IV diuresis, vitals were monitored and stable. Please see IV diuresis record for those vitals. Patient voided 825 ml in the office prior to discharge from the office. butterfly was d/c'd and area was free of erythema, ecchymosis, or drainage.  Patient was  instructed to record urinary output for the next 24 hours. Patient will receive a follow up call from the HF center in 24 hours to evaluate urinary output and reassess signs and symptoms.   - proBNP; Future  - Basic Metabolic Panel; Future  - proBNP  - Basic Metabolic Panel    2. Shortness of breath     - proBNP; Future  - proBNP    3. PAF (paroxysmal atrial fibrillation) (CMS/HCC)  S/p av node ablation  CHADS-VASc Risk Assessment            4 Total Score    1 CHF    1 Hypertension    2 PRIOR STROKE/TIA/THROMBO        Criteria that do not apply:    Age >/= 75    DM    Vascular Disease    Age 65-74    Sex: Female        Anticoagulated with coumadin and denies any s/s of bleeding     4. Essential hypertension  Well controlled on lisinopril  Continue to monitor     5. Mixed hyperlipidemia  Statin therapy         Follow Up {Instructions Charge Capture  Follow-up Communications  :23}   No follow-ups on file.    Patient was given instructions and counseling regarding his condition or for health maintenance advice. Please see specific information pulled into the AVS if appropriate.  Patient was instructed to call the [facility] with any questions, concerns, or worsening symptoms.    *Please note that portions of this note were completed with a voice recognition program. Efforts were made to edit the dictations, but occasionally words are mistranscribed.

## 2021-08-31 ENCOUNTER — TELEPHONE (OUTPATIENT)
Dept: CARDIOLOGY | Facility: CLINIC | Age: 56
End: 2021-08-31

## 2021-08-31 NOTE — TELEPHONE ENCOUNTER
Called Mr Alberto due to home monitoring hasn't been setup.  Left message for him to call  Dave to get this setup.  I also left my name and number if he has any questions.

## 2021-09-03 ENCOUNTER — ANTICOAGULATION VISIT (OUTPATIENT)
Dept: PHARMACY | Facility: HOSPITAL | Age: 56
End: 2021-09-03

## 2021-09-03 ENCOUNTER — TELEPHONE (OUTPATIENT)
Dept: CARDIOLOGY | Facility: HOSPITAL | Age: 56
End: 2021-09-03

## 2021-09-03 DIAGNOSIS — Z95.2 S/P AVR (AORTIC VALVE REPLACEMENT): Primary | ICD-10-CM

## 2021-09-03 LAB — INR PPP: 2.6

## 2021-09-03 NOTE — TELEPHONE ENCOUNTER
Patient left VM stating he is having problems with fluid. Patient stated he is having SOA as well as swelling in his feet.   Attempted to contact patient, left VM.

## 2021-09-03 NOTE — PROGRESS NOTES
Anticoagulation Clinic - Remote Progress Note  [facility]  Testing frequency: 7 days    Indication: Atrial Fibrillation and Mechanical Aortic Valve; h/o embolic CVA  Referring Provider: Bunny last seen 3/12/21; Next appointment: 6/25/21;  Initial Warfarin Start Date: 11/2012  Planned Duration of Therapy: Life  Goal INR: 2.5-3.5  Current Drug Interactions: escitalopram, aspirin, amiodarone, pantoprazole  CHADS-VASc: 3 (HTN, h/o CVA) - recheck with MD, sees Dr. Davis tomorrow at 1330.    Vit K Diet: patient eats salad 1-3 times a week, goes through spurts when he tries to eat healthier 3/3/2021  Alcohol: none  Tobacco: none       Anticoagulation Clinic INR History:  Date 4/14 4/20 5/1 5/16 5/27 6/7 6/15 6/22 6/25 7/4 7/31 8/27   Total Weekly Dose 80mg 80mg 90mg 90mg 90mg 75mg 95mg 95mg 107.5  mg ??? 105mg  ?? 65mg  ??   INR 2.3 2.0 3.2 2.9 3.2 4.1 3.1 1.3 2.0 2.7 2.7 1.3   Notes    ED levoflox missed; levoflox levoflox levoflox; refuse enox   self adj self adj; miss; enox     Date 8/31 9/4 9/11 9/27 10/2 10/5 10/15 10/26 11/5 11/7 11/15 12/7   Total Weekly Dose 95mg 100mg 80mg  ??? 70mg 90mg 100mg 100mg 100mg  75mg 105mg 100mg   INR 2.6 5.6 1.6 1.6 2.3 2.4 2.8 4.6 7.9 1.9 5.5; 5.7 2.8   Notes   incr GLV, misdose, refuse enox incorrect doses     recalled test strips 2x hold; clinic clinic      Date 1/11/19 1/24 2/8 2/18 3/4 3/26 4/23 5/7 6/3 6/17 6/24 7/11   Total Weekly Dose 100mg 100mg 100mg 105mg 100mg 100mg 0mg unable to confirm 75mg  ?? 95mg 80mg 95mg   INR 2.4 3.9 2.7 3.6 3.2 4.8 1.14 ED, 1.9 HM 3.5 4.9 2.5 2.3 5.4   Notes   LVM  clinic     doxy start 6/13 1x miss; doxy doxy     Date 7/18 7/22 7/25 8/5 8/12 8/15 8/21 8/26 8/30 9/6 9/12 9/17   Total Weekly Dose 75mg 100mg 100mg  ??? 95mg 95mg 75mg 90mg 70mg 90mg 85mg 85mg 75mg   INR 2.0 2.2 4.4 4.0 6.2 3.0 5.0 2.3 3.9 4.2 4.5 3.3   Notes 2x self held   No GLV VPA restarted 1x hold           Date 10/2 10/10 11/4 11/12 11/22 12/10 1/3/20 1/22 1/30 2/3  2/18 2/27   Total Weekly Dose 80mg 80mg 62.5mg 85mg 82.5mg 70mg 72.5mg 72.5mg 82.5mg 77.5mg 75mg 75mg   INR  3.9 3.6 1.7 3.4 4.7 2.4 3.6 1.3 2.3 3.2 3.1 3.2   Notes   1x miss self adj  self adj  lovenox         Date 4/8 4/29 5/18 6/20  8/3 8/17 10/5 10/19 11/11 12/21    Total Weekly Dose 70mg 70mg 65mg 65mg Non- compliant 65mg 65 mg 65mg 65mg 65mg 65mg Non- compliant   INR 4.3 4.1 4.5 3.0  3.0 3.8 3.0 2.9 4.1 2.3    Notes Self adj; Levaquin decr GLV; COVID+  recv'd 6/22; misdose       incr GLV      Date 1/20/21 1/21 1/25 2/1 2/16 3/3 3/22 4/13  4/26 5/14 5/21   Total WeeklyDose 40mg 45mg? 62.5mg 80mg? 80mg   70mg 67.5mg  47.5mg  67.5 mg   INR 1.2 1.2 maia 1.6 2.9 3.7  4.5 3.2 3.3  1.8 4.5 3.1   Notes Self held; augmentin 1x incr dose; lovenox 2x miss,   1x misdose  amox amox  self adj 5x hold, enox enox/norco, augmentin       Date 6/28  8/4 8/6 8/9 8/20 9/3        Total WeeklyDose 67.5 mg BH Barrett   6/30-7/29 [facility] 32.5 mg 47.5mg 51.25 mg 52.5mg 52.5mg?        INR 3.9  2.2 3.0 2.9 4.8 2.6        Notes  amio init enox enox enox            Warfarin Dosing During Admission:  Date  7/15 7/16 7/17 7/18 7/19 7/20 7/21 7/22 7/23 7/24 7/25  7/26  7/27  7/28  7/29   INR  1.26 1.39 1.72 2.07 2.29 2.38 2.63 2.68 2.67 2.87  3.05  3 3.24  3.5  4.09   Dose  10 mg 10 mg 10 mg 10 mg 10 mg 12.5 mg 10 mg 10 mg 10 mg 10 mg  10 mg  10 mg  10 ng  hold hold     Phone Interview:  Verbal Release Authorization signed on 11/7/18 -- may speak with Zuleima Alberto (patient's wife)    Tablet Strength:   7.5 mg  Patient Contact: [phone] -- try to call in PM or contact his wife Zuleima at [phone].  His email is [email or web address]    Patient Findings:  Positives:  Extra doses   Negatives:  Signs/symptoms of thrombosis, Signs/symptoms of bleeding, Laboratory test error suspected, Change in health, Change in alcohol use, Change in activity, Upcoming invasive procedure, Emergency department visit, Upcoming dental procedure, Missed  "doses, Change in medications, Change in diet/appetite, Hospital admission, Bruising, Other complaints   Comments:  Mr. Alberto called in his INR today and reported that he did not have a lot of time and requested the encounter be completed as quickly as possible. When asked what dose he took he stated \"one tablet a day\" and he was frustrated when I attempted to clarify (he was instructed to take one tablet daily except one-half tablet on Friday). He refused to clarify past doses and stated that he only needed me to tell him his dosing going forward. I am still uncertain of what dose he took last Friday, but as the only information I could obtain was \"one tablet a day\". I instructed him to continue this dose as he was therapeutic. He is agreeable to recheck his INR again next week and states he plans to be more compliant with follow-ups.       Plan:   1. INR is therapeutic today at 2.6 (goal 2.5-3.5). Instructed Mr. Alberto to continue taking warfarin 7.5mg daily until recheck.  2. Repeat INR on 9/7. Patient has extensive history of non-compliance with requested follow up.  3. Verbal information provided over the phone. Mitchell Alberto RBV dosing instructions, expresses understanding by teach back, and has no further questions at this time.    Jinny Silva, Pharmacy Intern  9/3/2021  15:29 EDT      Maybe next week (Friday 9/10) we can get clarification on his Friday dosing. Probably be better to have him take 7.5 mg this Friday as he is on lower end of his goal range and try to see if that is best or what he did last Friday (3.75 mg vs 7.5 mg).  I, Sam Zaldivar, PharmD, have reviewed the note in full and agree with the assessment and plan.  09/03/21  16:47 EDT    "

## 2021-09-27 ENCOUNTER — TELEPHONE (OUTPATIENT)
Dept: CARDIOLOGY | Facility: CLINIC | Age: 56
End: 2021-09-27

## 2021-09-27 NOTE — TELEPHONE ENCOUNTER
Patient called stating that his pharmacy has been requesting refills for the last 3 weeks with no response from our office. He also states that he has been completely out of his medications for the past 3 weeks. He has needed refills on Lipitor, Amiodarone and Lexapro. I explained to him that his PCP would need to refill the Lexapro and evaluate him to make sure that he is taking the correct dose of this medication. He states that he does not have a PCP. I instructed him to establish care with a PCP and  that I would give him one refill of Lexapro, but after that he would have to contact his PCP for further refills.I reassured him that our office sends through refill requests on a daily basis and that I would call [facility] on [facility] to see where they have been sending his refill requests. I reiterated the importance of him taking his medications as prescribed and to let us know before he is completely out of his cardiac medications.  When I spoke with a pharmacy intern at [facility], she said that the last time that they had filled medications for him was on 8/3/21 and that he had not requested any further refills. I went ahead a gave her refills verbally over the phone. I verbally refilled Amiodarone 200 mg TID (#270 with 3 refills), Lipitor 40 mg QHS (#90 with 2 refills) and Lexapro 10 mg daily ( a one month supply with zero refills). I explained to the pharmacy intern that he would need to f/u with his PCP for further refills of Lexapro. She agreed and verbalized understanding.

## 2021-10-01 RX ORDER — BUPROPION HYDROCHLORIDE 100 MG/1
TABLET, EXTENDED RELEASE ORAL
Qty: 60 TABLET | Refills: 3 | OUTPATIENT
Start: 2021-10-01

## 2021-10-04 ENCOUNTER — TELEPHONE (OUTPATIENT)
Dept: CARDIOLOGY | Facility: CLINIC | Age: 56
End: 2021-10-04

## 2021-10-04 RX ORDER — FUROSEMIDE 40 MG/1
TABLET ORAL
Qty: 60 TABLET | Refills: 1 | Status: SHIPPED | OUTPATIENT
Start: 2021-10-04 | End: 2021-10-05 | Stop reason: SDUPTHER

## 2021-10-04 NOTE — TELEPHONE ENCOUNTER
Called patient and asked if he was going to set up his home monitor. He said that he needed to do that today. He also said that he needed an appointment to see the cardiologist. I told him he had an appointment on the 29th of October with Timoteo Olivo. He said he wouldn't be able to wait that long because he was experiencing a lot of edema. I told him that I could transfer him to the heart failure clinic and he could make an appointment. He was agreeable to this. I transferred him.

## 2021-10-05 RX ORDER — FUROSEMIDE 40 MG/1
40 TABLET ORAL 2 TIMES DAILY
Qty: 60 TABLET | Refills: 1 | Status: SHIPPED | OUTPATIENT
Start: 2021-10-05 | End: 2021-12-20

## 2021-10-12 ENCOUNTER — TELEPHONE (OUTPATIENT)
Dept: PHARMACY | Facility: HOSPITAL | Age: 56
End: 2021-10-12

## 2021-10-21 DIAGNOSIS — Z95.2 HX OF AORTIC VALVE REPLACEMENT, MECHANICAL: ICD-10-CM

## 2021-10-21 RX ORDER — WARFARIN SODIUM 5 MG/1
TABLET ORAL
Qty: 15 TABLET | Refills: 0 | Status: SHIPPED | OUTPATIENT
Start: 2021-10-21 | End: 2021-10-26

## 2021-10-26 ENCOUNTER — OFFICE VISIT (OUTPATIENT)
Dept: FAMILY MEDICINE CLINIC | Facility: CLINIC | Age: 56
End: 2021-10-26

## 2021-10-26 VITALS
SYSTOLIC BLOOD PRESSURE: 146 MMHG | HEART RATE: 80 BPM | WEIGHT: 315 LBS | DIASTOLIC BLOOD PRESSURE: 92 MMHG | BODY MASS INDEX: 37.19 KG/M2 | OXYGEN SATURATION: 98 % | HEIGHT: 77 IN

## 2021-10-26 DIAGNOSIS — I48.91 ATRIAL FIBRILLATION, UNSPECIFIED TYPE (HCC): ICD-10-CM

## 2021-10-26 DIAGNOSIS — Z79.899 CONTROLLED SUBSTANCE AGREEMENT SIGNED: ICD-10-CM

## 2021-10-26 DIAGNOSIS — F41.9 ANXIETY: ICD-10-CM

## 2021-10-26 DIAGNOSIS — I10 PRIMARY HYPERTENSION: ICD-10-CM

## 2021-10-26 DIAGNOSIS — E66.01 CLASS 2 SEVERE OBESITY WITH SERIOUS COMORBIDITY AND BODY MASS INDEX (BMI) OF 38.0 TO 38.9 IN ADULT, UNSPECIFIED OBESITY TYPE (HCC): ICD-10-CM

## 2021-10-26 DIAGNOSIS — F32.A DEPRESSION, UNSPECIFIED DEPRESSION TYPE: Primary | ICD-10-CM

## 2021-10-26 DIAGNOSIS — Z95.2 S/P AVR (AORTIC VALVE REPLACEMENT): ICD-10-CM

## 2021-10-26 DIAGNOSIS — Z00.00 ENCOUNTER FOR MEDICAL EXAMINATION TO ESTABLISH CARE: ICD-10-CM

## 2021-10-26 PROBLEM — J96.01 ACUTE RESPIRATORY FAILURE WITH HYPOXIA (HCC): Status: RESOLVED | Noted: 2021-07-03 | Resolved: 2021-10-26

## 2021-10-26 PROBLEM — I49.01 CARDIAC ARREST WITH VENTRICULAR FIBRILLATION: Status: RESOLVED | Noted: 2021-06-30 | Resolved: 2021-10-26

## 2021-10-26 PROBLEM — G93.40 ACUTE ENCEPHALOPATHY: Status: RESOLVED | Noted: 2021-06-30 | Resolved: 2021-10-26

## 2021-10-26 PROBLEM — I46.9 CARDIAC ARREST: Status: RESOLVED | Noted: 2021-06-30 | Resolved: 2021-10-26

## 2021-10-26 PROBLEM — N17.9 AKI (ACUTE KIDNEY INJURY) (HCC): Status: RESOLVED | Noted: 2021-06-30 | Resolved: 2021-10-26

## 2021-10-26 PROBLEM — I46.9 CARDIAC ARREST WITH VENTRICULAR FIBRILLATION (HCC): Status: RESOLVED | Noted: 2021-06-30 | Resolved: 2021-10-26

## 2021-10-26 PROCEDURE — 99215 OFFICE O/P EST HI 40 MIN: CPT | Performed by: STUDENT IN AN ORGANIZED HEALTH CARE EDUCATION/TRAINING PROGRAM

## 2021-10-26 RX ORDER — BUPROPION HYDROCHLORIDE 100 MG/1
TABLET, EXTENDED RELEASE ORAL
COMMUNITY
Start: 2021-09-03 | End: 2021-11-16 | Stop reason: SDUPTHER

## 2021-10-26 RX ORDER — ALPRAZOLAM 0.25 MG/1
0.25 TABLET ORAL 2 TIMES DAILY PRN
Qty: 14 TABLET | Refills: 0 | Status: SHIPPED | OUTPATIENT
Start: 2021-10-26 | End: 2021-12-29

## 2021-10-26 NOTE — ASSESSMENT & PLAN NOTE
Uncontrolled, goal <130/80. Has appointment with cardiology in 3 days and plans to discuss this at that time.

## 2021-10-26 NOTE — ASSESSMENT & PLAN NOTE
Anxiety/Depression is severe and uncontrolled due to patient's recent medical history (cardiac arrest, 4 week hospitalization) and the loss of his wife after a long, complicated lee with esophageal cancer.   -Discussed importance of grieving and healthy coping mechanisms. He has reached out to grief counselor and will let us know if he is unable to establish care within reasonable time frame. He has strong support system. Denies SI.  -Continue Wellbutrin. Will avoid adding additional SSRI at this time to avoid QT prolongation given hx of VF, AF, etc  -Rx for Xanax 0.25mg BID prn.   -As part of this patient's treatment plan I am prescribing controlled substances. The patient has been made aware of appropriate use of such medications, including potential risk of somnolence, limited ability to drive and /or work safely, and potential for dependence or overdose. It has also been made clear that these medications are for use by this patient only, without concomitant use of alcohol or other substances unless prescribed.  Patient has completed prescribing agreement detailing terms of continued prescribing of controlled substances, including monitoring MEERA reports, urine drug screening, and pill counts if necessary. The patient is aware that inappropriate use will result in cessation of prescribing such medications.  MEERA report has been reviewed and scanned into the patient's chart.  History and physical exam exhibit continued safe and appropriate use of controlled substances at this time. This patient appears to have a low risk of dependence at this time. Patient and I discussed that this is a short term prescription and will not be continued beyond 1-2 months. CSA signed and UDS obtained today. Patient was forthcoming with Hydrocodone tablet taken 2 days ago. Will continue drug screens and if inappropriate, stop medication prescription.

## 2021-10-26 NOTE — ASSESSMENT & PLAN NOTE
Patient's (Body mass index is 38.65 kg/m².) Patient aware of necessary lifestyle changes but needs to get past grieving before he can focus on this.

## 2021-10-26 NOTE — PROGRESS NOTES
New Patient Office Visit      Patient Name: Mitchell Alberto  : 1965   MRN: [id number]   Care Team: Patient Care Team:  Latoya Soriano DO as PCP - General (Internal Medicine)  Do Vargas, PharmD as Pharmacist (Pharmacy)  Liane Diaz, PharmD as Pharmacist (Pharmacy)  Sam Zaldivar, PharmD as Pharmacist (Pharmacy)    Chief Complaint:    Chief Complaint   Patient presents with   • Establish Care   • Depression     Wife  two weeks ago.    • Anxiety       History of Present Illness: Mitchell Alberto is a 56 y.o. male with history of aortic valve dissection s/p mechanical AVR , CVA , HFrEF, HTN, AF on warfarin, and VF cardiac arrest 2021 s/p BiV ICD who is here today to establish care. His biggest concern today is anxiety/depression/grief.    Patient reports his wife passed away 2 weeks ago after a long complicated lee with esophageal cancer. 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 He is devastated by this and is emotionally struggling. This is interfering with his ability to care for himself, sleep, and get through the day. He feels significant guilt as he was unable to take care of her when he was personally hospitalized for VF cardiac arrest in 2021, he was hospitalized for several weeks and believes this was the downfall of his wifes health as well. He is motivated to get back on his feet and live life as she would want him to but the grief is overwhelming. He has debilitating episodes of anxiety along with generalized constant anxiety/depression. He admits to previous SI but denies plan, attempt, or current SI. He is compliant with wellbutrin for several years. He has reached out to grief counseling yesterday and is awaiting a call back. He admits to taking a friend's hydrocodone tablet 3 days ago to help him sleep as he hadnt slept in days. Does not have access to this and will not take again. Denies prior history of  substance misuse or alcohol misuse.     Cardiac history: (aortic valve dissection s/p mechanical AVR 2012, CVA 2012, HFrEF, HTN, AF on warfarin, and VF cardiac arrest 6/2021 s/p BiV ICD) - he is compliant with all medications. Managed per cardiology. Has appointment in 3 days. Denies cardiac/pulmonary symptoms.    Subjective      Review of Systems:   Review of Systems - See HPI    Past Medical History:   Past Medical History:   Diagnosis Date   • Abnormal weight loss    • Anxiety    • Aorta aneurysm (HCC)    • Bacterial meningitis     as child   • Bipolar I disorder, single manic episode (HCC)    • Congestive heart failure (HCC)    • Depression    • Obesity    • Shortness of breath    • Stroke (HCC)     Embolic CVA with left upper extremity weakness, November 2012, data deficit: a. Residual left upper extremity weakness.     • Transient cerebral ischemia        Past Surgical History:   Past Surgical History:   Procedure Laterality Date   • ABDOMINAL AORTIC ANEURYSM REPAIR      History of Aortic Aneurysm Repair   • AORTIC VALVE REPAIR/REPLACEMENT      Replacement   • BACK SURGERY     • CARDIAC CATHETERIZATION N/A 7/15/2021    Procedure: LEFT HEART CATH;  Surgeon: Berhane Shrestha MD;  Location:  [facility];  Service: Cardiology;  Laterality: N/A;   • CARDIAC ELECTROPHYSIOLOGY PROCEDURE N/A 7/28/2021    Procedure: EP - DEVICE;  Surgeon: Martin Davis MD;  Location:  [facility];  Service: Cardiology;  Laterality: N/A;   • CARDIAC ELECTROPHYSIOLOGY PROCEDURE N/A 7/28/2021    Procedure: AV Node Ablation;  Surgeon: Martin Davis MD;  Location:  [facility];  Service: Cardiology;  Laterality: N/A;   • HERNIA REPAIR  08/2014    Left inguinal herniorrhaphy   • SKIN CANCER EXCISION  08/2014    Left forehead melanoma excision, Dr. Gisell Kothari, August 2014.    • TRACHEOSTOMY AND PEG TUBE INSERTION N/A 7/13/2021    Procedure: TRACHEOSTOMY AND PERCUTANEOUS ENDOSCOPIC  GASTROSTOMY TUBE INSERTION;  Surgeon: Hayden Centeno MD;  Location: [facility];  Service: General;  Laterality: N/A;       Family History:   Family History   Problem Relation Age of Onset   • Aortic aneurysm Mother    • Arthritis Mother    • Heart disease Mother    • Heart attack Mother    • Hypertension Mother    • Arthritis Father    • Heart disease Father    • Melanoma Father    • Hypertension Father    • Depression Other    • Hypertension Sister    • Heart disease Brother    • Hypertension Brother    • Hypertension Brother        Social History:   Social History     Socioeconomic History   • Marital status:    Tobacco Use   • Smoking status: Never Smoker   • Smokeless tobacco: Never Used   Vaping Use   • Vaping Use: Never used   Substance and Sexual Activity   • Alcohol use: Yes     Comment: very occasional   • Drug use: Yes     Types: Marijuana     Comment: Uses daily   • Sexual activity: Defer       Tobacco History:   Social History     Tobacco Use   Smoking Status Never Smoker   Smokeless Tobacco Never Used       Medications:     Current Outpatient Medications:   •  amiodarone (PACERONE) 200 MG tablet, Take 1 tablet by mouth 3 (Three) Times a Day. (Patient taking differently: Take 200 mg by mouth 2 (Two) Times a Day.), Disp: , Rfl:   •  aspirin 81 MG chewable tablet, Chew 1 tablet Daily. (Patient taking differently: Chew 325 mg Daily.), Disp: , Rfl:   •  atorvastatin (LIPITOR) 40 MG tablet, Take 1 tablet by mouth Every Night., Disp: , Rfl:   •  buPROPion SR (WELLBUTRIN SR) 100 MG 12 hr tablet, , Disp: , Rfl:   •  furosemide (LASIX) 40 MG tablet, Take 1 tablet by mouth 2 (Two) Times a Day., Disp: 60 tablet, Rfl: 1  •  lisinopril (PRINIVIL,ZESTRIL) 40 MG tablet, Take 40 mg by mouth Daily., Disp: , Rfl:   •  pantoprazole (PROTONIX) 40 MG EC tablet, Take 40 mg by mouth Daily., Disp: , Rfl:   •  warfarin (COUMADIN) 7.5 MG tablet, , Disp: , Rfl:   •  ALPRAZolam (Xanax) 0.25 MG tablet, Take 1 tablet by  "mouth 2 (Two) Times a Day As Needed for Anxiety., Disp: 14 tablet, Rfl: 0    Allergies:   Allergies   Allergen Reactions   • Meclizine Hcl Itching       Objective     Physical Exam:  Vital Signs:   Vitals:    10/26/21 0952   BP: 146/92   Pulse: 80   SpO2: 98%   Weight: (!) 148 kg (326 lb)   Height: 195.6 cm (77.01\")   PainSc: 0-No pain     Body mass index is 38.65 kg/m².     Physical Exam  Vitals reviewed.   Constitutional:       Appearance: He is obese.   Cardiovascular:      Rate and Rhythm: Normal rate.   Pulmonary:      Effort: Pulmonary effort is normal. No respiratory distress.   Musculoskeletal:         General: No swelling.   Skin:     General: Skin is warm and dry.   Neurological:      Mental Status: He is alert.   Psychiatric:         Thought Content: Thought content normal.         Judgment: Judgment normal.      Comments: Anxious, tearful, and emotionally distraught with recalling the events surrounding his wife's passing         Assessment / Plan      Assessment/Plan:   Problems Addressed This Visit  Diagnoses and all orders for this visit:    Patient has multiple medical problems requiring attention, however given time restraint and patient's priorities, we spent this visit focusing on depression/anxiety. Will bring patient back in 1 week to reassess and continue discussing other medical problems/primary care needs. Has cardiology appt in 3 days for underlying cardiac conditions, stressed the importance of following up with this visit.     1. Depression, unspecified depression type (Primary)  Assessment & Plan:  Anxiety/Depression is severe and uncontrolled due to patient's recent medical history (cardiac arrest, 4 week hospitalization) and the loss of his wife after a long, complicated lee with esophageal cancer.   -Discussed importance of grieving and healthy coping mechanisms. He has reached out to grief counselor and will let us know if he is unable to establish care within reasonable time " frame. He has strong support system. Denies SI.  -Continue Wellbutrin. Will avoid adding additional SSRI at this time to avoid QT prolongation given hx of VF, AF, etc  -Rx for Xanax 0.25mg BID prn.   -As part of this patient's treatment plan I am prescribing controlled substances. The patient has been made aware of appropriate use of such medications, including potential risk of somnolence, limited ability to drive and /or work safely, and potential for dependence or overdose. It has also been made clear that these medications are for use by this patient only, without concomitant use of alcohol or other substances unless prescribed.  Patient has completed prescribing agreement detailing terms of continued prescribing of controlled substances, including monitoring MEERA reports, urine drug screening, and pill counts if necessary. The patient is aware that inappropriate use will result in cessation of prescribing such medications.  MEERA report has been reviewed and scanned into the patient's chart.  History and physical exam exhibit continued safe and appropriate use of controlled substances at this time. This patient appears to have a low risk of dependence at this time. Patient and I discussed that this is a short term prescription and will not be continued beyond 1-2 months. CSA signed and UDS obtained today. Patient was forthcoming with Hydrocodone tablet taken 2 days ago. Will continue drug screens and if inappropriate, stop medication prescription.         2. Encounter for medical examination to establish care    3. Controlled substance agreement signed  -     Cancel: Compliance Drug Analysis, Ur - Urine, Clean Catch; Future  -     Compliance Drug Analysis, Ur - Urine, Clean Catch; Future  -     Compliance Drug Analysis, Ur - Urine, Clean Catch  -     ALPRAZolam (Xanax) 0.25 MG tablet; Take 1 tablet by mouth 2 (Two) Times a Day As Needed for Anxiety.  Dispense: 14 tablet; Refill: 0    4. Anxiety  -      ALPRAZolam (Xanax) 0.25 MG tablet; Take 1 tablet by mouth 2 (Two) Times a Day As Needed for Anxiety.  Dispense: 14 tablet; Refill: 0    5. Primary hypertension  Assessment & Plan:  Uncontrolled, goal <130/80. Has appointment with cardiology in 3 days and plans to discuss this at that time.      6. Atrial fibrillation, unspecified type (HCC)  Assessment & Plan:  On chronic warfarin, managed per warfarin clinic. Rate controlled today.      7. S/P AVR (aortic valve replacement)    8. Class 2 severe obesity with serious comorbidity and body mass index (BMI) of 38.0 to 38.9 in adult, unspecified obesity type (HCC)  Assessment & Plan:  Patient's (Body mass index is 38.65 kg/m².) Patient aware of necessary lifestyle changes but needs to get past grieving before he can focus on this.          Plan of care reviewed with patient at the conclusion of today's visit. Education was provided regarding diagnosis and management.  Patient verbalizes understanding of and agreement with management plan.      Follow Up:   Return in about 1 week (around 11/2/2021).    I spent >60 minutes caring for Mitchell on this date of service. This time includes time spent by me in the following activities:preparing for the visit, reviewing tests, obtaining and/or reviewing a separately obtained history, performing a medically appropriate examination and/or evaluation , counseling and educating the patient/family/caregiver, documenting information in the medical record and care coordination.      DO SUNIL Antonio RD  [facility]  [address]  [facility] [address]  Fax [phone]  Phone [phone]

## 2021-10-27 RX ORDER — ESCITALOPRAM OXALATE 10 MG/1
TABLET ORAL
Qty: 30 TABLET | OUTPATIENT
Start: 2021-10-27

## 2021-10-28 ENCOUNTER — PATIENT ROUNDING (BHMG ONLY) (OUTPATIENT)
Dept: FAMILY MEDICINE CLINIC | Facility: CLINIC | Age: 56
End: 2021-10-28

## 2021-10-28 NOTE — PROGRESS NOTES
LVM for patient advising that I was calling to welcome him to the practice and inquire about his experience. Left office number for him to call back with any questions, concerns or suggestions.

## 2021-10-29 RX ORDER — WARFARIN SODIUM 5 MG/1
TABLET ORAL
Qty: 10 TABLET | Refills: 0 | Status: SHIPPED | OUTPATIENT
Start: 2021-10-29 | End: 2021-12-29

## 2021-10-31 LAB — DRUGS UR: NORMAL

## 2021-11-02 ENCOUNTER — TELEPHONE (OUTPATIENT)
Dept: FAMILY MEDICINE CLINIC | Facility: CLINIC | Age: 56
End: 2021-11-02

## 2021-11-02 NOTE — TELEPHONE ENCOUNTER
Called to check in with patient. He forgot about appointment today and is planning to reschedule this. He reached out to grief counseling and was able to get an appointment for November 15, however this is frustrating for him because he is currently struggling and was hoping to get in sooner. I told him I would like for him to reschedule with me so we can discuss healthy grieving mechanisms while we await this appointment.

## 2021-11-16 ENCOUNTER — ANTICOAGULATION VISIT (OUTPATIENT)
Dept: PHARMACY | Facility: HOSPITAL | Age: 56
End: 2021-11-16

## 2021-11-16 DIAGNOSIS — Z95.2 S/P AVR (AORTIC VALVE REPLACEMENT): Primary | ICD-10-CM

## 2021-11-16 LAB
INR PPP: 2
INR PPP: 3.9

## 2021-11-16 RX ORDER — WARFARIN SODIUM 7.5 MG/1
TABLET ORAL
Qty: 30 TABLET | Refills: 0 | Status: SHIPPED | OUTPATIENT
Start: 2021-11-16 | End: 2021-12-02

## 2021-11-16 NOTE — PROGRESS NOTES
Anticoagulation Clinic - Remote Progress Note  [facility]  Testing frequency: 7 days    Indication: Atrial Fibrillation and Mechanical Aortic Valve; h/o embolic CVA  Referring Provider: Bunny last seen 3/12/21; Next appointment: 6/25/21;  Initial Warfarin Start Date: 11/2012  Planned Duration of Therapy: Life  Goal INR: 2.5-3.5  Current Drug Interactions: escitalopram, aspirin, amiodarone, pantoprazole  CHADS-VASc: 3 (HTN, h/o CVA) - recheck with MD, sees Dr. Davis tomorrow at 1330.    Vit K Diet: patient eats salad 1-3 times a week, goes through spurts when he tries to eat healthier 3/3/2021  Alcohol: none  Tobacco: none       Anticoagulation Clinic INR History:      Date 10/2 10/10 11/4 11/12 11/22 12/10 1/3/20 1/22 1/30 2/3 2/18 2/27   Total Weekly Dose 80mg 80mg 62.5mg 85mg 82.5mg 70mg 72.5mg 72.5mg 82.5mg 77.5mg 75mg 75mg   INR  3.9 3.6 1.7 3.4 4.7 2.4 3.6 1.3 2.3 3.2 3.1 3.2   Notes   1x miss self adj  self adj  lovenox         Date 4/8 4/29 5/18 6/20  8/3 8/17 10/5 10/19 11/11 12/21    Total Weekly Dose 70mg 70mg 65mg 65mg Non- compliant 65mg 65 mg 65mg 65mg 65mg 65mg Non- compliant   INR 4.3 4.1 4.5 3.0  3.0 3.8 3.0 2.9 4.1 2.3    Notes Self adj; Levaquin decr GLV; COVID+  recv'd 6/22; misdose       incr GLV      Date 1/20/21 1/21 1/25 2/1 2/16 3/3 3/22 4/13  4/26 5/14 5/21   Total WeeklyDose 40mg 45mg? 62.5mg 80mg? 80mg   70mg 67.5mg  47.5mg  67.5 mg   INR 1.2 1.2 maia 1.6 2.9 3.7  4.5 3.2 3.3  1.8 4.5 3.1   Notes Self held; augmentin 1x incr dose; lovenox 2x miss,   1x misdose  amox amox  self adj 5x hold, enox enox/norco, augmentin       Date 6/28  8/4 8/6 8/9 8/20 9/3  11/9 11/16     Total WeeklyDose 67.5 mg BH Barrett   6/30-7/29 SJ CCH 32.5 mg 47.5mg 51.25 mg 52.5mg 52.5mg? Non compliant 35mg? 70mg?     INR 3.9  2.2 3.0 2.9 4.8 2.6  2.0 3.9     Notes  amio init enox enox enox    Rec 11/16        Warfarin Dosing During Admission:  Date  7/15 7/16 7/17 7/18 7/19 7/20 7/21 7/22 7/23 7/24 7/25   7/26 7/27 7/28 7/29   INR  1.26 1.39 1.72 2.07 2.29 2.38 2.63 2.68 2.67 2.87  3.05  3 3.24  3.5  4.09   Dose  10 mg 10 mg 10 mg 10 mg 10 mg 12.5 mg 10 mg 10 mg 10 mg 10 mg  10 mg  10 mg  10 ng  hold hold     Phone Interview:  Verbal Release Authorization signed on 11/7/18 -- may speak with Zuleima Alberto (patient's wife)  Patient's Wife has Passed Away   Tablet Strength:   7.5 mg  Patient Contact: [phone] -- try to call in PM or  [phone].  His email is [email or web address]    Patient Findings    Positives:  Extra doses, Change in medications, Change in diet/appetite   Negatives:  Signs/symptoms of thrombosis, Signs/symptoms of bleeding, Laboratory test error suspected, Change in health, Change in alcohol use, Change in activity, Upcoming invasive procedure, Emergency department visit, Upcoming dental procedure, Missed doses, Hospital admission, Bruising, Other complaints   Comments:  Patient called reporting an INR of 3.9 today from home test. He says he checked last week on Tuesday and it was 2.0 (not reported), and has been taking 2 Warfarin 5 mg tablets/day over the past week (70 mg/wk). Last encounter (9/3), patient was instructed to take 7.5 mg/day. He had run out of 7.5 mg tablets and requests a refill to be sent to his pharmacy. He has not had any GLV over the past week. 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 It has been a stressful past couple of months for him and he is hoping to get back on track.     Will require call-back by Pharmacist to confirm plan.     Plan:   1. INR is SUPRAtherapeutic today at 3.9 (goal 2.5-3.5). Per patient, INR was 2.0 last Tuesday and he changed his dose to 10 mg/day (70 mg/wk)  Instructed Mr. Alberto to go back to previous dose and take warfarin 7.5mg daily until recheck.  2. Repeat INR on 11/22. Patient has extensive history of non-compliance with requested follow up.  3. Patient requests refills for warfarin 7.5 mg to be sent to his  pharmacy ([facility] [address])  4. Verbal information provided over the phone. Mitchell HICKS Remy RBV dosing instructions, expresses understanding by teach back, and has no further questions at this time.    Eddie Taylor,  PharmD Candidate 2022 11/16/21  12:11 EST    Spoke with patient and he reports he was taking 5mg (one tablet) qd prior to INR of 2.0 at which point he doubled his dose and began 10mg qd. Reports his health has not been a concern but he now would like to focus on himself and agreeable to check INR more regularly. Plans to  RX for 7.5mg tablets today. Sent Rx for #30 7.5 mg tablets as pt not agreeable to splitting tablets. Instructed pharmacy to cancel all other RX for warfarin    Sonya Bustillos PharmD.  11/16/21   13:28 EST

## 2021-11-16 NOTE — TELEPHONE ENCOUNTER
Caller: Mitchell Alberto    Relationship: Self    Best call back number: [phone]     Requested Prescriptions:   Requested Prescriptions     Pending Prescriptions Disp Refills   • buPROPion SR (WELLBUTRIN SR) 100 MG 12 hr tablet          Pharmacy where request should be sent: [facility] [address] [place] - [phone] [facility] [phone] FX     Additional details provided by patient: OUT MEDICATION    Does the patient have less than a 3 day supply:  [x] Yes  [] No    Hermilo Guerrier Rep   11/16/21 13:17 EST

## 2021-11-16 NOTE — TELEPHONE ENCOUNTER
Rx Refill Note  Requested Prescriptions     Pending Prescriptions Disp Refills   • buPROPion SR (WELLBUTRIN SR) 100 MG 12 hr tablet        Last office visit with prescribing clinician: 10/26/2021      Next office visit with prescribing clinician: 11/29/2021     CSA: Not on file  UDS: Not on file        Epifanio Godfrey MA  11/16/21, 13:48 EST

## 2021-11-17 RX ORDER — BUPROPION HYDROCHLORIDE 100 MG/1
100 TABLET, EXTENDED RELEASE ORAL 2 TIMES DAILY
Qty: 30 TABLET | Refills: 2 | Status: SHIPPED | OUTPATIENT
Start: 2021-11-17 | End: 2021-12-29

## 2021-11-17 RX ORDER — BUPROPION HYDROCHLORIDE 100 MG/1
TABLET, EXTENDED RELEASE ORAL
Qty: 60 TABLET | OUTPATIENT
Start: 2021-11-17

## 2021-11-23 PROBLEM — Z95.2 PRESENCE OF PROSTHETIC HEART VALVE: Status: ACTIVE | Noted: 2021-08-11

## 2021-11-23 PROBLEM — I25.10 ATHSCL HEART DISEASE OF NATIVE CORONARY ARTERY W/O ANG PCTRS: Status: ACTIVE | Noted: 2021-08-11

## 2021-11-23 PROBLEM — Z79.01 LONG TERM (CURRENT) USE OF ANTICOAGULANTS: Status: ACTIVE | Noted: 2021-08-11

## 2021-11-23 PROBLEM — E66.9 OBESITY, UNSPECIFIED: Status: ACTIVE | Noted: 2021-08-11

## 2021-11-23 PROBLEM — Z48.812 ENCNTR FOR SURGICAL AFTCR FOLLOWING SURGERY ON THE CIRC SYS: Status: ACTIVE | Noted: 2021-08-11

## 2021-11-23 PROBLEM — I48.91 UNSPECIFIED ATRIAL FIBRILLATION: Status: ACTIVE | Noted: 2021-08-11

## 2021-11-23 PROBLEM — Z95.810 PRESENCE OF AUTOMATIC (IMPLANTABLE) CARDIAC DEFIBRILLATOR: Status: ACTIVE | Noted: 2021-08-11

## 2021-11-23 PROBLEM — I50.21 ACUTE SYSTOLIC (CONGESTIVE) HEART FAILURE: Status: ACTIVE | Noted: 2021-08-11

## 2021-11-23 PROBLEM — I69.331: Status: ACTIVE | Noted: 2021-08-11

## 2021-11-23 PROBLEM — I49.01 VENTRICULAR FIBRILLATION (HCC): Status: ACTIVE | Noted: 2021-08-11

## 2021-11-23 PROBLEM — Z87.891 PERSONAL HISTORY OF NICOTINE DEPENDENCE: Status: ACTIVE | Noted: 2021-08-11

## 2021-11-23 PROBLEM — Z91.81 HISTORY OF FALLING: Status: ACTIVE | Noted: 2021-08-11

## 2021-11-23 PROBLEM — Z86.14 PERSONAL HISTORY OF METHICILLIN RESISTANT STAPHYLOCOCCUS AUREUS INFECTION: Status: ACTIVE | Noted: 2021-08-11

## 2021-11-23 PROBLEM — Z51.81 ENCOUNTER FOR THERAPEUTIC DRUG LEVEL MONITORING: Status: ACTIVE | Noted: 2021-08-11

## 2021-11-23 PROBLEM — I11.0 HYPERTENSIVE HEART DISEASE WITH HEART FAILURE (HCC): Status: ACTIVE | Noted: 2021-08-11

## 2021-11-23 PROBLEM — I25.2 OLD MYOCARDIAL INFARCTION: Status: ACTIVE | Noted: 2021-08-11

## 2021-11-23 PROBLEM — Z43.1 ENCOUNTER FOR ATTENTION TO GASTROSTOMY (HCC): Status: ACTIVE | Noted: 2021-08-11

## 2021-11-23 PROBLEM — Z86.74 PERSONAL HISTORY OF SUDDEN CARDIAC ARREST: Status: ACTIVE | Noted: 2021-08-11

## 2021-11-30 ENCOUNTER — TELEPHONE (OUTPATIENT)
Dept: CARDIOLOGY | Facility: CLINIC | Age: 56
End: 2021-11-30

## 2021-11-30 ENCOUNTER — ANTICOAGULATION VISIT (OUTPATIENT)
Dept: PHARMACY | Facility: HOSPITAL | Age: 56
End: 2021-11-30

## 2021-11-30 DIAGNOSIS — Z95.2 S/P AVR (AORTIC VALVE REPLACEMENT): Primary | ICD-10-CM

## 2021-11-30 LAB — INR PPP: 4.3

## 2021-11-30 NOTE — TELEPHONE ENCOUNTER
Called patient to remind him to set up his home monitor. Gave him the phone number to call for assistance.

## 2021-11-30 NOTE — PROGRESS NOTES
Anticoagulation Clinic - Remote Progress Note  [facility]  Testing frequency: 7 days    Indication: Atrial Fibrillation and Mechanical Aortic Valve; h/o embolic CVA  Referring Provider: Bunny last seen 3/12/21; Next appointment: 6/25/21;  Initial Warfarin Start Date: 11/2012  Planned Duration of Therapy: Life  Goal INR: 2.5-3.5  Current Drug Interactions: escitalopram, aspirin, amiodarone, pantoprazole  CHADS-VASc: 3 (HTN, h/o CVA) - recheck with MD, sees Dr. Davis tomorrow at 1330.    Vit K Diet: patient eats salad 1-3 times a week, goes through spurts when he tries to eat healthier 3/3/2021  Alcohol: none  Tobacco: none       Anticoagulation Clinic INR History:      Date 10/2 10/10 11/4 11/12 11/22 12/10 1/3/20 1/22 1/30 2/3 2/18 2/27   Total Weekly Dose 80mg 80mg 62.5mg 85mg 82.5mg 70mg 72.5mg 72.5mg 82.5mg 77.5mg 75mg 75mg   INR  3.9 3.6 1.7 3.4 4.7 2.4 3.6 1.3 2.3 3.2 3.1 3.2   Notes   1x miss self adj  self adj  lovenox         Date 4/8 4/29 5/18 6/20  8/3 8/17 10/5 10/19 11/11 12/21    Total Weekly Dose 70mg 70mg 65mg 65mg Non- compliant 65mg 65 mg 65mg 65mg 65mg 65mg Non- compliant   INR 4.3 4.1 4.5 3.0  3.0 3.8 3.0 2.9 4.1 2.3    Notes Self adj; Levaquin decr GLV; COVID+  recv'd 6/22; misdose       incr GLV      Date 1/20/21 1/21 1/25 2/1 2/16 3/3 3/22 4/13  4/26 5/14 5/21   Total WeeklyDose 40mg 45mg? 62.5mg 80mg? 80mg   70mg 67.5mg  47.5mg  67.5 mg   INR 1.2 1.2 maia 1.6 2.9 3.7  4.5 3.2 3.3  1.8 4.5 3.1   Notes Self held; augmentin 1x incr dose; lovenox 2x miss,   1x misdose  amox amox  self adj 5x hold, enox enox/norco, augmentin       Date 6/28  8/4 8/6 8/9 8/20 9/3  11/9 11/16 11/30    Total WeeklyDose 67.5 mg BH Barrett   6/30-7/29 SJ CCH 32.5 mg 47.5mg 51.25 mg 52.5mg 52.5mg? Non compliant 35mg? 70mg? 52.5mg    INR 3.9  2.2 3.0 2.9 4.8 2.6  2.0 3.9 4.3    Notes  amio init enox enox enox    Rec 11/16        Warfarin Dosing During Admission:  Date  7/15 7/16 7/17 7/18 7/19 7/20 7/21 7/22  7/23 7/24 7/25 7/26 7/27 7/28 7/29   INR  1.26 1.39 1.72 2.07 2.29 2.38 2.63 2.68 2.67 2.87  3.05  3 3.24  3.5  4.09   Dose  10 mg 10 mg 10 mg 10 mg 10 mg 12.5 mg 10 mg 10 mg 10 mg 10 mg  10 mg  10 mg  10 ng  hold hold     Phone Interview:  Verbal Release Authorization signed on 11/7/18 -- Patient's Wife has Passed Away   Tablet Strength:   7.5 mg  Patient Contact: [phone] -- try to call in PM or  His email is [email or web address]    Patient Findings    Positives:  Other complaints   Negatives:  Signs/symptoms of thrombosis, Signs/symptoms of bleeding, Laboratory test error suspected, Change in health, Change in alcohol use, Change in activity, Upcoming invasive procedure, Emergency department visit, Upcoming dental procedure, Missed doses, Extra doses, Change in medications, Change in diet/appetite, Hospital admission, Bruising   Comments:  Reports he has a stomach virus,   Picked up 7.5mg tabs and has been taking 1qd since last encounter         Plan:   1. INR is SUPRAtherapeutic again today at 4.3 (goal 2.5-3.5) Instructed Mr. Alberto to reduce tonight's dose to 3.75mg then continue warfarin 7.5mg daily until recheck.  2. Repeat INR on 12/6, unable to check sooner  3.  Verbal information provided over the phone. Mitchell Alberto RBV dosing instructions, expresses understanding by teach back, and has no further questions at this time.        Sonya Bustillos, PharmD.  11/30/21   13:04 EST

## 2021-12-01 ENCOUNTER — HOSPITAL ENCOUNTER (EMERGENCY)
Facility: HOSPITAL | Age: 56
Discharge: LEFT BEFORE DISCHARGE | End: 2021-12-02
Attending: EMERGENCY MEDICINE | Admitting: EMERGENCY MEDICINE

## 2021-12-01 ENCOUNTER — APPOINTMENT (OUTPATIENT)
Dept: GENERAL RADIOLOGY | Facility: HOSPITAL | Age: 56
End: 2021-12-01

## 2021-12-01 DIAGNOSIS — D62 ACUTE BLOOD LOSS ANEMIA: Primary | ICD-10-CM

## 2021-12-01 DIAGNOSIS — N17.9 ACUTE KIDNEY INJURY (HCC): ICD-10-CM

## 2021-12-01 DIAGNOSIS — R07.9 ACUTE CHEST PAIN: ICD-10-CM

## 2021-12-01 DIAGNOSIS — Z86.79 HISTORY OF CHF (CONGESTIVE HEART FAILURE): ICD-10-CM

## 2021-12-01 DIAGNOSIS — K92.2 ACUTE UPPER GI BLEED: ICD-10-CM

## 2021-12-01 DIAGNOSIS — Z79.01 ANTICOAGULATED ON COUMADIN: ICD-10-CM

## 2021-12-01 LAB
ALBUMIN SERPL-MCNC: 4.2 G/DL (ref 3.5–5.2)
ALBUMIN/GLOB SERPL: 2.3 G/DL
ALP SERPL-CCNC: 63 U/L (ref 39–117)
ALT SERPL W P-5'-P-CCNC: 19 U/L (ref 1–41)
ANION GAP SERPL CALCULATED.3IONS-SCNC: 10 MMOL/L (ref 5–15)
AST SERPL-CCNC: 19 U/L (ref 1–40)
BASOPHILS # BLD AUTO: 0.03 10*3/MM3 (ref 0–0.2)
BASOPHILS NFR BLD AUTO: 0.3 % (ref 0–1.5)
BILIRUB SERPL-MCNC: 0.2 MG/DL (ref 0–1.2)
BUN SERPL-MCNC: 46 MG/DL (ref 6–20)
BUN/CREAT SERPL: 22.8 (ref 7–25)
CALCIUM SPEC-SCNC: 8.6 MG/DL (ref 8.6–10.5)
CHLORIDE SERPL-SCNC: 101 MMOL/L (ref 98–107)
CO2 SERPL-SCNC: 28 MMOL/L (ref 22–29)
CREAT SERPL-MCNC: 2.02 MG/DL (ref 0.76–1.27)
DEPRECATED RDW RBC AUTO: 54.8 FL (ref 37–54)
EOSINOPHIL # BLD AUTO: 0.07 10*3/MM3 (ref 0–0.4)
EOSINOPHIL NFR BLD AUTO: 0.8 % (ref 0.3–6.2)
ERYTHROCYTE [DISTWIDTH] IN BLOOD BY AUTOMATED COUNT: 16.8 % (ref 12.3–15.4)
GFR SERPL CREATININE-BSD FRML MDRD: 34 ML/MIN/1.73
GLOBULIN UR ELPH-MCNC: 1.8 GM/DL
GLUCOSE SERPL-MCNC: 144 MG/DL (ref 65–99)
HCT VFR BLD AUTO: 19.6 % (ref 37.5–51)
HGB BLD-MCNC: 6.1 G/DL (ref 13–17.7)
IMM GRANULOCYTES # BLD AUTO: 0.06 10*3/MM3 (ref 0–0.05)
IMM GRANULOCYTES NFR BLD AUTO: 0.7 % (ref 0–0.5)
INR PPP: 2.46 (ref 0.85–1.16)
LIPASE SERPL-CCNC: 15 U/L (ref 13–60)
LYMPHOCYTES # BLD AUTO: 1.98 10*3/MM3 (ref 0.7–3.1)
LYMPHOCYTES NFR BLD AUTO: 22.8 % (ref 19.6–45.3)
MCH RBC QN AUTO: 27.9 PG (ref 26.6–33)
MCHC RBC AUTO-ENTMCNC: 31.1 G/DL (ref 31.5–35.7)
MCV RBC AUTO: 89.5 FL (ref 79–97)
MONOCYTES # BLD AUTO: 0.72 10*3/MM3 (ref 0.1–0.9)
MONOCYTES NFR BLD AUTO: 8.3 % (ref 5–12)
NEUTROPHILS NFR BLD AUTO: 5.83 10*3/MM3 (ref 1.7–7)
NEUTROPHILS NFR BLD AUTO: 67.1 % (ref 42.7–76)
NRBC BLD AUTO-RTO: 0 /100 WBC (ref 0–0.2)
NT-PROBNP SERPL-MCNC: 322.3 PG/ML (ref 0–900)
PLATELET # BLD AUTO: 248 10*3/MM3 (ref 140–450)
PMV BLD AUTO: 10.8 FL (ref 6–12)
POTASSIUM SERPL-SCNC: 4.4 MMOL/L (ref 3.5–5.2)
PROT SERPL-MCNC: 6 G/DL (ref 6–8.5)
PROTHROMBIN TIME: 25.6 SECONDS (ref 11.4–14.4)
RBC # BLD AUTO: 2.19 10*6/MM3 (ref 4.14–5.8)
SODIUM SERPL-SCNC: 139 MMOL/L (ref 136–145)
TROPONIN T SERPL-MCNC: <0.01 NG/ML (ref 0–0.03)
WBC NRBC COR # BLD: 8.69 10*3/MM3 (ref 3.4–10.8)

## 2021-12-01 PROCEDURE — 99283 EMERGENCY DEPT VISIT LOW MDM: CPT

## 2021-12-01 PROCEDURE — 93005 ELECTROCARDIOGRAM TRACING: CPT | Performed by: EMERGENCY MEDICINE

## 2021-12-01 PROCEDURE — 86923 COMPATIBILITY TEST ELECTRIC: CPT

## 2021-12-01 PROCEDURE — 85025 COMPLETE CBC W/AUTO DIFF WBC: CPT | Performed by: EMERGENCY MEDICINE

## 2021-12-01 PROCEDURE — 85610 PROTHROMBIN TIME: CPT | Performed by: EMERGENCY MEDICINE

## 2021-12-01 PROCEDURE — 83690 ASSAY OF LIPASE: CPT | Performed by: EMERGENCY MEDICINE

## 2021-12-01 PROCEDURE — 93005 ELECTROCARDIOGRAM TRACING: CPT

## 2021-12-01 PROCEDURE — 86900 BLOOD TYPING SEROLOGIC ABO: CPT | Performed by: EMERGENCY MEDICINE

## 2021-12-01 PROCEDURE — 86850 RBC ANTIBODY SCREEN: CPT | Performed by: EMERGENCY MEDICINE

## 2021-12-01 PROCEDURE — 36415 COLL VENOUS BLD VENIPUNCTURE: CPT

## 2021-12-01 PROCEDURE — 80053 COMPREHEN METABOLIC PANEL: CPT | Performed by: EMERGENCY MEDICINE

## 2021-12-01 PROCEDURE — 84484 ASSAY OF TROPONIN QUANT: CPT | Performed by: EMERGENCY MEDICINE

## 2021-12-01 PROCEDURE — 83880 ASSAY OF NATRIURETIC PEPTIDE: CPT | Performed by: EMERGENCY MEDICINE

## 2021-12-01 PROCEDURE — 86901 BLOOD TYPING SEROLOGIC RH(D): CPT | Performed by: EMERGENCY MEDICINE

## 2021-12-01 PROCEDURE — 71045 X-RAY EXAM CHEST 1 VIEW: CPT

## 2021-12-01 RX ORDER — ASPIRIN 81 MG/1
324 TABLET, CHEWABLE ORAL ONCE
Status: DISCONTINUED | OUTPATIENT
Start: 2021-12-01 | End: 2021-12-02 | Stop reason: HOSPADM

## 2021-12-01 RX ORDER — SODIUM CHLORIDE 0.9 % (FLUSH) 0.9 %
10 SYRINGE (ML) INJECTION AS NEEDED
Status: DISCONTINUED | OUTPATIENT
Start: 2021-12-01 | End: 2021-12-02 | Stop reason: HOSPADM

## 2021-12-02 ENCOUNTER — READMISSION MANAGEMENT (OUTPATIENT)
Dept: CALL CENTER | Facility: HOSPITAL | Age: 56
End: 2021-12-02

## 2021-12-02 VITALS
RESPIRATION RATE: 18 BRPM | WEIGHT: 310 LBS | DIASTOLIC BLOOD PRESSURE: 90 MMHG | SYSTOLIC BLOOD PRESSURE: 179 MMHG | TEMPERATURE: 98 F | BODY MASS INDEX: 36.6 KG/M2 | OXYGEN SATURATION: 98 % | HEIGHT: 77 IN | HEART RATE: 80 BPM

## 2021-12-02 PROBLEM — K92.2 GI BLEED: Status: ACTIVE | Noted: 2021-12-02

## 2021-12-02 PROBLEM — I38 VALVULAR HEART DISEASE: Status: ACTIVE | Noted: 2021-12-02

## 2021-12-02 PROBLEM — Z86.79 HISTORY OF VENTRICULAR FIBRILLATION: Status: ACTIVE | Noted: 2021-12-02

## 2021-12-02 PROBLEM — I50.22 CHRONIC SYSTOLIC CHF (CONGESTIVE HEART FAILURE) (HCC): Status: ACTIVE | Noted: 2021-12-02

## 2021-12-02 PROBLEM — Z86.73 HISTORY OF CVA (CEREBROVASCULAR ACCIDENT): Status: ACTIVE | Noted: 2021-12-02

## 2021-12-02 PROBLEM — D64.9 SYMPTOMATIC ANEMIA: Status: ACTIVE | Noted: 2021-12-02

## 2021-12-02 LAB
ABO GROUP BLD: NORMAL
BLD GP AB SCN SERPL QL: NEGATIVE
HOLD SPECIMEN: NORMAL
INR PPP: 2.41 (ref 0.85–1.16)
PROTHROMBIN TIME: 25.3 SECONDS (ref 11.4–14.4)
QT INTERVAL: 460 MS
QTC INTERVAL: 530 MS
RH BLD: POSITIVE
T&S EXPIRATION DATE: NORMAL
TROPONIN T SERPL-MCNC: <0.01 NG/ML (ref 0–0.03)
WHOLE BLOOD HOLD SPECIMEN: NORMAL
WHOLE BLOOD HOLD SPECIMEN: NORMAL

## 2021-12-02 PROCEDURE — P9016 RBC LEUKOCYTES REDUCED: HCPCS

## 2021-12-02 PROCEDURE — 93005 ELECTROCARDIOGRAM TRACING: CPT | Performed by: EMERGENCY MEDICINE

## 2021-12-02 PROCEDURE — 86900 BLOOD TYPING SEROLOGIC ABO: CPT

## 2021-12-02 PROCEDURE — 25010000002 PROTHROMBIN COMPLEX CONC HUMAN 1000 UNITS KIT: Performed by: EMERGENCY MEDICINE

## 2021-12-02 PROCEDURE — 84484 ASSAY OF TROPONIN QUANT: CPT | Performed by: EMERGENCY MEDICINE

## 2021-12-02 PROCEDURE — 36430 TRANSFUSION BLD/BLD COMPNT: CPT

## 2021-12-02 PROCEDURE — 85610 PROTHROMBIN TIME: CPT | Performed by: EMERGENCY MEDICINE

## 2021-12-02 PROCEDURE — 99223 1ST HOSP IP/OBS HIGH 75: CPT | Performed by: INTERNAL MEDICINE

## 2021-12-02 PROCEDURE — 25010000002 PROTHROMBIN COMPLEX CONC HUMAN 500 UNITS KIT: Performed by: EMERGENCY MEDICINE

## 2021-12-02 RX ORDER — BUPROPION HYDROCHLORIDE 100 MG/1
100 TABLET, EXTENDED RELEASE ORAL 2 TIMES DAILY
Status: CANCELLED | OUTPATIENT
Start: 2021-12-02

## 2021-12-02 RX ORDER — ACETAMINOPHEN 325 MG/1
650 TABLET ORAL EVERY 4 HOURS PRN
Status: CANCELLED | OUTPATIENT
Start: 2021-12-02

## 2021-12-02 RX ORDER — WARFARIN SODIUM 7.5 MG/1
TABLET ORAL
Qty: 15 TABLET | Refills: 0 | Status: SHIPPED | OUTPATIENT
Start: 2021-12-02 | End: 2022-01-03 | Stop reason: SDUPTHER

## 2021-12-02 RX ORDER — PANTOPRAZOLE SODIUM 40 MG/10ML
80 INJECTION, POWDER, LYOPHILIZED, FOR SOLUTION INTRAVENOUS ONCE
Status: COMPLETED | OUTPATIENT
Start: 2021-12-02 | End: 2021-12-02

## 2021-12-02 RX ORDER — SODIUM CHLORIDE 0.9 % (FLUSH) 0.9 %
10 SYRINGE (ML) INJECTION AS NEEDED
Status: CANCELLED | OUTPATIENT
Start: 2021-12-02

## 2021-12-02 RX ORDER — ACETAMINOPHEN 160 MG/5ML
650 SOLUTION ORAL EVERY 4 HOURS PRN
Status: CANCELLED | OUTPATIENT
Start: 2021-12-02

## 2021-12-02 RX ORDER — PANTOPRAZOLE SODIUM 40 MG/10ML
40 INJECTION, POWDER, LYOPHILIZED, FOR SOLUTION INTRAVENOUS EVERY 12 HOURS
Status: DISCONTINUED | OUTPATIENT
Start: 2021-12-02 | End: 2021-12-02 | Stop reason: HOSPADM

## 2021-12-02 RX ORDER — ACETAMINOPHEN 650 MG/1
650 SUPPOSITORY RECTAL EVERY 4 HOURS PRN
Status: CANCELLED | OUTPATIENT
Start: 2021-12-02

## 2021-12-02 RX ORDER — ALPRAZOLAM 0.25 MG/1
0.25 TABLET ORAL 2 TIMES DAILY PRN
Status: CANCELLED | OUTPATIENT
Start: 2021-12-02

## 2021-12-02 RX ORDER — SODIUM CHLORIDE 0.9 % (FLUSH) 0.9 %
10 SYRINGE (ML) INJECTION EVERY 12 HOURS SCHEDULED
Status: CANCELLED | OUTPATIENT
Start: 2021-12-02

## 2021-12-02 RX ORDER — AMIODARONE HYDROCHLORIDE 200 MG/1
200 TABLET ORAL 2 TIMES DAILY
Status: CANCELLED | OUTPATIENT
Start: 2021-12-02

## 2021-12-02 RX ADMIN — PANTOPRAZOLE SODIUM 80 MG: 40 INJECTION, POWDER, FOR SOLUTION INTRAVENOUS at 00:37

## 2021-12-02 RX ADMIN — PROTHROMBIN, COAGULATION FACTOR VII HUMAN, COAGULATION FACTOR IX HUMAN, COAGULATION FACTOR X HUMAN, PROTEIN C, PROTEIN S HUMAN, AND WATER 2753 UNITS: KIT at 00:42

## 2021-12-02 NOTE — ED PROVIDER NOTES
[place]    EMERGENCY DEPARTMENT ENCOUNTER      Pt Name: Mitchell Alberto  MRN: [id number]  YOB: 1965  Date of evaluation: 12/1/2021  Provider: Drew Thompson MD    CHIEF COMPLAINT       Chief Complaint   Patient presents with   • Chest Pain         HISTORY OF PRESENT ILLNESS  (Location/Symptom, Timing/Onset, Context/Setting, Quality, Duration, Modifying Factors, Severity.)   Mitchell Alberto is a 56 y.o. male who presents to the emergency department with progressively worsening moderate severity generalized weakness and moderate severity substernal chest pressure rating to bilateral shoulders is worse with exertion over the course the past 3 days as well.  He is also had some shortness of breath and palpitations.  He also notes that he has had some watery emesis as well as some black stools over the course the past 3 days.  He is on Coumadin due to history of mechanical aortic valve replacement.  He notes that yesterday his INR was 4 and so they had him hold his Coumadin today which he has done.  He has no preop prior history of GI bleeding including no history of peptic ulcer disease, AAA, alcohol abuse, or cirrhosis.  He is not complaining about any abdominal pain at this time.      Nursing notes were reviewed.    REVIEW OF SYSTEMS    (2-9 systems for level 4, 10 or more for level 5)   ROS:  General: Weakness  Cardiovascular: Chest pain, palpitations  Respiratory:  No shortness of breath, no cough, no wheezing  Gastrointestinal: Vomiting, black stool  Musculoskeletal:  No muscle pain, no joint pain  Skin:  No rash  Neurologic:  No speech problems, no headache, no extremity numbness, no extremity tingling, no extremity weakness  Psychiatric:  No anxiety  Genitourinary:  No dysuria, no hematuria    Except as noted above the remainder of the review of systems was reviewed and negative.       PAST MEDICAL HISTORY     Past Medical History:   Diagnosis Date   • Abnormal weight loss    • Anxiety    • Aorta  aneurysm (HCC)    • Bacterial meningitis     as child   • Bipolar I disorder, single manic episode (HCC)    • Congestive heart failure (HCC)    • Depression    • Obesity    • Shortness of breath    • Stroke (HCC)     Embolic CVA with left upper extremity weakness, November 2012, data deficit: a. Residual left upper extremity weakness.     • Transient cerebral ischemia          SURGICAL HISTORY       Past Surgical History:   Procedure Laterality Date   • ABDOMINAL AORTIC ANEURYSM REPAIR      History of Aortic Aneurysm Repair   • AORTIC VALVE REPAIR/REPLACEMENT      Replacement   • BACK SURGERY     • CARDIAC CATHETERIZATION N/A 7/15/2021    Procedure: LEFT HEART CATH;  Surgeon: Berhane Shrestha MD;  Location: [facility] CATH INVASIVE LOCATION;  Service: Cardiology;  Laterality: N/A;   • CARDIAC ELECTROPHYSIOLOGY PROCEDURE N/A 7/28/2021    Procedure: EP - DEVICE;  Surgeon: Martin Davis MD;  Location: [facility] EP INVASIVE LOCATION;  Service: Cardiology;  Laterality: N/A;   • CARDIAC ELECTROPHYSIOLOGY PROCEDURE N/A 7/28/2021    Procedure: AV Node Ablation;  Surgeon: Martin Davis MD;  Location: [facility] EP INVASIVE LOCATION;  Service: Cardiology;  Laterality: N/A;   • HERNIA REPAIR  08/2014    Left inguinal herniorrhaphy   • SKIN CANCER EXCISION  08/2014    Left forehead melanoma excision, Dr. Gisell Kothari, August 2014.    • TRACHEOSTOMY AND PEG TUBE INSERTION N/A 7/13/2021    Procedure: TRACHEOSTOMY AND PERCUTANEOUS ENDOSCOPIC GASTROSTOMY TUBE INSERTION;  Surgeon: Hayden Centeno MD;  Location: [facility] OR;  Service: General;  Laterality: N/A;         CURRENT MEDICATIONS       Current Facility-Administered Medications:   •  aspirin chewable tablet 324 mg, 324 mg, Oral, Once, Drew Thompson MD  •  pantoprazole (PROTONIX) injection 80 mg, 80 mg, Intravenous, Once, Drew Thompson MD  •  prothrombin complex conc human (KCentra) IV solution 2,753 Units, 2,753 Units, Intravenous, Once **AND** phytonadione  (AQUA-MEPHYTON, VITAMIN K) 10 mg in sodium chloride 0.9 % 50 mL IVPB, 10 mg, Intravenous, Once **AND** Protime-INR, , , Once **AND** [CANCELED] Protime-INR, , , Once **AND** [START ON 12/3/2021] Protime-INR, , , Once **AND** Monitor for signs for thromboembolic events, , , Per Order Details, Drew Thompson MD  •  sodium chloride 0.9 % flush 10 mL, 10 mL, Intravenous, PRN, Drew Thompson MD    Current Outpatient Medications:   •  ALPRAZolam (Xanax) 0.25 MG tablet, Take 1 tablet by mouth 2 (Two) Times a Day As Needed for Anxiety., Disp: 14 tablet, Rfl: 0  •  amiodarone (PACERONE) 200 MG tablet, Take 1 tablet by mouth 3 (Three) Times a Day. (Patient taking differently: Take 200 mg by mouth 2 (Two) Times a Day.), Disp: , Rfl:   •  aspirin 81 MG chewable tablet, Chew 1 tablet Daily. (Patient taking differently: Chew 325 mg Daily.), Disp: , Rfl:   •  atorvastatin (LIPITOR) 40 MG tablet, Take 1 tablet by mouth Every Night., Disp: , Rfl:   •  buPROPion SR (WELLBUTRIN SR) 100 MG 12 hr tablet, Take 1 tablet by mouth 2 (Two) Times a Day., Disp: 30 tablet, Rfl: 2  •  furosemide (LASIX) 40 MG tablet, Take 1 tablet by mouth 2 (Two) Times a Day., Disp: 60 tablet, Rfl: 1  •  lisinopril (PRINIVIL,ZESTRIL) 40 MG tablet, Take 40 mg by mouth Daily., Disp: , Rfl:   •  pantoprazole (PROTONIX) 40 MG EC tablet, Take 40 mg by mouth Daily., Disp: , Rfl:   •  warfarin (COUMADIN) 5 MG tablet, TAKE 1 AND 1/2 TO 2 TABLETS BY MOUTH DAILY OR AS DIRECTED BY ANTICOAGULATION CLINIC *MUST SEE DOCTOR FOR FURTHER REFILLS*, Disp: 10 tablet, Rfl: 0  •  warfarin (COUMADIN) 7.5 MG tablet, , Disp: , Rfl:   •  warfarin (Coumadin) 7.5 MG tablet, Take 1-1.5 tablets by mouth once daily or as directed by the anticoagulation clinic, Disp: 30 tablet, Rfl: 0    ALLERGIES     Meclizine hcl    FAMILY HISTORY       Family History   Problem Relation Age of Onset   • Aortic aneurysm Mother    • Arthritis Mother    • Heart disease Mother    • Heart attack Mother   "  • Hypertension Mother    • Arthritis Father    • Heart disease Father    • Melanoma Father    • Hypertension Father    • Depression Other    • Hypertension Sister    • Heart disease Brother    • Hypertension Brother    • Hypertension Brother           SOCIAL HISTORY       Social History     Socioeconomic History   • Marital status:    Tobacco Use   • Smoking status: Never Smoker   • Smokeless tobacco: Never Used   Vaping Use   • Vaping Use: Never used   Substance and Sexual Activity   • Alcohol use: Yes     Comment: very occasional   • Drug use: Yes     Types: Marijuana     Comment: Uses daily   • Sexual activity: Defer         PHYSICAL EXAM    (up to 7 for level 4, 8 or more for level 5)     Vitals:    12/01/21 2225   BP: 165/85   BP Location: Right arm   Patient Position: Sitting   Pulse: 79   Resp: 18   Temp: 98.3 °F (36.8 °C)   TempSrc: Oral   SpO2: 94%   Weight: (!) 141 kg (310 lb)   Height: 195.6 cm (77\")       Physical Exam  General: Awake, alert, no acute distress.  HEENT: Conjunctivae normal.  Neck: Trachea midline.  Cardiac: Heart regular rate, rhythm, no murmurs, rubs, or gallops  Lungs: Lungs are clear to auscultation, there is no wheezing, rhonchi, or rales. There is no use of accessory muscles.  Chest wall: There is no tenderness to palpation over the chest wall or over ribs  Abdomen: Abdomen is soft, nontender, nondistended. There are no firm or pulsatile masses, no rebound rigidity or guarding.   Musculoskeletal: No deformity.  Neuro: Alert and oriented x 4.  Dermatology: Skin is warm and dry  Psych: Mentation is grossly normal, cognition is grossly normal. Affect is appropriate.        DIAGNOSTIC RESULTS     EKG: All EKGs are interpreted by the Emergency Department Physician who either signs or Co-signs this chart in the absence of a cardiologist.    ECG 12 Lead   Final Result   Test Reason : chest pain   Blood Pressure :   */*   mmHG   Vent. Rate :  80 BPM     Atrial Rate :  80 BPM      " P-R Int : 158 ms          QRS Dur : 158 ms       QT Int : 460 ms       P-R-T Axes :  16 131 -25 degrees      QTc Int : 530 ms      AV dual-paced rhythm   Abnormal ECG   When compared with ECG of 29-JUL-2021 05:03,   premature ventricular complexes are no longer present   Vent. rate has decreased BY   6 BPM   Confirmed by ILDA FERNÁNDEZ ([id number]) on 12/2/2021 12:28:35 AM      Referred By:            Confirmed By: ILDA FERNÁNDEZ      ECG 12 Lead    (Results Pending)       RADIOLOGY:   Non-plain film images such as CT, Ultrasound and MRI are read by the radiologist. Plain radiographic images are visualized and preliminarily interpreted by the emergency physician with the below findings:      [x] Radiologist's Report Reviewed:  XR Chest 1 View   Final Result   No acute disease.             Signer Name: Carly Otto MD    Signed: 12/1/2021 11:35 PM    Workstation Name: [id number]     [facility]            ED BEDSIDE ULTRASOUND:   Performed by ED Physician - none    LABS:    I have reviewed and interpreted all of the currently available lab results from this visit (if applicable):  Results for orders placed or performed during the hospital encounter of 12/01/21   Troponin    Specimen: Blood   Result Value Ref Range    Troponin T <0.010 0.000 - 0.030 ng/mL   Comprehensive Metabolic Panel    Specimen: Blood   Result Value Ref Range    Glucose 144 (H) 65 - 99 mg/dL    BUN 46 (H) 6 - 20 mg/dL    Creatinine 2.02 (H) 0.76 - 1.27 mg/dL    Sodium 139 136 - 145 mmol/L    Potassium 4.4 3.5 - 5.2 mmol/L    Chloride 101 98 - 107 mmol/L    CO2 28.0 22.0 - 29.0 mmol/L    Calcium 8.6 8.6 - 10.5 mg/dL    Total Protein 6.0 6.0 - 8.5 g/dL    Albumin 4.20 3.50 - 5.20 g/dL    ALT (SGPT) 19 1 - 41 U/L    AST (SGOT) 19 1 - 40 U/L    Alkaline Phosphatase 63 39 - 117 U/L    Total Bilirubin 0.2 0.0 - 1.2 mg/dL    eGFR Non African Amer 34 (L) >60 mL/min/1.73    Globulin 1.8 gm/dL    A/G Ratio 2.3 g/dL    BUN/Creatinine  Ratio 22.8 7.0 - 25.0    Anion Gap 10.0 5.0 - 15.0 mmol/L   Lipase    Specimen: Blood   Result Value Ref Range    Lipase 15 13 - 60 U/L   BNP    Specimen: Blood   Result Value Ref Range    proBNP 322.3 0.0 - 900.0 pg/mL   CBC Auto Differential    Specimen: Blood   Result Value Ref Range    WBC 8.69 3.40 - 10.80 10*3/mm3    RBC 2.19 (L) 4.14 - 5.80 10*6/mm3    Hemoglobin 6.1 (C) 13.0 - 17.7 g/dL    Hematocrit 19.6 (C) 37.5 - 51.0 %    MCV 89.5 79.0 - 97.0 fL    MCH 27.9 26.6 - 33.0 pg    MCHC 31.1 (L) 31.5 - 35.7 g/dL    RDW 16.8 (H) 12.3 - 15.4 %    RDW-SD 54.8 (H) 37.0 - 54.0 fl    MPV 10.8 6.0 - 12.0 fL    Platelets 248 140 - 450 10*3/mm3    Neutrophil % 67.1 42.7 - 76.0 %    Lymphocyte % 22.8 19.6 - 45.3 %    Monocyte % 8.3 5.0 - 12.0 %    Eosinophil % 0.8 0.3 - 6.2 %    Basophil % 0.3 0.0 - 1.5 %    Immature Grans % 0.7 (H) 0.0 - 0.5 %    Neutrophils, Absolute 5.83 1.70 - 7.00 10*3/mm3    Lymphocytes, Absolute 1.98 0.70 - 3.10 10*3/mm3    Monocytes, Absolute 0.72 0.10 - 0.90 10*3/mm3    Eosinophils, Absolute 0.07 0.00 - 0.40 10*3/mm3    Basophils, Absolute 0.03 0.00 - 0.20 10*3/mm3    Immature Grans, Absolute 0.06 (H) 0.00 - 0.05 10*3/mm3    nRBC 0.0 0.0 - 0.2 /100 WBC   Protime-INR    Specimen: Blood   Result Value Ref Range    Protime 25.6 (H) 11.4 - 14.4 Seconds    INR 2.46 (H) 0.85 - 1.16   ECG 12 Lead   Result Value Ref Range    QT Interval 460 ms    QTC Interval 530 ms   Green Top (Gel)   Result Value Ref Range    Extra Tube Hold for add-ons.    Lavender Top   Result Value Ref Range    Extra Tube hold for add-on    Gold Top - SST   Result Value Ref Range    Extra Tube Hold for add-ons.    Light Blue Top   Result Value Ref Range    Extra Tube hold for add-on         All other labs were within normal range or not returned as of this dictation.      EMERGENCY DEPARTMENT COURSE and DIFFERENTIAL DIAGNOSIS/MDM:   Vitals:    Vitals:    12/01/21 2225   BP: 165/85   BP Location: Right arm   Patient Position:  "Sitting   Pulse: 79   Resp: 18   Temp: 98.3 °F (36.8 °C)   TempSrc: Oral   SpO2: 94%   Weight: (!) 141 kg (310 lb)   Height: 195.6 cm (77\")       ED Course as of 12/02/21 0031   Thu Dec 02, 2021   0023 Spoke w/ Dr. Manning who accepts the pt for admission. [NS]      ED Course User Index  [NS] Drew Thompson MD       Patient presents with findings concerning for acute blood loss anemia secondary to upper GI bleed.  He reports melanotic stool over the course the past 3 days in the setting of Coumadin use and reportedly has been supratherapeutic requiring him to hold today's dose of Coumadin.  I suspect that most of his symptoms including his weakness, chest pain, and palpitations are related to his anemia with a hemoglobin of 6 today.  His initial ECG and troponin demonstrate no evidence of acute myocardial injury.  He does have some acute kidney injury as well with a creatinine of around 2.  I have ordered a 2 unit blood transfusion for him as well as reversal for his Coumadin.  He will be admitted to the hospitalist service for further management.      MEDICATIONS ADMINISTERED IN ED:  Medications   sodium chloride 0.9 % flush 10 mL (has no administration in time range)   aspirin chewable tablet 324 mg (has no administration in time range)   prothrombin complex conc human (KCentra) IV solution 2,753 Units (has no administration in time range)     And   phytonadione (AQUA-MEPHYTON, VITAMIN K) 10 mg in sodium chloride 0.9 % 50 mL IVPB (has no administration in time range)   pantoprazole (PROTONIX) injection 80 mg (has no administration in time range)         CRITICAL CARE TIME    Approximately 35 minutes of discontinuous critical care time was provided to this patient by myself absent of any time spent performing procedures.  Patient presents critically ill with acute upper GI bleed anticoagulated on Coumadin with hemoglobin of 6 placing cardiovascular, respiratory, neurologic, renal systems at risk requiring the " following interventions: Emergent blood transfusion, emergent reversal of Coumadin, interpretation of labs/ECG/imaging, frequent reassessment, coordination of admission with the following response: Improvement in hemoglobin values.  Patient at high risk of deterioration and possibly death without these interventions.        FINAL IMPRESSION      1. Acute blood loss anemia    2. Acute upper GI bleed    3. Anticoagulated on Coumadin    4. Acute chest pain    5. History of CHF (congestive heart failure)    6. Acute kidney injury (HCC)          DISPOSITION/PLAN     ED Disposition     ED Disposition Condition Comment    Decision to Admit  Level of Care: Telemetry [5]   Diagnosis: GI bleed [374479]   Admitting Physician: KISHAN DA SILVA [[id number]]   Attending Physician: KISHAN DA SILVA [[id number]]   Bed Request Comments: junior Thompson MD  Attending Emergency Physician               Drew Thompson MD  12/02/21 0031

## 2021-12-02 NOTE — ED NOTES
Pt has completed 2units.  Pt stating would like to proceed AMA  Provider (Brianda) made aware     Yelena Hay RN  12/02/21 9985

## 2021-12-02 NOTE — H&P
[facility] Medicine Services  HISTORY AND PHYSICAL    Patient Name: Mitchell Alberto  : 1965  MRN: [id number]  Primary Care Physician: System, Provider Not In  Date of admission: 2021    Subjective   Subjective     Chief Complaint:  Vomiting, dark stool, chest pain, weakness, dizziness    HPI:  Mitchell Alberto is a 56 y.o. male with PMH significant for aortic valve dissection s/p mechanical AVR , CVA , CHF, HTN, A. fib on Coumadin, V. fib cardiac arrest 2021 s/p BiV-ICD who presents to the ED with complaint of vomiting, dark stool, chest pain, weakness, dizziness.  He states that 4 days ago he went out to dinner with a friend and then developed nausea, vomiting, and diarrhea.  He states that he thought that he had food poisoning.  He notes that the diarrhea was dark in color.  He states over the last 3 days he has become progressively weaker.  He states that the vomiting has subsided as well as the diarrhea but that the stool is remained dark.  He reports having associated shortness of breath, dizziness, weakness, palpitations.  He denies abdominal pain.  He does note decreased urine output and states that he has been taking double his furosemide.  Upon arrival to the ED, he is noted to be anemic with Hgb 6.1, HCT 19.6.  He also has concern of ZOHAIB.  During admission, patient notes that he is unable to stay at the hospital.  His wife's  is on Friday.  He states he is willing to have the blood transfusions but is unwilling to stay after that due to preparing for and having the .  He will be admitted to hospital medicine for further evaluation.      COVID Details:        Symptoms: [] NONE [] Fever []  Cough [x] Shortness of breath [] Change in taste or smell  The patient has started, but not completed, their COVID-19 vaccination series.    Review of Systems   Constitutional: Positive for activity change, appetite change and fatigue. Negative for chills,  diaphoresis, fever and unexpected weight change.   HENT: Negative.    Eyes: Negative.  Negative for visual disturbance.   Respiratory: Positive for shortness of breath. Negative for cough, chest tightness and wheezing.    Cardiovascular: Positive for chest pain, palpitations and leg swelling.   Gastrointestinal: Positive for blood in stool, diarrhea, nausea and vomiting. Negative for abdominal distention, abdominal pain and constipation.   Endocrine: Negative.    Genitourinary: Positive for decreased urine volume. Negative for difficulty urinating, dysuria, frequency and urgency.   Musculoskeletal: Positive for gait problem. Negative for arthralgias, back pain and myalgias.   Skin: Negative for color change, pallor, rash and wound.   Allergic/Immunologic: Negative.    Neurological: Positive for dizziness, weakness and light-headedness. Negative for syncope, facial asymmetry and headaches.   Hematological: Negative.    Psychiatric/Behavioral: Negative for confusion. The patient is nervous/anxious.         All other systems reviewed and are negative.     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 Residual left upper extremity weakness.     • Transient cerebral ischemia        Past Surgical History:   Procedure Laterality Date   • ABDOMINAL AORTIC ANEURYSM REPAIR      History of Aortic Aneurysm Repair   • AORTIC VALVE REPAIR/REPLACEMENT      Replacement   • BACK SURGERY     • CARDIAC CATHETERIZATION N/A 7/15/2021    Procedure: LEFT HEART CATH;  Surgeon: Berhane Shrestha MD;  Location: [facility] CATH INVASIVE LOCATION;  Service: Cardiology;  Laterality: N/A;   • CARDIAC  ELECTROPHYSIOLOGY PROCEDURE N/A 7/28/2021    Procedure: EP - DEVICE;  Surgeon: Martin Davis MD;  Location:  [facility] EP INVASIVE LOCATION;  Service: Cardiology;  Laterality: N/A;   • CARDIAC ELECTROPHYSIOLOGY PROCEDURE N/A 7/28/2021    Procedure: AV Node Ablation;  Surgeon: Martin Davis MD;  Location:  [facility] EP INVASIVE LOCATION;  Service: Cardiology;  Laterality: N/A;   • HERNIA REPAIR  08/2014    Left inguinal herniorrhaphy   • SKIN CANCER EXCISION  08/2014    Left forehead melanoma excision, Dr. Gisell Kothari, August 2014.    • TRACHEOSTOMY AND PEG TUBE INSERTION N/A 7/13/2021    Procedure: TRACHEOSTOMY AND PERCUTANEOUS ENDOSCOPIC GASTROSTOMY TUBE INSERTION;  Surgeon: Hayden Centeno MD;  Location:  [facility] OR;  Service: General;  Laterality: N/A;       Family History:  family history includes Aortic aneurysm in his mother; Arthritis in his father and mother; Depression in an other family member; Heart attack in his mother; Heart disease in his brother, father, and mother; Hypertension in his brother, brother, father, mother, and sister; Melanoma in his father. Otherwise pertinent FHx was reviewed and unremarkable.     Social History:  reports that he has never smoked. He has never used smokeless tobacco. He reports current alcohol use. He reports current drug use. Drug: Marijuana.  Social History     Social History Narrative    Patient drinks 6-8 caffeine servings per day.       Medications:  ALPRAZolam, amiodarone, aspirin, atorvastatin, buPROPion SR, furosemide, lisinopril, pantoprazole, and warfarin    Allergies   Allergen Reactions   • Meclizine Hcl Itching       Objective   Objective     Vital Signs:   Temp:  [98.3 °F (36.8 °C)] 98.3 °F (36.8 °C)  Heart Rate:  [79-83] 80  Resp:  [16-18] 16  BP: (152-165)/(74-85) 152/74    Physical Exam   Constitutional: Awake, alert  Eyes: PERRLA, sclerae anicteric, no conjunctival injection  HENT: NCAT, mucous membranes moist  Neck: Supple, no thyromegaly, no  lymphadenopathy, trachea midline  Respiratory: Clear to auscultation bilaterally, nonlabored respirations   Cardiovascular: RRR, mechanical valve, palpable pedal pulses bilaterally  Gastrointestinal: Positive bowel sounds, soft, nontender, nondistended  Musculoskeletal: Mild bilateral ankle edema, no clubbing or cyanosis to extremities  Psychiatric: Appropriate affect, cooperative  Neurologic: Oriented x 3, strength symmetric in all extremities, Cranial Nerves grossly intact to confrontation, speech clear  Skin: No rashes      Result Review:  I have personally reviewed the results from the time of this admission to 12/02/21 1:08 AM EST and agree with these findings:  [x]  Laboratory  []  Microbiology  []  Radiology  [x]  EKG/Telemetry   []  Cardiology/Vascular   []  Pathology  [x]  Old records  []  Other:  Most notable findings include:     LAB RESULTS:      Lab 12/02/21 0043 12/01/21 2254 11/30/21  0000   WBC  --  8.69  --    HEMOGLOBIN  --  6.1*  --    HEMATOCRIT  --  19.6*  --    PLATELETS  --  248  --    NEUTROS ABS  --  5.83  --    IMMATURE GRANS (ABS)  --  0.06*  --    LYMPHS ABS  --  1.98  --    MONOS ABS  --  0.72  --    EOS ABS  --  0.07  --    MCV  --  89.5  --    PROTIME 25.3* 25.6*  --    INR 2.41* 2.46* 4.30         Lab 12/01/21 2254   SODIUM 139   POTASSIUM 4.4   CHLORIDE 101   CO2 28.0   ANION GAP 10.0   BUN 46*   CREATININE 2.02*   GLUCOSE 144*   CALCIUM 8.6         Lab 12/01/21 2254   TOTAL PROTEIN 6.0   ALBUMIN 4.20   GLOBULIN 1.8   ALT (SGPT) 19   AST (SGOT) 19   BILIRUBIN 0.2   ALK PHOS 63   LIPASE 15         Lab 12/02/21 0043 12/01/21 2254   PROBNP  --  322.3   TROPONIN T <0.010 <0.010             Lab 12/01/21  2338   ABO TYPING O   RH TYPING Positive   ANTIBODY SCREEN Negative         UA    Urinalysis 7/11/21 7/11/21 7/16/21 7/16/21    1151 1151 1739 1739   Squamous Epithelial Cells, UA  None Seen  0-2   Specific Gravity, UA 1.016  1.019    Ketones, UA Negative  Negative    Blood, UA  Large (3+) (A)  Large (3+) (A)    Leukocytes, UA Trace (A)  Moderate (2+) (A)    Nitrite, UA Negative  Negative    RBC, UA  Too Numerous to Count (A)  Too Numerous to Count (A)   WBC, UA  0-2  21-30 (A)   Bacteria, UA  None Seen  4+ (A)   (A) Abnormal value            Microbiology Results (last 10 days)     ** No results found for the last 240 hours. **          XR Chest 1 View    Result Date: 12/1/2021  PROCEDURE: CR Chest 1 Vw COMPARISON:  7/29/2021 INDICATIONS: Chest Pain triage protocol Relevant clinical info: PACEMAKER IN June 2021, HX OF BYPASS SX 10 YEARS AGO, CHEST PAIN AND SOA, HBP. HX OF mrsa PNEUMONIA TECHNIQUE: Single AP  view of the chest FINDINGS: Patient has had median sternotomy and aortic valve replacement. Pacemaker present. Previous tracheostomy tube is been removed. Cardiomediastinal silhouette is within normal limits given projection.  Lungs are relatively well inflated and clear. Osseous structures are intact.     Impression: No acute disease.  Signer Name: Carly Otto MD  Signed: 12/1/2021 11:35 PM  Workstation Name: [facility]  [facility]      Results for orders placed during the hospital encounter of 06/30/21    Adult Transesophageal Echo (NANCY) W/ Cont if Necessary Per Protocol    Interpretation Summary  · Calculated left ventricular 3D EF = 33% Estimated left ventricular EF = 30% Estimated left ventricular EF was in agreement with the calculated left ventricular EF. Left ventricular ejection fraction appears to be 26 - 30%. Left ventricular systolic function is severely decreased.  · Saline test results are negative.  · There is a mechanical aortic valve prosthesis present.  · The following left ventricular wall segments are hypokinetic: mid anterior, apical anterior, basal anterolateral, mid anterolateral, apical lateral, basal inferolateral, mid inferolateral, apical inferior, mid inferior, apical septal, basal inferoseptal, mid inferoseptal, apex  hypokinetic, mid anteroseptal, basal anterior, basal inferior and basal inferoseptal.  · The findings are consistent with dilated cardiomyopathy.  · The left ventricular cavity is borderline dilated.  · Left ventricular wall thickness is consistent with mild concentric hypertrophy.  · Estimated right ventricular systolic pressure from tricuspid regurgitation is normal (<35 mmHg).  · There is (grade 1), layered plaque in the descending aorta present.  · Normal right ventricular wall thickness, systolic function and septal motion noted; the right ventricular cavity is borderline dilated.  · No evidence of a left atrial appendage thrombus was present.  · No evidence of a patent foramen ovale.  · There is no evidence of pericardial effusion.  · No evidence of pulmonary hypertension is present.  · No valvular vegetations demonstrated.      Assessment/Plan   Assessment & Plan       Symptomatic anemia    Depression    Hypertension    Atrial fibrillation [I48.91]    S/P AVR (aortic valve replacement)    Chronic anticoagulation    Mechanical heart valve present    Presence of automatic (implantable) cardiac defibrillator    GI bleed    History of ventricular fibrillation    History of CVA (cerebrovascular accident)    Valvular heart disease    Chronic systolic CHF (congestive heart failure) (HCC)    56 y.o. male with PMH significant for aortic valve dissection s/p mechanical AVR , CVA , CHF, HTN, A. fib on Coumadin, V. fib cardiac arrest 2021 s/p BiV-ICD who presents to the ED with complaint of vomiting, dark stool, chest pain, weakness, dizziness who was found to have concern for symptomatic anemia secondary to GI bleed.    Symptomatic anemia  GI bleed  -Kcentra given in the ED for Coumadin reversal  -2 units PRBC ordered  -Protonix  80 mg now and 40 mg twice daily  -N.p.o.  -H&H posttransfusion  -Occult stool  GI consult in the a.m.  --patient plans to leave [place] for his wifes  after blood administration,  Explained the severity of leaving including death, severe stroke, and continued bleeding. Patient verbalized understanding and continues to want to leave. He is AAOx3. Witnessed by Dr. Thompson, Dara TURNER, and myself.  I have advised him that he likely has an upper GI bleed and that he should take omeprazole 40mg BID (manual script wrote out) for atleast 4-6 weeks and follow up with GI.  I have advised him to return to the ER after his wifes  for EGD and to determine when we can safely restart anticoagulation. He verbalizes understanding.      Atrial fibrillation  Mechanical aortic valve replacement  Chronic anticoagulation  -Hold anticoagulation now secondary to GI bleed  -PT/INR monitored per pharmacy  -Cardiology consult in the a.m. for recommendations on anticoagulation  -Continue amiodarone    ZOHAIB  -Patient reports he has been taking double his furosemide  -Hold furosemide and lisinopril  -BMP in the a.m.  -Currently getting blood transfusion  -Urine studies    History of V. fib cardiac arrest  -S/p BiV ICD    History CVA  -Hold ASA for now    Depression/anxiety  -Continue as needed Xanax    Hypertension  -Hold lisinopril secondary to ZOHAIB      DVT prophylaxis: Hold Coumadin secondary to GI bleed, no SCDs secondary to heart failure    CODE STATUS:   Code Status (Patient has no pulse and is not breathing): CPR (Attempt to Resuscitate)  Medical Interventions (Patient has pulse or is breathing): Full Support      This note has been completed as part of a split-shared workflow.   Signature:   Electronically signed by YUE Armstrong, 21, 1:36 AM EST.        Attending   Admission Attestation       I have seen and examined the patient, performing an independent face-to-face diagnostic evaluation with plan of care reviewed and developed with the advanced practice clinician (APC).      Brief Summary Statement:   Mitchell Alberto is a 56 y.o. male past medical history of CVA in 2012, CHF, atrial  fibrillation on chronic Coumadin, hypertension, mechanical aortic valve replacement, V. fib cardiac arrest in 2021 status post AICD placement who presents the ER with complaints of vomiting, exertional chest pain, weakness, dizziness, and melena.    Patient states that 4 days ago he began having nausea, vomiting, and melena.  He has become progressively more weak over that time..  He has been extremely stressed out with an upcoming  for his wife.  Denies any prior history of peptic ulcer disease.  He reports decreased urine output and has been doubling his furosemide.  Work-up in the ER tonight concerning for significant anemia and acute kidney injury.    Patient states that he has been having some palpitations over the past few days.  Also reports exertional dyspnea and fatigue as well as exertional chest pain.  He denies any chest pain currently at rest.  2 units PRBCs have been ordered.  Patient placed on Protonix IV.  Patient given reversal with Kcentra for his INR.  Unfortunately, patient has decided that he will leave AMA after blood administration.  Risks of leaving AMA have been explained to the patient including death and patient has unfortunately decided to leave anyways.    Remainder of detailed HPI is as noted by APC and has been reviewed and/or edited by me for completeness.    Attending Physical Exam:  Constitutional: Awake, alert, pale, appears fatigued, anxious  Eyes: PERRLA, sclerae anicteric, no conjunctival injection  HENT: NCAT, mucous membranes moist  Neck: Supple, no thyromegaly, no lymphadenopathy, trachea midline  Respiratory: Clear to auscultation bilaterally, nonlabored respirations   Cardiovascular: RRR, no murmurs, rubs, or gallops, palpable pedal pulses bilaterally  Gastrointestinal: Positive bowel sounds, soft, nontender, nondistended  Musculoskeletal: No bilateral ankle edema, no clubbing or cyanosis to extremities  Psychiatric: Anxious affect, cooperative  Neurologic:  Oriented x 3, strength symmetric in all extremities, Cranial Nerves grossly intact to confrontation, speech clear  Skin: No rashes      Brief Assessment/Plan :  See detailed assessment and plan developed with APC which I have reviewed and/or edited for completeness.        Admission Status: I believe that this patient meets INPATIENT status due to gi bleed, sx anemia, chest pain.  I feel patient’s risk for adverse outcomes and need for care warrant INPATIENT evaluation and I predict the patient’s care encounter to likely last beyond 2 midnights.        Lucho Manning,   12/02/21

## 2021-12-02 NOTE — OUTREACH NOTE
Prep Survey      Responses   [facility] patient discharged from? [place]   Is LACE score < 7 ? Yes   Emergency Room discharge w/ pulse ox? No   Eligibility [facility]   Date of Admission 21   Date of Discharge 21   Discharge diagnosis Symptomatic Anemia, Blood given,   S/P AVR (aortic valve replacement) GI Bleed   Does the patient have one of the following disease processes/diagnoses(primary or secondary)? Other   General alerts for this patient --  [pt left AMA, to attend his wife ]   Prep survey completed? Yes          Teodora Syed RN

## 2021-12-03 ENCOUNTER — TRANSITIONAL CARE MANAGEMENT TELEPHONE ENCOUNTER (OUTPATIENT)
Dept: CALL CENTER | Facility: HOSPITAL | Age: 56
End: 2021-12-03

## 2021-12-03 LAB
BH BB BLOOD EXPIRATION DATE: NORMAL
BH BB BLOOD EXPIRATION DATE: NORMAL
BH BB BLOOD TYPE BARCODE: 5100
BH BB BLOOD TYPE BARCODE: 5100
BH BB DISPENSE STATUS: NORMAL
BH BB DISPENSE STATUS: NORMAL
BH BB PRODUCT CODE: NORMAL
BH BB PRODUCT CODE: NORMAL
BH BB UNIT NUMBER: NORMAL
BH BB UNIT NUMBER: NORMAL
CROSSMATCH INTERPRETATION: NORMAL
CROSSMATCH INTERPRETATION: NORMAL
QT INTERVAL: 440 MS
QTC INTERVAL: 519 MS
UNIT  ABO: NORMAL
UNIT  ABO: NORMAL
UNIT  RH: NORMAL
UNIT  RH: NORMAL

## 2021-12-03 NOTE — OUTREACH NOTE
Call Center TCM Note      Responses   [facility] patient discharged from? [place]   Does the patient have one of the following disease processes/diagnoses(primary or secondary)? Other   TCM attempt successful? Yes   Call start time 1605   Call end time 1607   Discharge diagnosis Symptomatic Anemia, Blood given,   S/P AVR (aortic valve replacement) GI Bleed   Person spoke with today (if not patient) and relationship patient   Meds reviewed with patient/caregiver? Yes   Is the patient having any side effects they believe may be caused by any medication additions or changes? No   Does the patient have all medications ordered at discharge? N/A   Is the patient taking all medications as directed (includes completed medication regime)? Yes   Does the patient have a primary care provider?  Yes   Does the patient have an appointment with their PCP within 7 days of discharge? Yes   Comments regarding PCP hospital fu appt on 12/6/21 at 9:15 AM   Has the patient kept scheduled appointments due by today? N/A   Psychosocial issues? No   Did the patient receive a copy of their discharge instructions? Yes   Nursing interventions Reviewed instructions with patient   What is the patient's perception of their health status since discharge? Improving   Is the patient/caregiver able to teach back signs and symptoms related to disease process for when to call PCP? Yes   Is the patient/caregiver able to teach back signs and symptoms related to disease process for when to call 911? Yes   Is the patient/caregiver able to teach back the hierarchy of who to call/visit for symptoms/problems? PCP, Specialist, Home health nurse, Urgent Care, ED, 911 Yes   If the patient is a current smoker, are they able to teach back resources for cessation? Not a smoker   TCM call completed? Yes   Wrap up additional comments Pt states he is still not doing well,  still having some chest discomfort. Pt was at a ERN service, and states he has to  get this service finished. Pt verified PCP [facility] appt on 12/6/21. No questions/concerns.          Cheryl Kong RN    12/3/2021, 16:08 EST

## 2021-12-04 ENCOUNTER — APPOINTMENT (OUTPATIENT)
Dept: CT IMAGING | Facility: HOSPITAL | Age: 56
End: 2021-12-04

## 2021-12-04 ENCOUNTER — ANESTHESIA EVENT (OUTPATIENT)
Dept: GASTROENTEROLOGY | Facility: HOSPITAL | Age: 56
End: 2021-12-04

## 2021-12-04 ENCOUNTER — HOSPITAL ENCOUNTER (INPATIENT)
Facility: HOSPITAL | Age: 56
LOS: 1 days | Discharge: LEFT BEFORE DISCHARGE | End: 2021-12-04
Attending: EMERGENCY MEDICINE | Admitting: INTERNAL MEDICINE

## 2021-12-04 ENCOUNTER — ANESTHESIA (OUTPATIENT)
Dept: GASTROENTEROLOGY | Facility: HOSPITAL | Age: 56
End: 2021-12-04

## 2021-12-04 ENCOUNTER — APPOINTMENT (OUTPATIENT)
Dept: GENERAL RADIOLOGY | Facility: HOSPITAL | Age: 56
End: 2021-12-04

## 2021-12-04 VITALS
HEIGHT: 77 IN | SYSTOLIC BLOOD PRESSURE: 150 MMHG | DIASTOLIC BLOOD PRESSURE: 77 MMHG | BODY MASS INDEX: 36.6 KG/M2 | RESPIRATION RATE: 20 BRPM | OXYGEN SATURATION: 97 % | WEIGHT: 310 LBS | TEMPERATURE: 98 F | HEART RATE: 80 BPM

## 2021-12-04 DIAGNOSIS — K92.1 GASTROINTESTINAL HEMORRHAGE WITH MELENA: Primary | ICD-10-CM

## 2021-12-04 DIAGNOSIS — R19.7 DIARRHEA OF PRESUMED INFECTIOUS ORIGIN: ICD-10-CM

## 2021-12-04 DIAGNOSIS — R07.9 CHEST PAIN, UNSPECIFIED TYPE: ICD-10-CM

## 2021-12-04 DIAGNOSIS — N17.9 AKI (ACUTE KIDNEY INJURY) (HCC): ICD-10-CM

## 2021-12-04 DIAGNOSIS — R40.0 SOMNOLENCE: ICD-10-CM

## 2021-12-04 DIAGNOSIS — F12.90 MARIJUANA USE: ICD-10-CM

## 2021-12-04 DIAGNOSIS — Z79.01 CHRONIC ANTICOAGULATION: ICD-10-CM

## 2021-12-04 DIAGNOSIS — R41.82 ALTERED MENTAL STATUS, UNSPECIFIED ALTERED MENTAL STATUS TYPE: ICD-10-CM

## 2021-12-04 DIAGNOSIS — F43.21 GRIEF REACTION: ICD-10-CM

## 2021-12-04 DIAGNOSIS — Z87.898 HISTORY OF BENZODIAZEPINE USE: ICD-10-CM

## 2021-12-04 PROBLEM — I42.0 DILATED CARDIOMYOPATHY: Status: ACTIVE | Noted: 2021-12-02

## 2021-12-04 PROBLEM — E66.9 OBESITY (BMI 30-39.9): Status: ACTIVE | Noted: 2021-08-11

## 2021-12-04 PROBLEM — N39.0 ACUTE UTI (URINARY TRACT INFECTION): Status: ACTIVE | Noted: 2021-12-04

## 2021-12-04 PROBLEM — I50.21 ACUTE SYSTOLIC (CONGESTIVE) HEART FAILURE: Status: RESOLVED | Noted: 2021-08-11 | Resolved: 2021-12-04

## 2021-12-04 LAB
ABO GROUP BLD: NORMAL
ALBUMIN SERPL-MCNC: 4.4 G/DL (ref 3.5–5.2)
ALBUMIN/GLOB SERPL: 1.9 G/DL
ALP SERPL-CCNC: 78 U/L (ref 39–117)
ALT SERPL W P-5'-P-CCNC: 21 U/L (ref 1–41)
AMPHET+METHAMPHET UR QL: NEGATIVE
AMPHETAMINES UR QL: NEGATIVE
ANION GAP SERPL CALCULATED.3IONS-SCNC: 10 MMOL/L (ref 5–15)
ANION GAP SERPL CALCULATED.3IONS-SCNC: 15 MMOL/L (ref 5–15)
APTT PPP: 34.7 SECONDS (ref 50–95)
ARTERIAL PATENCY WRIST A: POSITIVE
AST SERPL-CCNC: 21 U/L (ref 1–40)
ATMOSPHERIC PRESS: ABNORMAL MM[HG]
BACTERIA UR QL AUTO: ABNORMAL /HPF
BARBITURATES UR QL SCN: NEGATIVE
BASE EXCESS BLDA CALC-SCNC: -0.7 MMOL/L (ref 0–2)
BASE EXCESS BLDA CALC-SCNC: 9 MMOL/L (ref -5–5)
BASOPHILS # BLD AUTO: 0.01 10*3/MM3 (ref 0–0.2)
BASOPHILS # BLD AUTO: 0.03 10*3/MM3 (ref 0–0.2)
BASOPHILS NFR BLD AUTO: 0.2 % (ref 0–1.5)
BASOPHILS NFR BLD AUTO: 0.2 % (ref 0–1.5)
BDY SITE: ABNORMAL
BENZODIAZ UR QL SCN: NEGATIVE
BILIRUB SERPL-MCNC: 0.4 MG/DL (ref 0–1.2)
BILIRUB UR QL STRIP: ABNORMAL
BLD GP AB SCN SERPL QL: NEGATIVE
BODY TEMPERATURE: 37 C
BUN SERPL-MCNC: 31 MG/DL (ref 6–20)
BUN SERPL-MCNC: 37 MG/DL (ref 6–20)
BUN/CREAT SERPL: 18.4 (ref 7–25)
BUN/CREAT SERPL: 20.5 (ref 7–25)
BUPRENORPHINE SERPL-MCNC: NEGATIVE NG/ML
CA-I BLDA-SCNC: 1.21 MMOL/L (ref 1.2–1.32)
CALCIUM SPEC-SCNC: 8.9 MG/DL (ref 8.6–10.5)
CALCIUM SPEC-SCNC: 9.4 MG/DL (ref 8.6–10.5)
CANNABINOIDS SERPL QL: POSITIVE
CHLORIDE SERPL-SCNC: 102 MMOL/L (ref 98–107)
CHLORIDE SERPL-SCNC: 104 MMOL/L (ref 98–107)
CLARITY UR: CLEAR
CO2 BLDA-SCNC: 25.6 MMOL/L (ref 22–33)
CO2 BLDA-SCNC: 34 MMOL/L (ref 24–29)
CO2 SERPL-SCNC: 23 MMOL/L (ref 22–29)
CO2 SERPL-SCNC: 26 MMOL/L (ref 22–29)
COCAINE UR QL: NEGATIVE
COHGB MFR BLD: 0.6 % (ref 0–2)
COLOR UR: ABNORMAL
CREAT SERPL-MCNC: 1.51 MG/DL (ref 0.76–1.27)
CREAT SERPL-MCNC: 2.01 MG/DL (ref 0.76–1.27)
D-LACTATE SERPL-SCNC: 1.1 MMOL/L (ref 0.5–2)
DEPRECATED RDW RBC AUTO: 51.3 FL (ref 37–54)
DEPRECATED RDW RBC AUTO: 52.4 FL (ref 37–54)
DEVELOPER EXPIRATION DATE: ABNORMAL
DEVELOPER LOT NUMBER: ABNORMAL
EOSINOPHIL # BLD AUTO: 0.06 10*3/MM3 (ref 0–0.4)
EOSINOPHIL # BLD AUTO: 0.13 10*3/MM3 (ref 0–0.4)
EOSINOPHIL NFR BLD AUTO: 0.9 % (ref 0.3–6.2)
EOSINOPHIL NFR BLD AUTO: 1 % (ref 0.3–6.2)
EPAP: 0
ERYTHROCYTE [DISTWIDTH] IN BLOOD BY AUTOMATED COUNT: 15.4 % (ref 12.3–15.4)
ERYTHROCYTE [DISTWIDTH] IN BLOOD BY AUTOMATED COUNT: 15.9 % (ref 12.3–15.4)
ETHANOL BLD-MCNC: <10 MG/DL (ref 0–10)
EXPIRATION DATE: ABNORMAL
FECAL OCCULT BLOOD SCREEN, POC: POSITIVE
GFR SERPL CREATININE-BSD FRML MDRD: 35 ML/MIN/1.73
GFR SERPL CREATININE-BSD FRML MDRD: 48 ML/MIN/1.73
GLOBULIN UR ELPH-MCNC: 2.3 GM/DL
GLUCOSE BLDC GLUCOMTR-MCNC: 123 MG/DL (ref 70–130)
GLUCOSE SERPL-MCNC: 102 MG/DL (ref 65–99)
GLUCOSE SERPL-MCNC: 114 MG/DL (ref 65–99)
GLUCOSE UR STRIP-MCNC: NEGATIVE MG/DL
HCO3 BLDA-SCNC: 24.4 MMOL/L (ref 20–26)
HCO3 BLDA-SCNC: 32.9 MMOL/L (ref 22–26)
HCT VFR BLD AUTO: 23.7 % (ref 37.5–51)
HCT VFR BLD AUTO: 25.9 % (ref 37.5–51)
HCT VFR BLD AUTO: 25.9 % (ref 37.5–51)
HCT VFR BLD CALC: 22 % (ref 38–51)
HCT VFR BLDA CALC: 23 % (ref 38–51)
HGB BLD-MCNC: 7.5 G/DL (ref 13–17.7)
HGB BLD-MCNC: 8.3 G/DL (ref 13–17.7)
HGB BLD-MCNC: 8.3 G/DL (ref 13–17.7)
HGB BLDA-MCNC: 7.2 G/DL (ref 13.5–17.5)
HGB BLDA-MCNC: 7.8 G/DL (ref 12–17)
HGB UR QL STRIP.AUTO: NEGATIVE
HOLD SPECIMEN: NORMAL
HYALINE CASTS UR QL AUTO: ABNORMAL /LPF
IMM GRANULOCYTES # BLD AUTO: 0.03 10*3/MM3 (ref 0–0.05)
IMM GRANULOCYTES # BLD AUTO: 0.07 10*3/MM3 (ref 0–0.05)
IMM GRANULOCYTES NFR BLD AUTO: 0.5 % (ref 0–0.5)
IMM GRANULOCYTES NFR BLD AUTO: 0.6 % (ref 0–0.5)
INHALED O2 CONCENTRATION: 44 %
INR PPP: 1.31 (ref 0.85–1.16)
INR PPP: 1.35 (ref 0.85–1.16)
IPAP: 0
KETONES UR QL STRIP: ABNORMAL
LEUKOCYTE ESTERASE UR QL STRIP.AUTO: ABNORMAL
LYMPHOCYTES # BLD AUTO: 0.77 10*3/MM3 (ref 0.7–3.1)
LYMPHOCYTES # BLD AUTO: 1.06 10*3/MM3 (ref 0.7–3.1)
LYMPHOCYTES NFR BLD AUTO: 12 % (ref 19.6–45.3)
LYMPHOCYTES NFR BLD AUTO: 8.5 % (ref 19.6–45.3)
Lab: ABNORMAL
MAGNESIUM SERPL-MCNC: 2.2 MG/DL (ref 1.6–2.6)
MCH RBC QN AUTO: 28.3 PG (ref 26.6–33)
MCH RBC QN AUTO: 29.1 PG (ref 26.6–33)
MCHC RBC AUTO-ENTMCNC: 31.6 G/DL (ref 31.5–35.7)
MCHC RBC AUTO-ENTMCNC: 32 G/DL (ref 31.5–35.7)
MCV RBC AUTO: 89.4 FL (ref 79–97)
MCV RBC AUTO: 90.9 FL (ref 79–97)
METHADONE UR QL SCN: NEGATIVE
METHGB BLD QL: 1 % (ref 0–1.5)
MODALITY: ABNORMAL
MONOCYTES # BLD AUTO: 0.52 10*3/MM3 (ref 0.1–0.9)
MONOCYTES # BLD AUTO: 0.72 10*3/MM3 (ref 0.1–0.9)
MONOCYTES NFR BLD AUTO: 5.8 % (ref 5–12)
MONOCYTES NFR BLD AUTO: 8.1 % (ref 5–12)
NEGATIVE CONTROL: NEGATIVE
NEUTROPHILS NFR BLD AUTO: 10.4 10*3/MM3 (ref 1.7–7)
NEUTROPHILS NFR BLD AUTO: 5.03 10*3/MM3 (ref 1.7–7)
NEUTROPHILS NFR BLD AUTO: 78.3 % (ref 42.7–76)
NEUTROPHILS NFR BLD AUTO: 83.9 % (ref 42.7–76)
NITRITE UR QL STRIP: NEGATIVE
NOTE: ABNORMAL
NRBC BLD AUTO-RTO: 0 /100 WBC (ref 0–0.2)
NRBC BLD AUTO-RTO: 0 /100 WBC (ref 0–0.2)
OPIATES UR QL: POSITIVE
OXYCODONE UR QL SCN: NEGATIVE
OXYHGB MFR BLDV: 97.6 % (ref 94–99)
PAW @ PEAK INSP FLOW SETTING VENT: 0 CMH2O
PCO2 BLDA: 41.1 MM HG (ref 35–45)
PCO2 BLDA: 48.2 MM HG (ref 35–45)
PCO2 TEMP ADJ BLD: 41.1 MM HG (ref 35–48)
PCP UR QL SCN: NEGATIVE
PH BLDA: 7.38 PH UNITS (ref 7.35–7.45)
PH BLDA: 7.44 PH UNITS (ref 7.35–7.6)
PH UR STRIP.AUTO: <=5 [PH] (ref 5–8)
PH, TEMP CORRECTED: 7.38 PH UNITS
PLATELET # BLD AUTO: 188 10*3/MM3 (ref 140–450)
PLATELET # BLD AUTO: 262 10*3/MM3 (ref 140–450)
PMV BLD AUTO: 10.2 FL (ref 6–12)
PMV BLD AUTO: 10.6 FL (ref 6–12)
PO2 BLDA: 151 MM HG (ref 83–108)
PO2 BLDA: 18 MMHG (ref 80–105)
PO2 TEMP ADJ BLD: 151 MM HG (ref 83–108)
POSITIVE CONTROL: POSITIVE
POTASSIUM BLDA-SCNC: 4.2 MMOL/L (ref 3.5–4.9)
POTASSIUM SERPL-SCNC: 4.4 MMOL/L (ref 3.5–5.2)
POTASSIUM SERPL-SCNC: 4.4 MMOL/L (ref 3.5–5.2)
PROCALCITONIN SERPL-MCNC: 0.16 NG/ML (ref 0–0.25)
PROPOXYPH UR QL: NEGATIVE
PROT SERPL-MCNC: 6.7 G/DL (ref 6–8.5)
PROT UR QL STRIP: ABNORMAL
PROTHROMBIN TIME: 15.9 SECONDS (ref 11.4–14.4)
PROTHROMBIN TIME: 16.2 SECONDS (ref 11.4–14.4)
RBC # BLD AUTO: 2.65 10*6/MM3 (ref 4.14–5.8)
RBC # BLD AUTO: 2.85 10*6/MM3 (ref 4.14–5.8)
RBC # UR STRIP: ABNORMAL /HPF
REF LAB TEST METHOD: ABNORMAL
RH BLD: POSITIVE
SAO2 % BLDA: 28 % (ref 95–98)
SARS-COV-2 RNA RESP QL NAA+PROBE: NOT DETECTED
SODIUM BLD-SCNC: 141 MMOL/L (ref 138–146)
SODIUM SERPL-SCNC: 140 MMOL/L (ref 136–145)
SODIUM SERPL-SCNC: 140 MMOL/L (ref 136–145)
SP GR UR STRIP: 1.02 (ref 1–1.03)
SQUAMOUS #/AREA URNS HPF: ABNORMAL /HPF
T&S EXPIRATION DATE: NORMAL
TOTAL RATE: 0 BREATHS/MINUTE
TRICYCLICS UR QL SCN: NEGATIVE
TROPONIN T SERPL-MCNC: <0.01 NG/ML (ref 0–0.03)
TSH SERPL DL<=0.05 MIU/L-ACNC: 3.81 UIU/ML (ref 0.27–4.2)
UFH PPP CHRO-ACNC: 0.1 IU/ML (ref 0.3–0.7)
UROBILINOGEN UR QL STRIP: ABNORMAL
WBC # UR STRIP: ABNORMAL /HPF
WBC NRBC COR # BLD: 12.41 10*3/MM3 (ref 3.4–10.8)
WBC NRBC COR # BLD: 6.42 10*3/MM3 (ref 3.4–10.8)
WHOLE BLOOD HOLD SPECIMEN: NORMAL
WHOLE BLOOD HOLD SPECIMEN: NORMAL

## 2021-12-04 PROCEDURE — 25010000002 FUROSEMIDE PER 20 MG: Performed by: INTERNAL MEDICINE

## 2021-12-04 PROCEDURE — 85025 COMPLETE CBC W/AUTO DIFF WBC: CPT

## 2021-12-04 PROCEDURE — P9016 RBC LEUKOCYTES REDUCED: HCPCS

## 2021-12-04 PROCEDURE — U0005 INFEC AGEN DETEC AMPLI PROBE: HCPCS | Performed by: INTERNAL MEDICINE

## 2021-12-04 PROCEDURE — 25010000002 CEFTRIAXONE PER 250 MG: Performed by: INTERNAL MEDICINE

## 2021-12-04 PROCEDURE — 85025 COMPLETE CBC W/AUTO DIFF WBC: CPT | Performed by: PHYSICIAN ASSISTANT

## 2021-12-04 PROCEDURE — 71250 CT THORAX DX C-: CPT

## 2021-12-04 PROCEDURE — 99285 EMERGENCY DEPT VISIT HI MDM: CPT

## 2021-12-04 PROCEDURE — 71045 X-RAY EXAM CHEST 1 VIEW: CPT

## 2021-12-04 PROCEDURE — 43235 EGD DIAGNOSTIC BRUSH WASH: CPT | Performed by: INTERNAL MEDICINE

## 2021-12-04 PROCEDURE — 87086 URINE CULTURE/COLONY COUNT: CPT | Performed by: INTERNAL MEDICINE

## 2021-12-04 PROCEDURE — 86900 BLOOD TYPING SEROLOGIC ABO: CPT

## 2021-12-04 PROCEDURE — 84132 ASSAY OF SERUM POTASSIUM: CPT

## 2021-12-04 PROCEDURE — 85730 THROMBOPLASTIN TIME PARTIAL: CPT | Performed by: PHYSICIAN ASSISTANT

## 2021-12-04 PROCEDURE — 85018 HEMOGLOBIN: CPT | Performed by: INTERNAL MEDICINE

## 2021-12-04 PROCEDURE — 36600 WITHDRAWAL OF ARTERIAL BLOOD: CPT

## 2021-12-04 PROCEDURE — 83050 HGB METHEMOGLOBIN QUAN: CPT

## 2021-12-04 PROCEDURE — 83605 ASSAY OF LACTIC ACID: CPT | Performed by: INTERNAL MEDICINE

## 2021-12-04 PROCEDURE — 85610 PROTHROMBIN TIME: CPT | Performed by: PHYSICIAN ASSISTANT

## 2021-12-04 PROCEDURE — 83735 ASSAY OF MAGNESIUM: CPT

## 2021-12-04 PROCEDURE — 99238 HOSP IP/OBS DSCHRG MGMT 30/<: CPT | Performed by: INTERNAL MEDICINE

## 2021-12-04 PROCEDURE — 84443 ASSAY THYROID STIM HORMONE: CPT | Performed by: INTERNAL MEDICINE

## 2021-12-04 PROCEDURE — U0003 INFECTIOUS AGENT DETECTION BY NUCLEIC ACID (DNA OR RNA); SEVERE ACUTE RESPIRATORY SYNDROME CORONAVIRUS 2 (SARS-COV-2) (CORONAVIRUS DISEASE [COVID-19]), AMPLIFIED PROBE TECHNIQUE, MAKING USE OF HIGH THROUGHPUT TECHNOLOGIES AS DESCRIBED BY CMS-2020-01-R: HCPCS | Performed by: INTERNAL MEDICINE

## 2021-12-04 PROCEDURE — 0DJ08ZZ INSPECTION OF UPPER INTESTINAL TRACT, VIA NATURAL OR ARTIFICIAL OPENING ENDOSCOPIC: ICD-10-PCS | Performed by: INTERNAL MEDICINE

## 2021-12-04 PROCEDURE — 86901 BLOOD TYPING SEROLOGIC RH(D): CPT | Performed by: EMERGENCY MEDICINE

## 2021-12-04 PROCEDURE — 86850 RBC ANTIBODY SCREEN: CPT | Performed by: EMERGENCY MEDICINE

## 2021-12-04 PROCEDURE — 99223 1ST HOSP IP/OBS HIGH 75: CPT | Performed by: NURSE PRACTITIONER

## 2021-12-04 PROCEDURE — 99223 1ST HOSP IP/OBS HIGH 75: CPT | Performed by: INTERNAL MEDICINE

## 2021-12-04 PROCEDURE — 82330 ASSAY OF CALCIUM: CPT

## 2021-12-04 PROCEDURE — 81001 URINALYSIS AUTO W/SCOPE: CPT | Performed by: INTERNAL MEDICINE

## 2021-12-04 PROCEDURE — 84295 ASSAY OF SERUM SODIUM: CPT

## 2021-12-04 PROCEDURE — 85014 HEMATOCRIT: CPT

## 2021-12-04 PROCEDURE — 82947 ASSAY GLUCOSE BLOOD QUANT: CPT

## 2021-12-04 PROCEDURE — 86923 COMPATIBILITY TEST ELECTRIC: CPT

## 2021-12-04 PROCEDURE — 80053 COMPREHEN METABOLIC PANEL: CPT | Performed by: EMERGENCY MEDICINE

## 2021-12-04 PROCEDURE — 82805 BLOOD GASES W/O2 SATURATION: CPT

## 2021-12-04 PROCEDURE — 93005 ELECTROCARDIOGRAM TRACING: CPT

## 2021-12-04 PROCEDURE — 82077 ASSAY SPEC XCP UR&BREATH IA: CPT | Performed by: EMERGENCY MEDICINE

## 2021-12-04 PROCEDURE — 82270 OCCULT BLOOD FECES: CPT | Performed by: EMERGENCY MEDICINE

## 2021-12-04 PROCEDURE — 80306 DRUG TEST PRSMV INSTRMNT: CPT | Performed by: EMERGENCY MEDICINE

## 2021-12-04 PROCEDURE — 84145 PROCALCITONIN (PCT): CPT | Performed by: INTERNAL MEDICINE

## 2021-12-04 PROCEDURE — 85610 PROTHROMBIN TIME: CPT

## 2021-12-04 PROCEDURE — 86900 BLOOD TYPING SEROLOGIC ABO: CPT | Performed by: EMERGENCY MEDICINE

## 2021-12-04 PROCEDURE — 25010000002 ONDANSETRON PER 1 MG: Performed by: EMERGENCY MEDICINE

## 2021-12-04 PROCEDURE — 82803 BLOOD GASES ANY COMBINATION: CPT

## 2021-12-04 PROCEDURE — 74176 CT ABD & PELVIS W/O CONTRAST: CPT

## 2021-12-04 PROCEDURE — 36430 TRANSFUSION BLD/BLD COMPNT: CPT

## 2021-12-04 PROCEDURE — 82375 ASSAY CARBOXYHB QUANT: CPT

## 2021-12-04 PROCEDURE — 85014 HEMATOCRIT: CPT | Performed by: INTERNAL MEDICINE

## 2021-12-04 PROCEDURE — 85520 HEPARIN ASSAY: CPT

## 2021-12-04 PROCEDURE — 25010000002 PROPOFOL 10 MG/ML EMULSION: Performed by: NURSE ANESTHETIST, CERTIFIED REGISTERED

## 2021-12-04 PROCEDURE — 70450 CT HEAD/BRAIN W/O DYE: CPT

## 2021-12-04 PROCEDURE — 84484 ASSAY OF TROPONIN QUANT: CPT

## 2021-12-04 RX ORDER — PEG-3350, SODIUM SULFATE, SODIUM CHLORIDE, POTASSIUM CHLORIDE, SODIUM ASCORBATE AND ASCORBIC ACID 7.5-2.691G
1000 KIT ORAL
Status: DISCONTINUED | OUTPATIENT
Start: 2021-12-04 | End: 2021-12-04 | Stop reason: HOSPADM

## 2021-12-04 RX ORDER — SODIUM CHLORIDE 9 MG/ML
INJECTION, SOLUTION INTRAVENOUS CONTINUOUS PRN
Status: DISCONTINUED | OUTPATIENT
Start: 2021-12-04 | End: 2021-12-04 | Stop reason: SURG

## 2021-12-04 RX ORDER — HEPARIN SODIUM 1000 [USP'U]/ML
30 INJECTION, SOLUTION INTRAVENOUS; SUBCUTANEOUS AS NEEDED
Status: DISCONTINUED | OUTPATIENT
Start: 2021-12-04 | End: 2021-12-04

## 2021-12-04 RX ORDER — FUROSEMIDE 40 MG/1
40 TABLET ORAL
Status: DISCONTINUED | OUTPATIENT
Start: 2021-12-04 | End: 2021-12-04 | Stop reason: HOSPADM

## 2021-12-04 RX ORDER — HEPARIN SODIUM 1000 [USP'U]/ML
10000 INJECTION, SOLUTION INTRAVENOUS; SUBCUTANEOUS AS NEEDED
Status: DISCONTINUED | OUTPATIENT
Start: 2021-12-04 | End: 2021-12-04 | Stop reason: SDUPTHER

## 2021-12-04 RX ORDER — FAMOTIDINE 20 MG/1
20 TABLET, FILM COATED ORAL ONCE
Status: CANCELLED | OUTPATIENT
Start: 2021-12-04 | End: 2021-12-04

## 2021-12-04 RX ORDER — SODIUM CHLORIDE 0.9 % (FLUSH) 0.9 %
10 SYRINGE (ML) INJECTION EVERY 12 HOURS SCHEDULED
Status: CANCELLED | OUTPATIENT
Start: 2021-12-04

## 2021-12-04 RX ORDER — LISINOPRIL 20 MG/1
40 TABLET ORAL DAILY
Status: DISCONTINUED | OUTPATIENT
Start: 2021-12-04 | End: 2021-12-04

## 2021-12-04 RX ORDER — FAMOTIDINE 10 MG/ML
20 INJECTION, SOLUTION INTRAVENOUS ONCE
Status: CANCELLED | OUTPATIENT
Start: 2021-12-04 | End: 2021-12-04

## 2021-12-04 RX ORDER — MIDAZOLAM HYDROCHLORIDE 1 MG/ML
1 INJECTION INTRAMUSCULAR; INTRAVENOUS
Status: CANCELLED | OUTPATIENT
Start: 2021-12-04

## 2021-12-04 RX ORDER — HEPARIN SODIUM 1000 [USP'U]/ML
40 INJECTION, SOLUTION INTRAVENOUS; SUBCUTANEOUS AS NEEDED
Status: DISCONTINUED | OUTPATIENT
Start: 2021-12-04 | End: 2021-12-04 | Stop reason: SDUPTHER

## 2021-12-04 RX ORDER — PROPOFOL 10 MG/ML
VIAL (ML) INTRAVENOUS AS NEEDED
Status: DISCONTINUED | OUTPATIENT
Start: 2021-12-04 | End: 2021-12-04 | Stop reason: SURG

## 2021-12-04 RX ORDER — SODIUM CHLORIDE 0.9 % (FLUSH) 0.9 %
10 SYRINGE (ML) INJECTION EVERY 12 HOURS SCHEDULED
Status: DISCONTINUED | OUTPATIENT
Start: 2021-12-04 | End: 2021-12-04 | Stop reason: HOSPADM

## 2021-12-04 RX ORDER — ALBUTEROL SULFATE 2.5 MG/3ML
2.5 SOLUTION RESPIRATORY (INHALATION) ONCE AS NEEDED
Status: DISCONTINUED | OUTPATIENT
Start: 2021-12-04 | End: 2021-12-04 | Stop reason: HOSPADM

## 2021-12-04 RX ORDER — PANTOPRAZOLE SODIUM 40 MG/10ML
40 INJECTION, POWDER, LYOPHILIZED, FOR SOLUTION INTRAVENOUS
Status: DISCONTINUED | OUTPATIENT
Start: 2021-12-04 | End: 2021-12-04 | Stop reason: HOSPADM

## 2021-12-04 RX ORDER — LIDOCAINE HYDROCHLORIDE 10 MG/ML
0.5 INJECTION, SOLUTION EPIDURAL; INFILTRATION; INTRACAUDAL; PERINEURAL ONCE AS NEEDED
Status: CANCELLED | OUTPATIENT
Start: 2021-12-04

## 2021-12-04 RX ORDER — SODIUM CHLORIDE, SODIUM LACTATE, POTASSIUM CHLORIDE, CALCIUM CHLORIDE 600; 310; 30; 20 MG/100ML; MG/100ML; MG/100ML; MG/100ML
9 INJECTION, SOLUTION INTRAVENOUS CONTINUOUS
Status: CANCELLED | OUTPATIENT
Start: 2021-12-04

## 2021-12-04 RX ORDER — CHOLECALCIFEROL (VITAMIN D3) 125 MCG
5 CAPSULE ORAL NIGHTLY PRN
Status: DISCONTINUED | OUTPATIENT
Start: 2021-12-04 | End: 2021-12-04 | Stop reason: HOSPADM

## 2021-12-04 RX ORDER — ACETAMINOPHEN 325 MG/1
650 TABLET ORAL EVERY 4 HOURS PRN
Status: DISCONTINUED | OUTPATIENT
Start: 2021-12-04 | End: 2021-12-04 | Stop reason: HOSPADM

## 2021-12-04 RX ORDER — SODIUM CHLORIDE, SODIUM LACTATE, POTASSIUM CHLORIDE, CALCIUM CHLORIDE 600; 310; 30; 20 MG/100ML; MG/100ML; MG/100ML; MG/100ML
30 INJECTION, SOLUTION INTRAVENOUS CONTINUOUS PRN
Status: CANCELLED | OUTPATIENT
Start: 2021-12-04

## 2021-12-04 RX ORDER — ONDANSETRON 2 MG/ML
4 INJECTION INTRAMUSCULAR; INTRAVENOUS EVERY 6 HOURS PRN
Status: DISCONTINUED | OUTPATIENT
Start: 2021-12-04 | End: 2021-12-04 | Stop reason: HOSPADM

## 2021-12-04 RX ORDER — FUROSEMIDE 10 MG/ML
40 INJECTION INTRAMUSCULAR; INTRAVENOUS ONCE
Status: COMPLETED | OUTPATIENT
Start: 2021-12-04 | End: 2021-12-04

## 2021-12-04 RX ORDER — ATORVASTATIN CALCIUM 40 MG/1
40 TABLET, FILM COATED ORAL NIGHTLY
Status: DISCONTINUED | OUTPATIENT
Start: 2021-12-04 | End: 2021-12-04 | Stop reason: HOSPADM

## 2021-12-04 RX ORDER — SODIUM CHLORIDE 9 MG/ML
10 INJECTION INTRAVENOUS AS NEEDED
Status: DISCONTINUED | OUTPATIENT
Start: 2021-12-04 | End: 2021-12-04 | Stop reason: HOSPADM

## 2021-12-04 RX ORDER — SODIUM CHLORIDE 0.9 % (FLUSH) 0.9 %
10 SYRINGE (ML) INJECTION AS NEEDED
Status: CANCELLED | OUTPATIENT
Start: 2021-12-04

## 2021-12-04 RX ORDER — ONDANSETRON 2 MG/ML
4 INJECTION INTRAMUSCULAR; INTRAVENOUS ONCE
Status: COMPLETED | OUTPATIENT
Start: 2021-12-04 | End: 2021-12-04

## 2021-12-04 RX ORDER — AMIODARONE HYDROCHLORIDE 200 MG/1
200 TABLET ORAL 2 TIMES DAILY
Status: DISCONTINUED | OUTPATIENT
Start: 2021-12-04 | End: 2021-12-04 | Stop reason: HOSPADM

## 2021-12-04 RX ORDER — PEG-3350, SODIUM SULFATE, SODIUM CHLORIDE, POTASSIUM CHLORIDE, SODIUM ASCORBATE AND ASCORBIC ACID 7.5-2.691G
1000 KIT ORAL
Status: DISCONTINUED | OUTPATIENT
Start: 2021-12-05 | End: 2021-12-04 | Stop reason: HOSPADM

## 2021-12-04 RX ORDER — HEPARIN SOD,PORCINE/0.9 % NACL 25000/250
7 INTRAVENOUS SOLUTION INTRAVENOUS
Status: DISCONTINUED | OUTPATIENT
Start: 2021-12-04 | End: 2021-12-04 | Stop reason: HOSPADM

## 2021-12-04 RX ORDER — SODIUM CHLORIDE 0.9 % (FLUSH) 0.9 %
10 SYRINGE (ML) INJECTION AS NEEDED
Status: DISCONTINUED | OUTPATIENT
Start: 2021-12-04 | End: 2021-12-04 | Stop reason: HOSPADM

## 2021-12-04 RX ORDER — LIDOCAINE HYDROCHLORIDE 10 MG/ML
INJECTION, SOLUTION EPIDURAL; INFILTRATION; INTRACAUDAL; PERINEURAL AS NEEDED
Status: DISCONTINUED | OUTPATIENT
Start: 2021-12-04 | End: 2021-12-04 | Stop reason: SURG

## 2021-12-04 RX ORDER — HEPARIN SODIUM 1000 [USP'U]/ML
10000 INJECTION, SOLUTION INTRAVENOUS; SUBCUTANEOUS ONCE
Status: DISCONTINUED | OUTPATIENT
Start: 2021-12-04 | End: 2021-12-04

## 2021-12-04 RX ORDER — HEPARIN SOD,PORCINE/0.9 % NACL 25000/250
10.5 INTRAVENOUS SOLUTION INTRAVENOUS
Status: DISCONTINUED | OUTPATIENT
Start: 2021-12-04 | End: 2021-12-04

## 2021-12-04 RX ORDER — HEPARIN SODIUM 1000 [USP'U]/ML
60 INJECTION, SOLUTION INTRAVENOUS; SUBCUTANEOUS AS NEEDED
Status: DISCONTINUED | OUTPATIENT
Start: 2021-12-04 | End: 2021-12-04

## 2021-12-04 RX ORDER — BUPROPION HYDROCHLORIDE 100 MG/1
100 TABLET, EXTENDED RELEASE ORAL 2 TIMES DAILY
Status: DISCONTINUED | OUTPATIENT
Start: 2021-12-04 | End: 2021-12-04 | Stop reason: HOSPADM

## 2021-12-04 RX ADMIN — SODIUM CHLORIDE: 9 INJECTION, SOLUTION INTRAVENOUS at 10:39

## 2021-12-04 RX ADMIN — PROPOFOL 200 MCG/KG/MIN: 10 INJECTION, EMULSION INTRAVENOUS at 10:42

## 2021-12-04 RX ADMIN — PANTOPRAZOLE SODIUM 40 MG: 40 INJECTION, POWDER, FOR SOLUTION INTRAVENOUS at 07:04

## 2021-12-04 RX ADMIN — ONDANSETRON 4 MG: 2 INJECTION INTRAMUSCULAR; INTRAVENOUS at 02:26

## 2021-12-04 RX ADMIN — FUROSEMIDE 40 MG: 10 INJECTION, SOLUTION INTRAMUSCULAR; INTRAVENOUS at 07:04

## 2021-12-04 RX ADMIN — PROPOFOL 50 MG: 10 INJECTION, EMULSION INTRAVENOUS at 10:42

## 2021-12-04 RX ADMIN — LIDOCAINE HYDROCHLORIDE 100 MG: 10 INJECTION, SOLUTION EPIDURAL; INFILTRATION; INTRACAUDAL; PERINEURAL at 10:42

## 2021-12-04 RX ADMIN — SODIUM CHLORIDE 1 G: 900 INJECTION INTRAVENOUS at 07:05

## 2021-12-04 NOTE — H&P
[facility] Medicine Services  HISTORY AND PHYSICAL    Patient Name: Mitchell Alberto  : 1965  MRN: [id number]  Primary Care Physician: System, Provider Not In    Subjective   Subjective     Chief Complaint:    HPI:  Mitchell Alberto is a 56 y.o. male with PMH significant for aortic valve dissection s/p mechanical AVR , CVA , CHF, HTN, A. fib on Coumadin, V. fib cardiac arrest 2021 s/p BiV-ICD who presented to the ED on 21 with complaints of vomiting, dark stool, chest pain, weakness, dizziness. At that time he had a Hgb 6.1, HCT 19.6. He was given Kcentra and transfused RBCS then had to leave the hospital for his wife's .  Patient left he hospital on 21.  Today, patient was found somnolent in a bar and EMS was called. EMS called him in as a field STEMI, but was a wide complex paced rhythm.    On arrival here patient was sleepy, but no focal neurologic deficits found.  Patient has no recollection of any events today.  Since arriving to the ED patient has developed diarrhea that is occult positive. HE complains of back pain and has developed a low grade fever.    ROS:   Patient denies weight loss, headaches, changes in vision, fever, chills, sore throat, shortness of breath, cough, change in urine output or habits, joint pain, rash, itching or bleeding.    Otherwise complete ROS is negative except as mentioned in the HPI.    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 Residual left upper extremity weakness.     • Transient cerebral ischemia        Past Surgical History:   Procedure Laterality Date   •  ABDOMINAL AORTIC ANEURYSM REPAIR      History of Aortic Aneurysm Repair   • AORTIC VALVE REPAIR/REPLACEMENT      Replacement   • BACK SURGERY     • CARDIAC CATHETERIZATION N/A 7/15/2021    Procedure: LEFT HEART CATH;  Surgeon: Berhane Shrestha MD;  Location:  [facility];  Service: Cardiology;  Laterality: N/A;   • CARDIAC ELECTROPHYSIOLOGY PROCEDURE N/A 2021    Procedure: EP - DEVICE;  Surgeon: Martin Davis MD;  Location:  [facility] EP INVASIVE LOCATION;  Service: Cardiology;  Laterality: N/A;   • CARDIAC ELECTROPHYSIOLOGY PROCEDURE N/A 2021    Procedure: AV Node Ablation;  Surgeon: Martin Davis MD;  Location:  [facility] EP INVASIVE LOCATION;  Service: Cardiology;  Laterality: N/A;   • HERNIA REPAIR  2014    Left inguinal herniorrhaphy   • SKIN CANCER EXCISION  2014    Left forehead melanoma excision, Dr. Gisell Kothari, 2014.    • TRACHEOSTOMY AND PEG TUBE INSERTION N/A 2021    Procedure: TRACHEOSTOMY AND PERCUTANEOUS ENDOSCOPIC GASTROSTOMY TUBE INSERTION;  Surgeon: Hayden Centeno MD;  Location:  [facility] OR;  Service: General;  Laterality: N/A;       Family History: family history includes Aortic aneurysm in his mother; Arthritis in his father and mother; Depression in an other family member; Heart attack in his mother; Heart disease in his brother, father, and mother; Hypertension in his brother, brother, father, mother, and sister; Melanoma in his father.     Social History:  reports that he has never smoked. He has never used smokeless tobacco. He reports current alcohol use. He reports current drug use. Drug: Marijuana.  Wife  this week.  Medications:  ALPRAZolam, amiodarone, aspirin, atorvastatin, buPROPion SR, furosemide, lisinopril, pantoprazole, and warfarin      Allergies   Allergen Reactions   • Meclizine Hcl Itching       Objective   Objective     Vital Signs:   Temp:  [98.4 °F (36.9 °C)] 98.4 °F (36.9 °C)  Heart Rate:  [80-82] 82  Resp:  [17-20] 17  BP:  (119-124)/(59-66) 124/64  Flow (L/min):  [2-6] 2    Patient is drowsy and crying, ill appearing  Neck is without mass or JVD  Heart is Reg with a systolic murmur  Lungs are shallow  Abd is soft without HSM or mass, mildly tender epigastrium  MAEW, no edema  Skin is without rash  Neurologic exam is nonfocal   Mood is emotional      Result Review:  I have personally reviewed the results from the time of this admission   to 12/4/2021 03:24 EST and agree with these findings:  [x]  Laboratory  [x]  Microbiology  [x]  Radiology  [x]  EKG/Telemetry   [x]  Cardiology/Vascular   []  Pathology  [x]  Old records  []  Other:  Most notable findings include: hgb 7.5, troponin normal, elevated, stable creatinine wbc 12.4, inr down to 1.3, abn UA      LAB RESULTS:      Lab 12/04/21 0018 12/02/21 0043 12/01/21  2254   WBC 12.41*  --  8.69   HEMOGLOBIN 7.5*  --  6.1*   HEMOGLOBIN, POC 7.8*  --   --    HEMATOCRIT 23.7*  --  19.6*   HEMATOCRIT POC 23*  --   --    PLATELETS 262  --  248   NEUTROS ABS 10.40*  --  5.83   IMMATURE GRANS (ABS) 0.07*  --  0.06*   LYMPHS ABS 1.06  --  1.98   MONOS ABS 0.72  --  0.72   EOS ABS 0.13  --  0.07   MCV 89.4  --  89.5   PROTIME 15.9* 25.3* 25.6*         Lab 12/04/21  0018 12/01/21  2254   SODIUM 140 139   POTASSIUM 4.4 4.4   CHLORIDE 102 101   CO2 23.0 28.0   ANION GAP 15.0 10.0   BUN 37* 46*   CREATININE 2.01* 2.02*   GLUCOSE 114* 144*   CALCIUM 9.4 8.6   MAGNESIUM 2.2  --          Lab 12/04/21  0018 12/01/21  2254   TOTAL PROTEIN 6.7 6.0   ALBUMIN 4.40 4.20   GLOBULIN 2.3 1.8   ALT (SGPT) 21 19   AST (SGOT) 21 19   BILIRUBIN 0.4 0.2   ALK PHOS 78 63   LIPASE  --  15         Lab 12/04/21 0018 12/02/21 0043 12/01/21  2254 11/30/21  0000   PROBNP  --   --  322.3  --    TROPONIN T <0.010 <0.010 <0.010  --    PROTIME 15.9* 25.3* 25.6*  --    INR 1.31* 2.41* 2.46* 4.30             Lab 12/04/21  0020 12/01/21  2338 12/01/21 2338   ABO TYPING O   < > O   RH TYPING Positive   < > Positive   ANTIBODY  SCREEN Negative  --  Negative    < > = values in this interval not displayed.         Lab 12/04/21  0048 12/04/21  0018   PH, ARTERIAL 7.382 7.44   PCO2, ARTERIAL 41.1  --    PO2 .0*  --    FIO2 44  --    HCO3 ART 24.4  --    BASE EXCESS ART -0.7*  --    CARBOXYHEMOGLOBIN 0.6  --      Brief Urine Lab Results  (Last result in the past 365 days)      Color   Clarity   Blood   Leuk Est   Nitrite   Protein   CREAT   Urine HCG        07/16/21 1739 Orange   Turbid   Large (3+)   Moderate (2+)   Negative   100 mg/dL (2+)               COVID19   Date Value Ref Range Status   12/04/2021 Not Detected Not Detected - Ref. Range Final       XR Chest 1 View    Result Date: 12/4/2021  CR Chest 1 Vw INDICATION: Myocardial infarction tonight COMPARISON:  12/1/2021 FINDINGS: Portable AP view(s) of the chest.  Pacemaker and sternal wires are stable. There are low lung volumes. There are no focal infiltrates.     Impression: Low lung volumes. No focal infiltrates are visible Signer Name: Curry Andres MD  Signed: 12/4/2021 12:51 AM  Workstation Name: [facility]  [facility]      Results for orders placed during the hospital encounter of 06/30/21    Adult Transesophageal Echo (NANCY) W/ Cont if Necessary Per Protocol    Interpretation Summary  · Calculated left ventricular 3D EF = 33% Estimated left ventricular EF = 30% Estimated left ventricular EF was in agreement with the calculated left ventricular EF. Left ventricular ejection fraction appears to be 26 - 30%. Left ventricular systolic function is severely decreased.  · Saline test results are negative.  · There is a mechanical aortic valve prosthesis present.  · The following left ventricular wall segments are hypokinetic: mid anterior, apical anterior, basal anterolateral, mid anterolateral, apical lateral, basal inferolateral, mid inferolateral, apical inferior, mid inferior, apical septal, basal inferoseptal, mid inferoseptal, apex hypokinetic, mid  anteroseptal, basal anterior, basal inferior and basal inferoseptal.  · The findings are consistent with dilated cardiomyopathy.  · The left ventricular cavity is borderline dilated.  · Left ventricular wall thickness is consistent with mild concentric hypertrophy.  · Estimated right ventricular systolic pressure from tricuspid regurgitation is normal (<35 mmHg).  · There is (grade 1), layered plaque in the descending aorta present.  · Normal right ventricular wall thickness, systolic function and septal motion noted; the right ventricular cavity is borderline dilated.  · No evidence of a left atrial appendage thrombus was present.  · No evidence of a patent foramen ovale.  · There is no evidence of pericardial effusion.  · No evidence of pulmonary hypertension is present.  · No valvular vegetations demonstrated.      Current COVID Risks are:  [] Fever []  Cough [] Shortness of breath [] Change in taste or smell  [] Exposure to COVID patient  [] High risk facility   [x]  NONE  The patient has started, but not completed, their COVID-19 vaccination series.    Assessment/Plan   Assessment / Plan       Prior Ascending aortic aneurysm (HCC)    Altered mental state    S/P AVR (aortic valve replacement)    Symptomatic anemia    Dilated cardiomyopathy (HCC)    Acute UTI (urinary tract infection)    Hypertension    Benign prostatic hyperplasia (BPH) with post-void dribbling    Atrial fibrillation [I48.91]    Obesity    Obesity (BMI 30-39.9)    Diarrhea of presumed infectious origin    Unresolved grief      Assessment & Plan:  Mitchell Alberto is a 56 y.o. male with PMH significant for aortic valve dissection s/p mechanical AVR 2012, CVA 2012, CHF, HTN, A. fib on Coumadin, V. fib cardiac arrest 6/2021 s/p BiV-ICD who presented to the ED on 12/3/21 as a field STEMI, but was a wide complex paced rhythm.      Altered mentation  -severe sleep deprivation  -polysubstance positive UDS  -grief reaction  -monitor  -has Xanax  outpatient script    Wide complex EKG paced with PVC, known Afib  Dilated CMP ED 33%  S/p AICD dual PPM  -monitor, currently without chest pain  -diurese after unit of blood  -cont amiodarone  -check TSH  -hold BP meds for now    Anemia of acute blood loss  Subtherapeutic INR  S/p KCentra on 12/1  -will need heparin ASAP for his mechanical AVR  -transfuse one unit of RBCs then lasix  -trend HGB  -Consult GI for EGD    Bipolar DO  Grief reaction    DVT prophylaxis:  Medical and mechanical DVT prophylaxis orders are present.    CODE STATUS:FULL COde for now- needs further discussion when patient is mpre clearly thinking       Admission Status: INPATIENT status due to multiorgan compromise-- needing close clinical treatment and reassessment.  I feel patient’s risk for adverse outcomes and need for care warrant INPATIENT evaluation and I predict the patient’s care encounter to likely last beyond 2 midnights.    Electronically signed by Dorinda Etienne MD 12/04/21 03:24 EST

## 2021-12-04 NOTE — CONSULTS
[facility] [place] Consult    Referring Provider: No ref. provider found    PCP: System, Provider Not In    Reason for Consultation: GI bleed on Coumadin    Chief complaint: Dark stools and weakness    History of present illness:    Mitchell Alberto is a 56 y.o. male who is admitted with recurrent gastrointestinal bleeding with melena.  Patient was recently seen and evaluated on  with complains of nausea, vomiting, and dark stool with onset of progressive weakness and fatigue and found to have gastrointestinal bleeding with initial hemoglobin of 6.1.  Unfortunately, patient received 1 unit PRBCs and had to leave hospital to attend his wife's  plans to follow-up with PCP and return to hospital as indicated.  Yesterday, patient was found somnolent at a bar as transfer to hospital for higher level of care.  Patient notes that since discharge he has continued dark stools with worsening weakness and fatigue.  Denies any acute abdominal pain, chest pain, or fever/chills.  Denies use of NSAIDs but is anticoagulated with Coumadin.  Hemoglobin at time of admission 7.5 with fecal occult positive.  No prior history gastrointestinal bleeding reported.  No prior history of ulcers reported. Past medical, surgical, social, and family histories are reviewed for accuracy.  No documented alleviating or exacerbating factors.  Does not endorse pain at time of exam.    Allergies:  Meclizine hcl    Scheduled Meds:  amiodarone, 200 mg, Oral, BID  atorvastatin, 40 mg, Oral, Nightly  buPROPion SR, 100 mg, Oral, BID  cefTRIAXone, 1 g, Intravenous, Q24H  furosemide, 40 mg, Oral, BID  heparin (porcine), 10,000 Units, Intravenous, Once  pantoprazole, 40 mg, Intravenous, Q AM  sodium chloride, 10 mL, Intravenous, Q12H         Infusions:  heparin, 10.5 Units/kg/hr, Last Rate: Stopped (21 0752)  Pharmacy to Dose Heparin,         PRN Meds:  •  acetaminophen  •  melatonin  •  ondansetron  •  Pharmacy to Dose Heparin  •   Sodium Chloride (PF)  •  sodium chloride    Home Meds:  Medications Prior to Admission   Medication Sig Dispense Refill Last Dose   • ALPRAZolam (Xanax) 0.25 MG tablet Take 1 tablet by mouth 2 (Two) Times a Day As Needed for Anxiety. 14 tablet 0    • amiodarone (PACERONE) 200 MG tablet Take 1 tablet by mouth 3 (Three) Times a Day. (Patient taking differently: Take 200 mg by mouth 2 (Two) Times a Day.)      • aspirin 81 MG chewable tablet Chew 1 tablet Daily. (Patient taking differently: Chew 325 mg Daily.)      • atorvastatin (LIPITOR) 40 MG tablet Take 1 tablet by mouth Every Night.      • buPROPion SR (WELLBUTRIN SR) 100 MG 12 hr tablet Take 1 tablet by mouth 2 (Two) Times a Day. 30 tablet 2    • furosemide (LASIX) 40 MG tablet Take 1 tablet by mouth 2 (Two) Times a Day. 60 tablet 1    • lisinopril (PRINIVIL,ZESTRIL) 40 MG tablet Take 40 mg by mouth Daily.      • pantoprazole (PROTONIX) 40 MG EC tablet Take 40 mg by mouth Daily.      • warfarin (COUMADIN) 5 MG tablet TAKE 1 AND 1/2 TO 2 TABLETS BY MOUTH DAILY OR AS DIRECTED BY ANTICOAGULATION CLINIC *MUST SEE DOCTOR FOR FURTHER REFILLS* 10 tablet 0    • warfarin (COUMADIN) 7.5 MG tablet       • warfarin (COUMADIN) 7.5 MG tablet Take 1 tablets by mouth once daily or as directed by the anticoagulation clinic 15 tablet 0        ROS: Review of Systems   Constitutional: Positive for activity change, appetite change, diaphoresis and fatigue. Negative for chills, fever and unexpected weight change.   HENT: Negative for sore throat, trouble swallowing and voice change.    Eyes: Negative.    Respiratory: Positive for shortness of breath.    Cardiovascular: Negative for chest pain, palpitations and leg swelling.   Gastrointestinal: Positive for abdominal pain, blood in stool and diarrhea. Negative for abdominal distention, anal bleeding, constipation, nausea, rectal pain and vomiting.   Endocrine: Negative.    Genitourinary: Negative.    Musculoskeletal: Negative.    Skin:  Positive for pallor.   Allergic/Immunologic: Negative.    Neurological: Negative.    Hematological: Bruises/bleeds easily.   Psychiatric/Behavioral: Negative.    All other systems reviewed and are negative.      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 Residual left upper extremity weakness.     • Transient cerebral ischemia        PAST SURG HX:  Past Surgical History:   Procedure Laterality Date   • ABDOMINAL AORTIC ANEURYSM REPAIR      History of Aortic Aneurysm Repair   • AORTIC VALVE REPAIR/REPLACEMENT      Replacement   • BACK SURGERY     • CARDIAC CATHETERIZATION N/A 7/15/2021    Procedure: LEFT HEART CATH;  Surgeon: Berhane Shrestha MD;  Location:  [facility];  Service: Cardiology;  Laterality: N/A;   • CARDIAC ELECTROPHYSIOLOGY PROCEDURE N/A 7/28/2021    Procedure: EP - DEVICE;  Surgeon: Martin Davis MD;  Location:  [facility] EP [facility];  Service: Cardiology;  Laterality: N/A;   • CARDIAC ELECTROPHYSIOLOGY PROCEDURE N/A 7/28/2021    Procedure: AV Node Ablation;  Surgeon: Martin Davis MD;  Location:  [facility];  Service: Cardiology;  Laterality: N/A;   • HERNIA REPAIR  08/2014    Left inguinal herniorrhaphy   • SKIN CANCER EXCISION  08/2014    Left forehead melanoma excision, Dr. Gisell Kothari, August 2014.    • TRACHEOSTOMY AND PEG TUBE INSERTION N/A 7/13/2021    Procedure: TRACHEOSTOMY AND PERCUTANEOUS ENDOSCOPIC GASTROSTOMY TUBE INSERTION;  Surgeon: Hayden Centeno MD;  Location: [facility] OR;  Service: General;  Laterality: N/A;       FAM HX:  Family History   Problem Relation Age of Onset   • Aortic  "aneurysm Mother    • Arthritis Mother    • Heart disease Mother    • Heart attack Mother    • Hypertension Mother    • Arthritis Father    • Heart disease Father    • Melanoma Father    • Hypertension Father    • Depression Other    • Hypertension Sister    • Heart disease Brother    • Hypertension Brother    • Hypertension Brother        SOC HX:  Social History     Socioeconomic History   • Marital status:    Tobacco Use   • Smoking status: Never Smoker   • Smokeless tobacco: Never Used   Vaping Use   • Vaping Use: Never used   Substance and Sexual Activity   • Alcohol use: Yes     Comment: very occasional   • Drug use: Yes     Types: Marijuana     Comment: Uses daily   • Sexual activity: Defer       PHYSICAL EXAM  /52   Pulse 80   Temp 98.1 °F (36.7 °C)   Resp 19   Ht 195.6 cm (77\")   Wt (!) 141 kg (310 lb)   SpO2 93%   BMI 36.76 kg/m²   Wt Readings from Last 3 Encounters:   12/04/21 (!) 141 kg (310 lb)   12/01/21 (!) 141 kg (310 lb)   10/26/21 (!) 148 kg (326 lb)   ,body mass index is 36.76 kg/m².  Physical Exam  Vitals and nursing note reviewed.   Constitutional:       General: He is not in acute distress.     Appearance: Normal appearance. He is obese. He is ill-appearing. He is not toxic-appearing.   HENT:      Head: Normocephalic and atraumatic.      Mouth/Throat:      Mouth: Mucous membranes are moist.      Pharynx: Oropharynx is clear. No oropharyngeal exudate.   Eyes:      General: No scleral icterus.     Extraocular Movements: Extraocular movements intact.      Conjunctiva/sclera: Conjunctivae normal.      Pupils: Pupils are equal, round, and reactive to light.   Cardiovascular:      Rate and Rhythm: Normal rate and regular rhythm.      Pulses: Normal pulses.      Heart sounds: Normal heart sounds.   Pulmonary:      Effort: Pulmonary effort is normal. No respiratory distress.      Breath sounds: Normal breath sounds.   Abdominal:      General: Abdomen is flat. Bowel sounds are normal. " There is no distension.      Palpations: Abdomen is soft. There is no mass.      Tenderness: There is abdominal tenderness. There is no guarding or rebound.      Hernia: No hernia is present.   Genitourinary:     Rectum: Guaiac result positive.   Musculoskeletal:         General: Normal range of motion.      Cervical back: Normal range of motion and neck supple. No rigidity or tenderness.      Right lower leg: No edema.      Left lower leg: No edema.   Skin:     Capillary Refill: Capillary refill takes less than 2 seconds.      Coloration: Skin is pale.      Comments: Skin is cool to touch and diaphoretic   Neurological:      General: No focal deficit present.      Mental Status: He is alert and oriented to person, place, and time.   Psychiatric:         Mood and Affect: Mood normal.         Behavior: Behavior normal.         Thought Content: Thought content normal.         Judgment: Judgment normal.      Comments: Flat affect         Results Review:   I reviewed the patient's new clinical results.  I reviewed the patient's new imaging results and agree with the interpretation.  I personally viewed and interpreted the patient's EKG/Telemetry data    Lab Results   Component Value Date    WBC 12.41 (H) 12/04/2021    HGB 7.5 (L) 12/04/2021    HGB 7.8 (L) 12/04/2021    HGB 6.1 (C) 12/01/2021    HCT 23.7 (L) 12/04/2021    HCT 23 (L) 12/04/2021    MCV 89.4 12/04/2021     12/04/2021       Lab Results   Component Value Date    INR 1.31 (H) 12/04/2021    INR 2.41 (H) 12/02/2021    INR 2.46 (H) 12/01/2021       Lab Results   Component Value Date    GLUCOSE 114 (H) 12/04/2021    BUN 37 (H) 12/04/2021    CREATININE 2.01 (H) 12/04/2021    EGFRIFNONA 35 (L) 12/04/2021    BCR 18.4 12/04/2021     12/04/2021    K 4.4 12/04/2021    CO2 23.0 12/04/2021    CALCIUM 9.4 12/04/2021    ALBUMIN 4.40 12/04/2021    ALKPHOS 78 12/04/2021    BILITOT 0.4 12/04/2021    ALT 21 12/04/2021    AST 21 12/04/2021       CT Abdomen Pelvis  Without Contrast    Result Date: 12/4/2021  CT Abdomen Pelvis WO INDICATION: Bleeding today TECHNIQUE: CT of the abdomen and pelvis without IV contrast. Coronal and sagittal reconstructions were obtained.  Radiation dose reduction techniques included automated exposure control or exposure modulation based on body size. Count of known CT and cardiac nuc med studies performed in previous 12 months: 1. COMPARISON: 6/5/2019 FINDINGS: Abdomen: Liver, gallbladder, spleen, pancreas, adrenal glands and kidneys are normal in appearance. Aorta is normal in size. There is no adenopathy. Bowel is normal in appearance. Pelvis: Bladder and prostate gland are normal. Bones show degenerative changes lumbar spine.     Negative CT of the abdomen and pelvis. Signer Name: Curry Andres MD  Signed: 12/4/2021 3:38 AM  Workstation Name: [facility]"[employer]    CT Head Without Contrast    Result Date: 12/4/2021  CT Head WO HISTORY: Altered mental status is morning TECHNIQUE: Axial unenhanced head CT. Radiation dose reduction techniques included automated exposure control or exposure modulation based on body size. Count of known CT and cardiac nuc med studies performed in previous 12 months: 1. Time of scan: 3:18 AM COMPARISON: 6/30/2021 FINDINGS: No intracranial hemorrhage, mass, or infarct. No hydrocephalus or extra-axial fluid collection. Brain parenchymal density is normal. The skull base, calvarium, and extracranial soft tissues are normal.     Normal, negative unenhanced head CT. Signer Name: Curry Andres MD  Signed: 12/4/2021 3:37 AM  Workstation Name: [facility]    CT Chest Without Contrast Diagnostic    Result Date: 12/4/2021  CT Chest WO INDICATION: Chest pain this morning TECHNIQUE: CT of the thorax without IV contrast. Coronal and sagittal reconstructions were obtained.  Radiation dose reduction techniques included automated exposure control or exposure modulation  based on body size. Count of known CT and cardiac nuc med studies performed in previous 12 months: 0. COMPARISON: 6/30/2021 FINDINGS: There is minimal bibasilar atelectasis. Otherwise the lungs are clear. Airways are patent. Sternal wires are present. A pacemaker is present. Aorta is normal in size. No adenopathy is identified.     Bibasilar atelectasis. Otherwise negative Signer Name: Curry Andres MD  Signed: 12/4/2021 3:39 AM  Workstation Name: [facility]  Radiology [facility]    XR Chest 1 View    Result Date: 12/4/2021  CR Chest 1 Vw INDICATION: Myocardial infarction tonight COMPARISON:  12/1/2021 FINDINGS: Portable AP view(s) of the chest.  Pacemaker and sternal wires are stable. There are low lung volumes. There are no focal infiltrates.     Low lung volumes. No focal infiltrates are visible Signer Name: Curry Andres MD  Signed: 12/4/2021 12:51 AM  Workstation Name: [facility]    XR Chest 1 View    Result Date: 12/1/2021  PROCEDURE: CR Chest 1 Vw COMPARISON:  7/29/2021 INDICATIONS: Chest Pain triage protocol Relevant clinical info: PACEMAKER IN June 2021, HX OF BYPASS SX 10 YEARS AGO, CHEST PAIN AND SOA, HBP. HX OF mrsa PNEUMONIA TECHNIQUE: Single AP  view of the chest FINDINGS: Patient has had median sternotomy and aortic valve replacement. Pacemaker present. Previous tracheostomy tube is been removed. Cardiomediastinal silhouette is within normal limits given projection.  Lungs are relatively well inflated and clear. Osseous structures are intact.     No acute disease.  Signer Name: Carly Otto MD  Signed: 12/1/2021 11:35 PM  Workstation Name: [facility]  Radiology [facility]    COVID19   Date Value Ref Range Status   12/04/2021 Not Detected Not Detected - Ref. Range Final   Results for GINNA HARDEN (MRN [id number]) as of 12/4/2021 10:28   Ref. Range 12/4/2021 02:01   Fecal Occult Blood Latest Ref Range: Negative  Positive (A)      ASSESSMENTS/PLANS  Patient Active Problem List   Diagnosis   • Anxiety   • Depression   • Hypertension   • Benign prostatic hyperplasia (BPH) with post-void dribbling   • Prior Ascending aortic aneurysm (HCC)   • Atrial fibrillation [I48.91]   • S/P AVR (aortic valve replacement)   • Obesity   • Sensorineural hearing loss   • Bilateral sensorineural hearing loss   • Elevated transaminase level   • Chronic anticoagulation   • Acute systolic right heart failure (HCC)   • Epistaxis   • Ventricular fibrillation (HCC)   • Athscl heart disease of native coronary artery w/o ang pctrs   • Encntr for surgical aftcr following surgery on the circ sys   • Encounter for therapeutic drug level monitoring   • Encounter for attention to gastrostomy (HCC)   • Obesity (BMI 30-39.9)   • Mechanical heart valve present   • Presence of automatic (implantable) cardiac defibrillator   • Personal history of sudden cardiac arrest   • Personal history of nicotine dependence   • Personal history of Methicillin resistant Staphylococcus aureus infection   • Monoplg upr lmb fol cerebral infrc aff right dominant side (HCC)   • Hypertensive heart disease with heart failure (HCC)   • History of falling   • Unspecified atrial fibrillation (HCC)   • Long term (current) use of anticoagulants   • Obesity, unspecified   • Old myocardial infarction   • GI bleed   • History of ventricular fibrillation   • History of CVA (cerebrovascular accident)   • Valvular heart disease   • Symptomatic anemia   • Dilated cardiomyopathy (HCC)   • Altered mental state   • Acute UTI (urinary tract infection)   • Diarrhea of presumed infectious origin   • Unresolved grief     1.  Acute gastrointestinal bleeding with melena  2.  Acute blood loss anemia  3.  Acute epigastric abdominal pain, related to above  4.  Opiate use and dependence  5.  Complicated grief pattern    Mitchell Alberto is a 56 y.o. male admitted to hospital after being found unresponsive at a local bar  following his wife's [facility] service.  Patient was recently in emergency department with acute gastrointestinal bleeding but left to be present at [facility]; received 1 unit PRBCs at that time.  At time of admission, hemoglobin found to be 7.5.  Given ongoing melanotic stools and patient's overall lethargic state, recommend EGD today for control of bleeding.    >>> N.p.o.  >>> Obtain informed consent for esophagogastroduodenoscopy; if negative, colonoscopy tomorrow.  >>> IV PPI twice daily  >>> Continue to trend H&H and transfuse for hemoglobins less than 7 or symptomatic anemia per protocol.  >>> Given patient's reported opiate use and dependence, recommend chemical dependency team consultation and initiation of medically supervised opiate withdrawal protocol per primary team.   -Order for inpatient opioid addiction consult placed    I discussed the patient's findings and my recommendations with patient, family and nursing staff    Jose Stewart, YUE  12/04/21  10:25 EST

## 2021-12-04 NOTE — ANESTHESIA PREPROCEDURE EVALUATION
Anesthesia Evaluation     Patient summary reviewed and Nursing notes reviewed   no history of anesthetic complications:  NPO Solid Status: > 8 hours  NPO Liquid Status: > 2 hours           Airway   Mallampati: II  TM distance: >3 FB  Neck ROM: full  No difficulty expected  Dental - normal exam     Pulmonary - normal exam    breath sounds clear to auscultation    ROS comment: Tracheostomy 7/21  Cardiovascular - normal exam    ECG reviewed  Rhythm: regular  Rate: normal    (+) pacemaker ICD, pacemaker, hypertension, valvular problems/murmurs (s/p [place]), dysrhythmias (h/o Vfib cardiac arrest 6/21) Atrial Fib,     ROS comment: 7/21 Echo:  Calculated left ventricular 3D EF = 33% Estimated left ventricular EF = 30% Estimated left ventricular EF was in agreement with the calculated left ventricular EF. Left ventricular ejection fraction appears to be 26 - 30%. Left ventricular systolic function is severely decreased.  7/21 LHC: normal coronaries    Neuro/Psych  (+) CVA,     GI/Hepatic/Renal/Endo    (+) obesity,  GI bleeding ,     Musculoskeletal     Abdominal    Substance History      OB/GYN          Other                        Anesthesia Plan    ASA 4     general     intravenous induction     Anesthetic plan, all risks, benefits, and alternatives have been provided, discussed and informed consent has been obtained with: patient.    Plan discussed with CRNA.

## 2021-12-04 NOTE — SIGNIFICANT NOTE
Pt left AMA.  Dr. Gandara notified and went to bedside.  Pt still requested to leave AMA and he is alert and oriented x 4.

## 2021-12-04 NOTE — ANESTHESIA POSTPROCEDURE EVALUATION
Patient: Mitchell Alberto    Procedure Summary     Date: 12/04/21 Room / Location:  [facility]    Anesthesia Start: 1036 Anesthesia Stop:     Procedure: ESOPHAGOGASTRODUODENOSCOPY (N/A ) Diagnosis:     Surgeons: Aakash Logan MD Provider: Kevin Harris MD    Anesthesia Type: general ASA Status: 4          Anesthesia Type: general    Vitals  Vitals Value Taken Time   BP 96/51 12/04/21 1038   Temp     Pulse 80 12/04/21 1038   Resp 14 12/04/21 1038   SpO2 95 % 12/04/21 1038           Post Anesthesia Care and Evaluation    Patient location during evaluation: PACU  Patient participation: complete - patient participated  Level of consciousness: awake  Pain score: 0  Pain management: adequate  Airway patency: patent  Anesthetic complications: No anesthetic complications  PONV Status: none  Cardiovascular status: acceptable and stable  Respiratory status: nasal cannula, unassisted, acceptable and spontaneous ventilation  Hydration status: acceptable

## 2021-12-04 NOTE — ED PROVIDER NOTES
Subjective   Patient is a pleasant 56-year-old male who presents with altered mental status and chest pain. He has a history of coronary disease and a CABG performed several months ago per EMS. According to nursing staff who took care of him 2 days ago he was admitted for a supratherapeutic INR along with a GI bleeding but left AMA prior to admission.     His wife recently passed away and her  was today. He was found at a local bar restaurant poorly responsive. Other customers at the bar called EMS. Patient was slouching in his chair but was able to speak upon arrival. His interactions were brief consistent with significant somnolence. He had noted chest pain and bilateral shoulder pain to EMS.    Patient is unable to give a detailed history due to his somnolence and altered mental status.       Chest Pain  Pain location:  Substernal area  Pain quality: sharp    Pain radiates to:  Does not radiate  Pain severity:  Unable to specify  Onset quality:  Unable to specify  Timing:  Unable to specify  Progression:  Unable to specify  Chronicity:  New  Context: at rest    Context: not breathing, not eating and not movement    Relieved by:  Nothing  Worsened by:  Nothing  Ineffective treatments:  None tried  Associated symptoms: abdominal pain and shortness of breath    Associated symptoms: no fatigue, no fever and no vomiting        Review of Systems   Constitutional: Negative for fatigue and fever.   Respiratory: Positive for shortness of breath.    Cardiovascular: Positive for chest pain.   Gastrointestinal: Positive for abdominal pain. Negative for vomiting.   All other systems reviewed and are negative.      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 Residual left upper extremity weakness.     • Transient cerebral ischemia        Allergies   Allergen Reactions   • Meclizine Hcl Itching       Past Surgical History:   Procedure Laterality Date   • ABDOMINAL AORTIC ANEURYSM REPAIR      History of Aortic Aneurysm Repair   • AORTIC VALVE REPAIR/REPLACEMENT      Replacement   • BACK SURGERY     • CARDIAC CATHETERIZATION N/A 7/15/2021    Procedure: LEFT HEART CATH;  Surgeon: Berhane Shrestha MD;  Location:  [facility];  Service: Cardiology;  Laterality: N/A;   • CARDIAC ELECTROPHYSIOLOGY PROCEDURE N/A 7/28/2021    Procedure: EP - DEVICE;  Surgeon: Martin Davis MD;  Location:  [facility] EP INVASIVE LOCATION;  Service: Cardiology;  Laterality: N/A;   • CARDIAC ELECTROPHYSIOLOGY PROCEDURE N/A 7/28/2021    Procedure: AV Node Ablation;  Surgeon: Martin Davis MD;  Location:  [facility] EP INVASIVE [facility];  Service: Cardiology;  Laterality: N/A;   • HERNIA REPAIR  08/2014    Left inguinal herniorrhaphy   • SKIN CANCER EXCISION  08/2014    Left forehead melanoma excision, Dr. Gisell Kothari, August 2014.    • TRACHEOSTOMY AND PEG TUBE INSERTION N/A 7/13/2021    Procedure: TRACHEOSTOMY AND PERCUTANEOUS ENDOSCOPIC GASTROSTOMY TUBE INSERTION;  Surgeon: Hayden Centeno MD;  Location:  [facility] OR;  Service: General;  Laterality: N/A;       Family History   Problem Relation Age of Onset   • Aortic aneurysm Mother    • Arthritis Mother    • Heart disease Mother    • Heart attack Mother    • Hypertension Mother    • Arthritis Father    • Heart disease Father    • Melanoma Father    • Hypertension Father    • Depression Other    • Hypertension Sister    • Heart disease Brother    • Hypertension Brother    • Hypertension Brother        Social History     Socioeconomic History   • Marital status:    Tobacco Use   • Smoking status: Never Smoker   • Smokeless tobacco: Never Used    Vaping Use   • Vaping Use: Never used   Substance and Sexual Activity   • Alcohol use: Yes     Comment: very occasional   • Drug use: Yes     Types: Marijuana     Comment: Uses daily   • Sexual activity: Defer           Objective   Physical Exam  Vitals and nursing note reviewed.   Constitutional:       General: He is in acute distress.      Appearance: He is well-developed. He is obese. He is ill-appearing. He is not diaphoretic.   HENT:      Head: Normocephalic.   Eyes:      Extraocular Movements: Extraocular movements intact.      Pupils: Pupils are equal, round, and reactive to light.   Cardiovascular:      Rate and Rhythm: Normal rate and regular rhythm.      Heart sounds: Murmur heard.    Systolic murmur is present.      Pulmonary:      Effort: Pulmonary effort is normal. No tachypnea or respiratory distress.      Breath sounds: Normal breath sounds. No stridor.   Chest:      Chest wall: No mass, deformity, tenderness or crepitus.   Musculoskeletal:         General: Normal range of motion.      Cervical back: Normal range of motion.      Right lower leg: No tenderness.      Left lower leg: No tenderness.   Neurological:      Mental Status: He is disoriented.      Motor: Weakness (Generalized weakness. No focal deficits.) present.      Comments: Patient is poorly responsive but will answer with repeated significant verbal stimuli.         Procedures           ED Course      Recent Results (from the past 24 hour(s))   Prepare RBC, 2 Units    Collection Time: 12/03/21 12:10 PM   Result Value Ref Range    Product Code E2937S85     Unit Number [id number]     UNIT  ABO O     UNIT  RH POS     Crossmatch Interpretation Compatible     Dispense Status PT     Blood Expiration Date 202201032359     Blood Type Barcode 5100     Product Code I5089G32     Unit Number [id number]     UNIT  ABO O     UNIT  RH POS     Crossmatch Interpretation Compatible     Dispense Status PT     Blood Expiration Date [id number]      Blood Type Barcode 5100    CBC Auto Differential    Collection Time: 12/04/21 12:18 AM    Specimen: Blood   Result Value Ref Range    WBC 12.41 (H) 3.40 - 10.80 10*3/mm3    RBC 2.65 (L) 4.14 - 5.80 10*6/mm3    Hemoglobin 7.5 (L) 13.0 - 17.7 g/dL    Hematocrit 23.7 (L) 37.5 - 51.0 %    MCV 89.4 79.0 - 97.0 fL    MCH 28.3 26.6 - 33.0 pg    MCHC 31.6 31.5 - 35.7 g/dL    RDW 15.9 (H) 12.3 - 15.4 %    RDW-SD 52.4 37.0 - 54.0 fl    MPV 10.2 6.0 - 12.0 fL    Platelets 262 140 - 450 10*3/mm3    Neutrophil % 83.9 (H) 42.7 - 76.0 %    Lymphocyte % 8.5 (L) 19.6 - 45.3 %    Monocyte % 5.8 5.0 - 12.0 %    Eosinophil % 1.0 0.3 - 6.2 %    Basophil % 0.2 0.0 - 1.5 %    Immature Grans % 0.6 (H) 0.0 - 0.5 %    Neutrophils, Absolute 10.40 (H) 1.70 - 7.00 10*3/mm3    Lymphocytes, Absolute 1.06 0.70 - 3.10 10*3/mm3    Monocytes, Absolute 0.72 0.10 - 0.90 10*3/mm3    Eosinophils, Absolute 0.13 0.00 - 0.40 10*3/mm3    Basophils, Absolute 0.03 0.00 - 0.20 10*3/mm3    Immature Grans, Absolute 0.07 (H) 0.00 - 0.05 10*3/mm3    nRBC 0.0 0.0 - 0.2 /100 WBC   POC Surgery Labs    Collection Time: 12/04/21 12:18 AM    Specimen: Blood   Result Value Ref Range    Ionized Calcium 1.21 1.20 - 1.32 mmol/L    POC Potassium 4.2 3.5 - 4.9 mmol/L    Sodium 141 138 - 146 mmol/L    Total CO2 34 (H) 24 - 29 mmol/L    Hemoglobin 7.8 (L) 12.0 - 17.0 g/dL    Hematocrit 23 (L) 38 - 51 %    pCO2, Arterial 48.2 (H) 35 - 45 mm Hg    pO2, Arterial 18 (L) 80 - 105 mmHg    Base Excess 9.0000 (H) -5 - 5 mmol/L    O2 Saturation, Arterial 28 (L) 95 - 98 %    pH, Arterial 7.44 7.35 - 7.6 pH units    HCO3, Arterial 32.9 (H) 22 - 26 mmol/L    Glucose 123 70 - 130 mg/dL     Note: In addition to lab results from this visit, the labs listed above may include labs taken at another facility or during a different encounter within the last 24 hours. Please correlate lab times with ED admission and discharge times for further clarification of the services performed during this  "visit.    CT Head Without Contrast   Final Result   Normal, negative unenhanced head CT.               Signer Name: Curry Andres MD    Signed: 12/4/2021 3:37 AM    Workstation Name: [facility]      CT Chest Without Contrast Diagnostic   Final Result   Bibasilar atelectasis. Otherwise negative      Signer Name: Curry Andres MD    Signed: 12/4/2021 3:39 AM    Workstation Name: [facility]      CT Abdomen Pelvis Without Contrast   Final Result   Negative CT of the abdomen and pelvis.               Signer Name: Curry Andres MD    Signed: 12/4/2021 3:38 AM    Workstation Name: [facility]      XR Chest 1 View   Final Result   Low lung volumes. No focal infiltrates are visible      Signer Name: Curyr Andres MD    Signed: 12/4/2021 12:51 AM    Workstation Name: [facility]        Vitals:    12/04/21 0016   BP: 119/66   BP Location: Right arm   Patient Position: Lying   Pulse: 80   Resp: 20   Temp: 98.4 °F (36.9 °C)   TempSrc: Oral   SpO2: 93%   Weight: (!) 141 kg (310 lb)   Height: 195.6 cm (77\")     Medications   Sodium Chloride (PF) 0.9 % 10 mL (has no administration in time range)     ECG/EMG Results (last 24 hours)     Procedure Component Value Units Date/Time    ECG 12 Lead [[id number]] Collected: 12/04/21 0012     Updated: 12/04/21 0013        ECG 12 Lead           Patient presented as a STEMI code.  Upon initial twelve-lead is actually a wide-complex paced rhythm.  Pacemaker appears to be functioning appropriately.  Is troponin was not elevated.  He is at risk given his significant history, but this does not appear to be the primary issue tonight.  Patient is very sedated without fully clear etiology.  His vital signs been very stable and he has been arousable throughout his ED stay.  I suspect is a combination of the grief from his wife's passing along with " benzodiazepine and other sedative use.  I discussed the case with Dr. Matthew, internal medicine, who will admit for further evaluation and management.                                             MDM    Final diagnoses:   Altered mental status, unspecified altered mental status type   Chest pain, unspecified type   Grief reaction   ZOHAIB (acute kidney injury) (HCC)   Somnolence   Marijuana use   History of benzodiazepine use       ED Disposition  ED Disposition     ED Disposition Condition Comment    Decision to Admit  Level of Care: Telemetry [5]   Diagnosis: Symptomatic anemia [[id number]]   Admitting Physician: MARÍA MATTHEW [[id number]]   Attending Physician: MARÍA MATTHEW [[id number]]   Certification: I Certify That Inpatient Hospital Services Are Medically Necessary For Greater Than 2 Midnights            No follow-up provider specified.       Medication List      No changes were made to your prescriptions during this visit.          Juan Otto,   12/11/21 1133

## 2021-12-04 NOTE — PLAN OF CARE
Goal Outcome Evaluation:              Outcome Summary: Patient recieved 1 unit of blood. Order by Dr. montelongo to hold heparin and 2nd unit of blood until GI sees him. Patient continues to be lethargic.

## 2021-12-05 LAB — BACTERIA SPEC AEROBE CULT: NO GROWTH

## 2021-12-06 ENCOUNTER — TELEPHONE (OUTPATIENT)
Dept: FAMILY MEDICINE CLINIC | Facility: CLINIC | Age: 56
End: 2021-12-06

## 2021-12-07 ENCOUNTER — ANTICOAGULATION VISIT (OUTPATIENT)
Dept: PHARMACY | Facility: HOSPITAL | Age: 56
End: 2021-12-07

## 2021-12-07 DIAGNOSIS — Z95.2 S/P AVR (AORTIC VALVE REPLACEMENT): Primary | ICD-10-CM

## 2021-12-07 LAB — INR PPP: 1.1

## 2021-12-07 NOTE — PROGRESS NOTES
Anticoagulation Clinic - Remote Progress Note  [facility]  Testing frequency: 7 days    Indication: Atrial Fibrillation and Mechanical Aortic Valve; h/o embolic CVA  Referring Provider: Bunny last seen 3/12/21; Next appointment: 6/25/21;  Initial Warfarin Start Date: 11/2012  Planned Duration of Therapy: Life  Goal INR: 2.5-3.5  Current Drug Interactions: escitalopram, aspirin, amiodarone, pantoprazole  CHADS-VASc: 3 (HTN, h/o CVA) - recheck with MD, sees Dr. Davis tomorrow at 1330.    Vit K Diet: patient eats salad 1-3 times a week, goes through spurts when he tries to eat healthier 3/3/2021  Alcohol: none  Tobacco: none       Anticoagulation Clinic INR History:      Date 10/2 10/10 11/4 11/12 11/22 12/10 1/3/20 1/22 1/30 2/3 2/18 2/27   Total Weekly Dose 80mg 80mg 62.5mg 85mg 82.5mg 70mg 72.5mg 72.5mg 82.5mg 77.5mg 75mg 75mg   INR  3.9 3.6 1.7 3.4 4.7 2.4 3.6 1.3 2.3 3.2 3.1 3.2   Notes   1x miss self adj  self adj  lovenox         Date 4/8 4/29 5/18 6/20  8/3 8/17 10/5 10/19 11/11 12/21    Total Weekly Dose 70mg 70mg 65mg 65mg Non- compliant 65mg 65 mg 65mg 65mg 65mg 65mg Non- compliant   INR 4.3 4.1 4.5 3.0  3.0 3.8 3.0 2.9 4.1 2.3    Notes Self adj; Levaquin decr GLV; COVID+  recv'd 6/22; misdose       incr GLV      Date 1/20/21 1/21 1/25 2/1 2/16 3/3 3/22 4/13  4/26 5/14 5/21   Total WeeklyDose 40mg 45mg? 62.5mg 80mg? 80mg   70mg 67.5mg  47.5mg  67.5 mg   INR 1.2 1.2 maia 1.6 2.9 3.7  4.5 3.2 3.3  1.8 4.5 3.1   Notes Self held; augmentin 1x incr dose; lovenox 2x miss,   1x misdose  amox amox  self adj 5x hold, enox enox/norco, augmentin       Date 6/28  8/4 8/6 8/9 8/20 9/3  11/9 11/16 11/30 12/2   Total WeeklyDose 67.5 mg BH Barrett   6/30-7/29 SJ [facility] 32.5 mg 47.5mg 51.25 mg 52.5mg 52.5mg? Non compliant 35mg? 70mg? 52.5mg BHL- left AMA   INR 3.9  2.2 3.0 2.9 4.8 2.6  2.0 3.9 4.3    Notes  amio init enox enox enox    Rec 11/16   GI bleed       Date 12/4 12/6             Total WeeklyDose BHL- left AMA  "7.5mg             INR  1.1             Notes AMS/GI Bleed                Phone Interview:  Verbal Release Authorization signed on 11/7/18 -- Patient's Wife has Passed Away   Tablet Strength:   7.5 mg  Patient Contact: [phone] -- try to call in PM or  His email is [email or web address]    Patient Findings  Positives:  Signs/symptoms of bleeding, Change in health, Emergency department visit, Missed doses, Change in medications, Hospital admission, Other complaints   Negatives:  Signs/symptoms of thrombosis, Laboratory test error suspected, Change in alcohol use, Change in activity, Upcoming invasive procedure, Upcoming dental procedure, Extra doses, Change in diet/appetite, Bruising   Comments:  Unfortunately patient admitted twice to [facility] within the last week (GI bleed/AMS) and both times left AMA. Has been advised on severity of condition and the possibility could be fatal without medical treatment. Patient hesitant to be admitted and reports melena has resolved but has continued fatigue.   Advised patient he needs to be seen in the hospital and after long and sebastian discussion he is agreeable. Understands risk including death if not admitted. Is starting out-patient rehab for opioid addiction today which he has reported is a \"serious serious problem\".  At this point patient plans to go to his rehab apt at 11am then seek medical treatment again.     Has lovenox on hand [80mg syringes (exp 1/2022)  dose will be 1 syringe + 0.6ml from a second syringe]. Will take one 140mg dose of lovenox which I have advised will keep him anticoagulated for ~12 hours and by which time he should be admitted to the hospital. Did not advise resuming warfarin due to acute complex medical issues.       Plan:   1. INR is baseline today following two admissions to [facility] this week, both times left AMA.  Instructed Mr. Alberto to inject lovenox 140mg dose then seek medical inpatient treatment  2. Follow up with hospital today  3.  Verbal " information provided over the phone. Mitchell Alberto RBV dosing instructions, expresses understanding by teach back, and has no further questions at this time.    Sonya Bustillos, GiovaniD.  12/07/21   10:15 EST

## 2021-12-08 ENCOUNTER — TELEPHONE (OUTPATIENT)
Dept: PHARMACY | Facility: HOSPITAL | Age: 56
End: 2021-12-08

## 2021-12-08 LAB
BH BB BLOOD EXPIRATION DATE: NORMAL
BH BB BLOOD EXPIRATION DATE: NORMAL
BH BB BLOOD TYPE BARCODE: 5100
BH BB BLOOD TYPE BARCODE: 5100
BH BB DISPENSE STATUS: NORMAL
BH BB DISPENSE STATUS: NORMAL
BH BB PRODUCT CODE: NORMAL
BH BB PRODUCT CODE: NORMAL
BH BB UNIT NUMBER: NORMAL
BH BB UNIT NUMBER: NORMAL
CROSSMATCH INTERPRETATION: NORMAL
CROSSMATCH INTERPRETATION: NORMAL
UNIT  ABO: NORMAL
UNIT  ABO: NORMAL
UNIT  RH: NORMAL
UNIT  RH: NORMAL

## 2021-12-08 NOTE — TELEPHONE ENCOUNTER
"Patient called to report he will not present to hospital until after his [facility] appt today at 1600. He wants to finish appt at [facility] today to ensure his issue is officially documented and is not seen as \"just coming to the hospital seeking drugs\". He freely admits he needs to be seen at hospital ASA because \"something is clearly wrong\" but also admits opoid addiction may be contributing.He admits taking ~100mg of Oxycontin frequently for the past 6-12 months.    He confirms he injected Lovenox 140mg (using 80mg syringes) lst night as directed but also admits taking warfarin 5mg as well. Patient was not directed to take warfarin last night per previous encounter. After consulting with Sonya Bustillos, PharmD, instructed patient to inject Lovenox 140mg immediately. He is agreeable. He asks if he should continue Lovenox q12h if he is not able to go to hospital today. Have confirmed but again STRONGLY recommended he present to hospital ASA. He voiced agreement and understanding and had no further questions at this time.   "

## 2021-12-11 ENCOUNTER — HOSPITAL ENCOUNTER (EMERGENCY)
Facility: HOSPITAL | Age: 56
Discharge: LEFT BEFORE DISCHARGE | End: 2021-12-11
Attending: EMERGENCY MEDICINE | Admitting: EMERGENCY MEDICINE

## 2021-12-11 ENCOUNTER — APPOINTMENT (OUTPATIENT)
Dept: CT IMAGING | Facility: HOSPITAL | Age: 56
End: 2021-12-11

## 2021-12-11 VITALS
OXYGEN SATURATION: 99 % | TEMPERATURE: 98.4 F | HEIGHT: 77 IN | SYSTOLIC BLOOD PRESSURE: 142 MMHG | DIASTOLIC BLOOD PRESSURE: 80 MMHG | HEART RATE: 80 BPM | WEIGHT: 315 LBS | BODY MASS INDEX: 37.19 KG/M2 | RESPIRATION RATE: 13 BRPM

## 2021-12-11 DIAGNOSIS — R10.9 ACUTE ABDOMINAL PAIN: ICD-10-CM

## 2021-12-11 DIAGNOSIS — Z79.01 ANTICOAGULATED: ICD-10-CM

## 2021-12-11 DIAGNOSIS — K92.2 GASTROINTESTINAL HEMORRHAGE, UNSPECIFIED GASTROINTESTINAL HEMORRHAGE TYPE: Primary | ICD-10-CM

## 2021-12-11 DIAGNOSIS — D64.9 ANEMIA, UNSPECIFIED TYPE: ICD-10-CM

## 2021-12-11 LAB
ABO GROUP BLD: NORMAL
ALBUMIN SERPL-MCNC: 4.1 G/DL (ref 3.5–5.2)
ALBUMIN/GLOB SERPL: 1.6 G/DL
ALP SERPL-CCNC: 77 U/L (ref 39–117)
ALT SERPL W P-5'-P-CCNC: 26 U/L (ref 1–41)
ANION GAP SERPL CALCULATED.3IONS-SCNC: 9 MMOL/L (ref 5–15)
AST SERPL-CCNC: 31 U/L (ref 1–40)
BASOPHILS # BLD AUTO: 0.02 10*3/MM3 (ref 0–0.2)
BASOPHILS NFR BLD AUTO: 0.3 % (ref 0–1.5)
BILIRUB SERPL-MCNC: 0.2 MG/DL (ref 0–1.2)
BILIRUB UR QL STRIP: NEGATIVE
BUN SERPL-MCNC: 28 MG/DL (ref 6–20)
BUN/CREAT SERPL: 22.2 (ref 7–25)
CALCIUM SPEC-SCNC: 9.1 MG/DL (ref 8.6–10.5)
CHLORIDE SERPL-SCNC: 104 MMOL/L (ref 98–107)
CLARITY UR: CLEAR
CO2 SERPL-SCNC: 27 MMOL/L (ref 22–29)
COLOR UR: YELLOW
CREAT SERPL-MCNC: 1.26 MG/DL (ref 0.76–1.27)
DEPRECATED RDW RBC AUTO: 50.1 FL (ref 37–54)
DEVELOPER EXPIRATION DATE: NORMAL
DEVELOPER LOT NUMBER: NORMAL
EOSINOPHIL # BLD AUTO: 0.18 10*3/MM3 (ref 0–0.4)
EOSINOPHIL NFR BLD AUTO: 2.9 % (ref 0.3–6.2)
ERYTHROCYTE [DISTWIDTH] IN BLOOD BY AUTOMATED COUNT: 15.3 % (ref 12.3–15.4)
EXPIRATION DATE: NORMAL
FECAL OCCULT BLOOD SCREEN, POC: NEGATIVE
GFR SERPL CREATININE-BSD FRML MDRD: 59 ML/MIN/1.73
GLOBULIN UR ELPH-MCNC: 2.6 GM/DL
GLUCOSE SERPL-MCNC: 115 MG/DL (ref 65–99)
GLUCOSE UR STRIP-MCNC: NEGATIVE MG/DL
HCT VFR BLD AUTO: 25.5 % (ref 37.5–51)
HGB BLD-MCNC: 8 G/DL (ref 13–17.7)
HGB UR QL STRIP.AUTO: NEGATIVE
HOLD SPECIMEN: NORMAL
IMM GRANULOCYTES # BLD AUTO: 0.04 10*3/MM3 (ref 0–0.05)
IMM GRANULOCYTES NFR BLD AUTO: 0.7 % (ref 0–0.5)
INR PPP: 1.3 (ref 0.85–1.16)
KETONES UR QL STRIP: NEGATIVE
LEUKOCYTE ESTERASE UR QL STRIP.AUTO: NEGATIVE
LYMPHOCYTES # BLD AUTO: 0.86 10*3/MM3 (ref 0.7–3.1)
LYMPHOCYTES NFR BLD AUTO: 14 % (ref 19.6–45.3)
Lab: NORMAL
MCH RBC QN AUTO: 27.9 PG (ref 26.6–33)
MCHC RBC AUTO-ENTMCNC: 31.4 G/DL (ref 31.5–35.7)
MCV RBC AUTO: 88.9 FL (ref 79–97)
MONOCYTES # BLD AUTO: 0.41 10*3/MM3 (ref 0.1–0.9)
MONOCYTES NFR BLD AUTO: 6.7 % (ref 5–12)
NEGATIVE CONTROL: NEGATIVE
NEUTROPHILS NFR BLD AUTO: 4.62 10*3/MM3 (ref 1.7–7)
NEUTROPHILS NFR BLD AUTO: 75.4 % (ref 42.7–76)
NITRITE UR QL STRIP: NEGATIVE
NRBC BLD AUTO-RTO: 0 /100 WBC (ref 0–0.2)
PH UR STRIP.AUTO: 8 [PH] (ref 5–8)
PLATELET # BLD AUTO: 247 10*3/MM3 (ref 140–450)
PMV BLD AUTO: 9.7 FL (ref 6–12)
POSITIVE CONTROL: POSITIVE
POTASSIUM SERPL-SCNC: 4.7 MMOL/L (ref 3.5–5.2)
PROT SERPL-MCNC: 6.7 G/DL (ref 6–8.5)
PROT UR QL STRIP: ABNORMAL
PROTHROMBIN TIME: 15.8 SECONDS (ref 11.4–14.4)
RBC # BLD AUTO: 2.87 10*6/MM3 (ref 4.14–5.8)
RH BLD: POSITIVE
SODIUM SERPL-SCNC: 140 MMOL/L (ref 136–145)
SP GR UR STRIP: 1.02 (ref 1–1.03)
UROBILINOGEN UR QL STRIP: ABNORMAL
WBC NRBC COR # BLD: 6.13 10*3/MM3 (ref 3.4–10.8)
WHOLE BLOOD HOLD SPECIMEN: NORMAL
WHOLE BLOOD HOLD SPECIMEN: NORMAL

## 2021-12-11 PROCEDURE — 25010000002 PROCHLORPERAZINE 10 MG/2ML SOLUTION: Performed by: NURSE PRACTITIONER

## 2021-12-11 PROCEDURE — 81003 URINALYSIS AUTO W/O SCOPE: CPT | Performed by: NURSE PRACTITIONER

## 2021-12-11 PROCEDURE — 0 IOPAMIDOL PER 1 ML: Performed by: EMERGENCY MEDICINE

## 2021-12-11 PROCEDURE — 80053 COMPREHEN METABOLIC PANEL: CPT | Performed by: EMERGENCY MEDICINE

## 2021-12-11 PROCEDURE — 85025 COMPLETE CBC W/AUTO DIFF WBC: CPT | Performed by: EMERGENCY MEDICINE

## 2021-12-11 PROCEDURE — 96374 THER/PROPH/DIAG INJ IV PUSH: CPT

## 2021-12-11 PROCEDURE — 86901 BLOOD TYPING SEROLOGIC RH(D): CPT | Performed by: EMERGENCY MEDICINE

## 2021-12-11 PROCEDURE — 99283 EMERGENCY DEPT VISIT LOW MDM: CPT

## 2021-12-11 PROCEDURE — 74177 CT ABD & PELVIS W/CONTRAST: CPT

## 2021-12-11 PROCEDURE — 71275 CT ANGIOGRAPHY CHEST: CPT

## 2021-12-11 PROCEDURE — 86900 BLOOD TYPING SEROLOGIC ABO: CPT | Performed by: EMERGENCY MEDICINE

## 2021-12-11 PROCEDURE — 85610 PROTHROMBIN TIME: CPT | Performed by: EMERGENCY MEDICINE

## 2021-12-11 PROCEDURE — 82270 OCCULT BLOOD FECES: CPT | Performed by: NURSE PRACTITIONER

## 2021-12-11 RX ORDER — SODIUM CHLORIDE 0.9 % (FLUSH) 0.9 %
10 SYRINGE (ML) INJECTION AS NEEDED
Status: DISCONTINUED | OUTPATIENT
Start: 2021-12-11 | End: 2021-12-11 | Stop reason: HOSPADM

## 2021-12-11 RX ORDER — PROCHLORPERAZINE EDISYLATE 5 MG/ML
10 INJECTION INTRAMUSCULAR; INTRAVENOUS ONCE
Status: COMPLETED | OUTPATIENT
Start: 2021-12-11 | End: 2021-12-11

## 2021-12-11 RX ORDER — ONDANSETRON 4 MG/1
4 TABLET, ORALLY DISINTEGRATING ORAL EVERY 6 HOURS PRN
Qty: 12 TABLET | Refills: 0 | Status: SHIPPED | OUTPATIENT
Start: 2021-12-11 | End: 2022-03-03

## 2021-12-11 RX ADMIN — IOPAMIDOL 99 ML: 755 INJECTION, SOLUTION INTRAVENOUS at 12:02

## 2021-12-11 RX ADMIN — PROCHLORPERAZINE EDISYLATE 10 MG: 5 INJECTION INTRAMUSCULAR; INTRAVENOUS at 11:44

## 2021-12-11 NOTE — DISCHARGE INSTRUCTIONS
Please call ASAP to arrange outpatient follow up with one of the following gastroenterologists:    [facility] GI Provider Dr. Beto Zapien, Dr. OSITO Bird, Dr. Kevin Henao, YUE Morgan    Office Location:  [address] [place] [address]    Office # [phone]    Or    [facility] GI Provider Dr. Mekhi Reynolds and Dr. Mike Campbell    Office Location:   [address] [place] [address]    Office # [phone]

## 2021-12-11 NOTE — ED NOTES
Pt reports feeling too nauseous to stand for final orthostatic measure.     Wili Burns, Paramedic  12/11/21 1123

## 2021-12-11 NOTE — ED PROVIDER NOTES
Subjective   Patient presents to the ER with weakness difficulty breathing dark sticky stools off and on for the last several days. Admitted here earlier in the month for syncope. He had an EGD and was supposed have a colonoscopy but left AMA. He needed a blood transfusion. Patient does have a history of mechanical heart valve and he takes warfarin for this. Patient reports he left AMA as his wife recently  and they were having her memorial service. He also advised me that during her illness he became addicted to pain medications and now he is taking Suboxone which has helped him a great deal. He does have some constipation. He has had some abdominal pain and cramping and retching. Tells me he has a hard time walking his dogs as he is so significantly short of breath.      GI Problem  The primary symptoms include fatigue, abdominal pain, vomiting and melena. Primary symptoms do not include fever, nausea, diarrhea or dysuria. The illness began more than 7 days ago.   The illness is also significant for bloating and constipation. The illness does not include chills.       Review of Systems   Constitutional: Positive for fatigue. Negative for chills, diaphoresis and fever.   HENT: Negative for congestion and sore throat.    Respiratory: Negative for cough, choking, chest tightness, shortness of breath and wheezing.    Cardiovascular: Negative for chest pain and leg swelling.   Gastrointestinal: Positive for abdominal pain, bloating, constipation, melena and vomiting. Negative for abdominal distention, anal bleeding, blood in stool, diarrhea and nausea.   Genitourinary: Negative for difficulty urinating, dysuria, flank pain, frequency, hematuria and urgency.   All other systems reviewed and are negative.      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 Residual left upper extremity weakness.     • Transient cerebral ischemia        Allergies   Allergen Reactions   • Meclizine Hcl Itching       Past Surgical History:   Procedure Laterality Date   • ABDOMINAL AORTIC ANEURYSM REPAIR      History of Aortic Aneurysm Repair   • AORTIC VALVE REPAIR/REPLACEMENT      Replacement   • BACK SURGERY     • CARDIAC CATHETERIZATION N/A 7/15/2021    Procedure: LEFT HEART CATH;  Surgeon: Berhane Shrestha MD;  Location:  [facility];  Service: Cardiology;  Laterality: N/A;   • CARDIAC ELECTROPHYSIOLOGY PROCEDURE N/A 7/28/2021    Procedure: EP - DEVICE;  Surgeon: Martin Davis MD;  Location:  [facility];  Service: Cardiology;  Laterality: N/A;   • CARDIAC ELECTROPHYSIOLOGY PROCEDURE N/A 7/28/2021    Procedure: AV Node Ablation;  Surgeon: Martin Davis MD;  Location:  [facility];  Service: Cardiology;  Laterality: N/A;   • ENDOSCOPY N/A 12/4/2021    Procedure: ESOPHAGOGASTRODUODENOSCOPY;  Surgeon: Aakash Logan MD;  Location:  [facility];  Service: Gastroenterology;  Laterality: N/A;   • HERNIA REPAIR  08/2014    Left inguinal herniorrhaphy   • SKIN CANCER EXCISION  08/2014    Left forehead melanoma excision, Dr. Gisell Kothari, August 2014.    • TRACHEOSTOMY AND PEG TUBE INSERTION N/A 7/13/2021    Procedure: TRACHEOSTOMY AND PERCUTANEOUS ENDOSCOPIC GASTROSTOMY TUBE INSERTION;  Surgeon: Hayden Centeno MD;  Location:  [facility];  Service: General;  Laterality: N/A;       Family History   Problem Relation Age of Onset   • Aortic aneurysm Mother    • Arthritis Mother    • Heart disease Mother    • Heart attack Mother    • Hypertension Mother    • Arthritis Father    • Heart disease Father    • Melanoma Father    • Hypertension Father    • Depression  Other    • Hypertension Sister    • Heart disease Brother    • Hypertension Brother    • Hypertension Brother        Social History     Socioeconomic History   • Marital status:    Tobacco Use   • Smoking status: Never Smoker   • Smokeless tobacco: Never Used   Vaping Use   • Vaping Use: Never used   Substance and Sexual Activity   • Alcohol use: Yes     Comment: very occasional   • Drug use: Yes     Types: Marijuana     Comment: Uses daily   • Sexual activity: Defer           Objective   Physical Exam  Constitutional:       Appearance: He is well-developed. He is obese. He is ill-appearing.   HENT:      Head: Normocephalic and atraumatic.      Right Ear: External ear normal.      Left Ear: External ear normal.      Nose: Nose normal.   Eyes:      Conjunctiva/sclera: Conjunctivae normal.      Pupils: Pupils are equal, round, and reactive to light.   Cardiovascular:      Rate and Rhythm: Normal rate and regular rhythm.      Heart sounds: Normal heart sounds.   Pulmonary:      Effort: Pulmonary effort is normal.      Breath sounds: Normal breath sounds.   Abdominal:      General: Bowel sounds are normal.      Palpations: Abdomen is soft.      Tenderness: There is abdominal tenderness.   Musculoskeletal:         General: Normal range of motion.      Cervical back: Normal range of motion and neck supple.   Skin:     General: Skin is warm and dry.      Coloration: Skin is pale.   Neurological:      Mental Status: He is alert and oriented to person, place, and time.   Psychiatric:         Behavior: Behavior normal.         Judgment: Judgment normal.         Procedures           ED Course  ED Course as of 12/11/21 1453   Sat Dec 11, 2021   1312 Awaiting  []   1405 Patient resting comfortably.  His nausea is under control.  His oxygen is in the upper 90s on room air.  Currently denies any chest pain or abdominal pain.  His H&H is fairly stable when compared to the others from 10 days ago he tells me he has not  had any bloody stools for several days.  However it is still low compared to his normal from several weeks ago.  I think it is still reasonable to admit him for further evaluation perhaps get his colonoscopy.  However he refuses he wants to go home.  He also tells me he is very hungry like a box lunch.  Once again I had a very long sitdown conversation with him discussing the signs symptoms worse condition particular his anticoagulation which she will follow up with her clinic this week as well as the possibility of worsening GI bleeding abdominal pain etc.  Strongly encouraged him to be admitted to the hospital for further evaluation however he refuses politely tells me he would rather go home and rest as he is hungry and he has dogs he needs to let out and he overall just does not want to be admitted to the hospital.  I will respect his wishes and he understands my concerns as well. [JM]      ED Course User Index  [ARYA] Anjel Pagan APRN           CT Angiogram Chest   Final Result   No pulmonary embolus or other acute process in the chest.       No acute findings in the abdomen and pelvis. Please note as described   above, delayed phase images were not obtained due to patient terminating   the scan early.           This report was finalized on 12/11/2021 12:36 PM by Low Vicente.          CT Abdomen Pelvis With Contrast   Final Result   No pulmonary embolus or other acute process in the chest.       No acute findings in the abdomen and pelvis. Please note as described   above, delayed phase images were not obtained due to patient terminating   the scan early.           This report was finalized on 12/11/2021 12:35 PM by Low Vicente.                                                  MDM    Final diagnoses:   Gastrointestinal hemorrhage, unspecified gastrointestinal hemorrhage type   Acute abdominal pain   Anemia, unspecified type   Anticoagulated       ED Disposition  ED Disposition     ED Disposition  Condition Comment    Latoya Iqbal, DO  [address] [address]  [phone]    Schedule an appointment as soon as possible for a visit       Please follow-up with your anticoagulation clinic this week    Schedule an appointment as soon as possible for a visit       [facility] Emergency Department  [address] [facility] [id number] [phone]    If symptoms worsen         Medication List      New Prescriptions    ondansetron ODT 4 MG disintegrating tablet  Commonly known as: ZOFRAN-ODT  Place 1 tablet on the tongue Every 6 (Six) Hours As Needed for Nausea or Vomiting.        Changed    amiodarone 200 MG tablet  Commonly known as: PACERONE  Take 1 tablet by mouth 3 (Three) Times a Day.  What changed: when to take this     aspirin 81 MG chewable tablet  Chew 1 tablet Daily.  What changed: how much to take           Where to Get Your Medications      These medications were sent to [address] - [address] [facility] [address] [facility] - [phone]  - [phone]   [address] [facility] [address] [place] [address]    Phone: [phone]   · ondansetron ODT 4 MG disintegrating tablet          Anjel Pagan APRN  12/11/21 1419       Anjel Pagan APRN  12/11/21 1452

## 2021-12-12 LAB
QT INTERVAL: 482 MS
QTC INTERVAL: 552 MS

## 2021-12-19 ENCOUNTER — HOSPITAL ENCOUNTER (EMERGENCY)
Facility: HOSPITAL | Age: 56
Discharge: HOME | End: 2021-12-19
Attending: STUDENT IN AN ORGANIZED HEALTH CARE EDUCATION/TRAINING PROGRAM | Admitting: STUDENT IN AN ORGANIZED HEALTH CARE EDUCATION/TRAINING PROGRAM

## 2021-12-19 ENCOUNTER — APPOINTMENT (OUTPATIENT)
Dept: GENERAL RADIOLOGY | Facility: HOSPITAL | Age: 56
End: 2021-12-19

## 2021-12-19 VITALS
HEIGHT: 77 IN | BODY MASS INDEX: 37.19 KG/M2 | SYSTOLIC BLOOD PRESSURE: 155 MMHG | RESPIRATION RATE: 20 BRPM | DIASTOLIC BLOOD PRESSURE: 101 MMHG | TEMPERATURE: 99.1 F | OXYGEN SATURATION: 99 % | HEART RATE: 80 BPM | WEIGHT: 315 LBS

## 2021-12-19 DIAGNOSIS — D64.9 FATIGUE ASSOCIATED WITH ANEMIA: ICD-10-CM

## 2021-12-19 DIAGNOSIS — D64.9 CHRONIC ANEMIA: Primary | ICD-10-CM

## 2021-12-19 DIAGNOSIS — R06.00 DYSPNEA, UNSPECIFIED TYPE: ICD-10-CM

## 2021-12-19 LAB
ALBUMIN SERPL-MCNC: 4.2 G/DL (ref 3.5–5.2)
ALBUMIN/GLOB SERPL: 1.6 G/DL
ALP SERPL-CCNC: 103 U/L (ref 39–117)
ALT SERPL W P-5'-P-CCNC: 24 U/L (ref 1–41)
AMPHET+METHAMPHET UR QL: NEGATIVE
AMPHETAMINES UR QL: NEGATIVE
ANION GAP SERPL CALCULATED.3IONS-SCNC: 10 MMOL/L (ref 5–15)
AST SERPL-CCNC: 25 U/L (ref 1–40)
BARBITURATES UR QL SCN: NEGATIVE
BASOPHILS # BLD AUTO: 0.01 10*3/MM3 (ref 0–0.2)
BASOPHILS NFR BLD AUTO: 0.2 % (ref 0–1.5)
BENZODIAZ UR QL SCN: NEGATIVE
BILIRUB SERPL-MCNC: 0.2 MG/DL (ref 0–1.2)
BILIRUB UR QL STRIP: NEGATIVE
BUN SERPL-MCNC: 26 MG/DL (ref 6–20)
BUN/CREAT SERPL: 19.3 (ref 7–25)
BUPRENORPHINE SERPL-MCNC: POSITIVE NG/ML
CALCIUM SPEC-SCNC: 8.9 MG/DL (ref 8.6–10.5)
CANNABINOIDS SERPL QL: POSITIVE
CHLORIDE SERPL-SCNC: 103 MMOL/L (ref 98–107)
CK SERPL-CCNC: 145 U/L (ref 20–200)
CLARITY UR: CLEAR
CO2 SERPL-SCNC: 29 MMOL/L (ref 22–29)
COCAINE UR QL: NEGATIVE
COLOR UR: YELLOW
CREAT SERPL-MCNC: 1.35 MG/DL (ref 0.76–1.27)
D-LACTATE SERPL-SCNC: 0.9 MMOL/L (ref 0.5–2)
DEPRECATED RDW RBC AUTO: 50.4 FL (ref 37–54)
EOSINOPHIL # BLD AUTO: 0.14 10*3/MM3 (ref 0–0.4)
EOSINOPHIL NFR BLD AUTO: 2.9 % (ref 0.3–6.2)
ERYTHROCYTE [DISTWIDTH] IN BLOOD BY AUTOMATED COUNT: 15.5 % (ref 12.3–15.4)
GFR SERPL CREATININE-BSD FRML MDRD: 55 ML/MIN/1.73
GLOBULIN UR ELPH-MCNC: 2.6 GM/DL
GLUCOSE SERPL-MCNC: 95 MG/DL (ref 65–99)
GLUCOSE UR STRIP-MCNC: NEGATIVE MG/DL
HCT VFR BLD AUTO: 25.5 % (ref 37.5–51)
HGB BLD-MCNC: 7.7 G/DL (ref 13–17.7)
HGB UR QL STRIP.AUTO: NEGATIVE
HOLD SPECIMEN: NORMAL
HOLD SPECIMEN: NORMAL
IMM GRANULOCYTES # BLD AUTO: 0.03 10*3/MM3 (ref 0–0.05)
IMM GRANULOCYTES NFR BLD AUTO: 0.6 % (ref 0–0.5)
INR PPP: 2.49 (ref 0.85–1.16)
KETONES UR QL STRIP: NEGATIVE
LEUKOCYTE ESTERASE UR QL STRIP.AUTO: NEGATIVE
LYMPHOCYTES # BLD AUTO: 0.96 10*3/MM3 (ref 0.7–3.1)
LYMPHOCYTES NFR BLD AUTO: 19.8 % (ref 19.6–45.3)
MAGNESIUM SERPL-MCNC: 2.1 MG/DL (ref 1.6–2.6)
MCH RBC QN AUTO: 26.6 PG (ref 26.6–33)
MCHC RBC AUTO-ENTMCNC: 30.2 G/DL (ref 31.5–35.7)
MCV RBC AUTO: 88.2 FL (ref 79–97)
METHADONE UR QL SCN: NEGATIVE
MONOCYTES # BLD AUTO: 0.44 10*3/MM3 (ref 0.1–0.9)
MONOCYTES NFR BLD AUTO: 9.1 % (ref 5–12)
NEUTROPHILS NFR BLD AUTO: 3.28 10*3/MM3 (ref 1.7–7)
NEUTROPHILS NFR BLD AUTO: 67.4 % (ref 42.7–76)
NITRITE UR QL STRIP: NEGATIVE
NRBC BLD AUTO-RTO: 0 /100 WBC (ref 0–0.2)
NT-PROBNP SERPL-MCNC: 514.5 PG/ML (ref 0–900)
OPIATES UR QL: NEGATIVE
OXYCODONE UR QL SCN: NEGATIVE
PCP UR QL SCN: NEGATIVE
PH UR STRIP.AUTO: 6.5 [PH] (ref 5–8)
PHOSPHATE SERPL-MCNC: 3 MG/DL (ref 2.5–4.5)
PLATELET # BLD AUTO: 188 10*3/MM3 (ref 140–450)
PMV BLD AUTO: 10.6 FL (ref 6–12)
POTASSIUM SERPL-SCNC: 4.1 MMOL/L (ref 3.5–5.2)
PROCALCITONIN SERPL-MCNC: 0.11 NG/ML (ref 0–0.25)
PROPOXYPH UR QL: NEGATIVE
PROT SERPL-MCNC: 6.8 G/DL (ref 6–8.5)
PROT UR QL STRIP: NEGATIVE
PROTHROMBIN TIME: 25.9 SECONDS (ref 11.4–14.4)
RBC # BLD AUTO: 2.89 10*6/MM3 (ref 4.14–5.8)
SARS-COV-2 RDRP RESP QL NAA+PROBE: NORMAL
SODIUM SERPL-SCNC: 142 MMOL/L (ref 136–145)
SP GR UR STRIP: 1.02 (ref 1–1.03)
T4 FREE SERPL-MCNC: 1.19 NG/DL (ref 0.93–1.7)
TRICYCLICS UR QL SCN: NEGATIVE
TROPONIN T SERPL-MCNC: <0.01 NG/ML (ref 0–0.03)
TSH SERPL DL<=0.05 MIU/L-ACNC: 3.85 UIU/ML (ref 0.27–4.2)
UROBILINOGEN UR QL STRIP: NORMAL
WBC NRBC COR # BLD: 4.86 10*3/MM3 (ref 3.4–10.8)
WHOLE BLOOD HOLD SPECIMEN: NORMAL
WHOLE BLOOD HOLD SPECIMEN: NORMAL

## 2021-12-19 PROCEDURE — 99283 EMERGENCY DEPT VISIT LOW MDM: CPT

## 2021-12-19 PROCEDURE — 80306 DRUG TEST PRSMV INSTRMNT: CPT | Performed by: STUDENT IN AN ORGANIZED HEALTH CARE EDUCATION/TRAINING PROGRAM

## 2021-12-19 PROCEDURE — 81003 URINALYSIS AUTO W/O SCOPE: CPT | Performed by: STUDENT IN AN ORGANIZED HEALTH CARE EDUCATION/TRAINING PROGRAM

## 2021-12-19 PROCEDURE — 36415 COLL VENOUS BLD VENIPUNCTURE: CPT

## 2021-12-19 PROCEDURE — 84443 ASSAY THYROID STIM HORMONE: CPT | Performed by: STUDENT IN AN ORGANIZED HEALTH CARE EDUCATION/TRAINING PROGRAM

## 2021-12-19 PROCEDURE — 84145 PROCALCITONIN (PCT): CPT | Performed by: STUDENT IN AN ORGANIZED HEALTH CARE EDUCATION/TRAINING PROGRAM

## 2021-12-19 PROCEDURE — 80053 COMPREHEN METABOLIC PANEL: CPT | Performed by: STUDENT IN AN ORGANIZED HEALTH CARE EDUCATION/TRAINING PROGRAM

## 2021-12-19 PROCEDURE — 85025 COMPLETE CBC W/AUTO DIFF WBC: CPT | Performed by: STUDENT IN AN ORGANIZED HEALTH CARE EDUCATION/TRAINING PROGRAM

## 2021-12-19 PROCEDURE — 93005 ELECTROCARDIOGRAM TRACING: CPT

## 2021-12-19 PROCEDURE — 83880 ASSAY OF NATRIURETIC PEPTIDE: CPT | Performed by: STUDENT IN AN ORGANIZED HEALTH CARE EDUCATION/TRAINING PROGRAM

## 2021-12-19 PROCEDURE — 83735 ASSAY OF MAGNESIUM: CPT | Performed by: STUDENT IN AN ORGANIZED HEALTH CARE EDUCATION/TRAINING PROGRAM

## 2021-12-19 PROCEDURE — 87635 SARS-COV-2 COVID-19 AMP PRB: CPT | Performed by: STUDENT IN AN ORGANIZED HEALTH CARE EDUCATION/TRAINING PROGRAM

## 2021-12-19 PROCEDURE — 93005 ELECTROCARDIOGRAM TRACING: CPT | Performed by: STUDENT IN AN ORGANIZED HEALTH CARE EDUCATION/TRAINING PROGRAM

## 2021-12-19 PROCEDURE — 71045 X-RAY EXAM CHEST 1 VIEW: CPT

## 2021-12-19 PROCEDURE — 83605 ASSAY OF LACTIC ACID: CPT | Performed by: STUDENT IN AN ORGANIZED HEALTH CARE EDUCATION/TRAINING PROGRAM

## 2021-12-19 PROCEDURE — 84439 ASSAY OF FREE THYROXINE: CPT | Performed by: STUDENT IN AN ORGANIZED HEALTH CARE EDUCATION/TRAINING PROGRAM

## 2021-12-19 PROCEDURE — 82550 ASSAY OF CK (CPK): CPT | Performed by: STUDENT IN AN ORGANIZED HEALTH CARE EDUCATION/TRAINING PROGRAM

## 2021-12-19 PROCEDURE — 84100 ASSAY OF PHOSPHORUS: CPT | Performed by: STUDENT IN AN ORGANIZED HEALTH CARE EDUCATION/TRAINING PROGRAM

## 2021-12-19 PROCEDURE — 85610 PROTHROMBIN TIME: CPT | Performed by: STUDENT IN AN ORGANIZED HEALTH CARE EDUCATION/TRAINING PROGRAM

## 2021-12-19 PROCEDURE — 84484 ASSAY OF TROPONIN QUANT: CPT | Performed by: STUDENT IN AN ORGANIZED HEALTH CARE EDUCATION/TRAINING PROGRAM

## 2021-12-19 RX ORDER — SODIUM CHLORIDE 0.9 % (FLUSH) 0.9 %
10 SYRINGE (ML) INJECTION AS NEEDED
Status: DISCONTINUED | OUTPATIENT
Start: 2021-12-19 | End: 2021-12-20 | Stop reason: HOSPADM

## 2021-12-20 ENCOUNTER — OFFICE VISIT (OUTPATIENT)
Dept: CARDIOLOGY | Facility: HOSPITAL | Age: 56
End: 2021-12-20

## 2021-12-20 VITALS
BODY MASS INDEX: 37.19 KG/M2 | TEMPERATURE: 97.1 F | HEART RATE: 80 BPM | OXYGEN SATURATION: 97 % | HEIGHT: 77 IN | WEIGHT: 315 LBS | DIASTOLIC BLOOD PRESSURE: 68 MMHG | RESPIRATION RATE: 20 BRPM | SYSTOLIC BLOOD PRESSURE: 124 MMHG

## 2021-12-20 DIAGNOSIS — E78.2 MIXED HYPERLIPIDEMIA: ICD-10-CM

## 2021-12-20 DIAGNOSIS — D50.9 IRON DEFICIENCY ANEMIA, UNSPECIFIED IRON DEFICIENCY ANEMIA TYPE: ICD-10-CM

## 2021-12-20 DIAGNOSIS — I50.21 ACUTE SYSTOLIC CONGESTIVE HEART FAILURE (HCC): Primary | ICD-10-CM

## 2021-12-20 DIAGNOSIS — I10 ESSENTIAL HYPERTENSION: ICD-10-CM

## 2021-12-20 LAB — HOLD SPECIMEN: NORMAL

## 2021-12-20 PROCEDURE — 99214 OFFICE O/P EST MOD 30 MIN: CPT | Performed by: NURSE PRACTITIONER

## 2021-12-20 RX ORDER — BUMETANIDE 1 MG/1
1 TABLET ORAL 2 TIMES DAILY
Qty: 60 TABLET | Refills: 3 | Status: SHIPPED | OUTPATIENT
Start: 2021-12-20 | End: 2022-03-03 | Stop reason: SDUPTHER

## 2021-12-20 RX ORDER — ACETAMINOPHEN 500 MG
500 TABLET ORAL EVERY 6 HOURS PRN
COMMUNITY

## 2021-12-20 RX ORDER — OMEPRAZOLE 20 MG/1
20 CAPSULE, DELAYED RELEASE ORAL DAILY
COMMUNITY
End: 2022-03-03

## 2021-12-20 RX ORDER — BUPRENORPHINE HYDROCHLORIDE AND NALOXONE HYDROCHLORIDE DIHYDRATE 8; 2 MG/1; MG/1
2 TABLET SUBLINGUAL DAILY
COMMUNITY

## 2021-12-20 RX ORDER — TADALAFIL 20 MG/1
5 TABLET ORAL
COMMUNITY

## 2021-12-20 RX ORDER — PROMETHAZINE HYDROCHLORIDE 25 MG/1
25 TABLET ORAL EVERY 8 HOURS PRN
COMMUNITY
End: 2021-12-29

## 2021-12-20 NOTE — DISCHARGE SUMMARY
[facility] Medicine Services  ELOPEMENT AGAINST MEDICAL ADVICE    Patient Name: Mitchell Alberto  : 1965  MRN: [id number]    Date of Admission: 2021  Date of Elopement:  21  Primary Care Physician: Latoya Soriano DO    Consults     Date and Time Order Name Status Description    2021  5:25 AM Inpatient Gastroenterology Consult Completed         Hospital Course     Presenting Problem:   Symptomatic anemia [D64.9]    Active Hospital Problems    Diagnosis  POA   • **Prior Ascending aortic aneurysm (HCC) [I71.2]  Yes   • Altered mental state [R41.82]  Yes   • Acute UTI (urinary tract infection) [N39.0]  Yes   • Diarrhea of presumed infectious origin [R19.7]  Yes   • Unresolved grief [F43.21]  Yes   • Symptomatic anemia [D64.9]  Yes   • Dilated cardiomyopathy (HCC) [I42.0]  Yes   • GI bleed [K92.2]  Unknown   • Obesity (BMI 30-39.9) [E66.9]  Yes   • Obesity [E66.9]  Yes   • S/P AVR (aortic valve replacement) [Z95.2]  Not Applicable   • Atrial fibrillation [I48.91] [I48.91]  Yes   • Benign prostatic hyperplasia (BPH) with post-void dribbling [N40.1, N39.43]  Yes   • Hypertension [I10]  Yes      Resolved Hospital Problems   No resolved problems to display.          Hospital Course:  Mitchell Alberto is a 56 y.o. male with past medical history significant for aortic valve dissection s/p AVR in , history of CVA, congestive heart heart failure with low ejection fraction, atrial fibrillation and patient is currently on Coumadin.  Patient also has history of ventricular fibrillation and cardiac arrest in  currently s/p biventricular ICD.  Patient was seen in the emergency room on 2021 with complaint of vomiting and dark stool.  At that time hemoglobin was 6.1 hematocrit 19.6.  He was given Kcentra and transfused 1 unit of packed red blood cell.  Patient then left the hospital as he had to participate in his wife's .  Patient was brought to the emergency  room on 12/3/2021 as he was found very drowsy in a bore and EMS was called.  He presented to the emergency room as a field STEMI but had a wide complex paced rhythm.  Patient was admitted to telemetry.  GI was consulted and Dr. Logan saw and had an upper endoscopy done which did not find any source of bleeding.  Esophagus, stomach, first portion of duodenum was pretty normal.  After the procedure when patient came to the floor he wanted to leave the hospital.  I saw him first to check his mental status and he was awake, alert, oriented x3.  He insisted that he wants to leave.  I explained to him in details about his situation, regarding his anemia, regarding his anticoagulation and heart valve issues.  However, unfortunately patient insisted that he wants to leave and he left against my medical advice.  **Patient left AMA prior to completion of evaluation and management**      Day of Discharge     HPI:   **Patient left AMA prior to completion of evaluation and management**    Vital Signs:         Physical Exam (if applicable):  Constitutional: No acute distress  HENT: NCAT, mucous membranes moist  Respiratory: Clear to auscultation bilaterally, respiratory effort normal   Cardiovascular: Paced rhythm, no murmurs, rubs, or gallops  Gastrointestinal: Positive bowel sounds, soft, nontender, nondistended  Musculoskeletal: No bilateral ankle edema  Psychiatric: Shows, cooperative  Neurologic: Awake, alert, oriented x3, no focality appreciated, speech clear  Skin: No rashes    Discharge Details     Discharge Disposition:  **Patient left AMA prior to completion of evaluation and management, therefore discharge planning remains incomplete including absence of any needed discharging medications, testing arrangements or follow up unless otherwise specified**    No future appointments.          Kenneth Gandara MD  12/19/21

## 2021-12-20 NOTE — PATIENT INSTRUCTIONS
Stop lasix and begin bumex 1 mg twice daily starting tomorrow  Dr Logan's office will call you for an appt

## 2021-12-20 NOTE — DISCHARGE INSTRUCTIONS
While today's work-up was reassuring and it does not appear that anything emergent is going on at this time, should your symptoms worsen or change in any way please return to the ED or seek other medical care.  I would recommend contacting cardiology as soon as possible to arrange follow-up.

## 2021-12-21 ENCOUNTER — TELEPHONE (OUTPATIENT)
Dept: CARDIOLOGY | Facility: HOSPITAL | Age: 56
End: 2021-12-21

## 2021-12-21 ENCOUNTER — TELEPHONE (OUTPATIENT)
Dept: GASTROENTEROLOGY | Facility: CLINIC | Age: 56
End: 2021-12-21

## 2021-12-21 LAB
QT INTERVAL: 436 MS
QTC INTERVAL: 502 MS

## 2021-12-21 NOTE — TELEPHONE ENCOUNTER
CALLED PATIENT TO GET SET UP FOR HOSP FOLLOW UP DUE TO ANEMIA. PATIENT STATES HE IS WEAK AND SHORT OF BREATH. HE STATES  EYAD LINDA WAS SUPPOSE TO GET HIM IN TO SEE HEMATOLOGY. I DID NOT SEE THAT REFERRAL CAN SOMEONE CHECK ON THAT REFERRAL AND CALL THE PATIENT.    THANK YOU  DARA

## 2021-12-21 NOTE — ED PROVIDER NOTES
EMERGENCY DEPARTMENT ENCOUNTER    Pt Name: Mitchell Alberto  MRN: [id number]  Pt :   1965  Room Number:    Date of encounter:  2021  PCP: Latoya Soriano DO  ED Provider: Felipe Hansen MD    Historian: Patient      HPI:  Chief Complaint: Weakness, dyspnea        Context: Mitchell Alberto is a 56-year-old man who presents because of 2 days of significantly worsening generalized weakness and exertional dyspnea.  He says it is gotten so bad that it took him 3 hours to sweep his bedroom the other day.  He has complicated recent medical history.  He says he has mechanical heart valve from aortic valve dissection about 10 years ago.  He is on warfarin.  He has required multiple blood transfusions over the past month because of symptomatic anemia secondary to GI bleed.  He denies melena or blood in his stool at this time.  Last month he had an ICD placed after V. fib arrest but did not do cardiac rehab and ultimately left AMA.  Lastly he was recently started on Suboxone 11 days ago due to hydrocodone addiction.  He is concerned that his symptoms may be related to the Suboxone because he googled the side effects and feels like his fatigue may be related to that and that this may be Suboxone induced withdrawal.  He denies recent fever or illness.  Denies any other complaints at this time.    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 Residual left upper extremity weakness.     • Transient cerebral ischemia          PAST SURGICAL HISTORY  Past Surgical History:   Procedure Laterality Date   • ABDOMINAL AORTIC ANEURYSM REPAIR      History of  Aortic Aneurysm Repair   • AORTIC VALVE REPAIR/REPLACEMENT      Replacement   • BACK SURGERY     • CARDIAC CATHETERIZATION N/A 7/15/2021    Procedure: LEFT HEART CATH;  Surgeon: Berhane Shrestha MD;  Location:  [facility];  Service: Cardiology;  Laterality: N/A;   • CARDIAC ELECTROPHYSIOLOGY PROCEDURE N/A 7/28/2021    Procedure: EP - DEVICE;  Surgeon: Martin Davis MD;  Location:  [facility];  Service: Cardiology;  Laterality: N/A;   • CARDIAC ELECTROPHYSIOLOGY PROCEDURE N/A 7/28/2021    Procedure: AV Node Ablation;  Surgeon: Martin Davis MD;  Location:  [facility];  Service: Cardiology;  Laterality: N/A;   • ENDOSCOPY N/A 12/4/2021    Procedure: ESOPHAGOGASTRODUODENOSCOPY;  Surgeon: Aakash Logan MD;  Location:  [facility];  Service: Gastroenterology;  Laterality: N/A;   • HERNIA REPAIR  08/2014    Left inguinal herniorrhaphy   • SKIN CANCER EXCISION  08/2014    Left forehead melanoma excision, Dr. Gisell Kothari, August 2014.    • TRACHEOSTOMY AND PEG TUBE INSERTION N/A 7/13/2021    Procedure: TRACHEOSTOMY AND PERCUTANEOUS ENDOSCOPIC GASTROSTOMY TUBE INSERTION;  Surgeon: Hayden Centeno MD;  Location:  [facility];  Service: General;  Laterality: N/A;         FAMILY HISTORY  Family History   Problem Relation Age of Onset   • Aortic aneurysm Mother    • Arthritis Mother    • Heart disease Mother    • Heart attack Mother    • Hypertension Mother    • Arthritis Father    • Heart disease Father    • Melanoma Father    • Hypertension Father    • Depression Other    • Hypertension Sister    • Heart disease Brother    • Hypertension Brother    • Hypertension Brother          SOCIAL HISTORY  Social History     Socioeconomic History   • Marital status:    Tobacco Use   • Smoking status: Never Smoker   • Smokeless tobacco: Never Used   Vaping Use   • Vaping Use: Never used   Substance and Sexual Activity   • Alcohol use: Yes     Comment: very occasional    • Drug use: Yes     Types: Marijuana     Comment: Uses daily   • Sexual activity: Defer         ALLERGIES  Meclizine hcl        REVIEW OF SYSTEMS  Review of Systems       All systems reviewed and negative except for those discussed in HPI.       PHYSICAL EXAM    I have reviewed the triage vital signs and nursing notes.    ED Triage Vitals [12/19/21 2039]   Temp Heart Rate Resp BP SpO2   99.1 °F (37.3 °C) 81 20 178/72 100 %      Temp src Heart Rate Source Patient Position BP Location FiO2 (%)   Temporal Monitor Sitting Left arm --       Physical Exam  GENERAL:   Appears awake and alert in no acute distress  HENT: Nares patent.  EYES: No scleral icterus.  CV: Regular rhythm, regular rate.  RESPIRATORY: Normal effort.  No audible wheezes, rales or rhonchi.  ABDOMEN: Soft, nontender  MUSCULOSKELETAL: No deformities.  Symmetric bilateral lower extremity edema  NEURO: Alert, moves all extremities, follows commands.  SKIN: Warm, pale, dry, no rash visualized.        LAB RESULTS  No results found for this or any previous visit (from the past 24 hour(s)).    If labs were ordered, I independently reviewed the results.        RADIOLOGY  No Radiology Exams Resulted Within Past 24 Hours    I ordered and reviewed the above noted radiographic studies.      I viewed images of the chest x-ray which is clear  See radiologist's dictation for official interpretation.        PROCEDURES    Procedures    ECG 12 Lead   Final Result   Test Reason : SOA Protocol   Blood Pressure :   */*   mmHG   Vent. Rate :  80 BPM     Atrial Rate :  80 BPM      P-R Int : 160 ms          QRS Dur : 148 ms       QT Int : 436 ms       P-R-T Axes :  36 137  -7 degrees      QTc Int : 502 ms      Atrial-paced rhythm   Nonspecific intraventricular block   Lateral infarct , age undetermined   Abnormal ECG   When compared with ECG of 04-DEC-2021 00:12,   Electronic atrial pacemaker has replaced Electronic ventricular pacemaker   Confirmed by JONATAN JORDAN  ([id number]) on 12/21/2021 6:45:56 AM      Referred By:            Confirmed By: JONATAN JORDAN          MEDICATIONS GIVEN IN ER    Medications - No data to display      PROGRESS, DATA ANALYSIS, CONSULTS, AND MEDICAL DECISION MAKING    All labs have been independently reviewed by me.  All radiology studies have been reviewed by me and the radiologist dictating the report.   EKG's have been independently viewed and interpreted by me.      Differential diagnoses: Symptomatic anemia, electrolyte abnormality, CHF, acute kidney injury, hypothyroidism, myositis, substance use, COVID-19, coagulopathy, active bleeding      ED Course as of 12/21/21 0735   Sun Dec 19, 2021   1715 In summary this is a 56-year-old man who presents because of 2 days of significantly worsening generalized weakness and exertional dyspnea.  He says it is gotten so bad that it took him 3 hours to sweep his bedroom the other day.  He has complicated recent medical history.  He says he has mechanical heart valve from aortic valve dissection about 10 years ago.  He is on warfarin.  He has required multiple blood transfusions over the past month because of symptomatic anemia secondary to GI bleed.  He denies melena or blood in his stool at this time.  Last month he had an ICD placed after V. fib arrest but did not do cardiac rehab and ultimately left AMA.  Lastly he was recently started on Suboxone 11 days ago due to hydrocodone addiction.  He is concerned that his symptoms may be related to the Suboxone because he googled the side effects and feels like his fatigue may be related to that and that this may be Suboxone induced withdrawal.  He denies recent fever or illness.  Denies any other complaints at this time. [CC]   3876 Patient arrived hemodynamically stable vitals within normal limits.  He is generally well-appearing but is somewhat pale.  Obtaining cardiac work-up, labs, electrolytes, Covid swab, urinalysis and UDS.  Will reevaluate pending  initial work-up. [CC]   2147 CBC reveals anemia at 7.7 however this is actually consistent with prior values and he does not merit transfusion at this point.  Creatinine elevated 1.35 however this also appears chronic.  Initial troponin is not elevated.  EKG reveals atrial paced rhythm at a rate of 80 and Q waves in the lateral leads but no acute findings. [CC]   2219 Chest x-ray is clear [CC]   2219 No elevation in troponin or BNP.  Lactate is not elevated.  Mag and [place] are within normal limits.  No elevation in CK.  Thyroid studies within normal limits.  No elevation in procalcitonin. [CC]      ED Course User Index  [CC] Felipe Hansen MD     INR is therapeutic at 2.49.  UDS positive for THC.  I do not see any acute findings on his work-up today.  I discussed the reassuring findings with the patient who is frustrated saying he is having significant fatigue and would like blood transfusion.  I explained to him that even though he has tolerated previous transfusions there are risks associated with this number at this time his hemoglobin is stable and he does not meet transfusion criteria.  He is disappointed but agreeable.  He will follow-up with his PCP.  Counseled on return precautions verbally expressed understanding of these.      AS OF 07:35 EST VITALS:    BP - (!) 155/101  HR - 80  TEMP - 99.1 °F (37.3 °C) (Temporal)  O2 SATS - 99%                  DIAGNOSIS  Final diagnoses:   Chronic anemia   Fatigue associated with anemia   Dyspnea, unspecified type         DISPOSITION  DISCHARGE    Patient discharged in stable condition.    Reviewed implications of results, diagnosis, meds, responsibility to follow up, warning signs and symptoms of possible worsening, potential complications and reasons to return to ER.    Patient/Family voiced understanding of above instructions.    Discussed plan for discharge, as there is no emergent indication for admission.  Pt/family is agreeable and understands need for  follow up and possible repeat testing.  Pt/family is aware that discharge does not mean that nothing is wrong but that it indicates no emergency is currently present that requires admission and they must continue care with follow-up as given below or with a physician of their choice.     FOLLOW-UP  Martin Davis MD  [address] [facility] [address] [place] [address]  [phone]    Call   For follow-up as soon as possible.         Medication List      Changed    amiodarone 200 MG tablet  Commonly known as: PACERONE  Take 1 tablet by mouth 3 (Three) Times a Day.  What changed: when to take this     aspirin 81 MG chewable tablet  Chew 1 tablet Daily.  What changed: how much to take                       Felipe Hansen MD  12/21/21 9647

## 2021-12-21 NOTE — PROGRESS NOTES
"Chief Complaint  Follow-up and Congestive Heart Failure    Subjective    History of Present Illness {CC  Problem List  Visit  Diagnosis   Encounters  Notes  Medications  Labs  Result Review Imaging  Media :23}       History of Present Illness   56-year-old male presents the office today as a same-day appointment for ongoing evaluation of his acute on chronic systolic heart failure.  Patient reports he has gained at least 18 to 20 pounds in the last 2 weeks.  He reports he has had multiple ED visits here at [facility].  He was seen most recently in the ED on 12/11/2021 and then again on 12/19/2021.  Last CBC on 12/19/2021 showed an H&H of 7.7 and 25.5.  Patient reports significant fatigue, dyspnea with minimal exertion, abdominal fullness and pedal edema. Patient notes that he had been abusing opiods but is no longer using and has started suboxone   Objective     Vital Signs:   Vitals:    12/20/21 1441   BP: 124/68   BP Location: Left arm   Patient Position: Sitting   Cuff Size: Large Adult   Pulse: 80   Resp: 20   Temp: 97.1 °F (36.2 °C)   TempSrc: Temporal   SpO2: 97%   Weight: (!) 158 kg (349 lb 4 oz)   Height: 195.6 cm (77\")     Body mass index is 41.42 kg/m².  Physical Exam  Vitals and nursing note reviewed.   Constitutional:       Appearance: Normal appearance.   HENT:      Head: Normocephalic.   Eyes:      Pupils: Pupils are equal, round, and reactive to light.   Cardiovascular:      Rate and Rhythm: Normal rate and regular rhythm.      Pulses: Normal pulses.      Heart sounds: Normal heart sounds. No murmur heard.      Pulmonary:      Effort: Pulmonary effort is normal.      Breath sounds: Rales present.   Abdominal:      General: Bowel sounds are normal. There is distension.      Palpations: Abdomen is soft.   Musculoskeletal:         General: Normal range of motion.      Cervical back: Normal range of motion.      Right lower leg: Edema present.      Left lower leg: " Edema present.   Skin:     General: Skin is warm and dry.      Capillary Refill: Capillary refill takes less than 2 seconds.      Coloration: Skin is pale.   Neurological:      Mental Status: He is alert and oriented to person, place, and time.   Psychiatric:         Mood and Affect: Mood normal.         Thought Content: Thought content normal.              Result Review  Data Reviewed:{ Labs  Result Review  Imaging  Med Tab  Media :23}   Lab Results   Component Value Date    WBC 4.86 12/19/2021    HGB 7.7 (L) 12/19/2021    HCT 25.5 (L) 12/19/2021    MCV 88.2 12/19/2021     12/19/2021     Lab Results   Component Value Date    GLUCOSE 95 12/19/2021    CALCIUM 8.9 12/19/2021     12/19/2021    K 4.1 12/19/2021    CO2 29.0 12/19/2021     12/19/2021    BUN 26 (H) 12/19/2021    CREATININE 1.35 (H) 12/19/2021    EGFRIFNONA 55 (L) 12/19/2021    BCR 19.3 12/19/2021    ANIONGAP 10.0 12/19/2021                Assessment and Plan {CC Problem List  Visit Diagnosis  ROS  Review (Popup)  Health Maintenance  Quality  BestPractice  Medications  SmartSets  SnapShot Encounters  Media :23}   1. Acute systolic congestive heart failure (HCC)  IV diuresis today in office. Patient received 2 mg bumex  today through a butterfly  in the left forearm over slow IV push. Patient voided x1 in the office prior to discharge from the office. Butterfly was d/c'd and area was free of erythema, ecchymosis, or drainage.  Patient was  instructed to record urinary output for the next 24 hours. Patient will receive a follow up call from the HF center in 24 hours to evaluate urinary output and reassess signs and symptoms.   Stop lasix and begin bumex 1 mg bid   2. Iron deficiency anemia, unspecified iron deficiency anemia type      - Ambulatory Referral to Gastroenterology  - Ambulatory Referral to Hematology    3. Essential hypertension  Well controlled    4. Mixed hyperlipidemia  Stable on lipitor           Follow Up  {Instructions Charge Capture  Follow-up Communications :23}   Return if symptoms worsen or fail to improve.    Patient was given instructions and counseling regarding his condition or for health maintenance advice. Please see specific information pulled into the AVS if appropriate.  Patient was instructed to call the [facility] with any questions, concerns, or worsening symptoms.

## 2021-12-22 ENCOUNTER — TELEPHONE (OUTPATIENT)
Dept: CARDIOLOGY | Facility: HOSPITAL | Age: 56
End: 2021-12-22

## 2021-12-22 NOTE — TELEPHONE ENCOUNTER
Patient states that he was supposed to be setup for a phlebotomist for a blood transfusion. Patient states that he has been having a lot of weakness.

## 2021-12-23 ENCOUNTER — TELEPHONE (OUTPATIENT)
Dept: CARDIOLOGY | Facility: HOSPITAL | Age: 56
End: 2021-12-23

## 2021-12-23 ENCOUNTER — TELEPHONE (OUTPATIENT)
Dept: ONCOLOGY | Facility: CLINIC | Age: 56
End: 2021-12-23

## 2021-12-23 ENCOUNTER — TELEPHONE (OUTPATIENT)
Dept: ONCOLOGY | Facility: OTHER | Age: 56
End: 2021-12-23

## 2021-12-23 NOTE — TELEPHONE ENCOUNTER
Caller: GINNA    Relationship to patient: SELF    Best call back number: [phone]    Patient is needing: BLOOD TRANSFUSION. HE DOES NOT WANT TO GO THROUGH THE HOLIDAYS LIKE THIS. SAID HE SPOKE WITH MARTA YESTERDAY WHO PROMISED HE COULD GET IN TODAY. [facility] REVIEWED URGENT REFERRAL TO SEE HEMATOLOGIST IN OUR OFFICE. REFERRAL HAS NOT BEEN SCHEDULED AND WAS SENT TO OFFICE. [facility] TRIED TO WARM TRANSFER. SPOKE WITH ELSY WHO STATED THERE WAS NOT A MARTA IN HER OFFICE NOR A PROVIDER IN OFFICE TODAY AND NO ONE TO SEE PATIENT TO GET ORDER FOR TRANSFUSION. SHE ADVISED PT TO SPEAK WITH REFERRING TO GET ORDER. ELSY MADE APPT FOR 12-.    [facility] TRIED TO EXPLAIN TO PT, BUT PT WAS ADAMANT TO SPEAK WITH MARTA WHO PROMISED HE COULD GET IN TODAY. [facility] EXPLAINED THERE IS AN OPENING ON 12- AND THAT WE HAD A REFERRAL. PT TOOK DOWN INFORMATION. PT STATED HE WOULD SPEAK WITH HIS OTHER PROVIDERS TO SEE IF HE COULD GET ORDER FOR THE BLOOD TRANSFUSION.

## 2021-12-23 NOTE — TELEPHONE ENCOUNTER
GINNA CALLED IN STATING THAT HE WAS CALLING MARTA BACK, I WA SNOT AWARE OF ANY MARTA IN THE OFFICE SO CALLED OFFICE & SPOKE WITH ELSY, AS I THOUGHT HE WAS CALLING BACK FROM THE ORIGINAL ENCOUNTER SENT BY WILDER - ALSO - THOUGHT MAYBE HE HAD THE WRONG OFFICE.  ELSY & I EXPLORED ALL THE MESSAGES IN THE PATIENTS CHART & WERE NOT SURE WHO HE SHOULD BE TALKING TO SO I WENT BACK TO PATIENT TO ENCOUNTER WHERE THE NAME MARTA CAME FROM.  HE STATED HE SPOKE WITH A MARTA YESTERDAY & WAS TOLD THAT HIS APPT. WAS URGENT & THAT HE WOULD BE GETTING A BLOOD TRANSFUSION TODAY BUT NO ONE HAS CALLED HIM.  I EXPLAINED TO ELSY & SHE CONFIRMED THAT DR. DOOLEY CANNOT ORDER A TRANSFUSION FOR A PATIENT WHO IS NOT ESTABLISHED & THAT HE WAS [place]. AS SOON AS WE COULD GET HIM IN & THAT IF HE FELT THAT BAD THAT HE NEEDED TO GO TO ER.  PATIENT STATED THAT THEY WILL NOT GIVE HIM ONE BECAUSE HE IS AT A [facility].1 & YOU HAVE TO BE AT A 7 TO GET A TRANSFUSION FROM THE HOSP.  I AGAIN ADVISED HIM TO GO TO THE HOSP. LIKE ELSY SUGGESTED & HE WANTED MY NAME & STATED HE HOPES HE MAKES IT UNTIL THE 29TH.  ELSY WAS DOCUMENTING A NOTE ALSO.

## 2021-12-23 NOTE — TELEPHONE ENCOUNTER
**NEW HEM APPT 12/29/21**  Patient called requesting blood transfusion before the holiday. Patient is not est patient and will be as of next week. Patient was seen in the hosp and d/c stating that he did not meet requirements for blood transfusion at this time. Patient stated that he wanted one anyway. Patient stated that Mike (not notes in chart of conversations) told him that the office could schedule him one without being seen. No evidence of this conversation in chart. Patient spoke with Kamila at [facility] who also investigated chart and looked for conversation. Patient was advised several times to go to the nearest ED if he was feeling significantly worse. Patient stated that he was not going because he had already been denied. Patient stated that he hopes he lives until wednesday for the appointment. Patient was once again advised to go to the ED if he was in distress and was exhibiting more symptoms. Patient firmly refused and ended call.

## 2021-12-23 NOTE — TELEPHONE ENCOUNTER
Patient called stating that he had an referral for hematology and oncology for a blood transfusion. He states that if an order in that they can do the blood transfusion today. He states that otherwise he would have to wait till next Wednesday.

## 2021-12-29 ENCOUNTER — LAB (OUTPATIENT)
Dept: LAB | Facility: HOSPITAL | Age: 56
End: 2021-12-29

## 2021-12-29 ENCOUNTER — CONSULT (OUTPATIENT)
Dept: ONCOLOGY | Facility: CLINIC | Age: 56
End: 2021-12-29

## 2021-12-29 VITALS
OXYGEN SATURATION: 97 % | HEART RATE: 80 BPM | WEIGHT: 315 LBS | BODY MASS INDEX: 37.19 KG/M2 | TEMPERATURE: 97 F | HEIGHT: 77 IN | RESPIRATION RATE: 16 BRPM | SYSTOLIC BLOOD PRESSURE: 147 MMHG | DIASTOLIC BLOOD PRESSURE: 78 MMHG

## 2021-12-29 DIAGNOSIS — R06.09 DYSPNEA ON EXERTION: ICD-10-CM

## 2021-12-29 DIAGNOSIS — D64.9 SYMPTOMATIC ANEMIA: Primary | ICD-10-CM

## 2021-12-29 DIAGNOSIS — D50.0 IRON DEFICIENCY ANEMIA DUE TO CHRONIC BLOOD LOSS: ICD-10-CM

## 2021-12-29 DIAGNOSIS — D64.9 SYMPTOMATIC ANEMIA: ICD-10-CM

## 2021-12-29 DIAGNOSIS — D50.0 IRON DEFICIENCY ANEMIA DUE TO CHRONIC BLOOD LOSS: Primary | ICD-10-CM

## 2021-12-29 LAB
ERYTHROCYTE [DISTWIDTH] IN BLOOD BY AUTOMATED COUNT: 17.9 % (ref 12.3–15.4)
FERRITIN SERPL-MCNC: 20.07 NG/ML (ref 30–400)
HCT VFR BLD AUTO: 26.9 % (ref 37.5–51)
HGB BLD-MCNC: 8.3 G/DL (ref 13–17.7)
IRON 24H UR-MRATE: 33 MCG/DL (ref 59–158)
IRON SATN MFR SERPL: 5 % (ref 20–50)
LYMPHOCYTES # BLD AUTO: 1.3 10*3/MM3 (ref 0.7–3.1)
LYMPHOCYTES NFR BLD AUTO: 21.4 % (ref 19.6–45.3)
MCH RBC QN AUTO: 25.6 PG (ref 26.6–33)
MCHC RBC AUTO-ENTMCNC: 31 G/DL (ref 31.5–35.7)
MCV RBC AUTO: 82.6 FL (ref 79–97)
MONOCYTES # BLD AUTO: 0.4 10*3/MM3 (ref 0.1–0.9)
MONOCYTES NFR BLD AUTO: 6.9 % (ref 5–12)
NEUTROPHILS NFR BLD AUTO: 4.2 10*3/MM3 (ref 1.7–7)
NEUTROPHILS NFR BLD AUTO: 71.7 % (ref 42.7–76)
NT-PROBNP SERPL-MCNC: 377.1 PG/ML (ref 0–900)
PLATELET # BLD AUTO: 272 10*3/MM3 (ref 140–450)
PMV BLD AUTO: 7.3 FL (ref 6–12)
RBC # BLD AUTO: 3.25 10*6/MM3 (ref 4.14–5.8)
RETICS # AUTO: 0.12 10*6/MM3 (ref 0.02–0.13)
RETICS/RBC NFR AUTO: 3.82 % (ref 0.7–1.9)
TIBC SERPL-MCNC: 618 MCG/DL (ref 298–536)
TRANSFERRIN SERPL-MCNC: 415 MG/DL (ref 200–360)
WBC NRBC COR # BLD: 5.9 10*3/MM3 (ref 3.4–10.8)

## 2021-12-29 PROCEDURE — 85045 AUTOMATED RETICULOCYTE COUNT: CPT

## 2021-12-29 PROCEDURE — 83880 ASSAY OF NATRIURETIC PEPTIDE: CPT

## 2021-12-29 PROCEDURE — 85025 COMPLETE CBC W/AUTO DIFF WBC: CPT

## 2021-12-29 PROCEDURE — 82607 VITAMIN B-12: CPT

## 2021-12-29 PROCEDURE — 83540 ASSAY OF IRON: CPT

## 2021-12-29 PROCEDURE — 36415 COLL VENOUS BLD VENIPUNCTURE: CPT

## 2021-12-29 PROCEDURE — 84466 ASSAY OF TRANSFERRIN: CPT

## 2021-12-29 PROCEDURE — 82728 ASSAY OF FERRITIN: CPT

## 2021-12-29 PROCEDURE — 82746 ASSAY OF FOLIC ACID SERUM: CPT

## 2021-12-29 PROCEDURE — 99204 OFFICE O/P NEW MOD 45 MIN: CPT | Performed by: INTERNAL MEDICINE

## 2021-12-29 RX ORDER — FERROUS SULFATE 325(65) MG
325 TABLET ORAL
Qty: 90 TABLET | Refills: 5 | Status: SHIPPED | OUTPATIENT
Start: 2021-12-29 | End: 2022-07-29

## 2021-12-29 NOTE — PROGRESS NOTES
ID: 56 y.o. year old male from [facility] [id number]    PCP: Latoya Soriano DO    REFERRING PHYSICIAN: YUE Martinez    Reason for Consultation: Worsening anemia    Dear Ms. Cardoza and Dr. Soriano    It is a pleasure to meet Mr. Alberto today.  He is a very pleasant 56-year-old gentleman who presents today for consultation due to anemia.  He has required 4 units of blood over the last month.  This all started in July when he had a cardiac arrest and was intubated for a number of days.  Subsequently his ejection fraction was 30%.  He had an AICD placed and also a mechanical valve placed.  He is currently on Coumadin.  He does not report any sebastian bleeding.  He is undergone an EGD and colonoscopy which were both negative for obvious signs of bleeding.  He reports significant shortness of breath with activity.  Denies any issues with headaches.  He does have dizziness on standing.        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 Residual left upper extremity weakness.     • Transient cerebral ischemia        Past Surgical History:   Procedure Laterality Date   • ABDOMINAL AORTIC ANEURYSM REPAIR      History of Aortic Aneurysm Repair   • AORTIC VALVE REPAIR/REPLACEMENT      Replacement   • BACK SURGERY     • CARDIAC CATHETERIZATION N/A 7/15/2021    Procedure: LEFT HEART CATH;  Surgeon: Berhane Shrestha MD;  Location:  [facility];  Service: Cardiology;  Laterality: N/A;   • CARDIAC ELECTROPHYSIOLOGY PROCEDURE N/A 7/28/2021    Procedure: EP - DEVICE;  Surgeon: Martin Davis MD;  Location:  [facility] EP [facility]VAS[facility];  Service: Cardiology;  Laterality: N/A;   •  CARDIAC ELECTROPHYSIOLOGY PROCEDURE N/A 7/28/2021    Procedure: AV Node Ablation;  Surgeon: Martin Davis MD;  Location:  [facility];  Service: Cardiology;  Laterality: N/A;   • ENDOSCOPY N/A 12/4/2021    Procedure: ESOPHAGOGASTRODUODENOSCOPY;  Surgeon: Aakash Logan MD;  Location:  [facility];  Service: Gastroenterology;  Laterality: N/A;   • HERNIA REPAIR  08/2014    Left inguinal herniorrhaphy   • SKIN CANCER EXCISION  08/2014    Left forehead melanoma excision, Dr. Gisell Kothari, August 2014.    • TRACHEOSTOMY AND PEG TUBE INSERTION N/A 7/13/2021    Procedure: TRACHEOSTOMY AND PERCUTANEOUS ENDOSCOPIC GASTROSTOMY TUBE INSERTION;  Surgeon: Hayden Centeno MD;  Location:  [facility];  Service: General;  Laterality: N/A;       Social History     Socioeconomic History   • Marital status:    Tobacco Use   • Smoking status: Never Smoker   • Smokeless tobacco: Never Used   Vaping Use   • Vaping Use: Never used   Substance and Sexual Activity   • Alcohol use: Yes     Comment: very occasional   • Drug use: Yes     Types: Marijuana     Comment: Uses daily   • Sexual activity: Defer       Family History   Problem Relation Age of Onset   • Aortic aneurysm Mother    • Arthritis Mother    • Heart disease Mother    • Heart attack Mother    • Hypertension Mother    • Arthritis Father    • Heart disease Father    • Melanoma Father    • Hypertension Father    • Depression Other    • Hypertension Sister    • Heart disease Brother    • Hypertension Brother    • Hypertension Brother        Review of Systems:    16 point review of systems was performed and reviewed and scanned into the EMR    Review of Systems   Constitutional: Positive for fatigue and unexpected weight change.   HENT:  Negative.    Eyes: Negative.    Respiratory: Positive for shortness of breath.    Cardiovascular: Negative.    Gastrointestinal: Negative.    Endocrine: Negative.    Musculoskeletal: Negative.    Skin: Negative.     Neurological: Positive for light-headedness.   Hematological: Negative.    Psychiatric/Behavioral: Negative.          Current Outpatient Medications:   •  amiodarone (PACERONE) 200 MG tablet, Take 1 tablet by mouth 3 (Three) Times a Day. (Patient taking differently: Take 200 mg by mouth 2 (Two) Times a Day.), Disp: , Rfl:   •  aspirin 81 MG chewable tablet, Chew 1 tablet Daily. (Patient taking differently: Chew 325 mg Daily.), Disp: , Rfl:   •  atorvastatin (LIPITOR) 40 MG tablet, Take 1 tablet by mouth Every Night., Disp: , Rfl:   •  bumetanide (BUMEX) 1 MG tablet, Take 1 tablet by mouth 2 (Two) Times a Day., Disp: 60 tablet, Rfl: 3  •  buprenorphine-naloxone (SUBOXONE) 8-2 MG per SL tablet, Place 2 tablets under the tongue Daily., Disp: , Rfl:   •  lisinopril (PRINIVIL,ZESTRIL) 40 MG tablet, Take 40 mg by mouth Daily., Disp: , Rfl:   •  omeprazole (priLOSEC) 20 MG capsule, Take 20 mg by mouth Daily., Disp: , Rfl:   •  ondansetron ODT (ZOFRAN-ODT) 4 MG disintegrating tablet, Place 1 tablet on the tongue Every 6 (Six) Hours As Needed for Nausea or Vomiting., Disp: 12 tablet, Rfl: 0  •  tadalafil (ADCIRCA) 20 MG tablet tablet, Take 5 mg by mouth Daily., Disp: , Rfl:   •  warfarin (COUMADIN) 7.5 MG tablet, , Disp: , Rfl:   •  warfarin (COUMADIN) 7.5 MG tablet, Take 1 tablets by mouth once daily or as directed by the anticoagulation clinic, Disp: 15 tablet, Rfl: 0  •  acetaminophen (TYLENOL) 500 MG tablet, Take 500 mg by mouth Every 6 (Six) Hours As Needed for Mild Pain ., Disp: , Rfl:   •  ferrous sulfate 325 (65 FE) MG tablet, Take 1 tablet by mouth Daily With Breakfast., Disp: 90 tablet, Rfl: 5    Pain Medications             aspirin 81 MG chewable tablet Chew 1 tablet Daily.    buprenorphine-naloxone (SUBOXONE) 8-2 MG per SL tablet Place 2 tablets under the tongue Daily.    acetaminophen (TYLENOL) 500 MG tablet Take 500 mg by mouth Every 6 (Six) Hours As Needed for Mild Pain .           Allergies   Allergen  Reactions   • Meclizine Hcl Itching         ECOG score: 0           Objective     Vitals:    12/29/21 1430   BP: 147/78   Pulse: 80   Resp: 16   Temp: 97 °F (36.1 °C)   SpO2: 97%     Body mass index is 40.56 kg/m².  Body surface area is 2.81 meters squared.        12/29/21  1430   Weight: (!) 155 kg (342 lb)     Pain Score    12/29/21 1430   PainSc: 0-No pain          Physical Exam    General: well appearing, in no acute distress  HEENT: sclera anicteric, oropharynx clear, neck is supple  Lymphatics: no cervical, supraclavicular, or axillary adenopathy  Cardiovascular: regular rate and rhythm, no murmurs, rubs or gallops  Lungs: clear to auscultation bilaterally  Abdomen: soft, nontender, nondistended.  No palpable organomegaly  Extremities: no lower extremity edema  Skin: no rashes, lesions, bruising, or petechiae  Msk:  Shows no weakness of the large muscle groups  Psych: Mood is stable        Lab Results   Component Value Date    GLUCOSE 95 12/19/2021    BUN 26 (H) 12/19/2021    CREATININE 1.35 (H) 12/19/2021     12/19/2021    K 4.1 12/19/2021     12/19/2021    CO2 29.0 12/19/2021    CALCIUM 8.9 12/19/2021    PROTEINTOT 6.8 12/19/2021    ALBUMIN 4.20 12/19/2021    BILITOT 0.2 12/19/2021    ALKPHOS 103 12/19/2021    AST 25 12/19/2021    ALT 24 12/19/2021       Lab Results   Component Value Date    HGB 8.3 (L) 12/29/2021    HCT 26.9 (L) 12/29/2021    MCV 82.6 12/29/2021     12/29/2021    WBC 5.90 12/29/2021    NEUTROABS 4.20 12/29/2021    LYMPHSABS 1.30 12/29/2021    MONOSABS 0.40 12/29/2021    EOSABS 0.14 12/19/2021    BASOSABS 0.01 12/19/2021     Lab Results   Component Value Date    HGB 8.3 (L) 12/29/2021    HGB 7.7 (L) 12/19/2021    HGB 8.0 (L) 12/11/2021     No results found for: FERRITIN  Lab Results   Component Value Date    MCV 82.6 12/29/2021    MCV 88.2 12/19/2021    MCV 88.9 12/11/2021         CT Abdomen Pelvis Without Contrast    Result Date: 12/4/2021  Negative CT of the abdomen and  pelvis. Signer Name: Curry Andres MD  Signed: 12/4/2021 3:38 AM  Workstation Name: [facility]    CT Head Without Contrast    Result Date: 12/4/2021  Normal, negative unenhanced head CT. Signer Name: Curry Andres MD  Signed: 12/4/2021 3:37 AM  Workstation Name: [facility]    CT Chest Without Contrast Diagnostic    Result Date: 12/4/2021  Bibasilar atelectasis. Otherwise negative Signer Name: Curry Andres MD  Signed: 12/4/2021 3:39 AM  Workstation Name: [facility]    CT Abdomen Pelvis With Contrast    Result Date: 12/11/2021  No pulmonary embolus or other acute process in the chest.  No acute findings in the abdomen and pelvis. Please note as described above, delayed phase images were not obtained due to patient terminating the scan early.   This report was finalized on 12/11/2021 12:35 PM by Low Vicente.      XR Chest 1 View    Result Date: 12/19/2021  No acute cardiopulmonary findings. Signer Name: Abel Culver MD  Signed: 12/19/2021 9:21 PM  Workstation Name: [facility]    XR Chest 1 View    Result Date: 12/4/2021  Low lung volumes. No focal infiltrates are visible Signer Name: Curry Andres MD  Signed: 12/4/2021 12:51 AM  Workstation Name: [facility]    XR Chest 1 View    Result Date: 12/1/2021  No acute disease.  Signer Name: Carly Otto MD  Signed: 12/1/2021 11:35 PM  Workstation Name: [facility]    CT Angiogram Chest    Result Date: 12/11/2021  No pulmonary embolus or other acute process in the chest.  No acute findings in the abdomen and pelvis. Please note as described above, delayed phase images were not obtained due to patient terminating the scan early.   This report was finalized on 12/11/2021 12:36 PM by Low Vicente.          Assessment/Plan      1.  Anemia likely iron  deficient due to chronic blood loss due to Coumadin use.  Recommend iron supplementation.  I will check his iron panels today to make sure there is no changes.  I may have to give him IV iron if it continues since he is on proton pump inhibitors.  This will limit his absorption.  For now I am going to see if this will help alleviate some of the need for transfusion support.    2.  Dyspnea with exertion.  He was unaware that his cardiac function is fairly depressed.  His ejection fraction at the last check was about 30%.  Has been no evaluation of any improvement in that.  He has an AICD in place.  He also has a mechanical valve in place.  The anemia alone should not explain the level of fatigue and shortness of breath he is having.  I suspect some of it is related to his underlying heart dysfunction.        Thank you for allowing me to participate in the care of this patient.    Yours sincerely,    Jake Dominguez MD  [facility]  Hematology and Oncology    Return in (Approximately): 2 months    Orders Placed This Encounter   Procedures   • Iron Profile     Standing Status:   Future     Number of Occurrences:   1     Standing Expiration Date:   12/29/2022     Order Specific Question:   Release to patient     Answer:   Immediate   • Ferritin     Standing Status:   Future     Number of Occurrences:   1     Standing Expiration Date:   12/29/2022     Order Specific Question:   Release to patient     Answer:   Immediate   • Vitamin B12     Standing Status:   Future     Number of Occurrences:   1     Standing Expiration Date:   12/29/2022     Order Specific Question:   Release to patient     Answer:   Immediate   • Folate     Standing Status:   Future     Number of Occurrences:   1     Standing Expiration Date:   12/29/2022     Order Specific Question:   Release to patient     Answer:   Immediate   • Reticulocytes     Standing Status:   Future     Number of Occurrences:   1     Standing Expiration Date:    12/29/2022     Order Specific Question:   Release to patient     Answer:   Immediate   • N-Terminal proBNP     Standing Status:   Future     Number of Occurrences:   1     Standing Expiration Date:   12/29/2022     Order Specific Question:   Release to patient     Answer:   Immediate

## 2021-12-30 LAB
FOLATE SERPL-MCNC: 10.6 NG/ML (ref 4.78–24.2)
VIT B12 BLD-MCNC: 457 PG/ML (ref 211–946)

## 2022-01-03 ENCOUNTER — ANTICOAGULATION VISIT (OUTPATIENT)
Dept: PHARMACY | Facility: HOSPITAL | Age: 57
End: 2022-01-03

## 2022-01-03 DIAGNOSIS — Z95.2 S/P AVR (AORTIC VALVE REPLACEMENT): Primary | ICD-10-CM

## 2022-01-03 LAB — INR PPP: 1.7

## 2022-01-03 RX ORDER — WARFARIN SODIUM 7.5 MG/1
TABLET ORAL
Qty: 30 TABLET | Refills: 0 | Status: SHIPPED | OUTPATIENT
Start: 2022-01-03 | End: 2022-02-21

## 2022-01-03 NOTE — PROGRESS NOTES
Anticoagulation Clinic - Remote Progress Note  [facility]  Testing frequency: 7 days    Indication: Atrial Fibrillation and Mechanical Aortic Valve; h/o embolic CVA  Referring Provider: Bunny last seen 3/12/21; Next appointment: 6/25/21;  Initial Warfarin Start Date: 11/2012  Planned Duration of Therapy: Life  Goal INR: 2.5-3.5  Current Drug Interactions: escitalopram, aspirin, amiodarone, pantoprazole  CHADS-VASc: 3 (HTN, h/o CVA)     Vit K Diet: patient eats salad 1-3 times a week, goes through spurts when he tries to eat healthier 3/3/2021  Alcohol: none  Tobacco: none       Anticoagulation Clinic INR History:      Date 10/2 10/10 11/4 11/12 11/22 12/10 1/3/20 1/22 1/30 2/3 2/18 2/27   Total Weekly Dose 80mg 80mg 62.5mg 85mg 82.5mg 70mg 72.5mg 72.5mg 82.5mg 77.5mg 75mg 75mg   INR  3.9 3.6 1.7 3.4 4.7 2.4 3.6 1.3 2.3 3.2 3.1 3.2   Notes   1x miss self adj  self adj  lovenox         Date 4/8 4/29 5/18 6/20  8/3 8/17 10/5 10/19 11/11 12/21    Total Weekly Dose 70mg 70mg 65mg 65mg Non- compliant 65mg 65 mg 65mg 65mg 65mg 65mg Non- compliant   INR 4.3 4.1 4.5 3.0  3.0 3.8 3.0 2.9 4.1 2.3    Notes Self adj; Levaquin decr GLV; COVID+  recv'd 6/22; misdose       incr GLV      Date 1/20/21 1/21 1/25 2/1 2/16 3/3 3/22 4/13  4/26 5/14 5/21   Total WeeklyDose 40mg 45mg? 62.5mg 80mg? 80mg   70mg 67.5mg  47.5mg  67.5 mg   INR 1.2 1.2 maia 1.6 2.9 3.7  4.5 3.2 3.3  1.8 4.5 3.1   Notes Self held; augmentin 1x incr dose; lovenox 2x miss,   1x misdose  amox amox  self adj 5x hold, enox enox/norco, augmentin       Date 6/28  8/4 8/6 8/9 8/20 9/3  11/9 11/16 11/30 12/2   Total WeeklyDose 67.5 mg BH Barrett   6/30-7/29 [facility] 32.5 mg 47.5mg 51.25 mg 52.5mg 52.5mg? Non compliant 35mg? 70mg? 52.5mg BHL- left AMA   INR 3.9  2.2 3.0 2.9 4.8 2.6  2.0 3.9 4.3    Notes  amio init enox enox enox    Rec 11/16   GI bleed       Date 12/4 12/6             Total WeeklyDose BHL- left AMA 7.5mg             INR  1.1             Notes AMS/GI  "Bleed                Phone Interview:  Verbal Release Authorization signed on 11/7/18 -- Patient's Wife has Passed Away   Tablet Strength:   7.5 mg  Patient Contact: [phone] -- try to call in PM or  His email is [email or web address]    Patient Findings  Positives:  Change in health, Change in activity, Emergency department visit, Change in medications, Other complaints   Negatives:  Signs/symptoms of thrombosis, Signs/symptoms of bleeding, Laboratory test error suspected, Change in alcohol use, Upcoming invasive procedure, Upcoming dental procedure, Missed doses, Extra doses, Change in diet/appetite, Hospital admission, Bruising   Comments:  Seen in ED twice since previous encounter. Noncompliant with INR recheck. Had consult with Dr Holland and was started on iron supplement. Self adjusted last night's warfarin dose to 10mg but otherwise has been taking 7.5mg qd  Reports he is \"completely wiped out\" and no relief from his fatigue Agreeable to make appt with C today    Per Dr Holland: \"2.  Dyspnea with exertion.  He was unaware that his cardiac function is fairly depressed.  His ejection fraction at the last check was about 30%.  Has been no evaluation of any improvement in that.  He has an AICD in place.  He also has a mechanical valve in place.  The anemia alone should not explain the level of fatigue and shortness of breath he is having.  I suspect some of it is related to his underlying heart dysfunction.\"       Plan:   1. INR is subtherapeutic following noncompliance.  Instructed Mr. Alberto to inject lovenox 140mg  q12h x4 doses and take 10mg warfarin tonight then 7.5mg daily until recheck  2. Repeat INR 1/6  3.  Verbal information provided over the phone. Mitchell Alberto RBV dosing instructions, expresses understanding by teach back, and has no further questions at this time.    Sonya Bustillos, GiovaniD.  01/03/22   11:54 EST          "

## 2022-01-12 ENCOUNTER — TELEPHONE (OUTPATIENT)
Dept: CARDIOLOGY | Facility: HOSPITAL | Age: 57
End: 2022-01-12

## 2022-01-12 DIAGNOSIS — I10 ESSENTIAL HYPERTENSION: ICD-10-CM

## 2022-01-12 DIAGNOSIS — I50.21 ACUTE SYSTOLIC CONGESTIVE HEART FAILURE: Primary | ICD-10-CM

## 2022-01-12 NOTE — TELEPHONE ENCOUNTER
----- Message from Venice Benavides, PharmISABEL sent at 1/11/2022  2:08 PM EST -----  Just LIZ- I talked to Mr. Alberto who left a VM saying he was out of bumetanide tablets when he should have received a month supply on 12/20/21. When I called him back he realized that he had been taking two tablets twice daily instead of one tablet once daily because that's how he had been taking furosemide at one point. I called his pharmacy to have them refill the prescription and he is going to only take one tablet twice daily, he did say that he is feeling great- the best he has felt in a while.

## 2022-01-25 LAB — INR PPP: 1.7

## 2022-01-26 ENCOUNTER — ANTICOAGULATION VISIT (OUTPATIENT)
Dept: PHARMACY | Facility: HOSPITAL | Age: 57
End: 2022-01-26

## 2022-01-26 DIAGNOSIS — Z95.2 S/P AVR (AORTIC VALVE REPLACEMENT): Primary | ICD-10-CM

## 2022-02-01 ENCOUNTER — ANTICOAGULATION VISIT (OUTPATIENT)
Dept: PHARMACY | Facility: HOSPITAL | Age: 57
End: 2022-02-01

## 2022-02-01 DIAGNOSIS — Z95.2 S/P AVR (AORTIC VALVE REPLACEMENT): Primary | ICD-10-CM

## 2022-02-10 ENCOUNTER — ANTICOAGULATION VISIT (OUTPATIENT)
Dept: PHARMACY | Facility: HOSPITAL | Age: 57
End: 2022-02-10

## 2022-02-10 DIAGNOSIS — Z95.2 S/P AVR (AORTIC VALVE REPLACEMENT): Primary | ICD-10-CM

## 2022-02-10 LAB — INR PPP: 5.6

## 2022-02-10 NOTE — PROGRESS NOTES
Anticoagulation Clinic - Remote Progress Note  [facility]  Testing frequency: 7 days    Indication: Atrial Fibrillation and Mechanical Aortic Valve; h/o embolic CVA  Referring Provider: Bunny last seen 3/12/21; Next appointment: 6/25/21;  Initial Warfarin Start Date: 11/2012  Planned Duration of Therapy: Life  Goal INR: 2.5-3.5  Current Drug Interactions: escitalopram, aspirin, amiodarone, pantoprazole  CHADS-VASc: 3 (HTN, h/o CVA)     Vit K Diet: patient eats salad 1-3 times a week, goes through spurts when he tries to eat healthier 3/3/2021  Alcohol: none  Tobacco: none       Anticoagulation Clinic INR History:      Date 10/2 10/10 11/4 11/12 11/22 12/10 1/3/20 1/22 1/30 2/3 2/18 2/27   Total Weekly Dose 80mg 80mg 62.5mg 85mg 82.5mg 70mg 72.5mg 72.5mg 82.5mg 77.5mg 75mg 75mg   INR  3.9 3.6 1.7 3.4 4.7 2.4 3.6 1.3 2.3 3.2 3.1 3.2   Notes   1x miss self adj  self adj  lovenox         Date 4/8 4/29 5/18 6/20  8/3 8/17 10/5 10/19 11/11 12/21    Total Weekly Dose 70mg 70mg 65mg 65mg Non- compliant 65mg 65 mg 65mg 65mg 65mg 65mg Non- compliant   INR 4.3 4.1 4.5 3.0  3.0 3.8 3.0 2.9 4.1 2.3    Notes Self adj; Levaquin decr GLV; COVID+  recv'd 6/22; misdose       incr GLV      Date 1/20/21 1/21 1/25 2/1 2/16 3/3 3/22 4/13  4/26 5/14 5/21   Total WeeklyDose 40mg 45mg? 62.5mg 80mg? 80mg   70mg 67.5mg  47.5mg  67.5 mg   INR 1.2 1.2 maia 1.6 2.9 3.7  4.5 3.2 3.3  1.8 4.5 3.1   Notes Self held; augmentin 1x incr dose; lovenox 2x miss,   1x misdose  amox amox  self adj 5x hold, enox enox/norco, augmentin       Date 6/28  8/4 8/6 8/9 8/20 9/3  11/9 11/16 11/30 12/2   Total WeeklyDose 67.5 mg BH Barrett   6/30-7/29 [facility] 32.5 mg 47.5mg 51.25 mg 52.5mg 52.5mg? Non compliant 35mg? 70mg? 52.5mg BHL- left AMA   INR 3.9  2.2 3.0 2.9 4.8 2.6  2.0 3.9 4.3    Notes  amio init enox enox enox    Rec 11/16   GI bleed       Date 12/4 12/6 1/25 2/10           Total WeeklyDose BHL- left AMA 7.5mg ?? 52.5mg           INR  1.1 1.7 5.6      "      Notes AMS/GI Bleed  rec 1/26 APAP COVID + suboxone             Phone Interview:  Verbal Release Authorization signed on 11/7/18 -- Patient's Wife has Passed Away   Tablet Strength:   7.5 mg  Patient Contact: [phone] -- try to call in PM or  His email is [email or web address]    Patient Findings  Positives:  Change in health, Change in medications, Change in diet/appetite   Negatives:  Signs/symptoms of thrombosis, Signs/symptoms of bleeding, Laboratory test error suspected, Change in alcohol use, Change in activity, Upcoming invasive procedure, Emergency department visit, Upcoming dental procedure, Missed doses, Extra doses, Hospital admission, Bruising, Other complaints   Comments:  Patient had 2 INR readings >4.5.  [facility] recommended for patient to have a lab draw to confirm the readings; however, patient refused.  Patient was educated about the variability of the test strip readings at values >4.5 and was also educated to monitor for any signs excessive bleeding (including in the urine, stool, emesis, etc.).   Agreeable to  at next encounter if still elevated, will not leave house today d/t quarantine.     On suboxone 8/2mg BID. Had zero GLV recently.   Reports he is quarantining from COVID, tested + 2/2. Has no s/x currently. Was symptomatic last week. He was taking mucinex, dayquil, APAP- reports he took \"bottles and bottles of OTC products\"        Plan:   1. INR is SUPRAtherapeutic at 5.6 following noncompliance. Considering recent GI bleed, Instructed Mr. Alberto to HOLD tonight's dose then resume warfarin 7.5mg daily until recheck  2. Repeat INR 2/14. Discussed to monitor for s/sx of bleeding including epistaxis, hematuria, unusual bruising, hemoptysis, hematochezia as well as s/sx of stroke including impaired speech, unilateral paralysis, blurry vision and when to seek medical attention  3.  Verbal information provided over the phone. Mitchell Alberto RBV dosing " instructions, expresses understanding by teach back, and has no further questions at this time.    Giovani RoeD.  02/10/22   14:09 EST

## 2022-02-21 RX ORDER — WARFARIN SODIUM 7.5 MG/1
TABLET ORAL
Qty: 30 TABLET | Refills: 0 | Status: SHIPPED | OUTPATIENT
Start: 2022-02-21 | End: 2022-03-03 | Stop reason: SDUPTHER

## 2022-02-21 NOTE — TELEPHONE ENCOUNTER
Needs FU appt, no-show 1/2022, last seen by NELA in 7/2021 in hospital, been following with YUE Rivera  Lab Results   Component Value Date    INR 5.60 02/10/2022    INR 1.70 01/25/2022    INR 1.70 01/02/2022    PROTIME 25.9 (H) 12/19/2021    PROTIME 15.8 (H) 12/11/2021    PROTIME 16.2 (H) 12/04/2021

## 2022-02-21 NOTE — TELEPHONE ENCOUNTER
Last seen 2/10/22 in [facility], non-complinat with repeat INR check.  Unable to LVM due to mailbox being full.  Will send 30 day supply with instructions to check INR and make FU appt with cardiology for more refills.

## 2022-02-25 ENCOUNTER — ANTICOAGULATION VISIT (OUTPATIENT)
Dept: PHARMACY | Facility: HOSPITAL | Age: 57
End: 2022-02-25

## 2022-02-25 DIAGNOSIS — Z95.2 S/P AVR (AORTIC VALVE REPLACEMENT): Primary | ICD-10-CM

## 2022-02-25 LAB — INR PPP: 1.8

## 2022-02-25 NOTE — PROGRESS NOTES
Anticoagulation Clinic - Remote Progress Note  [facility]  Testing frequency: 7 days    Indication: Atrial Fibrillation and Mechanical Aortic Valve; h/o embolic CVA  Referring Provider: Bunny last seen 3/12/21; Next appointment: 6/25/21;  Initial Warfarin Start Date: 11/2012  Planned Duration of Therapy: Life  Goal INR: 2.5-3.5  Current Drug Interactions: escitalopram, aspirin, amiodarone, pantoprazole  CHADS-VASc: 3 (HTN, h/o CVA)     Vit K Diet: patient eats salad 1-3 times a week, goes through spurts when he tries to eat healthier 3/3/2021  Alcohol: none  Tobacco: none       Anticoagulation Clinic INR History:      Date 10/2 10/10 11/4 11/12 11/22 12/10 1/3/20 1/22 1/30 2/3 2/18 2/27   Total Weekly Dose 80mg 80mg 62.5mg 85mg 82.5mg 70mg 72.5mg 72.5mg 82.5mg 77.5mg 75mg 75mg   INR  3.9 3.6 1.7 3.4 4.7 2.4 3.6 1.3 2.3 3.2 3.1 3.2   Notes   1x miss self adj  self adj  lovenox         Date 4/8 4/29 5/18 6/20  8/3 8/17 10/5 10/19 11/11 12/21    Total Weekly Dose 70mg 70mg 65mg 65mg Non- compliant 65mg 65 mg 65mg 65mg 65mg 65mg Non- compliant   INR 4.3 4.1 4.5 3.0  3.0 3.8 3.0 2.9 4.1 2.3    Notes Self adj; Levaquin decr GLV; COVID+  recv'd 6/22; misdose       incr GLV      Date 1/20/21 1/21 1/25 2/1 2/16 3/3 3/22 4/13  4/26 5/14 5/21   Total WeeklyDose 40mg 45mg? 62.5mg 80mg? 80mg   70mg 67.5mg  47.5mg  67.5 mg   INR 1.2 1.2 maia 1.6 2.9 3.7  4.5 3.2 3.3  1.8 4.5 3.1   Notes Self held; augmentin 1x incr dose; lovenox 2x miss,   1x misdose  amox amox  self adj 5x hold, enox enox/norco, augmentin       Date 6/28  8/4 8/6 8/9 8/20 9/3  11/9 11/16 11/30 12/2   Total WeeklyDose 67.5 mg BH Barrett   6/30-7/29 SJ CCH 32.5 mg 47.5mg 51.25 mg 52.5mg 52.5mg? Non compliant 35mg? 70mg? 52.5mg BHL- left AMA   INR 3.9  2.2 3.0 2.9 4.8 2.6  2.0 3.9 4.3    Notes  amio init enox enox enox    Rec 11/16   GI bleed       Date 12/4 12/6 1/25 2/10  2/24         Total WeeklyDose BHL- left AMA 7.5mg ?? 52.5mg ????          INR  1.1 1.7  5.6  1.8         Notes AMS/GI Bleed  rec 1/26 APAP COVID + suboxone  1x miss           Phone Interview:  Verbal Release Authorization signed on 11/7/18 -- Patient's Wife has Passed Away   Tablet Strength:   7.5 mg  Patient Contact: [phone] -- try to call in PM or  His email is [email or web address]    Patient Findings    Positives:  Missed doses, Change in medications   Negatives:  Signs/symptoms of thrombosis, Signs/symptoms of bleeding, Laboratory test error suspected, Change in health, Change in alcohol use, Change in activity, Upcoming invasive procedure, Emergency department visit, Upcoming dental procedure, Extra doses, Change in diet/appetite, Hospital admission, Bruising, Other complaints   Comments:  Missed 1x dose d/t being out of medications agreeable schedule f/u with Dr Hollis.   Started abx 2/23 doxycycline BID (unsure dose/duration)         Plan:   1. INR is SUBtherapeutic at 1.8 following missed dose.Instructed Mr. Alberto to BOOST tonight's dose to 11.25mg then resume warfarin 7.5mg daily until. Will inject 150mg lovenox q12h x2 doses recheck  2. Repeat INR 3/1. Discussed to monitor for s/sx of bleeding including epistaxis, hematuria, unusual bruising, hemoptysis, hematochezia as well as s/sx of stroke including impaired speech, unilateral paralysis, blurry vision and when to seek medical attention  3.  Verbal information provided over the phone. Mitchell Alberto RBV dosing instructions, expresses understanding by teach back, and has no further questions at this time.      Sonya Bustillos, GiovaniD.  02/25/22   14:46 EST

## 2022-03-02 NOTE — PROGRESS NOTES
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   late 2021.    PROBLEM LIST:   1.  Ascending aortic dissection and severe aortic insufficiency:  a.  Mechanical Bentall aortic valve replacement (St. Dave) and ascending aortic dissection repair, 11/17/2012, Dr. Jasmeet De Dios.   b. Echocardiogram, 12/03/2013:  i. LVEF (55% to 60%), mechanical aortic valve, area 1.18 cm2.  ii. Chronic Coumadin therapy                     c.    Cardiac PET scan, 08/26/2015, negative for reversible ischemia; fixed photopenia                            of the apex and septum possibly due to LBBB; LVEF (30% to 34%).                    d.    Cardiac catheterization,  Dorian Giraldo MD, 09/03/2015:  No obstructive                            CAD.                    E. 1/17 echo EF 55% mild lvh AVR mean pressure 17                    F. 12/16 CTA chest wnl  2. Ventricular fibrillation/cardiac arrest/systolic congestive heart failure  1. 6/21 [facility] admission after witnessed cardiac arrest  2. Patient received amiodarone, epinephrine, 5 defibrillations with ROSC prior to ED  3. Patient initiated on amiodarone therapy in the ER and implemented targeted temperature management per protocol.  4. 7/21 2D echo: LV cavity mildly dilated.  LV wall thickness-moderate concentric hypertrophy.  Mechanical aortic valve prosthesis present.  RVSP due to TR moderately elevated 45-55.  RV cavity dilated.  LA volume mildly increased.  RA cavity dilated.  LVEF =41-45%.  Mild General global hypokinesis.  1. 7/21 LHC: Angiographically near normal coronary arteries without any evidence of hemodynamically significant disease.  2. 7/21 NANCY: LVEF =33%.  Saline test negative.  Mechanical aortic valve present.  Multiple LV  wall segments hypokinetic.  Dilated cardiomyopathy.  LV cavity borderline dilated.  LV wall thickness-mild concentric hypertrophy.  RVSP due to TR normal.  Grade 1 plaque in descending aorta.  3. Atrial fibrillation  1.  [id number] score = 4  2. Patient was difficult case to medically manage via amiodarone therapy/rate control therapy  3. 7/28 AVN RFA and implantation of ST Dave biventricular pacemaker/ICD-GFT  4. Intermittent epistaxis while on heparin therapy  1. Requiring Rhino Rocket placement  5. 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  Residual left upper extremity weakness.    6. Left arm discomfort:  a. Possibly due to embolic CVA vs. intraoperative nerve traction.   7. Hypertension.   8. HL  10/18 164/61/53/104  6. 7/6/2021 positive MSSA (tracheal wound and sputum) and MRSA pneumonia per bronchoscopy (while intubated secondary to V. fib arrest)  7. 12/2/2021 and 12/4/2021 patient admitted to [facility] for acute anemia requiring Kcentra and 1 PRBC.  GI consulted but patient left AMA before further treatment and evaluation.  8. 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 Status post surgery:  a. Left inguinal herniorrhaphy, August 2014.   b. Left forehead melanoma excision, Dr. Gisell Kothari, August 2014.          CC:   Chief Complaint   Patient presents with   • Nonrheumatic aortic valve stenosis       Allergies  Allergies   Allergen Reactions   • Meclizine Hcl Itching       Current Medications    Current Outpatient Medications:   •  acetaminophen (TYLENOL) 500 MG tablet, Take 500 mg by mouth Every 6 (Six) Hours As Needed for Mild Pain ., Disp: , Rfl:   •  amiodarone (PACERONE) 200 MG tablet, Take 1 tablet by mouth Daily., Disp: 90 tablet, Rfl: 1  •  aspirin 81 MG chewable tablet, Chew 1 tablet Daily. (Patient taking differently: Chew 325 mg Daily.), Disp: , Rfl:   •  atorvastatin (LIPITOR) 40 MG tablet, Take 1 tablet by mouth Every Night., Disp: , Rfl:   •   "bumetanide (BUMEX) 1 MG tablet, Take 1 tablet by mouth 2 (Two) Times a Day., Disp: 60 tablet, Rfl: 3  •  buprenorphine-naloxone (SUBOXONE) 8-2 MG per SL tablet, Place 2 tablets under the tongue Daily., Disp: , Rfl:   •  ferrous sulfate 325 (65 FE) MG tablet, Take 1 tablet by mouth Daily With Breakfast., Disp: 90 tablet, Rfl: 5  •  lisinopril (PRINIVIL,ZESTRIL) 40 MG tablet, Take 40 mg by mouth Daily., Disp: , Rfl:   •  tadalafil (ADCIRCA) 20 MG tablet tablet, Take 5 mg by mouth Daily., Disp: , Rfl:   •  warfarin (COUMADIN) 7.5 MG tablet, , Disp: , Rfl:   •  warfarin (COUMADIN) 7.5 MG tablet, TAKE ONE TABLET BY MOUTH DAILY OR AS DIRECTED BY ANTICOAGULATION CLINIC. WILL NEED TO MAKE APPT WITH DOCTOR FOR MORE REFILLS, Disp: 30 tablet, Rfl: 0      History of Present Illness   Mitchell Alberto is a 56 y.o. year old male here for follow up.  He had been lost to follow-up over the last 8 months with several missed appointments.  Unfortunately had significant social despair as he had lost his wife in October after his hospitalization last summer  He notes chronic lower extremity swelling improved for short period of time and then has recurred.  He has not followed up with Dr. Holland in regards to his chronic anemia despite being on iron supplements.    OBJECTIVE:  Vitals:    03/03/22 1442   BP: 128/80   BP Location: Right arm   Patient Position: Sitting   Pulse: 96   SpO2: 98%   Weight: 110 kg (243 lb)   Height: 195.6 cm (77\")     Body mass index is 28.82 kg/m².    Constitutional:       Appearance: Healthy appearance. Not in distress.   Neck:      Vascular: No JVR. JVD normal.   Pulmonary:      Effort: Pulmonary effort is normal.      Breath sounds: Normal breath sounds. No wheezing. No rhonchi. No rales.   Chest:      Chest wall: Not tender to palpatation.   Cardiovascular:      PMI at left midclavicular line. Normal rate. Regular rhythm. Normal S1. Normal S2.      Murmurs: There is a systolic murmur.      No gallop. " Midsystolic click. No rub.   Pulses:     Intact distal pulses.   Edema:     Peripheral edema absent.   Abdominal:      General: Bowel sounds are normal.      Palpations: Abdomen is soft.      Tenderness: There is no abdominal tenderness.   Musculoskeletal: Normal range of motion.         General: No tenderness. Skin:     General: Skin is warm and dry.   Neurological:      General: No focal deficit present.      Mental Status: Alert and oriented to person, place and time.         Diagnostic Data:  Procedures  Device check  Acceptable thresholds and impedances  No arrhythmia  90% BiV pacing  Generator 6.1-years    ASSESSMENT:   Diagnosis Plan   1. History of falling     2. Shortness of breath  CBC & Differential    Comprehensive Metabolic Panel    proBNP   3. Primary hypertension  CBC & Differential    Comprehensive Metabolic Panel    proBNP   4. S/P AVR (aortic valve replacement)  CBC & Differential    Comprehensive Metabolic Panel    proBNP   5. Chronic systolic congestive heart failure (HCC)         PLAN:  Nonischemic cardiomyopathy status post witnessed arrest with cooling protocol and postevent encephalopathy requiring prolonged hospitalization.  Post BiV ICD with plan for follow-up with echocardiogram this summer.  Would wean amiodarone 200 daily with plan to discontinue this summer if no repeat arrhythmia  Plan follow-up with heart valve clinic in 2 weeks I have asked him to consider enrollment in cardiac rehab    Hypertension controlled current lisinopril.  Would initiate low-dose metoprolol succinate     Dyspnea with lower extremity edema concomitant heart failure plus anemia with document serologies today his most recent hemoglobin was 8.3 in December he has been on iron supplements since that time  He is weighing himself daily and take 2 mg of bumetanide for any weight gain of 2 pounds in 24 hours        Mike Hollis MD, FACC

## 2022-03-03 ENCOUNTER — OFFICE VISIT (OUTPATIENT)
Dept: CARDIOLOGY | Facility: CLINIC | Age: 57
End: 2022-03-03

## 2022-03-03 ENCOUNTER — LAB (OUTPATIENT)
Dept: LAB | Facility: HOSPITAL | Age: 57
End: 2022-03-03

## 2022-03-03 VITALS
DIASTOLIC BLOOD PRESSURE: 80 MMHG | BODY MASS INDEX: 28.69 KG/M2 | SYSTOLIC BLOOD PRESSURE: 128 MMHG | HEART RATE: 96 BPM | HEIGHT: 77 IN | OXYGEN SATURATION: 98 % | WEIGHT: 243 LBS

## 2022-03-03 DIAGNOSIS — I50.22 CHRONIC SYSTOLIC CONGESTIVE HEART FAILURE: ICD-10-CM

## 2022-03-03 DIAGNOSIS — R06.02 SHORTNESS OF BREATH: ICD-10-CM

## 2022-03-03 DIAGNOSIS — Z95.2 S/P AVR (AORTIC VALVE REPLACEMENT): ICD-10-CM

## 2022-03-03 DIAGNOSIS — I10 PRIMARY HYPERTENSION: ICD-10-CM

## 2022-03-03 DIAGNOSIS — Z91.81 HISTORY OF FALLING: Primary | ICD-10-CM

## 2022-03-03 LAB
BASOPHILS # BLD AUTO: 0.02 10*3/MM3 (ref 0–0.2)
BASOPHILS NFR BLD AUTO: 0.4 % (ref 0–1.5)
DEPRECATED RDW RBC AUTO: 52.1 FL (ref 37–54)
EOSINOPHIL # BLD AUTO: 0.24 10*3/MM3 (ref 0–0.4)
EOSINOPHIL NFR BLD AUTO: 4.7 % (ref 0.3–6.2)
ERYTHROCYTE [DISTWIDTH] IN BLOOD BY AUTOMATED COUNT: 16.9 % (ref 12.3–15.4)
HCT VFR BLD AUTO: 38.2 % (ref 37.5–51)
HGB BLD-MCNC: 11.9 G/DL (ref 13–17.7)
IMM GRANULOCYTES # BLD AUTO: 0.03 10*3/MM3 (ref 0–0.05)
IMM GRANULOCYTES NFR BLD AUTO: 0.6 % (ref 0–0.5)
LYMPHOCYTES # BLD AUTO: 1.19 10*3/MM3 (ref 0.7–3.1)
LYMPHOCYTES NFR BLD AUTO: 23.2 % (ref 19.6–45.3)
MCH RBC QN AUTO: 26.5 PG (ref 26.6–33)
MCHC RBC AUTO-ENTMCNC: 31.2 G/DL (ref 31.5–35.7)
MCV RBC AUTO: 85.1 FL (ref 79–97)
MONOCYTES # BLD AUTO: 0.5 10*3/MM3 (ref 0.1–0.9)
MONOCYTES NFR BLD AUTO: 9.8 % (ref 5–12)
NEUTROPHILS NFR BLD AUTO: 3.14 10*3/MM3 (ref 1.7–7)
NEUTROPHILS NFR BLD AUTO: 61.3 % (ref 42.7–76)
NRBC BLD AUTO-RTO: 0 /100 WBC (ref 0–0.2)
PLATELET # BLD AUTO: 176 10*3/MM3 (ref 140–450)
PMV BLD AUTO: 11.3 FL (ref 6–12)
RBC # BLD AUTO: 4.49 10*6/MM3 (ref 4.14–5.8)
WBC NRBC COR # BLD: 5.12 10*3/MM3 (ref 3.4–10.8)

## 2022-03-03 PROCEDURE — 80053 COMPREHEN METABOLIC PANEL: CPT

## 2022-03-03 PROCEDURE — 85025 COMPLETE CBC W/AUTO DIFF WBC: CPT

## 2022-03-03 PROCEDURE — 99214 OFFICE O/P EST MOD 30 MIN: CPT | Performed by: INTERNAL MEDICINE

## 2022-03-03 PROCEDURE — 83880 ASSAY OF NATRIURETIC PEPTIDE: CPT

## 2022-03-03 PROCEDURE — 36415 COLL VENOUS BLD VENIPUNCTURE: CPT

## 2022-03-03 PROCEDURE — 93289 INTERROG DEVICE EVAL HEART: CPT | Performed by: INTERNAL MEDICINE

## 2022-03-03 RX ORDER — BUMETANIDE 1 MG/1
TABLET ORAL
Qty: 45 TABLET | Refills: 5 | Status: SHIPPED | OUTPATIENT
Start: 2022-03-03 | End: 2022-06-22 | Stop reason: SDUPTHER

## 2022-03-03 RX ORDER — AMIODARONE HYDROCHLORIDE 200 MG/1
200 TABLET ORAL DAILY
Qty: 90 TABLET | Refills: 1 | Status: SHIPPED | OUTPATIENT
Start: 2022-03-03 | End: 2022-08-12

## 2022-03-03 RX ORDER — METOPROLOL SUCCINATE 25 MG/1
25 TABLET, EXTENDED RELEASE ORAL DAILY
Qty: 90 TABLET | Refills: 3 | Status: SHIPPED | OUTPATIENT
Start: 2022-03-03 | End: 2022-05-18 | Stop reason: SDUPTHER

## 2022-03-04 LAB
ALBUMIN SERPL-MCNC: 4.5 G/DL (ref 3.5–5.2)
ALBUMIN/GLOB SERPL: 1.6 G/DL
ALP SERPL-CCNC: 103 U/L (ref 39–117)
ALT SERPL W P-5'-P-CCNC: 23 U/L (ref 1–41)
ANION GAP SERPL CALCULATED.3IONS-SCNC: 11 MMOL/L (ref 5–15)
AST SERPL-CCNC: 25 U/L (ref 1–40)
BILIRUB SERPL-MCNC: 0.3 MG/DL (ref 0–1.2)
BUN SERPL-MCNC: 23 MG/DL (ref 6–20)
BUN/CREAT SERPL: 16.5 (ref 7–25)
CALCIUM SPEC-SCNC: 9.3 MG/DL (ref 8.6–10.5)
CHLORIDE SERPL-SCNC: 99 MMOL/L (ref 98–107)
CO2 SERPL-SCNC: 30 MMOL/L (ref 22–29)
CREAT SERPL-MCNC: 1.39 MG/DL (ref 0.76–1.27)
EGFRCR SERPLBLD CKD-EPI 2021: 59.5 ML/MIN/1.73
GLOBULIN UR ELPH-MCNC: 2.8 GM/DL
GLUCOSE SERPL-MCNC: 99 MG/DL (ref 65–99)
NT-PROBNP SERPL-MCNC: 206 PG/ML (ref 0–900)
POTASSIUM SERPL-SCNC: 4.6 MMOL/L (ref 3.5–5.2)
PROT SERPL-MCNC: 7.3 G/DL (ref 6–8.5)
SODIUM SERPL-SCNC: 140 MMOL/L (ref 136–145)

## 2022-03-07 ENCOUNTER — TELEPHONE (OUTPATIENT)
Dept: CARDIOLOGY | Facility: CLINIC | Age: 57
End: 2022-03-07

## 2022-03-07 NOTE — TELEPHONE ENCOUNTER
----- Message from Mike Hollis MD sent at 3/4/2022  4:02 PM EST -----  Labs better  No chf and anemia improved    Pt aware, he will keep FU with Eleni Cardoza 3/16/2022 and call if needs earlier FU with NELA.

## 2022-03-21 NOTE — PROGRESS NOTES
[id number]     Stefan Stauffer MRN: [id number]  AGE: 84 year old  ADMIT DATE: 3/13/2022    CODE STATUS: Full Resuscitation  ISOLATION STATUS: No active isolations   DIET: One Time Diet Regular  Cardiac Diet  2 Times/day W Breakfast & Dinner; Ensure High Protein/high Protein Low Carbohydrate Supplement Oral Nutrition Supplement    ALLERGIES:  Patient has no known allergies.     DX:Shortness of breath  (primary encounter diagnosis)     Att: Jaci Robbins MD  PCP: Alondra Hidalgo MD  IP Consult Orders (From admission, onward)             Start     Ordered    03/21/22 1644  Inpatient consult to Psychology  ONE TIME        Provider:  Marilyn Sousa, PHD    03/21/22 1643    03/21/22 0945  Inpatient consult to Psychiatry  ONE TIME        Provider:  Svetlana Paz MD    03/21/22 0944    03/17/22 1823  Inpatient consult to Physician  ONE TIME        Provider:  Jose Carlos Mas MD    03/17/22 1823    03/17/22 1709  Inpatient consult to Physician  ONE TIME        Provider:  Jose Carlos Mas MD    03/17/22 1709    03/15/22 0947  Inpatient consult to Pulmonology  ONE TIME        Provider:  Nahomi Alexis MD    03/15/22 0946    03/14/22 0955  Inpatient Consult to Interventional Radiology  ONE TIME        Provider:  (Not yet assigned)    03/14/22 0954    03/13/22 1404  Inpatient consult to Cardiology  ONE TIME        Provider:  Jase Healy MD    03/13/22 1403    03/13/22 1403  Inpatient consult to Oncology  ONE TIME        Provider:  Lanie Palacios MD    03/13/22 1402    03/13/22 1354  Inpatient consult to Oncology  ONE TIME        Provider:  Lanie Palacios MD    03/13/22 1353    03/13/22 1348  Inpatient consult to Pulmonology  ONE TIME        Provider:  Nahomi Alexis MD    03/13/22 1353                    BP: 113/63  Temp: 97.7 °F (36.5 °C)  Temp src: Oral  Heart Rate: 84  Resp: 16  SpO2: 96 %  Height: 6' (182.9 cm)  Weight: 123.8 kg (272 lb 14.9 oz)   Weight change:      Recent Labs   Lab 03/20/22 2021 03/21/22  0726  Anticoagulation Clinic - Remote Progress Note  [facility]  Testing frequency: 7 days    Indication: Atrial Fibrillation and Mechanical Aortic Valve; h/o embolic CVA  Referring Provider: milly Mckeon)  Initial Warfarin Start Date: 11/2012  Planned Duration of Therapy: Life  Goal INR: 2.5-3.5  Current Drug Interactions: citalopram, divalproex   CHADS-VASc: 3 (HTN, h/o CVA)    Vit K Diet: patient is eating daily samara lettuce salads  Alcohol: none  Tobacco: none       Anticoagulation Clinic INR History:  Date 5/8 5/23 6/6 6/9 6/13 6/28 7/19 8/10 8/30   Total Weekly Dose 52.5mg 60mg 62.5mg 62.5 mg 77.5 mg 57.5 mg 67.5 mg 70mg 70mg   INR 2.2 2.2 1.2 2.2 2.6 2.3 2.4 3.5 1.2   Notes missed dose more vit K dose increase; increase in Vit K foods  boost missed dose   Increase GLV; protein     Date 9/6 9/18 10/9 11/16 11/27 12/4 12/11 12/25 1/4/18   Total Weekly Dose 67.5 mg 60mg 70mg 70mg 70mg 70mg 75mg 75mg 70mg   INR 3.2 1.8 3.4 1.6 1.9 2.5 3.2 2.7 2.0   Notes boost + 1 held dose Missed dose + significant alcohol consumption on 9/16  enox enox    boost     Date 1/15 1/25 2/15 2/23 3/2 3/6 3/15 3/20 3/27 4/4   Total Weekly Dose 75mg 75mg 65mg 75mg 75mg 90mg 65mg 90mg 80mg 75mg   INR 2.9 2.5 2.1 3.3 1.4 2.0, 1.78 (ED) 2.4 3.2 3.5 2.4   Notes   missed   enox; boost; levaquin held x1        Date 4/14 4/20 5/1 5/16 5/27 6/7 6/15 6/22 6/25 7/4   Total Weekly Dose 80mg 80mg 90mg 90mg  (ED) 90mg 75mg 95mg 95mg 107.5 mg ???   INR 2.3 2.0 3.2 2.9 3.2 4.1 3.1 1.3 2.0 2.7   Notes     levoflox missed; levoflox levoflox levoflox; refuse lovenox       Date 7/31 8/27           Total Weekly Dose 105mg  ??? 65mg?           INR 2.7 1.3           Notes self adj Self adj; miss; enox             Phone Interview:  Tablet Strength: 5mg tablets   Current Dose: 15mg daily except 10mg SunFriSat  Patient Contact: [phone] -- try to call in PM  Patient Findings     Positives:   Missed doses   Negatives:   Signs/symptoms of  03/21/22  1153 03/21/22  1632   GLUCOSE BEDSIDE 154* 114* 109* 141*        Creatinine (mg/dL)   Date Value   03/20/2022 0.64 (L)   03/18/2022 0.60 (L)     PTT (sec)   Date Value   03/13/2022 27     INR (no units)   Date Value   03/13/2022 1.2     WBC (K/mcL)   Date Value   03/20/2022 4.5   03/18/2022 5.0        I/O last 3 completed shifts:  In: -   Out: 1050 [Urine:1050]                         IMPORTANT EVENTS THIS SHIFT:  Patient stable through shift. Remains on o2 at 2 L via NC. Oxygen saturation stable with activity. PRN cough medicine given as well as Tylenol for generalized pain.  IMPORTANT EVENTS COMING UP/GOALS (PLEASE INCLUDE WHITE BOARD AND DISCHARGE BOARD UPDATES):       PATIENT SPECIAL NEEDS/ACCOMMODATIONS:                                  thrombosis, Signs/symptoms of bleeding, Laboratory test error suspected, Change in health, Change in alcohol use, Change in activity, Upcoming invasive procedure, Emergency department visit, Upcoming dental procedure, Extra doses, Change in medications, Change in diet/appetite, Hospital admission, Bruising, Other complaints   Comments:   Mr. Alberto's partner has undergone surgery recently, and this has put Mr. Alberto under a lot of stress. He has missed his dose of warfarin last Thursday and believes he has only taken 10mg daily. He does recall taking 15mg on Sunday night. He voices understanding of being compliant and risks associated with SUBtherapeutic INR results. He is apologetic with his lack attention to his warfarin dosing closely and noncompliance with follow up. He voices understanding of SUBtherapeutic INR results, and he plans to be better so that he can be well enough to take care of his partner. He does report significant weight loss and is now 285lbs.      Plan:   1. INR is SUBtherapeutic on 8/27 at 1.3 following self adjusted dose and missed dose last week. Mr. Alberto prefers to only use lovenox once daily given price despite this dose will not provide high enough dose of lovenox. Patient prefers to inject once daily, therefore, will call in max dose of Enoxaparin 150mg/mL. Instructed patient to begin lovenox 150mg q24h and increase his dose to warfarin 15mg daily for now.  2. Repeat INR on Thursday.  3. Called in lovenox for one daily dosing to assist with cost. Mr. Alberto typically has to pay out of pocket. 150mg/day  4. Verbal information provided over the phone. Pt RBV dosing instructions, expresses understanding by teach back, and has no further questions at this time.    Do Vargas, PharmD  8/27/2018  3:20 PM

## 2022-03-25 ENCOUNTER — ANTICOAGULATION VISIT (OUTPATIENT)
Dept: PHARMACY | Facility: HOSPITAL | Age: 57
End: 2022-03-25

## 2022-03-25 DIAGNOSIS — Z95.2 S/P AVR (AORTIC VALVE REPLACEMENT): Primary | ICD-10-CM

## 2022-03-25 LAB — INR PPP: 2

## 2022-03-25 RX ORDER — WARFARIN SODIUM 7.5 MG/1
TABLET ORAL
Qty: 30 TABLET | Refills: 0 | Status: SHIPPED | OUTPATIENT
Start: 2022-03-25 | End: 2022-04-27

## 2022-03-25 NOTE — PROGRESS NOTES
Anticoagulation Clinic - Remote Progress Note  [facility]  Testing frequency: 7 days    Indication: Atrial Fibrillation and Mechanical Aortic Valve; h/o embolic CVA  Referring Provider: Bunny last seen 3/12/21; Next appointment: 6/25/21;  Initial Warfarin Start Date: 11/2012  Planned Duration of Therapy: Life  Goal INR: 2.5-3.5  Current Drug Interactions: escitalopram, aspirin, amiodarone, pantoprazole  CHADS-VASc: 3 (HTN, h/o CVA)     Vit K Diet: patient eats salad 1-3 times a week, goes through spurts when he tries to eat healthier 3/3/2021  Alcohol: none  Tobacco: none       Anticoagulation Clinic INR History:      Date 10/2 10/10 11/4 11/12 11/22 12/10 1/3/20 1/22 1/30 2/3 2/18 2/27   Total Weekly Dose 80mg 80mg 62.5mg 85mg 82.5mg 70mg 72.5mg 72.5mg 82.5mg 77.5mg 75mg 75mg   INR  3.9 3.6 1.7 3.4 4.7 2.4 3.6 1.3 2.3 3.2 3.1 3.2   Notes   1x miss self adj  self adj  lovenox         Date 4/8 4/29 5/18 6/20  8/3 8/17 10/5 10/19 11/11 12/21    Total Weekly Dose 70mg 70mg 65mg 65mg Non- compliant 65mg 65 mg 65mg 65mg 65mg 65mg Non- compliant   INR 4.3 4.1 4.5 3.0  3.0 3.8 3.0 2.9 4.1 2.3    Notes Self adj; Levaquin decr GLV; COVID+  recv'd 6/22; misdose       incr GLV      Date 1/20/21 1/21 1/25 2/1 2/16 3/3 3/22 4/13  4/26 5/14 5/21   Total WeeklyDose 40mg 45mg? 62.5mg 80mg? 80mg   70mg 67.5mg  47.5mg  67.5 mg   INR 1.2 1.2 maia 1.6 2.9 3.7  4.5 3.2 3.3  1.8 4.5 3.1   Notes Self held; augmentin 1x incr dose; lovenox 2x miss,   1x misdose  amox amox  self adj 5x hold, enox enox/norco, augmentin       Date 6/28  8/4 8/6 8/9 8/20 9/3  11/9 11/16 11/30 12/2   Total WeeklyDose 67.5 mg BH Barrett   6/30-7/29 [facility]CH 32.5 mg 47.5mg 51.25 mg 52.5mg 52.5mg? Non compliant 35mg? 70mg? 52.5mg BHL- left AMA   INR 3.9  2.2 3.0 2.9 4.8 2.6  2.0 3.9 4.3    Notes  amio init enox enox enox    Rec 11/16   GI bleed       Date 12/4 12/6 1/25 2/10  2/24 3/25        Total WeeklyDose BHL- left AMA 7.5mg ?? 52.5mg ????  52.5 mg         INR  1.1 1.7 5.6  1.8 2.0        Notes AMS/GI Bleed  rec 1/26 APAP COVID + suboxone  1x miss layton          Phone Interview:  Verbal Release Authorization signed on 11/7/18 -- Patient's Wife has Passed Away   Tablet Strength:   7.5 mg  Patient Contact: [phone] -- try to call in PM or  His email is [email or web address]    Patient Findings  Negatives:  Signs/symptoms of thrombosis, Signs/symptoms of bleeding, Laboratory test error suspected, Change in health, Change in alcohol use, Change in activity, Upcoming invasive procedure, Emergency department visit, Upcoming dental procedure, Missed doses, Extra doses, Change in medications, Change in diet/appetite, Hospital admission, Bruising, Other complaints   Comments:  Eating GLV at least once a week, no new meds or changes in meds   Request refills, will send in 1 month supply in hopes to encourage compliance     Recent labs on 3/3:   Scr 1.39  Crcl >100ml/min       Plan:   1. INR is SUBtherapeutic at 2.0 , non-compliant with follow-up INR scheduled on 3/1. Instructed Mr. Alberto to BOOST tonight's dose to 11.25mg then resume warfarin 7.5mg daily until. Will instruct to inject 150mg lovenox q12h recheck  2. Repeat INR 3/29. Discussed to monitor for s/sx of bleeding including epistaxis, hematuria, unusual bruising, hemoptysis, hematochezia as well as s/sx of stroke including impaired speech, unilateral paralysis, blurry vision and when to seek medical attention  3.  Verbal information provided over the phone. Mitchell Vazquezner RBV dosing instructions, expresses understanding by teach back, and has no further questions at this time.    Hannah Choudhury, PharmD, BCPS   3/25/2022  09:28 EDT

## 2022-04-05 ENCOUNTER — OFFICE VISIT (OUTPATIENT)
Dept: CARDIOLOGY | Facility: HOSPITAL | Age: 57
End: 2022-04-05

## 2022-04-05 ENCOUNTER — ANTICOAGULATION VISIT (OUTPATIENT)
Dept: PHARMACY | Facility: HOSPITAL | Age: 57
End: 2022-04-05

## 2022-04-05 VITALS
RESPIRATION RATE: 15 BRPM | SYSTOLIC BLOOD PRESSURE: 140 MMHG | HEART RATE: 72 BPM | WEIGHT: 315 LBS | TEMPERATURE: 96.3 F | HEIGHT: 77 IN | BODY MASS INDEX: 37.19 KG/M2 | DIASTOLIC BLOOD PRESSURE: 71 MMHG | OXYGEN SATURATION: 92 %

## 2022-04-05 DIAGNOSIS — Z95.2 S/P AVR (AORTIC VALVE REPLACEMENT): Primary | ICD-10-CM

## 2022-04-05 DIAGNOSIS — I48.0 PAF (PAROXYSMAL ATRIAL FIBRILLATION): ICD-10-CM

## 2022-04-05 DIAGNOSIS — Z95.2 S/P AVR (AORTIC VALVE REPLACEMENT): ICD-10-CM

## 2022-04-05 DIAGNOSIS — I50.23 ACUTE ON CHRONIC SYSTOLIC CHF (CONGESTIVE HEART FAILURE): Primary | ICD-10-CM

## 2022-04-05 DIAGNOSIS — E66.01 MORBID OBESITY WITH BMI OF 40.0-44.9, ADULT: ICD-10-CM

## 2022-04-05 DIAGNOSIS — E78.2 MIXED HYPERLIPIDEMIA: ICD-10-CM

## 2022-04-05 DIAGNOSIS — I10 PRIMARY HYPERTENSION: ICD-10-CM

## 2022-04-05 LAB
ANION GAP SERPL CALCULATED.3IONS-SCNC: 10 MMOL/L (ref 5–15)
BUN SERPL-MCNC: 22 MG/DL (ref 6–20)
BUN/CREAT SERPL: 15.8 (ref 7–25)
CALCIUM SPEC-SCNC: 9.2 MG/DL (ref 8.6–10.5)
CHLORIDE SERPL-SCNC: 103 MMOL/L (ref 98–107)
CO2 SERPL-SCNC: 26 MMOL/L (ref 22–29)
CREAT SERPL-MCNC: 1.39 MG/DL (ref 0.76–1.27)
EGFRCR SERPLBLD CKD-EPI 2021: 59.5 ML/MIN/1.73
GLUCOSE SERPL-MCNC: 110 MG/DL (ref 65–99)
INR PPP: 1.8 (ref 0.91–1.09)
POTASSIUM SERPL-SCNC: 4.5 MMOL/L (ref 3.5–5.2)
PROTHROMBIN TIME: 21.8 SECONDS (ref 10–13.8)
SODIUM SERPL-SCNC: 139 MMOL/L (ref 136–145)

## 2022-04-05 PROCEDURE — 36416 COLLJ CAPILLARY BLOOD SPEC: CPT

## 2022-04-05 PROCEDURE — G0463 HOSPITAL OUTPT CLINIC VISIT: HCPCS | Performed by: PHARMACIST

## 2022-04-05 PROCEDURE — 85610 PROTHROMBIN TIME: CPT

## 2022-04-05 PROCEDURE — 99214 OFFICE O/P EST MOD 30 MIN: CPT | Performed by: NURSE PRACTITIONER

## 2022-04-05 PROCEDURE — 80048 BASIC METABOLIC PNL TOTAL CA: CPT | Performed by: NURSE PRACTITIONER

## 2022-04-05 RX ORDER — FUROSEMIDE 40 MG/1
80 TABLET ORAL DAILY
COMMUNITY
Start: 2022-03-02 | End: 2022-06-21

## 2022-04-05 NOTE — PROGRESS NOTES
Anticoagulation Clinic - Remote Progress Note  [facility]  Testing frequency: 7 days    Indication: Atrial Fibrillation and Mechanical Aortic Valve; h/o embolic CVA  Referring Provider: Bunny last seen 3/12/21; Next appointment: 6/25/21;  Initial Warfarin Start Date: 11/2012  Planned Duration of Therapy: Life  Goal INR: 2.5-3.5  Current Drug Interactions: escitalopram, aspirin, amiodarone, pantoprazole  CHADS-VASc: 3 (HTN, h/o CVA)     Vit K Diet: patient eats salad 1-3 times a week, goes through spurts when he tries to eat healthier 3/3/2021  Alcohol: none  Tobacco: none       Anticoagulation Clinic INR History:      Date 10/2 10/10 11/4 11/12 11/22 12/10 1/3/20 1/22 1/30 2/3 2/18 2/27   Total Weekly Dose 80mg 80mg 62.5mg 85mg 82.5mg 70mg 72.5mg 72.5mg 82.5mg 77.5mg 75mg 75mg   INR  3.9 3.6 1.7 3.4 4.7 2.4 3.6 1.3 2.3 3.2 3.1 3.2   Notes   1x miss self adj  self adj  lovenox         Date 4/8 4/29 5/18 6/20  8/3 8/17 10/5 10/19 11/11 12/21    Total Weekly Dose 70mg 70mg 65mg 65mg Non- compliant 65mg 65 mg 65mg 65mg 65mg 65mg Non- compliant   INR 4.3 4.1 4.5 3.0  3.0 3.8 3.0 2.9 4.1 2.3    Notes Self adj; Levaquin decr GLV; COVID+  recv'd 6/22; misdose       incr GLV      Date 1/20/21 1/21 1/25 2/1 2/16 3/3 3/22 4/13  4/26 5/14 5/21   Total WeeklyDose 40mg 45mg? 62.5mg 80mg? 80mg   70mg 67.5mg  47.5mg  67.5 mg   INR 1.2 1.2 maia 1.6 2.9 3.7  4.5 3.2 3.3  1.8 4.5 3.1   Notes Self held; augmentin 1x incr dose; lovenox 2x miss,   1x misdose  amox amox  self adj 5x hold, enox enox/norco, augmentin       Date 6/28  8/4 8/6 8/9 8/20 9/3  11/9 11/16 11/30 12/2   Total WeeklyDose 67.5 mg BH Barrett   6/30-7/29 [facility] 32.5 mg 47.5mg 51.25 mg 52.5mg 52.5mg? Non compliant 35mg? 70mg? 52.5mg BHL- left AMA   INR 3.9  2.2 3.0 2.9 4.8 2.6  2.0 3.9 4.3    Notes  amio init enox enox enox    Rec 11/16   GI bleed       Date 12/4 12/6 1/25 2/10  2/24 3/25 4/5       Total WeeklyDose BHL- left AMA 7.5mg ?? 52.5mg ????  52.5 mg 52.5  mg       INR  1.1 1.7 5.6  1.8 2.0 1.8       Notes AMS/GI Bleed  rec 1/26 APAP COVID + suboxone  1x miss layton          Phone Interview:  Verbal Release Authorization signed on 11/7/18 -- Patient's Wife has Passed Away   Tablet Strength:   7.5 mg  Patient Contact: [phone] -- try to call in PM or  His email is [email or web address]    Patient Findings  Negatives:  Signs/symptoms of thrombosis, Signs/symptoms of bleeding, Laboratory test error suspected, Change in health, Change in alcohol use, Change in activity, Upcoming invasive procedure, Emergency department visit, Upcoming dental procedure, Missed doses, Extra doses, Change in medications, Change in diet/appetite, Hospital admission, Bruising, Other complaints   Comments:  Eleni gave him a furosemide dose for fluid buildup in the [facility].     He has lovenox 80 mg syringes and injects 150 mg for his dose. He estimates wt at 349lbs.  He mentions wt loss surgery and is comtemplating it.       Plan:   1. INR is SUBtherapeutic at 1.8.  Instructed Mr. Alberto to BOOST tonight and tomorrow's dose to 11.25mg then resume warfarin 7.5mg daily until recheck. Will instruct to inject 150mg lovenox q12h until recheck.   2. Repeat INR 4/8 with home monitor. Discussed to monitor for shortness of breath, pain, tenderness in extremities and  s/sx of stroke including impaired speech, unilateral paralysis, blurry vision and when to seek medical attention  3.  Verbal and written  information provided in the clinic.  Mitchell Alberto RBV dosing instructions, expresses understanding by teach back, and has no further questions at this time.    Sam Zaldivar, PharmD  4/5/2022  14:18 EDT

## 2022-04-05 NOTE — PROGRESS NOTES
"Chief Complaint  Congestive Heart Failure and Follow-up    Subjective    History of Present Illness {CC  Problem List  Visit  Diagnosis   Encounters  Notes  Medications  Labs  Result Review Imaging  Media :23}       History of Present Illness   56-year-old male presents the office today for ongoing evaluation of his acute on chronic systolic heart failure.  Patient reports that he has been taking Lasix and Bumex daily together due to his fluid overload.  He reports he is up at least 10 pounds.  Patient reports he is eating significant amounts of salt in his diet.  Reports eating salt with every meal.  Patient does not salt his food but eats foods high in sodium.  Notes dyspnea, pedal edema, fatigue.  Currently denies chest pain, presyncope, syncope, orthopnea, PND.  Patient has expressed interest about being about weighted by bariatric surgery due to his morbid obesity  Objective     Vital Signs:   Vitals:    04/05/22 1332   BP: 140/71   BP Location: Left arm   Patient Position: Sitting   Cuff Size: Adult   Pulse: 72   Resp: 15   Temp: 96.3 °F (35.7 °C)   TempSrc: Temporal   SpO2: 92%   Weight: (!) 158 kg (349 lb 3.2 oz)   Height: 195.6 cm (77\")     Body mass index is 41.41 kg/m².  Physical Exam  Vitals and nursing note reviewed.   Constitutional:       Appearance: Normal appearance.   HENT:      Head: Normocephalic.   Eyes:      Pupils: Pupils are equal, round, and reactive to light.   Cardiovascular:      Rate and Rhythm: Normal rate and regular rhythm.      Pulses: Normal pulses.      Heart sounds: Murmur heard.      Comments: midsystolicclick  Pulmonary:      Effort: Pulmonary effort is normal.      Breath sounds: Normal breath sounds.   Abdominal:      General: Bowel sounds are normal.      Palpations: Abdomen is soft.   Musculoskeletal:         General: Normal range of motion.      Cervical back: Normal range of motion.      Right lower leg: No edema.      Left lower leg: No edema.   Skin:     " General: Skin is warm and dry.      Capillary Refill: Capillary refill takes less than 2 seconds.   Neurological:      Mental Status: He is alert and oriented to person, place, and time.   Psychiatric:         Mood and Affect: Mood normal.         Thought Content: Thought content normal.              Result Review  Data Reviewed:{ Labs  Result Review  Imaging  Med Tab  Media :23}     Bmp pending from today  Lab Results   Component Value Date    GLUCOSE 99 03/03/2022    CALCIUM 9.3 03/03/2022     03/03/2022    K 4.6 03/03/2022    CO2 30.0 (H) 03/03/2022    CL 99 03/03/2022    BUN 23 (H) 03/03/2022    CREATININE 1.39 (H) 03/03/2022    EGFRIFNONA 55 (L) 12/19/2021    BCR 16.5 03/03/2022    ANIONGAP 11.0 03/03/2022                Assessment and Plan {CC Problem List  Visit Diagnosis  ROS  Review (Popup)  Health Maintenance  Quality  BestPractice  Medications  SmartSets  SnapShot Encounters  Media :23}   1. Acute on chronic systolic CHF (congestive heart failure) (HCC)  Received 80 mg of Lasix IV through butterfly in left hand.  Butterfly was discontinued and site was free of erythema, ecchymosis or drainage.  - Basic Metabolic Panel; Future  - Basic Metabolic Panel  Discussed beginning low-sodium diet  2. Primary hypertension  Well-controlled on lisinopril, Toprol    3. Mixed hyperlipidemia  Stable on Lipitor    4. PAF (paroxysmal atrial fibrillation) (HCC)  Maintaining normal sinus rhythm on amiodarone  Anticoagulated with Coumadin and denies any signs and symptoms of bleeding  CHADS-VASc Risk Assessment            4 Total Score    1 CHF    1 Hypertension    2 PRIOR STROKE/TIA/THROMBO        Criteria that do not apply:    Age >/= 75    DM    Vascular Disease    Age 65-74    Sex: Female              5. S/P AVR (aortic valve replacement)  On Coumadin and denies any signs and symptoms of bleeding  INR check at Coumadin clinic immediately following this appointment    6. Morbid obesity with BMI  40-44  Refer to bariatric surgery for consultation  Follow Up {Instructions Charge Capture  Follow-up Communications :23}   Return if symptoms worsen or fail to improve.    Patient was given instructions and counseling regarding his condition or for health maintenance advice. Please see specific information pulled into the AVS if appropriate.  Patient was instructed to call the [facility] with any questions, concerns, or worsening symptoms.

## 2022-04-06 ENCOUNTER — TELEPHONE (OUTPATIENT)
Dept: CARDIOLOGY | Facility: HOSPITAL | Age: 57
End: 2022-04-06

## 2022-04-12 ENCOUNTER — TELEPHONE (OUTPATIENT)
Dept: CARDIOLOGY | Facility: HOSPITAL | Age: 57
End: 2022-04-12

## 2022-04-12 RX ORDER — SACUBITRIL AND VALSARTAN 24; 26 MG/1; MG/1
1 TABLET, FILM COATED ORAL 2 TIMES DAILY
Qty: 60 TABLET | Refills: 3 | Status: SHIPPED | OUTPATIENT
Start: 2022-04-12 | End: 2022-05-18

## 2022-04-12 NOTE — TELEPHONE ENCOUNTER
Spoke with patient who notes ongoing pedal edema.  Will stop lisinopril and begin Entresto 24 mg / 26 mg 1 p.o. twice daily 36 hours after stopping lisinopril.  Patient verbalized understanding

## 2022-04-27 RX ORDER — WARFARIN SODIUM 7.5 MG/1
TABLET ORAL
Qty: 30 TABLET | Refills: 3 | Status: SHIPPED | OUTPATIENT
Start: 2022-04-27 | End: 2022-06-22 | Stop reason: SDUPTHER

## 2022-05-18 ENCOUNTER — OFFICE VISIT (OUTPATIENT)
Dept: FAMILY MEDICINE CLINIC | Facility: CLINIC | Age: 57
End: 2022-05-18

## 2022-05-18 VITALS
BODY MASS INDEX: 37.19 KG/M2 | OXYGEN SATURATION: 96 % | WEIGHT: 315 LBS | DIASTOLIC BLOOD PRESSURE: 82 MMHG | HEART RATE: 60 BPM | SYSTOLIC BLOOD PRESSURE: 138 MMHG | HEIGHT: 77 IN

## 2022-05-18 DIAGNOSIS — F32.A DEPRESSION, UNSPECIFIED DEPRESSION TYPE: Primary | ICD-10-CM

## 2022-05-18 DIAGNOSIS — F11.21 OPIOID DEPENDENCE IN REMISSION: ICD-10-CM

## 2022-05-18 DIAGNOSIS — I10 PRIMARY HYPERTENSION: ICD-10-CM

## 2022-05-18 DIAGNOSIS — I42.8 NONISCHEMIC CARDIOMYOPATHY: ICD-10-CM

## 2022-05-18 DIAGNOSIS — Z62.819 HISTORY OF ABUSE IN CHILDHOOD: ICD-10-CM

## 2022-05-18 DIAGNOSIS — Z95.2 MECHANICAL HEART VALVE PRESENT: ICD-10-CM

## 2022-05-18 DIAGNOSIS — F41.9 ANXIETY: ICD-10-CM

## 2022-05-18 PROBLEM — Z91.81 HISTORY OF FALLING: Status: RESOLVED | Noted: 2021-08-11 | Resolved: 2022-05-18

## 2022-05-18 PROBLEM — I49.01 VENTRICULAR FIBRILLATION: Status: RESOLVED | Noted: 2021-08-11 | Resolved: 2022-05-18

## 2022-05-18 PROCEDURE — 99214 OFFICE O/P EST MOD 30 MIN: CPT | Performed by: STUDENT IN AN ORGANIZED HEALTH CARE EDUCATION/TRAINING PROGRAM

## 2022-05-18 RX ORDER — BUPROPION HYDROCHLORIDE 150 MG/1
150 TABLET ORAL DAILY
Qty: 30 TABLET | Refills: 5 | Status: SHIPPED | OUTPATIENT
Start: 2022-05-18 | End: 2022-07-29 | Stop reason: SDUPTHER

## 2022-05-18 RX ORDER — METOPROLOL SUCCINATE 25 MG/1
25 TABLET, EXTENDED RELEASE ORAL DAILY
Qty: 30 TABLET | Refills: 5 | Status: SHIPPED | OUTPATIENT
Start: 2022-05-18 | End: 2022-06-20

## 2022-05-18 RX ORDER — LISINOPRIL 20 MG/1
40 TABLET ORAL DAILY
Qty: 30 TABLET | Refills: 5 | Status: SHIPPED | OUTPATIENT
Start: 2022-05-18 | End: 2022-06-20
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
## 2022-05-19 ENCOUNTER — TELEPHONE (OUTPATIENT)
Dept: PHARMACY | Facility: HOSPITAL | Age: 57
End: 2022-05-19

## 2022-05-19 NOTE — TELEPHONE ENCOUNTER
Spoke with Mr. Alberto re: overdue INR    He states he will check on HM today and call clinic with result    Of note, last INR was sub therapeutic at 1.8 on 4/5/22- patient was advised to use Lovenox and recheck on 4/8

## 2022-05-27 NOTE — TELEPHONE ENCOUNTER
LVM re: INR reminder    If no result by EOD - would consider sending out first overdue letter on Tuesday when clinic re-opens

## 2022-06-16 ENCOUNTER — HOSPITAL ENCOUNTER (EMERGENCY)
Facility: HOSPITAL | Age: 57
Discharge: LEFT BEFORE DISCHARGE | End: 2022-06-16

## 2022-06-16 VITALS
DIASTOLIC BLOOD PRESSURE: 100 MMHG | WEIGHT: 315 LBS | HEIGHT: 77 IN | SYSTOLIC BLOOD PRESSURE: 167 MMHG | TEMPERATURE: 97.7 F | RESPIRATION RATE: 20 BRPM | OXYGEN SATURATION: 96 % | BODY MASS INDEX: 37.19 KG/M2 | HEART RATE: 81 BPM

## 2022-06-16 PROCEDURE — 99211 OFF/OP EST MAY X REQ PHY/QHP: CPT

## 2022-06-20 ENCOUNTER — TELEPHONE (OUTPATIENT)
Dept: FAMILY MEDICINE CLINIC | Facility: CLINIC | Age: 57
End: 2022-06-20

## 2022-06-20 DIAGNOSIS — I50.20 HEART FAILURE WITH REDUCED EJECTION FRACTION: Primary | ICD-10-CM

## 2022-06-20 DIAGNOSIS — I10 PRIMARY HYPERTENSION: ICD-10-CM

## 2022-06-20 RX ORDER — CARVEDILOL 6.25 MG/1
6.25 TABLET ORAL 2 TIMES DAILY WITH MEALS
Qty: 60 TABLET | Refills: 0 | Status: SHIPPED | OUTPATIENT
Start: 2022-06-20 | End: 2022-07-27

## 2022-06-20 NOTE — TELEPHONE ENCOUNTER
Patient should STOP lisinopril immediately if concerns for anaphylactic reaction. No documented ER note - review of records reveal patient may have left prior to being seen in triage. Needs to seek urgent medical evaluation for rash today - recommend ER or urgent care to evaluate rash.     For alternative medication - will send in carvedilol 6.25mg twice daily. He is to take this instead of metoprolol - will help with blood pressure and heart rate control. Do not take metoprolol in addition to this. He has follow up with cardiology in 2 days. It is very important he go to this appointment to follow up for management of HTN and blood pressure.

## 2022-06-20 NOTE — TELEPHONE ENCOUNTER
Latoya Soriano, DO 13 minutes ago (4:00 PM)          Patient should STOP lisinopril immediately if concerns for anaphylactic reaction. No documented ER note - review of records reveal patient may have left prior to being seen in triage. Needs to seek urgent medical evaluation for rash today - recommend ER or urgent care to evaluate rash.      For alternative medication - will send in carvedilol 6.25mg twice daily. He is to take this instead of metoprolol - will help with blood pressure and heart rate control. Do not take metoprolol in addition to this. He has follow up with cardiology in 2 days. It is very important he go to this appointment to follow up for management of HTN and blood pressure.             Attempted to call patient x 2. No answer. Unable to LVM as mailbox is full.     Hub may relay message and document.

## 2022-06-20 NOTE — TELEPHONE ENCOUNTER
Patient had an anaphylaxis reaction to lisinopril. Explained that the hospital had to give him an epipen injection because his throat closed up and could not breath. He explained that he is still taking the medication and his hives are now coming back. I told the patient to stop taking this medication. We do not have any openings further today that th patient could make an appointment. Please advise on recommendations. Pt wanted a call back today.

## 2022-06-20 NOTE — TELEPHONE ENCOUNTER
Patient called back. Lavonne @ the HUB read HUB to read message to patient. Patient is not happy. Would like a medication for hives and a call back from the office.

## 2022-06-20 NOTE — TELEPHONE ENCOUNTER
Patient should STOP lisinopril immediately if concerns for anaphylactic reaction. No documented ER note - review of records reveal patient may have left prior to being seen in triage. Needs to seek urgent medical evaluation for rash today - recommend ER or urgent care to evaluate rash.      For alternative medication - will send in carvedilol 6.25mg twice daily. He is to take this instead of metoprolol - will help with blood pressure and heart rate control. Do not take metoprolol in addition to this. He has follow up with cardiology in 2 days. It is very important he go to this appointment to follow up for management of HTN and blood pressure.      HUB RELAYED MESSAGE  WAS SEEN AT [facility] TWICE LAST WEEK.   PATIENT WAS LOOKING FOR MEDICATION FOR ITCHING FOR HIVES.   PLEASE CALL PATIENT.

## 2022-06-20 NOTE — TELEPHONE ENCOUNTER
Caller: Mitchell Alberto    Relationship: Self    Best call back number:     [phone]    What medication are you requesting:     PATIENT REQUESTED A MEDICATION/STEROID TO HELP WITH THE SEVERE RASH HE HAS COVERING HIS BODY AS AN ALLERGIC REACTION TO THE MEDICATION LISTED BELOW     lisinopril (PRINIVIL,ZESTRIL) 20 MG tablet     What are your current symptoms:     RASH COVERING BODY    How long have you been experiencing symptoms:     SINCE Wednesday, 6/15/22    Have you had these symptoms before:    [] Yes  [x] No    Have you been treated for these symptoms before:   [] Yes  [x] No    If a prescription is needed, what is your preferred pharmacy and phone number: [facility] [id number] - [place], [place] - [address] AT [facility] [address] - [phone]  - [phone] FX     DR INGRAM

## 2022-06-20 NOTE — TELEPHONE ENCOUNTER
Caller: Mitchell Alberto    Relationship: Self    Best call back number:     [phone]    Which medication are you concerned about:      lisinopril (PRINIVIL,ZESTRIL) 20 MG tablet     Who prescribed you this medication:     DR INGRAM    What are your concerns:     PATIENT STATED THE MEDICATION LISTED ABOVE HAS CAUSED SEVERE ALLERGIC REACTION, HIVES COVERING HIS ENTIRE BODY    PATIENT REQUESTED DR INGRAM TO PRESCRIBE A COMPARABLE MEDICATION THAT WOULD NOT HAVE THE SEVERE SIDE EFFECT    How long have you had these concerns:     PATIENT EXPERIENCED ALLERGIC REACTION FOR THE FIRST TIME THIS PAST Wednesday, 6/15/22

## 2022-06-21 ENCOUNTER — OFFICE VISIT (OUTPATIENT)
Dept: FAMILY MEDICINE CLINIC | Facility: CLINIC | Age: 57
End: 2022-06-21

## 2022-06-21 VITALS
BODY MASS INDEX: 37.19 KG/M2 | WEIGHT: 315 LBS | HEIGHT: 77 IN | OXYGEN SATURATION: 97 % | DIASTOLIC BLOOD PRESSURE: 94 MMHG | HEART RATE: 80 BPM | SYSTOLIC BLOOD PRESSURE: 160 MMHG

## 2022-06-21 DIAGNOSIS — L50.9 URTICARIA: ICD-10-CM

## 2022-06-21 DIAGNOSIS — E66.01 CLASS 3 SEVERE OBESITY WITH SERIOUS COMORBIDITY AND BODY MASS INDEX (BMI) OF 40.0 TO 44.9 IN ADULT, UNSPECIFIED OBESITY TYPE: ICD-10-CM

## 2022-06-21 DIAGNOSIS — I10 PRIMARY HYPERTENSION: ICD-10-CM

## 2022-06-21 DIAGNOSIS — I42.0 DILATED CARDIOMYOPATHY: ICD-10-CM

## 2022-06-21 DIAGNOSIS — T78.3XXD ANGIOEDEMA, SUBSEQUENT ENCOUNTER: Primary | ICD-10-CM

## 2022-06-21 PROCEDURE — 99214 OFFICE O/P EST MOD 30 MIN: CPT | Performed by: STUDENT IN AN ORGANIZED HEALTH CARE EDUCATION/TRAINING PROGRAM

## 2022-06-21 RX ORDER — METHYLPREDNISOLONE 4 MG/1
TABLET ORAL
Qty: 21 TABLET | Status: CANCELLED | OUTPATIENT
Start: 2022-06-21

## 2022-06-21 RX ORDER — LORATADINE 10 MG/1
10 TABLET ORAL 2 TIMES DAILY
Qty: 30 TABLET | Refills: 0 | Status: CANCELLED | OUTPATIENT
Start: 2022-06-21

## 2022-06-21 NOTE — PROGRESS NOTES
Established Office Visit      Patient Name: Mitchell Alberto  : 1965   MRN: [id number]   Care Team: Patient Care Team:  Latoya Soriano DO as PCP - General (Internal Medicine)    Chief Complaint:    Chief Complaint   Patient presents with   • Allergic Reaction     Wel[place], was in the hospital twice last week, eyes itchy,        History of Present Illness: Mitchell Alberto is a 57 y.o. male who is here today to follow up with rash.    Presented to [facility] ER about 5 days ago for angioedema and anaphylactic reaction - presented with difficulty breathing, facial/lip swelling, hives all over. Was provided with epipen and steroid course. Suspected to be due to Lisinopril. Records unavailable for independent review. He has been taking lisinopril still and reports shortness of breath improved but lip swelling and hives have not gone away. Coming and going throughout the day/night - affecting bilateral arms, chest, abdomen, back.     Obesity - requests referral to bariatrics, interested in surgery. Has tried various diets for years - has been unsuccessful with keto, portion control, fasting. He does not exercise - feels tired all the time.     Subjective      Review of Systems:   Review of Systems - See HPI    I have reviewed and the following portions of the patient's history were updated as appropriate: past family history, past medical history, past social history, past surgical history and problem list.    Medications:     Current Outpatient Medications:   •  acetaminophen (TYLENOL) 500 MG tablet, Take 500 mg by mouth Every 6 (Six) Hours As Needed for Mild Pain ., Disp: , Rfl:   •  amiodarone (PACERONE) 200 MG tablet, Take 1 tablet by mouth Daily., Disp: 90 tablet, Rfl: 1  •  aspirin 81 MG chewable tablet, Chew 1 tablet Daily. (Patient taking differently: Chew 325 mg Daily.), Disp: , Rfl:   •  atorvastatin (LIPITOR) 40 MG tablet, Take 1 tablet by mouth Every Night., Disp: , Rfl:   •  bumetanide (BUMEX) 1 MG  "tablet, Take one tablet by mouth daily and if weight increases more than 2 lbs in 24 hours take extra 1 mg by mouth daily, Disp: 45 tablet, Rfl: 5  •  buprenorphine-naloxone (SUBOXONE) 8-2 MG per SL tablet, Place 2 tablets under the tongue Daily., Disp: , Rfl:   •  buPROPion XL (Wellbutrin XL) 150 MG 24 hr tablet, Take 1 tablet by mouth Daily., Disp: 30 tablet, Rfl: 5  •  carvedilol (Coreg) 6.25 MG tablet, Take 1 tablet by mouth 2 (Two) Times a Day With Meals., Disp: 60 tablet, Rfl: 0  •  ferrous sulfate 325 (65 FE) MG tablet, Take 1 tablet by mouth Daily With Breakfast., Disp: 90 tablet, Rfl: 5  •  tadalafil (ADCIRCA) 20 MG tablet tablet, Take 5 mg by mouth Daily., Disp: , Rfl:   •  warfarin (COUMADIN) 7.5 MG tablet, TAKE ONE TABLET BY MOUTH DAILY OR AS DIRECTED BY ANTICOAGULATION CLINIC. WILL NEED TO MAKE AN APPOINTMENT WITH DOCTOR FOR MORE REFILLS, Disp: 30 tablet, Rfl: 3    Allergies:   Allergies   Allergen Reactions   • Meclizine Hcl Itching       Objective     Physical Exam:  Vital Signs:   Vitals:    06/21/22 0901   BP: 160/94   Pulse: 80   SpO2: 97%   Weight: (!) 160 kg (353 lb 6.4 oz)   Height: 195.6 cm (77\")     Body mass index is 41.91 kg/m².     Physical Exam  Vitals reviewed.   Constitutional:       Appearance: Normal appearance. He is obese.   HENT:      Nose: Nose normal.      Comments: Airway is patent  Cardiovascular:      Rate and Rhythm: Normal rate.      Comments: Trace bilateral LE edema  Pulmonary:      Effort: Pulmonary effort is normal. No respiratory distress.   Skin:     General: Skin is warm and dry.      Comments: Upper and lower lip swelling. Scattered raised urticarial welts on bilateral arms, back, and torso   Neurological:      Mental Status: He is alert.   Psychiatric:         Mood and Affect: Mood normal.         Behavior: Behavior normal.         Judgment: Judgment normal.         Assessment / Plan      Assessment/Plan:   Problems Addressed This Visit  Diagnoses and all orders for " this visit:    1. Urticaria  2. Angioedema  (Primary)  Likely due to Lisinopril - advised him to stop taking this immediately. He was provided glucocorticoid course last week and is finishing this up. Unsure of dose - records requested from [place]. Increase Zyrtec to TID today, BID tomorrow and continue once daily dosing thereafter. Advised him to seek emergent medical attention if he feels short of air or if airway swelling occurs. Swelling today limited to lips.    2. Primary hypertension  3. Dilated cardiomyopathy (HCC)  He appears slightly volume up today - edema of bilateral LE. Recommended taking extra 1mg dose of Bumex today. Stressed importance of follow up with cardiology tomorrow to discuss medication management moving forward. If he feels SOA is worsening, needs to go to the ER for evaluation.     Advised him to STOP lisinopril due to angioedema. Will avoid CCB in setting of HFrEF. Start Coreg 6.25mg BID. Advised him not to take metoprolol while on this.     4. Class 3 severe obesity with serious comorbidity and body mass index (BMI) of 40.0 to 44.9 in adult, unspecified obesity type (HCC)  -     Ambulatory Referral to Bariatric Surgery  With comorbidity (CAD, HFrEF). Has failed lifestyle modifications thus far. Recommended calorie tracking, calorie deficit and goal of 150min weight loss per week.        Plan of care reviewed with patient at the conclusion of today's visit. Education was provided regarding diagnosis and management.  Patient verbalizes understanding of and agreement with management plan.    Follow Up:   No follow-ups on file.        DO SUNIL Antonio RD  [facility]  [address]  [facility] [address]  Fax [phone]  Phone [phone]

## 2022-06-22 ENCOUNTER — ANTICOAGULATION VISIT (OUTPATIENT)
Dept: PHARMACY | Facility: HOSPITAL | Age: 57
End: 2022-06-22

## 2022-06-22 ENCOUNTER — OFFICE VISIT (OUTPATIENT)
Dept: CARDIOLOGY | Facility: HOSPITAL | Age: 57
End: 2022-06-22

## 2022-06-22 VITALS
TEMPERATURE: 96.5 F | BODY MASS INDEX: 37.19 KG/M2 | SYSTOLIC BLOOD PRESSURE: 150 MMHG | OXYGEN SATURATION: 97 % | DIASTOLIC BLOOD PRESSURE: 84 MMHG | HEIGHT: 77 IN | HEART RATE: 80 BPM | RESPIRATION RATE: 20 BRPM | WEIGHT: 315 LBS

## 2022-06-22 DIAGNOSIS — E78.2 MIXED HYPERLIPIDEMIA: ICD-10-CM

## 2022-06-22 DIAGNOSIS — I48.0 PAF (PAROXYSMAL ATRIAL FIBRILLATION): ICD-10-CM

## 2022-06-22 DIAGNOSIS — Z95.2 S/P AVR (AORTIC VALVE REPLACEMENT): Primary | ICD-10-CM

## 2022-06-22 DIAGNOSIS — I50.23 ACUTE ON CHRONIC SYSTOLIC CHF (CONGESTIVE HEART FAILURE): Primary | ICD-10-CM

## 2022-06-22 DIAGNOSIS — I10 PRIMARY HYPERTENSION: ICD-10-CM

## 2022-06-22 LAB
ANION GAP SERPL CALCULATED.3IONS-SCNC: 10 MMOL/L (ref 5–15)
BUN SERPL-MCNC: 29 MG/DL (ref 6–20)
BUN/CREAT SERPL: 22.5 (ref 7–25)
CALCIUM SPEC-SCNC: 8.9 MG/DL (ref 8.6–10.5)
CHLORIDE SERPL-SCNC: 105 MMOL/L (ref 98–107)
CO2 SERPL-SCNC: 26 MMOL/L (ref 22–29)
CREAT SERPL-MCNC: 1.29 MG/DL (ref 0.76–1.27)
EGFRCR SERPLBLD CKD-EPI 2021: 64.7 ML/MIN/1.73
GLUCOSE SERPL-MCNC: 122 MG/DL (ref 65–99)
INR PPP: 2.4 (ref 0.91–1.09)
NT-PROBNP SERPL-MCNC: 758 PG/ML (ref 0–900)
POTASSIUM SERPL-SCNC: 4.3 MMOL/L (ref 3.5–5.2)
PROTHROMBIN TIME: 28.2 SECONDS (ref 10–13.8)
SODIUM SERPL-SCNC: 141 MMOL/L (ref 136–145)

## 2022-06-22 PROCEDURE — 83880 ASSAY OF NATRIURETIC PEPTIDE: CPT | Performed by: NURSE PRACTITIONER

## 2022-06-22 PROCEDURE — 85610 PROTHROMBIN TIME: CPT

## 2022-06-22 PROCEDURE — G0463 HOSPITAL OUTPT CLINIC VISIT: HCPCS

## 2022-06-22 PROCEDURE — 80048 BASIC METABOLIC PNL TOTAL CA: CPT | Performed by: NURSE PRACTITIONER

## 2022-06-22 PROCEDURE — 36416 COLLJ CAPILLARY BLOOD SPEC: CPT

## 2022-06-22 PROCEDURE — 99214 OFFICE O/P EST MOD 30 MIN: CPT | Performed by: NURSE PRACTITIONER

## 2022-06-22 RX ORDER — BUMETANIDE 1 MG/1
TABLET ORAL
Qty: 90 TABLET | Refills: 5 | Status: SHIPPED | OUTPATIENT
Start: 2022-06-22 | End: 2023-03-31 | Stop reason: SDUPTHER

## 2022-06-22 RX ORDER — WARFARIN SODIUM 7.5 MG/1
TABLET ORAL
Qty: 30 TABLET | Refills: 0 | Status: SHIPPED | OUTPATIENT
Start: 2022-06-22 | End: 2022-09-22

## 2022-06-22 NOTE — PROGRESS NOTES
Anticoagulation Clinic - Remote Progress Note  [facility]  Testing frequency: 7 days    Indication: Atrial Fibrillation and Mechanical Aortic Valve; h/o embolic CVA  Referring Provider: Bunny last seen 3/12/21; Next appointment: 6/25/21;  Initial Warfarin Start Date: 11/2012  Planned Duration of Therapy: Life  Goal INR: 2.5-3.5  Current Drug Interactions: escitalopram, aspirin, amiodarone, pantoprazole  CHADS-VASc: 3 (HTN, h/o CVA)     Vit K Diet: patient eats salad 1-3 times a week, goes through spurts when he tries to eat healthier 3/3/2021  Alcohol: none  Tobacco: none       Anticoagulation Clinic INR History:      Date 10/2 10/10 11/4 11/12 11/22 12/10 1/3/20 1/22 1/30 2/3 2/18 2/27   Total Weekly Dose 80mg 80mg 62.5mg 85mg 82.5mg 70mg 72.5mg 72.5mg 82.5mg 77.5mg 75mg 75mg   INR  3.9 3.6 1.7 3.4 4.7 2.4 3.6 1.3 2.3 3.2 3.1 3.2   Notes   1x miss self adj  self adj  lovenox         Date 4/8 4/29 5/18 6/20  8/3 8/17 10/5 10/19 11/11 12/21    Total Weekly Dose 70mg 70mg 65mg 65mg Non- compliant 65mg 65 mg 65mg 65mg 65mg 65mg Non- compliant   INR 4.3 4.1 4.5 3.0  3.0 3.8 3.0 2.9 4.1 2.3    Notes Self adj; Levaquin decr GLV; COVID+  recv'd 6/22; misdose       incr GLV      Date 1/20/21 1/21 1/25 2/1 2/16 3/3 3/22 4/13  4/26 5/14 5/21   Total WeeklyDose 40mg 45mg? 62.5mg 80mg? 80mg   70mg 67.5mg  47.5mg  67.5 mg   INR 1.2 1.2 maia 1.6 2.9 3.7  4.5 3.2 3.3  1.8 4.5 3.1   Notes Self held; augmentin 1x incr dose; lovenox 2x miss,   1x misdose  amox amox  self adj 5x hold, enox enox/norco, augmentin       Date 6/28  8/4 8/6 8/9 8/20 9/3  11/9 11/16 11/30 12/2   Total WeeklyDose 67.5 mg BH Barrett   6/30-7/29 SJ CCH 32.5 mg 47.5mg 51.25 mg 52.5mg 52.5mg? Non compliant 35mg? 70mg? 52.5mg BHL- left AMA   INR 3.9  2.2 3.0 2.9 4.8 2.6  2.0 3.9 4.3    Notes  amio init enox enox enox    Rec 11/16   GI bleed       Date 12/4 12/6 1/25 2/10  2/24 3/25 4/5       Total WeeklyDose BHL- left AMA 7.5mg ?? 52.5mg ????  52.5 mg 52.5  mg       INR  1.1 1.7 5.6  1.8 2.0 1.8       Notes AMS/GI Bleed  rec 1/26 APAP COVID + suboxone  1x miss layton          Phone Interview:  Verbal Release Authorization signed on 11/7/18 -- Patient's Wife has Passed Away   Tablet Strength:   7.5 mg  Patient Contact: [phone] -- try to call in PM or  His email is [email or web address]    Patient Findings  Negatives:  Signs/symptoms of thrombosis, Signs/symptoms of bleeding, Laboratory test error suspected, Change in health, Change in alcohol use, Change in activity, Upcoming invasive procedure, Emergency department visit, Upcoming dental procedure, Missed doses, Extra doses, Change in medications, Change in diet/appetite, Hospital admission, Bruising, Other complaints   Comments:  Eleni gave him a furosemide dose for fluid buildup in the [facility].     He has lovenox 80 mg syringes and injects 150 mg for his dose. He estimates wt at 349lbs.  He mentions wt loss surgery and is comtemplating it.       Plan:   1. INR is SUBtherapeutic at 1.8.  Instructed Mr. Alberto to BOOST tonight and tomorrow's dose to 11.25mg then resume warfarin 7.5mg daily until recheck. Will instruct to inject 150mg lovenox q12h until recheck.   2. Repeat INR 4/8 with home monitor. Discussed to monitor for shortness of breath, pain, tenderness in extremities and  s/sx of stroke including impaired speech, unilateral paralysis, blurry vision and when to seek medical attention  3.  Verbal and written  information provided in the clinic.  Mitchell Alberto RBV dosing instructions, expresses understanding by teach back, and has no further questions at this time.    Do Vargas, PharmD  6/22/2022  08:49 EDT

## 2022-06-22 NOTE — PROGRESS NOTES
Anticoagulation Clinic - Remote Progress Note  [facility]  Testing frequency: 7 days    Indication: Atrial Fibrillation and Mechanical Aortic Valve; h/o embolic CVA  Referring Provider: Bunny last seen 3/12/21; Next appointment: 6/25/21;  Initial Warfarin Start Date: 11/2012  Planned Duration of Therapy: Life  Goal INR: 2.5-3.5  Current Drug Interactions: escitalopram, aspirin, amiodarone, pantoprazole  CHADS-VASc: 3 (HTN, h/o CVA)     Vit K Diet: patient eats, goes through spurts when he tries to eat healthier 3/3/2021  Alcohol: none  Tobacco: none       Anticoagulation Clinic INR History:      Date 10/2 10/10 11/4 11/12 11/22 12/10 1/3/20 1/22 1/30 2/3 2/18 2/27   Total Weekly Dose 80mg 80mg 62.5mg 85mg 82.5mg 70mg 72.5mg 72.5mg 82.5mg 77.5mg 75mg 75mg   INR  3.9 3.6 1.7 3.4 4.7 2.4 3.6 1.3 2.3 3.2 3.1 3.2   Notes   1x miss self adj  self adj  lovenox         Date 4/8 4/29 5/18 6/20  8/3 8/17 10/5 10/19 11/11 12/21    Total Weekly Dose 70mg 70mg 65mg 65mg Non- compliant 65mg 65 mg 65mg 65mg 65mg 65mg Non- compliant   INR 4.3 4.1 4.5 3.0  3.0 3.8 3.0 2.9 4.1 2.3    Notes Self adj; Levaquin decr GLV; COVID+  recv'd 6/22; misdose       incr GLV      Date 1/20/21 1/21 1/25 2/1 2/16 3/3 3/22 4/13  4/26 5/14 5/21   Total WeeklyDose 40mg 45mg? 62.5mg 80mg? 80mg   70mg 67.5mg  47.5mg  67.5 mg   INR 1.2 1.2 maia 1.6 2.9 3.7  4.5 3.2 3.3  1.8 4.5 3.1   Notes Self held; augmentin 1x incr dose; lovenox 2x miss,   1x misdose  amox amox  self adj 5x hold, enox enox/norco, augmentin       Date 6/28  8/4 8/6 8/9 8/20 9/3  11/9 11/16 11/30 12/2   Total WeeklyDose 67.5 mg BH Barrett   6/30-7/29 SJ [facility] 32.5 mg 47.5mg 51.25 mg 52.5mg 52.5mg? Non compliant 35mg? 70mg? 52.5mg BHL- left AMA   INR 3.9  2.2 3.0 2.9 4.8 2.6  2.0 3.9 4.3    Notes  amio init enox enox enox    Rec 11/16   GI bleed       Date 12/4 12/6 1/25 2/10  2/24 3/25 4/5 6/22      Total WeeklyDose BHL- left AMA 7.5mg ?? 52.5mg ????  52.5 mg 52.5 mg 53.75mg      INR   1.1 1.7 5.6  1.8 2.0 1.8 2.4      Notes AMS/GI Bleed  rec 1/26 [facility] COVID + suboxone  1x miss enox  Clinic, 1x misdose        Phone Interview:  Verbal Release Authorization signed on 11/7/18 -- Patient's Wife has Passed Away   Tablet Strength:   7.5 mg  Patient Contact: [phone] -- try to call in PM or  His email is [email or web address]    Patient Findings  Positives:  Emergency department visit, Extra doses, Change in medications   Negatives:  Signs/symptoms of thrombosis, Signs/symptoms of bleeding, Laboratory test error suspected, Change in health, Change in alcohol use, Change in activity, Upcoming invasive procedure, Upcoming dental procedure, Missed doses, Change in diet/appetite, Hospital admission, Bruising, Other complaints   Comments:  Patient reports taking 1.5 tablets last night (6/21). No GLV. Upcoming weight loss procedure not yet scheduled. No longer on Lovenox. Patient had an ED admission for angioedema likely d/t lisinopril. Patient has stopped lisinopril and started carvedilol.         Plan:   1. INR is SUBtherapeutic at 2.4.  Instructed Mr. Alberto to BOOST tonight dose to 8.75mg then resume warfarin 7.5mg daily until recheck.   2. Warfarin refill (Qty: 30) to be sent to [facility] pharmacy.   3. Patient was noncompliant with followup (supposed to recheck with home monitor on 4/8). Reviewed medications with patient to make sure they are up to date.   4. RTC in 1 week 6/29.  5.  Verbal and written  information provided in the clinic.  Mitchell Vazquezner RBV dosing instructions, expresses understanding by teach back, and has no further questions at this time.    Valerie Barajas, PharmD  6/22/2022  08:50 EDT

## 2022-06-22 NOTE — PROGRESS NOTES
"Chief Complaint  Follow-up (Shf )    Subjective    History of Present Illness {CC  Problem List  Visit  Diagnosis   Encounters  Notes  Medications  Labs  Result Review Imaging  Media :23}       History of Present Illness   57 year old male presents to the office today for ongoing evaluation of his acute on chronic systolic heart failure. Patient reports that he has been feeling poorly for the past few weeks. He notes that he recently experienced angioedema related to his lisinopril and was evaluated at [facility]. Epi pen was given and lisinopril was discontinued.His pcp started Coreg yesterday due to elevated blood pressure readings. He notes ongoing dyspnea, pedal edema and fatigue.   Objective     Vital Signs:   Vitals:    06/22/22 0759 06/22/22 0825   BP: (!) 189/90 150/84   BP Location: Left arm Left arm   Patient Position: Sitting Sitting   Cuff Size: Large Adult    Pulse: 80    Resp: 20    Temp: 96.5 °F (35.8 °C)    TempSrc: Temporal    SpO2: 97%    Weight: (!) 161 kg (354 lb 8 oz)    Height: 195.6 cm (77\")      Body mass index is 42.04 kg/m².  Physical Exam  Vitals and nursing note reviewed.   Constitutional:       Appearance: Normal appearance.   HENT:      Head: Normocephalic.   Eyes:      Pupils: Pupils are equal, round, and reactive to light.   Cardiovascular:      Rate and Rhythm: Normal rate and regular rhythm.      Pulses: Normal pulses.      Heart sounds: Normal heart sounds. No murmur heard.     Comments: click  Pulmonary:      Effort: Pulmonary effort is normal.      Breath sounds: Rales present.   Abdominal:      General: Bowel sounds are normal.      Palpations: Abdomen is soft.   Musculoskeletal:         General: Normal range of motion.      Cervical back: Normal range of motion.      Right lower leg: Edema (2+ pitting ) present.      Left lower leg: Edema (2+ pitting ) present.   Skin:     General: Skin is warm and dry.      Capillary Refill: Capillary refill takes less than 2 " seconds.   Neurological:      Mental Status: He is alert and oriented to person, place, and time.   Psychiatric:         Mood and Affect: Mood normal.         Thought Content: Thought content normal.              Result Review  Data Reviewed:{ Labs  Result Review  Imaging  Med Tab  Media :23}     Lab Results   Component Value Date    GLUCOSE 110 (H) 04/05/2022    CALCIUM 9.2 04/05/2022     04/05/2022    K 4.5 04/05/2022    CO2 26.0 04/05/2022     04/05/2022    BUN 22 (H) 04/05/2022    CREATININE 1.39 (H) 04/05/2022    EGFRIFNONA 55 (L) 12/19/2021    BCR 15.8 04/05/2022    ANIONGAP 10.0 04/05/2022                Assessment and Plan {CC Problem List  Visit Diagnosis  ROS  Review (Popup)  Health Maintenance  Quality  BestPractice  Medications  SmartSets  SnapShot Encounters  Media :23}   1. Acute on chronic systolic CHF (congestive heart failure) (HCC)  Patient received 2 mg of bumex through a butterfly in his right hand. Butterfly was discontinued and site was free of erythema, ecchymosis  - Basic Metabolic Panel; Future  - proBNP; Future  - Basic Metabolic Panel  - proBNP  Increase bumex to 2mg q am and 1 mg qpm for next 5 days  Echo on 6/27/22  2. Primary hypertension  Coreg initiated yesterday   Monitor closely     3. PAF (paroxysmal atrial fibrillation) (HCC)  Maintaining nsr on amiodarone  CHADS-VASc Risk Assessment            4 Total Score    1 CHF    1 Hypertension    2 PRIOR STROKE/TIA/THROMBO        Criteria that do not apply:    Age >/= 75    DM    Vascular Disease    Age 65-74    Sex: Female        Anticoagulated with coumadin and denies any s/s of bleeding     4. Mixed hyperlipidemia  Stable on lipitor       Follow Up {Instructions Charge Capture  Follow-up Communications :23}   Return if symptoms worsen or fail to improve.    Patient was given instructions and counseling regarding his condition or for health maintenance advice. Please see specific information pulled into the  AVS if appropriate.  Patient was instructed to call the [facility] with any questions, concerns, or worsening symptoms.

## 2022-06-23 ENCOUNTER — TELEPHONE (OUTPATIENT)
Dept: CARDIOLOGY | Facility: HOSPITAL | Age: 57
End: 2022-06-23

## 2022-06-23 NOTE — TELEPHONE ENCOUNTER
----- Message from Yelena Mock MA sent at 6/23/2022 12:48 PM EDT -----  Pt states he feels better today than he has in a long time. The IV diuresis really helped. Pt verbalized understanding of his labs were stable.  ----- Message -----  From: Eleni Cardoza APRN  Sent: 6/23/2022  12:20 PM EDT  To: [place], please call Mr Alberto and ask how he is doing after IV? Please tell him that his labs are stable. thanks

## 2022-06-27 ENCOUNTER — HOSPITAL ENCOUNTER (OUTPATIENT)
Dept: CARDIOLOGY | Facility: HOSPITAL | Age: 57
Discharge: HOME | End: 2022-06-27
Admitting: INTERNAL MEDICINE

## 2022-06-27 DIAGNOSIS — I50.22 CHRONIC SYSTOLIC CONGESTIVE HEART FAILURE: ICD-10-CM

## 2022-06-27 LAB
BH CV ECHO MEAS - AO MAX PG: 43.8 MMHG
BH CV ECHO MEAS - AO MEAN PG: 25.6 MMHG
BH CV ECHO MEAS - AO ROOT DIAM: 3.5 CM
BH CV ECHO MEAS - AO V2 MAX: 328.1 CM/SEC
BH CV ECHO MEAS - AO V2 VTI: 63.3 CM
BH CV ECHO MEAS - AVA(I,D): 1.14 CM2
BH CV ECHO MEAS - EDV(CUBED): 199.9 ML
BH CV ECHO MEAS - EDV(MOD-SP2): 146 ML
BH CV ECHO MEAS - EDV(MOD-SP4): 243 ML
BH CV ECHO MEAS - EF(MOD-BP): 55.2 %
BH CV ECHO MEAS - EF(MOD-SP2): 49.9 %
BH CV ECHO MEAS - EF(MOD-SP4): 58.4 %
BH CV ECHO MEAS - ESV(CUBED): 68.3 ML
BH CV ECHO MEAS - ESV(MOD-SP2): 73.2 ML
BH CV ECHO MEAS - ESV(MOD-SP4): 101 ML
BH CV ECHO MEAS - FS: 30.1 %
BH CV ECHO MEAS - IVS/LVPW: 1.01 CM
BH CV ECHO MEAS - IVSD: 1.24 CM
BH CV ECHO MEAS - LA DIMENSION: 5 CM
BH CV ECHO MEAS - LAT PEAK E' VEL: 8.7 CM/SEC
BH CV ECHO MEAS - LV DIASTOLIC VOL/BSA (35-75): 87.3 CM2
BH CV ECHO MEAS - LV MASS(C)D: 312.7 GRAMS
BH CV ECHO MEAS - LV MAX PG: 3.2 MMHG
BH CV ECHO MEAS - LV MEAN PG: 2.02 MMHG
BH CV ECHO MEAS - LV SYSTOLIC VOL/BSA (12-30): 36.3 CM2
BH CV ECHO MEAS - LV V1 MAX: 90 CM/SEC
BH CV ECHO MEAS - LV V1 VTI: 22.3 CM
BH CV ECHO MEAS - LVIDD: 5.8 CM
BH CV ECHO MEAS - LVIDS: 4.1 CM
BH CV ECHO MEAS - LVOT AREA: 3.2 CM2
BH CV ECHO MEAS - LVOT DIAM: 2.03 CM
BH CV ECHO MEAS - LVPWD: 1.23 CM
BH CV ECHO MEAS - MED PEAK E' VEL: 8.2 CM/SEC
BH CV ECHO MEAS - MV A MAX VEL: 97.3 CM/SEC
BH CV ECHO MEAS - MV DEC SLOPE: 349.5 CM/SEC2
BH CV ECHO MEAS - MV DEC TIME: 0.19 MSEC
BH CV ECHO MEAS - MV E MAX VEL: 88.1 CM/SEC
BH CV ECHO MEAS - MV E/A: 0.91
BH CV ECHO MEAS - MV MAX PG: 3.1 MMHG
BH CV ECHO MEAS - MV MEAN PG: 1.46 MMHG
BH CV ECHO MEAS - MV P1/2T: 78.3 MSEC
BH CV ECHO MEAS - MV V2 VTI: 28.4 CM
BH CV ECHO MEAS - MVA(P1/2T): 2.8 CM2
BH CV ECHO MEAS - MVA(VTI): 2.5 CM2
BH CV ECHO MEAS - PA ACC TIME: 0.17 SEC
BH CV ECHO MEAS - PA PR(ACCEL): 2.9 MMHG
BH CV ECHO MEAS - PA V2 MAX: 169.7 CM/SEC
BH CV ECHO MEAS - RAP SYSTOLE: 3 MMHG
BH CV ECHO MEAS - RVSP: 21 MMHG
BH CV ECHO MEAS - SI(MOD-SP2): 26.1 ML/M2
BH CV ECHO MEAS - SI(MOD-SP4): 51 ML/M2
BH CV ECHO MEAS - SV(LVOT): 72.1 ML
BH CV ECHO MEAS - SV(MOD-SP2): 72.8 ML
BH CV ECHO MEAS - SV(MOD-SP4): 142 ML
BH CV ECHO MEAS - TAPSE (>1.6): 2.08 CM
BH CV ECHO MEAS - TR MAX PG: 18 MMHG
BH CV ECHO MEAS - TR MAX VEL: 211 CM/SEC
BH CV ECHO MEASUREMENTS AVERAGE E/E' RATIO: 10.43
BH CV VAS BP LEFT ARM: NORMAL MMHG
BH CV XLRA - RV BASE: 4 CM
BH CV XLRA - RV LENGTH: 6.2 CM
BH CV XLRA - RV MID: 3.3 CM
BH CV XLRA - TDI S': 9.1 CM/SEC
LEFT ATRIUM VOLUME INDEX: 36.3 ML/M2
LV EF 2D ECHO EST: 55 %
MAXIMAL PREDICTED HEART RATE: 163 BPM
STRESS TARGET HR: 139 BPM

## 2022-06-27 PROCEDURE — 25010000002 SULFUR HEXAFLUORIDE MICROSPH 60.7-25 MG RECONSTITUTED SUSPENSION: Performed by: INTERNAL MEDICINE

## 2022-06-27 PROCEDURE — 93306 TTE W/DOPPLER COMPLETE: CPT

## 2022-06-27 PROCEDURE — 93306 TTE W/DOPPLER COMPLETE: CPT | Performed by: INTERNAL MEDICINE

## 2022-06-27 RX ADMIN — SULFUR HEXAFLUORIDE 5 ML: KIT at 14:02

## 2022-06-28 ENCOUNTER — TELEPHONE (OUTPATIENT)
Dept: CARDIOLOGY | Facility: CLINIC | Age: 57
End: 2022-06-28

## 2022-06-28 NOTE — TELEPHONE ENCOUNTER
Per [facility] -   Echo per neurologist improved LVEF to nl   Mechanical Valve fxn at baseline     Pt aware, will call if needed prior to scheduled FU in 1/2023

## 2022-06-29 ENCOUNTER — TELEPHONE (OUTPATIENT)
Dept: PHARMACY | Facility: HOSPITAL | Age: 57
End: 2022-06-29

## 2022-06-29 NOTE — TELEPHONE ENCOUNTER
Called patient re: missed appt today    He states he is unable to give a definitive time to schedule an appt however, he will likely walk in tomorrow.    Advised him we would be happy to see him however he may have to wait and the best time to walk in would be between [id number].    He verbalized understanding.

## 2022-07-18 NOTE — TELEPHONE ENCOUNTER
Called patient back and left VM to call back at his convenience about refilling lasix and what did he find out that caused his hearing loss.    Detail Level: Detailed Depth Of Biopsy: dermis Was A Bandage Applied: Yes Size Of Lesion In Cm: 0.8 X Size Of Lesion In Cm: 0 Biopsy Type: H and E Biopsy Method: Dermablade Anesthesia Type: 1% lidocaine with epinephrine Anesthesia Volume In Cc (Will Not Render If 0): 0.5 Hemostasis: Drysol Wound Care: Petrolatum Dressing: bandage Destruction After The Procedure: No Type Of Destruction Used: Curettage Curettage Text: The wound bed was treated with curettage after the biopsy was performed. Cryotherapy Text: The wound bed was treated with cryotherapy after the biopsy was performed. Electrodesiccation Text: The wound bed was treated with electrodesiccation after the biopsy was performed. Electrodesiccation And Curettage Text: The wound bed was treated with electrodesiccation and curettage after the biopsy was performed. Silver Nitrate Text: The wound bed was treated with silver nitrate after the biopsy was performed. Consent: Written consent was obtained and risks were reviewed including but not limited to scarring, infection, bleeding, scabbing, incomplete removal, nerve damage and allergy to anesthesia. Post-Care Instructions: I reviewed with the patient in detail post-care instructions. Patient is to keep the biopsy site dry overnight, and then apply bacitracin twice daily until healed. Patient may apply hydrogen peroxide soaks to remove any crusting. Notification Instructions: Patient will be notified of biopsy results. However, patient instructed to call the office if not contacted within 2 weeks. Billing Type: Third-Party Bill Information: Selecting Yes will display possible errors in your note based on the variables you have selected. This validation is only offered as a suggestion for you. PLEASE NOTE THAT THE VALIDATION TEXT WILL BE REMOVED WHEN YOU FINALIZE YOUR NOTE. IF YOU WANT TO FAX A PRELIMINARY NOTE YOU WILL NEED TO TOGGLE THIS TO 'NO' IF YOU DO NOT WANT IT IN YOUR FAXED NOTE.

## 2022-07-26 DIAGNOSIS — I10 PRIMARY HYPERTENSION: ICD-10-CM

## 2022-07-26 DIAGNOSIS — I50.20 HEART FAILURE WITH REDUCED EJECTION FRACTION: ICD-10-CM

## 2022-07-26 RX ORDER — ATORVASTATIN CALCIUM 40 MG/1
TABLET, FILM COATED ORAL
Qty: 90 TABLET | OUTPATIENT
Start: 2022-07-26

## 2022-07-26 NOTE — TELEPHONE ENCOUNTER
Called patient re: missed appt/overdue INR    Patient states he will walk in this afternoon ~ 1430

## 2022-07-27 RX ORDER — CARVEDILOL 6.25 MG/1
TABLET ORAL
Qty: 60 TABLET | Refills: 0 | Status: SHIPPED | OUTPATIENT
Start: 2022-07-27 | End: 2022-07-29 | Stop reason: SDUPTHER

## 2022-07-27 NOTE — TELEPHONE ENCOUNTER
Rx Refill Note  Requested Prescriptions     Pending Prescriptions Disp Refills   • carvedilol (COREG) 6.25 MG tablet [Pharmacy Med Name: CARVEDILOL 6.25 MG TABLET] 60 tablet 0     Sig: TAKE ONE TABLET BY MOUTH TWICE A DAY WITH MEALS      Last office visit with prescribing clinician: 6/21/2022      Next office visit with prescribing clinician: 7/29/2022            Jessica Grier MA  07/27/22, 09:40 EDT

## 2022-07-28 ENCOUNTER — ANTICOAGULATION VISIT (OUTPATIENT)
Dept: PHARMACY | Facility: HOSPITAL | Age: 57
End: 2022-07-28

## 2022-07-28 DIAGNOSIS — Z95.2 S/P AVR (AORTIC VALVE REPLACEMENT): ICD-10-CM

## 2022-07-28 DIAGNOSIS — I48.91 ATRIAL FIBRILLATION, UNSPECIFIED TYPE: Primary | ICD-10-CM

## 2022-07-28 LAB
INR PPP: 1.7 (ref 0.91–1.09)
PROTHROMBIN TIME: 20.2 SECONDS (ref 10–13.8)

## 2022-07-28 PROCEDURE — 36416 COLLJ CAPILLARY BLOOD SPEC: CPT

## 2022-07-28 PROCEDURE — 85610 PROTHROMBIN TIME: CPT

## 2022-07-28 NOTE — PROGRESS NOTES
Anticoagulation Clinic - Remote Progress Note  [facility]  Testing frequency: 7 days    Indication: Atrial Fibrillation and Mechanical Aortic Valve; h/o embolic CVA  Referring Provider: Bunny last seen 3/12/21; Next appointment: 6/25/21;  Initial Warfarin Start Date: 11/2012  Planned Duration of Therapy: Life  Goal INR: 2.5-3.5  Current Drug Interactions: escitalopram, aspirin, amiodarone, pantoprazole  CHADS-VASc: 3 (HTN, h/o CVA)     Vit K Diet: patient eats, goes through spurts when he tries to eat healthier 3/3/2021  Alcohol: none  Tobacco: none       Anticoagulation Clinic INR History:      Date 10/2 10/10 11/4 11/12 11/22 12/10 1/3/20 1/22 1/30 2/3 2/18 2/27   Total Weekly Dose 80mg 80mg 62.5mg 85mg 82.5mg 70mg 72.5mg 72.5mg 82.5mg 77.5mg 75mg 75mg   INR  3.9 3.6 1.7 3.4 4.7 2.4 3.6 1.3 2.3 3.2 3.1 3.2   Notes   1x miss self adj  self adj  lovenox         Date 4/8 4/29 5/18 6/20  8/3 8/17 10/5 10/19 11/11 12/21    Total Weekly Dose 70mg 70mg 65mg 65mg Non- compliant 65mg 65 mg 65mg 65mg 65mg 65mg Non- compliant   INR 4.3 4.1 4.5 3.0  3.0 3.8 3.0 2.9 4.1 2.3    Notes Self adj; Levaquin decr GLV; COVID+  recv'd 6/22; misdose       incr GLV      Date 1/20/21 1/21 1/25 2/1 2/16 3/3 3/22 4/13  4/26 5/14 5/21   Total WeeklyDose 40mg 45mg? 62.5mg 80mg? 80mg   70mg 67.5mg  47.5mg  67.5 mg   INR 1.2 1.2 maia 1.6 2.9 3.7  4.5 3.2 3.3  1.8 4.5 3.1   Notes Self held; augmentin 1x incr dose; lovenox 2x miss,   1x misdose  amox amox  self adj 5x hold, enox enox/norco, augmentin       Date 6/28  8/4 8/6 8/9 8/20 9/3  11/9 11/16 11/30 12/2   Total WeeklyDose 67.5 mg BH Barrett   6/30-7/29 [facility] 32.5 mg 47.5mg 51.25 mg 52.5mg 52.5mg? Non compliant 35mg? 70mg? 52.5mg BHL- left AMA   INR 3.9  2.2 3.0 2.9 4.8 2.6  2.0 3.9 4.3    Notes  amio init enox enox enox    Rec 11/16   GI bleed       Date 12/4 12/6 1/25 2/10  2/24 3/25 4/5 6/22 7/28     Total WeeklyDose BHL- left AMA 7.5mg ?? 52.5mg ????  52.5 mg 52.5 mg 53.75mg 45  mg     INR  1.1 1.7 5.6  1.8 2.0 1.8 2.4 1.7     Notes AMS/GI Bleed  rec 1/26 [facility] COVID + suboxone  1x miss enox  Clinic, 1x misdose 1x miss       Phone Interview:  Verbal Release Authorization signed on 11/7/18 -- Patient's Wife has Passed Away   Tablet Strength:   7.5 mg  Patient Contact: [phone] -- try to call in PM or  His email is [email or web address]    Patient Findings    Positives:  Missed doses   Negatives:  Signs/symptoms of thrombosis, Signs/symptoms of bleeding, Laboratory test error suspected, Change in health, Change in alcohol use, Change in activity, Upcoming invasive procedure, Emergency department visit, Upcoming dental procedure, Extra doses, Change in medications, Change in diet/appetite, Hospital admission, Bruising, Other complaints   Comments:  Mr Alberto missed his dose on Tuesday, other than denies any other changes in diet or medications.  He states that he is starting the process to become eligible for weight loss surgery, nothing is planned at this time.  He will let us know when this is scheduled.  Of note he states Lovenox is expensive even after insurance pays (per patient copay ~$200-300).  He currently has one box (150mg) at home already     Verified weight at 161 kg and Scr 1.24 on 6/22/22 estimated crcl ~45ml/min.  Will take 150 mg BID starting today and ending on Saturday.    He also says he is working to getting back enrolled in [employer] home monitor so he can check at home, he states it could be 10-14 days         Plan:   1. INR is SUBtherapeutic at 1.7.  Instructed Mr. Alberto to boost today anf Fridays dose to 11.25 mg, then resume warfarin 7.5mg daily until recheck.   2. Warfarin denies refill   3. Patient was noncompliant with followup (supposed to recheck with home monitor on 4/8). Reviewed medications with patient to make sure they are up to date.   4. RTC on 8/1 to ensure WNL  5.  Verbal and written  information provided in the clinic.  Mitchell Alberto RBV dosing  instructions, expresses understanding by teach back, and has no further questions at this time.    Hannah Choudhury, PharmD, BCPS   7/28/2022  13:51 EDT

## 2022-07-29 ENCOUNTER — OFFICE VISIT (OUTPATIENT)
Dept: FAMILY MEDICINE CLINIC | Facility: CLINIC | Age: 57
End: 2022-07-29

## 2022-07-29 ENCOUNTER — LAB (OUTPATIENT)
Dept: LAB | Facility: HOSPITAL | Age: 57
End: 2022-07-29

## 2022-07-29 VITALS
WEIGHT: 315 LBS | HEART RATE: 80 BPM | OXYGEN SATURATION: 98 % | BODY MASS INDEX: 37.19 KG/M2 | SYSTOLIC BLOOD PRESSURE: 150 MMHG | DIASTOLIC BLOOD PRESSURE: 108 MMHG | HEIGHT: 77 IN

## 2022-07-29 DIAGNOSIS — R73.09 ABNORMAL GLUCOSE: ICD-10-CM

## 2022-07-29 DIAGNOSIS — Z00.00 ENCOUNTER FOR SCREENING AND PREVENTATIVE CARE: ICD-10-CM

## 2022-07-29 DIAGNOSIS — Z12.11 ENCOUNTER FOR SCREENING COLONOSCOPY: ICD-10-CM

## 2022-07-29 DIAGNOSIS — I10 PRIMARY HYPERTENSION: Primary | ICD-10-CM

## 2022-07-29 DIAGNOSIS — E66.01 CLASS 3 SEVERE OBESITY WITH SERIOUS COMORBIDITY AND BODY MASS INDEX (BMI) OF 40.0 TO 44.9 IN ADULT, UNSPECIFIED OBESITY TYPE: ICD-10-CM

## 2022-07-29 DIAGNOSIS — Z12.5 PROSTATE CANCER SCREENING: ICD-10-CM

## 2022-07-29 DIAGNOSIS — R97.20 ELEVATED PSA: Primary | ICD-10-CM

## 2022-07-29 DIAGNOSIS — I50.20 HEART FAILURE WITH REDUCED EJECTION FRACTION: ICD-10-CM

## 2022-07-29 DIAGNOSIS — D50.9 IRON DEFICIENCY ANEMIA, UNSPECIFIED IRON DEFICIENCY ANEMIA TYPE: ICD-10-CM

## 2022-07-29 DIAGNOSIS — F32.A DEPRESSION, UNSPECIFIED DEPRESSION TYPE: ICD-10-CM

## 2022-07-29 LAB
BASOPHILS # BLD AUTO: 0.04 10*3/MM3 (ref 0–0.2)
BASOPHILS NFR BLD AUTO: 0.9 % (ref 0–1.5)
CHOLEST SERPL-MCNC: 122 MG/DL (ref 0–200)
DEPRECATED RDW RBC AUTO: 41.6 FL (ref 37–54)
EOSINOPHIL # BLD AUTO: 0.24 10*3/MM3 (ref 0–0.4)
EOSINOPHIL NFR BLD AUTO: 5.6 % (ref 0.3–6.2)
ERYTHROCYTE [DISTWIDTH] IN BLOOD BY AUTOMATED COUNT: 13.9 % (ref 12.3–15.4)
FERRITIN SERPL-MCNC: 41.9 NG/ML (ref 30–400)
HBA1C MFR BLD: 5.4 % (ref 4.8–5.6)
HCT VFR BLD AUTO: 37.4 % (ref 37.5–51)
HDLC SERPL QL: 1.94
HDLC SERPL-MCNC: 63 MG/DL (ref 40–60)
HGB BLD-MCNC: 12.3 G/DL (ref 13–17.7)
IMM GRANULOCYTES # BLD AUTO: 0.02 10*3/MM3 (ref 0–0.05)
IMM GRANULOCYTES NFR BLD AUTO: 0.5 % (ref 0–0.5)
IRON 24H UR-MRATE: 47 MCG/DL (ref 59–158)
IRON SATN MFR SERPL: 10 % (ref 20–50)
LDLC SERPL CALC-MCNC: 49 MG/DL (ref 0–100)
LYMPHOCYTES # BLD AUTO: 1.04 10*3/MM3 (ref 0.7–3.1)
LYMPHOCYTES NFR BLD AUTO: 24.4 % (ref 19.6–45.3)
MCH RBC QN AUTO: 28 PG (ref 26.6–33)
MCHC RBC AUTO-ENTMCNC: 32.9 G/DL (ref 31.5–35.7)
MCV RBC AUTO: 85 FL (ref 79–97)
MONOCYTES # BLD AUTO: 0.45 10*3/MM3 (ref 0.1–0.9)
MONOCYTES NFR BLD AUTO: 10.6 % (ref 5–12)
NEUTROPHILS NFR BLD AUTO: 2.47 10*3/MM3 (ref 1.7–7)
NEUTROPHILS NFR BLD AUTO: 58 % (ref 42.7–76)
NRBC BLD AUTO-RTO: 0 /100 WBC (ref 0–0.2)
PLATELET # BLD AUTO: 153 10*3/MM3 (ref 140–450)
PMV BLD AUTO: 10.9 FL (ref 6–12)
PSA SERPL-MCNC: 9.76 NG/ML (ref 0–4)
RBC # BLD AUTO: 4.4 10*6/MM3 (ref 4.14–5.8)
TIBC SERPL-MCNC: 454 MCG/DL (ref 298–536)
TRANSFERRIN SERPL-MCNC: 305 MG/DL (ref 200–360)
TRIGL SERPL-MCNC: 40 MG/DL (ref 0–150)
VLDLC SERPL-MCNC: 10 MG/DL (ref 5–40)
WBC NRBC COR # BLD: 4.26 10*3/MM3 (ref 3.4–10.8)

## 2022-07-29 PROCEDURE — 80061 LIPID PANEL: CPT | Performed by: STUDENT IN AN ORGANIZED HEALTH CARE EDUCATION/TRAINING PROGRAM

## 2022-07-29 PROCEDURE — G0103 PSA SCREENING: HCPCS | Performed by: STUDENT IN AN ORGANIZED HEALTH CARE EDUCATION/TRAINING PROGRAM

## 2022-07-29 PROCEDURE — 83036 HEMOGLOBIN GLYCOSYLATED A1C: CPT | Performed by: STUDENT IN AN ORGANIZED HEALTH CARE EDUCATION/TRAINING PROGRAM

## 2022-07-29 PROCEDURE — 83540 ASSAY OF IRON: CPT | Performed by: STUDENT IN AN ORGANIZED HEALTH CARE EDUCATION/TRAINING PROGRAM

## 2022-07-29 PROCEDURE — 82728 ASSAY OF FERRITIN: CPT | Performed by: STUDENT IN AN ORGANIZED HEALTH CARE EDUCATION/TRAINING PROGRAM

## 2022-07-29 PROCEDURE — 99214 OFFICE O/P EST MOD 30 MIN: CPT | Performed by: STUDENT IN AN ORGANIZED HEALTH CARE EDUCATION/TRAINING PROGRAM

## 2022-07-29 PROCEDURE — 85025 COMPLETE CBC W/AUTO DIFF WBC: CPT | Performed by: STUDENT IN AN ORGANIZED HEALTH CARE EDUCATION/TRAINING PROGRAM

## 2022-07-29 PROCEDURE — 84466 ASSAY OF TRANSFERRIN: CPT | Performed by: STUDENT IN AN ORGANIZED HEALTH CARE EDUCATION/TRAINING PROGRAM

## 2022-07-29 RX ORDER — DOXYCYCLINE HYCLATE 50 MG/1
324 CAPSULE, GELATIN COATED ORAL EVERY OTHER DAY
Qty: 30 TABLET | Refills: 2 | Status: SHIPPED | OUTPATIENT
Start: 2022-07-29 | End: 2023-03-31 | Stop reason: SDUPTHER

## 2022-07-29 RX ORDER — CARVEDILOL 12.5 MG/1
12.5 TABLET ORAL 2 TIMES DAILY WITH MEALS
Qty: 30 TABLET | Refills: 5 | Status: SHIPPED | OUTPATIENT
Start: 2022-07-29 | End: 2022-11-07

## 2022-07-29 RX ORDER — CARVEDILOL 6.25 MG/1
6.25 TABLET ORAL 2 TIMES DAILY WITH MEALS
Qty: 60 TABLET | Refills: 0 | Status: CANCELLED | OUTPATIENT
Start: 2022-07-29

## 2022-07-29 RX ORDER — BUPROPION HYDROCHLORIDE 300 MG/1
300 TABLET ORAL DAILY
Qty: 30 TABLET | Refills: 5 | Status: SHIPPED | OUTPATIENT
Start: 2022-07-29 | End: 2023-02-03

## 2022-07-29 NOTE — PROGRESS NOTES
Established Office Visit      Patient Name: Mitchell Alberto  : 1965   MRN: [id number]   Care Team: Patient Care Team:  Latoya Soriano DO as PCP - General (Internal Medicine)    Chief Complaint:    Chief Complaint   Patient presents with   • Hypertension     6 WK F/U   • Cardiomyopathy     6 WK F/U   • Angioedema     6 WK F/U       History of Present Illness: Mitchell Alberto is a 57 y.o. male who is here today to follow up with HTN, obesity, depression, anxiety.     He experienced angioedema with lisinopril.. this was discontinued about 4 weeks ago and he was started on coreg in place of this. His BP remains very elevated. He does not check his BP at home but feels it runs higher while at the doctors office.     Obesity - referred to bariatrics at last visit per patient request, interested in surgical intervention. He has not returned new patient paperwork yet but is motivated to. He is discouraged by his lack of weight loss with lifestyle modifications.    Depression, anxiety - wellbutrin 150mg daily was restarted about 2 months ago. He has noticed some improvement but feels he is still struggling with but anxiety and depressed mood. Denies SI.       Subjective      Review of Systems:   Review of Systems - See HPI    I have reviewed and the following portions of the patient's history were updated as appropriate: past family history, past medical history, past social history, past surgical history and problem list.    Medications:     Current Outpatient Medications:   •  acetaminophen (TYLENOL) 500 MG tablet, Take 500 mg by mouth Every 6 (Six) Hours As Needed for Mild Pain ., Disp: , Rfl:   •  amiodarone (PACERONE) 200 MG tablet, Take 1 tablet by mouth Daily., Disp: 90 tablet, Rfl: 1  •  bumetanide (BUMEX) 1 MG tablet, Take 2 tablets in am and 1 tablet in the afternoon, Disp: 90 tablet, Rfl: 5  •  buprenorphine-naloxone (SUBOXONE) 8-2 MG per SL tablet, Place 2 tablets under the tongue Daily., Disp: , Rfl:  "  •  buPROPion XL (WELLBUTRIN XL) 300 MG 24 hr tablet, Take 1 tablet by mouth Daily., Disp: 30 tablet, Rfl: 5  •  carvedilol (COREG) 12.5 MG tablet, Take 1 tablet by mouth 2 (Two) Times a Day With Meals., Disp: 30 tablet, Rfl: 5  •  tadalafil (ADCIRCA) 20 MG tablet tablet, Take 5 mg by mouth Daily., Disp: , Rfl:   •  warfarin (COUMADIN) 7.5 MG tablet, TAKE ONE TABLET BY MOUTH DAILY OR AS DIRECTED BY ANTICOAGULATION CLINIC. WILL NEED TO MAKE AN APPOINTMENT WITH DOCTOR FOR MORE REFILLS, Disp: 30 tablet, Rfl: 0  •  atorvastatin (LIPITOR) 40 MG tablet, Take 1 tablet by mouth Every Night., Disp: , Rfl:   •  ferrous gluconate (FERGON) 324 MG tablet, Take 1 tablet by mouth Every Other Day., Disp: 30 tablet, Rfl: 2    Allergies:   Allergies   Allergen Reactions   • Lisinopril Angioedema   • Meclizine Hcl Itching       Objective     Physical Exam:  Vital Signs:   Vitals:    07/29/22 0818   BP: (!) 150/108   Pulse: 80   SpO2: 98%   Weight: (!) 164 kg (361 lb)   Height: 195.6 cm (77\")     Body mass index is 42.81 kg/m².     Physical Exam  Vitals reviewed.   Constitutional:       Appearance: Normal appearance. He is obese.   Cardiovascular:      Rate and Rhythm: Normal rate and regular rhythm.      Pulses: Normal pulses.      Heart sounds: Murmur heard.      Comments: +1 pitting edema of bilateral LE  Pulmonary:      Effort: Pulmonary effort is normal. No respiratory distress.      Breath sounds: Normal breath sounds. No wheezing or rhonchi.   Skin:     General: Skin is warm and dry.   Neurological:      Mental Status: He is alert.   Psychiatric:         Mood and Affect: Mood normal.         Behavior: Behavior normal.         Judgment: Judgment normal.         Assessment / Plan      Assessment/Plan:   Problems Addressed This Visit  Diagnoses and all orders for this visit:    1. Primary hypertension (Primary)  -     carvedilol (COREG) 12.5 MG tablet; Take 1 tablet by mouth 2 (Two) Times a Day With Meals.  Dispense: 30 tablet; " Refill: 5    Uncontrolled, will increase Coreg to 12.5mg BID. Continue Bumex. Encouraged him to purchase home BP monitor and notify me if readings are persistently above goal <130/80.     2. Heart failure with reduced ejection fraction (HCC)  -     carvedilol (COREG) 12.5 MG tablet; Take 1 tablet by mouth 2 (Two) Times a Day With Meals.  Dispense: 30 tablet; Refill: 5  Aortic valve dissection s/p mechanical AVR 2012, CVA 2012, HFrEF, HTN, AF on warfarin, and VF cardiac arrest 6/2021 s/p BiV ICD) - Managed per cardiology. Continue BB, statin, bumex, amiodarone warfarin. Low sodium diet.     3. Depression, unspecified depression type  -     buPROPion XL (WELLBUTRIN XL) 300 MG 24 hr tablet; Take 1 tablet by mouth Daily.  Dispense: 30 tablet; Refill: 5  Improved but remains uncontrolled, will increase Wellbutrin to 300mg daily.     4. Iron deficiency anemia, unspecified iron deficiency anemia type  -     Iron Profile; Future  -     Ferritin; Future  -     CBC w AUTO Differential; Future  -     Iron Profile  -     Ferritin  -     CBC w AUTO Differential    5. Encounter for screening and preventative care  -     Lipid Panel w/ Chol/HDL Ratio; Future  -     Hemoglobin A1c; Future  -     Lipid Panel w/ Chol/HDL Ratio  -     Hemoglobin A1c    Discussed importance of preventative care including vaccinations, age appropriate cancer screening, routine lab work, healthy diet, and active lifestyle.  Overdue for routine labs    6. Class 3 severe obesity with serious comorbidity and body mass index (BMI) of 40.0 to 44.9 in adult, unspecified obesity type (HCC)  -     Lipid Panel w/ Chol/HDL Ratio; Future  -     Hemoglobin A1c; Future  -     Lipid Panel w/ Chol/HDL Ratio  -     Hemoglobin A1c  Labs today. Continue statin.   Counseled patient on importance of weight loss and maintaining healthy lifestyle. Recommended calorie deficit and 150 minutes of exercise per week.  Planning to establish with bariatrics - has new patient  paperwork and will prioritize filling this out    7. Prostate cancer screening  -     PSA SCREENING; Future  -     PSA SCREENING    8. Encounter for screening colonoscopy  -     Ambulatory Referral For Screening Colonoscopy  Overdue. Last colonoscopy 2019 with multiple polyps - recommended one year follow up.     9. Abnormal glucose  -     Hemoglobin A1c; Future  -     Hemoglobin A1c          Plan of care reviewed with patient at the conclusion of today's visit. Education was provided regarding diagnosis and management.  Patient verbalizes understanding of and agreement with management plan.    Follow Up: 6 weeks for HTN, obesity         DO SUNIL Antonio RD  [facility]  [address]  [facility] [address]  Fax [phone]  Phone [phone]

## 2022-08-01 ENCOUNTER — TELEPHONE (OUTPATIENT)
Dept: FAMILY MEDICINE CLINIC | Facility: CLINIC | Age: 57
End: 2022-08-01

## 2022-08-01 RX ORDER — ATORVASTATIN CALCIUM 40 MG/1
TABLET, FILM COATED ORAL
Qty: 90 TABLET | OUTPATIENT
Start: 2022-08-01

## 2022-08-01 NOTE — TELEPHONE ENCOUNTER
Unable to contact, no answer. Letter has been sent.     His PSA is slightly elevated and has been elevated in the past as well. I recommend referral to Urology. This is usually caused by prostate inflammation and may be benign but it is important we discuss ruling out prostate cancer. His iron is a little low. I recommend starting an iron supplement once every other day and I have sent this in. Cholesterol looks great. Diabetes screening is negative.   Hub can relay and document.

## 2022-08-01 NOTE — TELEPHONE ENCOUNTER
Attempted to contact, no answer left message asking for return call    Please call patient with results. His PSA is slightly elevated and has been elevated in the past as well. I recommend referral to Urology. This is usually caused by prostate inflammation and may be benign but it is important we discuss ruling out prostate cancer. His iron is a little low. I recommend starting an iron supplement once every other day and I have sent this in. Cholesterol looks great. Diabetes screening is negative.      Attempted to call patient, no answer. LVM with office # given.      Hub may relay message and document.

## 2022-08-01 NOTE — TELEPHONE ENCOUNTER
----- Message from Latoya Soriano DO sent at 7/29/2022  4:19 PM EDT -----  Please call patient with results. His PSA is slightly elevated and has been elevated in the past as well. I recommend referral to Urology. This is usually caused by prostate inflammation and may be benign but it is important we discuss ruling out prostate cancer. His iron is a little low. I recommend starting an iron supplement once every other day and I have sent this in. Cholesterol looks great. Diabetes screening is negative.     Attempted to call patient, no answer. LVM with office # given.     Hub may relay message and document.

## 2022-08-08 ENCOUNTER — TELEPHONE (OUTPATIENT)
Dept: CARDIOLOGY | Facility: CLINIC | Age: 57
End: 2022-08-08

## 2022-08-09 ENCOUNTER — ANTICOAGULATION VISIT (OUTPATIENT)
Dept: PHARMACY | Facility: HOSPITAL | Age: 57
End: 2022-08-09

## 2022-08-09 ENCOUNTER — TELEPHONE (OUTPATIENT)
Dept: CARDIOLOGY | Facility: CLINIC | Age: 57
End: 2022-08-09

## 2022-08-09 DIAGNOSIS — Z95.2 S/P AVR (AORTIC VALVE REPLACEMENT): Primary | ICD-10-CM

## 2022-08-09 DIAGNOSIS — I48.91 ATRIAL FIBRILLATION, UNSPECIFIED TYPE: ICD-10-CM

## 2022-08-09 LAB
INR PPP: 2.7 (ref 0.91–1.09)
PROTHROMBIN TIME: 32.2 SECONDS (ref 10–13.8)

## 2022-08-09 PROCEDURE — 85610 PROTHROMBIN TIME: CPT

## 2022-08-09 PROCEDURE — 36416 COLLJ CAPILLARY BLOOD SPEC: CPT

## 2022-08-09 NOTE — TELEPHONE ENCOUNTER
Pt walked into  requesting FU appt. RN received epic message from  staff.     Pt called and he reports he remains fatigued, SOA, dizzy, and unexplained weight loss. Last echo (6/2022) revealed normal EF, proBNP normal. He has not established remote monitoring on BiV ICD (PETRONAM).    Pt was given contact info for [facility] - [phone]. Would be helpful to establish rate and rhythm information. Will work in to see NELA as well.

## 2022-08-09 NOTE — PROGRESS NOTES
Anticoagulation Clinic - Remote Progress Note  [facility]  Testing frequency: 7 days    Indication: Atrial Fibrillation and Mechanical Aortic Valve; h/o embolic CVA  Referring Provider: Bunny last seen 3/12/21; Next appointment: 6/25/21;  Initial Warfarin Start Date: 11/2012  Planned Duration of Therapy: Life  Goal INR: 2.5-3.5  Current Drug Interactions: escitalopram, aspirin, amiodarone, pantoprazole  CHADS-VASc: 3 (HTN, h/o CVA)     Vit K Diet: patient eats, goes through spurts when he tries to eat healthier 3/3/2021  Alcohol: none  Tobacco: none       Anticoagulation Clinic INR History:      Date 10/2 10/10 11/4 11/12 11/22 12/10 1/3/20 1/22 1/30 2/3 2/18 2/27   Total Weekly Dose 80mg 80mg 62.5mg 85mg 82.5mg 70mg 72.5mg 72.5mg 82.5mg 77.5mg 75mg 75mg   INR  3.9 3.6 1.7 3.4 4.7 2.4 3.6 1.3 2.3 3.2 3.1 3.2   Notes   1x miss self adj  self adj  lovenox         Date 4/8 4/29 5/18 6/20  8/3 8/17 10/5 10/19 11/11 12/21    Total Weekly Dose 70mg 70mg 65mg 65mg Non- compliant 65mg 65 mg 65mg 65mg 65mg 65mg Non- compliant   INR 4.3 4.1 4.5 3.0  3.0 3.8 3.0 2.9 4.1 2.3    Notes Self adj; Levaquin decr GLV; COVID+  recv'd 6/22; misdose       incr GLV      Date 1/20/21 1/21 1/25 2/1 2/16 3/3 3/22 4/13  4/26 5/14 5/21   Total WeeklyDose 40mg 45mg? 62.5mg 80mg? 80mg   70mg 67.5mg  47.5mg  67.5 mg   INR 1.2 1.2 maia 1.6 2.9 3.7  4.5 3.2 3.3  1.8 4.5 3.1   Notes Self held; augmentin 1x incr dose; lovenox 2x miss,   1x misdose  amox amox  self adj 5x hold, enox enox/norco, augmentin       Date 6/28  8/4 8/6 8/9 8/20 9/3  11/9 11/16 11/30 12/2   Total WeeklyDose 67.5 mg BH Barrett   6/30-7/29 [facility] 32.5 mg 47.5mg 51.25 mg 52.5mg 52.5mg? Non compliant 35mg? 70mg? 52.5mg BHL- left AMA   INR 3.9  2.2 3.0 2.9 4.8 2.6  2.0 3.9 4.3    Notes  amio init enox enox enox    Rec 11/16   GI bleed       Date 12/4 12/6 1/25 2/10  2/24 3/25 4/5 6/22 7/28     Total WeeklyDose BHL- left AMA 7.5mg ?? 52.5mg ????  52.5 mg 52.5 mg 53.75mg 45  "mg     INR  1.1 1.7 5.6  1.8 2.0 1.8 2.4 1.7     Notes AMS/GI Bleed  rec 1/26 [facility] COVID + suboxone  1x miss enox  Clinic, 1x misdose 1x miss       Phone Interview:  Verbal Release Authorization signed on 11/7/18 -- Patient's Wife has Passed Away   Tablet Strength:   7.5 mg  Patient Contact: [phone] -- try to call in PM or  His email is [email or web address]    Patient Findings    Positives:  Signs/symptoms of bleeding   Negatives:  Signs/symptoms of thrombosis, Laboratory test error suspected, Change in health, Change in alcohol use, Change in activity, Upcoming invasive procedure, Emergency department visit, Upcoming dental procedure, Missed doses, Extra doses, Change in medications, Change in diet/appetite, Hospital admission, Bruising, Other complaints   Comments:  Mr Alberto arrived today and states he threw up last night and noticed \" a lot of blood\" he also states he has been taking 3 ibuprofen daily recently.  He denies blood in urine or stool.  Highly recommended patient seek medical attention today due to blood in vomit.  He states he would if \"it got worse\".  He also endorses feeing dizzy and fatigued.  Stated if he was uncomfortable going to ER to see PCP today or tomorrow, he asked \"what could they do beside order labs?\" And said his recent labs on 7/29 were fine.  Counseled that since he was not having hematemesis at that time his labs could be different now.  Again highly encouraged him to seek medical attention.    Of note he is still working on [employer] home monitor        Plan:   1. INR is therapeutic at 2.7.  Instructed Mr. Alberto to continue warfarin 7.5mg daily until recheck.   2. Warfarin denies refill   3. Patient was noncompliant with followup (supposed to recheck with home monitor on 4/8). Reviewed medications with patient to make sure they are up to date.   4. RTC in 2 weeks to ensure WNL  5.  Verbal and written  information provided in the clinic.  Mitchell Vazquezner RBV dosing " instructions, expresses understanding by teach back, and has no further questions at this time.    Hannah Choudhury, PharmD, BCPS   8/9/2022  11:39 EDT

## 2022-08-10 ENCOUNTER — HOSPITAL ENCOUNTER (EMERGENCY)
Facility: HOSPITAL | Age: 57
Discharge: LEFT BEFORE DISCHARGE | End: 2022-08-10
Attending: STUDENT IN AN ORGANIZED HEALTH CARE EDUCATION/TRAINING PROGRAM | Admitting: STUDENT IN AN ORGANIZED HEALTH CARE EDUCATION/TRAINING PROGRAM

## 2022-08-10 ENCOUNTER — APPOINTMENT (OUTPATIENT)
Dept: GENERAL RADIOLOGY | Facility: HOSPITAL | Age: 57
End: 2022-08-10

## 2022-08-10 ENCOUNTER — APPOINTMENT (OUTPATIENT)
Dept: CT IMAGING | Facility: HOSPITAL | Age: 57
End: 2022-08-10

## 2022-08-10 ENCOUNTER — APPOINTMENT (OUTPATIENT)
Dept: PHARMACY | Facility: HOSPITAL | Age: 57
End: 2022-08-10

## 2022-08-10 VITALS
HEIGHT: 77 IN | HEART RATE: 80 BPM | BODY MASS INDEX: 37.19 KG/M2 | WEIGHT: 315 LBS | OXYGEN SATURATION: 94 % | RESPIRATION RATE: 18 BRPM | SYSTOLIC BLOOD PRESSURE: 145 MMHG | DIASTOLIC BLOOD PRESSURE: 84 MMHG | TEMPERATURE: 98.5 F

## 2022-08-10 DIAGNOSIS — I38 VALVULAR HEART DISEASE: ICD-10-CM

## 2022-08-10 DIAGNOSIS — R07.9 CHEST PAIN, UNSPECIFIED TYPE: ICD-10-CM

## 2022-08-10 DIAGNOSIS — Z86.73 HISTORY OF CVA (CEREBROVASCULAR ACCIDENT): ICD-10-CM

## 2022-08-10 DIAGNOSIS — Z79.01 LONG TERM (CURRENT) USE OF ANTICOAGULANTS: ICD-10-CM

## 2022-08-10 DIAGNOSIS — Z95.2 MECHANICAL HEART VALVE PRESENT: ICD-10-CM

## 2022-08-10 DIAGNOSIS — Z86.79 HISTORY OF VENTRICULAR FIBRILLATION: ICD-10-CM

## 2022-08-10 DIAGNOSIS — I42.0 DILATED CARDIOMYOPATHY: ICD-10-CM

## 2022-08-10 DIAGNOSIS — I71.21 ASCENDING AORTIC ANEURYSM: ICD-10-CM

## 2022-08-10 DIAGNOSIS — N17.9 AKI (ACUTE KIDNEY INJURY): Primary | ICD-10-CM

## 2022-08-10 DIAGNOSIS — Z95.2 S/P AVR (AORTIC VALVE REPLACEMENT): ICD-10-CM

## 2022-08-10 LAB
ALBUMIN SERPL-MCNC: 4.8 G/DL (ref 3.5–5.2)
ALBUMIN/GLOB SERPL: 1.9 G/DL
ALP SERPL-CCNC: 98 U/L (ref 39–117)
ALT SERPL W P-5'-P-CCNC: 16 U/L (ref 1–41)
ANION GAP SERPL CALCULATED.3IONS-SCNC: 11 MMOL/L (ref 5–15)
APTT PPP: 45.1 SECONDS (ref 60–90)
AST SERPL-CCNC: 19 U/L (ref 1–40)
BASOPHILS # BLD AUTO: 0.02 10*3/MM3 (ref 0–0.2)
BASOPHILS NFR BLD AUTO: 0.3 % (ref 0–1.5)
BILIRUB SERPL-MCNC: 0.4 MG/DL (ref 0–1.2)
BILIRUB UR QL STRIP: NEGATIVE
BUN SERPL-MCNC: 30 MG/DL (ref 6–20)
BUN/CREAT SERPL: 17.6 (ref 7–25)
CALCIUM SPEC-SCNC: 9.7 MG/DL (ref 8.6–10.5)
CHLORIDE SERPL-SCNC: 100 MMOL/L (ref 98–107)
CLARITY UR: CLEAR
CO2 SERPL-SCNC: 29 MMOL/L (ref 22–29)
COLOR UR: YELLOW
CREAT SERPL-MCNC: 1.7 MG/DL (ref 0.76–1.27)
D-LACTATE SERPL-SCNC: 1.3 MMOL/L (ref 0.5–2)
DEPRECATED RDW RBC AUTO: 46 FL (ref 37–54)
EGFRCR SERPLBLD CKD-EPI 2021: 46.4 ML/MIN/1.73
EOSINOPHIL # BLD AUTO: 0.09 10*3/MM3 (ref 0–0.4)
EOSINOPHIL NFR BLD AUTO: 1.2 % (ref 0.3–6.2)
ERYTHROCYTE [DISTWIDTH] IN BLOOD BY AUTOMATED COUNT: 14.6 % (ref 12.3–15.4)
FLUAV RNA RESP QL NAA+PROBE: NOT DETECTED
FLUBV RNA RESP QL NAA+PROBE: NOT DETECTED
GLOBULIN UR ELPH-MCNC: 2.5 GM/DL
GLUCOSE SERPL-MCNC: 112 MG/DL (ref 65–99)
GLUCOSE UR STRIP-MCNC: NEGATIVE MG/DL
HCT VFR BLD AUTO: 40.8 % (ref 37.5–51)
HGB BLD-MCNC: 13.1 G/DL (ref 13–17.7)
HGB UR QL STRIP.AUTO: NEGATIVE
HOLD SPECIMEN: NORMAL
IMM GRANULOCYTES # BLD AUTO: 0.02 10*3/MM3 (ref 0–0.05)
IMM GRANULOCYTES NFR BLD AUTO: 0.3 % (ref 0–0.5)
INR PPP: 2.59 (ref 0.84–1.13)
INR PPP: 3 (ref 0.8–1.2)
KETONES UR QL STRIP: NEGATIVE
LEUKOCYTE ESTERASE UR QL STRIP.AUTO: NEGATIVE
LIPASE SERPL-CCNC: 12 U/L (ref 13–60)
LYMPHOCYTES # BLD AUTO: 1.38 10*3/MM3 (ref 0.7–3.1)
LYMPHOCYTES NFR BLD AUTO: 18.9 % (ref 19.6–45.3)
MAGNESIUM SERPL-MCNC: 2.1 MG/DL (ref 1.6–2.6)
MCH RBC QN AUTO: 27.9 PG (ref 26.6–33)
MCHC RBC AUTO-ENTMCNC: 32.1 G/DL (ref 31.5–35.7)
MCV RBC AUTO: 86.8 FL (ref 79–97)
MONOCYTES # BLD AUTO: 0.63 10*3/MM3 (ref 0.1–0.9)
MONOCYTES NFR BLD AUTO: 8.6 % (ref 5–12)
NEUTROPHILS NFR BLD AUTO: 5.16 10*3/MM3 (ref 1.7–7)
NEUTROPHILS NFR BLD AUTO: 70.7 % (ref 42.7–76)
NITRITE UR QL STRIP: NEGATIVE
NRBC BLD AUTO-RTO: 0 /100 WBC (ref 0–0.2)
NT-PROBNP SERPL-MCNC: 544.2 PG/ML (ref 0–900)
PH UR STRIP.AUTO: 5.5 [PH] (ref 5–8)
PHOSPHATE SERPL-MCNC: 3.6 MG/DL (ref 2.5–4.5)
PLATELET # BLD AUTO: 230 10*3/MM3 (ref 140–450)
PMV BLD AUTO: 11.4 FL (ref 6–12)
POTASSIUM SERPL-SCNC: 3.8 MMOL/L (ref 3.5–5.2)
PROT SERPL-MCNC: 7.3 G/DL (ref 6–8.5)
PROT UR QL STRIP: ABNORMAL
PROTHROMBIN TIME: 27.9 SECONDS (ref 11.4–14.4)
PROTHROMBIN TIME: 34.7 SECONDS (ref 12.8–15.2)
RBC # BLD AUTO: 4.7 10*6/MM3 (ref 4.14–5.8)
RSV RNA NPH QL NAA+NON-PROBE: NOT DETECTED
SARS-COV-2 RNA RESP QL NAA+PROBE: NOT DETECTED
SODIUM SERPL-SCNC: 140 MMOL/L (ref 136–145)
SP GR UR STRIP: 1.02 (ref 1–1.03)
T4 FREE SERPL-MCNC: 1.78 NG/DL (ref 0.93–1.7)
TROPONIN T SERPL-MCNC: <0.01 NG/ML (ref 0–0.03)
TROPONIN T SERPL-MCNC: <0.01 NG/ML (ref 0–0.03)
UROBILINOGEN UR QL STRIP: ABNORMAL
WBC NRBC COR # BLD: 7.3 10*3/MM3 (ref 3.4–10.8)
WHOLE BLOOD HOLD COAG: NORMAL
WHOLE BLOOD HOLD SPECIMEN: NORMAL

## 2022-08-10 PROCEDURE — 83735 ASSAY OF MAGNESIUM: CPT | Performed by: STUDENT IN AN ORGANIZED HEALTH CARE EDUCATION/TRAINING PROGRAM

## 2022-08-10 PROCEDURE — 85730 THROMBOPLASTIN TIME PARTIAL: CPT | Performed by: STUDENT IN AN ORGANIZED HEALTH CARE EDUCATION/TRAINING PROGRAM

## 2022-08-10 PROCEDURE — 96374 THER/PROPH/DIAG INJ IV PUSH: CPT

## 2022-08-10 PROCEDURE — 96375 TX/PRO/DX INJ NEW DRUG ADDON: CPT

## 2022-08-10 PROCEDURE — 87637 SARSCOV2&INF A&B&RSV AMP PRB: CPT | Performed by: STUDENT IN AN ORGANIZED HEALTH CARE EDUCATION/TRAINING PROGRAM

## 2022-08-10 PROCEDURE — 84484 ASSAY OF TROPONIN QUANT: CPT | Performed by: STUDENT IN AN ORGANIZED HEALTH CARE EDUCATION/TRAINING PROGRAM

## 2022-08-10 PROCEDURE — 83880 ASSAY OF NATRIURETIC PEPTIDE: CPT | Performed by: STUDENT IN AN ORGANIZED HEALTH CARE EDUCATION/TRAINING PROGRAM

## 2022-08-10 PROCEDURE — 83690 ASSAY OF LIPASE: CPT | Performed by: STUDENT IN AN ORGANIZED HEALTH CARE EDUCATION/TRAINING PROGRAM

## 2022-08-10 PROCEDURE — 81003 URINALYSIS AUTO W/O SCOPE: CPT | Performed by: STUDENT IN AN ORGANIZED HEALTH CARE EDUCATION/TRAINING PROGRAM

## 2022-08-10 PROCEDURE — 99283 EMERGENCY DEPT VISIT LOW MDM: CPT

## 2022-08-10 PROCEDURE — 85610 PROTHROMBIN TIME: CPT

## 2022-08-10 PROCEDURE — 84100 ASSAY OF PHOSPHORUS: CPT | Performed by: STUDENT IN AN ORGANIZED HEALTH CARE EDUCATION/TRAINING PROGRAM

## 2022-08-10 PROCEDURE — 71275 CT ANGIOGRAPHY CHEST: CPT

## 2022-08-10 PROCEDURE — 36415 COLL VENOUS BLD VENIPUNCTURE: CPT

## 2022-08-10 PROCEDURE — 71045 X-RAY EXAM CHEST 1 VIEW: CPT

## 2022-08-10 PROCEDURE — 80053 COMPREHEN METABOLIC PANEL: CPT | Performed by: STUDENT IN AN ORGANIZED HEALTH CARE EDUCATION/TRAINING PROGRAM

## 2022-08-10 PROCEDURE — 0 IOPAMIDOL PER 1 ML: Performed by: STUDENT IN AN ORGANIZED HEALTH CARE EDUCATION/TRAINING PROGRAM

## 2022-08-10 PROCEDURE — 74174 CTA ABD&PLVS W/CONTRAST: CPT

## 2022-08-10 PROCEDURE — 85610 PROTHROMBIN TIME: CPT | Performed by: STUDENT IN AN ORGANIZED HEALTH CARE EDUCATION/TRAINING PROGRAM

## 2022-08-10 PROCEDURE — 83605 ASSAY OF LACTIC ACID: CPT | Performed by: STUDENT IN AN ORGANIZED HEALTH CARE EDUCATION/TRAINING PROGRAM

## 2022-08-10 PROCEDURE — 85025 COMPLETE CBC W/AUTO DIFF WBC: CPT | Performed by: STUDENT IN AN ORGANIZED HEALTH CARE EDUCATION/TRAINING PROGRAM

## 2022-08-10 PROCEDURE — 93005 ELECTROCARDIOGRAM TRACING: CPT | Performed by: STUDENT IN AN ORGANIZED HEALTH CARE EDUCATION/TRAINING PROGRAM

## 2022-08-10 PROCEDURE — 84439 ASSAY OF FREE THYROXINE: CPT | Performed by: STUDENT IN AN ORGANIZED HEALTH CARE EDUCATION/TRAINING PROGRAM

## 2022-08-10 RX ORDER — PANTOPRAZOLE SODIUM 40 MG/10ML
80 INJECTION, POWDER, LYOPHILIZED, FOR SOLUTION INTRAVENOUS ONCE
Status: COMPLETED | OUTPATIENT
Start: 2022-08-10 | End: 2022-08-10

## 2022-08-10 RX ORDER — SODIUM CHLORIDE 0.9 % (FLUSH) 0.9 %
10 SYRINGE (ML) INJECTION AS NEEDED
Status: DISCONTINUED | OUTPATIENT
Start: 2022-08-10 | End: 2022-08-10 | Stop reason: HOSPADM

## 2022-08-10 RX ORDER — LABETALOL HYDROCHLORIDE 5 MG/ML
10 INJECTION, SOLUTION INTRAVENOUS ONCE
Status: COMPLETED | OUTPATIENT
Start: 2022-08-10 | End: 2022-08-10

## 2022-08-10 RX ORDER — LABETALOL HYDROCHLORIDE 5 MG/ML
10 INJECTION, SOLUTION INTRAVENOUS ONCE
Status: DISCONTINUED | OUTPATIENT
Start: 2022-08-10 | End: 2022-08-10 | Stop reason: HOSPADM

## 2022-08-10 RX ORDER — ASPIRIN 81 MG/1
324 TABLET, CHEWABLE ORAL ONCE
Status: COMPLETED | OUTPATIENT
Start: 2022-08-10 | End: 2022-08-10

## 2022-08-10 RX ADMIN — LABETALOL HYDROCHLORIDE 10 MG: 5 INJECTION, SOLUTION INTRAVENOUS at 01:23

## 2022-08-10 RX ADMIN — IOPAMIDOL 100 ML: 755 INJECTION, SOLUTION INTRAVENOUS at 02:14

## 2022-08-10 RX ADMIN — ASPIRIN 81 MG CHEWABLE TABLET 324 MG: 81 TABLET CHEWABLE at 01:04

## 2022-08-10 RX ADMIN — PANTOPRAZOLE SODIUM 80 MG: 40 INJECTION, POWDER, FOR SOLUTION INTRAVENOUS at 01:23

## 2022-08-10 NOTE — ED PROVIDER NOTES
EMERGENCY DEPARTMENT ENCOUNTER    Pt Name: Mitchell Alberto  MRN: [id number]  Pt :   1965  Room Number:    Date of encounter:  8/10/2022  PCP: Latoya Soriano DO  ED Provider: Felipe Hansen MD    Historian: Patient      HPI:  Chief Complaint: Chest pain, hematemesis        Context: Mitchell Alberto is a 57-year-old man who presents because of vomiting blood and chest pain.  He has extensive cardiac history with aortic aneurysm status post open repair and aortic valve replacement with mechanical valve.  Also has dual pacer placed here by Dr. Davis and CHF.  He says he has chronic back pain and for the last week and a half has been taking large amounts of ibuprofen which he did not realize could upset his stomach.  He has history of prior episodes of hematemesis that was presumed to be from an ulcer.  Yesterday he started having vomiting and says he was vomiting some bright red blood.  He has not vomited any blood today but has continued to have the vomiting and also has intermittent, sharp, right-sided chest pain consistent with prior cardiac pain. He denies any radiation to the back or shoulders but continues to have his chronic low back pain.  Knows of nothing that makes the pain better and has generalized weakness with the associated hematemesis.  Denies fevers or systemic symptoms.  No other complaints at this time.     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 Residual left upper extremity weakness.     • Transient cerebral ischemia          PAST SURGICAL HISTORY  Past Surgical History:   Procedure Laterality Date   • ABDOMINAL  AORTIC ANEURYSM REPAIR      History of Aortic Aneurysm Repair   • AORTIC VALVE REPAIR/REPLACEMENT      Replacement   • BACK SURGERY     • CARDIAC CATHETERIZATION N/A 7/15/2021    Procedure: LEFT HEART CATH;  Surgeon: Berhane Shrestha MD;  Location:  [facility];  Service: Cardiology;  Laterality: N/A;   • CARDIAC ELECTROPHYSIOLOGY PROCEDURE N/A 7/28/2021    Procedure: EP - DEVICE;  Surgeon: Martin Davis MD;  Location:  [facility];  Service: Cardiology;  Laterality: N/A;   • CARDIAC ELECTROPHYSIOLOGY PROCEDURE N/A 7/28/2021    Procedure: AV Node Ablation;  Surgeon: Martin Davis MD;  Location:  [facility];  Service: Cardiology;  Laterality: N/A;   • ENDOSCOPY N/A 12/4/2021    Procedure: ESOPHAGOGASTRODUODENOSCOPY;  Surgeon: Aakash Logan MD;  Location:  [facility];  Service: Gastroenterology;  Laterality: N/A;   • HERNIA REPAIR  08/2014    Left inguinal herniorrhaphy   • SKIN CANCER EXCISION  08/2014    Left forehead melanoma excision, Dr. Gisell Kothari, August 2014.    • TRACHEOSTOMY AND PEG TUBE INSERTION N/A 7/13/2021    Procedure: TRACHEOSTOMY AND PERCUTANEOUS ENDOSCOPIC GASTROSTOMY TUBE INSERTION;  Surgeon: Hayden Centeno MD;  Location:  [facility];  Service: General;  Laterality: N/A;         FAMILY HISTORY  Family History   Problem Relation Age of Onset   • Aortic aneurysm Mother    • Arthritis Mother    • Heart disease Mother    • Heart attack Mother    • Hypertension Mother    • Arthritis Father    • Heart disease Father    • Melanoma Father    • Hypertension Father    • Depression Other    • Hypertension Sister    • Heart disease Brother    • Hypertension Brother    • Hypertension Brother          SOCIAL HISTORY  Social History     Socioeconomic History   • Marital status:    Tobacco Use   • Smoking status: Never Smoker   • Smokeless tobacco: Never Used   Vaping Use   • Vaping Use: Never used   Substance and Sexual Activity   • Alcohol  use: Yes     Comment: very occasional   • Drug use: Yes     Types: Marijuana     Comment: Uses daily   • Sexual activity: Defer         ALLERGIES  Lisinopril and Meclizine hcl        REVIEW OF SYSTEMS  Review of Systems       All systems reviewed and negative except for those discussed in HPI.       PHYSICAL EXAM    I have reviewed the triage vital signs and nursing notes.    ED Triage Vitals [08/10/22 0026]   Temp Heart Rate Resp BP SpO2   98.5 °F (36.9 °C) 80 20 (!) 195/111 94 %      Temp src Heart Rate Source Patient Position BP Location FiO2 (%)   Oral Monitor Sitting Left arm --       Physical Exam  GENERAL:   Appears uncomfortable  HENT: Nares patent.  EYES: No scleral icterus.  CV: Regular rhythm, regular rate.  Mechanical click  RESPIRATORY: Normal effort.  No audible wheezes, rales or rhonchi.  ABDOMEN: Soft, nontender  MUSCULOSKELETAL: No deformities.   NEURO: Alert, moves all extremities, follows commands.  SKIN: Warm, dry, no rash visualized.        LAB RESULTS  Recent Results (from the past 24 hour(s))   POC Protime / INR    Collection Time: 08/09/22 11:15 AM    Specimen: Blood   Result Value Ref Range    Protime 32.2 (H) 10.0 - 13.8 seconds    INR 2.7 (H) 0.91 - 1.09   Protime-INR    Collection Time: 08/10/22 12:35 AM    Specimen: Blood   Result Value Ref Range    Protime 27.9 (H) 11.4 - 14.4 Seconds    INR 2.59 (H) 0.84 - 1.13   Lavender Top    Collection Time: 08/10/22 12:35 AM   Result Value Ref Range    Extra Tube hold for add-on    Gray Top    Collection Time: 08/10/22 12:35 AM   Result Value Ref Range    Extra Tube Hold for add-ons.    Light Blue Top    Collection Time: 08/10/22 12:35 AM   Result Value Ref Range    Extra Tube Hold for add-ons.    CBC Auto Differential    Collection Time: 08/10/22 12:35 AM    Specimen: Blood   Result Value Ref Range    WBC 7.30 3.40 - 10.80 10*3/mm3    RBC 4.70 4.14 - 5.80 10*6/mm3    Hemoglobin 13.1 13.0 - 17.7 g/dL    Hematocrit 40.8 37.5 - 51.0 %    MCV 86.8  79.0 - 97.0 fL    MCH 27.9 26.6 - 33.0 pg    MCHC 32.1 31.5 - 35.7 g/dL    RDW 14.6 12.3 - 15.4 %    RDW-SD 46.0 37.0 - 54.0 fl    MPV 11.4 6.0 - 12.0 fL    Platelets 230 140 - 450 10*3/mm3    Neutrophil % 70.7 42.7 - 76.0 %    Lymphocyte % 18.9 (L) 19.6 - 45.3 %    Monocyte % 8.6 5.0 - 12.0 %    Eosinophil % 1.2 0.3 - 6.2 %    Basophil % 0.3 0.0 - 1.5 %    Immature Grans % 0.3 0.0 - 0.5 %    Neutrophils, Absolute 5.16 1.70 - 7.00 10*3/mm3    Lymphocytes, Absolute 1.38 0.70 - 3.10 10*3/mm3    Monocytes, Absolute 0.63 0.10 - 0.90 10*3/mm3    Eosinophils, Absolute 0.09 0.00 - 0.40 10*3/mm3    Basophils, Absolute 0.02 0.00 - 0.20 10*3/mm3    Immature Grans, Absolute 0.02 0.00 - 0.05 10*3/mm3    nRBC 0.0 0.0 - 0.2 /100 WBC   aPTT    Collection Time: 08/10/22 12:35 AM    Specimen: Blood   Result Value Ref Range    PTT 45.1 (L) 60.0 - 90.0 seconds   Lactic Acid, Plasma    Collection Time: 08/10/22 12:35 AM    Specimen: Blood   Result Value Ref Range    Lactate 1.3 0.5 - 2.0 mmol/L   Troponin    Collection Time: 08/10/22  1:13 AM    Specimen: Blood   Result Value Ref Range    Troponin T <0.010 0.000 - 0.030 ng/mL   Comprehensive Metabolic Panel    Collection Time: 08/10/22  1:13 AM    Specimen: Blood   Result Value Ref Range    Glucose 112 (H) 65 - 99 mg/dL    BUN 30 (H) 6 - 20 mg/dL    Creatinine 1.70 (H) 0.76 - 1.27 mg/dL    Sodium 140 136 - 145 mmol/L    Potassium 3.8 3.5 - 5.2 mmol/L    Chloride 100 98 - 107 mmol/L    CO2 29.0 22.0 - 29.0 mmol/L    Calcium 9.7 8.6 - 10.5 mg/dL    Total Protein 7.3 6.0 - 8.5 g/dL    Albumin 4.80 3.50 - 5.20 g/dL    ALT (SGPT) 16 1 - 41 U/L    AST (SGOT) 19 1 - 40 U/L    Alkaline Phosphatase 98 39 - 117 U/L    Total Bilirubin 0.4 0.0 - 1.2 mg/dL    Globulin 2.5 gm/dL    A/G Ratio 1.9 g/dL    BUN/Creatinine Ratio 17.6 7.0 - 25.0    Anion Gap 11.0 5.0 - 15.0 mmol/L    eGFR 46.4 (L) >60.0 mL/min/1.73   Lipase    Collection Time: 08/10/22  1:13 AM    Specimen: Blood   Result Value Ref Range     Lipase 12 (L) 13 - 60 U/L   BNP    Collection Time: 08/10/22  1:13 AM    Specimen: Blood   Result Value Ref Range    proBNP 544.2 0.0 - 900.0 pg/mL   Green Top (Gel)    Collection Time: 08/10/22  1:13 AM   Result Value Ref Range    Extra Tube Hold for add-ons.    Gold Top - SST    Collection Time: 08/10/22  1:13 AM   Result Value Ref Range    Extra Tube Hold for add-ons.    Magnesium    Collection Time: 08/10/22  1:13 AM    Specimen: Blood   Result Value Ref Range    Magnesium 2.1 1.6 - 2.6 mg/dL   Phosphorus    Collection Time: 08/10/22  1:13 AM    Specimen: Blood   Result Value Ref Range    Phosphorus 3.6 2.5 - 4.5 mg/dL   T4, Free    Collection Time: 08/10/22  1:13 AM    Specimen: Blood   Result Value Ref Range    Free T4 1.78 (H) 0.93 - 1.70 ng/dL   POC Protime / INR    Collection Time: 08/10/22  1:18 AM    Specimen: Blood   Result Value Ref Range    Protime 34.7 (H) 12.8 - 15.2 seconds    INR 3.0 (H) 0.8 - 1.2   COVID-19, FLU A/B, RSV PCR - Swab, Nasopharynx    Collection Time: 08/10/22  1:31 AM    Specimen: Nasopharynx; Swab   Result Value Ref Range    COVID19 Not Detected Not Detected - Ref. Range    Influenza A PCR Not Detected Not Detected    Influenza B PCR Not Detected Not Detected    RSV, PCR Not Detected Not Detected   Urinalysis With Microscopic If Indicated (No Culture) - Urine, Clean Catch    Collection Time: 08/10/22  1:33 AM    Specimen: Urine, Clean Catch   Result Value Ref Range    Color, UA Yellow Yellow, Straw    Appearance, UA Clear Clear    pH, UA 5.5 5.0 - 8.0    Specific Gravity, UA 1.025 1.001 - 1.030    Glucose, UA Negative Negative    Ketones, UA Negative Negative    Bilirubin, UA Negative Negative    Blood, UA Negative Negative    Protein, UA Trace (A) Negative    Leuk Esterase, UA Negative Negative    Nitrite, UA Negative Negative    Urobilinogen, UA 1.0 E.U./dL 0.2 - 1.0 E.U./dL   ECG 12 Lead    Collection Time: 08/10/22  2:49 AM   Result Value Ref Range    QT Interval 468 ms     QTC Interval 539 ms   Troponin    Collection Time: 08/10/22  2:53 AM    Specimen: Hand, Right; Blood   Result Value Ref Range    Troponin T <0.010 0.000 - 0.030 ng/mL       If labs were ordered, I independently reviewed the results.        RADIOLOGY  CT Angiogram Abdomen Pelvis    Result Date: 8/10/2022  EXAMINATION: CT SCAN OF THE CHEST, ABDOMEN AND PELVIS WITH INTRAVENOUS CONTRAST DATE OF EXAM: 8/10/2022 2:13 AM HISTORY: Severe hematemesis and chest pain. History of open AAA repair. COMPARISON: 12/11/2021. TECHNIQUE: CT examination of the chest, abdomen and pelvis was performed following the intravenous administration of 100 mL  Isovue-370. CT dose lowering techniques were used, to include: automated exposure control, adjustment for patient size, and/or use of iterative reconstruction. FINDINGS: CHEST: Lungs:  Normal. Pleura: Normal. Mediastinum And Luisa: Median sternotomy. Cardiac pacer. Cardiovascular: Normal. Chest Wall:  Normal. ABDOMEN/PELVIS: Liver: Normal. Gallbladder/Billary: Normal. Pancreas: Normal. Spleen: Normal. Adrenal Glands: Normal. Kidneys: Normal. GI Tract: Colonic diverticulosis without diverticulitis. Mesentery/Peritoneum: Normal. Vasculature: Normal. Lymph Nodes: Normal. Abdominal Wall: Normal. Bladder: Normal. Reproductive: Normal. MUSCULOSKELETAL: Enlarged prostate.     No acute abnormalities in the chest, abdomen or pelvis. Electronically signed by:  Felipe Bueno M.D.  8/10/2022 1:33 AM Mountain Time    XR Chest 1 View    Result Date: 8/10/2022  Single portable chest radiograph INDICATION:  Shortness of breath COMPARISON:  Chest radiograph 12/19/2021 FINDINGS: Trace left effusion and mild hazy left basilar opacity. No pneumothorax. There is no evidence of focal consolidation, pleural effusion, or pneumothorax. The cardiomediastinal silhouette is mildly enlarged. Cardiac pacemaker. Cardiac prosthetic device. No acute focal bony abnormalities are seen. Median sternotomy.     Trace left  effusion and left basilar atelectasis, underlying consolidation cannot be excluded. Mild cardiomegaly Electronically signed by:  Clover Chaudhari M.D.  8/9/2022 11:15 PM Mountain Time    CT Angiogram Chest    Result Date: 8/10/2022  EXAMINATION: CT SCAN OF THE CHEST, ABDOMEN AND PELVIS WITH INTRAVENOUS CONTRAST DATE OF EXAM: 8/10/2022 2:13 AM HISTORY: Severe hematemesis and chest pain. History of open AAA repair. COMPARISON: 12/11/2021. TECHNIQUE: CT examination of the chest, abdomen and pelvis was performed following the intravenous administration of 100 mL  Isovue-370. CT dose lowering techniques were used, to include: automated exposure control, adjustment for patient size, and/or use of iterative reconstruction. FINDINGS: CHEST: Lungs:  Normal. Pleura: Normal. Mediastinum And Luisa: Median sternotomy. Cardiac pacer. Cardiovascular: Normal. Chest Wall:  Normal. ABDOMEN/PELVIS: Liver: Normal. Gallbladder/Billary: Normal. Pancreas: Normal. Spleen: Normal. Adrenal Glands: Normal. Kidneys: Normal. GI Tract: Colonic diverticulosis without diverticulitis. Mesentery/Peritoneum: Normal. Vasculature: Normal. Lymph Nodes: Normal. Abdominal Wall: Normal. Bladder: Normal. Reproductive: Normal. MUSCULOSKELETAL: Enlarged prostate.     No acute abnormalities in the chest, abdomen or pelvis. Electronically signed by:  Felipe Bueno M.D.  8/10/2022 1:33 AM Mountain Time      I ordered and reviewed the above noted radiographic studies.      I viewed images of chest x-ray which reveals cardiomegaly and CTA of the chest and abdomen which does not reveal acute aortic injury, pulmonary embolism, or other abnormality that I can appreciate.    See radiologist's dictation for official interpretation.        PROCEDURES    Procedures    ECG 12 Lead   Preliminary Result   Test Reason : 2ND SET   Blood Pressure :   */*   mmHG   Vent. Rate :  80 BPM     Atrial Rate :  80 BPM      P-R Int : 148 ms          QRS Dur : 172 ms       QT Int :  468 ms       P-R-T Axes :  66 141 -35 degrees      QTc Int : 539 ms      Atrial-paced rhythm with occasional ventricular-paced complexes   Abnormal ECG   When compared with ECG of 19-DEC-2021 20:44,   Electronic ventricular pacemaker has replaced Electronic atrial pacemaker      Referred By: ED MD           Confirmed By:           MEDICATIONS GIVEN IN ER    Medications   aspirin chewable tablet 324 mg (324 mg Oral Given 8/10/22 0104)   labetalol (NORMODYNE,TRANDATE) injection 10 mg (10 mg Intravenous Given 8/10/22 0123)   pantoprazole (PROTONIX) injection 80 mg (80 mg Intravenous Given 8/10/22 0123)   iopamidol (ISOVUE-370) 76 % injection 100 mL (100 mL Intravenous Given 8/10/22 0214)         PROGRESS, DATA ANALYSIS, CONSULTS, AND MEDICAL DECISION MAKING    All labs have been independently reviewed by me.  All radiology studies have been reviewed by me and the radiologist dictating the report.   EKG's have been independently viewed and interpreted by me.      Differential diagnoses: Aortic dissection, aortoenteric fistula, pulmonary embolism, myocardial infarction, anemia, electrolyte abnormality, pneumothorax      ED Course as of 08/10/22 1046   Wed Aug 10, 2022   0035 In summary this is a 57-year-old man who presents because of vomiting blood and chest pain.  He has extensive cardiac history with aortic aneurysm status post open repair and aortic valve replacement with mechanical valve.  Also has dual pacer placed here by Dr. Davis and CHF.  He says he has chronic back pain and for the last week and a half has been taking large amounts of ibuprofen which he did not realize could upset his stomach.  He has history of prior episodes of hematemesis that was presumed to be from an ulcer.  Yesterday he started having vomiting and says he was vomiting some bright red blood.  He has not vomited any blood today but has continued to have the vomiting and also has intermittent, sharp, right-sided chest pain consistent  with prior cardiac pain.  He denies any radiation to the back or shoulders but continues to have his chronic low back pain.  Knows of nothing that makes the pain better and has generalized weakness with the associated hematemesis.  Denies fevers or systemic symptoms.  No other complaints at this time. [CC]   0228 ECG reveals AV dual paced rhythm with occasional PVCs but no acute findings that I can appreciate.  No anemia on CBC.  INR is therapeutic at 2.59.  No elevation in troponin or BNP.  Creatinine is 1.7 representing acute kidney injury when compared with prior values.  COVID and flu swab is negative. [CC]      ED Course User Index  [CC] Felipe Hansen MD       Repeat troponin remains low.  CTA of the chest and abdomen did not reveal acute vascular injury or any other acute abnormalities that I can appreciate.  Heart score is 6.  He has had significant hypotension treated with labetalol only mildly hypertensive at this point.  At this point he is reporting symptoms of GI bleed, has a an acute kidney injury and a moderate heart score I discussed the findings with him and I am recommending hospital admission.  He says he wants to go home he is busy this morning.  I implored him that he has multiple admittable diagnoses and he says he simply cannot stay.  He says he plans to return and I explained to him that his risks include death and he may not be able to return.  He is alert and oriented and able to make his own decisions at this time and says he is going to leave.  He elected to leave A.      AS OF 10:46 EDT VITALS:    BP - 145/84  HR - 80  TEMP - 98.5 °F (36.9 °C) (Oral)  O2 SATS - 94%                  DIAGNOSIS  Final diagnoses:   ZOHAIB (acute kidney injury) (HCC)   Chest pain, unspecified type   History of ventricular fibrillation   Valvular heart disease   History of CVA (cerebrovascular accident)   Dilated cardiomyopathy (HCC)   Mechanical heart valve present   S/P AVR (aortic valve replacement)    Prior Ascending aortic aneurysm (HCC)   Long term (current) use of anticoagulants         DISPOSITION  Felipe Mata MD  08/10/22 4915

## 2022-08-11 NOTE — PROGRESS NOTES
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   late 2021.    PROBLEM LIST:   1.  Ascending aortic dissection and severe aortic insufficiency:  a.  Mechanical Bentall aortic valve replacement (St. Dave) and ascending aortic dissection repair, 11/17/2012, Dr. Jasmeet De Dios.   b. Echocardiogram, 12/03/2013:  i. LVEF (55% to 60%), mechanical aortic valve, area 1.18 cm2.  ii. Chronic Coumadin therapy                     c. Cardiac PET scan, 08/26/2015, negative for reversible ischemia; fixed photopenia of the apex and septum possibly due to LBBB; LVEF (30% to 34%).                    d. Cardiac catheterization,  Dorian Giraldo MD, 09/03/2015:  No obstructive CAD.                    E. 1/17 echo EF 55% mild lvh AVR mean pressure 17                    F. 12/16 CTA chest wnl  2. Ventricular fibrillation/cardiac arrest/systolic congestive heart failure  1. 6/21 [facility] admission after witnessed cardiac arrest  2. Patient received amiodarone, epinephrine, 5 defibrillations with ROSC prior to ED  3. Patient initiated on amiodarone therapy in the ER and implemented targeted temperature management per protocol.  4. 7/21 2D echo: LV cavity mildly dilated.  LV wall thickness-moderate concentric hypertrophy.  Mechanical aortic valve prosthesis present.  RVSP due to TR moderately elevated 45-55.  RV cavity dilated.  LA volume mildly increased.  RA cavity dilated.  LVEF =41-45%.  Mild General global hypokinesis.  1. 7/21 LHC: Angiographically near normal coronary arteries without any evidence of hemodynamically significant disease.  2. 7/21 NANCY: LVEF =33%.  Saline test negative.  Mechanical aortic valve present.  Multiple LV wall segments hypokinetic.  Dilated cardiomyopathy.  LV cavity borderline  dilated.  LV wall thickness-mild concentric hypertrophy.  RVSP due to TR normal.  Grade 1 plaque in descending aorta.  5. 6/22 2D echo: LVEF = 55%.  Mechanical aortic valve present.  Moderate AS.  Aortic valve max pressure gradient = 43.8; mean pressure gradient = 25.6.  LV diastolic dysfunction (grade 1) impaired laxation.  RVSP due to TR normal <35.  3. Atrial fibrillation  1.  PTA1WP2-RMDb score = 4  2. Patient was difficult case to medically manage via amiodarone therapy/rate control therapy  3. 7/28 AVN RFA and implantation of ST Dave biventricular pacemaker/ICD-GFT  4. Intermittent epistaxis while on heparin therapy  1. Requiring Rhino Rocket placement  5. 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  Residual left upper extremity weakness.    6. Left arm discomfort:  a. Possibly due to embolic CVA vs. intraoperative nerve traction.   7. Hypertension  1. Angioedema - likely secondary to lisinopril  8. HL  1. 10/18 164/61/53/104  2. 7/22 122/40/63/49  6. 7/6/2021 positive MSSA (tracheal wound and sputum) and MRSA pneumonia per bronchoscopy (while intubated secondary to V. fib arrest)  7. 12/2/2021 and 12/4/2021 patient admitted to [facility] for acute anemia requiring Kcentra and 1 PRBC.  GI consulted but patient left AMA before further treatment and evaluation.  1. 8/10/2022 [facility] ER evaluation for hematemesis ZOHAIB, and chest pain.  Patient left AMA.  (Patient had been taking significant amount of ibuprofen)  8. 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 Depression/anxiety  10. History of opioid dependence-in remission  11. Status post surgery:  a. Left inguinal herniorrhaphy, August 2014.   b. Left forehead melanoma excision, Dr. Gisell Kothari, August 2014.      CC:   Chief Complaint   Patient presents with   • AVS   • Congestive Heart Failure   • RHF   • Hypertension       Allergies  Allergies   Allergen Reactions   • Lisinopril Angioedema   • Meclizine Hcl Itching  "      Current Medications    Current Outpatient Medications:   •  acetaminophen (TYLENOL) 500 MG tablet, Take 500 mg by mouth Every 6 (Six) Hours As Needed for Mild Pain ., Disp: , Rfl:   •  amiodarone (PACERONE) 200 MG tablet, Take 1 tablet by mouth Daily., Disp: 90 tablet, Rfl: 1  •  atorvastatin (LIPITOR) 40 MG tablet, Take 1 tablet by mouth Every Night., Disp: , Rfl:   •  bumetanide (BUMEX) 1 MG tablet, Take 2 tablets in am and 1 tablet in the afternoon, Disp: 90 tablet, Rfl: 5  •  buprenorphine-naloxone (SUBOXONE) 8-2 MG per SL tablet, Place 2 tablets under the tongue Daily., Disp: , Rfl:   •  buPROPion XL (WELLBUTRIN XL) 300 MG 24 hr tablet, Take 1 tablet by mouth Daily., Disp: 30 tablet, Rfl: 5  •  carvedilol (COREG) 12.5 MG tablet, Take 1 tablet by mouth 2 (Two) Times a Day With Meals., Disp: 30 tablet, Rfl: 5  •  ferrous gluconate (FERGON) 324 MG tablet, Take 1 tablet by mouth Every Other Day., Disp: 30 tablet, Rfl: 2  •  tadalafil (ADCIRCA) 20 MG tablet tablet, Take 5 mg by mouth Daily., Disp: , Rfl:   •  warfarin (COUMADIN) 7.5 MG tablet, TAKE ONE TABLET BY MOUTH DAILY OR AS DIRECTED BY ANTICOAGULATION CLINIC. WILL NEED TO MAKE AN APPOINTMENT WITH DOCTOR FOR MORE REFILLS, Disp: 30 tablet, Rfl: 0      History of Present Illness   Mitchell Alberto is a 57 y.o. year old male here for follow up.    Recently in the ER with nausea and emesis.  He was taking excessive doses of NSAID.  He also had acute renal insufficiency.  He states he has profound fatigue is not sleeping well in recurrent nausea.  He was able lose 20 pounds in the last couple months after carbohydrate restriction    OBJECTIVE:  Vitals:    08/12/22 1403   BP: 132/80   BP Location: Left arm   Patient Position: Sitting   Pulse: 80   SpO2: 96%   Weight: (!) 156 kg (344 lb)   Height: 195.6 cm (77.01\")     Body mass index is 40.78 kg/m².    Constitutional:       Appearance: Healthy appearance. Not in distress.   Neck:      Vascular: No JVR. JVD " normal.   Pulmonary:      Effort: Pulmonary effort is normal.      Breath sounds: Normal breath sounds. No wheezing. No rhonchi. No rales.   Chest:      Chest wall: Not tender to palpatation.   Cardiovascular:      PMI at left midclavicular line. Normal rate. Regular rhythm. Normal S1. Normal S2.      Murmurs: There is no murmur.      No gallop. Midsystolic click. No rub.   Pulses:     Intact distal pulses.   Edema:     Peripheral edema absent.   Abdominal:      General: Bowel sounds are normal.      Palpations: Abdomen is soft.      Tenderness: There is no abdominal tenderness.   Musculoskeletal: Normal range of motion.         General: No tenderness. Skin:     General: Skin is warm and dry.   Neurological:      General: No focal deficit present.      Mental Status: Alert and oriented to person, place and time.         Diagnostic Data:  Procedures  Device check  Acceptable thresholds and impedances  No mode switch  95% a 89% V pacing  Decreased sensor threshold to auto -0.5  Generator 5.9-year    ASSESSMENT:   Diagnosis Plan   1. Nonrheumatic aortic valve stenosis     2. Primary hypertension     3. Mixed hyperlipidemia     4. Paroxysmal atrial fibrillation (HCC)         PLAN:  AS post mechanical AVR acceptable function continued echocardiogram intermittently    Hypertension controlled current carvedilol    Mixed dyslipidemia on statin therapy    Paroxysmal A. fib [id number] systemically anticoagulated warfarin due to mechanical AVR continued amiodarone will decrease dose to 100 daily    Tachybradycardia syndrome patient will attempt to download applications for his home monitoring    GI distress with recent NSAID utilization worry about recurrent ulceration refer to GI    Patient will utilize Bumex only on days with weight gain 2 pounds in 24 hours    Mike Hollis MD, FACC   no

## 2022-08-12 ENCOUNTER — OFFICE VISIT (OUTPATIENT)
Dept: CARDIOLOGY | Facility: CLINIC | Age: 57
End: 2022-08-12

## 2022-08-12 VITALS
BODY MASS INDEX: 37.19 KG/M2 | OXYGEN SATURATION: 96 % | DIASTOLIC BLOOD PRESSURE: 80 MMHG | WEIGHT: 315 LBS | HEIGHT: 77 IN | HEART RATE: 80 BPM | SYSTOLIC BLOOD PRESSURE: 132 MMHG

## 2022-08-12 DIAGNOSIS — I35.0 NONRHEUMATIC AORTIC VALVE STENOSIS: Primary | ICD-10-CM

## 2022-08-12 DIAGNOSIS — K92.0 HEMATEMESIS WITH NAUSEA: ICD-10-CM

## 2022-08-12 DIAGNOSIS — I10 PRIMARY HYPERTENSION: ICD-10-CM

## 2022-08-12 DIAGNOSIS — E78.2 MIXED HYPERLIPIDEMIA: ICD-10-CM

## 2022-08-12 DIAGNOSIS — I48.0 PAROXYSMAL ATRIAL FIBRILLATION: ICD-10-CM

## 2022-08-12 PROCEDURE — 99214 OFFICE O/P EST MOD 30 MIN: CPT | Performed by: INTERNAL MEDICINE

## 2022-08-12 RX ORDER — AMIODARONE HYDROCHLORIDE 200 MG/1
100 TABLET ORAL DAILY
Qty: 90 TABLET | Refills: 1 | Status: SHIPPED | OUTPATIENT
Start: 2022-08-12 | End: 2023-03-10 | Stop reason: SDUPTHER

## 2022-08-14 LAB
QT INTERVAL: 468 MS
QTC INTERVAL: 539 MS

## 2022-08-16 ENCOUNTER — TELEPHONE (OUTPATIENT)
Dept: GASTROENTEROLOGY | Facility: CLINIC | Age: 57
End: 2022-08-16

## 2022-08-16 NOTE — TELEPHONE ENCOUNTER
[facility]chell Alberto  [address] [facility] [address]  1965        Patient will be having a a colonoscopy by Dr. Sancho Bird on September 14, 2022. The patient is needing cardiac clearance due to being on blood thinners. Please complete the following and fax back to the office.     Patient's was last seen in the office on:_____________________    Procedure/Test Performed:    _____ Stress Test       _____ Echocardiogram    _____ EKG     _____ Heart Cath    Based on the above test results and/or clinical evaluation it is my recommendation:    ____ Patient may proceed with the scheduled surgical procedure with an acceptable cardiac risk.    ____ Patient CAN NOT stop Plavix, Effient, Ticlid, Aspirin, Coumadin, Pradaxa, Xarelto, Eliquis, Savaysa, or Brilinta prior to procedure.     ____ Patient CAN stop Plavix, Effient, Ticlid, Aspirin, Coumadin, Pradaxa, Xarelto, Eliquis, Savaysa, or Brilinta  ______ days prior to procedure.    Please sign and date below.    Signature________________________________________   Date:_________________     Thank You    Mark I. Brunner, M.D.  JULIAN Landaverde M.D. Mark A. Spurlin, M.D. Gregory M. Woolfolk, M.D.  Jesica Jeffers, YUE Stewart, SHAGGY Womack

## 2022-08-16 NOTE — TELEPHONE ENCOUNTER
Need cardiac clearance for Colonoscopy on 09/16/2022 by Dr Bird - Patient is currently on Warfarin for Mechanical AV and PAF, has BiV PPM/ICD (S[facility])    Last office visit - 8/12/2022   **GI referral was sent by J for GI distress with recent NSAID utilization worry about recurrent ulceration refer to GI**  Last EKG 8/10/2022  Last echo, 6/27/2022: normal LVEF, mechanical AV, moderate AV stenosis    Acceptable cardiac risk per [facility] and warfarin hold per ACP. Routed to ACP

## 2022-08-16 NOTE — TELEPHONE ENCOUNTER
This patient has procedure scheduled with [facility] on 9/14. Can you obtain cardiac clearance? It looks like his cardiologist is Dr. Hollis.

## 2022-08-19 NOTE — TELEPHONE ENCOUNTER
Dr. Bunny Bird has requested 3 day hold of warfarin prior to colonoscopy 9/14. Pt on warfarin for mAVR , therefore will bridge with lovenox. [156kg / Scr 1.7 ]    9/11: Hold warfarin 3 days  9/12: lovenox PM only  9/13:lovenox AM only  9/14: procedure, when cleared by Dr Bird,  resume warfarin [2-3 boosted doses if appropriate] + lovenox q12h until INR >2.0    Please advise if you are agreeable to plan above or if you prefer an alternative approach to Mtichell Alberto's anticoagulation plan for the upcoming procedure. In addition, please advise if surgeon's office should contact your office for further cardiac clearance.        Thank you,    Sonya Bustillos, PharmD    [facility] Anticoagulation Team  (t) [phone]  (f) [phone]

## 2022-08-24 ENCOUNTER — APPOINTMENT (OUTPATIENT)
Dept: PHARMACY | Facility: HOSPITAL | Age: 57
End: 2022-08-24

## 2022-08-25 ENCOUNTER — ANTICOAGULATION VISIT (OUTPATIENT)
Dept: PHARMACY | Facility: HOSPITAL | Age: 57
End: 2022-08-25

## 2022-08-25 DIAGNOSIS — I48.91 ATRIAL FIBRILLATION, UNSPECIFIED TYPE: Primary | ICD-10-CM

## 2022-08-25 DIAGNOSIS — Z95.2 S/P AVR (AORTIC VALVE REPLACEMENT): ICD-10-CM

## 2022-08-25 LAB
INR PPP: 2.4 (ref 0.91–1.09)
PROTHROMBIN TIME: 29.1 SECONDS (ref 10–13.8)

## 2022-08-25 PROCEDURE — G0463 HOSPITAL OUTPT CLINIC VISIT: HCPCS

## 2022-08-25 PROCEDURE — 36416 COLLJ CAPILLARY BLOOD SPEC: CPT

## 2022-08-25 PROCEDURE — 85610 PROTHROMBIN TIME: CPT

## 2022-08-25 NOTE — PROGRESS NOTES
Anticoagulation Clinic - Remote Progress Note  [facility]  Testing frequency: 7 days    Indication: Atrial Fibrillation and Mechanical Aortic Valve; h/o embolic CVA  Referring Provider: Bunny last seen 3/12/21; Next appointment: 6/25/21;  Initial Warfarin Start Date: 11/2012  Planned Duration of Therapy: Life  Goal INR: 2.5-3.5  Current Drug Interactions: escitalopram, aspirin, amiodarone, pantoprazole  CHADS-VASc: 3 (HTN, h/o CVA)     Vit K Diet: patient eats, goes through spurts when he tries to eat healthier 3/3/2021  Alcohol: none  Tobacco: none       Anticoagulation Clinic INR History:      Date 10/2 10/10 11/4 11/12 11/22 12/10 1/3/20 1/22 1/30 2/3 2/18 2/27   Total Weekly Dose 80mg 80mg 62.5mg 85mg 82.5mg 70mg 72.5mg 72.5mg 82.5mg 77.5mg 75mg 75mg   INR  3.9 3.6 1.7 3.4 4.7 2.4 3.6 1.3 2.3 3.2 3.1 3.2   Notes   1x miss self adj  self adj  lovenox         Date 4/8 4/29 5/18 6/20  8/3 8/17 10/5 10/19 11/11 12/21    Total Weekly Dose 70mg 70mg 65mg 65mg Non- compliant 65mg 65 mg 65mg 65mg 65mg 65mg Non- compliant   INR 4.3 4.1 4.5 3.0  3.0 3.8 3.0 2.9 4.1 2.3    Notes Self adj; Levaquin decr GLV; COVID+  recv'd 6/22; misdose       incr GLV      Date 1/20/21 1/21 1/25 2/1 2/16 3/3 3/22 4/13  4/26 5/14 5/21   Total WeeklyDose 40mg 45mg? 62.5mg 80mg? 80mg   70mg 67.5mg  47.5mg  67.5 mg   INR 1.2 1.2 maia 1.6 2.9 3.7  4.5 3.2 3.3  1.8 4.5 3.1   Notes Self held; augmentin 1x incr dose; lovenox 2x miss,   1x misdose  amox amox  self adj 5x hold, enox enox/norco, augmentin       Date 6/28  8/4 8/6 8/9 8/20 9/3  11/9 11/16 11/30 12/2   Total WeeklyDose 67.5 mg BH Barrett   6/30-7/29 SJ [facility] 32.5 mg 47.5mg 51.25 mg 52.5mg 52.5mg? Non compliant 35mg? 70mg? 52.5mg BHL- left AMA   INR 3.9  2.2 3.0 2.9 4.8 2.6  2.0 3.9 4.3    Notes  amio init enox enox enox    Rec 11/16   GI bleed       Date 12/4 12/6 1/25 2/10  2/24 3/25 4/5 6/22 7/28 8/9 8/10   Total WeeklyDose BHL- left AMA 7.5mg ?? 52.5mg ????  52.5 mg 52.5 mg  53.75mg 45 mg 52.5mg 52.5mg   INR  1.1 1.7 5.6  1.8 2.0 1.8 2.4 1.7 2.7 3.0   Notes AMS/GI Bleed  rec 1/26 [facility] COVID + suboxone  1x miss enox  Clinic, 1x misdose 1x miss  ED-      Phone Interview:  Verbal Release Authorization signed on 11/7/18 -- Patient's Wife has Passed Away   Tablet Strength:   7.5 mg  Patient Contact: [phone] -- try to call in PM or  His email is [email or web address]    Patient Findings  Positives:  Emergency department visit, Missed doses   Negatives:  Signs/symptoms of thrombosis, Signs/symptoms of bleeding, Laboratory test error suspected, Change in health, Change in alcohol use, Change in activity, Upcoming invasive procedure, Upcoming dental procedure, Extra doses, Change in medications, Change in diet/appetite, Hospital admission, Bruising, Other complaints   Comments:  Pt left AMA from Ed. Please review note. Pt missed dose of warfarin two days ago.      Plan:   1. INR is slightly subtherapeutic at 2.4 likely due to missed dose two nights ago. Instructed Mr. Alberto to boost dose to 11.25mg tonight then continue warfarin 7.5mg daily until recheck.   2. Warfarin denies refill   3. RTC in 2 weeks to ensure WNL  4.  Verbal and written  information provided in the clinic.  Mitchell Alberto RBV dosing instructions, expresses understanding by teach back, and has no further questions at this time.    Do Vargas, PharmD  8/25/2022  15:57 EDT     Please review at next encounter:  Dr. Bird has requested 3 day hold of warfarin prior to colonoscopy 9/14. Pt on warfarin for mAVR , therefore will bridge with lovenox. [156kg / Scr 1.7 ]     9/11: Hold warfarin 3 days  9/12: lovenox PM only  9/13:lovenox AM only  9/14: procedure, when cleared by Dr Bird,  resume warfarin [2-3 boosted doses if appropriate] + lovenox q12h until INR >2.0

## 2022-08-26 DIAGNOSIS — Z12.11 ENCOUNTER FOR SCREENING COLONOSCOPY: Primary | ICD-10-CM

## 2022-09-08 ENCOUNTER — APPOINTMENT (OUTPATIENT)
Dept: PHARMACY | Facility: HOSPITAL | Age: 57
End: 2022-09-08

## 2022-09-09 ENCOUNTER — OFFICE VISIT (OUTPATIENT)
Dept: FAMILY MEDICINE CLINIC | Facility: CLINIC | Age: 57
End: 2022-09-09

## 2022-09-09 VITALS
HEART RATE: 97 BPM | BODY MASS INDEX: 37.19 KG/M2 | HEIGHT: 77 IN | OXYGEN SATURATION: 98 % | DIASTOLIC BLOOD PRESSURE: 88 MMHG | WEIGHT: 315 LBS | SYSTOLIC BLOOD PRESSURE: 138 MMHG

## 2022-09-09 DIAGNOSIS — F32.A DEPRESSION, UNSPECIFIED DEPRESSION TYPE: ICD-10-CM

## 2022-09-09 DIAGNOSIS — G47.33 OSA (OBSTRUCTIVE SLEEP APNEA): ICD-10-CM

## 2022-09-09 DIAGNOSIS — E66.01 CLASS 3 SEVERE OBESITY WITH SERIOUS COMORBIDITY AND BODY MASS INDEX (BMI) OF 40.0 TO 44.9 IN ADULT, UNSPECIFIED OBESITY TYPE: ICD-10-CM

## 2022-09-09 DIAGNOSIS — R97.20 ELEVATED PSA: ICD-10-CM

## 2022-09-09 DIAGNOSIS — I10 PRIMARY HYPERTENSION: Primary | ICD-10-CM

## 2022-09-09 PROBLEM — Z79.01 LONG TERM (CURRENT) USE OF ANTICOAGULANTS: Status: RESOLVED | Noted: 2021-08-11 | Resolved: 2022-09-09

## 2022-09-09 PROCEDURE — 99214 OFFICE O/P EST MOD 30 MIN: CPT | Performed by: STUDENT IN AN ORGANIZED HEALTH CARE EDUCATION/TRAINING PROGRAM

## 2022-09-09 NOTE — PROGRESS NOTES
Established Office Visit      Patient Name: Mitchell Alberto  : 1965   MRN: [id number]   Care Team: Patient Care Team:  Latoya Soriano DO as PCP - General (Internal Medicine)  Mike Hollis MD as Consulting Physician (Cardiology)    Chief Complaint:    Chief Complaint   Patient presents with   • Hypertension       History of Present Illness: Mitchell Alberto is a 57 y.o. male with obesity, anxiety, depression, aortic valve dissection s/p mechanical AVR , CVA , HFrEF, HTN, AF on warfarin, and VF cardiac arrest 2021 s/p BiV ICD) who is here today to follow up with HTN, depression, anxiety, elevated PSA.    HTN - coreg increased at last visit and BP has improved.  HFrEF - following with cardiology, compliant with bumex. LE edema has improved, sometimes worsens with increased sodium intake such as eating potato chips last night but he is usually able to predict what will worsen his swelling.    AF on warfarin - compliant with coreg and amiodorone    Obesity - referred to bariatrics several months ago but now he has had recent success with dieting. He has lost 20lb though low carb diet. He is not exercising but would like to start.     Depression/anxiety - wellbutrin was increased to 300mg last visit. He feels a significant improvement in mood. Happy with current dose. The one year anniversary of his late wife passing away is coming up next month and he has good support system of friends to help him through this time.     Elevated PSA - 9.7 2022. He has previously followed with [facility] urology and was advised to follow up. He has nocturia, dribbling stream, hesitation. Stable for years.     He reports morning time headaches, fatigue, history of snoring. Has never been evaluated for sleep apnea.     Subjective      Review of Systems:   Review of Systems - See HPI    I have reviewed and the following portions of the patient's history were updated as appropriate: past family history, past  "medical history, past social history, past surgical history and problem list.    Medications:     Current Outpatient Medications:   •  acetaminophen (TYLENOL) 500 MG tablet, Take 500 mg by mouth Every 6 (Six) Hours As Needed for Mild Pain ., Disp: , Rfl:   •  amiodarone (PACERONE) 200 MG tablet, Take 0.5 tablets by mouth Daily., Disp: 90 tablet, Rfl: 1  •  atorvastatin (LIPITOR) 40 MG tablet, Take 1 tablet by mouth Every Night., Disp: , Rfl:   •  bumetanide (BUMEX) 1 MG tablet, Take 2 tablets in am and 1 tablet in the afternoon, Disp: 90 tablet, Rfl: 5  •  buprenorphine-naloxone (SUBOXONE) 8-2 MG per SL tablet, Place 2 tablets under the tongue Daily., Disp: , Rfl:   •  buPROPion XL (WELLBUTRIN XL) 300 MG 24 hr tablet, Take 1 tablet by mouth Daily., Disp: 30 tablet, Rfl: 5  •  carvedilol (COREG) 12.5 MG tablet, Take 1 tablet by mouth 2 (Two) Times a Day With Meals., Disp: 30 tablet, Rfl: 5  •  ferrous gluconate (FERGON) 324 MG tablet, Take 1 tablet by mouth Every Other Day., Disp: 30 tablet, Rfl: 2  •  Sod Picosulfate-Mag Ox-Cit Acd 10-3.5-12 MG-GM -GM/160ML solution, Take 160 mL by mouth Take As Directed for 2 doses., Disp: 320 mL, Rfl: 0  •  tadalafil (ADCIRCA) 20 MG tablet tablet, Take 5 mg by mouth Daily., Disp: , Rfl:   •  warfarin (COUMADIN) 7.5 MG tablet, TAKE ONE TABLET BY MOUTH DAILY OR AS DIRECTED BY ANTICOAGULATION CLINIC. WILL NEED TO MAKE AN APPOINTMENT WITH DOCTOR FOR MORE REFILLS, Disp: 30 tablet, Rfl: 0    Allergies:   Allergies   Allergen Reactions   • Lisinopril Angioedema   • Meclizine Hcl Itching       Objective     Physical Exam:  Vital Signs:   Vitals:    09/09/22 1037   BP: 138/88   Pulse: 97   SpO2: 98%   Weight: (!) 156 kg (343 lb)   Height: 195.6 cm (77.01\")     Body mass index is 40.66 kg/m².     Physical Exam  Vitals reviewed.   Constitutional:       Appearance: Normal appearance. He is obese.   Cardiovascular:      Rate and Rhythm: Normal rate.      Comments: Trace LE edema present with " bilateral LE hyperpigmentation consistent with venous stasis dermatitis   Pulmonary:      Effort: Pulmonary effort is normal. No respiratory distress.   Skin:     General: Skin is warm and dry.   Neurological:      Mental Status: He is alert.   Psychiatric:         Mood and Affect: Mood normal.         Behavior: Behavior normal.         Judgment: Judgment normal.         Assessment / Plan      Assessment/Plan:   Problems Addressed This Visit  Diagnoses and all orders for this visit:    1. Primary hypertension (Primary)  -     Ambulatory Referral to Sleep Medicine  Improved with Coreg 12.5mg BID. Still slightly above goal <130/80. Will continue to work on lifestyle modifications and weight loss.    2. Elevated PSA  Elevated PSA - 9.7 07/2022. He will reach out to his Urologist Dr. Silva to follow up    3. Class 3 severe obesity with serious comorbidity and body mass index (BMI) of 40.0 to 44.9 in adult, unspecified obesity type (HCC)  -     Ambulatory Referral to Sleep Medicine    Congratulated on recent dietary changes and weight loss. He will continue with healthy diet and work towards incorporating exercise into his lifestlye. Goal weight loss of 1-2lb per week. He would like to hold off on bariatric referral at this time as he is having success with natural lifestyle changes and I agree with this.    4. Depression, unspecified depression type  Improved with wellbutrin 300mg daily. Continue this without changes     5. STEVEN (obstructive sleep apnea)  -     Ambulatory Referral to Sleep Medicine  Referred to sleep medicine for STEVEN evaluation. Discussed adverse effects of uncontrolled STEVEN and the effects it has on obesity, fatigue, cardiovascular aj, mental health, HTN. Strongly encouraged treatment if indicated and he is agreeable.       Plan of care reviewed with patient at the conclusion of today's visit. Education was provided regarding diagnosis and management.  Patient verbalizes understanding of and  agreement with management plan.    Follow Up:   Return for 4-5 weeks for weight check and mood .        DO SUNIL Antonio RD  [facility]  [address]  [facility] [address]  Fax [phone]  Phone [phone]

## 2022-09-13 ENCOUNTER — TELEPHONE (OUTPATIENT)
Dept: PHARMACY | Facility: HOSPITAL | Age: 57
End: 2022-09-13

## 2022-09-22 ENCOUNTER — TELEPHONE (OUTPATIENT)
Dept: PHARMACY | Facility: HOSPITAL | Age: 57
End: 2022-09-22

## 2022-09-22 RX ORDER — WARFARIN SODIUM 7.5 MG/1
TABLET ORAL
Qty: 30 TABLET | Refills: 1 | Status: SHIPPED | OUTPATIENT
Start: 2022-09-22 | End: 2022-10-19 | Stop reason: SDUPTHER

## 2022-10-17 ENCOUNTER — TELEPHONE (OUTPATIENT)
Dept: PHARMACY | Facility: HOSPITAL | Age: 57
End: 2022-10-17

## 2022-10-19 ENCOUNTER — ANTICOAGULATION VISIT (OUTPATIENT)
Dept: PHARMACY | Facility: HOSPITAL | Age: 57
End: 2022-10-19

## 2022-10-19 DIAGNOSIS — Z95.2 S/P AVR (AORTIC VALVE REPLACEMENT): Primary | ICD-10-CM

## 2022-10-19 LAB — INR PPP: 3

## 2022-10-19 NOTE — PROGRESS NOTES
Anticoagulation Clinic - Remote Progress Note  [facility]  Testing frequency: 7 days    Indication: Atrial Fibrillation and Mechanical Aortic Valve; h/o embolic CVA  Referring Provider: Bunny last seen 3/12/21; Next appointment: 6/25/21;  Initial Warfarin Start Date: 11/2012  Planned Duration of Therapy: Life  Goal INR: 2.5-3.5  Current Drug Interactions: escitalopram, aspirin, amiodarone, pantoprazole  CHADS-VASc: 3 (HTN, h/o CVA)     Vit K Diet: patient eats, goes through spurts when he tries to eat healthier 3/3/2021  Alcohol: none  Tobacco: none       Anticoagulation Clinic INR History:      Date 10/2 10/10 11/4 11/12 11/22 12/10 1/3/20 1/22 1/30 2/3 2/18 2/27   Total Weekly Dose 80mg 80mg 62.5mg 85mg 82.5mg 70mg 72.5mg 72.5mg 82.5mg 77.5mg 75mg 75mg   INR  3.9 3.6 1.7 3.4 4.7 2.4 3.6 1.3 2.3 3.2 3.1 3.2   Notes   1x miss self adj  self adj  lovenox         Date 4/8 4/29 5/18 6/20  8/3 8/17 10/5 10/19 11/11 12/21    Total Weekly Dose 70mg 70mg 65mg 65mg Non- compliant 65mg 65 mg 65mg 65mg 65mg 65mg Non- compliant   INR 4.3 4.1 4.5 3.0  3.0 3.8 3.0 2.9 4.1 2.3    Notes Self adj; Levaquin decr GLV; COVID+  recv'd 6/22; misdose       incr GLV      Date 1/20/21 1/21 1/25 2/1 2/16 3/3 3/22 4/13  4/26 5/14 5/21   Total WeeklyDose 40mg 45mg? 62.5mg 80mg? 80mg   70mg 67.5mg  47.5mg  67.5 mg   INR 1.2 1.2 maia 1.6 2.9 3.7  4.5 3.2 3.3  1.8 4.5 3.1   Notes Self held; augmentin 1x incr dose; lovenox 2x miss,   1x misdose  amox amox  self adj 5x hold, enox enox/norco, augmentin       Date 6/28  8/4 8/6 8/9 8/20 9/3  11/9 11/16 11/30 12/2   Total WeeklyDose 67.5 mg BH Barrett   6/30-7/29 [facility] 32.5 mg 47.5mg 51.25 mg 52.5mg 52.5mg? Non compliant 35mg? 70mg? 52.5mg BHL- left AMA   INR 3.9  2.2 3.0 2.9 4.8 2.6  2.0 3.9 4.3    Notes  amio init enox enox enox    Rec 11/16   GI bleed       Date 12/4 12/6 1/25 2/10  2/24 3/25 4/5 6/22 7/28 8/9 8/10   Total WeeklyDose BHL- left AMA 7.5mg ?? 52.5mg ????  52.5 mg 52.5 mg  Spoke with patient. Advised that appointment time was so that if there were any issues patient could be re-programmed early in the day.   Advised that no antibiotics were prescribed. Advised to take the Hydroxyzine for itching as needed and Tizanidine as needed for muscle spasms. Understanding verbalied.   No further needs expressed.    53.75mg 45 mg 52.5mg 52.5mg   INR  1.1 1.7 5.6  1.8 2.0 1.8 2.4 1.7 2.7 3.0   Notes AMS/GI Bleed  rec 1/26 [facility] COVID + suboxone  1x miss enox  Clinic, 1x misdose 1x miss  ED-      Date 10/18              Total WeeklyDose 52.5 mg              INR 3.0              Notes 10/19                    Phone Interview:  Verbal Release Authorization signed on 11/7/18 -- Patient's Wife has Passed Away   Tablet Strength:   7.5 mg  Patient Contact: [phone] -- try to call in PM or  His email is [email or web address]    Patient Findings  Positives:  Upcoming invasive procedure   Negatives:  Signs/symptoms of thrombosis, Signs/symptoms of bleeding, Laboratory test error suspected, Change in health, Change in alcohol use, Change in activity, Emergency department visit, Upcoming dental procedure, Missed doses, Extra doses, Change in medications, Change in diet/appetite, Hospital admission, Bruising, Other complaints   Comments:  All findings negative per patient , still considering weight loss surgery     Plan:   1. INR is therapeutic at 3.0. Instructed Mr. Alberto  to continue warfarin 7.5mg daily until recheck.   2. Recheck in one week 10/25  3.  Verbal and written  information provided in the clinic.  Mitchell Alberto RBV dosing instructions, expresses understanding by teach back, and has no further questions at this time.    Benja Prasad, Pharmacy Technician  10/19/2022  13:59 EDT     Wt loss surgery not scheduled.  I, Sam Zaldivar, PharmD, have reviewed the note in full and agree with the assessment and plan.  10/19/22  16:27 EDT

## 2022-10-20 RX ORDER — WARFARIN SODIUM 7.5 MG/1
TABLET ORAL
Qty: 30 TABLET | Refills: 1 | Status: SHIPPED | OUTPATIENT
Start: 2022-10-20 | End: 2023-01-24 | Stop reason: SDUPTHER

## 2022-11-01 ENCOUNTER — ANTICOAGULATION VISIT (OUTPATIENT)
Dept: PHARMACY | Facility: HOSPITAL | Age: 57
End: 2022-11-01

## 2022-11-01 DIAGNOSIS — Z95.2 S/P AVR (AORTIC VALVE REPLACEMENT): Primary | ICD-10-CM

## 2022-11-01 LAB — INR PPP: 3.1

## 2022-11-01 NOTE — PROGRESS NOTES
Anticoagulation Clinic - Remote Progress Note  [facility]  Testing frequency: 7 days    Indication: Atrial Fibrillation and Mechanical Aortic Valve; h/o embolic CVA  Referring Provider: Bunny last seen 3/12/21; Next appointment: 6/25/21;  Initial Warfarin Start Date: 11/2012  Planned Duration of Therapy: Life  Goal INR: 2.5-3.5  Current Drug Interactions: escitalopram, aspirin, amiodarone, pantoprazole  CHADS-VASc: 3 (HTN, h/o CVA)     Vit K Diet: patient eats, goes through spurts when he tries to eat healthier 3/3/2021  Alcohol: none  Tobacco: none       Anticoagulation Clinic INR History:      Date 10/2 10/10 11/4 11/12 11/22 12/10 1/3/20 1/22 1/30 2/3 2/18 2/27   Total Weekly Dose 80mg 80mg 62.5mg 85mg 82.5mg 70mg 72.5mg 72.5mg 82.5mg 77.5mg 75mg 75mg   INR  3.9 3.6 1.7 3.4 4.7 2.4 3.6 1.3 2.3 3.2 3.1 3.2   Notes   1x miss self adj  self adj  lovenox         Date 4/8 4/29 5/18 6/20  8/3 8/17 10/5 10/19 11/11 12/21    Total Weekly Dose 70mg 70mg 65mg 65mg Non- compliant 65mg 65 mg 65mg 65mg 65mg 65mg Non- compliant   INR 4.3 4.1 4.5 3.0  3.0 3.8 3.0 2.9 4.1 2.3    Notes Self adj; Levaquin decr GLV; COVID+  recv'd 6/22; misdose       incr GLV      Date 1/20/21 1/21 1/25 2/1 2/16 3/3 3/22 4/13  4/26 5/14 5/21   Total WeeklyDose 40mg 45mg? 62.5mg 80mg? 80mg   70mg 67.5mg  47.5mg  67.5 mg   INR 1.2 1.2 maia 1.6 2.9 3.7  4.5 3.2 3.3  1.8 4.5 3.1   Notes Self held; augmentin 1x incr dose; lovenox 2x miss,   1x misdose  amox amox  self adj 5x hold, enox enox/norco, augmentin       Date 6/28  8/4 8/6 8/9 8/20 9/3  11/9 11/16 11/30 12/2   Total WeeklyDose 67.5 mg BH Barrett   6/30-7/29 [facility] 32.5 mg 47.5mg 51.25 mg 52.5mg 52.5mg? Non compliant 35mg? 70mg? 52.5mg BHL- left AMA   INR 3.9  2.2 3.0 2.9 4.8 2.6  2.0 3.9 4.3    Notes  amio init enox enox enox    Rec 11/16   GI bleed       Date 12/4 12/6 1/25 2/10  2/24 3/25 4/5 6/22 7/28 8/9 8/10   Total WeeklyDose BHL- left AMA 7.5mg ?? 52.5mg ????  52.5 mg 52.5 mg  53.75mg 45 mg 52.5mg 52.5mg   INR  1.1 1.7 5.6  1.8 2.0 1.8 2.4 1.7 2.7 3.0   Notes AMS/GI Bleed  rec 1/26 [facility] COVID + suboxone  1x miss enox  Clinic, 1x misdose 1x miss  ED-      Date 10/18 11/1             Total WeeklyDose 52.5 mg 52.5 mg             INR 3.0 3.1             Notes 10/19                    Phone Interview:  Verbal Release Authorization signed on 11/7/18 -- Patient's Wife has Passed Away   Tablet Strength:   7.5 mg  Patient Contact: [phone] -- try to call in PM or  His email is [email or web address]      Patient Findings  Negatives:  Signs/symptoms of thrombosis, Signs/symptoms of bleeding, Laboratory test error suspected, Change in health, Change in alcohol use, Change in activity, Upcoming invasive procedure, Emergency department visit, Upcoming dental procedure, Missed doses, Extra doses, Change in medications, Change in diet/appetite, Hospital admission, Bruising, Other complaints   Comments:  All findings negative per patient.        Plan:   1. INR is therapeutic at 3.1. Instructed Mr. Alberto  to continue warfarin 7.5mg daily until recheck.   2. Recheck in one week 11/8  3.  Verbal and written  information provided in the clinic.  Mitchell Alberto RBV dosing instructions, expresses understanding by teach back, and has no further questions at this time.    Benja Prasad, Pharmacy Technician  11/1/2022  13:18 EDT    I, Hannah Choudhury, PharmD, have reviewed the note in full and agree with the assessment and plan.  11/01/22  14:22 EDT

## 2022-11-05 DIAGNOSIS — I10 PRIMARY HYPERTENSION: ICD-10-CM

## 2022-11-05 DIAGNOSIS — I50.20 HEART FAILURE WITH REDUCED EJECTION FRACTION: ICD-10-CM

## 2022-11-07 RX ORDER — CARVEDILOL 12.5 MG/1
TABLET ORAL
Qty: 60 TABLET | Refills: 5 | Status: SHIPPED | OUTPATIENT
Start: 2022-11-07 | End: 2023-03-10 | Stop reason: SDUPTHER

## 2022-12-20 ENCOUNTER — ANTICOAGULATION VISIT (OUTPATIENT)
Dept: PHARMACY | Facility: HOSPITAL | Age: 57
End: 2022-12-20

## 2022-12-20 DIAGNOSIS — Z95.2 S/P AVR (AORTIC VALVE REPLACEMENT): Primary | ICD-10-CM

## 2022-12-20 LAB — INR PPP: 2.7

## 2022-12-20 NOTE — PROGRESS NOTES
Anticoagulation Clinic - Remote Progress Note  [facility]  Testing frequency: 7 days    Indication: Atrial Fibrillation and Mechanical Aortic Valve; h/o embolic CVA  Referring Provider: Bunny last seen 3/12/21; Next appointment: 6/25/21;  Initial Warfarin Start Date: 11/2012  Planned Duration of Therapy: Life  Goal INR: 2.5-3.5  Current Drug Interactions: escitalopram, aspirin, amiodarone, pantoprazole  CHADS-VASc: 3 (HTN, h/o CVA)     Vit K Diet: patient eats, goes through spurts when he tries to eat healthier 3/3/2021  Alcohol: none  Tobacco: none       Anticoagulation Clinic INR History:      Date 10/2 10/10 11/4 11/12 11/22 12/10 1/3/20 1/22 1/30 2/3 2/18 2/27   Total Weekly Dose 80mg 80mg 62.5mg 85mg 82.5mg 70mg 72.5mg 72.5mg 82.5mg 77.5mg 75mg 75mg   INR  3.9 3.6 1.7 3.4 4.7 2.4 3.6 1.3 2.3 3.2 3.1 3.2   Notes   1x miss self adj  self adj  lovenox         Date 4/8 4/29 5/18 6/20  8/3 8/17 10/5 10/19 11/11 12/21    Total Weekly Dose 70mg 70mg 65mg 65mg Non- compliant 65mg 65 mg 65mg 65mg 65mg 65mg Non- compliant   INR 4.3 4.1 4.5 3.0  3.0 3.8 3.0 2.9 4.1 2.3    Notes Self adj; Levaquin decr GLV; COVID+  recv'd 6/22; misdose       incr GLV      Date 1/20/21 1/21 1/25 2/1 2/16 3/3 3/22 4/13  4/26 5/14 5/21   Total WeeklyDose 40mg 45mg? 62.5mg 80mg? 80mg   70mg 67.5mg  47.5mg  67.5 mg   INR 1.2 1.2 maia 1.6 2.9 3.7  4.5 3.2 3.3  1.8 4.5 3.1   Notes Self held; augmentin 1x incr dose; lovenox 2x miss,   1x misdose  amox amox  self adj 5x hold, enox enox/norco, augmentin       Date 6/28  8/4 8/6 8/9 8/20 9/3  11/9 11/16 11/30 12/2   Total WeeklyDose 67.5 mg BH Barrett   6/30-7/29 [facility] 32.5 mg 47.5mg 51.25 mg 52.5mg 52.5mg? Non compliant 35mg? 70mg? 52.5mg BHL- left AMA   INR 3.9  2.2 3.0 2.9 4.8 2.6  2.0 3.9 4.3    Notes  amio init enox enox enox    Rec 11/16   GI bleed       Date 12/4 12/6 1/25 2/10  2/24 3/25 4/5 6/22 7/28 8/9 8/10   Total WeeklyDose BHL- left AMA 7.5mg ?? 52.5mg ????  52.5 mg 52.5 mg  53.75mg 45 mg 52.5mg 52.5mg   INR  1.1 1.7 5.6  1.8 2.0 1.8 2.4 1.7 2.7 3.0   Notes AMS/GI Bleed  rec 1/26 [facility] COVID + suboxone  1x miss enox  Clinic, 1x misdose 1x miss  ED-      Date 10/18 11/1 12/20            Total WeeklyDose 52.5 mg 52.5 mg 52.5 mg            INR 3.0 3.1 2.7            Notes 10/19                    Phone Interview:  Verbal Release Authorization signed on 11/7/18 -- Patient's Wife has Passed Away   Tablet Strength:   7.5 mg  Patient Contact: [phone] -- try to call in PM or  His email is [email or web address]    Patient Findings    Negatives:  Signs/symptoms of thrombosis, Signs/symptoms of bleeding, Laboratory test error suspected, Change in health, Change in alcohol use, Change in activity, Upcoming invasive procedure, Emergency department visit, Upcoming dental procedure, Missed doses, Extra doses, Change in medications, Change in diet/appetite, Hospital admission, Bruising, Other complaints   Comments:  All findings negative per pt.      Plan:   1. INR is therapeutic today at 2.7(goal 2.5-3.5). Instructed Mr. Alberto to continue warfarin 7.5mg daily until recheck.   2. Recheck in two weeks, 1/3/23.  3.  Verbal and written  information provided in the clinic.  Mitchell Alberto RBV dosing instructions, expresses understanding by teach back, and has no further questions at this time.    Aaron Pagan CPhT  12/20/2022  13:31 Sonya QUIROZ, PharmD, have reviewed the note in full and agree with the assessment and plan.  12/20/22  15:22 EST

## 2023-01-23 ENCOUNTER — ANTICOAGULATION VISIT (OUTPATIENT)
Dept: PHARMACY | Facility: HOSPITAL | Age: 58
End: 2023-01-23
Payer: MEDICARE

## 2023-01-23 DIAGNOSIS — I10 PRIMARY HYPERTENSION: ICD-10-CM

## 2023-01-23 DIAGNOSIS — I50.20 HEART FAILURE WITH REDUCED EJECTION FRACTION: ICD-10-CM

## 2023-01-23 DIAGNOSIS — I48.91 ATRIAL FIBRILLATION, UNSPECIFIED TYPE: ICD-10-CM

## 2023-01-23 DIAGNOSIS — Z95.2 S/P AVR (AORTIC VALVE REPLACEMENT): Primary | ICD-10-CM

## 2023-01-23 LAB — INR PPP: 2.9

## 2023-01-23 NOTE — PROGRESS NOTES
Anticoagulation Clinic - Remote Progress Note  [facility]  Testing frequency: 7 days    Indication: Atrial Fibrillation and Mechanical Aortic Valve; h/o embolic CVA  Referring Provider: Bunny last seen 3/12/21; Next appointment: 6/25/21;  Initial Warfarin Start Date: 11/2012  Planned Duration of Therapy: Life  Goal INR: 2.5-3.5  Current Drug Interactions: escitalopram, aspirin, amiodarone, pantoprazole  CHADS-VASc: 3 (HTN, h/o CVA)     Vit K Diet: patient eats, goes through spurts when he tries to eat healthier 3/3/2021  Alcohol: none  Tobacco: none       Anticoagulation Clinic INR History:      Date 10/2 10/10 11/4 11/12 11/22 12/10 1/3/20 1/22 1/30 2/3 2/18 2/27   Total Weekly Dose 80mg 80mg 62.5mg 85mg 82.5mg 70mg 72.5mg 72.5mg 82.5mg 77.5mg 75mg 75mg   INR  3.9 3.6 1.7 3.4 4.7 2.4 3.6 1.3 2.3 3.2 3.1 3.2   Notes   1x miss self adj  self adj  lovenox         Date 4/8 4/29 5/18 6/20  8/3 8/17 10/5 10/19 11/11 12/21    Total Weekly Dose 70mg 70mg 65mg 65mg Non- compliant 65mg 65 mg 65mg 65mg 65mg 65mg Non- compliant   INR 4.3 4.1 4.5 3.0  3.0 3.8 3.0 2.9 4.1 2.3    Notes Self adj; Levaquin decr GLV; COVID+  recv'd 6/22; misdose       incr GLV      Date 1/20/21 1/21 1/25 2/1 2/16 3/3 3/22 4/13  4/26 5/14 5/21   Total WeeklyDose 40mg 45mg? 62.5mg 80mg? 80mg   70mg 67.5mg  47.5mg  67.5 mg   INR 1.2 1.2 maia 1.6 2.9 3.7  4.5 3.2 3.3  1.8 4.5 3.1   Notes Self held; augmentin 1x incr dose; lovenox 2x miss,   1x misdose  amox amox  self adj 5x hold, enox enox/norco, augmentin       Date 6/28  8/4 8/6 8/9 8/20 9/3  11/9 11/16 11/30 12/2   Total WeeklyDose 67.5 mg BH Barrett   6/30-7/29 [facility] 32.5 mg 47.5mg 51.25 mg 52.5mg 52.5mg? Non compliant 35mg? 70mg? 52.5mg BHL- left AMA   INR 3.9  2.2 3.0 2.9 4.8 2.6  2.0 3.9 4.3    Notes  amio init enox enox enox    Rec 11/16   GI bleed       Date 12/4 12/6 1/25 2/10  2/24 3/25 4/5 6/22 7/28 8/9 8/10   Total WeeklyDose BHL- left AMA 7.5mg ?? 52.5mg ????  52.5 mg 52.5 mg  53.75mg 45 mg 52.5mg 52.5mg   INR  1.1 1.7 5.6  1.8 2.0 1.8 2.4 1.7 2.7 3.0   Notes AMS/GI Bleed  rec 1/26 [facility] COVID + suboxone  1x  enox  Clinic, 1x misdose 1x   ED-      Date 10/18 11/1 12/20 1/20           Total WeeklyDose 52.5 mg 52.5 mg 52.5 mg 52.5 mg           INR 3.0 3.1 2.7 2.9           Notes 10/19   rec'd 1/23                 Phone Interview:  Verbal Release Authorization signed on 11/7/18 -- Patient's Wife has Passed Away   Tablet Strength:   7.5 mg  Patient Contact: Cell: [phone] Home: [phone] -- try to call in PM or  Email: [email or web address]    Patient Findings    Negatives:  Signs/symptoms of thrombosis, Signs/symptoms of bleeding, Laboratory test error suspected, Change in health, Change in alcohol use, Change in activity, Upcoming invasive procedure, Emergency department visit, Upcoming dental procedure, Missed doses, Extra doses, Change in medications, Change in diet/appetite, Hospital admission, Bruising, Other complaints   Comments:  Pt request refill of warfarin 7.5mg. All findings negative per pt.      Plan:   1. INR was therapeutic on 1/20 at 2.9, received 1/23 (goal 2.5-3.5). Instructed Mr. Alberto to continue warfarin 7.5mg daily until recheck.   2. Recheck in two weeks, 2/3/23.  3.  Verbal and written  information provided in the clinic.  Mitchell Alberto RBV dosing instructions, expresses understanding by teach back, and has no further questions at this time.    Iza Whitehead, TRINA  1/23/2023  08:56 Sonya QUIROZ, PharmD, have reviewed the note in full and agree with the assessment and plan.  01/24/23  15:08 EST

## 2023-01-23 NOTE — ADDENDUM NOTE
Addended by: MEENA SANTOS on: 1/21/2021 08:59 AM     Modules accepted: Orders     Alert-The patient is alert, awake and responds to voice. The patient is oriented to time, place, and person. The triage nurse is able to obtain subjective information.

## 2023-01-24 RX ORDER — WARFARIN SODIUM 7.5 MG/1
TABLET ORAL
Qty: 30 TABLET | Refills: 1 | Status: CANCELLED | OUTPATIENT
Start: 2023-01-24

## 2023-01-24 RX ORDER — WARFARIN SODIUM 7.5 MG/1
TABLET ORAL
Qty: 30 TABLET | Refills: 1 | Status: SHIPPED | OUTPATIENT
Start: 2023-01-24 | End: 2023-03-31 | Stop reason: SDUPTHER

## 2023-01-24 RX ORDER — WARFARIN SODIUM 7.5 MG/1
TABLET ORAL
Qty: 30 TABLET | Refills: 1 | OUTPATIENT
Start: 2023-01-24

## 2023-02-03 DIAGNOSIS — F32.A DEPRESSION, UNSPECIFIED DEPRESSION TYPE: ICD-10-CM

## 2023-02-03 RX ORDER — BUPROPION HYDROCHLORIDE 300 MG/1
TABLET ORAL
Qty: 30 TABLET | Refills: 5 | Status: SHIPPED | OUTPATIENT
Start: 2023-02-03

## 2023-02-14 LAB — INR PPP: 3

## 2023-02-15 ENCOUNTER — ANTICOAGULATION VISIT (OUTPATIENT)
Dept: PHARMACY | Facility: HOSPITAL | Age: 58
End: 2023-02-15
Payer: MEDICARE

## 2023-02-15 DIAGNOSIS — Z95.2 S/P AVR (AORTIC VALVE REPLACEMENT): Primary | ICD-10-CM

## 2023-02-15 NOTE — PROGRESS NOTES
Anticoagulation Clinic - Remote Progress Note  [facility]  Testing frequency: 7 days    Indication: Atrial Fibrillation and Mechanical Aortic Valve; h/o embolic CVA  Referring Provider: Bunny last seen 3/12/21; Next appointment: 6/25/21;  Initial Warfarin Start Date: 11/2012  Planned Duration of Therapy: Life  Goal INR: 2.5-3.5  Current Drug Interactions: escitalopram, aspirin, amiodarone, pantoprazole  CHADS-VASc: 3 (HTN, h/o CVA)     Vit K Diet: patient eats, goes through spurts when he tries to eat healthier 3/3/2021  Alcohol: none  Tobacco: none       Anticoagulation Clinic INR History:      Date 10/2 10/10 11/4 11/12 11/22 12/10 1/3/20 1/22 1/30 2/3 2/18 2/27   Total Weekly Dose 80mg 80mg 62.5mg 85mg 82.5mg 70mg 72.5mg 72.5mg 82.5mg 77.5mg 75mg 75mg   INR  3.9 3.6 1.7 3.4 4.7 2.4 3.6 1.3 2.3 3.2 3.1 3.2   Notes   1x miss self adj  self adj  lovenox         Date 4/8 4/29 5/18 6/20  8/3 8/17 10/5 10/19 11/11 12/21    Total Weekly Dose 70mg 70mg 65mg 65mg Non- compliant 65mg 65 mg 65mg 65mg 65mg 65mg Non- compliant   INR 4.3 4.1 4.5 3.0  3.0 3.8 3.0 2.9 4.1 2.3    Notes Self adj; Levaquin decr GLV; COVID+  recv'd 6/22; misdose       incr GLV      Date 1/20/21 1/21 1/25 2/1 2/16 3/3 3/22 4/13  4/26 5/14 5/21   Total WeeklyDose 40mg 45mg? 62.5mg 80mg? 80mg   70mg 67.5mg  47.5mg  67.5 mg   INR 1.2 1.2 maia 1.6 2.9 3.7  4.5 3.2 3.3  1.8 4.5 3.1   Notes Self held; augmentin 1x incr dose; lovenox 2x miss,   1x misdose  amox amox  self adj 5x hold, enox enox/norco, augmentin       Date 6/28  8/4 8/6 8/9 8/20 9/3  11/9 11/16 11/30 12/2   Total WeeklyDose 67.5 mg BH Barrett   6/30-7/29 [facility] 32.5 mg 47.5mg 51.25 mg 52.5mg 52.5mg? Non compliant 35mg? 70mg? 52.5mg BHL- left AMA   INR 3.9  2.2 3.0 2.9 4.8 2.6  2.0 3.9 4.3    Notes  amio init enox enox enox    Rec 11/16   GI bleed       Date 12/4 12/6 1/25 2/10  2/24 3/25 4/5 6/22 7/28 8/9 8/10   Total WeeklyDose BHL- left AMA 7.5mg ?? 52.5mg ????  52.5 mg 52.5 mg  53.75mg 45 mg 52.5mg 52.5mg   INR  1.1 1.7 5.6  1.8 2.0 1.8 2.4 1.7 2.7 3.0   Notes AMS/GI Bleed  rec 1/26 [facility] COVID + suboxone  1x miss enox  Clinic, 1x misdose 1x   ED-      Date 10/18 11/1 12/20 1/20 2/14          Total WeeklyDose 52.5 mg 52.5 mg 52.5 mg 52.5 mg 52.5 mg          INR 3.0 3.1 2.7 2.9 3.0          Notes 10/19   rec'd 1/23 rec'd 2/15                Phone Interview:  Verbal Release Authorization signed on 11/7/18 -- Patient's Wife has Passed Away   Tablet Strength:   7.5 mg  Patient Contact: Cell: [phone] Home: [phone] -- try to call in PM or  Email: [email or web address]    Patient Findings    Negatives:  Signs/symptoms of thrombosis, Signs/symptoms of bleeding, Laboratory test error suspected, Change in health, Change in alcohol use, Change in activity, Upcoming invasive procedure, Emergency department visit, Upcoming dental procedure, Missed doses, Extra doses, Change in medications, Change in diet/appetite, Hospital admission, Bruising, Other complaints   Comments:  Findings negative.      Plan:   1. INR was therapeutic on 2/14 at 3.0, received 2/15 (goal 2.5-3.5). Instructed Mr. Alberto to continue warfarin 7.5mg daily until recheck.   2. Recheck in two weeks, 2/28/23.  3.  Verbal and written  information provided in the clinic.  Mitchell Alberto RBV dosing instructions, expresses understanding by teach back, and has no further questions at this time.    Thank you.  Ramon Cat, PharmD  Pharmacy Resident  2/15/2023  08:30 EST

## 2023-02-24 ENCOUNTER — TELEPHONE (OUTPATIENT)
Dept: CARDIOLOGY | Facility: HOSPITAL | Age: 58
End: 2023-02-24
Payer: MEDICARE

## 2023-02-24 NOTE — TELEPHONE ENCOUNTER
----- Message from Venice Wyman CMA sent at 2/24/2023  2:15 PM EST -----  Patient called and states that he has been having elevated blood pressure reading each time he goes to the doctor's office to have a procedure done. He states that he goes monthly and each time his blood pressure is around 200/100. He states that he does not take his blood pressure any other times other than when he has it checked at the office. He feels like his carvedilol is not working as well. He states that he can take his blood pressure over the weekend and report back on Monday with a more consistent range of pressures.

## 2023-03-10 DIAGNOSIS — I50.20 HEART FAILURE WITH REDUCED EJECTION FRACTION: ICD-10-CM

## 2023-03-10 DIAGNOSIS — I10 PRIMARY HYPERTENSION: ICD-10-CM

## 2023-03-10 RX ORDER — CARVEDILOL 12.5 MG/1
12.5 TABLET ORAL 2 TIMES DAILY WITH MEALS
Qty: 60 TABLET | Refills: 0 | Status: SHIPPED | OUTPATIENT
Start: 2023-03-10 | End: 2023-03-31 | Stop reason: DRUGHIGH

## 2023-03-10 RX ORDER — AMIODARONE HYDROCHLORIDE 200 MG/1
100 TABLET ORAL DAILY
Qty: 15 TABLET | Refills: 0 | Status: SHIPPED | OUTPATIENT
Start: 2023-03-10 | End: 2023-03-31 | Stop reason: SDUPTHER

## 2023-03-15 ENCOUNTER — TELEPHONE (OUTPATIENT)
Dept: PHARMACY | Facility: HOSPITAL | Age: 58
End: 2023-03-15

## 2023-03-27 RX ORDER — WARFARIN SODIUM 7.5 MG/1
TABLET ORAL
Qty: 5 TABLET | Refills: 0 | Status: SHIPPED | OUTPATIENT
Start: 2023-03-27

## 2023-03-27 RX ORDER — WARFARIN SODIUM 7.5 MG/1
TABLET ORAL
Qty: 5 TABLET | Refills: 0 | OUTPATIENT
Start: 2023-03-27

## 2023-03-29 RX ORDER — WARFARIN SODIUM 5 MG/1
TABLET ORAL
Qty: 8 TABLET | OUTPATIENT
Start: 2023-03-29

## 2023-03-29 NOTE — TELEPHONE ENCOUNTER
We send refill for medication at 3/31 appointment. Spoke with patient and he has enough warfarin to last until then.

## 2023-03-30 NOTE — PROGRESS NOTES
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  Ascending aortic dissection and severe aortic insufficiency:  a. Mechanical Bentall AVR (St. Dave) and ascending aortic dissection repair, 11/17/2012, Dr. Jasmeet De Dios.   b. Echocardiogram, 12/03/2013: LVEF (55% to 60%), mechanical aortic valve, area 1.18 cm2.                     c. Cardiac PET scan, 08/26/2015, negative for reversible ischemia; fixed photopenia of the apex and septum possibly due to LBBB; LVEF (30% to 34%).                    d. [facility] MD Jany, 09/03/2015:  No obstructive CAD.                    E. 1/17 echo EF 55% mild lvh AVR mean pressure 17                    F. 12/16 CTA chest wnl  2. Ventricular fibrillation/cardiac arrest/systolic congestive heart failure  1. 6/21 [facility] admission after witnessed cardiac arrest  2. Patient received amiodarone, epinephrine, 5 defibrillations with ROSC prior to ED  3. 7/21 2D echo: LV cavity mildly dilated.  LV wall thickness-moderate concentric hypertrophy.  Mechanical aortic valve prosthesis present.  RVSP due to TR moderately elevated 45-55.  RV cavity dilated.  LA volume mildly increased.  RA cavity dilated.  LVEF =41-45%.  Mild General global hypokinesis.  4. 7/21 LHC: Angiographically near normal coronary arteries without any evidence of hemodynamically significant disease.  5. 7/21 NANCY: LVEF =33%.  Saline test negative.  Mechanical aortic valve present.  Multiple LV wall segments hypokinetic.  Dilated cardiomyopathy.  LV cavity borderline dilated.  LV wall thickness-mild concentric hypertrophy.  RVSP due to TR normal.  Grade 1 plaque in descending aorta.  6. 6/22 2D echo: LVEF = 55%.  Mechanical aortic valve present.  Moderate AS.  Aortic  valve max pressure gradient = 43.8; mean pressure gradient = 25.6.  LV diastolic dysfunction (grade 1) impaired laxation.  RVSP due to TR normal <35.  3. Atrial fibrillation  1. UHG1SG5-VLAu score = 4  2. 7/28 AVN RFA and implantation of ST Dave biventricular pacemaker/ICD-GFT  4. Intermittent epistaxis while on heparin therapy  1. Requiring Rhino Rocket placement  5. 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  Residual left upper extremity weakness.   6. Hypertension  1. Angioedema - likely secondary to lisinopril  7. HL  1. 10/18 164/61/53/104  2. 7/22 122/40/63/49  6. 7/6/2021 positive MSSA (tracheal wound and sputum) and MRSA pneumonia per bronchoscopy (while intubated secondary to V. fib arrest)  7. 12/2/2021 and 12/4/2021 patient admitted to [facility] for acute anemia requiring Kcentra and 1 PRBC.  GI consulted but patient left AMA before further treatment and evaluation.  1. 8/10/2022 [facility] ER evaluation for hematemesis ZOHAIB, and chest pain.  Patient left AMA.  (Patient had been taking significant amount of ibuprofen)  8. 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 Depression/anxiety  10. History of opioid dependence-in remission  11. Status post surgery:  a. Left inguinal herniorrhaphy, August 2014.   b. Left forehead melanoma excision, Dr. Gisell Kothari, August 2014        CC:   Chief Complaint   Patient presents with   • Nonrheumatic aortic valve stenosis       Allergies  Allergies   Allergen Reactions   • Lisinopril Angioedema   • Meclizine Hcl Itching       Current Medications    Current Outpatient Medications:   •  acetaminophen (TYLENOL) 500 MG tablet, Take 1 tablet by mouth Every 6 (Six) Hours As Needed for Mild Pain., Disp: , Rfl:   •  amiodarone (PACERONE) 200 MG tablet, Take 0.5 tablets by mouth Daily. NEED APPOINTMENT FOR FURTHER REFILLS, Disp: 15 tablet, Rfl: 0  •  bumetanide (BUMEX) 1 MG tablet, Take 2 tablets in am and 1 tablet in the afternoon, Disp: 90  tablet, Rfl: 5  •  buprenorphine-naloxone (SUBOXONE) 8-2 MG per SL tablet, Place 2 tablets under the tongue Daily. weening, Disp: , Rfl:   •  buPROPion XL (WELLBUTRIN XL) 300 MG 24 hr tablet, TAKE ONE TABLET BY MOUTH DAILY, Disp: 30 tablet, Rfl: 5  •  ferrous gluconate (FERGON) 324 MG tablet, Take 1 tablet by mouth Every Other Day., Disp: 30 tablet, Rfl: 2  •  Semaglutide,0.25 or 0.5MG/DOS, (Ozempic, 0.25 or 0.5 MG/DOSE,) 2 MG/1.5ML solution pen-injector, Inject 0.25 mg under the skin into the appropriate area as directed 1 (One) Time Per Week., Disp: , Rfl:   •  tadalafil (ADCIRCA) 20 MG tablet tablet, Take 5 mg by mouth Daily., Disp: , Rfl:   •  warfarin (COUMADIN) 7.5 MG tablet, Take one tablet by mouth daily until repeat INR. Unable to fill for further refills until INR is checked., Disp: 5 tablet, Rfl: 0  •  warfarin (COUMADIN) 7.5 MG tablet, TAKE ONE TABLET BY MOUTH DAILY OR AS DIRECTED BY ANTICOAGULATION CLINIC., Disp: 30 tablet, Rfl: 1  •  carvedilol (COREG) 25 MG tablet, Take 1 tablet by mouth 2 (Two) Times a Day., Disp: 180 tablet, Rfl: 3  •  spironolactone (ALDACTONE) 25 MG tablet, Take 1 tablet by mouth Daily., Disp: 90 tablet, Rfl: 3      History of Present Illness   Mitchell Alberto is a 57 y.o. year old male here for follow up here for follow up on AS s/p mechanical AVR, paroxysmal atrial fibrillation, and heart failure with recovered EF s/p BiV PPM/ICD.  Patient gained substantial amount of weight after his wife  over a year ago.  He has lost 30 pounds over the last 2 months by dietary changes and cutting out carbohydrates.  He has started Ozempic.  His device interrogation showed a 10-second run of V. tach that spontaneously resolved without shock.  His lower extremity edema has greatly improved.  He occasionally has some shortness of breath with activity that is stable without crescendo pattern.    Pt denies any chest pain, orthopnea, PND, palpitations, or claudication.      OBJECTIVE:  Vitals:  "   03/31/23 1527   BP: (!) 156/108   BP Location: Left arm   Patient Position: Sitting   Pulse: 76   SpO2: 93%   Weight: (!) 153 kg (338 lb)   Height: 195.6 cm (77\")     Body mass index is 40.08 kg/m².    Constitutional:       Appearance: Not in distress.      Comments: BMI 40     Pulmonary:      Effort: Pulmonary effort is normal.      Breath sounds: Normal breath sounds. No wheezing. No rhonchi. No rales.   Chest:      Chest wall: Not tender to palpatation.   Cardiovascular:      PMI at left midclavicular line. Normal rate. Regular rhythm. Normal S1. Normal S2.      Murmurs: There is no murmur.      No gallop. Midsystolic click. No rub.   Pulses:     Intact distal pulses.   Edema:     Peripheral edema present.     Pretibial: bilateral 1+ pitting edema of the pretibial area.     Ankle: bilateral 1+ edema of the ankle.     Feet: bilateral 1+ pitting edema of the feet.  Abdominal:      Palpations: Abdomen is soft.      Tenderness: There is no abdominal tenderness.   Musculoskeletal: Normal range of motion.         General: No tenderness. Neurological:      General: No focal deficit present.      Mental Status: Alert and oriented to person, place and time.         Diagnostic Data:  Procedures    Device interrogation: [employer] Dave BiV ICD, atrial paced >99%, biventricular paced greater than 97%, threshold and impedance reviewed necessary changes were made, 1 mode switch for 4 seconds, 1 event of V. tach for 10 seconds resolved and some without shock.    ASSESSMENT:   Diagnosis Plan   1. Aortic valve insufficiency, etiology of cardiac valve disease unspecified  Adult Transthoracic Echo Complete w/ Color, Spectral and Contrast if necessary per protocol      2. Primary hypertension        3. S/P AVR (aortic valve replacement)        4. Presence of automatic (implantable) cardiac defibrillator        5. Mechanical heart valve present        6. Dilated cardiomyopathy (HCC)        7. Chronic anticoagulation        8. History " of ventricular fibrillation            PLAN:  Severe aortic insufficiency s/p Bentall procedure with mechanical AVR-patient's heart failure symptoms stable NYHA class II.  We will repeat echocardiogram at follow-up appointment in 6 months.  Warfarin management per anticoagulation clinic.    Dilated cardiomyopathy/chronic systolic heart failure with recovered EF-Echo 6/22 normal ejection fraction.  History of V-fib cardiac arrest s/p BiV ICD and amiodarone therapy.  Device interrogation did reveal 10-second run of VT that resolved spontaneously without shock.  Patient serious about getting his device transmission set up at this time.  Device rep discussed with device clinic to facilitate it set up.    Hypertension- The patient reports he had an incident where his face swelled up that was thought to be due to angioedema from lisinopril.  He was changed to carvedilol 12.5.  He notes some blood pressures at other medical appointments as high as 200 systolic.  His elevated on presentation today.  We will  increase his carvedilol to 25 mg twice daily and add spironolactone 25 mg. Patient to check BP at home and dnotify us if SBP consistently <100 or >140.    Scribed by Rosas Velásquez PA-C for Mike Hollis MD, FACC

## 2023-03-31 ENCOUNTER — ANTICOAGULATION VISIT (OUTPATIENT)
Dept: PHARMACY | Facility: HOSPITAL | Age: 58
End: 2023-03-31
Payer: MEDICARE

## 2023-03-31 ENCOUNTER — OFFICE VISIT (OUTPATIENT)
Dept: CARDIOLOGY | Facility: CLINIC | Age: 58
End: 2023-03-31
Payer: MEDICARE

## 2023-03-31 ENCOUNTER — TELEPHONE (OUTPATIENT)
Dept: CARDIOLOGY | Facility: CLINIC | Age: 58
End: 2023-03-31

## 2023-03-31 VITALS
HEIGHT: 77 IN | HEART RATE: 76 BPM | OXYGEN SATURATION: 93 % | WEIGHT: 315 LBS | DIASTOLIC BLOOD PRESSURE: 108 MMHG | BODY MASS INDEX: 37.19 KG/M2 | SYSTOLIC BLOOD PRESSURE: 156 MMHG

## 2023-03-31 DIAGNOSIS — Z95.810 PRESENCE OF AUTOMATIC (IMPLANTABLE) CARDIAC DEFIBRILLATOR: ICD-10-CM

## 2023-03-31 DIAGNOSIS — I42.0 DILATED CARDIOMYOPATHY: ICD-10-CM

## 2023-03-31 DIAGNOSIS — Z95.2 S/P AVR (AORTIC VALVE REPLACEMENT): ICD-10-CM

## 2023-03-31 DIAGNOSIS — Z86.79 HISTORY OF VENTRICULAR FIBRILLATION: ICD-10-CM

## 2023-03-31 DIAGNOSIS — I10 PRIMARY HYPERTENSION: ICD-10-CM

## 2023-03-31 DIAGNOSIS — Z95.2 MECHANICAL HEART VALVE PRESENT: ICD-10-CM

## 2023-03-31 DIAGNOSIS — I35.1 AORTIC VALVE INSUFFICIENCY, ETIOLOGY OF CARDIAC VALVE DISEASE UNSPECIFIED: Primary | ICD-10-CM

## 2023-03-31 DIAGNOSIS — I48.91 ATRIAL FIBRILLATION, UNSPECIFIED TYPE: Primary | ICD-10-CM

## 2023-03-31 DIAGNOSIS — Z79.01 CHRONIC ANTICOAGULATION: ICD-10-CM

## 2023-03-31 LAB
INR PPP: 3 (ref 0.91–1.09)
PROTHROMBIN TIME: 35.8 SECONDS (ref 10–13.8)

## 2023-03-31 PROCEDURE — 36416 COLLJ CAPILLARY BLOOD SPEC: CPT

## 2023-03-31 PROCEDURE — 85610 PROTHROMBIN TIME: CPT

## 2023-03-31 PROCEDURE — G0463 HOSPITAL OUTPT CLINIC VISIT: HCPCS

## 2023-03-31 RX ORDER — CARVEDILOL 25 MG/1
25 TABLET ORAL 2 TIMES DAILY
Qty: 180 TABLET | Refills: 3 | Status: SHIPPED | OUTPATIENT
Start: 2023-03-31

## 2023-03-31 RX ORDER — SPIRONOLACTONE 25 MG/1
25 TABLET ORAL DAILY
Qty: 90 TABLET | Refills: 3 | Status: SHIPPED | OUTPATIENT
Start: 2023-03-31

## 2023-03-31 RX ORDER — BUMETANIDE 1 MG/1
TABLET ORAL
Qty: 90 TABLET | Refills: 5 | Status: SHIPPED | OUTPATIENT
Start: 2023-03-31

## 2023-03-31 RX ORDER — WARFARIN SODIUM 7.5 MG/1
TABLET ORAL
Qty: 30 TABLET | Refills: 1 | Status: SHIPPED | OUTPATIENT
Start: 2023-03-31

## 2023-03-31 RX ORDER — DOXYCYCLINE HYCLATE 50 MG/1
324 CAPSULE, GELATIN COATED ORAL EVERY OTHER DAY
Qty: 30 TABLET | Refills: 2 | Status: SHIPPED | OUTPATIENT
Start: 2023-03-31

## 2023-03-31 RX ORDER — AMIODARONE HYDROCHLORIDE 200 MG/1
100 TABLET ORAL DAILY
Qty: 15 TABLET | Refills: 0 | Status: SHIPPED | OUTPATIENT
Start: 2023-03-31

## 2023-03-31 RX ORDER — SEMAGLUTIDE 1.34 MG/ML
0.25 INJECTION, SOLUTION SUBCUTANEOUS WEEKLY
COMMUNITY

## 2023-03-31 NOTE — TELEPHONE ENCOUNTER
Pt had in office follow up appointment today with Dr. Hollis and a cardiac device check.     Yecenia Johnson RN, [employer] device rep, performed in office device interrogation & encouraged pt to call [employer] customer service, remote monitoring division of [employer], to set up remote device home monitoring via [employer] company provided smart phone.

## 2023-03-31 NOTE — PROGRESS NOTES
Anticoagulation Clinic - Remote Progress Note  [facility]  Testing frequency: 7 days    Indication: Atrial Fibrillation and Mechanical Aortic Valve; h/o embolic CVA  Referring Provider: Bunny last seen 3/31/2023;  Initial Warfarin Start Date: 11/2012  Planned Duration of Therapy: Life  Goal INR: 2.5-3.5  Current Drug Interactions: escitalopram, aspirin, amiodarone, pantoprazole  CHADS-VASc: 3 (HTN, h/o CVA)     Vit K Diet: patient eats, goes through spurts when he tries to eat healthier 3/3/2021  Alcohol: none  Tobacco: none       Anticoagulation Clinic INR History:      Date 10/2 10/10 11/4 11/12 11/22 12/10 1/3/20 1/22 1/30 2/3 2/18 2/27   Total Weekly Dose 80mg 80mg 62.5mg 85mg 82.5mg 70mg 72.5mg 72.5mg 82.5mg 77.5mg 75mg 75mg   INR  3.9 3.6 1.7 3.4 4.7 2.4 3.6 1.3 2.3 3.2 3.1 3.2   Notes   1x miss self adj  self adj  lovenox         Date 4/8 4/29 5/18 6/20  8/3 8/17 10/5 10/19 11/11 12/21    Total Weekly Dose 70mg 70mg 65mg 65mg Non- compliant 65mg 65 mg 65mg 65mg 65mg 65mg Non- compliant   INR 4.3 4.1 4.5 3.0  3.0 3.8 3.0 2.9 4.1 2.3    Notes Self adj; Levaquin decr GLV; COVID+  recv'd 6/22; misdose       incr GLV      Date 1/20/21 1/21 1/25 2/1 2/16 3/3 3/22 4/13  4/26 5/14 5/21   Total WeeklyDose 40mg 45mg? 62.5mg 80mg? 80mg   70mg 67.5mg  47.5mg  67.5 mg   INR 1.2 1.2 maia 1.6 2.9 3.7  4.5 3.2 3.3  1.8 4.5 3.1   Notes Self held; augmentin 1x incr dose; lovenox 2x miss,   1x misdose  amox amox  self adj 5x hold, enox enox/norco, augmentin       Date 6/28  8/4 8/6 8/9 8/20 9/3  11/9 11/16 11/30 12/2   Total WeeklyDose 67.5 mg BH Barrett   6/30-7/29 SJ CCH 32.5 mg 47.5mg 51.25 mg 52.5mg 52.5mg? Non compliant 35mg? 70mg? 52.5mg BHL- left AMA   INR 3.9  2.2 3.0 2.9 4.8 2.6  2.0 3.9 4.3    Notes  amio init enox enox enox    Rec 11/16   GI bleed       Date 12/4 12/6 1/25 2/10  2/24 3/25 4/5 6/22 7/28 8/9 8/10   Total WeeklyDose BHL- left AMA 7.5mg ?? 52.5mg ????  52.5 mg 52.5 mg 53.75mg 45 mg 52.5mg 52.5mg    INR  1.1 1.7 5.6  1.8 2.0 1.8 2.4 1.7 2.7 3.0   Notes AMS/GI Bleed  rec 1/26 [facility] COVID + suboxone  1x miss enox  Clinic, 1x misdose 1x miss  ED-      Date 10/18 11/1 12/20 1/20 2/14 3/31         Total WeeklyDose 52.5 mg 52.5 mg 52.5 mg 52.5 mg 52.5 mg 52.5mg         INR 3.0 3.1 2.7 2.9 3.0 3.0         Notes 10/19   rec'd 1/23 rec'd 2/15            Phone Interview:  Verbal Release Authorization signed on 11/7/18 and 3/31/2023 (may speak with sister in emergency)  Tablet Strength:   7.5 mg  Patient Contact: Cell: [phone] Home: [phone] -- try to call in PM Email: [email or web address]    Patient Findings  Negatives:  Signs/symptoms of thrombosis, Signs/symptoms of bleeding, Laboratory test error suspected, Change in health, Change in alcohol use, Change in activity, Upcoming invasive procedure, Emergency department visit, Upcoming dental procedure, Missed doses, Extra doses, Change in medications, Change in diet/appetite, Hospital admission, Bruising, Other complaints   Comments:  Patient reports that he is watching what he eats and lost weight. He may have fluid pill change. Going to see Dr. Hollis today. Pt is taking Ozempic and started taking 2 days ago.     Plan:   1. INR was therapeutic on 2/14 at 3.0, received 2/15 (goal 2.5-3.5). Instructed Mr. Alberto to continue warfarin 7.5mg daily until recheck.   2. Recheck in two weeks, 4/12/2023 with home monitor.  3. Patient is going to get new test strips from [facility] and plans to get new test strips. Understands need to be compliant.   4.  Verbal and written  information provided in the clinic.  Mitchell Alberto RBV dosing instructions, expresses understanding by teach back, and has no further questions at this time.    Do Vargas, PharmD  3/31/2023  14:36 EDT

## 2023-04-10 ENCOUNTER — ANTICOAGULATION VISIT (OUTPATIENT)
Dept: PHARMACY | Facility: HOSPITAL | Age: 58
End: 2023-04-10
Payer: MEDICARE

## 2023-04-10 DIAGNOSIS — I48.91 ATRIAL FIBRILLATION, UNSPECIFIED TYPE: Primary | ICD-10-CM

## 2023-04-10 DIAGNOSIS — Z95.2 S/P AVR (AORTIC VALVE REPLACEMENT): ICD-10-CM

## 2023-04-10 LAB — INR PPP: 3

## 2023-04-10 NOTE — PROGRESS NOTES
Anticoagulation Clinic - Remote Progress Note  [facility]  Testing frequency: 7 days    Indication: Atrial Fibrillation and Mechanical Aortic Valve; h/o embolic CVA  Referring Provider: Bunny last seen 3/31/2023;  Initial Warfarin Start Date: 11/2012  Planned Duration of Therapy: Life  Goal INR: 2.5-3.5  Current Drug Interactions: escitalopram, aspirin, amiodarone, pantoprazole  CHADS-VASc: 3 (HTN, h/o CVA)     Vit K Diet: patient eats, goes through spurts when he tries to eat healthier 3/3/2021  Alcohol: none  Tobacco: none       Anticoagulation Clinic INR History:      Date 10/2 10/10 11/4 11/12 11/22 12/10 1/3/20 1/22 1/30 2/3 2/18 2/27   Total Weekly Dose 80mg 80mg 62.5mg 85mg 82.5mg 70mg 72.5mg 72.5mg 82.5mg 77.5mg 75mg 75mg   INR  3.9 3.6 1.7 3.4 4.7 2.4 3.6 1.3 2.3 3.2 3.1 3.2   Notes   1x miss self adj  self adj  lovenox         Date 4/8 4/29 5/18 6/20  8/3 8/17 10/5 10/19 11/11 12/21    Total Weekly Dose 70mg 70mg 65mg 65mg Non- compliant 65mg 65 mg 65mg 65mg 65mg 65mg Non- compliant   INR 4.3 4.1 4.5 3.0  3.0 3.8 3.0 2.9 4.1 2.3    Notes Self adj; Levaquin decr GLV; COVID+  recv'd 6/22; misdose       incr GLV      Date 1/20/21 1/21 1/25 2/1 2/16 3/3 3/22 4/13  4/26 5/14 5/21   Total WeeklyDose 40mg 45mg? 62.5mg 80mg? 80mg   70mg 67.5mg  47.5mg  67.5 mg   INR 1.2 1.2 maia 1.6 2.9 3.7  4.5 3.2 3.3  1.8 4.5 3.1   Notes Self held; augmentin 1x incr dose; lovenox 2x miss,   1x misdose  amox amox  self adj 5x hold, enox enox/norco, augmentin       Date 6/28  8/4 8/6 8/9 8/20 9/3  11/9 11/16 11/30 12/2   Total WeeklyDose 67.5 mg BH Barrett   6/30-7/29 SJ CCH 32.5 mg 47.5mg 51.25 mg 52.5mg 52.5mg? Non compliant 35mg? 70mg? 52.5mg BHL- left AMA   INR 3.9  2.2 3.0 2.9 4.8 2.6  2.0 3.9 4.3    Notes  amio init enox enox enox    Rec 11/16   GI bleed       Date 12/4 12/6 1/25 2/10  2/24 3/25 4/5 6/22 7/28 8/9 8/10   Total WeeklyDose BHL- left AMA 7.5mg ?? 52.5mg ????  52.5 mg 52.5 mg 53.75mg 45 mg 52.5mg 52.5mg    INR  1.1 1.7 5.6  1.8 2.0 1.8 2.4 1.7 2.7 3.0   Notes AMS/GI Bleed  rec 1/26 [facility] COVID + suboxone  1x miss enox  Clinic, 1x misdose 1x   ED-      Date 10/18 11/1 12/20 1/20 2/14 3/31 4/10        Total WeeklyDose 52.5 mg 52.5 mg 52.5 mg 52.5 mg 52.5 mg 52.5mg 52.5mg        INR 3.0 3.1 2.7 2.9 3.0 3.0 3.0        Notes 10/19   rec'd 1/23 rec'd 2/15            Phone Interview:  Verbal Release Authorization signed on 11/7/18 and 3/31/2023 (may speak with sister in emergency)  Tablet Strength:   7.5 mg  Patient Contact: Cell: [phone] Home: [phone] -- try to call in PM Email: [email or web address]    Patient Findings  Negatives:  Signs/symptoms of thrombosis, Signs/symptoms of bleeding, Laboratory test error suspected, Change in health, Change in alcohol use, Change in activity, Upcoming invasive procedure, Emergency department visit, Upcoming dental procedure, Missed doses, Extra doses, Change in medications, Change in diet/appetite, Hospital admission, Bruising, Other complaints   Comments:  Pt started ozempic two weeks ago. Pt could not recall when INR was from. Please refer to plan.     Plan:   1. INR was therapeutic on 2/14 at 3.0, received 2/15 (goal 2.5-3.5). Pt answered and states he will call back. Patient did not call back. I called patient back on 4/14- he could not recall when INR was from. Per [facility] it was reported on 3.0. Patient reports he takes 7.5mg daily of warfarin.   2. Recheck on Sunday. Will receive results on Monday.  3. He reports he received test strips today.  4.  Verbal and written  information provided in the clinic.  Mitchell Alberto RBV dosing instructions, expresses understanding by teach back, and has no further questions at this time.    Do Vargas, PharmD  4/10/2023  14:57 EDT

## 2023-04-12 RX ORDER — AMIODARONE HYDROCHLORIDE 200 MG/1
100 TABLET ORAL DAILY
Qty: 45 TABLET | Refills: 3 | Status: SHIPPED | OUTPATIENT
Start: 2023-04-12

## 2023-04-25 ENCOUNTER — ANTICOAGULATION VISIT (OUTPATIENT)
Dept: PHARMACY | Facility: HOSPITAL | Age: 58
End: 2023-04-25
Payer: MEDICARE

## 2023-04-25 DIAGNOSIS — Z95.2 S/P AVR (AORTIC VALVE REPLACEMENT): Primary | ICD-10-CM

## 2023-04-25 LAB — INR PPP: 7.8

## 2023-04-25 NOTE — PROGRESS NOTES
Anticoagulation Clinic - Remote Progress Note  [facility]  Testing frequency: 7 days    Indication: Atrial Fibrillation and Mechanical Aortic Valve; h/o embolic CVA  Referring Provider: Bunny last seen 3/31/2023;  Initial Warfarin Start Date: 11/2012  Planned Duration of Therapy: Life  Goal INR: 2.5-3.5  Current Drug Interactions: escitalopram, aspirin, amiodarone, pantoprazole  CHADS-VASc: 3 (HTN, h/o CVA)     Vit K Diet: patient eats, goes through spurts when he tries to eat healthier 3/3/2021  Alcohol: none  Tobacco: none       Anticoagulation Clinic INR History:      Date 10/2 10/10 11/4 11/12 11/22 12/10 1/3/20 1/22 1/30 2/3 2/18 2/27   Total Weekly Dose 80mg 80mg 62.5mg 85mg 82.5mg 70mg 72.5mg 72.5mg 82.5mg 77.5mg 75mg 75mg   INR  3.9 3.6 1.7 3.4 4.7 2.4 3.6 1.3 2.3 3.2 3.1 3.2   Notes   1x miss self adj  self adj  lovenox         Date 4/8 4/29 5/18 6/20  8/3 8/17 10/5 10/19 11/11 12/21    Total Weekly Dose 70mg 70mg 65mg 65mg Non- compliant 65mg 65 mg 65mg 65mg 65mg 65mg Non- compliant   INR 4.3 4.1 4.5 3.0  3.0 3.8 3.0 2.9 4.1 2.3    Notes Self adj; Levaquin decr GLV; COVID+  recv'd 6/22; misdose       incr GLV      Date 1/20/21 1/21 1/25 2/1 2/16 3/3 3/22 4/13  4/26 5/14 5/21   Total WeeklyDose 40mg 45mg? 62.5mg 80mg? 80mg   70mg 67.5mg  47.5mg  67.5 mg   INR 1.2 1.2 maia 1.6 2.9 3.7  4.5 3.2 3.3  1.8 4.5 3.1   Notes Self held; augmentin 1x incr dose; lovenox 2x miss,   1x misdose  amox amox  self adj 5x hold, enox enox/norco, augmentin       Date 6/28  8/4 8/6 8/9 8/20 9/3  11/9 11/16 11/30 12/2   Total WeeklyDose 67.5 mg BH Barrett   6/30-7/29 SJ CCH 32.5 mg 47.5mg 51.25 mg 52.5mg 52.5mg? Non compliant 35mg? 70mg? 52.5mg BHL- left AMA   INR 3.9  2.2 3.0 2.9 4.8 2.6  2.0 3.9 4.3    Notes  amio init enox enox enox    Rec 11/16   GI bleed       Date 12/4 12/6 1/25 2/10  2/24 3/25 4/5 6/22 7/28 8/9 8/10   Total WeeklyDose BHL- left AMA 7.5mg ?? 52.5mg ????  52.5 mg 52.5 mg 53.75mg 45 mg 52.5mg 52.5mg    INR  1.1 1.7 5.6  1.8 2.0 1.8 2.4 1.7 2.7 3.0   Notes AMS/GI Bleed  rec 1/26 [facility] COVID + suboxone  1x  enox  Clinic, 1x misdose 1x   ED-      Date 10/18 11/1 12/20 1/20 2/14 3/31 4/10 4/25       Total WeeklyDose 52.5 mg 52.5 mg 52.5 mg 52.5 mg 52.5 mg 52.5mg 52.5mg 52.5mg       INR 3.0 3.1 2.7 2.9 3.0 3.0 3.0 7.8       Notes 10/19   rec'd 1/23 rec'd 2/15   RSV azith doxy hematuria         Phone Interview:  Verbal Release Authorization signed on 11/7/18 and 3/31/2023 (may speak with sister in emergency)  Tablet Strength:   7.5 mg  Patient Contact: Cell: [phone] Home: [phone] -- try to call in PM Email: [email or web address]    Patient Findings    Positives:  Signs/symptoms of bleeding, Change in health, Change in medications   Negatives:  Signs/symptoms of thrombosis, Laboratory test error suspected, Change in alcohol use, Change in activity, Upcoming invasive procedure, Emergency department visit, Upcoming dental procedure, Missed doses, Extra doses, Change in diet/appetite, Hospital admission, Bruising, Other complaints   Comments:  RSV ~ 3 weeks ago, finally feeling better today; had been prescribed azithro x3 days. Initiated doxycycline bid x10 days 4/20/23. Blood in urine appeared yesterday AM, continued throughout day   Discussed to monitor for s/sx of bleeding including epistaxis, hematuria, unusual bruising, hemoptysis, hematochezia as well as s/sx of stroke including impaired speech, unilateral paralysis, blurry vision and when to seek medical attention     Patient agreeable to go to ED to assess hematuria + coorelate INR with venipuncture       Plan:   1. INR was critically elevated at 7.8 today on patient HM (goal 2.5-3.5).Patient to HOLD warfarin x2 days.   2. Recheck on Thursday, will keep clinic informed of findings from ED  3. Verbal  information provided over the phone.  Mitchell Alberto RBV dosing instructions, expresses understanding by teach back, and has no further questions  at this time.      Sonya Bustillos, GiovaniD.  04/25/23   10:54 EDT

## 2023-04-28 ENCOUNTER — ANTICOAGULATION VISIT (OUTPATIENT)
Dept: PHARMACY | Facility: HOSPITAL | Age: 58
End: 2023-04-28
Payer: MEDICARE

## 2023-04-28 DIAGNOSIS — Z95.2 S/P AVR (AORTIC VALVE REPLACEMENT): Primary | ICD-10-CM

## 2023-04-28 LAB — INR PPP: 1.2

## 2023-04-28 RX ORDER — ENOXAPARIN SODIUM 150 MG/ML
1 INJECTION SUBCUTANEOUS EVERY 12 HOURS
Qty: 3 ML | Refills: 0 | Status: SHIPPED | OUTPATIENT
Start: 2023-04-28 | End: 2023-04-30

## 2023-04-28 NOTE — PROGRESS NOTES
Anticoagulation Clinic - Remote Progress Note  [facility]  Testing frequency: 7 days    Indication: Atrial Fibrillation and Mechanical Aortic Valve; h/o embolic CVA  Referring Provider: Bunny last seen 3/31/2023;  Initial Warfarin Start Date: 11/2012  Planned Duration of Therapy: Life  Goal INR: 2.5-3.5  Current Drug Interactions: escitalopram, aspirin, amiodarone, pantoprazole  CHADS-VASc: 3 (HTN, h/o CVA)     Vit K Diet: patient eats, goes through spurts when he tries to eat healthier 3/3/2021  Alcohol: none  Tobacco: none       Anticoagulation Clinic INR History:      Date 10/2 10/10 11/4 11/12 11/22 12/10 1/3/20 1/22 1/30 2/3 2/18 2/27   Total Weekly Dose 80mg 80mg 62.5mg 85mg 82.5mg 70mg 72.5mg 72.5mg 82.5mg 77.5mg 75mg 75mg   INR  3.9 3.6 1.7 3.4 4.7 2.4 3.6 1.3 2.3 3.2 3.1 3.2   Notes   1x miss self adj  self adj  lovenox         Date 4/8 4/29 5/18 6/20  8/3 8/17 10/5 10/19 11/11 12/21    Total Weekly Dose 70mg 70mg 65mg 65mg Non- compliant 65mg 65 mg 65mg 65mg 65mg 65mg Non- compliant   INR 4.3 4.1 4.5 3.0  3.0 3.8 3.0 2.9 4.1 2.3    Notes Self adj; Levaquin decr GLV; COVID+  recv'd 6/22; misdose       incr GLV      Date 1/20/21 1/21 1/25 2/1 2/16 3/3 3/22 4/13  4/26 5/14 5/21   Total WeeklyDose 40mg 45mg? 62.5mg 80mg? 80mg   70mg 67.5mg  47.5mg  67.5 mg   INR 1.2 1.2 maia 1.6 2.9 3.7  4.5 3.2 3.3  1.8 4.5 3.1   Notes Self held; augmentin 1x incr dose; lovenox 2x miss,   1x misdose  amox amox  self adj 5x hold, enox enox/norco, augmentin       Date 6/28  8/4 8/6 8/9 8/20 9/3  11/9 11/16 11/30 12/2   Total WeeklyDose 67.5 mg BH Barrett   6/30-7/29 [facility] 32.5 mg 47.5mg 51.25 mg 52.5mg 52.5mg? Non compliant 35mg? 70mg? 52.5mg BHL- left AMA   INR 3.9  2.2 3.0 2.9 4.8 2.6  2.0 3.9 4.3    Notes  amio init enox enox enox    Rec 11/16   GI bleed       Date 12/4 12/6 1/25 2/10  2/24 3/25 4/5 6/22 7/28 8/9 8/10   Total WeeklyDose BHL- left AMA 7.5mg ?? 52.5mg ????  52.5 mg 52.5 mg 53.75mg 45 mg 52.5mg 52.5mg    INR  1.1 1.7 5.6  1.8 2.0 1.8 2.4 1.7 2.7 3.0   Notes AMS/GI Bleed  rec 1/26 [facility] COVID + suboxone  1x  enox  Clinic, 1x misdose 1x   ED-      Date 10/18 11/1 12/20 1/20 2/14 3/31 4/10 4/25 4/28      Total WeeklyDose 52.5 mg 52.5 mg 52.5 mg 52.5 mg 52.5 mg 52.5mg 52.5mg 52.5mg       INR 3.0 3.1 2.7 2.9 3.0 3.0 3.0 7.8 1.2      Notes 10/19   rec'd 1/23 rec'd 2/15   RSV azith doxy hematuria         Phone Interview:  Verbal Release Authorization signed on 11/7/18 and 3/31/2023 (may speak with sister in emergency)  Tablet Strength:   7.5 mg  Patient Contact: Cell: [phone] Home: [phone] -- try to call in PM Email: [email or web address]    Patient Findings  Positives:  Change in medications   Negatives:  Signs/symptoms of thrombosis, Signs/symptoms of bleeding, Laboratory test error suspected, Change in health, Change in alcohol use, Change in activity, Upcoming invasive procedure, Emergency department visit, Upcoming dental procedure, Missed doses, Extra doses, Change in diet/appetite, Hospital admission, Bruising, Other complaints   Comments:  Cannot afford lovenox, aware of increased risk of thrombus/stroke- Agreeable to  #3 syringes   Reports he is feeling better, has 7 days remaining of levofloxacin 500mg (previously reported being on doxycycline)   Hematuria resolved   Took 11.25 mg 4/27       Plan:   1. INR was baseline at 1.2 today on patient HM (goal 2.5-3.5).Patient to BOOST tonight's dose to 11.25mg then resume warfarin 7.5mg daily. Inject lovenox 1mg/kg q12h x3 doses   2. Recheck on Monday  3. Verbal  information provided over the phone.  Mitchell Alberto RBV dosing instructions, expresses understanding by teach back, and has no further questions at this time.    Giovani RoeD.  04/28/23   13:14 EDT

## 2023-05-01 ENCOUNTER — ANTICOAGULATION VISIT (OUTPATIENT)
Dept: PHARMACY | Facility: HOSPITAL | Age: 58
End: 2023-05-01
Payer: MEDICARE

## 2023-05-01 DIAGNOSIS — Z95.2 S/P AVR (AORTIC VALVE REPLACEMENT): Primary | ICD-10-CM

## 2023-05-01 LAB — INR PPP: 2

## 2023-05-01 NOTE — PROGRESS NOTES
Anticoagulation Clinic - Remote Progress Note  [facility]  Testing frequency: 7 days    Indication: Atrial Fibrillation and Mechanical Aortic Valve; h/o embolic CVA  Referring Provider: Bunny last seen 3/31/2023;  Initial Warfarin Start Date: 11/2012  Planned Duration of Therapy: Life  Goal INR: 2.5-3.5  Current Drug Interactions: escitalopram, aspirin, amiodarone, pantoprazole  CHADS-VASc: 3 (HTN, h/o CVA)     Vit K Diet: patient eats, goes through spurts when he tries to eat healthier 3/3/2021  Alcohol: none  Tobacco: none       Anticoagulation Clinic INR History:      Date 10/2 10/10 11/4 11/12 11/22 12/10 1/3/20 1/22 1/30 2/3 2/18 2/27   Total Weekly Dose 80mg 80mg 62.5mg 85mg 82.5mg 70mg 72.5mg 72.5mg 82.5mg 77.5mg 75mg 75mg   INR  3.9 3.6 1.7 3.4 4.7 2.4 3.6 1.3 2.3 3.2 3.1 3.2   Notes   1x miss self adj  self adj  lovenox         Date 4/8 4/29 5/18 6/20  8/3 8/17 10/5 10/19 11/11 12/21    Total Weekly Dose 70mg 70mg 65mg 65mg Non- compliant 65mg 65 mg 65mg 65mg 65mg 65mg Non- compliant   INR 4.3 4.1 4.5 3.0  3.0 3.8 3.0 2.9 4.1 2.3    Notes Self adj; Levaquin decr GLV; COVID+  recv'd 6/22; misdose       incr GLV      Date 1/20/21 1/21 1/25 2/1 2/16 3/3 3/22 4/13  4/26 5/14 5/21   Total WeeklyDose 40mg 45mg? 62.5mg 80mg? 80mg   70mg 67.5mg  47.5mg  67.5 mg   INR 1.2 1.2 maia 1.6 2.9 3.7  4.5 3.2 3.3  1.8 4.5 3.1   Notes Self held; augmentin 1x incr dose; lovenox 2x miss,   1x misdose  amox amox  self adj 5x hold, enox enox/norco, augmentin       Date 6/28  8/4 8/6 8/9 8/20 9/3  11/9 11/16 11/30 12/2   Total WeeklyDose 67.5 mg BH Barrett   6/30-7/29 [facility] 32.5 mg 47.5mg 51.25 mg 52.5mg 52.5mg? Non compliant 35mg? 70mg? 52.5mg BHL- left AMA   INR 3.9  2.2 3.0 2.9 4.8 2.6  2.0 3.9 4.3    Notes  amio init enox enox enox    Rec 11/16   GI bleed       Date 12/4 12/6 1/25 2/10  2/24 3/25 4/5 6/22 7/28 8/9 8/10   Total WeeklyDose BHL- left AMA 7.5mg ?? 52.5mg ????  52.5 mg 52.5 mg 53.75mg 45 mg 52.5mg 52.5mg    INR  1.1 1.7 5.6  1.8 2.0 1.8 2.4 1.7 2.7 3.0   Notes AMS/GI Bleed  rec 1/26 [facility] COVID + suboxone  1x miss enox  Clinic, 1x misdose 1x miss  ED-      Date 10/18 11/1 12/20 1/20 2/14 3/31 4/10 4/25 4/28 5/2     Total WeeklyDose 52.5 mg 52.5 mg 52.5 mg 52.5 mg 52.5 mg 52.5mg 52.5mg 52.5mg 41.25mg 45 mg     INR 3.0 3.1 2.7 2.9 3.0 3.0 3.0 7.8 1.2 2.1     Notes 10/19   rec'd 1/23 rec'd 2/15   RSV azith doxy hematuria 2x hold LVQ 2x boost       Phone Interview:  Verbal Release Authorization signed on 11/7/18 and 3/31/2023 (may speak with sister in emergency)  Tablet Strength:   7.5 mg  Patient Contact: Cell: [phone] Home: [phone] -- try to call in PM Email: [email or web address]    Patient Findings    Positives:  Change in medications   Negatives:  Signs/symptoms of thrombosis, Signs/symptoms of bleeding, Laboratory test error suspected, Change in health, Change in alcohol use, Change in activity, Upcoming invasive procedure, Emergency department visit, Upcoming dental procedure, Missed doses, Extra doses, Change in diet/appetite, Hospital admission, Bruising, Other complaints   Comments:  Was unable to  lovenox. Reports he is feeling better and eating better but still fatigued, has 3 days remaining of levofloxacin 500mg          Plan:   1. INR remains subtherapeutic today at 2.0 on patient HM but improved (goal 2.5-3.5). Patient to continue warfarin 7.5mg daily.  2. Recheck on Thursday  3. Verbal  information provided over the phone.  Mitchell Alberto RBV dosing instructions, expresses understanding by teach back, and has no further questions at this time.    Sonya Bustillos, GiovaniD.  05/02/23   10:41 EDT

## 2023-05-02 RX ORDER — WARFARIN SODIUM 7.5 MG/1
TABLET ORAL
Qty: 30 TABLET | Refills: 1 | Status: SHIPPED | OUTPATIENT
Start: 2023-05-02

## 2023-06-06 ENCOUNTER — ANTICOAGULATION VISIT (OUTPATIENT)
Dept: PHARMACY | Facility: HOSPITAL | Age: 58
End: 2023-06-06
Payer: MEDICARE

## 2023-06-06 DIAGNOSIS — Z95.2 S/P AVR (AORTIC VALVE REPLACEMENT): Primary | ICD-10-CM

## 2023-06-06 LAB — INR PPP: 3.7

## 2023-06-06 NOTE — PROGRESS NOTES
Anticoagulation Clinic - Remote Progress Note  [facility]  Testing frequency: 7 days    Indication: Atrial Fibrillation and Mechanical Aortic Valve; h/o embolic CVA  Referring Provider: Bunny last seen 3/31/2023;  Initial Warfarin Start Date: 11/2012  Planned Duration of Therapy: Life  Goal INR: 2.5-3.5  Current Drug Interactions: escitalopram, aspirin, amiodarone, pantoprazole  CHADS-VASc: 3 (HTN, h/o CVA)     Vit K Diet: patient eats, goes through spurts when he tries to eat healthier 3/3/2021  Alcohol: none  Tobacco: none       Anticoagulation Clinic INR History:      Date 10/2 10/10 11/4 11/12 11/22 12/10 1/3/20 1/22 1/30 2/3 2/18 2/27   Total Weekly Dose 80mg 80mg 62.5mg 85mg 82.5mg 70mg 72.5mg 72.5mg 82.5mg 77.5mg 75mg 75mg   INR  3.9 3.6 1.7 3.4 4.7 2.4 3.6 1.3 2.3 3.2 3.1 3.2   Notes   1x miss self adj  self adj  lovenox         Date 4/8 4/29 5/18 6/20  8/3 8/17 10/5 10/19 11/11 12/21    Total Weekly Dose 70mg 70mg 65mg 65mg Non- compliant 65mg 65 mg 65mg 65mg 65mg 65mg Non- compliant   INR 4.3 4.1 4.5 3.0  3.0 3.8 3.0 2.9 4.1 2.3    Notes Self adj; Levaquin decr GLV; COVID+  recv'd 6/22; misdose       incr GLV      Date 1/20/21 1/21 1/25 2/1 2/16 3/3 3/22 4/13  4/26 5/14 5/21   Total WeeklyDose 40mg 45mg? 62.5mg 80mg? 80mg   70mg 67.5mg  47.5mg  67.5 mg   INR 1.2 1.2 maia 1.6 2.9 3.7  4.5 3.2 3.3  1.8 4.5 3.1   Notes Self held; augmentin 1x incr dose; lovenox 2x miss,   1x misdose  amox amox  self adj 5x hold, enox enox/norco, augmentin       Date 6/28  8/4 8/6 8/9 8/20 9/3  11/9 11/16 11/30 12/2   Total WeeklyDose 67.5 mg BH Barrett   6/30-7/29 [facility] 32.5 mg 47.5mg 51.25 mg 52.5mg 52.5mg? Non compliant 35mg? 70mg? 52.5mg BHL- left AMA   INR 3.9  2.2 3.0 2.9 4.8 2.6  2.0 3.9 4.3    Notes  amio init enox enox enox    Rec 11/16   GI bleed       Date 12/4 12/6 1/25 2/10  2/24 3/25 4/5 6/22 7/28 8/9 8/10   Total WeeklyDose BHL- left AMA 7.5mg ?? 52.5mg ????  52.5 mg 52.5 mg 53.75mg 45 mg 52.5mg 52.5mg    INR  1.1 1.7 5.6  1.8 2.0 1.8 2.4 1.7 2.7 3.0   Notes AMS/GI Bleed  rec 1/26 [facility] COVID + suboxone  1x miss enox  Clinic, 1x misdose 1x miss  ED-      Date 10/18 11/1 12/20 1/20 2/14 3/31 4/10 4/25 4/28 5/2  6/6   Total WeeklyDose 52.5 mg 52.5 mg 52.5 mg 52.5 mg 52.5 mg 52.5mg 52.5mg 52.5mg 41.25mg 45 mg Non compliant 48.75mg   INR 3.0 3.1 2.7 2.9 3.0 3.0 3.0 7.8 1.2 2.1  3.7   Notes 10/19   rec'd 1/23 rec'd 2/15   RSV azith doxy hematuria 2x hold LVQ 2x boost  1x miss 1x self adj     Phone Interview:  Verbal Release Authorization signed on 11/7/18 and 3/31/2023 (may speak with sister in emergency)  Tablet Strength:   7.5 mg  Patient Contact: Cell: [phone] Home: [phone] -- try to call in PM Email: [email or web address]    Patient Findings  Positives: Missed doses, Extra doses   Negatives: Signs/symptoms of thrombosis, Signs/symptoms of bleeding, Laboratory test error suspected, Change in health, Change in alcohol use, Change in activity, Upcoming invasive procedure, Emergency department visit, Upcoming dental procedure, Change in medications, Change in diet/appetite, Hospital admission, Bruising, Other complaints   Comments: Missed 1x dose took 11.25 mg next day  Off all abx and doing well         Plan:   1. INR is SUPRAtherapeutic today at 3.7 (goal 2.5-3.5). Patient was noncompliant with requested recheck date Patient to  reduce tonight's dose to 5mg then continue warfarin 7.5mg daily.  2. Recheck inr in 1 week  3. Verbal  information provided over the phone.  Mitchell Alberto RBV dosing instructions, expresses understanding by teach back, and has no further questions at this time.    Sonya Bustillos, PharmD.  06/06/23   11:53 EDT

## 2023-08-01 ENCOUNTER — ANTICOAGULATION VISIT (OUTPATIENT)
Dept: PHARMACY | Facility: HOSPITAL | Age: 58
End: 2023-08-01
Payer: MEDICARE

## 2023-08-01 DIAGNOSIS — Z95.2 S/P AVR (AORTIC VALVE REPLACEMENT): Primary | ICD-10-CM

## 2023-08-01 LAB — INR PPP: 6.3

## 2023-08-09 DIAGNOSIS — F32.A DEPRESSION, UNSPECIFIED DEPRESSION TYPE: ICD-10-CM

## 2023-08-09 RX ORDER — BUPROPION HYDROCHLORIDE 300 MG/1
TABLET ORAL
Qty: 30 TABLET | Refills: 0 | Status: SHIPPED | OUTPATIENT
Start: 2023-08-09

## 2023-08-09 NOTE — TELEPHONE ENCOUNTER
Rx Refill Note  Requested Prescriptions     Pending Prescriptions Disp Refills    buPROPion XL (WELLBUTRIN XL) 300 MG 24 hr tablet [Pharmacy Med Name: BUPROPION XL 300MG TABLETS] 30 tablet 5     Sig: TAKE 1 TABLET BY MOUTH DAILY      Last office visit with prescribing clinician: 9/9/2022   Last telemedicine visit with prescribing clinician: Visit date not found   Next office visit with prescribing clinician: Visit date not found                         Would you like a call back once the refill request has been completed: [] Yes [] No    If the office needs to give you a call back, can they leave a voicemail: [] Yes [] No    Jessica Grier MA  08/09/23, 12:43 EDT

## 2023-08-09 NOTE — TELEPHONE ENCOUNTER
CALLED AND INFORMED PT OF HIS MEDICATION REFILL AND HE WENT AHEAD AND SCHEDULED AN APPOINTMENT WITH HIS PCP.

## 2023-08-15 ENCOUNTER — ANTICOAGULATION VISIT (OUTPATIENT)
Dept: PHARMACY | Facility: HOSPITAL | Age: 58
End: 2023-08-15
Payer: MEDICARE

## 2023-08-15 DIAGNOSIS — Z95.2 S/P AVR (AORTIC VALVE REPLACEMENT): Primary | ICD-10-CM

## 2023-08-15 LAB — INR PPP: 5.2

## 2023-08-15 NOTE — PROGRESS NOTES
Anticoagulation Clinic - Remote Progress Note  [facility]  Testing frequency: 7 days    Indication: Atrial Fibrillation and Mechanical Aortic Valve; h/o embolic CVA  Referring Provider: Bunny last seen 3/31/2023;  Initial Warfarin Start Date: 11/2012  Planned Duration of Therapy: Life  Goal INR: 2.5-3.5  Current Drug Interactions: escitalopram, aspirin, amiodarone, pantoprazole  CHADS-VASc: 3 (HTN, h/o CVA)     Vit K Diet: patient eats, goes through spurts when he tries to eat healthier 3/3/2021  Alcohol: none  Tobacco: none       Anticoagulation Clinic INR History:      Date 10/2 10/10 11/4 11/12 11/22 12/10 1/3/20 1/22 1/30 2/3 2/18 2/27   Total Weekly Dose 80mg 80mg 62.5mg 85mg 82.5mg 70mg 72.5mg 72.5mg 82.5mg 77.5mg 75mg 75mg   INR  3.9 3.6 1.7 3.4 4.7 2.4 3.6 1.3 2.3 3.2 3.1 3.2   Notes   1x miss self adj  self adj  lovenox         Date 4/8 4/29 5/18 6/20  8/3 8/17 10/5 10/19 11/11 12/21    Total Weekly Dose 70mg 70mg 65mg 65mg Non- compliant 65mg 65 mg 65mg 65mg 65mg 65mg Non- compliant   INR 4.3 4.1 4.5 3.0  3.0 3.8 3.0 2.9 4.1 2.3    Notes Self adj; Levaquin decr GLV; COVID+  recv'd 6/22; misdose       incr GLV      Date 1/20/21 1/21 1/25 2/1 2/16 3/3 3/22 4/13  4/26 5/14 5/21   Total WeeklyDose 40mg 45mg? 62.5mg 80mg? 80mg   70mg 67.5mg  47.5mg  67.5 mg   INR 1.2 1.2 maia 1.6 2.9 3.7  4.5 3.2 3.3  1.8 4.5 3.1   Notes Self held; augmentin 1x incr dose; lovenox 2x miss,   1x misdose  amox amox  self adj 5x hold, enox enox/norco, augmentin       Date 6/28  8/4 8/6 8/9 8/20 9/3  11/9 11/16 11/30 12/2   Total WeeklyDose 67.5 mg BH Barrett   6/30-7/29 [facility] 32.5 mg 47.5mg 51.25 mg 52.5mg 52.5mg? Non compliant 35mg? 70mg? 52.5mg BHL- left AMA   INR 3.9  2.2 3.0 2.9 4.8 2.6  2.0 3.9 4.3    Notes  amio init enox enox enox    Rec 11/16   GI bleed       Date 12/4 12/6 1/25 2/10  2/24 3/25 4/5 6/22 7/28 8/9 8/10   Total WeeklyDose BHL- left AMA 7.5mg ?? 52.5mg ????  52.5 mg 52.5 mg 53.75mg 45 mg 52.5mg 52.5mg    INR  1.1 1.7 5.6  1.8 2.0 1.8 2.4 1.7 2.7 3.0   Notes AMS/GI Bleed  rec 1/26 [facility] COVID + suboxone  1x miss enox  Clinic, 1x misdose 1x miss  ED-      Date 10/18 11/1 12/20 1/20 2/14 3/31 4/10 4/25 4/28 5/2  6/6   Total WeeklyDose 52.5 mg 52.5 mg 52.5 mg 52.5 mg 52.5 mg 52.5mg 52.5mg 52.5mg 41.25mg 45 mg Non compliant 48.75mg   INR 3.0 3.1 2.7 2.9 3.0 3.0 3.0 7.8 1.2 2.1  3.7   Notes 10/19   rec'd 1/23 rec'd 2/15   RSV azith doxy hematuria 2x hold LVQ 2x boost  1x miss 1x self adj     Date 7/11  8/1 8/15        Total Weekly Dose 52.5 mg No ncompliant 52.5mg 52.5mg        INR 4.4  6.3 5.2        Notes Verbal  ozempic   clinda            Phone Interview:  Verbal Release Authorization signed on 11/7/18 and 3/31/2023 (may speak with sister in emergency)  Tablet Strength:   7.5 mg  Patient Contact: Cell: [phone] Home: [phone] -- try to call in PM Email: [email or web address]    Patient Findings    Positives: Extra doses, Change in medications   Negatives: Signs/symptoms of thrombosis, Signs/symptoms of bleeding, Laboratory test error suspected, Change in health, Change in alcohol use, Change in activity, Upcoming invasive procedure, Emergency department visit, Upcoming dental procedure, Missed doses, Change in diet/appetite, Hospital admission, Bruising, Other complaints   Comments: Had a fall two weeks ago and has been taking abx (clindamycin 300mg q8h -- started 8/8, has 7 days remaining)  Resumed 7.5mg qd, discussed moving forward 3.75mg once weekly (Wednesdays) -- may require further dose reduction     Plan:   1. INR is SUPRAtherapeutic today at 5.2, again non-compliant with requested follow-up (goal 2.5-3.5). Instructed Patient to  HOLD tonight's dose then begin reduced dose of warfarin 7.5mg daily except 3.75mg Wednesdays.  2. Recheck inr 8/18 at appt with Dr Soriano  3. Verbal  information provided over the phone.  Mitchell Alberto RBV dosing instructions, expresses understanding by teach back, and has  no further questions at this time.    Sonya Bustillos, PharmD.  08/15/23   11:41 EDT

## 2023-08-18 ENCOUNTER — OFFICE VISIT (OUTPATIENT)
Dept: FAMILY MEDICINE CLINIC | Facility: CLINIC | Age: 58
End: 2023-08-18
Payer: MEDICARE

## 2023-08-18 VITALS
DIASTOLIC BLOOD PRESSURE: 84 MMHG | BODY MASS INDEX: 37.08 KG/M2 | HEIGHT: 77 IN | OXYGEN SATURATION: 96 % | WEIGHT: 314 LBS | SYSTOLIC BLOOD PRESSURE: 124 MMHG | HEART RATE: 78 BPM

## 2023-08-18 DIAGNOSIS — I10 PRIMARY HYPERTENSION: ICD-10-CM

## 2023-08-18 DIAGNOSIS — F32.A DEPRESSION, UNSPECIFIED DEPRESSION TYPE: ICD-10-CM

## 2023-08-18 DIAGNOSIS — F41.9 ANXIETY: Primary | ICD-10-CM

## 2023-08-18 DIAGNOSIS — M54.50 CHRONIC BILATERAL LOW BACK PAIN WITHOUT SCIATICA: ICD-10-CM

## 2023-08-18 DIAGNOSIS — E66.01 CLASS 2 SEVERE OBESITY WITH SERIOUS COMORBIDITY AND BODY MASS INDEX (BMI) OF 37.0 TO 37.9 IN ADULT, UNSPECIFIED OBESITY TYPE: ICD-10-CM

## 2023-08-18 DIAGNOSIS — G89.29 CHRONIC BILATERAL LOW BACK PAIN WITHOUT SCIATICA: ICD-10-CM

## 2023-08-18 DIAGNOSIS — R97.20 ELEVATED PSA: ICD-10-CM

## 2023-08-18 DIAGNOSIS — I50.20 HEART FAILURE WITH REDUCED EJECTION FRACTION: ICD-10-CM

## 2023-08-18 RX ORDER — BUPROPION HYDROCHLORIDE 300 MG/1
300 TABLET ORAL DAILY
Qty: 30 TABLET | Refills: 11 | Status: SHIPPED | OUTPATIENT
Start: 2023-08-18

## 2023-08-18 RX ORDER — ESCITALOPRAM OXALATE 10 MG/1
10 TABLET ORAL DAILY
Qty: 30 TABLET | Refills: 5 | Status: SHIPPED | OUTPATIENT
Start: 2023-08-18

## 2023-08-18 NOTE — PROGRESS NOTES
Established Office Visit      Patient Name: Mitchell Alberto  : 1965   MRN: [id number]   Care Team: Patient Care Team:  Latoya Soriano DO as PCP - General (Internal Medicine)  Mike Hollis MD as Consulting Physician (Cardiology)    Chief Complaint:    Chief Complaint   Patient presents with    Anxiety    Depression       History of Present Illness: Mitchell Alberto is a 58 y.o. male with obesity, anxiety, depression, aortic valve dissection s/p mechanical AVR , CVA , HFrEF, HTN, AF on warfarin, and VF cardiac arrest 2021 s/p BiV ICD) who is here today to follow up with chronic medical problems.  Has missed/canceled several appointments. It has been about 1 year since I have seen him.     He has lost 40 lb in the past year. He has been taking Ozempic over the past several months prescribed by a friend. Reports initially feeling significant GI symptoms but these have since resolved.     He is compliant with suboxone - following with suboxone clinic and is interested in weaning off of this.   He reports chronic low back pain - has had surgical intervention 3x several years ago. Low back pain is bothersome to him, interested in discussing medications to help with pain relief in the future.    Anxiety/depression - compliant with wellbutrin and reports this has been extremely helpful for him. He still has some occasional break through anxiety/depressed mood which makes him want to sleep all day, loses motivation at times, difficulty concentrating. He is interested in medication in addition to wellbutrin.       Subjective      Review of Systems:   Review of Systems - See HPI    I have reviewed and the following portions of the patient's history were updated as appropriate: past family history, past medical history, past social history, past surgical history and problem list.    Medications:     Current Outpatient Medications:     acetaminophen (TYLENOL) 500 MG tablet, Take 1 tablet by mouth Every 6  "(Six) Hours As Needed for Mild Pain., Disp: , Rfl:     amiodarone (PACERONE) 200 MG tablet, Take 0.5 tablets by mouth Daily., Disp: 45 tablet, Rfl: 3    bumetanide (BUMEX) 1 MG tablet, Take 2 tablets in am and 1 tablet in the afternoon, Disp: 90 tablet, Rfl: 5    buprenorphine-naloxone (SUBOXONE) 8-2 MG per SL tablet, Place 2 tablets under the tongue Daily. weening, Disp: , Rfl:     buPROPion XL (WELLBUTRIN XL) 300 MG 24 hr tablet, Take 1 tablet by mouth Daily., Disp: 30 tablet, Rfl: 11    carvedilol (COREG) 25 MG tablet, Take 1 tablet by mouth 2 (Two) Times a Day., Disp: 180 tablet, Rfl: 3    ferrous gluconate (FERGON) 324 MG tablet, Take 1 tablet by mouth Every Other Day., Disp: 30 tablet, Rfl: 2    Semaglutide,0.25 or 0.5MG/DOS, (Ozempic, 0.25 or 0.5 MG/DOSE,) 2 MG/1.5ML solution pen-injector, Inject 0.25 mg under the skin into the appropriate area as directed 1 (One) Time Per Week., Disp: , Rfl:     spironolactone (ALDACTONE) 25 MG tablet, Take 1 tablet by mouth Daily., Disp: 90 tablet, Rfl: 3    tadalafil (ADCIRCA) 20 MG tablet tablet, Take 5 mg by mouth Daily., Disp: , Rfl:     warfarin (COUMADIN) 7.5 MG tablet, TAKE ONE TABLET BY MOUTH DAILY OR AS DIRECTED BY ANTICOAGULATION CLINIC., Disp: 30 tablet, Rfl: 1    escitalopram (Lexapro) 10 MG tablet, Take 1 tablet by mouth Daily., Disp: 30 tablet, Rfl: 5    Allergies:   Allergies   Allergen Reactions    Lisinopril Angioedema    Meclizine Hcl Itching       Objective     Physical Exam:  Vital Signs:   Vitals:    08/18/23 1356   BP: 124/84   Pulse: 78   SpO2: 96%   Weight: (!) 142 kg (314 lb)   Height: 195.6 cm (77\")     Body mass index is 37.23 kg/mý.     Physical Exam  Vitals reviewed.   Constitutional:       Appearance: Normal appearance.   Cardiovascular:      Rate and Rhythm: Normal rate.      Heart sounds: Murmur heard.   Pulmonary:      Effort: Pulmonary effort is normal. No respiratory distress.   Skin:     General: Skin is warm and dry.   Neurological:      " Mental Status: He is alert.   Psychiatric:         Mood and Affect: Mood normal.         Behavior: Behavior normal.         Judgment: Judgment normal.       Assessment / Plan      Assessment/Plan:   Problems Addressed This Visit  Diagnoses and all orders for this visit:    1. Anxiety (Primary)  2. Depression, unspecified depression type  -     buPROPion XL (WELLBUTRIN XL) 300 MG 24 hr tablet; Take 1 tablet by mouth Daily.  Dispense: 30 tablet; Refill: 11  -     escitalopram (Lexapro) 10 MG tablet; Take 1 tablet by mouth Daily.  Dispense: 30 tablet; Refill: 5  -     TSH; Future  Improved with wellbutrin but still persistent breakthrough depressed mood/anxiety  Add Lexapro 10mg daily   Discussed potential side effects, he will let me know if he experiences these     3. Primary hypertension  -     CBC (No Diff); Future  -     Lipid Panel; Future  -     Comprehensive Metabolic Panel; Future  -     TSH; Future  Well controlled today - continue coreg, amiodorone, bumex, spironolactone     4. Heart failure with reduced ejection fraction  -     CBC (No Diff); Future  -     Lipid Panel; Future  -     Comprehensive Metabolic Panel; Future  -     TSH; Future    Aortic valve dissection s/p mechanical AVR 2012, HFrEF, HTN, AF on warfarin, and VF cardiac arrest 6/2021 s/p BiV ICD) - Managed per cardiology. Continue BB, statin, bumex, amiodarone warfarin. Low sodium diet.      5. Elevated PSA  -     PSA DIAGNOSTIC ONLY; Future    6. Class 2 severe obesity with serious comorbidity and body mass index (BMI) of 37.0 to 37.9 in adult, unspecified obesity type  Congratulated patient on 40lb weight loss.  Encouraged continued dietary modifications and exercise     7. Chronic bilateral low back pain without sciatica  Reports he will return to talk about this another time but he is interested in medication such as gabapentin to help with chronic low back pain. I advised him that before entertaining the idea of starting a controlled  substance such as gabapentin, he would benefit from PT trial and updated MRI imaging. He declines this for now and will return to discuss another day      Plan of care reviewed with patient at the conclusion of today's visit. Education was provided regarding diagnosis and management.  Patient verbalizes understanding of and agreement with management plan.    Follow Up:   Return in about 5 months (around 1/18/2024) for recheck mood and chronic back pain .        DO SUNIL Antonio RD  [facility]  [address]  [facility] [address]  Fax [phone]  Phone [phone]

## 2023-08-21 RX ORDER — WARFARIN SODIUM 7.5 MG/1
TABLET ORAL
Qty: 30 TABLET | Refills: 1 | Status: SHIPPED | OUTPATIENT
Start: 2023-08-21

## 2023-08-22 DIAGNOSIS — F32.A DEPRESSION, UNSPECIFIED DEPRESSION TYPE: ICD-10-CM

## 2023-08-22 RX ORDER — BUPROPION HYDROCHLORIDE 300 MG/1
300 TABLET ORAL DAILY
Qty: 30 TABLET | Refills: 11 | OUTPATIENT
Start: 2023-08-22

## 2023-09-11 DIAGNOSIS — F32.A DEPRESSION, UNSPECIFIED DEPRESSION TYPE: ICD-10-CM

## 2023-09-11 RX ORDER — WARFARIN SODIUM 7.5 MG/1
TABLET ORAL
Qty: 30 TABLET | Refills: 1 | Status: CANCELLED | OUTPATIENT
Start: 2023-09-11

## 2023-09-11 NOTE — TELEPHONE ENCOUNTER
Caller: Mitchell Alberto    Relationship: Self    Best call back number: [phone]     Requested Prescriptions:   Requested Prescriptions     Pending Prescriptions Disp Refills    buPROPion XL (WELLBUTRIN XL) 300 MG 24 hr tablet 30 tablet 11     Sig: Take 1 tablet by mouth Daily.        Pharmacy where request should be sent: [id number] - [place], [place] - [address] [place] [address] AT [facility] [address] - [phone]  - [phone] FX     Last office visit with prescribing clinician: 8/18/2023   Last telemedicine visit with prescribing clinician: Visit date not found   Next office visit with prescribing clinician: Visit date not found     Additional details provided by patient: PATIENT IS OUT     Does the patient have less than a 3 day supply:  [x] Yes  [] No    Would you like a call back once the refill request has been completed: [] Yes [x] No    If the office needs to give you a call back, can they leave a voicemail: [] Yes [x] No    Cadance Dunaway, RegSched Rep   09/11/23 15:10 EDT

## 2023-09-12 RX ORDER — BUPROPION HYDROCHLORIDE 300 MG/1
300 TABLET ORAL DAILY
Qty: 30 TABLET | Refills: 11 | OUTPATIENT
Start: 2023-09-12

## 2023-09-12 NOTE — TELEPHONE ENCOUNTER
Rx Refill Note  Requested Prescriptions     Refused Prescriptions Disp Refills    buPROPion XL (WELLBUTRIN XL) 300 MG 24 hr tablet [Pharmacy Med Name: BUPROPION XL 300MG TABLETS] 30 tablet 11     Sig: TAKE 1 TABLET BY MOUTH DAILY     Refused By: MILAGROS PARSON     Reason for Refusal: Duplicate renewal request      Last office visit with prescribing clinician: 8/18/2023     Next office visit with prescribing clinician: Return in about 5 months (around 1/18/2024) for recheck mood and chronic back pain .     Yadi Rios MA  09/12/23, 10:53 EDT

## 2023-09-18 ENCOUNTER — TELEPHONE (OUTPATIENT)
Dept: PHARMACY | Facility: HOSPITAL | Age: 58
End: 2023-09-18

## 2023-09-18 NOTE — TELEPHONE ENCOUNTER
Called PT to inform him that he has an over due INR, PT did not answer, left voice mail to call back anticoagulation clinic.    Taylor Weeks  Pharmacy Technician  9/18/2023 16:11 EDT

## 2023-10-03 LAB — INR PPP: 6.5 (ref 2.5–3.5)

## 2023-10-04 ENCOUNTER — ANTICOAGULATION VISIT (OUTPATIENT)
Dept: PHARMACY | Facility: HOSPITAL | Age: 58
End: 2023-10-04
Payer: MEDICARE

## 2023-10-04 DIAGNOSIS — I48.91 ATRIAL FIBRILLATION, UNSPECIFIED TYPE: Primary | ICD-10-CM

## 2023-10-04 DIAGNOSIS — Z95.2 S/P AVR (AORTIC VALVE REPLACEMENT): ICD-10-CM

## 2023-10-04 NOTE — PROGRESS NOTES
Anticoagulation Clinic - Remote Progress Note  [facility]  Testing frequency: 7 days    Indication: Atrial Fibrillation and Mechanical Aortic Valve; h/o embolic CVA  Referring Provider: Bunny last seen 3/31/2023;  Initial Warfarin Start Date: 11/2012  Planned Duration of Therapy: Life  Goal INR: 2.5-3.5  Current Drug Interactions: escitalopram, aspirin, amiodarone, pantoprazole  CHADS-VASc: 3 (HTN, h/o CVA)     Vit K Diet: patient eats, goes through spurts when he tries to eat healthier 3/3/2021  Alcohol: none  Tobacco: none       Anticoagulation Clinic INR History:      Date 10/2 10/10 11/4 11/12 11/22 12/10 1/3/20 1/22 1/30 2/3 2/18 2/27   Total Weekly Dose 80mg 80mg 62.5mg 85mg 82.5mg 70mg 72.5mg 72.5mg 82.5mg 77.5mg 75mg 75mg   INR  3.9 3.6 1.7 3.4 4.7 2.4 3.6 1.3 2.3 3.2 3.1 3.2   Notes   1x miss self adj  self adj  lovenox         Date 4/8 4/29 5/18 6/20  8/3 8/17 10/5 10/19 11/11 12/21    Total Weekly Dose 70mg 70mg 65mg 65mg Non- compliant 65mg 65 mg 65mg 65mg 65mg 65mg Non- compliant   INR 4.3 4.1 4.5 3.0  3.0 3.8 3.0 2.9 4.1 2.3    Notes Self adj; Levaquin decr GLV; COVID+  recv'd 6/22; misdose       incr GLV      Date 1/20/21 1/21 1/25 2/1 2/16 3/3 3/22 4/13  4/26 5/14 5/21   Total WeeklyDose 40mg 45mg? 62.5mg 80mg? 80mg   70mg 67.5mg  47.5mg  67.5 mg   INR 1.2 1.2 maia 1.6 2.9 3.7  4.5 3.2 3.3  1.8 4.5 3.1   Notes Self held; augmentin 1x incr dose; lovenox 2x miss,   1x misdose  amox amox  self adj 5x hold, enox enox/norco, augmentin       Date 6/28  8/4 8/6 8/9 8/20 9/3  11/9 11/16 11/30 12/2   Total WeeklyDose 67.5 mg BH Barrett   6/30-7/29 [facility] 32.5 mg 47.5mg 51.25 mg 52.5mg 52.5mg? Non compliant 35mg? 70mg? 52.5mg BHL- left AMA   INR 3.9  2.2 3.0 2.9 4.8 2.6  2.0 3.9 4.3    Notes  amio init enox enox enox    Rec 11/16   GI bleed       Date 12/4 12/6 1/25 2/10  2/24 3/25 4/5 6/22 7/28 8/9 8/10   Total WeeklyDose BHL- left AMA 7.5mg ?? 52.5mg ????  52.5 mg 52.5 mg 53.75mg 45 mg 52.5mg 52.5mg    INR  1.1 1.7 5.6  1.8 2.0 1.8 2.4 1.7 2.7 3.0   Notes AMS/GI Bleed  rec 1/26 [facility] COVID + suboxone  1x miss enox  Clinic, 1x misdose 1x miss  ED-      Date 10/18 11/1 12/20 1/20 2/14 3/31 4/10 4/25 4/28 5/2  6/6   Total WeeklyDose 52.5 mg 52.5 mg 52.5 mg 52.5 mg 52.5 mg 52.5mg 52.5mg 52.5mg 41.25mg 45 mg Non compliant 48.75mg   INR 3.0 3.1 2.7 2.9 3.0 3.0 3.0 7.8 1.2 2.1  3.7   Notes 10/19   rec'd 1/23 rec'd 2/15   RSV azith doxy hematuria 2x hold LVQ 2x boost  1x miss 1x self adj     Date 7/11  8/1 8/15  10/3      Total Weekly Dose 52.5 mg No ncompliant 52.5mg 52.5mg ???? 48.75 mg      INR 4.4  6.3 5.2        Notes Verbal  ozempic   clinda  Rcv'd 10/4          Phone Interview:  Verbal Release Authorization signed on 11/7/18 and 3/31/2023 (may speak with sister in emergency)  Tablet Strength:   7.5 mg  Patient Contact: Cell: [phone] Home: [phone] -- try to call in PM Email: [email or web address]    Patient Findings    Positives: Bruising   Negatives: Signs/symptoms of thrombosis, Signs/symptoms of bleeding, Laboratory test error suspected, Change in health, Change in alcohol use, Change in activity, Upcoming invasive procedure, Emergency department visit, Upcoming dental procedure, Missed doses, Extra doses, Change in medications, Change in diet/appetite, Hospital admission, Other complaints   Comments: Says nothing has changed since the last time he spoke to us. Does consume THC gummies from time to time ([employer] states that it may increase the INR). Patient states that he has seen some bruising recently, I informed him to keep an eye on it and to get it check out if it doesn't heal or gets worse over time.     Plan:   INR yesterday was SUPRAtherapeutic at 6.5 (goal 2.5 - 3.5). Instructed patient to HOLD tonight's dose, and take a decreased dose of warfarin 3.75 mg tomorrow (10/5).  Informed patient to recheck Friday (10/6/23) and he states that he will walk into our Clinic on Friday.  Verbal   information provided over the phone.  Mitchell Alberto RBV dosing instructions, expresses understanding by teach back, and has no further questions at this time. Current maintenance dose is warfarin 7.5mg daily except 3.75mg Wednesdays, but we may have to reduce it depending on the INR this Friday.    Chance Francois, PharmD  10/4/2023  16:15 EDT

## 2023-10-13 ENCOUNTER — ANTICOAGULATION VISIT (OUTPATIENT)
Dept: PHARMACY | Facility: HOSPITAL | Age: 58
End: 2023-10-13
Payer: MEDICARE

## 2023-10-13 DIAGNOSIS — Z95.2 S/P AVR (AORTIC VALVE REPLACEMENT): Primary | ICD-10-CM

## 2023-10-13 LAB
INR PPP: 6.7 (ref 0.91–1.09)
PROTHROMBIN TIME: 80.6 SECONDS (ref 10–13.8)

## 2023-10-13 PROCEDURE — 85610 PROTHROMBIN TIME: CPT

## 2023-10-13 PROCEDURE — G0463 HOSPITAL OUTPT CLINIC VISIT: HCPCS

## 2023-10-13 PROCEDURE — 36416 COLLJ CAPILLARY BLOOD SPEC: CPT

## 2023-10-13 NOTE — PROGRESS NOTES
Anticoagulation Clinic - Remote Progress Note  [facility]  Testing frequency: 7 days    Indication: Atrial Fibrillation and Mechanical Aortic Valve; h/o embolic CVA  Referring Provider: Bunny last seen 3/31/2023;  Initial Warfarin Start Date: 11/2012  Planned Duration of Therapy: Life  Goal INR: 2.5-3.5  Current Drug Interactions: escitalopram, aspirin, amiodarone, pantoprazole  CHADS-VASc: 3 (HTN, h/o CVA)     Vit K Diet: patient eats, goes through spurts when he tries to eat healthier 3/3/2021  Alcohol: none  Tobacco: none       Anticoagulation Clinic INR History:      Date 10/2 10/10 11/4 11/12 11/22 12/10 1/3/20 1/22 1/30 2/3 2/18 2/27   Total Weekly Dose 80mg 80mg 62.5mg 85mg 82.5mg 70mg 72.5mg 72.5mg 82.5mg 77.5mg 75mg 75mg   INR  3.9 3.6 1.7 3.4 4.7 2.4 3.6 1.3 2.3 3.2 3.1 3.2   Notes   1x miss self adj  self adj  lovenox         Date 4/8 4/29 5/18 6/20  8/3 8/17 10/5 10/19 11/11 12/21    Total Weekly Dose 70mg 70mg 65mg 65mg Non- compliant 65mg 65 mg 65mg 65mg 65mg 65mg Non- compliant   INR 4.3 4.1 4.5 3.0  3.0 3.8 3.0 2.9 4.1 2.3    Notes Self adj; Levaquin decr GLV; COVID+  recv'd 6/22; misdose       incr GLV      Date 1/20/21 1/21 1/25 2/1 2/16 3/3 3/22 4/13  4/26 5/14 5/21   Total WeeklyDose 40mg 45mg? 62.5mg 80mg? 80mg   70mg 67.5mg  47.5mg  67.5 mg   INR 1.2 1.2 maia 1.6 2.9 3.7  4.5 3.2 3.3  1.8 4.5 3.1   Notes Self held; augmentin 1x incr dose; lovenox 2x miss,   1x misdose  amox amox  self adj 5x hold, enox enox/norco, augmentin       Date 6/28  8/4 8/6 8/9 8/20 9/3  11/9 11/16 11/30 12/2   Total WeeklyDose 67.5 mg BH Barrett   6/30-7/29 [facility] 32.5 mg 47.5mg 51.25 mg 52.5mg 52.5mg? Non compliant 35mg? 70mg? 52.5mg BHL- left AMA   INR 3.9  2.2 3.0 2.9 4.8 2.6  2.0 3.9 4.3    Notes  amio init enox enox enox    Rec 11/16   GI bleed       Date 12/4 12/6 1/25 2/10  2/24 3/25 4/5 6/22 7/28 8/9 8/10   Total WeeklyDose BHL- left AMA 7.5mg ?? 52.5mg ????  52.5 mg 52.5 mg 53.75mg 45 mg 52.5mg 52.5mg    INR  1.1 1.7 5.6  1.8 2.0 1.8 2.4 1.7 2.7 3.0   Notes AMS/GI Bleed  rec 1/26 [facility] COVID + suboxone  1x miss enox  Clinic, 1x misdose 1x miss  ED-      Date 10/18 11/1 12/20 1/20 2/14 3/31 4/10 4/25 4/28 5/2  6/6   Total WeeklyDose 52.5 mg 52.5 mg 52.5 mg 52.5 mg 52.5 mg 52.5mg 52.5mg 52.5mg 41.25mg 45 mg Non compliant 48.75mg   INR 3.0 3.1 2.7 2.9 3.0 3.0 3.0 7.8 1.2 2.1  3.7   Notes 10/19   rec'd 1/23 rec'd 2/15   RSV azith doxy hematuria 2x hold LVQ 2x boost  1x miss 1x self adj     Date 7/11  8/1 8/15  10/3 10/13     Total Weekly Dose 52.5 mg No ncompliant 52.5mg 52.5mg ???? 48.75 mg 52.5mg     INR 4.4  6.3 5.2   6.7     Notes Verbal  ozempic   clinda  Rcv'd 10/4 clinic         Phone Interview:  Verbal Release Authorization signed on 11/7/18 and 3/31/2023 (may speak with sister in emergency)  Tablet Strength:   7.5 mg  Patient Contact: Cell: [phone] Home: [phone] -- try to call in PM Email: [email or web address]    Patient Findings    Positives: Bruising   Negatives: Signs/symptoms of thrombosis, Signs/symptoms of bleeding, Laboratory test error suspected, Change in health, Change in alcohol use, Change in activity, Upcoming invasive procedure, Emergency department visit, Upcoming dental procedure, Missed doses, Extra doses, Change in medications, Change in diet/appetite, Hospital admission, Other complaints   Comments: Reports he held 1x dose then resumed 7.5mg daily. Discussed to monitor for s/sx of bleeding including epistaxis, hematuria, unusual bruising, hemoptysis, hematochezia as well as s/sx of stroke including impaired speech, unilateral paralysis, blurry vision and when to seek medical attention    Discussed need for lower dose moving forward-- likely 3.75mg 3x weekly       Plan:   INR yesterday was SUPRAtherapeutic at 6.7 (goal 2.5 - 3.5). Instructed patient to HOLD dose x 2 nights , and take a decreased dose of warfarin 7.5 mg daily except 3.75mg MWF until recheck  Informed patient to  recheck Monday on   Verbal  information provided IN CLINIC  Mitchell Alberto RBV dosing instructions, expresses understanding by teach back, and has no further questions at this time. Current maintenance dose is warfarin 7.5mg daily except 3.75mg Wednesdays, but we may have to reduce it depending on the INR this Friday.      Giovani RoeD.  10/13/23   16:01 EDT

## 2023-10-23 RX ORDER — FERROUS GLUCONATE 324(38)MG
324 TABLET ORAL EVERY OTHER DAY
Qty: 30 TABLET | Refills: 2 | Status: SHIPPED | OUTPATIENT
Start: 2023-10-23

## 2023-10-30 ENCOUNTER — ANTICOAGULATION VISIT (OUTPATIENT)
Dept: PHARMACY | Facility: HOSPITAL | Age: 58
End: 2023-10-30
Payer: MEDICARE

## 2023-10-30 DIAGNOSIS — Z95.2 S/P AVR (AORTIC VALVE REPLACEMENT): Primary | ICD-10-CM

## 2023-10-30 DIAGNOSIS — I48.91 ATRIAL FIBRILLATION, UNSPECIFIED TYPE: ICD-10-CM

## 2023-10-30 LAB — INR PPP: 3.5

## 2023-10-30 NOTE — PROGRESS NOTES
Anticoagulation Clinic - Remote Progress Note  [facility]  Testing frequency: 7 days    Indication: Atrial Fibrillation and Mechanical Aortic Valve; h/o embolic CVA  Referring Provider: Bunny last seen 3/31/2023;  Initial Warfarin Start Date: 11/2012  Planned Duration of Therapy: Life  Goal INR: 2.5-3.5  Current Drug Interactions: escitalopram, aspirin, amiodarone, pantoprazole  CHADS-VASc: 3 (HTN, h/o CVA)     Vit K Diet: patient eats, goes through spurts when he tries to eat healthier 3/3/2021  Alcohol: none  Tobacco: none       Anticoagulation Clinic INR History:      Date 10/2 10/10 11/4 11/12 11/22 12/10 1/3/20 1/22 1/30 2/3 2/18 2/27   Total Weekly Dose 80mg 80mg 62.5mg 85mg 82.5mg 70mg 72.5mg 72.5mg 82.5mg 77.5mg 75mg 75mg   INR  3.9 3.6 1.7 3.4 4.7 2.4 3.6 1.3 2.3 3.2 3.1 3.2   Notes   1x miss self adj  self adj  lovenox         Date 4/8 4/29 5/18 6/20  8/3 8/17 10/5 10/19 11/11 12/21    Total Weekly Dose 70mg 70mg 65mg 65mg Non- compliant 65mg 65 mg 65mg 65mg 65mg 65mg Non- compliant   INR 4.3 4.1 4.5 3.0  3.0 3.8 3.0 2.9 4.1 2.3    Notes Self adj; Levaquin decr GLV; COVID+  recv'd 6/22; misdose       incr GLV      Date 1/20/21 1/21 1/25 2/1 2/16 3/3 3/22 4/13  4/26 5/14 5/21   Total WeeklyDose 40mg 45mg? 62.5mg 80mg? 80mg   70mg 67.5mg  47.5mg  67.5 mg   INR 1.2 1.2 maia 1.6 2.9 3.7  4.5 3.2 3.3  1.8 4.5 3.1   Notes Self held; augmentin 1x incr dose; lovenox 2x miss,   1x misdose  amox amox  self adj 5x hold, enox enox/norco, augmentin       Date 6/28  8/4 8/6 8/9 8/20 9/3  11/9 11/16 11/30 12/2   Total WeeklyDose 67.5 mg BH Barrett   6/30-7/29 [facility] 32.5 mg 47.5mg 51.25 mg 52.5mg 52.5mg? Non compliant 35mg? 70mg? 52.5mg BHL- left AMA   INR 3.9  2.2 3.0 2.9 4.8 2.6  2.0 3.9 4.3    Notes  amio init enox enox enox    Rec 11/16   GI bleed       Date 12/4 12/6 1/25 2/10  2/24 3/25 4/5 6/22 7/28 8/9 8/10   Total WeeklyDose BHL- left AMA 7.5mg ?? 52.5mg ????  52.5 mg 52.5 mg 53.75mg 45 mg 52.5mg 52.5mg    INR  1.1 1.7 5.6  1.8 2.0 1.8 2.4 1.7 2.7 3.0   Notes AMS/GI Bleed  rec 1/26 [facility] COVID + suboxone  1x miss enox  Clinic, 1x misdose 1x miss  ED-      Date 10/18 11/1 12/20 1/20 2/14 3/31 4/10 4/25 4/28 5/2  6/6   Total WeeklyDose 52.5 mg 52.5 mg 52.5 mg 52.5 mg 52.5 mg 52.5mg 52.5mg 52.5mg 41.25mg 45 mg Non compliant 48.75mg   INR 3.0 3.1 2.7 2.9 3.0 3.0 3.0 7.8 1.2 2.1  3.7   Notes 10/19   rec'd 1/23 rec'd 2/15   RSV azith doxy hematuria 2x hold LVQ 2x boost  1x miss 1x self adj   30  Date 7/11  8/1 8/15  10/3 10/13 10/30    Total Weekly Dose 52.5 mg No ncompliant 52.5mg 52.5mg ???? 48.75 mg 52.5mg     INR 4.4  6.3 5.2   6.7    Notes Verbal  ozempic   clinda  Rcv'd 10/4 clinic Erroneous result        Phone Interview:  Verbal Release Authorization signed on 11/7/18 and 3/31/2023 (may speak with sister in emergency)  Tablet Strength:   7.5 mg  Patient Contact: Cell: [phone] Home: [phone] -- try to call in PM Email: [email or web address]    Patient Findings      Patient reports he  made up the result of 3.5 to get [employer] to send him supplies-- they should arrive in 2-5 days.   He plans to go to PCP this afternoon to check INR    Plan:   INR was(goal 2.5 - 3.5). UNABLE TO GET IN CONTACT WITH THE PATIENT. PLEASE DISREGARD THE FOLLOWING PLAN UNTIL ABLE TO GET IN CONTACT WITH PATIENT/ PATIENT REPRESENTATIVE.     Unable to get in contact with representative at this time. Will close encounter and send overdue INR.      Instructed patient to HOLD dose x 2 nights , and take a decreased dose of warfarin 7.5 mg daily except 3.75mg MWF until recheck  Informed patient to recheck Monday on   Verbal  information provided IN CLINIC  Mitchell Alberto RBV dosing instructions, expresses understanding by teach back, and has no further questions at this time. Current maintenance dose is warfarin 7.5mg daily except 3.75mg Wednesdays, but we may have to reduce it depending on the INR this Friday.

## 2023-11-16 ENCOUNTER — ANTICOAGULATION VISIT (OUTPATIENT)
Dept: PHARMACY | Facility: HOSPITAL | Age: 58
End: 2023-11-16
Payer: MEDICARE

## 2023-11-16 DIAGNOSIS — Z95.2 S/P AVR (AORTIC VALVE REPLACEMENT): Primary | ICD-10-CM

## 2023-11-16 LAB
INR PPP: 3.7
INR PPP: 5.3

## 2023-11-16 NOTE — PROGRESS NOTES
Anticoagulation Clinic - Remote Progress Note  [facility]  Testing frequency: 7 days    Indication: Atrial Fibrillation and Mechanical Aortic Valve; h/o embolic CVA  Referring Provider: Bunny last seen 3/31/2023;  Initial Warfarin Start Date: 11/2012  Planned Duration of Therapy: Life  Goal INR: 2.5-3.5  Current Drug Interactions: escitalopram, aspirin, amiodarone, pantoprazole  CHADS-VASc: 3 (HTN, h/o CVA)     Vit K Diet: patient eats, goes through spurts when he tries to eat healthier 3/3/2021  Alcohol: none  Tobacco: none       Anticoagulation Clinic INR History:      Date 10/2 10/10 11/4 11/12 11/22 12/10 1/3/20 1/22 1/30 2/3 2/18 2/27   Total Weekly Dose 80mg 80mg 62.5mg 85mg 82.5mg 70mg 72.5mg 72.5mg 82.5mg 77.5mg 75mg 75mg   INR  3.9 3.6 1.7 3.4 4.7 2.4 3.6 1.3 2.3 3.2 3.1 3.2   Notes   1x miss self adj  self adj  lovenox         Date 4/8 4/29 5/18 6/20  8/3 8/17 10/5 10/19 11/11 12/21    Total Weekly Dose 70mg 70mg 65mg 65mg Non- compliant 65mg 65 mg 65mg 65mg 65mg 65mg Non- compliant   INR 4.3 4.1 4.5 3.0  3.0 3.8 3.0 2.9 4.1 2.3    Notes Self adj; Levaquin decr GLV; COVID+  recv'd 6/22; misdose       incr GLV      Date 1/20/21 1/21 1/25 2/1 2/16 3/3 3/22 4/13  4/26 5/14 5/21   Total WeeklyDose 40mg 45mg? 62.5mg 80mg? 80mg   70mg 67.5mg  47.5mg  67.5 mg   INR 1.2 1.2 maia 1.6 2.9 3.7  4.5 3.2 3.3  1.8 4.5 3.1   Notes Self held; augmentin 1x incr dose; lovenox 2x miss,   1x misdose  amox amox  self adj 5x hold, enox enox/norco, augmentin       Date 6/28  8/4 8/6 8/9 8/20 9/3  11/9 11/16 11/30 12/2   Total WeeklyDose 67.5 mg BH Barrett   6/30-7/29 [facility] 32.5 mg 47.5mg 51.25 mg 52.5mg 52.5mg? Non compliant 35mg? 70mg? 52.5mg BHL- left AMA   INR 3.9  2.2 3.0 2.9 4.8 2.6  2.0 3.9 4.3    Notes  amio init enox enox enox    Rec 11/16   GI bleed       Date 12/4 12/6 1/25 2/10  2/24 3/25 4/5 6/22 7/28 8/9 8/10   Total WeeklyDose BHL- left AMA 7.5mg ?? 52.5mg ????  52.5 mg 52.5 mg 53.75mg 45 mg 52.5mg 52.5mg    INR  1.1 1.7 5.6  1.8 2.0 1.8 2.4 1.7 2.7 3.0   Notes AMS/GI Bleed  rec 1/26 [facility] COVID + suboxone  1x miss enox  Clinic, 1x misdose 1x miss  ED-      Date 10/18 11/1 12/20 1/20 2/14 3/31 4/10 4/25 4/28 5/2  6/6   Total WeeklyDose 52.5 mg 52.5 mg 52.5 mg 52.5 mg 52.5 mg 52.5mg 52.5mg 52.5mg 41.25mg 45 mg Non compliant 48.75mg   INR 3.0 3.1 2.7 2.9 3.0 3.0 3.0 7.8 1.2 2.1  3.7   Notes 10/19   rec'd 1/23 rec'd 2/15   RSV azith doxy hematuria 2x hold LVQ 2x boost  1x miss 1x self adj   30  Date 7/11  8/1 8/15  10/3 10/13 10/30 11/16   Total Weekly Dose 52.5 mg No ncompliant 52.5mg 52.5mg ???? 48.75 mg 52.5mg     INR 4.4  6.3 5.2   6.7 3.7   Notes Verbal  ozempic   clinda  Rcv'd 10/4 clinic Erroneous result        Phone Interview:  Verbal Release Authorization signed on 11/7/18 and 3/31/2023 (may speak with sister in emergency)  Tablet Strength:   7.5 mg  Patient Contact: Cell: [phone] Home: [phone] -- try to call in PM Email: [email or web address]    Patient Findings      Positives: Signs/symptoms of bleeding, Emergency department visit, Change in medications   Negatives: Signs/symptoms of thrombosis, Laboratory test error suspected, Change in health, Change in alcohol use, Change in activity, Upcoming invasive procedure, Upcoming dental procedure, Missed doses, Extra doses, Change in diet/appetite, Hospital admission, Bruising, Other complaints   Comments: Reports ED visit at [facility] for hematuria on 11/14 and INR was 5.3 Held dose of warfarin  and took 3.75mg yesterday-- no further hematuria.    He once again reverted to 7.5mg daily prior to ED visit -- reinforced importance of reducing dose. Pt WROTE DOWN new dosing  Was additionally started on ciprofloxacin 500 mg BID x10d+ phenazopyridine     Plan:   INR was SUPRAtherapeutic at 3.7 (goal 2.5 - 3.5). Instructed patient to  reduce tonight's dose to 3.75mg then begin warfarin 7.5 mg daily except 3.75mg MWF until recheck  Informed patient to recheck  Tuesday on   Verbal  information provided over the phone  Mitchell Alberto RBV dosing instructions, expresses understanding by teach back, and has no further questions at this time.    Sonya Bustillos, PharmD.  11/16/23   15:50 EST

## 2023-12-04 ENCOUNTER — ANTICOAGULATION VISIT (OUTPATIENT)
Dept: PHARMACY | Facility: HOSPITAL | Age: 58
End: 2023-12-04
Payer: MEDICARE

## 2023-12-04 DIAGNOSIS — Z95.2 S/P AVR (AORTIC VALVE REPLACEMENT): Primary | ICD-10-CM

## 2023-12-04 LAB — INR PPP: 3.8

## 2023-12-04 RX ORDER — WARFARIN SODIUM 7.5 MG/1
TABLET ORAL
Qty: 30 TABLET | Refills: 1 | Status: SHIPPED | OUTPATIENT
Start: 2023-12-04 | End: 2023-12-04 | Stop reason: SDUPTHER

## 2023-12-04 RX ORDER — WARFARIN SODIUM 7.5 MG/1
TABLET ORAL
Qty: 30 TABLET | Refills: 1 | Status: SHIPPED | OUTPATIENT
Start: 2023-12-04

## 2023-12-04 NOTE — PROGRESS NOTES
Anticoagulation Clinic - Remote Progress Note  [facility]  Testing frequency: 7 days    Indication: Atrial Fibrillation and Mechanical Aortic Valve; h/o embolic CVA  Referring Provider: Bunny last seen 3/31/2023;  Initial Warfarin Start Date: 11/2012  Planned Duration of Therapy: Life  Goal INR: 2.5-3.5  Current Drug Interactions: escitalopram, aspirin, amiodarone, pantoprazole  CHADS-VASc: 3 (HTN, h/o CVA)     Vit K Diet: patient eats, goes through spurts when he tries to eat healthier 3/3/2021  Alcohol: none  Tobacco: none       Anticoagulation Clinic INR History:      Date 10/2 10/10 11/4 11/12 11/22 12/10 1/3/20 1/22 1/30 2/3 2/18 2/27   Total Weekly Dose 80mg 80mg 62.5mg 85mg 82.5mg 70mg 72.5mg 72.5mg 82.5mg 77.5mg 75mg 75mg   INR  3.9 3.6 1.7 3.4 4.7 2.4 3.6 1.3 2.3 3.2 3.1 3.2   Notes   1x miss self adj  self adj  lovenox         Date 4/8 4/29 5/18 6/20  8/3 8/17 10/5 10/19 11/11 12/21    Total Weekly Dose 70mg 70mg 65mg 65mg Non- compliant 65mg 65 mg 65mg 65mg 65mg 65mg Non- compliant   INR 4.3 4.1 4.5 3.0  3.0 3.8 3.0 2.9 4.1 2.3    Notes Self adj; Levaquin decr GLV; COVID+  recv'd 6/22; misdose       incr GLV      Date 1/20/21 1/21 1/25 2/1 2/16 3/3 3/22 4/13  4/26 5/14 5/21   Total WeeklyDose 40mg 45mg? 62.5mg 80mg? 80mg   70mg 67.5mg  47.5mg  67.5 mg   INR 1.2 1.2 maia 1.6 2.9 3.7  4.5 3.2 3.3  1.8 4.5 3.1   Notes Self held; augmentin 1x incr dose; lovenox 2x miss,   1x misdose  amox amox  self adj 5x hold, enox enox/norco, augmentin       Date 6/28  8/4 8/6 8/9 8/20 9/3  11/9 11/16 11/30 12/2   Total WeeklyDose 67.5 mg BH Barrett   6/30-7/29 [facility] 32.5 mg 47.5mg 51.25 mg 52.5mg 52.5mg? Non compliant 35mg? 70mg? 52.5mg BHL- left AMA   INR 3.9  2.2 3.0 2.9 4.8 2.6  2.0 3.9 4.3    Notes  amio init enox enox enox    Rec 11/16   GI bleed     Date 12/4 12/6 1/25 2/10  2/24 3/25 4/5 6/22 7/28 8/9 8/10   Total WeeklyDose BHL- left AMA 7.5mg ?? 52.5mg ????  52.5 mg 52.5 mg 53.75mg 45 mg 52.5mg 52.5mg    INR  1.1 1.7 5.6  1.8 2.0 1.8 2.4 1.7 2.7 3.0   Notes AMS/GI Bleed  rec 1/26 [facility] COVID + suboxone  1x miss enox  Clinic, 1x misdose 1x miss  ED-      Date 10/18 11/1 12/20 1/20 2/14 3/31 4/10 4/25 4/28 5/2  6/6   Total WeeklyDose 52.5 mg 52.5 mg 52.5 mg 52.5 mg 52.5 mg 52.5mg 52.5mg 52.5mg 41.25mg 45 mg Non compliant 48.75mg   INR 3.0 3.1 2.7 2.9 3.0 3.0 3.0 7.8 1.2 2.1  3.7   Notes 10/19   rec'd 1/23 rec'd 2/15   RSV azith doxy hematuria 2x hold LVQ 2x boost  1x miss 1x self adj   30  Date 7/11  8/1 8/15  10/3 10/13 10/30 11/14ED  11/16 11/21 12/4     Total Weekly Dose 52.5 mg No ncompliant 52.5mg 52.5mg ???? 48.75 mg 52.5mg  ?? Self-adj  30 mg DUE 41.25 mg ?     INR 4.4  6.3 5.2   6.7  5.3  3.7   3.8     Notes Verbal  ozempic   clinda  Rcv'd 10/4 clinic Erroneous result hematuria Heldx1, redx1 cipro forgot          Phone Interview:  Verbal Release Authorization signed on 11/7/18 and 3/31/2023 (may speak with sister in emergency)  Tablet Strength:   7.5 mg  Patient Contact: Cell: [phone] Home: [phone] -- try to call in PM Email: [email or web address]      Patient Findings  Negatives: Signs/symptoms of thrombosis, Signs/symptoms of bleeding, Laboratory test error suspected, Change in health, Change in alcohol use, Change in activity, Upcoming invasive procedure, Emergency department visit, Upcoming dental procedure, Missed doses, Extra doses, Change in medications, Change in diet/appetite, Hospital admission, Bruising, Other complaints   Comments: He was overdue to recheck his INR (due to recheck 11/21)  He stated that he forgot.  Otherwise, above findings negative.    He was unable to confirm warfarin dosing.  He stated that it is written in his notebook and he does not have it with him.       Plan:   INR is SUPRAtherapeutic today at 3.8 (goal 2.5 - 3.5). Instructed Mr. Alberto to continue maintenance regimen of warfarin 7.5 mg oral daily except 3.75mg MonWedFri until recheck.  He will have an extra  serving of "[employer].  Informed patient to recheck Monday, 12/11 to ensure back wnl  Verbal  information provided over the phone  Mitchell Alberto RBV dosing instructions, expresses understanding by teach back, and has no further questions at this time.    Dora Pereira, PharmD  12/04/23   11:31 EST

## 2023-12-27 DIAGNOSIS — I35.1 AORTIC VALVE INSUFFICIENCY, ETIOLOGY OF CARDIAC VALVE DISEASE UNSPECIFIED: Primary | ICD-10-CM

## 2024-01-17 ENCOUNTER — TELEPHONE (OUTPATIENT)
Dept: PHARMACY | Facility: HOSPITAL | Age: 59
End: 2024-01-17

## 2024-02-05 ENCOUNTER — ANTICOAGULATION VISIT (OUTPATIENT)
Dept: PHARMACY | Facility: HOSPITAL | Age: 59
End: 2024-02-05
Payer: MEDICARE

## 2024-02-05 DIAGNOSIS — Z95.2 S/P AVR (AORTIC VALVE REPLACEMENT): Primary | ICD-10-CM

## 2024-02-05 LAB — INR PPP: 4.2

## 2024-02-05 NOTE — PROGRESS NOTES
Anticoagulation Clinic - Remote Progress Note  [facility]  Testing frequency: 7 days    Indication: Atrial Fibrillation and Mechanical Aortic Valve; h/o embolic CVA  Referring Provider: Bunny last seen 3/31/2023;  Initial Warfarin Start Date: 11/2012  Planned Duration of Therapy: Life  Goal INR: 2.5-3.5  Current Drug Interactions: escitalopram, aspirin, amiodarone, pantoprazole  CHADS-VASc: 3 (HTN, h/o CVA)     Vit K Diet: patient eats, goes through spurts when he tries to eat healthier 3/3/2021  Alcohol: none  Tobacco: none       Anticoagulation Clinic INR History:      Date 10/2 10/10 11/4 11/12 11/22 12/10 1/3/20 1/22 1/30 2/3 2/18 2/27   Total Weekly Dose 80mg 80mg 62.5mg 85mg 82.5mg 70mg 72.5mg 72.5mg 82.5mg 77.5mg 75mg 75mg   INR  3.9 3.6 1.7 3.4 4.7 2.4 3.6 1.3 2.3 3.2 3.1 3.2   Notes   1x miss self adj  self adj  lovenox         Date 4/8 4/29 5/18 6/20  8/3 8/17 10/5 10/19 11/11 12/21    Total Weekly Dose 70mg 70mg 65mg 65mg Non- compliant 65mg 65 mg 65mg 65mg 65mg 65mg Non- compliant   INR 4.3 4.1 4.5 3.0  3.0 3.8 3.0 2.9 4.1 2.3    Notes Self adj; Levaquin decr GLV; COVID+  recv'd 6/22; misdose       incr GLV      Date 1/20/21 1/21 1/25 2/1 2/16 3/3 3/22 4/13  4/26 5/14 5/21   Total WeeklyDose 40mg 45mg? 62.5mg 80mg? 80mg   70mg 67.5mg  47.5mg  67.5 mg   INR 1.2 1.2 maia 1.6 2.9 3.7  4.5 3.2 3.3  1.8 4.5 3.1   Notes Self held; augmentin 1x incr dose; lovenox 2x miss,   1x misdose  amox amox  self adj 5x hold, enox enox/norco, augmentin       Date 6/28  8/4 8/6 8/9 8/20 9/3  11/9 11/16 11/30 12/2   Total WeeklyDose 67.5 mg BH Barrett   6/30-7/29 [facility] CCH 32.5 mg 47.5mg 51.25 mg 52.5mg 52.5mg? Non compliant 35mg? 70mg? 52.5mg BHL- left AMA   INR 3.9  2.2 3.0 2.9 4.8 2.6  2.0 3.9 4.3    Notes  amio init enox enox enox    Rec 11/16   GI bleed     Date 12/4 12/6 1/25 2/10  2/24 3/25 4/5 6/22 7/28 8/9 8/10   Total WeeklyDose BHL- left AMA 7.5mg ?? 52.5mg ????  52.5 mg 52.5 mg 53.75mg 45 mg 52.5mg 52.5mg  "  INR  1.1 1.7 5.6  1.8 2.0 1.8 2.4 1.7 2.7 3.0   Notes AMS/GI Bleed  rec 1/26 [facility] COVID + suboxone  1x miss enox  Clinic, 1x misdose 1x miss  ED-      Date 10/18 11/1 12/20 1/20 2/14 3/31 4/10 4/25 4/28 5/2  6/6   Total WeeklyDose 52.5 mg 52.5 mg 52.5 mg 52.5 mg 52.5 mg 52.5mg 52.5mg 52.5mg 41.25mg 45 mg Non compliant 48.75mg   INR 3.0 3.1 2.7 2.9 3.0 3.0 3.0 7.8 1.2 2.1  3.7   Notes 10/19   rec'd 1/23 rec'd 2/15   RSV azith doxy hematuria 2x hold LVQ 2x boost  1x miss 1x self adj   30  Date 7/11  8/1 8/15  10/3 10/13 10/30 11/14ED  11/16 11/21 12/4 2/5    Total Weekly Dose 52.5 mg No ncompliant 52.5mg 52.5mg ???? 48.75 mg 52.5mg  ?? Self-adj  30 mg DUE 41.25 mg ? 48.75 mg    INR 4.4  6.3 5.2   6.7  5.3  3.7   3.8 4.2    Notes Verbal  ozempic   clinda  Rcv'd 10/4 clinic Erroneous result hematuria Heldx1, redx1 cipro forgot   Hold       Phone Interview:  Verbal Release Authorization signed on 11/7/18 and 3/31/2023 (may speak with sister in emergency)  Tablet Strength:   7.5 mg  Patient Contact: Cell: [phone] Home: [phone] -- try to call in PM Email: [email or web address]      Patient Findings    Positives: Signs/symptoms of bleeding, Extra doses   Negatives: Signs/symptoms of thrombosis, Laboratory test error suspected, Change in health, Change in alcohol use, Change in activity, Upcoming invasive procedure, Emergency department visit, Upcoming dental procedure, Missed doses, Change in medications, Change in diet/appetite, Hospital admission, Bruising, Other complaints   Comments: Patient stated dosing of 3.75 mg WEDSUN and all other days 7.5 mg. Patient reports blood in urine for about a week. Patient will call urologist. Patient understands he is non compliant because he feels that something is always wrong with him \" always sick\". All other findings negative per patient.       Plan:   INR is SUPRAtherapeutic today at 4.2(goal 2.5 - 3.5). Per Reed Lu PharmD Instructed Mr. Alberto to hold warfarin " 2/5 and 2/6  and check with urologist. Patient requested dosing instructions past Wednesday and informed him we couldn't dose based off of the current information we have.  Informed patient to recheck 2/7. Resume warfarin based on urology recommendations.  Verbal  information provided over the phone  Mitchell W Remy RBV dosing instructions, expresses understanding by teach back, and has no further questions at this time.    Jesus Gilmore   [facility]  2/5/2024 12:30 EST    I, Yariel Lu, PharmD, have reviewed the note in full and agree with the assessment and plan.  02/05/24  13:10 EST

## 2024-02-12 ENCOUNTER — ANTICOAGULATION VISIT (OUTPATIENT)
Dept: PHARMACY | Facility: HOSPITAL | Age: 59
End: 2024-02-12
Payer: MEDICARE

## 2024-02-12 DIAGNOSIS — Z95.2 S/P AVR (AORTIC VALVE REPLACEMENT): Primary | ICD-10-CM

## 2024-02-12 DIAGNOSIS — I48.91 ATRIAL FIBRILLATION, UNSPECIFIED TYPE: Primary | ICD-10-CM

## 2024-02-12 DIAGNOSIS — Z95.2 S/P AVR (AORTIC VALVE REPLACEMENT): ICD-10-CM

## 2024-02-12 LAB — INR PPP: 3.8

## 2024-02-12 RX ORDER — WARFARIN SODIUM 7.5 MG/1
TABLET ORAL
Qty: 30 TABLET | Refills: 1 | Status: SHIPPED | OUTPATIENT
Start: 2024-02-12

## 2024-02-14 ENCOUNTER — OFFICE VISIT (OUTPATIENT)
Dept: CARDIOLOGY | Facility: HOSPITAL | Age: 59
End: 2024-02-14
Payer: MEDICARE

## 2024-02-14 VITALS
RESPIRATION RATE: 20 BRPM | OXYGEN SATURATION: 93 % | HEIGHT: 77 IN | TEMPERATURE: 97 F | DIASTOLIC BLOOD PRESSURE: 89 MMHG | WEIGHT: 315 LBS | SYSTOLIC BLOOD PRESSURE: 141 MMHG | HEART RATE: 80 BPM | BODY MASS INDEX: 37.19 KG/M2

## 2024-02-14 DIAGNOSIS — R60.9 EDEMA, UNSPECIFIED TYPE: ICD-10-CM

## 2024-02-14 DIAGNOSIS — I48.0 PAF (PAROXYSMAL ATRIAL FIBRILLATION): ICD-10-CM

## 2024-02-14 DIAGNOSIS — I10 PRIMARY HYPERTENSION: ICD-10-CM

## 2024-02-14 DIAGNOSIS — Z86.79 HISTORY OF VENTRICULAR FIBRILLATION: ICD-10-CM

## 2024-02-14 DIAGNOSIS — I42.0 DILATED CARDIOMYOPATHY: Primary | ICD-10-CM

## 2024-02-14 DIAGNOSIS — R06.02 SHORTNESS OF BREATH: ICD-10-CM

## 2024-02-14 DIAGNOSIS — I35.0 AORTIC VALVE STENOSIS, ETIOLOGY OF CARDIAC VALVE DISEASE UNSPECIFIED: ICD-10-CM

## 2024-02-14 LAB — NT-PROBNP SERPL-MCNC: 229 PG/ML (ref 0–900)

## 2024-02-14 PROCEDURE — 83880 ASSAY OF NATRIURETIC PEPTIDE: CPT | Performed by: NURSE PRACTITIONER

## 2024-02-14 PROCEDURE — 80053 COMPREHEN METABOLIC PANEL: CPT | Performed by: NURSE PRACTITIONER

## 2024-02-14 RX ORDER — BUMETANIDE 0.25 MG/ML
2 INJECTION INTRAMUSCULAR; INTRAVENOUS ONCE
Status: SHIPPED | OUTPATIENT
Start: 2024-02-14

## 2024-02-15 ENCOUNTER — TELEPHONE (OUTPATIENT)
Dept: CARDIOLOGY | Facility: HOSPITAL | Age: 59
End: 2024-02-15
Payer: MEDICARE

## 2024-02-15 DIAGNOSIS — I50.22 CHRONIC SYSTOLIC CONGESTIVE HEART FAILURE: ICD-10-CM

## 2024-02-15 DIAGNOSIS — R60.9 EDEMA, UNSPECIFIED TYPE: ICD-10-CM

## 2024-02-15 DIAGNOSIS — I42.0 DILATED CARDIOMYOPATHY: Primary | ICD-10-CM

## 2024-02-15 DIAGNOSIS — N18.30 STAGE 3 CHRONIC KIDNEY DISEASE, UNSPECIFIED WHETHER STAGE 3A OR 3B CKD: ICD-10-CM

## 2024-02-15 LAB
ALBUMIN SERPL-MCNC: 4.7 G/DL (ref 3.5–5.2)
ALBUMIN/GLOB SERPL: 1.9 G/DL
ALP SERPL-CCNC: 90 U/L (ref 39–117)
ALT SERPL W P-5'-P-CCNC: 13 U/L (ref 1–41)
ANION GAP SERPL CALCULATED.3IONS-SCNC: 13 MMOL/L (ref 5–15)
AST SERPL-CCNC: 20 U/L (ref 1–40)
BILIRUB SERPL-MCNC: 0.2 MG/DL (ref 0–1.2)
BUN SERPL-MCNC: 22 MG/DL (ref 6–20)
BUN/CREAT SERPL: 16.1 (ref 7–25)
CALCIUM SPEC-SCNC: 9.3 MG/DL (ref 8.6–10.5)
CHLORIDE SERPL-SCNC: 102 MMOL/L (ref 98–107)
CO2 SERPL-SCNC: 22 MMOL/L (ref 22–29)
CREAT SERPL-MCNC: 1.37 MG/DL (ref 0.76–1.27)
EGFRCR SERPLBLD CKD-EPI 2021: 59.8 ML/MIN/1.73
GLOBULIN UR ELPH-MCNC: 2.5 GM/DL
GLUCOSE SERPL-MCNC: 98 MG/DL (ref 65–99)
POTASSIUM SERPL-SCNC: 4.3 MMOL/L (ref 3.5–5.2)
PROT SERPL-MCNC: 7.2 G/DL (ref 6–8.5)
SODIUM SERPL-SCNC: 137 MMOL/L (ref 136–145)

## 2024-03-19 ENCOUNTER — ANTICOAGULATION VISIT (OUTPATIENT)
Dept: PHARMACY | Facility: HOSPITAL | Age: 59
End: 2024-03-19
Payer: MEDICARE

## 2024-03-19 DIAGNOSIS — Z95.2 S/P AVR (AORTIC VALVE REPLACEMENT): Primary | ICD-10-CM

## 2024-03-19 LAB — INR PPP: 7.3

## 2024-03-19 NOTE — PROGRESS NOTES
Anticoagulation Clinic - Remote Progress Note  [facility]  Testing frequency: 7 days    Indication: Atrial Fibrillation and Mechanical Aortic Valve; h/o embolic CVA  Referring Provider: Bunny last seen 3/31/2023; next 5/2024  Initial Warfarin Start Date: 11/2012  Planned Duration of Therapy: Life  Goal INR: 2.5-3.5  Current Drug Interactions: escitalopram, aspirin, amiodarone, pantoprazole  CHADS-VASc: 3 (HTN, h/o CVA)     Vit K Diet: patient eats, goes through spurts when he tries to eat healthier 3/3/2021  Alcohol: none  Tobacco: none       Anticoagulation Clinic INR History:      Date 10/2 10/10 11/4 11/12 11/22 12/10 1/3/20 1/22 1/30 2/3 2/18 2/27   Total Weekly Dose 80mg 80mg 62.5mg 85mg 82.5mg 70mg 72.5mg 72.5mg 82.5mg 77.5mg 75mg 75mg   INR  3.9 3.6 1.7 3.4 4.7 2.4 3.6 1.3 2.3 3.2 3.1 3.2   Notes   1x miss self adj  self adj  lovenox         Date 4/8 4/29 5/18 6/20  8/3 8/17 10/5 10/19 11/11 12/21    Total Weekly Dose 70mg 70mg 65mg 65mg Non- compliant 65mg 65 mg 65mg 65mg 65mg 65mg Non- compliant   INR 4.3 4.1 4.5 3.0  3.0 3.8 3.0 2.9 4.1 2.3    Notes Self adj; Levaquin decr GLV; COVID+  recv'd 6/22; misdose       incr GLV      Date 1/20/21 1/21 1/25 2/1 2/16 3/3 3/22 4/13  4/26 5/14 5/21   Total WeeklyDose 40mg 45mg? 62.5mg 80mg? 80mg   70mg 67.5mg  47.5mg  67.5 mg   INR 1.2 1.2 maia 1.6 2.9 3.7  4.5 3.2 3.3  1.8 4.5 3.1   Notes Self held; augmentin 1x incr dose; lovenox 2x miss,   1x misdose  amox amox  self adj 5x hold, enox enox/norco, augmentin       Date 6/28  8/4 8/6 8/9 8/20 9/3  11/9 11/16 11/30 12/2   Total WeeklyDose 67.5 mg BH Barrett   6/30-7/29 [facility] CCH 32.5 mg 47.5mg 51.25 mg 52.5mg 52.5mg? Non compliant 35mg? 70mg? 52.5mg BHL- left AMA   INR 3.9  2.2 3.0 2.9 4.8 2.6  2.0 3.9 4.3    Notes  amio init enox enox enox    Rec 11/16   GI bleed     Date 12/4 12/6 1/25 2/10  2/24 3/25 4/5 6/22 7/28 8/9 8/10   Total WeeklyDose BHL- left AMA 7.5mg ?? 52.5mg ????  52.5 mg 52.5 mg 53.75mg 45 mg 52.5mg  "52.5mg   INR  1.1 1.7 5.6  1.8 2.0 1.8 2.4 1.7 2.7 3.0   Notes AMS/GI Bleed  rec 1/26 [facility] COVID + suboxone  1x miss enox  Clinic, 1x misdose 1x miss  ED-      Date 10/18 11/1 12/20 1/20 2/14 3/31 4/10 4/25 4/28 5/2  6/6   Total WeeklyDose 52.5 mg 52.5 mg 52.5 mg 52.5 mg 52.5 mg 52.5mg 52.5mg 52.5mg 41.25mg 45 mg Non compliant 48.75mg   INR 3.0 3.1 2.7 2.9 3.0 3.0 3.0 7.8 1.2 2.1  3.7   Notes 10/19   rec'd 1/23 rec'd 2/15   RSV azith doxy hematuria 2x hold LVQ 2x boost  1x miss 1x self adj   30  Date 7/11  8/1 8/15  10/3 10/13 10/30 11/14ED  11/16 11/21 12/4 2/5 2/12   Total Weekly Dose 52.5 mg No ncompliant 52.5mg 52.5mg ???? 48.75 mg 52.5mg  ?? Self-adj  30 mg DUE 41.25 mg ? 48.75 mg 33.75 mg   INR 4.4  6.3 5.2   6.7  5.3  3.7   3.8 4.2 3.8   Notes Verbal  ozempic   clinda  Rcv'd 10/4 clinic Erroneous result hematuria Heldx1, redx1 cipro forgot   Hold x 2     Date  3/19               Total Weekly Dose Non- compliant 45 mg               INR  7.3              Notes  Misdosing, noncompliant                 Phone Interview:  Verbal Release Authorization signed on 11/7/18 and 3/31/2023 (may speak with sister in emergency)  Tablet Strength:   7.5 mg  Patient Contact: Cell: [phone] Home: [phone] -- try to call in PM Email: [email or web address]      Patient Findings  Positives: Change in health, Extra doses, Other complaints   Negatives: Signs/symptoms of thrombosis, Signs/symptoms of bleeding, Laboratory test error suspected, Change in alcohol use, Change in activity, Upcoming invasive procedure, Emergency department visit, Upcoming dental procedure, Missed doses, Change in medications, Change in diet/appetite, Hospital admission, Bruising   Comments: Discussed overdue INR with patient, per patient \"I don't keep track of it like I should\". Patient taking warfarin 3.75 mg TueSun, warfarin 7.5 mg daily all other days, tracker updated.  Patient was last instructed to take warfarin to 7.5 mg daily except 3.75 " mg onMWF. No bruising/bleeding out of ordinary, reports he is light headed but not too out of ordinary per patient given his end stage HF. Nothing has changed with diet.       Plan:   INR is SUPRAtherapeutic today at 7.3 (goal 2.5 - 3.5) on home monitor. Patient taking warfarin 3.75 mg TueSun, warfarin 7.5 mg daily all other days instead of instructed warfarin 7.5 mg daily except 3.75 mg onMWF. Patient also noncompliant with instructed recheck date. Instructed Mr. Alberto to HOLD warfarin today and tomorrow, and to take warfarin 7.5 mg Thursday. Instructed patient to be on lookout for signs of bruising/bleeding and to seek medication attention if needed.  Patient requests to get INR done in Clinic Friday as walk in since he will already be in [place]. Requested patient call clinic when he is leaving for clinic to get better idea of when he would. Patient prefers this over getting INR done at lab closer to home.  Verbal  information provided over the phone  Mitchell HICKS Remy RBV dosing instructions, expresses understanding by teach back, and has no further questions at this time.      Franklin Hagen, PharmD, BCPS  3/19/2024  15:20 EDT

## 2024-03-20 ENCOUNTER — TELEPHONE (OUTPATIENT)
Dept: CARDIOLOGY | Facility: HOSPITAL | Age: 59
End: 2024-03-20
Payer: MEDICARE

## 2024-03-20 NOTE — TELEPHONE ENCOUNTER
Caller: Mitchell Alberto    Relationship: Self    Best call back number: [phone]    What is the best time to reach you: ANYTIME     Who are you requesting to speak with (clinical staff, provider,  specific staff member): BO GUTIERREZ     What was the call regarding: PATIENT CALLED IN REQUESTING TO SPEAK TO BO ABOUT AN ON GOING ISSUES WITH FLUID BUILD UP IN HIS BODY. PATIENT STATED THAT THE LASIX HE IS CURRENTLY TAKING DOESN'T SEEM TO BE WORKING AND HE CAN NOT AFFORD TO COME TO THE OFFICE NOW SINCE HE MOVED TO [place], [place]. PATIENT STATED HE WAS GIVEN SAMPLES OF THE JARDIANCE THAT SEEMED TO HELP BETTER BUT IT IS $300 A MONTH WHICH HE CAN NOT AFFORD. PATIENT WOULD LIKE A CALL BACK FROM BO ONLY TO DISCUSS.     Is it okay if the provider responds through MyChart: PREFERS A CALL     PATIENT IS NOT CURRENTLY HAVING ANY ACTIVE SYMPTOMS

## 2024-03-21 ENCOUNTER — TELEPHONE (OUTPATIENT)
Dept: PHARMACY | Facility: HOSPITAL | Age: 59
End: 2024-03-21
Payer: MEDICARE

## 2024-03-21 DIAGNOSIS — Z95.2 S/P AVR (AORTIC VALVE REPLACEMENT): ICD-10-CM

## 2024-03-21 RX ORDER — WARFARIN SODIUM 7.5 MG/1
TABLET ORAL
Qty: 30 TABLET | Refills: 2 | Status: SHIPPED | OUTPATIENT
Start: 2024-03-21

## 2024-03-21 NOTE — TELEPHONE ENCOUNTER
He is actually a patient of Eleni.  He had a f/u this week with her.  It appears it was cancelled.  I would have him schedule a f/u with Eleni for continued management.

## 2024-03-22 NOTE — TELEPHONE ENCOUNTER
Spoke to pt and he states he would like a new diuretic. It is hard for him to come to [place] because he now lives in [place].

## 2024-04-01 RX ORDER — AMIODARONE HYDROCHLORIDE 200 MG/1
100 TABLET ORAL DAILY
Qty: 45 TABLET | Refills: 0 | Status: SHIPPED | OUTPATIENT
Start: 2024-04-01 | End: 2024-04-08 | Stop reason: SDUPTHER

## 2024-04-08 ENCOUNTER — ANTICOAGULATION VISIT (OUTPATIENT)
Dept: PHARMACY | Facility: HOSPITAL | Age: 59
End: 2024-04-08
Payer: MEDICARE

## 2024-04-08 DIAGNOSIS — Z95.2 S/P AVR (AORTIC VALVE REPLACEMENT): Primary | ICD-10-CM

## 2024-04-08 DIAGNOSIS — I48.91 ATRIAL FIBRILLATION, UNSPECIFIED TYPE: ICD-10-CM

## 2024-04-08 LAB — INR PPP: 4.9

## 2024-04-08 RX ORDER — BUMETANIDE 1 MG/1
TABLET ORAL
Qty: 90 TABLET | Refills: 0 | Status: SHIPPED | OUTPATIENT
Start: 2024-04-08

## 2024-04-08 RX ORDER — AMIODARONE HYDROCHLORIDE 200 MG/1
100 TABLET ORAL DAILY
Qty: 45 TABLET | Refills: 0 | Status: SHIPPED | OUTPATIENT
Start: 2024-04-08

## 2024-04-08 RX ORDER — CARVEDILOL 25 MG/1
25 TABLET ORAL 2 TIMES DAILY
Qty: 180 TABLET | Refills: 3 | Status: SHIPPED | OUTPATIENT
Start: 2024-04-08

## 2024-04-08 NOTE — PROGRESS NOTES
Anticoagulation Clinic - Remote Progress Note  [facility]  Testing frequency: 7 days    Indication: Atrial Fibrillation and Mechanical Aortic Valve; h/o embolic CVA  Referring Provider: Bunny last seen 3/31/2023; next 5/2024  Initial Warfarin Start Date: 11/2012  Planned Duration of Therapy: Life  Goal INR: 2.5-3.5  Current Drug Interactions: escitalopram, aspirin, amiodarone, pantoprazole  CHADS-VASc: 3 (HTN, h/o CVA)     Vit K Diet: patient eats, goes through spurts when he tries to eat healthier 3/3/2021  Alcohol: none  Tobacco: none       Anticoagulation Clinic INR History:      Date 10/2 10/10 11/4 11/12 11/22 12/10 1/3/20 1/22 1/30 2/3 2/18 2/27   Total Weekly Dose 80mg 80mg 62.5mg 85mg 82.5mg 70mg 72.5mg 72.5mg 82.5mg 77.5mg 75mg 75mg   INR  3.9 3.6 1.7 3.4 4.7 2.4 3.6 1.3 2.3 3.2 3.1 3.2   Notes   1x miss self adj  self adj  lovenox         Date 4/8 4/29 5/18 6/20  8/3 8/17 10/5 10/19 11/11 12/21    Total Weekly Dose 70mg 70mg 65mg 65mg Non- compliant 65mg 65 mg 65mg 65mg 65mg 65mg Non- compliant   INR 4.3 4.1 4.5 3.0  3.0 3.8 3.0 2.9 4.1 2.3    Notes Self adj; Levaquin decr GLV; COVID+  recv'd 6/22; misdose       incr GLV      Date 1/20/21 1/21 1/25 2/1 2/16 3/3 3/22 4/13  4/26 5/14 5/21   Total WeeklyDose 40mg 45mg? 62.5mg 80mg? 80mg   70mg 67.5mg  47.5mg  67.5 mg   INR 1.2 1.2 maia 1.6 2.9 3.7  4.5 3.2 3.3  1.8 4.5 3.1   Notes Self held; augmentin 1x incr dose; lovenox 2x miss,   1x misdose  amox amox  self adj 5x hold, enox enox/norco, augmentin       Date 6/28  8/4 8/6 8/9 8/20 9/3  11/9 11/16 11/30 12/2   Total WeeklyDose 67.5 mg BH Barrett   6/30-7/29 S[facility] CCH 32.5 mg 47.5mg 51.25 mg 52.5mg 52.5mg? Non compliant 35mg? 70mg? 52.5mg BHL- left AMA   INR 3.9  2.2 3.0 2.9 4.8 2.6  2.0 3.9 4.3    Notes  amio init enox enox enox    Rec 11/16   GI bleed     Date 12/4 12/6 1/25 2/10  2/24 3/25 4/5 6/22 7/28 8/9 8/10   Total WeeklyDose BHL- left AMA 7.5mg ?? 52.5mg ????  52.5 mg 52.5 mg 53.75mg 45 mg 52.5mg  52.5mg   INR  1.1 1.7 5.6  1.8 2.0 1.8 2.4 1.7 2.7 3.0   Notes AMS/GI Bleed  rec 1/26 [facility] COVID + suboxone  1x miss enox  Clinic, 1x misdose 1x miss  ED-      Date 10/18 11/1 12/20 1/20 2/14 3/31 4/10 4/25 4/28 5/2  6/6   Total WeeklyDose 52.5 mg 52.5 mg 52.5 mg 52.5 mg 52.5 mg 52.5mg 52.5mg 52.5mg 41.25mg 45 mg Non compliant 48.75mg   INR 3.0 3.1 2.7 2.9 3.0 3.0 3.0 7.8 1.2 2.1  3.7   Notes 10/19   rec'd 1/23 rec'd 2/15   RSV azith doxy hematuria 2x hold LVQ 2x boost  1x miss 1x self adj   30  Date 7/11  8/1 8/15  10/3 10/13 10/30 11/14ED  11/16 11/21 12/4 2/5 2/12   Total Weekly Dose 52.5 mg No ncompliant 52.5mg 52.5mg ???? 48.75 mg 52.5mg  ?? Self-adj  30 mg DUE 41.25 mg ? 48.75 mg 33.75 mg   INR 4.4  6.3 5.2   6.7  5.3  3.7   3.8 4.2 3.8   Notes Verbal  ozempic   clinda  Rcv'd 10/4 clinic Erroneous result hematuria Heldx1, redx1 cipro forgot   Hold x 2     Date  3/19 4/8              Total Weekly Dose Non- compliant 45 mg 45 mg              INR  7.3 4.9             Notes  Misdosing, noncompliant Misdosing, noncompliant                Phone Interview:  Verbal Release Authorization signed on 11/7/18 and 3/31/2023 (may speak with sister in emergency)  Tablet Strength:   7.5 mg  Patient Contact: Cell: [phone] Home: [phone] -- try to call in PM Email: [email or web address]    Patient Findings    Positives: Missed doses, Extra doses, Change in medications   Negatives: Signs/symptoms of thrombosis, Signs/symptoms of bleeding, Laboratory test error suspected, Change in health, Change in alcohol use, Change in activity, Upcoming invasive procedure, Emergency department visit, Upcoming dental procedure, Change in diet/appetite, Hospital admission, Bruising, Other complaints   Comments: Patient reports taking warfarin 3.75 mg Tues/Sun and 7.5 mg all other days (45 mg/week) instead of instructed 3/75 mg Mon, Wed, Fri and 7.5 mg all other days (41.25 mg/week). He will be on cefuroxime BID for 2 more days. He  has also started tamsulosin. All other findings negative.     Plan:   INR is SUPRAtherapeutic today at 4.9 (goal 2.5 - 3.5) on home monitor. Patient reports mis-dose of 45 mg/week instead of instructed 41.25 mg/week. Instructed Mr. Alberto to HOLD warfarin dose today and then take warfarin 3.75 mg Sun, Tues, Thurs and warfarin 7.5 mg all other days until recheck.  Recheck INR on 4/11/24. Patient agreeable to this plan.   Verbal  information provided over the phone  Mitchell Alberto RBV dosing instructions, expresses understanding by teach back, and has no further questions at this time.    Reshma Cortes, PharmD  04/08/24   13:43 EDT

## 2024-04-08 NOTE — TELEPHONE ENCOUNTER
Caller: Mitchell Alberto    Relationship: Self    Best call back number: [phone]    Requested Prescriptions:   Requested Prescriptions     Pending Prescriptions Disp Refills    amiodarone (PACERONE) 200 MG tablet 45 tablet 0     Sig: Take 0.5 tablets by mouth Daily.        [id number] - [place], [place] - [address] AT [address] - [phone]  - [phone] FX     Last office visit with prescribing clinician: 3/31/2023   Last telemedicine visit with prescribing clinician: Visit date not found   Next office visit with prescribing clinician: 5/10/2024     Additional details provided by patient: ONE DAY LEFT    Does the patient have less than a 3 day supply:  [x] Yes  [] No    Would you like a call back once the refill request has been completed: [] Yes [x] No    If the office needs to give you a call back, can they leave a voicemail: [] Yes [x] No    Hermilo Sherman Rep   04/08/24 11:59 EDT

## 2024-04-08 NOTE — PROGRESS NOTES
Anticoagulation Clinic - Remote Progress Note  [facility]  Testing frequency: 7 days    Indication: Atrial Fibrillation and Mechanical Aortic Valve; h/o embolic CVA  Referring Provider: Bunny last seen 3/31/2023; next 5/2024  Initial Warfarin Start Date: 11/2012  Planned Duration of Therapy: Life  Goal INR: 2.5-3.5  Current Drug Interactions: escitalopram, aspirin, amiodarone, pantoprazole  CHADS-VASc: 3 (HTN, h/o CVA)     Vit K Diet: patient eats, goes through spurts when he tries to eat healthier 3/3/2021  Alcohol: none  Tobacco: none       Anticoagulation Clinic INR History:      Date 10/2 10/10 11/4 11/12 11/22 12/10 1/3/20 1/22 1/30 2/3 2/18 2/27   Total Weekly Dose 80mg 80mg 62.5mg 85mg 82.5mg 70mg 72.5mg 72.5mg 82.5mg 77.5mg 75mg 75mg   INR  3.9 3.6 1.7 3.4 4.7 2.4 3.6 1.3 2.3 3.2 3.1 3.2   Notes   1x miss self adj  self adj  lovenox         Date 4/8 4/29 5/18 6/20  8/3 8/17 10/5 10/19 11/11 12/21    Total Weekly Dose 70mg 70mg 65mg 65mg Non- compliant 65mg 65 mg 65mg 65mg 65mg 65mg Non- compliant   INR 4.3 4.1 4.5 3.0  3.0 3.8 3.0 2.9 4.1 2.3    Notes Self adj; Levaquin decr GLV; COVID+  recv'd 6/22; misdose       incr GLV      Date 1/20/21 1/21 1/25 2/1 2/16 3/3 3/22 4/13  4/26 5/14 5/21   Total WeeklyDose 40mg 45mg? 62.5mg 80mg? 80mg   70mg 67.5mg  47.5mg  67.5 mg   INR 1.2 1.2 maia 1.6 2.9 3.7  4.5 3.2 3.3  1.8 4.5 3.1   Notes Self held; augmentin 1x incr dose; lovenox 2x miss,   1x misdose  amox amox  self adj 5x hold, enox enox/norco, augmentin       Date 6/28  8/4 8/6 8/9 8/20 9/3  11/9 11/16 11/30 12/2   Total WeeklyDose 67.5 mg BH Barrett   6/30-7/29 S[facility] CCH 32.5 mg 47.5mg 51.25 mg 52.5mg 52.5mg? Non compliant 35mg? 70mg? 52.5mg BHL- left AMA   INR 3.9  2.2 3.0 2.9 4.8 2.6  2.0 3.9 4.3    Notes  amio init enox enox enox    Rec 11/16   GI bleed     Date 12/4 12/6 1/25 2/10  2/24 3/25 4/5 6/22 7/28 8/9 8/10   Total WeeklyDose BHL- left AMA 7.5mg ?? 52.5mg ????  52.5 mg 52.5 mg 53.75mg 45 mg 52.5mg  "52.5mg   INR  1.1 1.7 5.6  1.8 2.0 1.8 2.4 1.7 2.7 3.0   Notes AMS/GI Bleed  rec 1/26 [facility] COVID + suboxone  1x miss enox  Clinic, 1x misdose 1x miss  ED-      Date 10/18 11/1 12/20 1/20 2/14 3/31 4/10 4/25 4/28 5/2  6/6   Total WeeklyDose 52.5 mg 52.5 mg 52.5 mg 52.5 mg 52.5 mg 52.5mg 52.5mg 52.5mg 41.25mg 45 mg Non compliant 48.75mg   INR 3.0 3.1 2.7 2.9 3.0 3.0 3.0 7.8 1.2 2.1  3.7   Notes 10/19   rec'd 1/23 rec'd 2/15   RSV azith doxy hematuria 2x hold LVQ 2x boost  1x miss 1x self adj   30  Date 7/11  8/1 8/15  10/3 10/13 10/30 11/14ED  11/16 11/21 12/4 2/5 2/12   Total Weekly Dose 52.5 mg No ncompliant 52.5mg 52.5mg ???? 48.75 mg 52.5mg  ?? Self-adj  30 mg DUE 41.25 mg ? 48.75 mg 33.75 mg   INR 4.4  6.3 5.2   6.7  5.3  3.7   3.8 4.2 3.8   Notes Verbal  ozempic   clinda  Rcv'd 10/4 clinic Erroneous result hematuria Heldx1, redx1 cipro forgot   Hold x 2     Date  3/19               Total Weekly Dose Non- compliant 45 mg               INR  7.3              Notes  Misdosing, noncompliant                 Phone Interview:  Verbal Release Authorization signed on 11/7/18 and 3/31/2023 (may speak with sister in emergency)  Tablet Strength:   7.5 mg  Patient Contact: Cell: [phone] Home: [phone] -- try to call in PM Email: [email or web address]      Patient Findings  Positives: Change in health, Extra doses, Other complaints   Negatives: Signs/symptoms of thrombosis, Signs/symptoms of bleeding, Laboratory test error suspected, Change in alcohol use, Change in activity, Upcoming invasive procedure, Emergency department visit, Upcoming dental procedure, Missed doses, Change in medications, Change in diet/appetite, Hospital admission, Bruising   Comments: Discussed overdue INR with patient, per patient \"I don't keep track of it like I should\". Patient taking warfarin 3.75 mg TueSun, warfarin 7.5 mg daily all other days, tracker updated.  Patient was last instructed to take warfarin to 7.5 mg daily except 3.75 " mg onMWF. No bruising/bleeding out of ordinary, reports he is light headed but not too out of ordinary per patient given his end stage HF. Nothing has changed with diet.       Plan:   INR is SUPRAtherapeutic today at 7.3 (goal 2.5 - 3.5) on home monitor. Patient taking warfarin 3.75 mg TueSun, warfarin 7.5 mg daily all other days instead of instructed warfarin 7.5 mg daily except 3.75 mg onMWF. Patient also noncompliant with instructed recheck date. Instructed Mr. Alberto to HOLD warfarin today and tomorrow, and to take warfarin 7.5 mg Thursday. Instructed patient to be on lookout for signs of bruising/bleeding and to seek medication attention if needed.  Patient requests to get INR done in Clinic Friday as walk in since he will already be in [place]. Requested patient call clinic when he is leaving for clinic to get better idea of when he would. Patient prefers this over getting INR done at lab closer to home.  Verbal  information provided over the phone  Mitchell HICKS Remy RBV dosing instructions, expresses understanding by teach back, and has no further questions at this time.      Franklin Hagen, PharmD, BCPS  4/8/2024  13:22 EDT

## 2024-04-08 NOTE — TELEPHONE ENCOUNTER
Caller: Remy Mitchell W    Relationship: Self    Best call back number: [phone]    Requested Prescriptions:   Requested Prescriptions     Pending Prescriptions Disp Refills    amiodarone (PACERONE) 200 MG tablet 45 tablet 0     Sig: Take 0.5 tablets by mouth Daily.    carvedilol (COREG) 25 MG tablet 180 tablet 3     Sig: Take 1 tablet by mouth 2 (Two) Times a Day.        Pharmacy where request should be sent: [id number] - [place], [place] - [address] [phone] [facility] [phone] FX     Last office visit with prescribing clinician: 3/31/2023   Last telemedicine visit with prescribing clinician: Visit date not found   Next office visit with prescribing clinician: 5/10/2024     Additional details provided by patient: PATIENT COMPLETELY OUT OF THE MEDICATIONS. AMIODARONE WAS SENT TO [facility] ON EARLIER ATTEMPT TO REFILL. PLEASE MAKE SURE TO SEND ALL MEDICATIONS REQUESTED TO [facility].     Does the patient have less than a 3 day supply:  [x] Yes  [] No    Would you like a call back once the refill request has been completed: [] Yes [] No    If the office needs to give you a call back, can they leave a voicemail: [] Yes [] No    Hermilo Riggins Rep   04/08/24 12:31 EDT

## 2024-04-08 NOTE — TELEPHONE ENCOUNTER
Lab Results   Component Value Date    GLUCOSE 98 02/14/2024    CALCIUM 9.3 02/14/2024     02/14/2024    K 4.3 02/14/2024    CO2 22.0 02/14/2024     02/14/2024    BUN 22 (H) 02/14/2024    CREATININE 1.37 (H) 02/14/2024    EGFR 59.8 (L) 02/14/2024    BCR 16.1 02/14/2024    ANIONGAP 13.0 02/14/2024

## 2024-04-30 ENCOUNTER — ANTICOAGULATION VISIT (OUTPATIENT)
Dept: PHARMACY | Facility: HOSPITAL | Age: 59
End: 2024-04-30
Payer: MEDICARE

## 2024-04-30 DIAGNOSIS — Z95.2 S/P AVR (AORTIC VALVE REPLACEMENT): ICD-10-CM

## 2024-04-30 DIAGNOSIS — I48.91 ATRIAL FIBRILLATION, UNSPECIFIED TYPE: Primary | ICD-10-CM

## 2024-04-30 DIAGNOSIS — I48.91 ATRIAL FIBRILLATION, UNSPECIFIED TYPE: ICD-10-CM

## 2024-04-30 DIAGNOSIS — Z95.2 S/P AVR (AORTIC VALVE REPLACEMENT): Primary | ICD-10-CM

## 2024-04-30 LAB — INR PPP: 3.3

## 2024-04-30 RX ORDER — BUMETANIDE 1 MG/1
TABLET ORAL
Qty: 90 TABLET | Refills: 0 | Status: SHIPPED | OUTPATIENT
Start: 2024-04-30

## 2024-04-30 RX ORDER — WARFARIN SODIUM 7.5 MG/1
TABLET ORAL
Qty: 30 TABLET | Refills: 3 | Status: SHIPPED | OUTPATIENT
Start: 2024-04-30

## 2024-04-30 RX ORDER — BUMETANIDE 1 MG/1
TABLET ORAL
Qty: 90 TABLET | Refills: 0 | OUTPATIENT
Start: 2024-04-30

## 2024-04-30 NOTE — TELEPHONE ENCOUNTER
Please review and refill, deny or re-route. Not sure if you want to address this or forward to Dr. Hollis.  Yamel Jacinto MA

## 2024-04-30 NOTE — PROGRESS NOTES
Anticoagulation Clinic - Remote Progress Note  [facility]  Testing frequency: 7 days    Indication: Atrial Fibrillation and Mechanical Aortic Valve; h/o embolic CVA  Referring Provider: Bunny last seen 3/31/2023; next 5/2024  Initial Warfarin Start Date: 11/2012  Planned Duration of Therapy: Life  Goal INR: 2.5-3.5  Current Drug Interactions: escitalopram, aspirin, amiodarone, pantoprazole  CHADS-VASc: 3 (HTN, h/o CVA)     Vit K Diet: patient eats, goes through spurts when he tries to eat healthier 3/3/2021  Alcohol: none  Tobacco: none       Anticoagulation Clinic INR History:      Date 10/2 10/10 11/4 11/12 11/22 12/10 1/3/20 1/22 1/30 2/3 2/18 2/27   Total Weekly Dose 80mg 80mg 62.5mg 85mg 82.5mg 70mg 72.5mg 72.5mg 82.5mg 77.5mg 75mg 75mg   INR  3.9 3.6 1.7 3.4 4.7 2.4 3.6 1.3 2.3 3.2 3.1 3.2   Notes   1x miss self adj  self adj  lovenox         Date 4/8 4/29 5/18 6/20  8/3 8/17 10/5 10/19 11/11 12/21    Total Weekly Dose 70mg 70mg 65mg 65mg Non- compliant 65mg 65 mg 65mg 65mg 65mg 65mg Non- compliant   INR 4.3 4.1 4.5 3.0  3.0 3.8 3.0 2.9 4.1 2.3    Notes Self adj; Levaquin decr GLV; COVID+  recv'd 6/22; misdose       incr GLV      Date 1/20/21 1/21 1/25 2/1 2/16 3/3 3/22 4/13  4/26 5/14 5/21   Total WeeklyDose 40mg 45mg? 62.5mg 80mg? 80mg   70mg 67.5mg  47.5mg  67.5 mg   INR 1.2 1.2 maia 1.6 2.9 3.7  4.5 3.2 3.3  1.8 4.5 3.1   Notes Self held; augmentin 1x incr dose; lovenox 2x miss,   1x misdose  amox amox  self adj 5x hold, enox enox/norco, augmentin       Date 6/28  8/4 8/6 8/9 8/20 9/3  11/9 11/16 11/30 12/2   Total WeeklyDose 67.5 mg BH Barrett   6/30-7/29 [facility] 32.5 mg 47.5mg 51.25 mg 52.5mg 52.5mg? Non compliant 35mg? 70mg? 52.5mg BHL- left AMA   INR 3.9  2.2 3.0 2.9 4.8 2.6  2.0 3.9 4.3    Notes  amio init enox enox enox    Rec 11/16   GI bleed     Date 12/4 12/6 1/25 2/10  2/24 3/25 4/5 6/22 7/28 8/9 8/10   Total WeeklyDose BHL- left AMA 7.5mg ?? 52.5mg ????  52.5 mg 52.5 mg 53.75mg 45 mg 52.5mg  52.5mg   INR  1.1 1.7 5.6  1.8 2.0 1.8 2.4 1.7 2.7 3.0   Notes AMS/GI Bleed  rec 1/26 [facility] COVID + suboxone  1x miss enox  Clinic, 1x misdose 1x miss  ED-      Date 10/18 11/1 12/20 1/20 2/14 3/31 4/10 4/25 4/28 5/2  6/6   Total WeeklyDose 52.5 mg 52.5 mg 52.5 mg 52.5 mg 52.5 mg 52.5mg 52.5mg 52.5mg 41.25mg 45 mg Non compliant 48.75mg   INR 3.0 3.1 2.7 2.9 3.0 3.0 3.0 7.8 1.2 2.1  3.7   Notes 10/19   rec'd 1/23 rec'd 2/15   RSV azith doxy hematuria 2x hold LVQ 2x boost  1x miss 1x self adj   30  Date 7/11  8/1 8/15  10/3 10/13 10/30 11/14ED  11/16 11/21 12/4 2/5 2/12   Total Weekly Dose 52.5 mg No ncompliant 52.5mg 52.5mg ???? 48.75 mg 52.5mg  ?? Self-adj  30 mg DUE 41.25 mg ? 48.75 mg 33.75 mg   INR 4.4  6.3 5.2   6.7  5.3  3.7   3.8 4.2 3.8   Notes Verbal  ozempic   clinda  Rcv'd 10/4 clinic Erroneous result hematuria Heldx1, redx1 cipro forgot   Hold x 2     Date  3/19 4/8 4/30/24             Total Weekly Dose Non- compliant 45 mg 45 mg ???             INR  7.3 4.9 3.3            Notes  Misdosing, noncompliant Misdosing, noncompliant                Phone Interview:  Verbal Release Authorization signed on 11/7/18 and 3/31/2023 (may speak with sister in emergency)  Tablet Strength:   7.5 mg  Patient Contact: Cell: [phone] Home: [phone] -- try to call in PM Email: [email or web address]        Patient Findings  Positives: Change in medications, Change in diet/appetite   Negatives: Signs/symptoms of thrombosis, Signs/symptoms of bleeding, Laboratory test error suspected, Change in health, Change in alcohol use, Change in activity, Upcoming invasive procedure, Emergency department visit, Upcoming dental procedure, Missed doses, Extra doses, Hospital admission, Bruising, Other complaints   Comments: Patient was non -compliant with his recheck date. I warned the patient that he will be discharged from [facility] if he isn't compliant w/ rechecks and reporting    He couldn't remember his most recent  Warfarin regimen.    Patient reported he doesn't have an appetite due to Ozempic    All other findings negative...  Pt. Hung up     UNABLE TO GET IN CONTACT WITH THE PATIEN, as patient hung up on phone call with technician.  Unable to provide dosing instructions      PLEASE DISREGARD THE FOLLOWING PLAN UNTIL ABLE TO GET IN CONTACT WITH PATIENT/ PATIENT REPRESENTATIVE.    Plan:   INR is therapeutic today at 3.3 (goal 2.5 - 3.5) on home monitor.   Recheck INR on   Verbal  information provided over the phone  Mitchell Alberto RBV dosing instructions, expresses understanding by teach back, and has no further questions at this time.    Benja Prasad, Pharmacy Technician  4/30/2024  15:21 EDT    I, Reshma Cortes, [facility], have reviewed the note in full and agree with the assessment and plan.  05/02/24  08:09 EDT

## 2024-05-10 ENCOUNTER — OFFICE VISIT (OUTPATIENT)
Dept: CARDIOLOGY | Facility: CLINIC | Age: 59
End: 2024-05-10
Payer: MEDICARE

## 2024-05-10 ENCOUNTER — LAB (OUTPATIENT)
Dept: LAB | Facility: HOSPITAL | Age: 59
End: 2024-05-10
Payer: MEDICARE

## 2024-05-10 ENCOUNTER — HOSPITAL ENCOUNTER (OUTPATIENT)
Dept: CARDIOLOGY | Facility: HOSPITAL | Age: 59
Discharge: HOME | End: 2024-05-10
Payer: MEDICARE

## 2024-05-10 VITALS
BODY MASS INDEX: 37.19 KG/M2 | WEIGHT: 315 LBS | SYSTOLIC BLOOD PRESSURE: 138 MMHG | HEIGHT: 77 IN | DIASTOLIC BLOOD PRESSURE: 98 MMHG

## 2024-05-10 VITALS
DIASTOLIC BLOOD PRESSURE: 82 MMHG | OXYGEN SATURATION: 97 % | HEIGHT: 77 IN | SYSTOLIC BLOOD PRESSURE: 132 MMHG | WEIGHT: 306.6 LBS | HEART RATE: 83 BPM | BODY MASS INDEX: 36.2 KG/M2

## 2024-05-10 DIAGNOSIS — I35.0 NONRHEUMATIC AORTIC VALVE STENOSIS: ICD-10-CM

## 2024-05-10 DIAGNOSIS — D62 ACUTE BLOOD LOSS ANEMIA: Primary | ICD-10-CM

## 2024-05-10 DIAGNOSIS — I48.0 PAROXYSMAL ATRIAL FIBRILLATION: ICD-10-CM

## 2024-05-10 DIAGNOSIS — I35.1 AORTIC VALVE INSUFFICIENCY, ETIOLOGY OF CARDIAC VALVE DISEASE UNSPECIFIED: ICD-10-CM

## 2024-05-10 DIAGNOSIS — I50.32 CHRONIC DIASTOLIC (CONGESTIVE) HEART FAILURE: ICD-10-CM

## 2024-05-10 DIAGNOSIS — D62 ACUTE BLOOD LOSS ANEMIA: ICD-10-CM

## 2024-05-10 LAB
ALBUMIN SERPL-MCNC: 4.8 G/DL (ref 3.5–5.2)
ALBUMIN/GLOB SERPL: 1.6 G/DL
ALP SERPL-CCNC: 92 U/L (ref 39–117)
ALT SERPL W P-5'-P-CCNC: 14 U/L (ref 1–41)
ANION GAP SERPL CALCULATED.3IONS-SCNC: 11.2 MMOL/L (ref 5–15)
ASCENDING AORTA: 3 CM
AST SERPL-CCNC: 20 U/L (ref 1–40)
BH CV ECHO MEAS - AO MAX PG: 20.8 MMHG
BH CV ECHO MEAS - AO MEAN PG: 13.5 MMHG
BH CV ECHO MEAS - AO ROOT DIAM: 3.3 CM
BH CV ECHO MEAS - AO V2 MAX: 228 CM/SEC
BH CV ECHO MEAS - AO V2 VTI: 34.6 CM
BH CV ECHO MEAS - EDV(MOD-SP2): 83.8 ML
BH CV ECHO MEAS - EDV(MOD-SP4): 132.5 ML
BH CV ECHO MEAS - EF(MOD-BP): 58.6 %
BH CV ECHO MEAS - EF(MOD-SP2): 55.4 %
BH CV ECHO MEAS - EF(MOD-SP4): 48.7 %
BH CV ECHO MEAS - ESV(MOD-SP2): 37.4 ML
BH CV ECHO MEAS - ESV(MOD-SP4): 68 ML
BH CV ECHO MEAS - IVS/LVPW: 1.05 CM
BH CV ECHO MEAS - IVSD: 1.29 CM
BH CV ECHO MEAS - LA DIMENSION: 4.1 CM
BH CV ECHO MEAS - LAT PEAK E' VEL: 11.7 CM/SEC
BH CV ECHO MEAS - LV DIASTOLIC VOL/BSA (35-75): 48.5 CM2
BH CV ECHO MEAS - LV MAX PG: 3.7 MMHG
BH CV ECHO MEAS - LV MEAN PG: 2.17 MMHG
BH CV ECHO MEAS - LV SYSTOLIC VOL/BSA (12-30): 24.9 CM2
BH CV ECHO MEAS - LV V1 MAX: 96.3 CM/SEC
BH CV ECHO MEAS - LV V1 VTI: 18.3 CM
BH CV ECHO MEAS - LVIDD: 4.3 CM
BH CV ECHO MEAS - LVIDS: 2.9 CM
BH CV ECHO MEAS - LVOT DIAM: 2.1 CM
BH CV ECHO MEAS - LVPWD: 1.23 CM
BH CV ECHO MEAS - MED PEAK E' VEL: 9.09 CM/SEC
BH CV ECHO MEAS - MV A MAX VEL: 62 CM/SEC
BH CV ECHO MEAS - MV DEC SLOPE: 188 CM/SEC2
BH CV ECHO MEAS - MV E MAX VEL: 35 CM/SEC
BH CV ECHO MEAS - MV E/A: 0.56
BH CV ECHO MEAS - MV MAX PG: 2.37 MMHG
BH CV ECHO MEAS - MV MEAN PG: 1.1 MMHG
BH CV ECHO MEAS - MV V2 VTI: 13.7 CM
BH CV ECHO MEAS - PA ACC TIME: 0.11 SEC
BH CV ECHO MEAS - RAP SYSTOLE: 8 MMHG
BH CV ECHO MEAS - RVSP: 24 MMHG
BH CV ECHO MEAS - SV(MOD-SP2): 46.4 ML
BH CV ECHO MEAS - SV(MOD-SP4): 64.5 ML
BH CV ECHO MEAS - SVI(MOD-SP2): 17 ML/M2
BH CV ECHO MEAS - SVI(MOD-SP4): 23.6 ML/M2
BH CV ECHO MEAS - TR MAX PG: 16 MMHG
BH CV ECHO MEAS - TR MAX VEL: 200 CM/SEC
BH CV ECHO MEASUREMENTS AVERAGE E/E' RATIO: 3.37
BH CV XLRA - TDI S': 10.52 CM/SEC
BILIRUB SERPL-MCNC: 0.4 MG/DL (ref 0–1.2)
BUN SERPL-MCNC: 29 MG/DL (ref 6–20)
BUN/CREAT SERPL: 15.5 (ref 7–25)
CALCIUM SPEC-SCNC: 10 MG/DL (ref 8.6–10.5)
CHLORIDE SERPL-SCNC: 96 MMOL/L (ref 98–107)
CO2 SERPL-SCNC: 30.8 MMOL/L (ref 22–29)
CREAT SERPL-MCNC: 1.87 MG/DL (ref 0.76–1.27)
DEPRECATED RDW RBC AUTO: 43.9 FL (ref 37–54)
EGFRCR SERPLBLD CKD-EPI 2021: 41.2 ML/MIN/1.73
ERYTHROCYTE [DISTWIDTH] IN BLOOD BY AUTOMATED COUNT: 14.5 % (ref 12.3–15.4)
GLOBULIN UR ELPH-MCNC: 3 GM/DL
GLUCOSE SERPL-MCNC: 112 MG/DL (ref 65–99)
HCT VFR BLD AUTO: 40.1 % (ref 37.5–51)
HGB BLD-MCNC: 13.1 G/DL (ref 13–17.7)
LEFT ATRIUM VOLUME INDEX: 26.5 ML/M2
MCH RBC QN AUTO: 27.6 PG (ref 26.6–33)
MCHC RBC AUTO-ENTMCNC: 32.7 G/DL (ref 31.5–35.7)
MCV RBC AUTO: 84.4 FL (ref 79–97)
NT-PROBNP SERPL-MCNC: 175 PG/ML (ref 0–900)
PLATELET # BLD AUTO: 211 10*3/MM3 (ref 140–450)
PMV BLD AUTO: 10.9 FL (ref 6–12)
POTASSIUM SERPL-SCNC: 4.1 MMOL/L (ref 3.5–5.2)
PROT SERPL-MCNC: 7.8 G/DL (ref 6–8.5)
RBC # BLD AUTO: 4.75 10*6/MM3 (ref 4.14–5.8)
SODIUM SERPL-SCNC: 138 MMOL/L (ref 136–145)
WBC NRBC COR # BLD AUTO: 6.03 10*3/MM3 (ref 3.4–10.8)

## 2024-05-10 PROCEDURE — 99214 OFFICE O/P EST MOD 30 MIN: CPT | Performed by: INTERNAL MEDICINE

## 2024-05-10 PROCEDURE — 80053 COMPREHEN METABOLIC PANEL: CPT

## 2024-05-10 PROCEDURE — 3079F DIAST BP 80-89 MM HG: CPT | Performed by: INTERNAL MEDICINE

## 2024-05-10 PROCEDURE — 85027 COMPLETE CBC AUTOMATED: CPT

## 2024-05-10 PROCEDURE — 3075F SYST BP GE 130 - 139MM HG: CPT | Performed by: INTERNAL MEDICINE

## 2024-05-10 PROCEDURE — 83880 ASSAY OF NATRIURETIC PEPTIDE: CPT

## 2024-05-10 PROCEDURE — 93306 TTE W/DOPPLER COMPLETE: CPT

## 2024-05-10 PROCEDURE — 36415 COLL VENOUS BLD VENIPUNCTURE: CPT

## 2024-05-10 RX ORDER — FERROUS GLUCONATE 324(38)MG
324 TABLET ORAL EVERY OTHER DAY
Qty: 30 TABLET | Refills: 2 | Status: SHIPPED | OUTPATIENT
Start: 2024-05-10

## 2024-05-16 ENCOUNTER — TELEPHONE (OUTPATIENT)
Dept: PHARMACY | Facility: HOSPITAL | Age: 59
End: 2024-05-16

## 2024-05-16 NOTE — TELEPHONE ENCOUNTER
Tried to contact patient again today. Call went straight to voicemail, left a voice message. We have reached out to patient multiple times to go over most recent INR result, but patient has not responded to either phone calls or an unable to contact letter. Sending first overdue letter.     Dana Dash CPhT   10:51 EDT 5/16/2024

## 2024-06-03 RX ORDER — BUMETANIDE 1 MG/1
TABLET ORAL
Qty: 90 TABLET | Refills: 3 | Status: SHIPPED | OUTPATIENT
Start: 2024-06-03

## 2024-06-05 ENCOUNTER — TELEPHONE (OUTPATIENT)
Dept: PHARMACY | Facility: HOSPITAL | Age: 59
End: 2024-06-05

## 2024-06-05 NOTE — TELEPHONE ENCOUNTER
Attempted to contact Mr. Alberto, but received no answer. Sending 2nd Overdue INR letter tomorrow.     Dana Dash CPhT   15:54 EDT 6/5/2024

## 2024-06-10 ENCOUNTER — ANTICOAGULATION VISIT (OUTPATIENT)
Dept: PHARMACY | Facility: HOSPITAL | Age: 59
End: 2024-06-10
Payer: MEDICARE

## 2024-06-10 DIAGNOSIS — Z95.2 S/P AVR (AORTIC VALVE REPLACEMENT): Primary | ICD-10-CM

## 2024-06-10 LAB — INR PPP: 4.7

## 2024-06-10 NOTE — PROGRESS NOTES
Anticoagulation Clinic - Remote Progress Note  [facility]  Testing frequency: 7 days    Indication: Atrial Fibrillation and Mechanical Aortic Valve; h/o embolic CVA  Referring Provider: Bunny last seen 3/31/2023; next 5/2024  Initial Warfarin Start Date: 11/2012  Planned Duration of Therapy: Life  Goal INR: 2.5-3.5  Current Drug Interactions: escitalopram, aspirin, amiodarone, pantoprazole  CHADS-VASc: 3 (HTN, h/o CVA)     Vit K Diet: patient eats, goes through spurts when he tries to eat healthier 3/3/2021  Alcohol: none  Tobacco: none       Anticoagulation Clinic INR History:      Date 10/2 10/10 11/4 11/12 11/22 12/10 1/3/20 1/22 1/30 2/3 2/18 2/27   Total Weekly Dose 80mg 80mg 62.5mg 85mg 82.5mg 70mg 72.5mg 72.5mg 82.5mg 77.5mg 75mg 75mg   INR  3.9 3.6 1.7 3.4 4.7 2.4 3.6 1.3 2.3 3.2 3.1 3.2   Notes   1x miss self adj  self adj  lovenox         Date 4/8 4/29 5/18 6/20  8/3 8/17 10/5 10/19 11/11 12/21    Total Weekly Dose 70mg 70mg 65mg 65mg Non- compliant 65mg 65 mg 65mg 65mg 65mg 65mg Non- compliant   INR 4.3 4.1 4.5 3.0  3.0 3.8 3.0 2.9 4.1 2.3    Notes Self adj; Levaquin decr GLV; COVID+  recv'd 6/22; misdose       incr GLV      Date 1/20/21 1/21 1/25 2/1 2/16 3/3 3/22 4/13  4/26 5/14 5/21   Total WeeklyDose 40mg 45mg? 62.5mg 80mg? 80mg   70mg 67.5mg  47.5mg  67.5 mg   INR 1.2 1.2 maia 1.6 2.9 3.7  4.5 3.2 3.3  1.8 4.5 3.1   Notes Self held; augmentin 1x incr dose; lovenox 2x miss,   1x misdose  amox amox  self adj 5x hold, enox enox/norco, augmentin       Date 6/28  8/4 8/6 8/9 8/20 9/3  11/9 11/16 11/30 12/2   Total WeeklyDose 67.5 mg BH Barrett   6/30-7/29 [facility] 32.5 mg 47.5mg 51.25 mg 52.5mg 52.5mg? Non compliant 35mg? 70mg? 52.5mg BHL- left AMA   INR 3.9  2.2 3.0 2.9 4.8 2.6  2.0 3.9 4.3    Notes  amio init enox enox enox    Rec 11/16   GI bleed     Date 12/4 12/6 1/25 2/10  2/24 3/25 4/5 6/22 7/28 8/9 8/10   Total WeeklyDose BHL- left AMA 7.5mg ?? 52.5mg ????  52.5 mg 52.5 mg 53.75mg 45 mg 52.5mg  52.5mg   INR  1.1 1.7 5.6  1.8 2.0 1.8 2.4 1.7 2.7 3.0   Notes AMS/GI Bleed  rec 1/26 [facility] COVID + suboxone  1x miss enox  Clinic, 1x misdose 1x miss  ED-      Date 10/18 11/1 12/20 1/20 2/14 3/31 4/10 4/25 4/28 5/2  6/6   Total WeeklyDose 52.5 mg 52.5 mg 52.5 mg 52.5 mg 52.5 mg 52.5mg 52.5mg 52.5mg 41.25mg 45 mg Non compliant 48.75mg   INR 3.0 3.1 2.7 2.9 3.0 3.0 3.0 7.8 1.2 2.1  3.7   Notes 10/19   rec'd 1/23 rec'd 2/15   RSV azith doxy hematuria 2x hold LVQ 2x boost  1x miss 1x self adj   30  Date 7/11  8/1 8/15  10/3 10/13 10/30 11/14ED  11/16 11/21 12/4 2/5 2/12   Total Weekly Dose 52.5 mg No ncompliant 52.5mg 52.5mg ???? 48.75 mg 52.5mg  ?? Self-adj  30 mg DUE 41.25 mg ? 48.75 mg 33.75 mg   INR 4.4  6.3 5.2   6.7  5.3  3.7   3.8 4.2 3.8   Notes Verbal  ozempic   clinda  Rcv'd 10/4 clinic Erroneous result hematuria Heldx1, redx1 cipro forgot   Hold x 2     Date  3/19 4/8 4/30/24 5/1 6/10           Total Weekly Dose Non- compliant 45 mg 45 mg ???  45 mg           INR  7.3 4.9 3.3 3.3 4.7          Notes  Misdosing, noncompliant Misdosing, noncompliant  Rec'd 5/2 Misdose Non- compliant             Phone Interview:  Verbal Release Authorization signed on 11/7/18 and 3/31/2023 (may speak with sister in emergency)  Tablet Strength:   7.5 mg  Patient Contact: Cell: [phone] Home: [phone] -- try to call in PM Email: [email or web address]      Patient Findings  Positives: Extra doses   Negatives: Signs/symptoms of thrombosis, Signs/symptoms of bleeding, Laboratory test error suspected, Change in health, Change in alcohol use, Change in activity, Upcoming invasive procedure, Emergency department visit, Upcoming dental procedure, Missed doses, Change in medications, Change in diet/appetite, Hospital admission, Bruising, Other complaints   Comments: Patient reports he was taking warfarin 3.75 mg TueSun, and 7.5 mg all other days. Patient reports this was the dose he thought we had instructed him to take.  "Discussed that this was not but that we hadn't talked to him in quite a while. Patient stated \"I want to start getting better at this\" regarding to testing his INR. Patient offering to recheck next Monday again.       Plan:   INR was supratherapeutic today at 4.7 (goal 2.5 - 3.5) on home monitor. Instructed Mr. Alberto to take warfarin 3.75 today and then take warfarin 3.75 mg SunTueThu and warfarin 7.5 mg all other days until recheck (41.25 mg weekly dose, 9% reduction from previously reported dose).  Recheck INR in one week on 6/17. Patient agreeable to recheck then.  Verbal information provided over the phone  Mitchell Alberto RBV dosing instructions, expresses understanding by teach back, and has no further questions at this time.    Franklin Hagen, PharmD, BCPS  6/10/2024  14:13 EDT    "

## 2024-06-17 RX ORDER — CARVEDILOL 25 MG/1
25 TABLET ORAL 2 TIMES DAILY
Qty: 180 TABLET | Refills: 3 | Status: SHIPPED | OUTPATIENT
Start: 2024-06-17

## 2024-06-18 ENCOUNTER — TELEPHONE (OUTPATIENT)
Dept: CARDIOLOGY | Facility: HOSPITAL | Age: 59
End: 2024-06-18
Payer: MEDICARE

## 2024-06-18 DIAGNOSIS — I50.32 CHRONIC DIASTOLIC (CONGESTIVE) HEART FAILURE: Primary | ICD-10-CM

## 2024-06-18 DIAGNOSIS — N18.30 STAGE 3 CHRONIC KIDNEY DISEASE, UNSPECIFIED WHETHER STAGE 3A OR 3B CKD: ICD-10-CM

## 2024-06-18 NOTE — TELEPHONE ENCOUNTER
Epic notification received for overdue test results Basic Metabolic Profile ordered on 2/15/2024. CMP completed more recently on 5/10/2024. See results below.  Lab Results   Component Value Date    GLUCOSE 112 (H) 05/10/2024    BUN 29 (H) 05/10/2024    CREATININE 1.87 (H) 05/10/2024    EGFR 41.2 (L) 05/10/2024    BCR 15.5 05/10/2024    K 4.1 05/10/2024    CO2 30.8 (H) 05/10/2024    CALCIUM 10.0 05/10/2024    ALBUMIN 4.8 05/10/2024    BILITOT 0.4 05/10/2024    AST 20 05/10/2024    ALT 14 05/10/2024

## 2024-06-19 NOTE — TELEPHONE ENCOUNTER
Pt kidney function had worsened.  Dr. Hollis decreased his Bumex 1 mg daily.  Pt will need to repeat his BMP within the next 2 weeks.

## 2024-06-26 ENCOUNTER — ANTICOAGULATION VISIT (OUTPATIENT)
Dept: PHARMACY | Facility: HOSPITAL | Age: 59
End: 2024-06-26
Payer: MEDICARE

## 2024-06-26 DIAGNOSIS — Z95.2 S/P AVR (AORTIC VALVE REPLACEMENT): Primary | ICD-10-CM

## 2024-06-26 LAB — INR PPP: 4.2

## 2024-06-26 NOTE — PROGRESS NOTES
Anticoagulation Clinic - Remote Progress Note  [facility]  Testing frequency: 7 days    Indication: Atrial Fibrillation and Mechanical Aortic Valve; h/o embolic CVA  Referring Provider: Bunny last seen 3/31/2023; next 5/2024  Initial Warfarin Start Date: 11/2012  Planned Duration of Therapy: Life  Goal INR: 2.5-3.5  Current Drug Interactions: escitalopram, aspirin, amiodarone, pantoprazole  CHADS-VASc: 3 (HTN, h/o CVA)     Vit K Diet: patient eats, goes through spurts when he tries to eat healthier 3/3/2021  Alcohol: none  Tobacco: none       Anticoagulation Clinic INR History:      Date 10/2 10/10 11/4 11/12 11/22 12/10 1/3/20 1/22 1/30 2/3 2/18 2/27   Total Weekly Dose 80mg 80mg 62.5mg 85mg 82.5mg 70mg 72.5mg 72.5mg 82.5mg 77.5mg 75mg 75mg   INR  3.9 3.6 1.7 3.4 4.7 2.4 3.6 1.3 2.3 3.2 3.1 3.2   Notes   1x miss self adj  self adj  lovenox         Date 4/8 4/29 5/18 6/20  8/3 8/17 10/5 10/19 11/11 12/21    Total Weekly Dose 70mg 70mg 65mg 65mg Non- compliant 65mg 65 mg 65mg 65mg 65mg 65mg Non- compliant   INR 4.3 4.1 4.5 3.0  3.0 3.8 3.0 2.9 4.1 2.3    Notes Self adj; Levaquin decr GLV; COVID+  recv'd 6/22; misdose       incr GLV      Date 1/20/21 1/21 1/25 2/1 2/16 3/3 3/22 4/13  4/26 5/14 5/21   Total WeeklyDose 40mg 45mg? 62.5mg 80mg? 80mg   70mg 67.5mg  47.5mg  67.5 mg   INR 1.2 1.2 maia 1.6 2.9 3.7  4.5 3.2 3.3  1.8 4.5 3.1   Notes Self held; augmentin 1x incr dose; lovenox 2x miss,   1x misdose  amox amox  self adj 5x hold, enox enox/norco, augmentin       Date 6/28  8/4 8/6 8/9 8/20 9/3  11/9 11/16 11/30 12/2   Total WeeklyDose 67.5 mg BH Barrett   6/30-7/29 [facility] 32.5 mg 47.5mg 51.25 mg 52.5mg 52.5mg? Non compliant 35mg? 70mg? 52.5mg BHL- left AMA   INR 3.9  2.2 3.0 2.9 4.8 2.6  2.0 3.9 4.3    Notes  amio init enox enox enox    Rec 11/16   GI bleed     Date 12/4 12/6 1/25 2/10  2/24 3/25 4/5 6/22 7/28 8/9 8/10   Total WeeklyDose BHL- left AMA 7.5mg ?? 52.5mg ????  52.5 mg 52.5 mg 53.75mg 45 mg 52.5mg  52.5mg   INR  1.1 1.7 5.6  1.8 2.0 1.8 2.4 1.7 2.7 3.0   Notes AMS/GI Bleed  rec 1/26 [facility] COVID + suboxone  1x miss enox  Clinic, 1x misdose 1x miss  ED-      Date 10/18 11/1 12/20 1/20 2/14 3/31 4/10 4/25 4/28 5/2  6/6   Total WeeklyDose 52.5 mg 52.5 mg 52.5 mg 52.5 mg 52.5 mg 52.5mg 52.5mg 52.5mg 41.25mg 45 mg Non compliant 48.75mg   INR 3.0 3.1 2.7 2.9 3.0 3.0 3.0 7.8 1.2 2.1  3.7   Notes 10/19   rec'd 1/23 rec'd 2/15   RSV azith doxy hematuria 2x hold LVQ 2x boost  1x miss 1x self adj   30  Date 7/11  8/1 8/15  10/3 10/13 10/30 11/14ED  11/16 11/21 12/4 2/5 2/12   Total Weekly Dose 52.5 mg No ncompliant 52.5mg 52.5mg ???? 48.75 mg 52.5mg  ?? Self-adj  30 mg DUE 41.25 mg ? 48.75 mg 33.75 mg   INR 4.4  6.3 5.2   6.7  5.3  3.7   3.8 4.2 3.8   Notes Verbal  ozempic   clinda  Rcv'd 10/4 clinic Erroneous result hematuria Heldx1, redx1 cipro forgot   Hold x 2     Date  3/19 4/8 4/30/24 5/1 6/10 6/26          Total Weekly Dose Non- compliant 45 mg 45 mg ???  45 mg 37.5 mg          INR  7.3 4.9 3.3 3.3 4.7 4.2         Notes  Misdosing, noncompliant Misdosing, noncompliant  Rec'd 5/2 Misdose Non- compliant Overdue  Misdose            Phone Interview:  Verbal Release Authorization signed on 11/7/18 and 3/31/2023 (may speak with sister in emergency)  Tablet Strength:   7.5 mg  Patient Contact: Cell: [phone] Home: [phone] -- try to call in PM Email: [email or web address]      Patient Findings  Positives: Missed doses   Negatives: Signs/symptoms of thrombosis, Signs/symptoms of bleeding, Laboratory test error suspected, Change in health, Change in alcohol use, Change in activity, Upcoming invasive procedure, Emergency department visit, Upcoming dental procedure, Extra doses, Change in medications, Change in diet/appetite, Hospital admission, Bruising, Other complaints   Comments: Patient reports he was taking warfarin 7.5 mg WedFriSat and warfarin 3.75 mg all other days since last recheck, tracker updated. No  other changes.       Plan:   INR was supratherapeutic today at 4.2 (goal 2.5 - 3.5) on home monitor. Patient was taking warfarin 7.5 mg WedFriSat and warfarin 3.75 mg all other days as mentioned above. Based on this information, instructed Mr. Alberto to take adjusted dose of warfarin 3.75 mg daily except for and warfarin 7.5 mg MonFri until recheck. Patient wrote down and RBV entire dosing instructions.  Recheck INR in one week on 7/3. Patient agreeable to recheck then.  Verbal information provided over the phone  Mitchell Alberto RBV dosing instructions, expresses understanding by teach back, and has no further questions at this time.    Franklin Hagen, PharmD, BCPS  6/26/2024  15:55 EDT

## 2024-07-05 RX ORDER — AMIODARONE HYDROCHLORIDE 200 MG/1
100 TABLET ORAL DAILY
Qty: 45 TABLET | Refills: 0 | Status: SHIPPED | OUTPATIENT
Start: 2024-07-05

## 2024-07-17 ENCOUNTER — ANTICOAGULATION VISIT (OUTPATIENT)
Dept: PHARMACY | Facility: HOSPITAL | Age: 59
End: 2024-07-17
Payer: MEDICARE

## 2024-07-17 ENCOUNTER — TELEPHONE (OUTPATIENT)
Dept: CARDIOLOGY | Facility: CLINIC | Age: 59
End: 2024-07-17
Payer: MEDICARE

## 2024-07-17 DIAGNOSIS — Z95.2 S/P AVR (AORTIC VALVE REPLACEMENT): Primary | ICD-10-CM

## 2024-07-17 DIAGNOSIS — E66.01 CLASS 2 SEVERE OBESITY WITH SERIOUS COMORBIDITY AND BODY MASS INDEX (BMI) OF 38.0 TO 38.9 IN ADULT, UNSPECIFIED OBESITY TYPE: Primary | ICD-10-CM

## 2024-07-17 LAB — INR PPP: 2.7

## 2024-07-17 NOTE — TELEPHONE ENCOUNTER
Patient called and requested to see , he really wants to do a weight loss surgery and wants us to put in a referral, he does not have a PCP. Ok to put in referral to ?    Thanks,

## 2024-07-17 NOTE — PROGRESS NOTES
Anticoagulation Clinic - Remote Progress Note  [facility]  Testing frequency: 7 days    Indication: Atrial Fibrillation and Mechanical Aortic Valve; h/o embolic CVA  Referring Provider: Bunny last seen 3/31/2023; next 5/2024  Initial Warfarin Start Date: 11/2012  Planned Duration of Therapy: Life  Goal INR: 2.5-3.5  Current Drug Interactions: escitalopram, aspirin, amiodarone, pantoprazole  CHADS-VASc: 3 (HTN, h/o CVA)     Vit K Diet: patient eats, goes through spurts when he tries to eat healthier 3/3/2021  Alcohol: none  Tobacco: none       Anticoagulation Clinic INR History:      Date 10/2 10/10 11/4 11/12 11/22 12/10 1/3/20 1/22 1/30 2/3 2/18 2/27   Total Weekly Dose 80mg 80mg 62.5mg 85mg 82.5mg 70mg 72.5mg 72.5mg 82.5mg 77.5mg 75mg 75mg   INR  3.9 3.6 1.7 3.4 4.7 2.4 3.6 1.3 2.3 3.2 3.1 3.2   Notes   1x miss self adj  self adj  lovenox         Date 4/8 4/29 5/18 6/20  8/3 8/17 10/5 10/19 11/11 12/21    Total Weekly Dose 70mg 70mg 65mg 65mg Non- compliant 65mg 65 mg 65mg 65mg 65mg 65mg Non- compliant   INR 4.3 4.1 4.5 3.0  3.0 3.8 3.0 2.9 4.1 2.3    Notes Self adj; Levaquin decr GLV; COVID+  recv'd 6/22; misdose       incr GLV      Date 1/20/21 1/21 1/25 2/1 2/16 3/3 3/22 4/13  4/26 5/14 5/21   Total WeeklyDose 40mg 45mg? 62.5mg 80mg? 80mg   70mg 67.5mg  47.5mg  67.5 mg   INR 1.2 1.2 maia 1.6 2.9 3.7  4.5 3.2 3.3  1.8 4.5 3.1   Notes Self held; augmentin 1x incr dose; lovenox 2x miss,   1x misdose  amox amox  self adj 5x hold, enox enox/norco, augmentin       Date 6/28  8/4 8/6 8/9 8/20 9/3  11/9 11/16 11/30 12/2   Total WeeklyDose 67.5 mg BH Barrett   6/30-7/29 [facility] 32.5 mg 47.5mg 51.25 mg 52.5mg 52.5mg? Non compliant 35mg? 70mg? 52.5mg BHL- left AMA   INR 3.9  2.2 3.0 2.9 4.8 2.6  2.0 3.9 4.3    Notes  amio init enox enox enox    Rec 11/16   GI bleed     Date 12/4 12/6 1/25 2/10  2/24 3/25 4/5 6/22 7/28 8/9 8/10   Total WeeklyDose BHL- left AMA 7.5mg ?? 52.5mg ????  52.5 mg 52.5 mg 53.75mg 45 mg 52.5mg  52.5mg   INR  1.1 1.7 5.6  1.8 2.0 1.8 2.4 1.7 2.7 3.0   Notes AMS/GI Bleed  rec 1/26 [facility] COVID + suboxone  1x miss enox  Clinic, 1x misdose 1x miss  ED-      Date 10/18 11/1 12/20 1/20 2/14 3/31 4/10 4/25 4/28 5/2  6/6   Total WeeklyDose 52.5 mg 52.5 mg 52.5 mg 52.5 mg 52.5 mg 52.5mg 52.5mg 52.5mg 41.25mg 45 mg Non compliant 48.75mg   INR 3.0 3.1 2.7 2.9 3.0 3.0 3.0 7.8 1.2 2.1  3.7   Notes 10/19   rec'd 1/23 rec'd 2/15   RSV azith doxy hematuria 2x hold LVQ 2x boost  1x miss 1x self adj   30  Date 7/11  8/1 8/15  10/3 10/13 10/30 11/14ED  11/16 11/21 12/4 2/5 2/12   Total Weekly Dose 52.5 mg No ncompliant 52.5mg 52.5mg ???? 48.75 mg 52.5mg  ?? Self-adj  30 mg DUE 41.25 mg ? 48.75 mg 33.75 mg   INR 4.4  6.3 5.2   6.7  5.3  3.7   3.8 4.2 3.8   Notes Verbal  ozempic   clinda  Rcv'd 10/4 clinic Erroneous result hematuria Heldx1, redx1 cipro forgot   Hold x 2     Date  3/19 4/8 4/30/24 5/1 6/10 6/26 7/17/24         Total Weekly Dose Non- compliant 45 mg 45 mg ???  45 mg 37.5 mg 33.75         INR  7.3 4.9 3.3 3.3 4.7 4.2 2.7         Notes  Misdosing, noncompliant Misdosing, noncompliant  Rec'd 5/2 Misdose Non- compliant Overdue  Misdose Overdue  Hematuria             Phone Interview:  Verbal Release Authorization signed on 11/7/18 and 3/31/2023 (may speak with sister in emergency)  Tablet Strength:   7.5 mg  Patient Contact: Cell: [phone] Home: [phone] -- try to call in PM Email: [email or web address]        Patient Findings  Positives: Signs/symptoms of bleeding   Negatives: Signs/symptoms of thrombosis, Laboratory test error suspected, Change in health, Change in alcohol use, Change in activity, Upcoming invasive procedure, Emergency department visit, Upcoming dental procedure, Missed doses, Extra doses, Change in medications, Change in diet/appetite, Hospital admission, Bruising, Other complaints   Comments: Mr. Alberto reported he's experienced Blood in his urine often for the past year.  Patient claims  he is currently at Dr. Hollis's office regarding a potential weight loss surgery    He did follow previous Warfarin dosing instructions from Franklin Hagen, PharmD, but he did not recheck on 7/3/24    All other findings negative per patient  I warned the patient non compliance with recheck dates could result in a discharge from [facility] monitor services.       Plan:   INR was therapeutic today at 2.7 (goal 2.5 - 3.5) Instructed Mr. Alberto to continue Warfarin 3.75 mg oral daily except for Warfarin 7.5 mg Monday and Friday until recheck.   Recheck INR in two weeks on 7/31/24 Patient agreeable to recheck then.  Verbal information provided over the phone  Mitchell Alberto RBV dosing instructions, expresses understanding by teach back, and has no further questions at this time.    Benja Prasad, Pharmacy Technician  7/17/2024  13:04 EDT        I, Laura Obregon, [facility], have reviewed the note in full and agree with the assessment and plan.  07/17/24  14:35 EDT

## 2024-07-26 ENCOUNTER — LAB (OUTPATIENT)
Dept: LAB | Facility: HOSPITAL | Age: 59
End: 2024-07-26
Payer: MEDICARE

## 2024-07-26 ENCOUNTER — OFFICE VISIT (OUTPATIENT)
Dept: CARDIOLOGY | Facility: HOSPITAL | Age: 59
End: 2024-07-26
Payer: MEDICARE

## 2024-07-26 ENCOUNTER — TELEPHONE (OUTPATIENT)
Dept: CARDIOLOGY | Facility: HOSPITAL | Age: 59
End: 2024-07-26
Payer: MEDICARE

## 2024-07-26 ENCOUNTER — TELEPHONE (OUTPATIENT)
Dept: CARDIOLOGY | Facility: HOSPITAL | Age: 59
End: 2024-07-26

## 2024-07-26 VITALS
HEIGHT: 77 IN | WEIGHT: 315 LBS | DIASTOLIC BLOOD PRESSURE: 79 MMHG | RESPIRATION RATE: 18 BRPM | OXYGEN SATURATION: 95 % | TEMPERATURE: 97 F | SYSTOLIC BLOOD PRESSURE: 141 MMHG | HEART RATE: 80 BPM | BODY MASS INDEX: 37.19 KG/M2

## 2024-07-26 DIAGNOSIS — R06.02 SHORTNESS OF BREATH: Primary | ICD-10-CM

## 2024-07-26 DIAGNOSIS — R60.0 EDEMA LEG: ICD-10-CM

## 2024-07-26 DIAGNOSIS — I50.32 CHRONIC DIASTOLIC (CONGESTIVE) HEART FAILURE: ICD-10-CM

## 2024-07-26 DIAGNOSIS — R73.09 ELEVATED RANDOM BLOOD GLUCOSE LEVEL: ICD-10-CM

## 2024-07-26 DIAGNOSIS — E66.9 OBESITY (BMI 30-39.9): ICD-10-CM

## 2024-07-26 DIAGNOSIS — I50.32 CHRONIC DIASTOLIC (CONGESTIVE) HEART FAILURE: Primary | ICD-10-CM

## 2024-07-26 DIAGNOSIS — I48.0 PAROXYSMAL ATRIAL FIBRILLATION: ICD-10-CM

## 2024-07-26 DIAGNOSIS — R06.02 SHORTNESS OF BREATH: ICD-10-CM

## 2024-07-26 DIAGNOSIS — I10 ESSENTIAL HYPERTENSION: ICD-10-CM

## 2024-07-26 DIAGNOSIS — N18.30 STAGE 3 CHRONIC KIDNEY DISEASE, UNSPECIFIED WHETHER STAGE 3A OR 3B CKD: ICD-10-CM

## 2024-07-26 DIAGNOSIS — I35.0 NONRHEUMATIC AORTIC VALVE STENOSIS: ICD-10-CM

## 2024-07-26 LAB
ALBUMIN SERPL-MCNC: 4.3 G/DL (ref 3.5–5.2)
ALBUMIN/GLOB SERPL: 1.4 G/DL
ALP SERPL-CCNC: 98 U/L (ref 39–117)
ALT SERPL W P-5'-P-CCNC: 19 U/L (ref 1–41)
ANION GAP SERPL CALCULATED.3IONS-SCNC: 10 MMOL/L (ref 5–15)
AST SERPL-CCNC: 24 U/L (ref 1–40)
BILIRUB SERPL-MCNC: 0.3 MG/DL (ref 0–1.2)
BUN SERPL-MCNC: 36 MG/DL (ref 6–20)
BUN/CREAT SERPL: 24.2 (ref 7–25)
CALCIUM SPEC-SCNC: 9.3 MG/DL (ref 8.6–10.5)
CHLORIDE SERPL-SCNC: 100 MMOL/L (ref 98–107)
CO2 SERPL-SCNC: 26 MMOL/L (ref 22–29)
CREAT SERPL-MCNC: 1.49 MG/DL (ref 0.76–1.27)
DEPRECATED RDW RBC AUTO: 43.6 FL (ref 37–54)
EGFRCR SERPLBLD CKD-EPI 2021: 53.7 ML/MIN/1.73
ERYTHROCYTE [DISTWIDTH] IN BLOOD BY AUTOMATED COUNT: 14.2 % (ref 12.3–15.4)
GLOBULIN UR ELPH-MCNC: 3 GM/DL
GLUCOSE SERPL-MCNC: 96 MG/DL (ref 65–99)
HBA1C MFR BLD: 5.5 % (ref 4.8–5.6)
HCT VFR BLD AUTO: 37.6 % (ref 37.5–51)
HGB BLD-MCNC: 12.4 G/DL (ref 13–17.7)
MCH RBC QN AUTO: 27.9 PG (ref 26.6–33)
MCHC RBC AUTO-ENTMCNC: 33 G/DL (ref 31.5–35.7)
MCV RBC AUTO: 84.5 FL (ref 79–97)
NT-PROBNP SERPL-MCNC: 166.4 PG/ML (ref 0–900)
PLATELET # BLD AUTO: 178 10*3/MM3 (ref 140–450)
PMV BLD AUTO: 10.6 FL (ref 6–12)
POTASSIUM SERPL-SCNC: 4.3 MMOL/L (ref 3.5–5.2)
PROT SERPL-MCNC: 7.3 G/DL (ref 6–8.5)
RBC # BLD AUTO: 4.45 10*6/MM3 (ref 4.14–5.8)
SODIUM SERPL-SCNC: 136 MMOL/L (ref 136–145)
WBC NRBC COR # BLD AUTO: 5.82 10*3/MM3 (ref 3.4–10.8)

## 2024-07-26 PROCEDURE — 80053 COMPREHEN METABOLIC PANEL: CPT

## 2024-07-26 PROCEDURE — 36415 COLL VENOUS BLD VENIPUNCTURE: CPT

## 2024-07-26 PROCEDURE — 85027 COMPLETE CBC AUTOMATED: CPT

## 2024-07-26 PROCEDURE — 83036 HEMOGLOBIN GLYCOSYLATED A1C: CPT | Performed by: NURSE PRACTITIONER

## 2024-07-26 PROCEDURE — 83880 ASSAY OF NATRIURETIC PEPTIDE: CPT

## 2024-07-26 RX ORDER — METOLAZONE 2.5 MG/1
2.5 TABLET ORAL EVERY OTHER DAY
Qty: 3 TABLET | Refills: 0 | Status: SHIPPED | OUTPATIENT
Start: 2024-07-26

## 2024-07-26 RX ORDER — DAPAGLIFLOZIN 5 MG/1
5 TABLET, FILM COATED ORAL DAILY
Qty: 30 TABLET | Refills: 1 | Status: SHIPPED | OUTPATIENT
Start: 2024-07-26

## 2024-07-26 NOTE — TELEPHONE ENCOUNTER
Called to review labs with patient.    proBNP normal.  Creatinine has improved since last visit.    Further discussion with patient.  He initially said he had not been taking NSAIDs, but over the last week he had back pain and had been taking NSAIDs regularly.    Patient also tells me he will drink 6 diet sodas per day.  Not following a low-sodium diet.    Discussed diet modifications.  Decrease soda to 2 daily.      He also does not weigh himself regularly due to depression/frustration related to weight.  Has not been keeping up with weight over the last 2 months.  Started to feel swollen over the last few days and felt it was related to fluid.    No s/s noted in the past with use of ozempic and weight loss.     Pt will continue Bumex 1 mg BID and aldactone.     Will try Metolazone 2.5 mg every other day for 3 days.  (Discussed concern that we are over working kidneys and s/s are not associated with HF but more related to weight gain, NA intake, Naid use).With weight gain and dyspnea we will try metolazone for 3 days days.     With hx of HF will trial Farxiga    Stressed importance of f/u visit for close monitoring.     Pt needs to establish with PCP for routine management.      F/u schedule f[facility]u in [facility] weeks [facility].

## 2024-07-26 NOTE — PROGRESS NOTES
"[facility], [facility]    Chief Complaint  Edema    Subjective    History of Present Illness {CC  Problem List  Visit  Diagnosis   Encounters  Notes  Medications  Labs  Result Review Imaging  Media :23}     Mitchell Alberto presents to [facility] for   History of Present Illness   59-year-old male with history of valvular heart disease status post mechanical Bentall AVR and ascending aortic dissection repair 11/17/2012, V-fib, heart failure with reduced EF, paroxysmal atrial fibrillation status post AV node ablation with [employer] BiV pacemaker, embolic CVA (2012), hypertension, hyperlipidemia.    Last visit with [facility] Bumex decreased 1 mg BID (from 5 daily Due to worsening kidney function).      Pt had requested bariatric referral recently. Referral completed by [facility] awaiting to be scheduled.      Called today to reporting 10 lb weight gain in 3 days. States he is taking Bumex and aldactone as scheduled.  Has not taken any extra.  Reports using NSAIDs sparingly.    Reports chest tightness with weight gain.      I do not see labs repeated as ordered.     Denies any change in currents meds.  This week was working outside in the heat.  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 Reports he stays well hydrated.      Pt does not have a PCP currently.    Not taking Jardiance due to expense.     \"I have gained some weight\".     Objective     Vital Signs:   Vitals:    07/26/24 1448   BP: 141/79   BP Location: Left arm   Patient Position: Sitting   Cuff Size: Adult   Pulse: 80   Resp: 18   Temp: 97 °F (36.1 °C)   TempSrc: Temporal   SpO2: 95%   Weight: (!) 151 kg (332 lb 12.8 oz)   Height: 195.6 cm (77\")     Body mass index is 39.46 kg/m².  Physical Exam  Vitals reviewed.   Constitutional:       General: He is not in acute distress.     Appearance: He is obese.   Cardiovascular:      Rate and Rhythm: Normal rate and regular rhythm.      Heart " sounds: No murmur heard.  Pulmonary:      Effort: Pulmonary effort is normal.      Breath sounds: Normal breath sounds.   Musculoskeletal:      Right lower leg: Edema present.      Left lower leg: Edema present.   Skin:     Coloration: Skin is not pale.   Neurological:      Mental Status: He is alert.   Psychiatric:         Mood and Affect: Mood normal.         Behavior: Behavior normal. Behavior is cooperative.              Result Review  Data Reviewed:{ Labs  Result Review  Imaging  Med Tab  Media :23}   Echo 05/2024:  Ef 56-60%, Mild paravalvular regurgitation is present in the prosthetic aortic theodore    Lab Results   Component Value Date    WBC 5.82 07/26/2024    HGB 12.4 (L) 07/26/2024    HCT 37.6 07/26/2024    MCV 84.5 07/26/2024     07/26/2024     Lab Results   Component Value Date    GLUCOSE 96 07/26/2024    BUN 36 (H) 07/26/2024    CREATININE 1.49 (H) 07/26/2024    EGFR 53.7 (L) 07/26/2024    BCR 24.2 07/26/2024    K 4.3 07/26/2024    CO2 26.0 07/26/2024    CALCIUM 9.3 07/26/2024    ALBUMIN 4.3 07/26/2024    BILITOT 0.3 07/26/2024    AST 24 07/26/2024    ALT 19 07/26/2024                   Assessment and Plan {CC Problem List  Visit Diagnosis  ROS  Review (Popup)  Health Maintenance  Quality  BestPractice  Medications  SmartSets  SnapShot Encounters  Media :23}   1. Chronic diastolic (congestive) heart failure  With with weight gain in the last 2 months (20 lbs in last 2 months)  LCTA,  lower extremity edema.  ? HF vs weight gain.  Currently on Bumex, aldactone  Will recheck labs  Pending labs results may consider brief trial of metolazone  Would like to consider entresto for better blood pressure control  EF normal, no VHD noted on echo 05/2024  May consider try farxiga (insurance did not cover jardence.     - CBC (No Diff); Future  - Comprehensive Metabolic Panel; Future  - proBNP; Future    2. Paroxysmal atrial fibrillation  Amio  coumadin    3. Shortness of breath  LCTA  Normal  EF  - CBC (No Diff); Future  - Comprehensive Metabolic Panel; Future  - proBNP; Future    4. Essential hypertension  Not at goal today    5. Nonrheumatic aortic valve stenosis  coumadin    6. Stage 3 chronic kidney disease, unspecified whether stage 3a or 3b CKD  Labs today  Avoid NSAIDs.     7. Edema leg  Encouraged compression  Decrease NA intake    - CBC (No Diff); Future  - Comprehensive Metabolic Panel; Future  - proBNP; Future    8. Elevated random blood glucose level    - Hemoglobin A1c    9. Obesity (BMI 30-39.9)  Body mass index is 39.46 kg/m².  Bariatric referral completed.           Follow Up {Instructions Charge Capture  Follow-up Communications :23}   Return in about 4 weeks (around 8/23/2024), or if symptoms worsen or fail to improve, for HF.    Patient was given instructions and counseling regarding his condition or for health maintenance advice. Please see specific information pulled into the AVS if appropriate.  Patient was instructed to call the [facility] with any questions, concerns, or worsening symptoms.

## 2024-07-31 ENCOUNTER — TELEPHONE (OUTPATIENT)
Dept: CARDIOLOGY | Facility: HOSPITAL | Age: 59
End: 2024-07-31
Payer: MEDICARE

## 2024-07-31 NOTE — TELEPHONE ENCOUNTER
----- Message from Haresh Early sent at 7/31/2024  8:27 AM EDT -----  Regarding: FW: Leonard mdication  Can you call patient.  Let him know we will be sending him a co-pay card to see if that will help with the expense.     Also ask him if he noted any weight loss, improvement of swelling, dyspnea after use of metolazone    Remind him to get repeat labs in 2 weeks.  We may need to mail him the lab order if he wants to complete locally.  Encourage him to keep f/u with Eleni Cardoza in 4 weeks as scheduled  ----- Message -----  From: Yamel Jacinto MA  Sent: 7/29/2024   9:49 AM EDT  To: YUE Juarez  Subject: Leonard mdication                            Patient is requesting an alternative medication. His copy for Farxiga is $300 a month.

## 2024-07-31 NOTE — TELEPHONE ENCOUNTER
Spoke to patient regarding copay card. Will send patient the card in the mail.      He reports that he has lost 17 lbs since Friday and has had significant improvement in his dyspnea. He reports no edema at this time.    He will repeat labs in 2 weeks. Offered to mail the order to patient but he wants to have them done here at [facility]. Encouraged him to keep f/u with Eleni Cardoza in 4 weeks as scheduled

## 2024-07-31 NOTE — TELEPHONE ENCOUNTER
----- Message from Haresh Early sent at 7/31/2024  7:48 AM EDT -----  Regarding: RE: Alternative mdication  Alternatives will be evaluated at his f/u with YUE Rivera  ----- Message -----  From: Yamel Jacinto MA  Sent: 7/29/2024   9:49 AM EDT  To: YUE Juarez  Subject: Alternative mdication                            Patient is requesting an alternative medication. His copy for Farxiga is $300 a month.

## 2024-08-15 ENCOUNTER — ANTICOAGULATION VISIT (OUTPATIENT)
Dept: PHARMACY | Facility: HOSPITAL | Age: 59
End: 2024-08-15
Payer: MEDICARE

## 2024-08-15 DIAGNOSIS — Z95.2 S/P AVR (AORTIC VALVE REPLACEMENT): Primary | ICD-10-CM

## 2024-08-15 LAB — INR PPP: 2.3

## 2024-08-15 NOTE — PROGRESS NOTES
Anticoagulation Clinic - Remote Progress Note  [facility]  Testing frequency: 7 days    Indication: Atrial Fibrillation and Mechanical Aortic Valve; h/o embolic CVA  Referring Provider: Bunny last seen 3/31/2023; next 5/2024  Initial Warfarin Start Date: 11/2012  Planned Duration of Therapy: Life  Goal INR: 2.5-3.5  Current Drug Interactions: escitalopram, aspirin, amiodarone, pantoprazole  CHADS-VASc: 3 (HTN, h/o CVA)     Vit K Diet: patient eats, goes through spurts when he tries to eat healthier 3/3/2021  Alcohol: none  Tobacco: none       Anticoagulation Clinic INR History:      Date 10/2 10/10 11/4 11/12 11/22 12/10 1/3/20 1/22 1/30 2/3 2/18 2/27   Total Weekly Dose 80mg 80mg 62.5mg 85mg 82.5mg 70mg 72.5mg 72.5mg 82.5mg 77.5mg 75mg 75mg   INR  3.9 3.6 1.7 3.4 4.7 2.4 3.6 1.3 2.3 3.2 3.1 3.2   Notes   1x miss self adj  self adj  lovenox         Date 4/8 4/29 5/18 6/20  8/3 8/17 10/5 10/19 11/11 12/21    Total Weekly Dose 70mg 70mg 65mg 65mg Non- compliant 65mg 65 mg 65mg 65mg 65mg 65mg Non- compliant   INR 4.3 4.1 4.5 3.0  3.0 3.8 3.0 2.9 4.1 2.3    Notes Self adj; Levaquin decr GLV; COVID+  recv'd 6/22; misdose       incr GLV      Date 1/20/21 1/21 1/25 2/1 2/16 3/3 3/22 4/13  4/26 5/14 5/21   Total WeeklyDose 40mg 45mg? 62.5mg 80mg? 80mg   70mg 67.5mg  47.5mg  67.5 mg   INR 1.2 1.2 maia 1.6 2.9 3.7  4.5 3.2 3.3  1.8 4.5 3.1   Notes Self held; augmentin 1x incr dose; lovenox 2x miss,   1x misdose  amox amox  self adj 5x hold, enox enox/norco, augmentin       Date 6/28  8/4 8/6 8/9 8/20 9/3  11/9 11/16 11/30 12/2   Total WeeklyDose 67.5 mg BH Barrett   6/30-7/29 SJ CCH 32.5 mg 47.5mg 51.25 mg 52.5mg 52.5mg? Non compliant 35mg? 70mg? 52.5mg BHL- left AMA   INR 3.9  2.2 3.0 2.9 4.8 2.6  2.0 3.9 4.3    Notes  amio init enox enox enox    Rec 11/16   GI bleed     Date 12/4 12/6 1/25 2/10  2/24 3/25 4/5 6/22 7/28 8/9 8/10   Total WeeklyDose BHL- left AMA 7.5mg ?? 52.5mg ????  52.5 mg 52.5 mg 53.75mg 45 mg 52.5mg  52.5mg   INR  1.1 1.7 5.6  1.8 2.0 1.8 2.4 1.7 2.7 3.0   Notes AMS/GI Bleed  rec 1/26 [facility] COVID + suboxone  1x miss enox  Clinic, 1x misdose 1x miss  ED-      Date 10/18 11/1 12/20 1/20 2/14 3/31 4/10 4/25 4/28 5/2  6/6   Total WeeklyDose 52.5 mg 52.5 mg 52.5 mg 52.5 mg 52.5 mg 52.5mg 52.5mg 52.5mg 41.25mg 45 mg Non compliant 48.75mg   INR 3.0 3.1 2.7 2.9 3.0 3.0 3.0 7.8 1.2 2.1  3.7   Notes 10/19   rec'd 1/23 rec'd 2/15   RSV azith doxy hematuria 2x hold LVQ 2x boost  1x miss 1x self adj   30  Date 7/11  8/1 8/15  10/3 10/13 10/30 11/14ED  11/16 11/21 12/4 2/5 2/12   Total Weekly Dose 52.5 mg No ncompliant 52.5mg 52.5mg ???? 48.75 mg 52.5mg  ?? Self-adj  30 mg DUE 41.25 mg ? 48.75 mg 33.75 mg   INR 4.4  6.3 5.2   6.7  5.3  3.7   3.8 4.2 3.8   Notes Verbal  ozempic   clinda  Rcv'd 10/4 clinic Erroneous result hematuria Heldx1, redx1 cipro forgot   Hold x 2     Date  3/19 4/8 4/30/24 5/1 6/10 6/26 7/17/24 8/15/24        Total Weekly Dose Non- compliant 45 mg 45 mg ???  45 mg 37.5 mg 33.75 33.75 mg         INR  7.3 4.9 3.3 3.3 4.7 4.2 2.7 2.3        Notes  Misdosing, noncompliant Misdosing, noncompliant  Rec'd 5/2 Misdose Non- compliant Overdue  Misdose Overdue  Hematuria   overdue          Phone Interview:  Verbal Release Authorization signed on 11/7/18 and 3/31/2023 (may speak with sister in emergency)  Tablet Strength:   7.5 mg  Patient Contact: Cell: [phone] Home: [phone] -- try to call in PM Email: [email or web address]      Patient Findings  Negatives: Signs/symptoms of thrombosis, Signs/symptoms of bleeding, Laboratory test error suspected, Change in health, Change in alcohol use, Change in activity, Upcoming invasive procedure, Emergency department visit, Upcoming dental procedure, Missed doses, Extra doses, Change in medications, Change in diet/appetite, Hospital admission, Bruising, Other complaints   Comments: All findings negative per patient    Once again i've warned the patient  non  compliance with recheck dates could result in a discharge from [facility] monitor services       Plan:   INR was SUBtherapeutic today at 2.3 (goal 2.5 - 3.5) Per Mary Ann Kirkland RPH Instructed Mr. Alberto to INCREASE this week' regimen to Warfarin 3.75 mg oral daily except for Warfarin 7.5 mg Monday, Thursday, and Friday until recheck.   Recheck INR in one week on 7/22/24 Patient agreeable to recheck then.  Verbal information provided over the phone  Mitchell Alberto RBV dosing instructions, expresses understanding by teach back, and has no further questions at this time.    Benja Prasad, Pharmacy Technician  8/15/2024  12:23 EDT      I, Mary Ann Kirkland RP, have reviewed the note in full and agree with the assessment and plan.  08/15/24  16:00 EDT

## 2024-09-04 ENCOUNTER — ANTICOAGULATION VISIT (OUTPATIENT)
Dept: PHARMACY | Facility: HOSPITAL | Age: 59
End: 2024-09-04
Payer: MEDICARE

## 2024-09-04 DIAGNOSIS — Z95.2 S/P AVR (AORTIC VALVE REPLACEMENT): Primary | ICD-10-CM

## 2024-09-04 LAB — INR PPP: 2.7

## 2024-09-04 NOTE — PROGRESS NOTES
Anticoagulation Clinic - Remote Progress Note  [facility]  Testing frequency: 7 days    Indication: Atrial Fibrillation and Mechanical Aortic Valve; h/o embolic CVA  Referring Provider: Bunny last seen 3/31/2023; next 5/2024  Initial Warfarin Start Date: 11/2012  Planned Duration of Therapy: Life  Goal INR: 2.5-3.5  Current Drug Interactions: escitalopram, aspirin, amiodarone, pantoprazole  CHADS-VASc: 3 (HTN, h/o CVA)     Vit K Diet: patient eats, goes through spurts when he tries to eat healthier 3/3/2021  Alcohol: none  Tobacco: none       Anticoagulation Clinic INR History:      Date 10/2 10/10 11/4 11/12 11/22 12/10 1/3/20 1/22 1/30 2/3 2/18 2/27   Total Weekly Dose 80mg 80mg 62.5mg 85mg 82.5mg 70mg 72.5mg 72.5mg 82.5mg 77.5mg 75mg 75mg   INR  3.9 3.6 1.7 3.4 4.7 2.4 3.6 1.3 2.3 3.2 3.1 3.2   Notes   1x miss self adj  self adj  lovenox         Date 4/8 4/29 5/18 6/20  8/3 8/17 10/5 10/19 11/11 12/21    Total Weekly Dose 70mg 70mg 65mg 65mg Non- compliant 65mg 65 mg 65mg 65mg 65mg 65mg Non- compliant   INR 4.3 4.1 4.5 3.0  3.0 3.8 3.0 2.9 4.1 2.3    Notes Self adj; Levaquin decr GLV; COVID+  recv'd 6/22; misdose       incr GLV      Date 1/20/21 1/21 1/25 2/1 2/16 3/3 3/22 4/13  4/26 5/14 5/21   Total WeeklyDose 40mg 45mg? 62.5mg 80mg? 80mg   70mg 67.5mg  47.5mg  67.5 mg   INR 1.2 1.2 maia 1.6 2.9 3.7  4.5 3.2 3.3  1.8 4.5 3.1   Notes Self held; augmentin 1x incr dose; lovenox 2x miss,   1x misdose  amox amox  self adj 5x hold, enox enox/norco, augmentin       Date 6/28  8/4 8/6 8/9 8/20 9/3  11/9 11/16 11/30 12/2   Total WeeklyDose 67.5 mg BH Barrett   6/30-7/29 SJ CCH 32.5 mg 47.5mg 51.25 mg 52.5mg 52.5mg? Non compliant 35mg? 70mg? 52.5mg BHL- left AMA   INR 3.9  2.2 3.0 2.9 4.8 2.6  2.0 3.9 4.3    Notes  amio init enox enox enox    Rec 11/16   GI bleed     Date 12/4 12/6 1/25 2/10  2/24 3/25 4/5 6/22 7/28 8/9 8/10   Total WeeklyDose BHL- left AMA 7.5mg ?? 52.5mg ????  52.5 mg 52.5 mg 53.75mg 45 mg 52.5mg  52.5mg   INR  1.1 1.7 5.6  1.8 2.0 1.8 2.4 1.7 2.7 3.0   Notes AMS/GI Bleed  rec 1/26 [facility] COVID + suboxone  1x miss enox  Clinic, 1x misdose 1x miss  ED-      Date 10/18 11/1 12/20 1/20 2/14 3/31 4/10 4/25 4/28 5/2  6/6   Total WeeklyDose 52.5 mg 52.5 mg 52.5 mg 52.5 mg 52.5 mg 52.5mg 52.5mg 52.5mg 41.25mg 45 mg Non compliant 48.75mg   INR 3.0 3.1 2.7 2.9 3.0 3.0 3.0 7.8 1.2 2.1  3.7   Notes 10/19   rec'd 1/23 rec'd 2/15   RSV azith doxy hematuria 2x hold LVQ 2x boost  1x miss 1x self adj   30  Date 7/11  8/1 8/15  10/3 10/13 10/30 11/14ED  11/16 11/21 12/4 2/5 2/12   Total Weekly Dose 52.5 mg No ncompliant 52.5mg 52.5mg ???? 48.75 mg 52.5mg  ?? Self-adj  30 mg DUE 41.25 mg ? 48.75 mg 33.75 mg   INR 4.4  6.3 5.2   6.7  5.3  3.7   3.8 4.2 3.8   Notes Verbal  ozempic   clinda  Rcv'd 10/4 clinic Erroneous result hematuria Heldx1, redx1 cipro forgot   Hold x 2     Date  3/19 4/8 4/30/24 5/1 6/10 6/26 7/17/24 8/15/24 9/4/24       Total Weekly Dose Non- compliant 45 mg 45 mg ???  45 mg 37.5 mg 33.75 33.75 mg  37.5 mg       INR  7.3 4.9 3.3 3.3 4.7 4.2 2.7 2.3 2.7       Notes  Misdosing, noncompliant Misdosing, noncompliant  Rec'd 5/2 Misdose Non- compliant Overdue  Misdose Overdue  Hematuria   overdue OverdueHematuria          Phone Interview:  Verbal Release Authorization signed on 11/7/18 and 3/31/2023 (may speak with sister in emergency)  Tablet Strength:   7.5 mg  Patient Contact: Cell: [phone] Home: [phone] -- try to call in PM Email: [email or web address]      Patient Findings  Positives: Signs/symptoms of bleeding   Negatives: Signs/symptoms of thrombosis, Laboratory test error suspected, Change in health, Change in alcohol use, Change in activity, Upcoming invasive procedure, Emergency department visit, Upcoming dental procedure, Missed doses, Extra doses, Change in medications, Change in diet/appetite, Hospital admission, Bruising, Other complaints   Comments: Experiencing Hematuria as of  yesterday    All other findings negative per patient    Once again i've warned the patient  non compliance with recheck dates could result in a discharge from [facility] Home monitor services         Plan:   INR was therapeutic today at 2.7 (goal 2.5 - 3.5) Per Monique Ko, PharmD Instructed Mr. Alberto to continue Warfarin 3.75 mg oral daily except for Warfarin 7.5 mg Monday, Thursday, and Friday until recheck.   Recheck INR in two week on 9/18/24  Verbal information provided over the phone  Mitchell Alberto RBV dosing instructions, expresses understanding by teach back, and has no further questions at this time.    Benja Prasad, Pharmacy Technician  9/4/2024  15:57 EDT      I, Mary Ann Kirkland, [facility], have reviewed the note in full and agree with the assessment and plan.  09/04/24  16:27 EDT

## 2024-09-08 DIAGNOSIS — Z95.2 S/P AVR (AORTIC VALVE REPLACEMENT): ICD-10-CM

## 2024-09-09 RX ORDER — WARFARIN SODIUM 7.5 MG/1
TABLET ORAL
Qty: 30 TABLET | Refills: 3 | Status: SHIPPED | OUTPATIENT
Start: 2024-09-09

## 2024-10-02 ENCOUNTER — ANTICOAGULATION VISIT (OUTPATIENT)
Dept: PHARMACY | Facility: HOSPITAL | Age: 59
End: 2024-10-02
Payer: MEDICARE

## 2024-10-02 DIAGNOSIS — Z95.2 S/P AVR (AORTIC VALVE REPLACEMENT): Primary | ICD-10-CM

## 2024-10-02 DIAGNOSIS — I48.91 ATRIAL FIBRILLATION, UNSPECIFIED TYPE: ICD-10-CM

## 2024-10-02 LAB — INR PPP: 1.8

## 2024-10-02 NOTE — PROGRESS NOTES
Anticoagulation Clinic - Remote Progress Note  [facility]  Testing frequency: 7 days    Indication: Atrial Fibrillation and Mechanical Aortic Valve; h/o embolic CVA  Referring Provider: Bunny last seen 3/31/2023; next 5/2024  Initial Warfarin Start Date: 11/2012  Planned Duration of Therapy: Life  Goal INR: 2.5-3.5  Current Drug Interactions: escitalopram, aspirin, amiodarone, pantoprazole  CHADS-VASc: 3 (HTN, h/o CVA)     Vit K Diet: patient eats, goes through spurts when he tries to eat healthier 3/3/2021  Alcohol: none  Tobacco: none       Anticoagulation Clinic INR History:      Date 10/2 10/10 11/4 11/12 11/22 12/10 1/3/20 1/22 1/30 2/3 2/18 2/27   Total Weekly Dose 80mg 80mg 62.5mg 85mg 82.5mg 70mg 72.5mg 72.5mg 82.5mg 77.5mg 75mg 75mg   INR  3.9 3.6 1.7 3.4 4.7 2.4 3.6 1.3 2.3 3.2 3.1 3.2   Notes   1x miss self adj  self adj  lovenox         Date 4/8 4/29 5/18 6/20  8/3 8/17 10/5 10/19 11/11 12/21    Total Weekly Dose 70mg 70mg 65mg 65mg Non- compliant 65mg 65 mg 65mg 65mg 65mg 65mg Non- compliant   INR 4.3 4.1 4.5 3.0  3.0 3.8 3.0 2.9 4.1 2.3    Notes Self adj; Levaquin decr GLV; COVID+  recv'd 6/22; misdose       incr GLV      Date 1/20/21 1/21 1/25 2/1 2/16 3/3 3/22 4/13  4/26 5/14 5/21   Total WeeklyDose 40mg 45mg? 62.5mg 80mg? 80mg   70mg 67.5mg  47.5mg  67.5 mg   INR 1.2 1.2 maia 1.6 2.9 3.7  4.5 3.2 3.3  1.8 4.5 3.1   Notes Self held; augmentin 1x incr dose; lovenox 2x miss,   1x misdose  amox amox  self adj 5x hold, enox enox/norco, augmentin       Date 6/28  8/4 8/6 8/9 8/20 9/3  11/9 11/16 11/30 12/2   Total WeeklyDose 67.5 mg BH Barrett   6/30-7/29 SJ CCH 32.5 mg 47.5mg 51.25 mg 52.5mg 52.5mg? Non compliant 35mg? 70mg? 52.5mg BHL- left AMA   INR 3.9  2.2 3.0 2.9 4.8 2.6  2.0 3.9 4.3    Notes  amio init enox enox enox    Rec 11/16   GI bleed     Date 12/4 12/6 1/25 2/10  2/24 3/25 4/5 6/22 7/28 8/9 8/10   Total WeeklyDose BHL- left AMA 7.5mg ?? 52.5mg ????  52.5 mg 52.5 mg 53.75mg 45 mg 52.5mg  52.5mg   INR  1.1 1.7 5.6  1.8 2.0 1.8 2.4 1.7 2.7 3.0   Notes AMS/GI Bleed  rec 1/26 [facility] COVID + suboxone  1x miss enox  Clinic, 1x misdose 1x miss  ED-      Date 10/18 11/1 12/20 1/20 2/14 3/31 4/10 4/25 4/28 5/2  6/6   Total WeeklyDose 52.5 mg 52.5 mg 52.5 mg 52.5 mg 52.5 mg 52.5mg 52.5mg 52.5mg 41.25mg 45 mg Non compliant 48.75mg   INR 3.0 3.1 2.7 2.9 3.0 3.0 3.0 7.8 1.2 2.1  3.7   Notes 10/19   rec'd 1/23 rec'd 2/15   RSV azith doxy hematuria 2x hold LVQ 2x boost  1x miss 1x self adj   30  Date 7/11  8/1 8/15  10/3 10/13 10/30 11/14ED  11/16 11/21 12/4 2/5 2/12   Total Weekly Dose 52.5 mg No ncompliant 52.5mg 52.5mg ???? 48.75 mg 52.5mg  ?? Self-adj  30 mg DUE 41.25 mg ? 48.75 mg 33.75 mg   INR 4.4  6.3 5.2   6.7  5.3  3.7   3.8 4.2 3.8   Notes Verbal  ozempic   clinda  Rcv'd 10/4 clinic Erroneous result hematuria Heldx1, redx1 cipro forgot   Hold x 2     Date  3/19 4/8 4/30/24 5/1 6/10 6/26 7/17/24 8/15/24 9/4/24 10/2      Total Weekly Dose Non- compliant 45 mg 45 mg ???  45 mg 37.5 mg 33.75 33.75 mg  37.5 mg 37.5 mg      INR  7.3 4.9 3.3 3.3 4.7 4.2 2.7 2.3 2.7 1.8      Notes  Misdosing, noncompliant Misdosing, noncompliant  Rec'd 5/2 Misdose Non- compliant Overdue  Misdose Overdue  Hematuria   overdue Overdue Hematuria  Large increase in GLV intake         Phone Interview:  Verbal Release Authorization signed on 11/7/18 and 3/31/2023 (may speak with sister in emergency)  Tablet Strength:   7.5 mg  Patient Contact: Cell: [phone] Home: [phone] -- try to call in PM Email: [email or web address]    Patient Findings    Positives: Change in diet/appetite   Negatives: Signs/symptoms of thrombosis, Signs/symptoms of bleeding, Laboratory test error suspected, Change in health, Change in alcohol use, Change in activity, Upcoming invasive procedure, Emergency department visit, Upcoming dental procedure, Missed doses, Extra doses, Change in medications, Hospital admission, Bruising, Other complaints    Comments: Patient reports he ate a significant amount of cabbage one day this past week. All other findings negative per patient.     Plan:   INR significantly subtherapeutic today at 1.8 (goal 2.5 - 3.5) Per Monique Ko, PharmD instructed Mr. Alberto to increase warfarin regimen to 7.5 mg daily except for warfarin 3.75 mg FriSatTues until recheck.   Repeat INR in 1 week, 10.09.24  Discussed to monitor for any symptoms of DVT  Verbal information provided over the phone  Mitchell Alberto RBV dosing instructions, expresses understanding by teach back, and has no further questions at this time.      Dana Dash CPhT   16:24 EDT 10/2/2024    IMary Ann, [facility], have reviewed the note in full and agree with the assessment and plan.  10/03/24  08:37 EDT

## 2024-10-03 ENCOUNTER — ANTICOAGULATION VISIT (OUTPATIENT)
Dept: PHARMACY | Facility: HOSPITAL | Age: 59
End: 2024-10-03
Payer: MEDICARE

## 2024-10-03 DIAGNOSIS — I48.91 ATRIAL FIBRILLATION, UNSPECIFIED TYPE: Primary | ICD-10-CM

## 2024-10-03 DIAGNOSIS — Z95.2 S/P AVR (AORTIC VALVE REPLACEMENT): ICD-10-CM

## 2024-10-03 RX ORDER — AMIODARONE HYDROCHLORIDE 200 MG/1
100 TABLET ORAL DAILY
Qty: 45 TABLET | Refills: 0 | Status: SHIPPED | OUTPATIENT
Start: 2024-10-03

## 2024-10-11 ENCOUNTER — TELEPHONE (OUTPATIENT)
Dept: CARDIOLOGY | Facility: HOSPITAL | Age: 59
End: 2024-10-11
Payer: MEDICARE

## 2024-10-11 DIAGNOSIS — R06.02 SHORTNESS OF BREATH: ICD-10-CM

## 2024-10-11 DIAGNOSIS — R60.0 EDEMA LEG: ICD-10-CM

## 2024-10-11 DIAGNOSIS — I50.32 CHRONIC DIASTOLIC (CONGESTIVE) HEART FAILURE: ICD-10-CM

## 2024-10-11 RX ORDER — METOLAZONE 2.5 MG/1
2.5 TABLET ORAL EVERY OTHER DAY
Qty: 3 TABLET | Refills: 0 | Status: SHIPPED | OUTPATIENT
Start: 2024-10-11

## 2024-10-11 NOTE — TELEPHONE ENCOUNTER
Patient called stating that he has had a 15 lb weight gain in the last week and is experiencing lower extremity edema. Patient was offered an appointment for Monday and refused. He states that he can only be seen on Fridays. He is scheduled to come in on the 18th but is requesting having Metolazone sent to [facility] in [place].

## 2024-10-17 RX ORDER — TAMSULOSIN HYDROCHLORIDE 0.4 MG/1
1 CAPSULE ORAL DAILY
COMMUNITY

## 2024-10-28 ENCOUNTER — TELEPHONE (OUTPATIENT)
Dept: PHARMACY | Facility: HOSPITAL | Age: 59
End: 2024-10-28
Payer: MEDICARE

## 2024-10-28 NOTE — TELEPHONE ENCOUNTER
Approved by Dr. Hollis 10.29.24    Dr. Hollis,     We were recently informed that Mitchell Alberto is undergoing a hydrocelectomy on 11.13.24 with Homer Buitrago at [facility]. Dr. Buitrago requested that the patient hold warfarin for 5 days prior to procedure and resume warfarin post-op day 1.      Mitchell Alberto is on warfarin for atrial fibrillation, and mechanical aortic valve with h/o embolic CVA, therefore bridge therapy is recommended. [If bridging, Weight: 151 kg / Scr 1.4 mg/dL]  CrCl: 86 mL/min     11/8: HOLD WARFARIN  11/9: HOLD WARFARIN, Lovenox SC 150mg PM only    11/10: HOLD WARFARIN, Lovenox SC 150mg q12h   11/11:HOLD WARFARIN, Lovenox SC 150mg q12h   11/12:HOLD WARFARIN, Lovenox SC 150mg AM only   11/13: procedure, HOLD WARFARIN  11/14: warfarin 7.5mg, Lovenox SC 150mg PM only   11/15: warfarin 7.5mg, Lovenox SC 150mg q12h   11/16: warfarin 7.5mg, Lovenox SC 150mg q12h   11/17: warfarin 7.5mg, Lovenox SC 150mg q12h     11/18: INR recheck     Please advise if you are agreeable to plan above or if you prefer an alternative approach to Mitchell Alberto's anticoagulation plan for the upcoming procedure. In addition, please advise if surgeon's office should contact your office for further cardiac clearance.       Monique Ko, PharmD   10/28/2024  15:20 EDT

## 2024-10-31 ENCOUNTER — ANTICOAGULATION VISIT (OUTPATIENT)
Dept: PHARMACY | Facility: HOSPITAL | Age: 59
End: 2024-10-31
Payer: MEDICARE

## 2024-10-31 DIAGNOSIS — I48.91 ATRIAL FIBRILLATION, UNSPECIFIED TYPE: Primary | ICD-10-CM

## 2024-10-31 DIAGNOSIS — Z95.2 S/P AVR (AORTIC VALVE REPLACEMENT): ICD-10-CM

## 2024-10-31 DIAGNOSIS — Z95.2 S/P AVR (AORTIC VALVE REPLACEMENT): Primary | ICD-10-CM

## 2024-10-31 LAB
INR PPP: 2.1
INR PPP: 2.1

## 2024-10-31 NOTE — PROGRESS NOTES
Anticoagulation Clinic - Remote Progress Note  [facility]  Testing frequency: 7 days    Indication: Atrial Fibrillation and Mechanical Aortic Valve; h/o embolic CVA  Referring Provider: Bunny last seen 3/31/2023; next 5/2024  Initial Warfarin Start Date: 11/2012  Planned Duration of Therapy: Life  Goal INR: 2.5-3.5  Current Drug Interactions: escitalopram, aspirin, amiodarone, pantoprazole  CHADS-VASc: 3 (HTN, h/o CVA)     Vit K Diet: patient eats, goes through spurts when he tries to eat healthier 3/3/2021  Alcohol: none  Tobacco: none       Anticoagulation Clinic INR History:      Date 10/2 10/10 11/4 11/12 11/22 12/10 1/3/20 1/22 1/30 2/3 2/18 2/27   Total Weekly Dose 80mg 80mg 62.5mg 85mg 82.5mg 70mg 72.5mg 72.5mg 82.5mg 77.5mg 75mg 75mg   INR  3.9 3.6 1.7 3.4 4.7 2.4 3.6 1.3 2.3 3.2 3.1 3.2   Notes   1x miss self adj  self adj  lovenox         Date 4/8 4/29 5/18 6/20  8/3 8/17 10/5 10/19 11/11 12/21    Total Weekly Dose 70mg 70mg 65mg 65mg Non- compliant 65mg 65 mg 65mg 65mg 65mg 65mg Non- compliant   INR 4.3 4.1 4.5 3.0  3.0 3.8 3.0 2.9 4.1 2.3    Notes Self adj; Levaquin decr GLV; COVID+  recv'd 6/22; misdose       incr GLV      Date 1/20/21 1/21 1/25 2/1 2/16 3/3 3/22 4/13  4/26 5/14 5/21   Total WeeklyDose 40mg 45mg? 62.5mg 80mg? 80mg   70mg 67.5mg  47.5mg  67.5 mg   INR 1.2 1.2 maia 1.6 2.9 3.7  4.5 3.2 3.3  1.8 4.5 3.1   Notes Self held; augmentin 1x incr dose; lovenox 2x miss,   1x misdose  amox amox  self adj 5x hold, enox enox/norco, augmentin       Date 6/28  8/4 8/6 8/9 8/20 9/3  11/9 11/16 11/30 12/2   Total WeeklyDose 67.5 mg BH Barrett   6/30-7/29 SJ CCH 32.5 mg 47.5mg 51.25 mg 52.5mg 52.5mg? Non compliant 35mg? 70mg? 52.5mg BHL- left AMA   INR 3.9  2.2 3.0 2.9 4.8 2.6  2.0 3.9 4.3    Notes  amio init enox enox enox    Rec 11/16   GI bleed     Date 12/4 12/6 1/25 2/10  2/24 3/25 4/5 6/22 7/28 8/9 8/10   Total WeeklyDose BHL- left AMA 7.5mg ?? 52.5mg ????  52.5 mg 52.5 mg 53.75mg 45 mg 52.5mg  52.5mg   INR  1.1 1.7 5.6  1.8 2.0 1.8 2.4 1.7 2.7 3.0   Notes AMS/GI Bleed  rec 1/26 [facility] COVID + suboxone  1x miss enox  Clinic, 1x misdose 1x miss  ED-      Date 10/18 11/1 12/20 1/20 2/14 3/31 4/10 4/25 4/28 5/2  6/6   Total WeeklyDose 52.5 mg 52.5 mg 52.5 mg 52.5 mg 52.5 mg 52.5mg 52.5mg 52.5mg 41.25mg 45 mg Non compliant 48.75mg   INR 3.0 3.1 2.7 2.9 3.0 3.0 3.0 7.8 1.2 2.1  3.7   Notes 10/19   rec'd 1/23 rec'd 2/15   RSV azith doxy hematuria 2x hold LVQ 2x boost  1x miss 1x self adj   30  Date 7/11  8/1 8/15  10/3 10/13 10/30 11/14ED  11/16 11/21 12/4 2/5 2/12   Total Weekly Dose 52.5 mg No ncompliant 52.5mg 52.5mg ???? 48.75 mg 52.5mg  ?? Self-adj  30 mg DUE 41.25 mg ? 48.75 mg 33.75 mg   INR 4.4  6.3 5.2   6.7  5.3  3.7   3.8 4.2 3.8   Notes Verbal  ozempic   clinda  Rcv'd 10/4 clinic Erroneous result hematuria Heldx1, redx1 cipro forgot   Hold x 2     Date  3/19 4/8 4/30/24 5/1 6/10 6/26 7/17/24 8/15/24 9/4/24 10/2 10/31     Total Weekly Dose Non- compliant 45 mg 45 mg ???  45 mg 37.5 mg 33.75 33.75 mg  37.5 mg 37.5 mg 37.5mg     INR  7.3 4.9 3.3 3.3 4.7 4.2 2.7 2.3 2.7 1.8 2.10     Notes  Misdosing, noncompliant Misdosing, noncompliant  Rec'd 5/2 Misdose Non- compliant Overdue  Misdose Overdue  Hematuria   overdue Overdue Hematuria  Large increase in GLV intake         Phone Interview:  Verbal Release Authorization signed on 11/7/18 and 3/31/2023 (may speak with sister in emergency)  Tablet Strength:   7.5 mg  Patient Contact: Cell: [phone] Home: [phone] -- try to call in PM Email: [email or web address]      UNABLE TO GET IN CONTACT WITH THE PATIENT.   LVM on 10/31/2024 at 15:19 EDT    PLEASE DISREGARD THE FOLLOWING PLAN UNTIL ABLE TO GET IN CONTACT WITH PATIENT/ PATIENT REPRESENTATIVE.    ALSO NEED TO REVIEW BRIDGE PLAN W/ PATIENT     Plan:   INR significantly subtherapeutic today at 2.10 (goal 2.5 - 3.5) Per Monique Ko, PharmD instructed Mr. Alberto to increase warfarin regimen to 7.5 mg  daily except for warfarin 3.75 mg FriSatTues until recheck.   Repeat INR in 1 week, 11.15.24  Discussed to monitor for any symptoms of DVT  Verbal information provided over the phone  Mitchell Alberto RBV dosing instructions, expresses understanding by teach back, and has no further questions at this time.      Monique Ko, PharmD   10/31/2024  15:16 EDT

## 2024-11-04 ENCOUNTER — ANTICOAGULATION VISIT (OUTPATIENT)
Dept: PHARMACY | Facility: HOSPITAL | Age: 59
End: 2024-11-04
Payer: MEDICARE

## 2024-11-04 DIAGNOSIS — I48.91 ATRIAL FIBRILLATION, UNSPECIFIED TYPE: ICD-10-CM

## 2024-11-04 DIAGNOSIS — Z95.2 S/P AVR (AORTIC VALVE REPLACEMENT): Primary | ICD-10-CM

## 2024-11-04 LAB — INR PPP: 2.5

## 2024-11-04 RX ORDER — ENOXAPARIN SODIUM 150 MG/ML
150 INJECTION SUBCUTANEOUS EVERY 12 HOURS
Qty: 14 ML | Refills: 0 | Status: SHIPPED | OUTPATIENT
Start: 2024-11-04 | End: 2024-11-11

## 2024-11-04 NOTE — PROGRESS NOTES
Anticoagulation Clinic - Remote Progress Note  [facility]  Testing frequency: 7 days    Indication: Atrial Fibrillation and Mechanical Aortic Valve; h/o embolic CVA  Referring Provider: Bunny last seen 3/31/2023; next 5/2024  Initial Warfarin Start Date: 11/2012  Planned Duration of Therapy: Life  Goal INR: 2.5-3.5  Current Drug Interactions: escitalopram, aspirin, amiodarone, pantoprazole  CHADS-VASc: 3 (HTN, h/o CVA)     Vit K Diet: patient eats, goes through spurts when he tries to eat healthier 3/3/2021  Alcohol: none  Tobacco: none       Anticoagulation Clinic INR History:      Date 10/2 10/10 11/4 11/12 11/22 12/10 1/3/20 1/22 1/30 2/3 2/18 2/27   Total Weekly Dose 80mg 80mg 62.5mg 85mg 82.5mg 70mg 72.5mg 72.5mg 82.5mg 77.5mg 75mg 75mg   INR  3.9 3.6 1.7 3.4 4.7 2.4 3.6 1.3 2.3 3.2 3.1 3.2   Notes   1x miss self adj  self adj  lovenox         Date 4/8 4/29 5/18 6/20  8/3 8/17 10/5 10/19 11/11 12/21    Total Weekly Dose 70mg 70mg 65mg 65mg Non- compliant 65mg 65 mg 65mg 65mg 65mg 65mg Non- compliant   INR 4.3 4.1 4.5 3.0  3.0 3.8 3.0 2.9 4.1 2.3    Notes Self adj; Levaquin decr GLV; COVID+  recv'd 6/22; misdose       incr GLV      Date 1/20/21 1/21 1/25 2/1 2/16 3/3 3/22 4/13  4/26 5/14 5/21   Total WeeklyDose 40mg 45mg? 62.5mg 80mg? 80mg   70mg 67.5mg  47.5mg  67.5 mg   INR 1.2 1.2 maia 1.6 2.9 3.7  4.5 3.2 3.3  1.8 4.5 3.1   Notes Self held; augmentin 1x incr dose; lovenox 2x miss,   1x misdose  amox amox  self adj 5x hold, enox enox/norco, augmentin       Date 6/28  8/4 8/6 8/9 8/20 9/3  11/9 11/16 11/30 12/2   Total WeeklyDose 67.5 mg BH Barrett   6/30-7/29 [facility] CCH 32.5 mg 47.5mg 51.25 mg 52.5mg 52.5mg? Non compliant 35mg? 70mg? 52.5mg BHL- left AMA   INR 3.9  2.2 3.0 2.9 4.8 2.6  2.0 3.9 4.3    Notes  amio init enox enox enox    Rec 11/16   GI bleed     Date 12/4 12/6 1/25 2/10  2/24 3/25 4/5 6/22 7/28 8/9 8/10   Total WeeklyDose BHL- left AMA 7.5mg ?? 52.5mg ????  52.5 mg 52.5 mg 53.75mg 45 mg 52.5mg  52.5mg   INR  1.1 1.7 5.6  1.8 2.0 1.8 2.4 1.7 2.7 3.0   Notes AMS/GI Bleed  rec 1/26 [facility] COVID + suboxone  1x miss enox  Clinic, 1x misdose 1x miss  ED-      Date 10/18 11/1 12/20 1/20 2/14 3/31 4/10 4/25 4/28 5/2  6/6   Total WeeklyDose 52.5 mg 52.5 mg 52.5 mg 52.5 mg 52.5 mg 52.5mg 52.5mg 52.5mg 41.25mg 45 mg Non compliant 48.75mg   INR 3.0 3.1 2.7 2.9 3.0 3.0 3.0 7.8 1.2 2.1  3.7   Notes 10/19   rec'd 1/23 rec'd 2/15   RSV azith doxy hematuria 2x hold LVQ 2x boost  1x miss 1x self adj   30  Date 7/11  8/1 8/15  10/3 10/13 10/30 11/14ED  11/16 11/21 12/4 2/5 2/12   Total Weekly Dose 52.5 mg No ncompliant 52.5mg 52.5mg ???? 48.75 mg 52.5mg  ?? Self-adj  30 mg DUE 41.25 mg ? 48.75 mg 33.75 mg   INR 4.4  6.3 5.2   6.7  5.3  3.7   3.8 4.2 3.8   Notes Verbal  ozempic   clinda  Rcv'd 10/4 clinic Erroneous result hematuria Heldx1, redx1 cipro forgot   Hold x 2     Date  3/19 4/8 4/30/24 5/1 6/10 6/26 7/17/24 8/15/24 9/4/24 10/2 10/31 11/4/24    Total Weekly Dose Non- compliant 45 mg 45 mg ???  45 mg 37.5 mg 33.75 33.75 mg  37.5 mg 37.5 mg 37.5mg ?    INR  7.3 4.9 3.3 3.3 4.7 4.2 2.7 2.3 2.7 1.8 2.10 2.5    Notes  Misdosing, noncompliant Misdosing, noncompliant  Rec'd 5/2 Misdose Non- compliant Overdue  Misdose Overdue  Hematuria   overdue Overdue Hematuria  Large increase in GLV intake  Unable to contact       Phone Interview:  Verbal Release Authorization signed on 11/7/18 and 3/31/2023 (may speak with sister in emergency)  Tablet Strength:   7.5 mg  Patient Contact: Cell: [phone] Home: [phone] -- try to call in PM Email: [email or web address]      Patient Findings  Positives: Upcoming invasive procedure, Other complaints   Negatives: Signs/symptoms of thrombosis, Signs/symptoms of bleeding, Laboratory test error suspected, Change in health, Change in alcohol use, Change in activity, Emergency department visit, Upcoming dental procedure, Missed doses, Extra doses, Change in medications, Change in  diet/appetite, Hospital admission, Bruising   Comments:   Mr. Alberto has been non complaint with recheck dates, unable to contact the same day that results are turned in.  Patient cannot verify how's he's taken his Warfarin since 10/2/24    Patient's upcoming  hydrocelectomy is actually on 11/13 instead of 11/12  He would like his Lovenox injections sent to [facility] [place]     All other findings negative per patient         Plan:   INR significantly SUBtherapeutic today at 2.10 (goal 2.5 - 3.5) Per Monique Ko, PharmD instructed Mr. Alberto to take Warfarin 7.5 mg daily except Warfarin 3.75 mg MonWedFri until the patient's perioperative plan begins.     11/8: HOLD WARFARIN  11/9: HOLD WARFARIN, Lovenox SC 150mg PM only    11/10: HOLD WARFARIN, Lovenox SC 150mg q12h   11/11:HOLD WARFARIN, Lovenox SC 150mg q12h   11/12:HOLD WARFARIN, Lovenox SC 150mg AM only   11/13: procedure, HOLD WARFARIN  11/14: warfarin 7.5mg, Lovenox SC 150mg PM only   11/15: warfarin 7.5mg, Lovenox SC 150mg q12h   11/16: warfarin 7.5mg, Lovenox SC 150mg q12h   11/17: warfarin 7.5mg, Lovenox SC 150mg q12h      Repeat INR on 11/18/24  Discussed to monitor for any symptoms of DVT  Verbal information provided over the phone  Mitchell Alberto RBV dosing instructions, expresses understanding by teach back, and has no further questions at this time.      Benja Prasad, Pharmacy Technician  11/4/2024  12:05 EST      I, Monique Ko, PharmD, have reviewed the note in full and agree with the assessment and plan.  11/04/24  14:17 EST

## 2024-11-07 ENCOUNTER — OFFICE VISIT (OUTPATIENT)
Dept: BEHAVIORAL HEALTH | Facility: CLINIC | Age: 59
End: 2024-11-07
Payer: MEDICARE

## 2024-11-07 ENCOUNTER — OFFICE VISIT (OUTPATIENT)
Dept: BARIATRICS/WEIGHT MGMT | Facility: CLINIC | Age: 59
End: 2024-11-07
Payer: MEDICARE

## 2024-11-07 ENCOUNTER — DOCUMENTATION (OUTPATIENT)
Dept: BARIATRICS/WEIGHT MGMT | Facility: CLINIC | Age: 59
End: 2024-11-07
Payer: MEDICARE

## 2024-11-07 VITALS
WEIGHT: 315 LBS | TEMPERATURE: 97.8 F | BODY MASS INDEX: 39.17 KG/M2 | OXYGEN SATURATION: 95 % | SYSTOLIC BLOOD PRESSURE: 124 MMHG | HEIGHT: 75 IN | HEART RATE: 89 BPM | DIASTOLIC BLOOD PRESSURE: 86 MMHG

## 2024-11-07 DIAGNOSIS — F19.11 HISTORY OF SUBSTANCE ABUSE: ICD-10-CM

## 2024-11-07 DIAGNOSIS — N18.9 CHRONIC KIDNEY DISEASE, UNSPECIFIED CKD STAGE: ICD-10-CM

## 2024-11-07 DIAGNOSIS — R79.9 ABNORMAL FINDING OF BLOOD CHEMISTRY, UNSPECIFIED: ICD-10-CM

## 2024-11-07 DIAGNOSIS — R73.09 ELEVATED HEMOGLOBIN A1C: ICD-10-CM

## 2024-11-07 DIAGNOSIS — Z56.0 NOT CURRENTLY WORKING DUE TO DISABLED STATUS: ICD-10-CM

## 2024-11-07 DIAGNOSIS — Z95.2 S/P AVR (AORTIC VALVE REPLACEMENT): ICD-10-CM

## 2024-11-07 DIAGNOSIS — Z71.89 ENCOUNTER FOR PSYCHOLOGICAL ASSESSMENT PRIOR TO BARIATRIC SURGERY: ICD-10-CM

## 2024-11-07 DIAGNOSIS — E66.01 OBESITY, CLASS III, BMI 40-49.9 (MORBID OBESITY): Primary | ICD-10-CM

## 2024-11-07 DIAGNOSIS — I10 PRIMARY HYPERTENSION: ICD-10-CM

## 2024-11-07 DIAGNOSIS — Z63.4 DEATH OF WIFE: ICD-10-CM

## 2024-11-07 DIAGNOSIS — E55.9 VITAMIN D DEFICIENCY: ICD-10-CM

## 2024-11-07 DIAGNOSIS — F33.1 MDD (MAJOR DEPRESSIVE DISORDER), RECURRENT EPISODE, MODERATE: ICD-10-CM

## 2024-11-07 DIAGNOSIS — E66.01 MORBID OBESITY WITH BMI OF 40.0-44.9, ADULT: Primary | ICD-10-CM

## 2024-11-07 DIAGNOSIS — R12 HEARTBURN: ICD-10-CM

## 2024-11-07 DIAGNOSIS — R53.83 FATIGUE, UNSPECIFIED TYPE: ICD-10-CM

## 2024-11-07 PROCEDURE — 1160F RVW MEDS BY RX/DR IN RCRD: CPT | Performed by: PSYCHOLOGIST

## 2024-11-07 PROCEDURE — 90791 PSYCH DIAGNOSTIC EVALUATION: CPT | Performed by: PSYCHOLOGIST

## 2024-11-07 PROCEDURE — 1159F MED LIST DOCD IN RCRD: CPT | Performed by: PSYCHOLOGIST

## 2024-11-07 RX ORDER — SODIUM CHLORIDE 0.9 % (FLUSH) 0.9 %
3 SYRINGE (ML) INJECTION EVERY 12 HOURS SCHEDULED
OUTPATIENT
Start: 2024-11-07

## 2024-11-07 RX ORDER — SODIUM CHLORIDE 9 MG/ML
40 INJECTION, SOLUTION INTRAVENOUS AS NEEDED
OUTPATIENT
Start: 2024-11-07

## 2024-11-07 RX ORDER — SODIUM CHLORIDE 9 MG/ML
150 INJECTION, SOLUTION INTRAVENOUS CONTINUOUS
OUTPATIENT
Start: 2024-11-07 | End: 2024-11-08

## 2024-11-07 RX ORDER — SODIUM CHLORIDE 0.9 % (FLUSH) 0.9 %
3-10 SYRINGE (ML) INJECTION AS NEEDED
OUTPATIENT
Start: 2024-11-07

## 2024-11-07 SDOH — ECONOMIC STABILITY - INCOME SECURITY: UNEMPLOYMENT, UNSPECIFIED: Z56.0

## 2024-11-07 SDOH — SOCIAL STABILITY - SOCIAL INSECURITY: DISSAPEARANCE AND DEATH OF FAMILY MEMBER: Z63.4

## 2024-11-07 NOTE — PROGRESS NOTES
[facility] BARIATRIC SURGERY  [address]  [address]  [facility] [id number] [phone]      Patient  Name:  Mitchell Alberto  :  1965      Date of Visit: 2024      Chief Complaint:  weight gain; unable to maintain weight loss      History of Present Illness:  Mitchell Alberto is a 59 y.o. male who presents today for evaluation, education and consultation regarding metabolic and bariatric surgery with Dr. Nguyễn.     Mitchell has been overweight for at least 50 years, has been 35 pounds or more overweight for at least 40 years, has been 100 pounds or more overweight for 20 or more years. Previous diet attempts include: High Protein, Low Carbohydrate, Low Fat, Calorie Counting, Kathia's Diet, Cabbage Soup, Fasting, and Slim Fast; None; None.  His maximum lifetime weight is 350 pounds.       Complete history has been obtained and discussed today, as pertinent to metabolic/ bariatric surgery.     Hx significant for embolic CVA + ascending aortic dissection  s/p mechanical AVR, hx V.Fib/cardiac arrest , required trach + PEG, s/p ICD, A.Fib s/p ablation, CHF, HTN, CKD, opioid abuse on Suboxone.          Past Medical History:   Diagnosis Date    Anxiety and depression     Aorta aneurysm     Aortic insufficiency     s/p mechanical AVR     Atrial fibrillation     Back pain     Bacterial meningitis     as child    Bipolar I disorder, single manic episode     BPH (benign prostatic hyperplasia)     Cardiac arrest     , required trach + PEG    CKD (chronic kidney disease)     Congestive heart failure     on Bumex + Aldactone    Fatigue     Heartburn     Hiatal hernia     EGD  w/ Dr. Brunner    History of drug abuse     clean since , on Suboxone    Hypertension     Joint pain     Stroke     Embolic CVA with left upper extremity weakness, 2012, data deficit: a. Residual left upper extremity weakness.      Transient cerebral ischemia      Past Surgical History:    Procedure Laterality Date    AORTIC VALVE REPAIR/REPLACEMENT  2012    mechanical    BACK SURGERY  2000    x2    CARDIAC CATHETERIZATION N/A 07/15/2021    Procedure: LEFT HEART CATH;  Surgeon: Berhane Shrestha MD;  Location:  [facility];  Service: Cardiology;  Laterality: N/A;    CARDIAC ELECTROPHYSIOLOGY PROCEDURE N/A 07/28/2021    Procedure: EP - DEVICE;  Surgeon: Martin Davis MD;  Location:  [facility] EP [facility];  Service: Cardiology;  Laterality: N/A;    CARDIAC ELECTROPHYSIOLOGY PROCEDURE N/A 07/28/2021    Procedure: AV Node Ablation;  Surgeon: Martin Davis MD;  Location:  [facility];  Service: Cardiology;  Laterality: N/A;    ENDOSCOPY N/A 12/04/2021    Procedure: ESOPHAGOGASTRODUODENOSCOPY;  Surgeon: Aakash Logan MD;  Location:  [facility];  Service: Gastroenterology;  Laterality: N/A;    INGUINAL HERNIA REPAIR Left 08/2014    w/ mesh    SKIN CANCER EXCISION  08/2014    Left forehead melanoma excision, Dr. Gisell Kothari, August 2014.     TRACHEOSTOMY AND PEG TUBE INSERTION N/A 07/13/2021    Procedure: TRACHEOSTOMY AND PERCUTANEOUS ENDOSCOPIC GASTROSTOMY TUBE INSERTION;  Surgeon: Hayden Centeno MD;  Location:  [facility];  Service: General;  Laterality: N/A;       Allergies   Allergen Reactions    Lisinopril Angioedema    Meclizine Hcl Itching       Current Outpatient Medications:     acetaminophen (TYLENOL) 500 MG tablet, Take 1 tablet by mouth Every 6 (Six) Hours As Needed for Mild Pain., Disp: , Rfl:     bumetanide (BUMEX) 1 MG tablet, TAKE 2 TABLETS IN MORNING AND 1 TABLET IN THE AFTERNOON, Disp: 90 tablet, Rfl: 3    buprenorphine-naloxone (SUBOXONE) 8-2 MG per SL tablet, Place 2 tablets under the tongue Daily. aislinn, Disp: , Rfl:     buPROPion XL (WELLBUTRIN XL) 300 MG 24 hr tablet, Take 1 tablet by mouth Daily., Disp: 30 tablet, Rfl: 11    carvedilol (COREG) 25 MG tablet, TAKE 1 TABLET BY MOUTH TWICE DAILY, Disp: 180 tablet, Rfl: 3     Enoxaparin Sodium (LOVENOX) 150 MG/ML syringe, Inject 1 mL under the skin into the appropriate area as directed Every 12 (Twelve) Hours for 7 days., Disp: 14 mL, Rfl: 0    spironolactone (ALDACTONE) 25 MG tablet, Take 1 tablet by mouth Daily., Disp: 90 tablet, Rfl: 3    tadalafil (ADCIRCA) 20 MG tablet tablet, Take 5 mg by mouth Daily., Disp: , Rfl:     tamsulosin (FLOMAX) 0.4 MG capsule 24 hr capsule, Take 1 capsule by mouth Daily., Disp: , Rfl:     warfarin (COUMADIN) 7.5 MG tablet, TAKE 1/2 TO 1 TABLET BY MOUTH DAILY AS DIRECTED BY COAGULATION CLINIC, Disp: 30 tablet, Rfl: 3    Social History     Socioeconomic History    Marital status:    Tobacco Use    Smoking status: Never    Smokeless tobacco: Never   Vaping Use    Vaping status: Never Used   Substance and Sexual Activity    Alcohol use: Never     Comment: very occasional    Drug use: Yes     Types: Marijuana     Comment: occasional    Sexual activity: Defer     Social History     Social History Narrative    Pt lives in [facility], he is  with no children. He is disabled.        Family History   Problem Relation Age of Onset    Aortic aneurysm Mother     Arthritis Mother     Heart disease Mother     Heart attack Mother     Hypertension Mother     Heart attack Father     Arthritis Father     Heart disease Father     Melanoma Father     Hypertension Father     Hypertension Sister     Heart disease Brother     Hypertension Brother     Obesity Brother     Diabetes Brother     Hypertension Brother     Depression Other        Review of Systems:  Constitutional:  reports fatigue, weight gain.  HEENT:  denies headache, ear pain or loss of hearing, blurred or double vision, nasal discharge or sore throat.  Cardiovascular:  reports HTN, Atrial Fib, hx heart disease, hx MI.  Respiratory:  denies sleep apnea, asthma, COPD, hx PE.  Gastrointestinal:  reports heartburn and denies dysphagia, nausea, vomiting, abdominal pain, IBS, gallbladder issues, liver  disease, hx pancreatitis.  Genitourinary:  reports renal insufficiency, enlarged prostate and denies history of  frequent UTI.    Musculoskeletal:  reports back pain and denies fibromyalgia, arthritis, and autoimmune disease.  Neurological:  reports stroke and denies headaches, migraines, seizure.  Psychiatric:  reports hx depression and denies hx anxiety, bipolar disorder.  Endocrine:  denies diabetes, thyroid disease, gout.  Hematologic:  denies bleeding disorder, hx anemia, hx blood transfusion.  Skin:  reports hx MRSA.    Physical Exam:  Vital Signs:  Weight: (!) 155 kg (342 lb 12.8 oz)   Body mass index is 42.85 kg/m².  Temp: 97.8 °F (36.6 °C)   Heart Rate: 89   BP: 124/86     Physical Exam  Vitals reviewed.   Constitutional:       Appearance: He is well-developed.   HENT:      Head: Normocephalic and atraumatic.   Eyes:      General: No scleral icterus.     Conjunctiva/sclera: Conjunctivae normal.   Neck:      Thyroid: No thyromegaly.   Cardiovascular:      Rate and Rhythm: Normal rate and regular rhythm.      Heart sounds: No murmur heard.  Pulmonary:      Effort: Pulmonary effort is normal. No respiratory distress.      Breath sounds: Normal breath sounds. No wheezing or rales.   Abdominal:      General: Bowel sounds are normal. There is no distension.      Palpations: Abdomen is soft. There is no mass.      Tenderness: There is no abdominal tenderness.      Hernia: No hernia is present.      Comments: Scars: prior PEG   Musculoskeletal:         General: Normal range of motion.      Cervical back: Neck supple.   Skin:     General: Skin is warm and dry.      Findings: No rash.   Neurological:      Mental Status: He is alert and oriented to person, place, and time.      Gait: Gait normal.   Psychiatric:         Judgment: Judgment normal.         Patient Active Problem List   Diagnosis    Anxiety    Anxiety and depression    Hypertension    Benign prostatic hyperplasia (BPH) with post-void dribbling     Ascending aortic aneurysm    Atrial fibrillation [I48.91]    S/P AVR (aortic valve replacement)    Obesity    Sensorineural hearing loss    Bilateral sensorineural hearing loss    Elevated transaminase level    Chronic anticoagulation    Acute systolic right heart failure    Epistaxis    Athscl heart disease of native coronary artery w/o ang pctrs    Encntr for surgical aftcr following surgery on the circ sys    Encounter for therapeutic drug level monitoring    Encounter for attention to gastrostomy    Obesity (BMI 30-39.9)    Mechanical heart valve present    Presence of automatic (implantable) cardiac defibrillator    Personal history of sudden cardiac arrest    Personal history of nicotine dependence    Personal history of Methicillin resistant Staphylococcus aureus infection    Monoplg upr lmb fol cerebral infrc aff right dominant side    Hypertensive heart disease with heart failure    Unspecified atrial fibrillation    Obesity, unspecified    Old myocardial infarction    GI bleed    History of ventricular fibrillation    History of CVA (cerebrovascular accident)    Valvular heart disease    Symptomatic anemia    Dilated cardiomyopathy with recovered ejection fraction    Altered mental state    Acute UTI (urinary tract infection)    Diarrhea of presumed infectious origin    Unresolved grief    Back pain    Joint pain    Fatigue    Hiatal hernia    History of drug abuse    CKD (chronic kidney disease)    Cardiac arrest    Congestive heart failure       Assessment:  59 y.o. male with medically complicated obesity pursuing sleeve gastrectomy.    Metabolic & Bariatric Surgery is deemed medically necessary given the following: Class 3 Severe Obesity (BMI >=40). Obesity-related health conditions include the following: hypertension and CHF, back pain . Obesity is worsening. BMI is is above average; BMI management plan is completed. We discussed consulting a Bariatric surgeon.          Plan:  Further evaluation will  include: CBC, CMP, Lipids, TSH, HgA1C, Vitamin D, H.Pylori, GES and EGD.    Additional clearances needed prior to surgery will include: Cardiology and anticoagulation bridging, periop suboxone plan, PCP to address TSH .     Patient understands that bariatric surgery is not cosmetic surgery but rather a tool to help make a lifelong commitment to lifestyle changes including diet, exercise and behavior modifications.  The patient has been educated today on those expected postoperative lifestyle changes.  Psychological and Nutritional consultations will be completed prior to surgery.  Instructions on how to access [employer] (an internet based site w/ educational surgical videos) were given to the patient.  Recommended perioperative vitamin supplementation was reviewed.  The importance of avoiding ASA/ NSAIDS/ steroids/ tobacco/nicotine/ hormones/ immunomodulators perioperatively was discussed in detail.  All questions/concerns have been addressed.      Further input to follow pending the above.           YUE Gibson

## 2024-11-07 NOTE — PROGRESS NOTES
PROGRESS NOTE    Data:    Mitchell Alberto is a 59 y.o. male who met with the undersigned for a scheduled psychological evaluation from 10:30 - 11:15 am.      Clinical Maneuvering/Intervention:      Chief complaint and history of presenting illness/Problems: struggling with obesity for several years. Despite trying different weight loss plans and diets, the pt reported being unsuccessful in losing weight. A psychological evaluation was conducted in order to assess past and current level of functioning. Areas assessed included, but were not limited to: perception of social support, perception of ability to face and deal with challenges in life (positive functioning), anxiety symptoms, depressive symptoms, perspective on beliefs/belief system, coping skills for stress, intelligence level, addiction issues, etc. Therapeutic rapport was established. Interventions conducted today were geared towards assessing the pt's readiness for weight loss surgery and identifying and psychological contraindications for undergoing such a major life change. Social support was deemed strong (specific to weight loss surgery/weight loss in this manner and in a general sense): friends, family members, doctor. Current psychological struggles were described as low to moderate and included several losses over the past few years (wife, brother, and his dog). He talked about these loses leading to depression, weight gain, and in turn, worse depression. He explained that at the same time, he has reached a point that he is tired of suffering, both physically and emotionally, and that weight loss surgery is just the thing for him to turn his life around. He as a history of substance abuse and explained that he is several years clean (since 2021). Coping skills for distress and related to undergoing a major life change such as weight loss surgery/weight loss were deemed strong and included seeing how he does not need to be nervous about surgery,  being thoughtful when it comes to making bigger decisions in life, relies on his social support system, asks for help (medication for mood, weight loss surgery, Suboxone to prevent relapse of substance abuse), maintains quality relationships with others and makes a point to learn what will help him be successful with weight loss surgery. The pt endorsed having characteristics of readiness to undergo major life changes inherent in the journey of weight loss surgery. The pt could speak to the detailed process of becoming ready mentally for this major life change, having 'suffered' enough, and feeling confident about his decision to have this surgery. The pt expressed gratitude for today's visit.     Past Family and Social History:      History of family mental health problems per pt: none endorsed    Psychosocial history: treatment of psychiatric care in the past (N/A), alcohol/substance abuse treatment in the past (Suboxone clinic since 2021) , alcohol/substance abuse problems (opiates), inpatient psychiatric care (N/A).    Mental Status Exam (MSE):  Hygiene:  good  Dress: normal  Attitude:  cooperative and proactive  Motor Activity: normal  Speech: normal  Mood:   level  Affect:  congruent  Thought Processes: normal  Thought Content:  normal  Suicidal Thoughts:  not endorsed  Homicidal Thoughts:  not endorsed  Crisis Safety Plan: not needed   Hallucinations:  none      Patient's Support Network Includes:  family, friends      Progress toward goal: there is evidence to suggest that he is taking measures to improve the quality of his life including seeking weight loss surgery.       Functional Status: moderate      Prognosis: good with weight loss surgery    Evaluation, Diagnoses, and Ability/Capacity to Respond to Treatment:      The pt presented to be struggling with Major Depressive Disorder (MDD, moderate), in part related to being overweight (BMI = 42.85, morbid obesity), but also related to suffering several  losses over the past few years (wife, brother, dog). He has a history of substance abuse, clean since 2021. Results of MSE demonstrated a functional status of moderate. Strengths: belief in self that he will be successful with weight loss surgery, etc (see detailed list of coping skills above). Needed for growth (CPT code requirement for Weaknesses): weight loss.      From a psychological standpoint, the pt presents as a good candidate for bariatric surgery. He is motivated for the surgery, has showed readiness for the lifestyle change in terms of starting to adjust his eating habits, and seems to have appropriate expectations of how to prepare and how to live after surgery in order to lose weight successfully.    Treatment Plan:      Short term goals: Continue working a program of recovery in order to prevent relapse of substance abuse. Start improving his health by following up with his bariatric surgeon in order to receive weight loss surgery as soon as feasible/appropriate and demonstrate success with compliance to adhering to the recommended diet. Long term goals: reach a healthy weight and experience overall improved mood via taking control of his health.Continue working a program of recovery in order to prevent relapse of substance abuse.     Anabell Mederos, PhD, LP

## 2024-11-07 NOTE — PROGRESS NOTES
Bariatric Nutrition Consult     Name: Mitchell Alberto   : 1965   AGE: 59 y.o.   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 Residual left upper extremity weakness.      Transient cerebral ischemia                                  Diet history reveals patient reports he eats everything and loves to cook. He eats 5-7 times a day and portions are very large. High in fat and carbs. He is knowledgeable about a high protein low carb diet. Large quantities of carbonated caffeinated high caloric and diet drinks.     Average intake below:    Breakfast: sausage biscuit and hashbrowns/large bag of popcorn    Lunch: ham and cheese sandwich and handfuls of chips    Dinner: spaghetti/chili/chicken and salad/ cheeseburger and fries/chicken strips and fries     Snacks: chips are a trigger food. Family size bags of chips, large bag of popcorn, ate 6 large cookies in 2 days, fruit    Protein sources: meat, fish, chicken, cheese, eggs, core power protein drinks    Drinks: 120 oz coke or diet coke or mt dew daily, 3 large cups coffee with sf creamer, large amount of water    Food allergies/intolerances: none    Night eating: yes    Patient has/has not been diagnosed with an eating disorder: no    Exercise/activity: limited due to weight, reports gets short of breath and fatigues easily    Main bariatric nutrition principles  discussed and explained. Patient needs to focus on 100g protein daily, 100-140g carbohydrates daily, healthy fat intake, 64 oz fluid daily, no carbonation, and try protein drinks/protein powders. Avoid high fructose corn syrup. Patient verbalized understanding and queries were answered.  Additional nutritional counseling will be available      Yanira Carey RD,LD

## 2024-11-09 LAB
25(OH)D3+25(OH)D2 SERPL-MCNC: 17.1 NG/ML (ref 30–100)
ALBUMIN SERPL-MCNC: 4.1 G/DL (ref 3.8–4.9)
ALP SERPL-CCNC: 98 IU/L (ref 44–121)
ALT SERPL-CCNC: 21 IU/L (ref 0–44)
AST SERPL-CCNC: 24 IU/L (ref 0–40)
BASOPHILS # BLD AUTO: 0 X10E3/UL (ref 0–0.2)
BASOPHILS NFR BLD AUTO: 0 %
BILIRUB SERPL-MCNC: 0.3 MG/DL (ref 0–1.2)
BUN SERPL-MCNC: 15 MG/DL (ref 6–24)
BUN/CREAT SERPL: 15 (ref 9–20)
CALCIUM SERPL-MCNC: 9.3 MG/DL (ref 8.7–10.2)
CHLORIDE SERPL-SCNC: 103 MMOL/L (ref 96–106)
CHOLEST SERPL-MCNC: 119 MG/DL (ref 100–199)
CO2 SERPL-SCNC: 25 MMOL/L (ref 20–29)
CREAT SERPL-MCNC: 1.03 MG/DL (ref 0.76–1.27)
EGFRCR SERPLBLD CKD-EPI 2021: 84 ML/MIN/1.73
EOSINOPHIL # BLD AUTO: 0.2 X10E3/UL (ref 0–0.4)
EOSINOPHIL NFR BLD AUTO: 3 %
ERYTHROCYTE [DISTWIDTH] IN BLOOD BY AUTOMATED COUNT: 13.9 % (ref 11.6–15.4)
GLOBULIN SER CALC-MCNC: 2.4 G/DL (ref 1.5–4.5)
GLUCOSE SERPL-MCNC: 103 MG/DL (ref 70–99)
HBA1C MFR BLD: 6.1 % (ref 4.8–5.6)
HCT VFR BLD AUTO: 37 % (ref 37.5–51)
HDLC SERPL-MCNC: 36 MG/DL
HGB BLD-MCNC: 11.7 G/DL (ref 13–17.7)
IMM GRANULOCYTES # BLD AUTO: 0 X10E3/UL (ref 0–0.1)
IMM GRANULOCYTES NFR BLD AUTO: 0 %
LDLC SERPL CALC-MCNC: 60 MG/DL (ref 0–99)
LYMPHOCYTES # BLD AUTO: 1.3 X10E3/UL (ref 0.7–3.1)
LYMPHOCYTES NFR BLD AUTO: 24 %
MCH RBC QN AUTO: 26 PG (ref 26.6–33)
MCHC RBC AUTO-ENTMCNC: 31.6 G/DL (ref 31.5–35.7)
MCV RBC AUTO: 82 FL (ref 79–97)
MONOCYTES # BLD AUTO: 0.5 X10E3/UL (ref 0.1–0.9)
MONOCYTES NFR BLD AUTO: 9 %
NEUTROPHILS # BLD AUTO: 3.5 X10E3/UL (ref 1.4–7)
NEUTROPHILS NFR BLD AUTO: 64 %
PLATELET # BLD AUTO: 214 X10E3/UL (ref 150–450)
POTASSIUM SERPL-SCNC: 4.3 MMOL/L (ref 3.5–5.2)
PROT SERPL-MCNC: 6.5 G/DL (ref 6–8.5)
RBC # BLD AUTO: 4.5 X10E6/UL (ref 4.14–5.8)
SODIUM SERPL-SCNC: 140 MMOL/L (ref 134–144)
TRIGL SERPL-MCNC: 132 MG/DL (ref 0–149)
TSH SERPL DL<=0.005 MIU/L-ACNC: <0.005 UIU/ML (ref 0.45–4.5)
UREA BREATH TEST QL: NEGATIVE
VLDLC SERPL CALC-MCNC: 23 MG/DL (ref 5–40)
WBC # BLD AUTO: 5.5 X10E3/UL (ref 3.4–10.8)

## 2024-11-12 ENCOUNTER — ANTICOAGULATION VISIT (OUTPATIENT)
Dept: PHARMACY | Facility: HOSPITAL | Age: 59
End: 2024-11-12
Payer: MEDICARE

## 2024-11-12 ENCOUNTER — TELEPHONE (OUTPATIENT)
Dept: PHARMACY | Facility: HOSPITAL | Age: 59
End: 2024-11-12

## 2024-11-12 DIAGNOSIS — I48.91 ATRIAL FIBRILLATION, UNSPECIFIED TYPE: ICD-10-CM

## 2024-11-12 DIAGNOSIS — Z95.2 S/P AVR (AORTIC VALVE REPLACEMENT): Primary | ICD-10-CM

## 2024-11-12 LAB — INR PPP: 1.1

## 2024-11-12 NOTE — PROGRESS NOTES
Anticoagulation Clinic - Remote Progress Note  [facility]  Testing frequency: 7 days    Indication: Atrial Fibrillation and Mechanical Aortic Valve; h/o embolic CVA  Referring Provider: Bunny last seen 3/31/2023; next 5/2024  Initial Warfarin Start Date: 11/2012  Planned Duration of Therapy: Life  Goal INR: 2.5-3.5  Current Drug Interactions: escitalopram, aspirin, amiodarone, pantoprazole  CHADS-VASc: 3 (HTN, h/o CVA)     Vit K Diet: patient eats, goes through spurts when he tries to eat healthier 3/3/2021  Alcohol: none  Tobacco: none       Anticoagulation Clinic INR History:  Date 10/2 10/10 11/4 11/12 11/22 12/10 1/3/20 1/22 1/30 2/3 2/18 2/27   Total Weekly Dose 80mg 80mg 62.5mg 85mg 82.5mg 70mg 72.5mg 72.5mg 82.5mg 77.5mg 75mg 75mg   INR  3.9 3.6 1.7 3.4 4.7 2.4 3.6 1.3 2.3 3.2 3.1 3.2   Notes   1x miss self adj  self adj  lovenox         Date 4/8 4/29 5/18 6/20  8/3 8/17 10/5 10/19 11/11 12/21    Total Weekly Dose 70mg 70mg 65mg 65mg Non- compliant 65mg 65 mg 65mg 65mg 65mg 65mg Non- compliant   INR 4.3 4.1 4.5 3.0  3.0 3.8 3.0 2.9 4.1 2.3    Notes Self adj; Levaquin decr GLV; COVID+  recv'd 6/22; misdose       incr GLV      Date 1/20/21 1/21 1/25 2/1 2/16 3/3 3/22 4/13  4/26 5/14 5/21   Total WeeklyDose 40mg 45mg? 62.5mg 80mg? 80mg   70mg 67.5mg  47.5mg  67.5 mg   INR 1.2 1.2 maia 1.6 2.9 3.7  4.5 3.2 3.3  1.8 4.5 3.1   Notes Self held; augmentin 1x incr dose; lovenox 2x miss,   1x misdose  amox amox  self adj 5x hold, enox enox/norco, augmentin       Date 6/28  8/4 8/6 8/9 8/20 9/3  11/9 11/16 11/30 12/2   Total WeeklyDose 67.5 mg BH Barrett   6/30-7/29 SJ CCH 32.5 mg 47.5mg 51.25 mg 52.5mg 52.5mg? Non compliant 35mg? 70mg? 52.5mg BHL- left AMA   INR 3.9  2.2 3.0 2.9 4.8 2.6  2.0 3.9 4.3    Notes  amio init enox enox enox    Rec 11/16   GI bleed     Date 12/4 12/6 1/25 2/10  2/24 3/25 4/5 6/22 7/28 8/9 8/10   Total WeeklyDose BHL- left AMA 7.5mg ?? 52.5mg ????  52.5 mg 52.5 mg 53.75mg 45 mg 52.5mg  52.5mg   INR  1.1 1.7 5.6  1.8 2.0 1.8 2.4 1.7 2.7 3.0   Notes AMS/GI Bleed  rec 1/26 [facility] COVID + suboxone  1x miss enox  Clinic, 1x misdose 1x miss  ED-      Date 10/18 11/1 12/20 1/20 2/14 3/31 4/10 4/25 4/28 5/2  6/6   Total WeeklyDose 52.5 mg 52.5 mg 52.5 mg 52.5 mg 52.5 mg 52.5mg 52.5mg 52.5mg 41.25mg 45 mg Non compliant 48.75mg   INR 3.0 3.1 2.7 2.9 3.0 3.0 3.0 7.8 1.2 2.1  3.7   Notes 10/19   rec'd 1/23 rec'd 2/15   RSV azith doxy hematuria 2x hold LVQ 2x boost  1x miss 1x self adj   30  Date 7/11  8/1 8/15  10/3 10/13 10/30 11/14ED  11/16 11/21 12/4 2/5 2/12   Total Weekly Dose 52.5 mg No ncompliant 52.5mg 52.5mg ???? 48.75 mg 52.5mg  ?? Self-adj  30 mg DUE 41.25 mg ? 48.75 mg 33.75 mg   INR 4.4  6.3 5.2   6.7  5.3  3.7   3.8 4.2 3.8   Notes Verbal  ozempic   clinda  Rcv'd 10/4 clinic Erroneous result hematuria Heldx1, redx1 cipro forgot   Hold x 2     Date  3/19 4/8 4/30/24 5/1 6/10 6/26 7/17/24 8/15/24 9/4/24 10/2 10/31 11/4/24 11/12   Total Weekly Dose Non- compliant 45 mg 45 mg ???  45 mg 37.5 mg 33.75 33.75 mg  37.5 mg 37.5 mg 37.5mg ? 18.75 mg   INR  7.3 4.9 3.3 3.3 4.7 4.2 2.7 2.3 2.7 1.8 2.10 2.5 1.1   Notes  Misdosing, noncompliant Misdosing, noncompliant  Rec'd 5/2 Misdose Non- compliant Overdue  Misdose Overdue  Hematuria   overdue Overdue Hematuria  Large increase in GLV intake  Unable to contact  HOLD      Phone Interview:  Verbal Release Authorization signed on 11/7/18 and 3/31/2023 (may speak with sister in emergency)  Tablet Strength:   7.5 mg  Patient Contact: Cell: [phone] Home: [phone] -- try to call in PM Email: [email or web address]    Patient Findings    Positives: Upcoming invasive procedure   Negatives: Signs/symptoms of thrombosis, Signs/symptoms of bleeding, Laboratory test error suspected, Change in health, Change in alcohol use, Change in activity, Emergency department visit, Upcoming dental procedure, Missed doses, Extra doses, Change in medications, Change in  diet/appetite, Hospital admission, Bruising, Other complaints   Comments: Procedure with Dr. Hollis has been rescheduled to 12.04.24. All other findings negative per patient.     Plan:   INR subtherapeutic today at 1.1  (goal 2.5 - 3.5). Patient was holding warfarin for procedure originally scheduled for 11/13, but procedure has been rescheduled for 12/4. Will edit current perioperative plan to reflect this change. Per Monique Ko, PharmD instructed patient to take warfarin 7.5 mg daily and inject lovenox 150 mg SC q12h until recheck.  Repeat INR 11.15.24. Patient eager to get back in range so he can discontinue lovenox injections.   Discussed to monitor for any symptoms of DVT  Verbal information provided over the phone  Mitchell Alberto RBV dosing instructions, expresses understanding by teach back, and has no further questions at this time.        Dana Dash CPhT   15:45 EST 11/12/2024    IMary Ann, [facility], have reviewed the note in full and agree with the assessment and plan.  11/12/24  16:13 EST

## 2024-11-12 NOTE — TELEPHONE ENCOUNTER
Approved by Dr. Hollis 11.13.24    Dr. Hollis,     We were recently informed that Mitchell Alberto is undergoing a hydrocelectomy on 12.11.24 with Homer Buitrago at [facility]. Dr. Buitrago requested that the patient hold warfarin for 5 days prior to procedure and resume warfarin post-op day 1.      Mitchell Alberto is on warfarin for atrial fibrillation, and mechanical aortic valve with h/o embolic CVA, therefore bridge therapy is recommended. [If bridging, Weight: 151 kg / Scr 1.4 mg/dL]  CrCl: 86 mL/min      12/6: HOLD WARFARIN  12/7: HOLD WARFARIN, Lovenox SC 150mg PM only    12/8:HOLD WARFARIN, Lovenox SC 150mg q12h   12/9:HOLD WARFARIN, Lovenox SC 150mg q12h   12/10:HOLD WARFARIN, Lovenox SC 150mg AM only   12/11:procedure, HOLD WARFARIN  12/12: warfarin 7.5mg, Lovenox SC 150mg PM only   12/13: warfarin 7.5mg, Lovenox SC 150mg q12h   12/14: warfarin 7.5mg, Lovenox SC 150mg q12h   12/15: warfarin 7.5mg, Lovenox SC 150mg q12h      12/16: INR recheck     Please advise if you are agreeable to plan above or if you prefer an alternative approach to Mitchell Alberto's anticoagulation plan for the upcoming procedure. In addition, please advise if surgeon's office should contact your office for further cardiac clearance.      Thank you,    Dana Dash [facility]  [address] [place] [address]. [address]  [place] [address]   Phone: [phone]  Fax: [phone]  Hours: Mon - Fri 8:00am - 4:30pm (phone not answered after 4:00pm)

## 2024-11-26 RX ORDER — BUMETANIDE 1 MG/1
TABLET ORAL
Qty: 90 TABLET | Refills: 3 | Status: SHIPPED | OUTPATIENT
Start: 2024-11-26

## 2024-12-05 ENCOUNTER — TELEPHONE (OUTPATIENT)
Dept: PHARMACY | Facility: HOSPITAL | Age: 59
End: 2024-12-05

## 2024-12-05 NOTE — TELEPHONE ENCOUNTER
Patient called and informed clinic his procedure was rescheduled for 12.11.24. He reported an INR of 2.5 today. Instructed patient to take warfarin 7.5 mg tonight and begin warfarin hold tomorrow. Updated the dates in the perioperative plan approved by Dr. Hollis on 11.13.24.     Dana Dash CPhT   15:33 EST 12/5/2024

## 2025-01-02 ENCOUNTER — TELEPHONE (OUTPATIENT)
Dept: PHARMACY | Facility: HOSPITAL | Age: 60
End: 2025-01-02
Payer: MEDICARE

## 2025-01-02 ENCOUNTER — ANTICOAGULATION VISIT (OUTPATIENT)
Dept: PHARMACY | Facility: HOSPITAL | Age: 60
End: 2025-01-02
Payer: MEDICARE

## 2025-01-02 DIAGNOSIS — Z95.2 S/P AVR (AORTIC VALVE REPLACEMENT): Primary | ICD-10-CM

## 2025-01-02 DIAGNOSIS — I48.91 ATRIAL FIBRILLATION, UNSPECIFIED TYPE: ICD-10-CM

## 2025-01-02 LAB — INR PPP: 2.5

## 2025-01-02 RX ORDER — ENOXAPARIN SODIUM 150 MG/ML
150 INJECTION SUBCUTANEOUS EVERY 12 HOURS
Qty: 6 ML | Refills: 0 | Status: SHIPPED | OUTPATIENT
Start: 2025-01-02 | End: 2025-01-05

## 2025-01-02 NOTE — TELEPHONE ENCOUNTER
Dr. Hollis,     We were recently informed that Mitchell Alberto is undergoing a hydrocelectomy on 01.08.25 with Homer Buitrago at [facility]. Dr. Buitrago requested that the patient hold warfarin for 5 days prior to procedure and resume warfarin post-op day 1.      Mitchell Alberto is on warfarin for atrial fibrillation, and mechanical aortic valve with h/o embolic CVA, therefore bridge therapy is recommended. [If bridging, Weight: 155 kg / Scr 1.4 mg/dL]  CrCl: 86 mL/min      1/3: HOLD WARFARIN  1/4: HOLD WARFARIN, Lovenox SC 150mg q12 hrs   1/5: HOLD WARFARIN, Lovenox SC 150mg q12 hrs   1/6: HOLD WARFARIN, Lovenox SC 150mg q12 hrs   1/7: HOLD WARFARIN, Lovenox SC 150mg AM only   1/8: procedure, HOLD WARFARIN  1/9: warfarin 7.5mg, Lovenox SC 150mg PM only   1/10: warfarin 7.5mg, Lovenox SC 150mg q12h  1/11: warfarin 7.5 mg, Lovenox  mg q12h  1/12: warfarin 7.5 mg, Lovenox  mg q12h      1/13: INR recheck     Please advise if you are agreeable to plan above or if you prefer an alternative approach to Mitchell Alberto's anticoagulation plan for the upcoming procedure. In addition, please advise if surgeon's office should contact your office for further cardiac clearance.     Monique Ko, PharmD   1/2/2025  15:09 EST

## 2025-01-02 NOTE — PROGRESS NOTES
Anticoagulation Clinic - Remote Progress Note  [facility]  Testing frequency: 7 days    Indication: Atrial Fibrillation and Mechanical Aortic Valve; h/o embolic CVA  Referring Provider: Bunny last seen 3/31/2023; next 5/2024  Initial Warfarin Start Date: 11/2012  Planned Duration of Therapy: Life  Goal INR: 2.5-3.5  Current Drug Interactions: escitalopram, aspirin, amiodarone, pantoprazole  CHADS-VASc: 3 (HTN, h/o CVA)     Vit K Diet: patient eats, goes through spurts when he tries to eat healthier 3/3/2021  Alcohol: none  Tobacco: none       Anticoagulation Clinic INR History:  Date 10/2 10/10 11/4 11/12 11/22 12/10 1/3/20 1/22 1/30 2/3 2/18 2/27   Total Weekly Dose 80mg 80mg 62.5mg 85mg 82.5mg 70mg 72.5mg 72.5mg 82.5mg 77.5mg 75mg 75mg   INR  3.9 3.6 1.7 3.4 4.7 2.4 3.6 1.3 2.3 3.2 3.1 3.2   Notes   1x miss self adj  self adj  lovenox         Date 4/8 4/29 5/18 6/20  8/3 8/17 10/5 10/19 11/11 12/21    Total Weekly Dose 70mg 70mg 65mg 65mg Non- compliant 65mg 65 mg 65mg 65mg 65mg 65mg Non- compliant   INR 4.3 4.1 4.5 3.0  3.0 3.8 3.0 2.9 4.1 2.3    Notes Self adj; Levaquin decr GLV; COVID+  recv'd 6/22; misdose       incr GLV      Date 1/20/21 1/21 1/25 2/1 2/16 3/3 3/22 4/13  4/26 5/14 5/21   Total WeeklyDose 40mg 45mg? 62.5mg 80mg? 80mg   70mg 67.5mg  47.5mg  67.5 mg   INR 1.2 1.2 maia 1.6 2.9 3.7  4.5 3.2 3.3  1.8 4.5 3.1   Notes Self held; augmentin 1x incr dose; lovenox 2x miss,   1x misdose  amox amox  self adj 5x hold, enox enox/norco, augmentin       Date 6/28  8/4 8/6 8/9 8/20 9/3  11/9 11/16 11/30 12/2   Total WeeklyDose 67.5 mg BH Barrett   6/30-7/29 SJ CCH 32.5 mg 47.5mg 51.25 mg 52.5mg 52.5mg? Non compliant 35mg? 70mg? 52.5mg BHL- left AMA   INR 3.9  2.2 3.0 2.9 4.8 2.6  2.0 3.9 4.3    Notes  amio init enox enox enox    Rec 11/16   GI bleed     Date 12/4 12/6 1/25 2/10  2/24 3/25 4/5 6/22 7/28 8/9 8/10   Total WeeklyDose BHL- left AMA 7.5mg ?? 52.5mg ????  52.5 mg 52.5 mg 53.75mg 45 mg 52.5mg  52.5mg   INR  1.1 1.7 5.6  1.8 2.0 1.8 2.4 1.7 2.7 3.0   Notes AMS/GI Bleed  rec 1/26 [facility] COVID + suboxone  1x miss enox  Clinic, 1x misdose 1x miss  ED-      Date 10/18 11/1 12/20 1/20 2/14 3/31 4/10 4/25 4/28 5/2  6/6   Total WeeklyDose 52.5 mg 52.5 mg 52.5 mg 52.5 mg 52.5 mg 52.5mg 52.5mg 52.5mg 41.25mg 45 mg Non compliant 48.75mg   INR 3.0 3.1 2.7 2.9 3.0 3.0 3.0 7.8 1.2 2.1  3.7   Notes 10/19   rec'd 1/23 rec'd 2/15   RSV azith doxy hematuria 2x hold LVQ 2x boost  1x miss 1x self adj   30  Date 7/11  8/1 8/15  10/3 10/13 10/30 11/14ED  11/16 11/21 12/4 2/5 2/12   Total Weekly Dose 52.5 mg No ncompliant 52.5mg 52.5mg ???? 48.75 mg 52.5mg  ?? Self-adj  30 mg DUE 41.25 mg ? 48.75 mg 33.75 mg   INR 4.4  6.3 5.2   6.7  5.3  3.7   3.8 4.2 3.8   Notes Verbal  ozempic   clinda  Rcv'd 10/4 clinic Erroneous result hematuria Heldx1, redx1 cipro forgot   Hold x 2     Date  3/19 4/8 4/30/24 5/1 6/10 6/26 7/17/24 8/15/24 9/4/24 10/2 10/31 11/4/24 11/12   Total Weekly Dose Non- compliant 45 mg 45 mg ???  45 mg 37.5 mg 33.75 33.75 mg  37.5 mg 37.5 mg 37.5mg ? 18.75 mg   INR  7.3 4.9 3.3 3.3 4.7 4.2 2.7 2.3 2.7 1.8 2.10 2.5 1.1   Notes  Misdosing, noncompliant Misdosing, noncompliant  Rec'd 5/2 Misdose Non- compliant Overdue  Misdose Overdue  Hematuria   overdue Overdue Hematuria  Large increase in GLV intake  Unable to contact  HOLD      Date 1/2/24 1/13/24             Total WeeklyDose 52.5 mg ?              INR 2.5              Notes Overdue  Self adj Procedure HOLD                 Phone Interview:  Verbal Release Authorization signed on 11/7/18 and 3/31/2023 (may speak with sister in emergency)  Tablet Strength:   7.5 mg  Patient Contact: Cell: [phone] Home: [phone] -- try to call in PM Email: [email or web address]      Patient Findings  Positives: Upcoming invasive procedure, Other complaints   Negatives: Signs/symptoms of thrombosis, Signs/symptoms of bleeding, Laboratory test error suspected, Change in  health, Change in alcohol use, Change in activity, Emergency department visit, Upcoming dental procedure, Missed doses, Extra doses, Change in medications, Change in diet/appetite, Hospital admission, Bruising   Comments:  hydrocelectomy has been rescheduled to 01.08.25 with Homer Buitrago at [facility].  The clinic wasn't made aware until today.    Patient claims he has taken Warfarin 7.5 mg daily since his last encounter  Mr. Alberto is historically non compliant with instructed recheck dates    All other findings negative per patient           Plan:   INR therapeutic today at 2.5 (goal 2.5 - 3.5). Per Monique Ko, PharmD instructed patient to follow the perioperative plan previously approved by Dr. Hollis (resent for I).    1/3: HOLD WARFARIN  1/4: HOLD WARFARIN, Lovenox SC 150mg q12 hrs   1/5: HOLD WARFARIN, Lovenox SC 150mg q12 hrs   1/6: HOLD WARFARIN, Lovenox SC 150mg q12 hrs   1/7: HOLD WARFARIN, Lovenox SC 150mg AM only   1/8: procedure, HOLD WARFARIN  1/9: warfarin 7.5mg, Lovenox SC 150mg PM only   1/10: warfarin 7.5mg, Lovenox SC 150mg q12h  1/11: warfarin 7.5 mg, Lovenox  mg q12h  1/12: warfarin 7.5 mg, Lovenox  mg q12h     Repeat INR 1/13/25   Discussed to monitor for any symptoms of DVT  Verbal information provided over the phone  Mitchell Alberto RBV dosing instructions, expresses understanding by teach back, and has no further questions at this time.      Benja Prasad, Pharmacy Technician  1/2/2025  15:32 EST      ERIKA, Monique Ko, PharmD, have reviewed the note in full and agree with the assessment and plan.  01/02/25  15:48 EST

## 2025-01-26 DIAGNOSIS — Z95.2 S/P AVR (AORTIC VALVE REPLACEMENT): ICD-10-CM

## 2025-01-27 ENCOUNTER — ANTICOAGULATION VISIT (OUTPATIENT)
Dept: PHARMACY | Facility: HOSPITAL | Age: 60
End: 2025-01-27
Payer: MEDICARE

## 2025-01-27 DIAGNOSIS — I48.91 ATRIAL FIBRILLATION, UNSPECIFIED TYPE: Primary | ICD-10-CM

## 2025-01-27 DIAGNOSIS — Z95.2 S/P AVR (AORTIC VALVE REPLACEMENT): ICD-10-CM

## 2025-01-27 LAB — INR PPP: 6.8

## 2025-01-27 RX ORDER — WARFARIN SODIUM 7.5 MG/1
TABLET ORAL
Qty: 30 TABLET | Refills: 3 | Status: SHIPPED | OUTPATIENT
Start: 2025-01-27 | End: 2025-01-27

## 2025-01-27 RX ORDER — WARFARIN SODIUM 7.5 MG/1
TABLET ORAL
Qty: 30 TABLET | Refills: 1 | Status: SHIPPED | OUTPATIENT
Start: 2025-01-27

## 2025-01-27 NOTE — PROGRESS NOTES
Anticoagulation Clinic - Remote Progress Note  [facility]  Testing frequency: 7 days    Indication: Atrial Fibrillation and Mechanical Aortic Valve; h/o embolic CVA  Referring Provider: Bunny last seen 3/31/2023; next 5/2024  Initial Warfarin Start Date: 11/2012  Planned Duration of Therapy: Life  Goal INR: 2.5-3.5  Current Drug Interactions: escitalopram, aspirin, amiodarone, pantoprazole  CHADS-VASc: 3 (HTN, h/o CVA)     Vit K Diet: patient eats, goes through spurts when he tries to eat healthier 3/3/2021  Alcohol: none  Tobacco: none       Anticoagulation Clinic INR History:  Date 10/2 10/10 11/4 11/12 11/22 12/10 1/3/20 1/22 1/30 2/3 2/18 2/27   Total Weekly Dose 80mg 80mg 62.5mg 85mg 82.5mg 70mg 72.5mg 72.5mg 82.5mg 77.5mg 75mg 75mg   INR  3.9 3.6 1.7 3.4 4.7 2.4 3.6 1.3 2.3 3.2 3.1 3.2   Notes   1x miss self adj  self adj  lovenox         Date 4/8 4/29 5/18 6/20  8/3 8/17 10/5 10/19 11/11 12/21    Total Weekly Dose 70mg 70mg 65mg 65mg Non- compliant 65mg 65 mg 65mg 65mg 65mg 65mg Non- compliant   INR 4.3 4.1 4.5 3.0  3.0 3.8 3.0 2.9 4.1 2.3    Notes Self adj; Levaquin decr GLV; COVID+  recv'd 6/22; misdose       incr GLV      Date 1/20/21 1/21 1/25 2/1 2/16 3/3 3/22 4/13  4/26 5/14 5/21   Total WeeklyDose 40mg 45mg? 62.5mg 80mg? 80mg   70mg 67.5mg  47.5mg  67.5 mg   INR 1.2 1.2 maia 1.6 2.9 3.7  4.5 3.2 3.3  1.8 4.5 3.1   Notes Self held; augmentin 1x incr dose; lovenox 2x miss,   1x misdose  amox amox  self adj 5x hold, enox enox/norco, augmentin       Date 6/28  8/4 8/6 8/9 8/20 9/3  11/9 11/16 11/30 12/2   Total WeeklyDose 67.5 mg BH Barrett   6/30-7/29 SJ CCH 32.5 mg 47.5mg 51.25 mg 52.5mg 52.5mg? Non compliant 35mg? 70mg? 52.5mg BHL- left AMA   INR 3.9  2.2 3.0 2.9 4.8 2.6  2.0 3.9 4.3    Notes  amio init enox enox enox    Rec 11/16   GI bleed     Date 12/4 12/6 1/25 2/10  2/24 3/25 4/5 6/22 7/28 8/9 8/10   Total WeeklyDose BHL- left AMA 7.5mg ?? 52.5mg ????  52.5 mg 52.5 mg 53.75mg 45 mg 52.5mg  52.5mg   INR  1.1 1.7 5.6  1.8 2.0 1.8 2.4 1.7 2.7 3.0   Notes AMS/GI Bleed  rec 1/26 [facility] COVID + suboxone  1x miss enox  Clinic, 1x misdose 1x miss  ED-      Date 10/18 11/1 12/20 1/20 2/14 3/31 4/10 4/25 4/28 5/2  6/6   Total WeeklyDose 52.5 mg 52.5 mg 52.5 mg 52.5 mg 52.5 mg 52.5mg 52.5mg 52.5mg 41.25mg 45 mg Non compliant 48.75mg   INR 3.0 3.1 2.7 2.9 3.0 3.0 3.0 7.8 1.2 2.1  3.7   Notes 10/19   rec'd 1/23 rec'd 2/15   RSV azith doxy hematuria 2x hold LVQ 2x boost  1x miss 1x self adj   30  Date 7/11  8/1 8/15  10/3 10/13 10/30 11/14ED  11/16 11/21 12/4 2/5 2/12   Total Weekly Dose 52.5 mg No ncompliant 52.5mg 52.5mg ???? 48.75 mg 52.5mg  ?? Self-adj  30 mg DUE 41.25 mg ? 48.75 mg 33.75 mg   INR 4.4  6.3 5.2   6.7  5.3  3.7   3.8 4.2 3.8   Notes Verbal  ozempic   clinda  Rcv'd 10/4 clinic Erroneous result hematuria Heldx1, redx1 cipro forgot   Hold x 2     Date  3/19 4/8 4/30/24 5/1 6/10 6/26 7/17/24 8/15/24 9/4/24 10/2 10/31 11/4/24 11/12   Total Weekly Dose Non- compliant 45 mg 45 mg ???  45 mg 37.5 mg 33.75 33.75 mg  37.5 mg 37.5 mg 37.5mg ? 18.75 mg   INR  7.3 4.9 3.3 3.3 4.7 4.2 2.7 2.3 2.7 1.8 2.10 2.5 1.1   Notes  Misdosing, noncompliant Misdosing, noncompliant  Rec'd 5/2 Misdose Non- compliant Overdue  Misdose Overdue  Hematuria   overdue Overdue Hematuria  Large increase in GLV intake  Unable to contact  HOLD      Date 1/2/24 1/13/24 1/27            Total WeeklyDose 52.5 mg ?  52.5  mg             INR 2.5  6.8            Notes Overdue  Self adj Procedure HOLD                 Phone Interview:  Verbal Release Authorization signed on 11/7/18 and 3/31/2023 (may speak with sister in emergency)  Tablet Strength:   7.5 mg  Patient Contact: Cell: [phone] Home: [phone] -- try to call in PM Email: [email or web address]    Patient Findings:  Positives: Change in health, Change in medications   Negatives: Signs/symptoms of thrombosis, Signs/symptoms of bleeding, Laboratory test error suspected, Change  in alcohol use, Change in activity, Upcoming invasive procedure, Emergency department visit, Upcoming dental procedure, Missed doses, Extra doses, Change in diet/appetite, Hospital admission, Bruising, Other complaints   Comments: Has been taking prednisone 10 mg 1-2 tab daily PRN (finished bottle today) and  mg every other day for back pain.  Patient was supposed to have procedure on 1/8/25, held warfarin and was on Lovenox but stopped Lovenox and resumed warfarin 7.5 mg daily when surgery was postponed.  All other findings negative.       Plan:   INR SUPRAtherapeutic today at 6.8 (goal 2.5 - 3.5). Instructed patient to hold warfarin x 2, then decrease dosing regimen to warfarin 7.5 mg daily except 3.75 mg on Mon Wed Fri. Patient ~3 weeks overdue for INR check, unknown how long patients INR has been supratherapeutic. Patient restarted warfarin at 7.5mg daily, although had been taking a few doses of 3.75 throughout the week prior to warfarin hold. Patient has also been taking IBU and oral steroids, both of which increase INR. Counseled patient on s/s of bleeding and to seek medical attention if any occur.   Repeat INR 2/4/25   Discussed to monitor for any symptoms of DVT  Verbal information provided over the phone  Mitchell Alberto RBV dosing instructions, expresses understanding by teach back, and has no further questions at this time.      Monique Ko, PharmD  1/27/2025  15:37 EST

## 2025-01-31 ENCOUNTER — OFFICE VISIT (OUTPATIENT)
Dept: CARDIOLOGY | Facility: CLINIC | Age: 60
End: 2025-01-31
Payer: MEDICARE

## 2025-01-31 ENCOUNTER — ANTICOAGULATION VISIT (OUTPATIENT)
Dept: PHARMACY | Facility: HOSPITAL | Age: 60
End: 2025-01-31
Payer: MEDICARE

## 2025-01-31 ENCOUNTER — LAB (OUTPATIENT)
Dept: LAB | Facility: HOSPITAL | Age: 60
End: 2025-01-31
Payer: MEDICARE

## 2025-01-31 VITALS
SYSTOLIC BLOOD PRESSURE: 136 MMHG | HEART RATE: 86 BPM | HEIGHT: 77 IN | OXYGEN SATURATION: 96 % | BODY MASS INDEX: 37.19 KG/M2 | DIASTOLIC BLOOD PRESSURE: 90 MMHG | WEIGHT: 315 LBS

## 2025-01-31 DIAGNOSIS — I10 PRIMARY HYPERTENSION: ICD-10-CM

## 2025-01-31 DIAGNOSIS — I25.10 CORONARY ARTERY DISEASE INVOLVING NATIVE CORONARY ARTERY OF NATIVE HEART WITHOUT ANGINA PECTORIS: ICD-10-CM

## 2025-01-31 DIAGNOSIS — I35.0 NONRHEUMATIC AORTIC VALVE STENOSIS: ICD-10-CM

## 2025-01-31 DIAGNOSIS — R06.09 DOE (DYSPNEA ON EXERTION): Primary | ICD-10-CM

## 2025-01-31 DIAGNOSIS — R06.09 DOE (DYSPNEA ON EXERTION): ICD-10-CM

## 2025-01-31 DIAGNOSIS — Z95.2 S/P AVR (AORTIC VALVE REPLACEMENT): Primary | ICD-10-CM

## 2025-01-31 DIAGNOSIS — I50.32 CHRONIC HEART FAILURE WITH PRESERVED EJECTION FRACTION (HFPEF): ICD-10-CM

## 2025-01-31 DIAGNOSIS — E78.2 MIXED HYPERLIPIDEMIA: ICD-10-CM

## 2025-01-31 LAB
ALBUMIN SERPL-MCNC: 4.1 G/DL (ref 3.5–5.2)
ALBUMIN/GLOB SERPL: 1.3 G/DL
ALP SERPL-CCNC: 132 U/L (ref 39–117)
ALT SERPL W P-5'-P-CCNC: 20 U/L (ref 1–41)
ANION GAP SERPL CALCULATED.3IONS-SCNC: 11 MMOL/L (ref 5–15)
AST SERPL-CCNC: 21 U/L (ref 1–40)
BILIRUB SERPL-MCNC: 0.3 MG/DL (ref 0–1.2)
BUN SERPL-MCNC: 20 MG/DL (ref 6–20)
BUN/CREAT SERPL: 15.6 (ref 7–25)
CALCIUM SPEC-SCNC: 9.3 MG/DL (ref 8.6–10.5)
CHLORIDE SERPL-SCNC: 100 MMOL/L (ref 98–107)
CO2 SERPL-SCNC: 27 MMOL/L (ref 22–29)
CREAT SERPL-MCNC: 1.28 MG/DL (ref 0.76–1.27)
DEPRECATED RDW RBC AUTO: 45.5 FL (ref 37–54)
EGFRCR SERPLBLD CKD-EPI 2021: 64.5 ML/MIN/1.73
ERYTHROCYTE [DISTWIDTH] IN BLOOD BY AUTOMATED COUNT: 15.8 % (ref 12.3–15.4)
GLOBULIN UR ELPH-MCNC: 3.1 GM/DL
GLUCOSE SERPL-MCNC: 149 MG/DL (ref 65–99)
HCT VFR BLD AUTO: 43 % (ref 37.5–51)
HGB BLD-MCNC: 14 G/DL (ref 13–17.7)
INR PPP: 1.6 (ref 0.91–1.09)
MCH RBC QN AUTO: 26 PG (ref 26.6–33)
MCHC RBC AUTO-ENTMCNC: 32.6 G/DL (ref 31.5–35.7)
MCV RBC AUTO: 79.8 FL (ref 79–97)
NT-PROBNP SERPL-MCNC: 175 PG/ML (ref 0–900)
PLATELET # BLD AUTO: 186 10*3/MM3 (ref 140–450)
PMV BLD AUTO: 10.4 FL (ref 6–12)
POTASSIUM SERPL-SCNC: 3.6 MMOL/L (ref 3.5–5.2)
PROT SERPL-MCNC: 7.2 G/DL (ref 6–8.5)
PROTHROMBIN TIME: 19.2 SECONDS (ref 10–13.8)
RBC # BLD AUTO: 5.39 10*6/MM3 (ref 4.14–5.8)
SODIUM SERPL-SCNC: 138 MMOL/L (ref 136–145)
T-UPTAKE NFR SERPL: 1.06 TBI (ref 0.8–1.3)
T4 SERPL-MCNC: 9.72 MCG/DL (ref 4.5–11.7)
TSH SERPL DL<=0.05 MIU/L-ACNC: 0.66 UIU/ML (ref 0.27–4.2)
WBC NRBC COR # BLD AUTO: 6.99 10*3/MM3 (ref 3.4–10.8)

## 2025-01-31 PROCEDURE — 84479 ASSAY OF THYROID (T3 OR T4): CPT

## 2025-01-31 PROCEDURE — 84436 ASSAY OF TOTAL THYROXINE: CPT

## 2025-01-31 PROCEDURE — 36416 COLLJ CAPILLARY BLOOD SPEC: CPT

## 2025-01-31 PROCEDURE — 80053 COMPREHEN METABOLIC PANEL: CPT

## 2025-01-31 PROCEDURE — 85610 PROTHROMBIN TIME: CPT

## 2025-01-31 PROCEDURE — 85027 COMPLETE CBC AUTOMATED: CPT

## 2025-01-31 PROCEDURE — G0463 HOSPITAL OUTPT CLINIC VISIT: HCPCS

## 2025-01-31 PROCEDURE — 83880 ASSAY OF NATRIURETIC PEPTIDE: CPT

## 2025-01-31 PROCEDURE — 36415 COLL VENOUS BLD VENIPUNCTURE: CPT

## 2025-01-31 PROCEDURE — 84443 ASSAY THYROID STIM HORMONE: CPT

## 2025-01-31 RX ORDER — DULOXETIN HYDROCHLORIDE 60 MG/1
1 CAPSULE, DELAYED RELEASE ORAL DAILY
COMMUNITY
Start: 2025-01-15

## 2025-01-31 NOTE — PROGRESS NOTES
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   late 2021     PROBLEM LIST:    Ascending aortic dissection and severe aortic insufficiency:  Mechanical [place] AVR (St. Dave) and ascending aortic dissection repair, 11/17/2012, Dr. Jasmeet De Dios.   Echocardiogram, 12/03/2013: LVEF (55% to 60%), mechanical aortic valve, area 1.18 cm2.  Cardiac PET scan, 08/26/2015, negative for reversible ischemia; fixed photopenia of the apex and septum possibly due to LBBB; LVEF (30% to 34%).  [facility] MD Jany, 09/03/2015:  No obstructive CAD.  1/17 echo EF 55% mild lvh AVR mean pressure 17  12/16 CTA chest wnl  Ventricular fibrillation/cardiac arrest/systolic congestive heart failure  6/21 [facility] admission after witnessed cardiac arrest  Patient received amiodarone, epinephrine, 5 defibrillations with ROSC prior to ED  7/21 2D echo: LV cavity mildly dilated.  LV wall thickness-moderate concentric hypertrophy.  Mechanical aortic valve prosthesis present.  RVSP due to TR moderately elevated 45-55.  RV cavity dilated.  LA volume mildly increased.  RA cavity dilated.  LVEF =41-45%.  Mild General global hypokinesis.  7/21 LHC: Angiographically near normal coronary arteries without any evidence of hemodynamically significant disease.  7/21 NANCY: LVEF =33%.  Saline test negative.  Mechanical aortic valve present.  Multiple LV wall segments hypokinetic.  Dilated cardiomyopathy.  LV cavity borderline dilated.  LV wall thickness-mild concentric hypertrophy.  RVSP due to TR normal.  Grade 1 plaque in descending aorta.  6/22 2D echo: LVEF = 55%.  Mechanical aortic valve present.  Moderate AS.  Aortic valve max pressure gradient = 43.8; mean pressure gradient = 25.6.  LV diastolic dysfunction (grade 1) impaired laxation.   RVSP due to TR normal <35.  5/24 Echo:  EF >55% mild LVH, IR, atrial septum redundant AV 21/14mmhg  mild AI  Atrial fibrillation  [id number] score = 4  7/21 AVN RFA and implantation of ST Dave biventricular pacemaker/ICD-GFT  Not setup for remote monitoring.  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   Hypertension  Angioedema - likely secondary to lisinopril  HL  10/18 164/61/53/104  11/24 119/132/36/60  7/6/2021 positive MSSA (tracheal wound and sputum) and MRSA pneumonia per bronchoscopy (while intubated secondary to V. fib arrest)  12/2/2021 and 12/4/2021 patient admitted to [facility] for acute anemia requiring Kcentra and 1 PRBC.  GI consulted but patient left AMA before further treatment and evaluation.  8/10/2022 [facility] ER evaluation for hematemesis ZOHAIB, and chest pain.  Patient left AMA. (Patient had been taking significant amount of ibuprofen)  Ototoxicity to lasix with left side hearing loss  CKD - prior CR 1.3-1.4, 11/24 1.0  Depression/anxiety  History of opioid dependence-in remission  Surgical history:  Left inguinal herniorrhaphy, August 2014.   Left forehead melanoma excision, Dr. Gisell Kothari, August 2014  AAA repair  Back surgery  Left inguinal hernia repair        CC:   Chief Complaint   Patient presents with    Acute blood loss anemia       Allergies  Allergies   Allergen Reactions    Lisinopril Angioedema    Meclizine Hcl Itching       Current Medications    Current Outpatient Medications:     acetaminophen (TYLENOL) 500 MG tablet, Take 1 tablet by mouth Every 6 (Six) Hours As Needed for Mild Pain., Disp: , Rfl:     bumetanide (BUMEX) 1 MG tablet, TAKE 2 TABLETS IN MORNING AND 1 TABLET IN THE AFTERNOON, Disp: 90 tablet, Rfl: 3    buprenorphine-naloxone (SUBOXONE) 8-2 MG per SL tablet, Place 2 tablets under the tongue Daily. aislinn, Disp: , Rfl:  "    buPROPion XL (WELLBUTRIN XL) 300 MG 24 hr tablet, Take 1 tablet by mouth Daily., Disp: 30 tablet, Rfl: 11    carvedilol (COREG) 25 MG tablet, TAKE 1 TABLET BY MOUTH TWICE DAILY, Disp: 180 tablet, Rfl: 3    Cholecalciferol 1.25 MG (38769 UT) tablet, Take 1 tablet by mouth 1 (One) Time Per Week., Disp: 12 tablet, Rfl: 0    DULoxetine (CYMBALTA) 60 MG capsule, Take 1 capsule by mouth Daily., Disp: , Rfl:     spironolactone (ALDACTONE) 25 MG tablet, Take 1 tablet by mouth Daily., Disp: 90 tablet, Rfl: 3    tadalafil (ADCIRCA) 20 MG tablet tablet, Take 5 mg by mouth Daily., Disp: , Rfl:     tamsulosin (FLOMAX) 0.4 MG capsule 24 hr capsule, Take 1 capsule by mouth Daily., Disp: , Rfl:     warfarin (COUMADIN) 7.5 MG tablet, TAKE 1/2 TO 1 TABLET BY MOUTH DAILY AS DIRECTED BY COAGULATION CLINIC, Disp: 30 tablet, Rfl: 1      History of Present Illness   Mitchell Alberto is a 59 y.o. year old male here for follow up.  Does not feel well 40 pound weight gain since last visit.  He is scheduled to have prostate surgery in the near future.  He is also having a thyroid ultrasound at outside hospital.  He no longer has a primary care provider  Interestingly he states that his lower extremity edema is resolved within the last 1 month with no changes in therapy      OBJECTIVE:  Vitals:    01/31/25 0949   BP: 136/90   BP Location: Left arm   Patient Position: Sitting   Pulse: 86   SpO2: 96%   Weight: (!) 157 kg (347 lb)   Height: 195.6 cm (77\")     Body mass index is 41.15 kg/m².    Constitutional:       Appearance: Healthy appearance. Not in distress.   Neck:      Vascular: No JVR. JVD normal.   Pulmonary:      Effort: Pulmonary effort is normal.      Breath sounds: Normal breath sounds. No wheezing. No rhonchi. No rales.   Chest:      Chest wall: Not tender to palpatation.   Cardiovascular:      PMI at left midclavicular line. Normal rate. Regular rhythm. Normal S1. Normal S2.       Murmurs: There is no murmur.      No gallop.  " Systolic ejection click. No rub.   Pulses:     Intact distal pulses.   Edema:     Peripheral edema absent.   Abdominal:      General: Bowel sounds are normal.      Palpations: Abdomen is soft.      Tenderness: There is no abdominal tenderness.   Musculoskeletal: Normal range of motion.         General: No tenderness. Skin:     General: Skin is warm and dry.   Neurological:      General: No focal deficit present.      Mental Status: Alert and oriented to person, place and time.         Diagnostic Data:  Procedures  Device check  99% BiV pacing  Acceptable thresholds and impedances  1 high V rate 14 beats  Less than 1% mode switch less than 10 beats  Generator 3.3 years      ASSESSMENT:   Diagnosis Plan   1. VICTORIA (dyspnea on exertion)  Comprehensive Metabolic Panel    CBC (No Diff)    BNP    Thyroid Panel With TSH      2. Chronic heart failure with preserved ejection fraction (HFpEF)        3. Primary hypertension        4. Mixed hyperlipidemia        5. Coronary artery disease involving native coronary artery of native heart without angina pectoris        6. Nonrheumatic aortic valve stenosis            PLAN:  HFpEF will document serologies today echocardiogram 9 months previous acceptable    Hypertension controlled currently tamsulosin Coreg spironolactone    Mixed dyslipidemia intolerant of statin therapy    Aortic stenosis post mechanical AVR on warfarin for hold parameter prior to prostate surgery    Weight gain with abnormal TSH noted in November we will redocument thyroid panel today with outside hospital scheduled for thyroid ultrasound          Mike Hollis MD, FACC

## 2025-01-31 NOTE — PROGRESS NOTES
Anticoagulation Clinic - Remote Progress Note  [facility]  Testing frequency: 7 days    Indication: Atrial Fibrillation and Mechanical Aortic Valve; h/o embolic CVA  Referring Provider: Bunny last seen 3/31/2023; next 5/2024  Initial Warfarin Start Date: 11/2012  Planned Duration of Therapy: Life  Goal INR: 2.5-3.5  Current Drug Interactions: escitalopram, aspirin, amiodarone, pantoprazole  CHADS-VASc: 3 (HTN, h/o CVA)     Vit K Diet: patient eats, goes through spurts when he tries to eat healthier 3/3/2021  Alcohol: none  Tobacco: none       Anticoagulation Clinic INR History:  Date 10/2 10/10 11/4 11/12 11/22 12/10 1/3/20 1/22 1/30 2/3 2/18 2/27   Total Weekly Dose 80mg 80mg 62.5mg 85mg 82.5mg 70mg 72.5mg 72.5mg 82.5mg 77.5mg 75mg 75mg   INR  3.9 3.6 1.7 3.4 4.7 2.4 3.6 1.3 2.3 3.2 3.1 3.2   Notes   1x miss self adj  self adj  lovenox         Date 4/8 4/29 5/18 6/20  8/3 8/17 10/5 10/19 11/11 12/21    Total Weekly Dose 70mg 70mg 65mg 65mg Non- compliant 65mg 65 mg 65mg 65mg 65mg 65mg Non- compliant   INR 4.3 4.1 4.5 3.0  3.0 3.8 3.0 2.9 4.1 2.3    Notes Self adj; Levaquin decr GLV; COVID+  recv'd 6/22; misdose       incr GLV      Date 1/20/21 1/21 1/25 2/1 2/16 3/3 3/22 4/13  4/26 5/14 5/21   Total WeeklyDose 40mg 45mg? 62.5mg 80mg? 80mg   70mg 67.5mg  47.5mg  67.5 mg   INR 1.2 1.2 maia 1.6 2.9 3.7  4.5 3.2 3.3  1.8 4.5 3.1   Notes Self held; augmentin 1x incr dose; lovenox 2x miss,   1x misdose  amox amox  self adj 5x hold, enox enox/norco, augmentin       Date 6/28  8/4 8/6 8/9 8/20 9/3  11/9 11/16 11/30 12/2   Total WeeklyDose 67.5 mg BH Barrett   6/30-7/29 SJ CCH 32.5 mg 47.5mg 51.25 mg 52.5mg 52.5mg? Non compliant 35mg? 70mg? 52.5mg BHL- left AMA   INR 3.9  2.2 3.0 2.9 4.8 2.6  2.0 3.9 4.3    Notes  amio init enox enox enox    Rec 11/16   GI bleed     Date 12/4 12/6 1/25 2/10  2/24 3/25 4/5 6/22 7/28 8/9 8/10   Total WeeklyDose BHL- left AMA 7.5mg ?? 52.5mg ????  52.5 mg 52.5 mg 53.75mg 45 mg 52.5mg  52.5mg   INR  1.1 1.7 5.6  1.8 2.0 1.8 2.4 1.7 2.7 3.0   Notes AMS/GI Bleed  rec 1/26 [facility] COVID + suboxone  1x miss enox  Clinic, 1x misdose 1x miss  ED-      Date 10/18 11/1 12/20 1/20 2/14 3/31 4/10 4/25 4/28 5/2  6/6   Total WeeklyDose 52.5 mg 52.5 mg 52.5 mg 52.5 mg 52.5 mg 52.5mg 52.5mg 52.5mg 41.25mg 45 mg Non compliant 48.75mg   INR 3.0 3.1 2.7 2.9 3.0 3.0 3.0 7.8 1.2 2.1  3.7   Notes 10/19   rec'd 1/23 rec'd 2/15   RSV azith doxy hematuria 2x hold LVQ 2x boost  1x miss 1x self adj   30  Date 7/11  8/1 8/15  10/3 10/13 10/30 11/14ED  11/16 11/21 12/4 2/5 2/12   Total Weekly Dose 52.5 mg No ncompliant 52.5mg 52.5mg ???? 48.75 mg 52.5mg  ?? Self-adj  30 mg DUE 41.25 mg ? 48.75 mg 33.75 mg   INR 4.4  6.3 5.2   6.7  5.3  3.7   3.8 4.2 3.8   Notes Verbal  ozempic   clinda  Rcv'd 10/4 clinic Erroneous result hematuria Heldx1, redx1 cipro forgot   Hold x 2     Date  3/19 4/8 4/30/24 5/1 6/10 6/26 7/17/24 8/15/24 9/4/24 10/2 10/31 11/4/24 11/12   Total Weekly Dose Non- compliant 45 mg 45 mg ???  45 mg 37.5 mg 33.75 33.75 mg  37.5 mg 37.5 mg 37.5mg ? 18.75 mg   INR  7.3 4.9 3.3 3.3 4.7 4.2 2.7 2.3 2.7 1.8 2.10 2.5 1.1   Notes  Misdosing, noncompliant Misdosing, noncompliant  Rec'd 5/2 Misdose Non- compliant Overdue  Misdose Overdue  Hematuria   overdue Overdue Hematuria  Large increase in GLV intake  Unable to contact  HOLD      Date 1/2/24 1/13/24 1/27 1/31           Total WeeklyDose 52.5 mg ?  52.5  mg  33.75 mg            INR 2.5  6.8 1.6           Notes Overdue  Self adj Procedure HOLD                 Phone Interview:  Verbal Release Authorization signed on 11/7/18 and 3/31/2023 (may speak with sister in emergency)  Tablet Strength:   7.5 mg  Patient Contact: Cell: [phone] Home: [phone] -- try to call in PM Email: [email or web address]       Patient Findings:  Negatives: Signs/symptoms of thrombosis, Signs/symptoms of bleeding, Laboratory test error suspected, Change in health, Change in alcohol use,  Change in activity, Upcoming invasive procedure, Emergency department visit, Upcoming dental procedure, Missed doses, Extra doses, Change in medications, Change in diet/appetite, Hospital admission, Bruising, Other complaints   Comments: Patient came in to clinic, checked INR and left. Did not assess questions.       Plan:   INR SUBtherapeutic today at 1.6 (goal 2.5 - 3.5). Instructed patient to follow warfarin bridge plan for procedure until recheck. Called [facility] pharmacy to confirm they received Lovenox Rx - they confirmed they are preparing Rx today. Normally would not start Lovenox night 1 of warfarin bridge plan but since INR subtherapeutic today, will start Lovenox tonight. Called and LVM to adivse patient to do this.         1/31: HOLD WARFARIN - start Lovenox this PM        2/1: HOLD WARFARIN, Lovenox 150 mg every 12 hours        2/2: HOLD WARFARIN, Lovenox 150 mg every 12 hours         2/3: HOLD WARFARIN, Lovenox 150 mg every 12 hours         2/4: HOLD WARFARIN, Lovenox 150 mg AM only        2/5: procedure, HOLD WARFARIN       2/6: warfarin 7.5mg, Lovenox 150 mg PM only        2/7: warfarin 7.5mg, Lovenox 150 mg every 12 hours         2/8: warfarin 3.75 mg, Lovenox 150 mg every 12 hours         2/9: warfarin 7.5 mg, Lovenox 150 mg every 12 hours         2/10: INR recheck     Repeat INR 2/10/25   Discussed to monitor for any symptoms of DVT  Verbal information provided over the phone  Mitchell Alberto RBV dosing instructions, expresses understanding by teach back, and has no further questions at this time.      Monique Ko, PharmD  1/31/2025  11:09 EST

## 2025-02-03 ENCOUNTER — TELEPHONE (OUTPATIENT)
Dept: CARDIOLOGY | Facility: CLINIC | Age: 60
End: 2025-02-03
Payer: MEDICARE

## 2025-02-03 NOTE — TELEPHONE ENCOUNTER
----- Message from Mike Hollis sent at 1/31/2025  4:29 PM EST -----    No chf.  Thyroid labs wnl  Needs to get pcp and try to get him on ozempic etc if his thyroid ultrasound ends up being okay  ----- Message -----  From: Lab, Background User  Sent: 1/31/2025   2:06 PM EST  To: Mike Hollis MD    Pt called and informed of the above results, verbalized understanding.

## 2025-02-06 ENCOUNTER — TELEPHONE (OUTPATIENT)
Dept: PHARMACY | Facility: HOSPITAL | Age: 60
End: 2025-02-06
Payer: MEDICARE

## 2025-02-06 NOTE — TELEPHONE ENCOUNTER
Patient had a procedure yesterday and called to confirm his warfarin/Lovenox instructions prior to his next INR check. Reviewed the following details that were communicated in his previous encounter:      1/31: HOLD WARFARIN - start Lovenox this PM        2/1: HOLD WARFARIN, Lovenox 150 mg every 12 hours        2/2: HOLD WARFARIN, Lovenox 150 mg every 12 hours         2/3: HOLD WARFARIN, Lovenox 150 mg every 12 hours         2/4: HOLD WARFARIN, Lovenox 150 mg AM only        2/5: procedure, HOLD WARFARIN       2/6: warfarin 7.5mg, Lovenox 150 mg PM only        2/7: warfarin 7.5mg, Lovenox 150 mg every 12 hours         2/8: warfarin 3.75 mg, Lovenox 150 mg every 12 hours         2/9: warfarin 7.5 mg, Lovenox 150 mg every 12 hours         2/10: INR recheck      Patient expressed understanding and no further questions.     Reshma Cortes, PharmD  02/06/25   11:28 EST

## 2025-02-26 DIAGNOSIS — F32.A DEPRESSION, UNSPECIFIED DEPRESSION TYPE: ICD-10-CM

## 2025-02-26 RX ORDER — BUPROPION HYDROCHLORIDE 300 MG/1
300 TABLET ORAL DAILY
Qty: 90 TABLET | Refills: 4 | OUTPATIENT
Start: 2025-02-26

## 2025-03-12 ENCOUNTER — TELEPHONE (OUTPATIENT)
Dept: PHARMACY | Facility: HOSPITAL | Age: 60
End: 2025-03-12
Payer: MEDICARE

## 2025-03-12 NOTE — TELEPHONE ENCOUNTER
LVM - called patient regrding overdue INR. Have not received INR from patient since he restarted warfarin after procedure.

## 2025-03-26 ENCOUNTER — ANTICOAGULATION VISIT (OUTPATIENT)
Dept: PHARMACY | Facility: HOSPITAL | Age: 60
End: 2025-03-26
Payer: MEDICARE

## 2025-03-26 DIAGNOSIS — I48.91 ATRIAL FIBRILLATION, UNSPECIFIED TYPE: ICD-10-CM

## 2025-03-26 DIAGNOSIS — Z95.2 S/P AVR (AORTIC VALVE REPLACEMENT): Primary | ICD-10-CM

## 2025-03-26 LAB — INR PPP: 4.2

## 2025-03-26 NOTE — PROGRESS NOTES
Anticoagulation Clinic - Remote Progress Note  [facility]  Testing frequency: 7 days    Indication: Atrial Fibrillation and Mechanical Aortic Valve; h/o embolic CVA  Referring Provider: Bunny last seen 3/31/2023; next 5/2024  Initial Warfarin Start Date: 11/2012  Planned Duration of Therapy: Life  Goal INR: 2.5-3.5  Current Drug Interactions: escitalopram, aspirin, amiodarone, pantoprazole  CHADS-VASc: 3 (HTN, h/o CVA)     Vit K Diet: patient eats, goes through spurts when he tries to eat healthier 3/3/2021  Alcohol: none  Tobacco: none       Anticoagulation Clinic INR History:  Date 10/2 10/10 11/4 11/12 11/22 12/10 1/3/20 1/22 1/30 2/3 2/18 2/27   Total Weekly Dose 80mg 80mg 62.5mg 85mg 82.5mg 70mg 72.5mg 72.5mg 82.5mg 77.5mg 75mg 75mg   INR  3.9 3.6 1.7 3.4 4.7 2.4 3.6 1.3 2.3 3.2 3.1 3.2   Notes   1x miss self adj  self adj  lovenox         Date 4/8 4/29 5/18 6/20  8/3 8/17 10/5 10/19 11/11 12/21    Total Weekly Dose 70mg 70mg 65mg 65mg Non- compliant 65mg 65 mg 65mg 65mg 65mg 65mg Non- compliant   INR 4.3 4.1 4.5 3.0  3.0 3.8 3.0 2.9 4.1 2.3    Notes Self adj; Levaquin decr GLV; COVID+  recv'd 6/22; misdose       incr GLV      Date 1/20/21 1/21 1/25 2/1 2/16 3/3 3/22 4/13  4/26 5/14 5/21   Total WeeklyDose 40mg 45mg? 62.5mg 80mg? 80mg   70mg 67.5mg  47.5mg  67.5 mg   INR 1.2 1.2 maia 1.6 2.9 3.7  4.5 3.2 3.3  1.8 4.5 3.1   Notes Self held; augmentin 1x incr dose; lovenox 2x miss,   1x misdose  amox amox  self adj 5x hold, enox enox/norco, augmentin       Date 6/28  8/4 8/6 8/9 8/20 9/3  11/9 11/16 11/30 12/2   Total WeeklyDose 67.5 mg BH Barrett   6/30-7/29 [facility] 32.5 mg 47.5mg 51.25 mg 52.5mg 52.5mg? Non compliant 35mg? 70mg? 52.5mg [facility]- left AMA   INR 3.9  2.2 3.0 2.9 4.8 2.6  2.0 3.9 4.3    Notes  amio init enox enox enox    Rec 11/16   GI bleed     Date 12/4 12/6 1/25 2/10  2/24 3/25 4/5 6/22 7/28 8/9 8/10   Total WeeklyDose BHL- left AMA 7.5mg ?? 52.5mg ????  52.5 mg 52.5 mg 53.75mg 45 mg 52.5mg  52.5mg   INR  1.1 1.7 5.6  1.8 2.0 1.8 2.4 1.7 2.7 3.0   Notes AMS/GI Bleed  rec 1/26 [facility] COVID + suboxone  1x miss enox  Clinic, 1x misdose 1x miss  ED-      Date 10/18 11/1 12/20 1/20 2/14 3/31 4/10 4/25 4/28 5/2  6/6   Total WeeklyDose 52.5 mg 52.5 mg 52.5 mg 52.5 mg 52.5 mg 52.5mg 52.5mg 52.5mg 41.25mg 45 mg Non compliant 48.75mg   INR 3.0 3.1 2.7 2.9 3.0 3.0 3.0 7.8 1.2 2.1  3.7   Notes 10/19   rec'd 1/23 rec'd 2/15   RSV azith doxy hematuria 2x hold LVQ 2x boost  1x miss 1x self adj   30  Date 7/11  8/1 8/15  10/3 10/13 10/30 11/14ED  11/16 11/21 12/4 2/5 2/12   Total Weekly Dose 52.5 mg No ncompliant 52.5mg 52.5mg ???? 48.75 mg 52.5mg  ?? Self-adj  30 mg DUE 41.25 mg ? 48.75 mg 33.75 mg   INR 4.4  6.3 5.2   6.7  5.3  3.7   3.8 4.2 3.8   Notes Verbal  ozempic   clinda  Rcv'd 10/4 clinic Erroneous result hematuria Heldx1, redx1 cipro forgot   Hold x 2     Date  3/19 4/8 4/30/24 5/1 6/10 6/26 7/17/24 8/15/24 9/4/24 10/2 10/31 11/4/24 11/12   Total Weekly Dose Non- compliant 45 mg 45 mg ???  45 mg 37.5 mg 33.75 33.75 mg  37.5 mg 37.5 mg 37.5mg ? 18.75 mg   INR  7.3 4.9 3.3 3.3 4.7 4.2 2.7 2.3 2.7 1.8 2.10 2.5 1.1   Notes  Misdosing, noncompliant Misdosing, noncompliant  Rec'd 5/2 Misdose Non- compliant Overdue  Misdose Overdue  Hematuria   overdue Overdue Hematuria  Large increase in GLV intake  Unable to contact  HOLD      Date 1/2/24 1/13/24 1/27 1/31 3/26          Total WeeklyDose 52.5 mg ?  52.5  mg  33.75 mg  52.5 mg          INR 2.5  6.8 1.6 4.2          Notes Overdue  Self adj Procedure HOLD               Phone Interview:  Verbal Release Authorization signed on 11/7/18 and 3/31/2023 (may speak with sister in emergency)  Tablet Strength:   7.5 mg  Patient Contact: Cell: [phone] Home: [phone] -- try to call in PM Email: [email or web address]    Patient Findings    Negatives: Signs/symptoms of thrombosis, Signs/symptoms of bleeding, Laboratory test error suspected, Change in health, Change in  alcohol use, Change in activity, Upcoming invasive procedure, Emergency department visit, Upcoming dental procedure, Missed doses, Extra doses, Change in medications, Change in diet/appetite, Hospital admission, Bruising, Other complaints   Comments: All findings negative per patient.     Plan:   INR supratherapeutic today at 4.2 (goal 2.5 - 3.5). Per Trenton Lovelace, GiovaniD instructed patient to take warfarin 7.5 mg daily except 3.75 mg Wed/Sat until recheck.   Repeat INR in 2 weeks, 4.09.25  Verbal information provided over the phone  Mitchell Alberto RBV dosing instructions, expresses understanding by teach back, and has no further questions at this time.      Dana Dash CPhT, [facility]   15:06 EDT   3/26/2025    I, Trenton Lovelace, PharmD, have reviewed the note in full and agree with the assessment and plan.  03/26/25  15:09 EDT

## 2025-03-31 RX ORDER — CARVEDILOL 25 MG/1
25 TABLET ORAL 2 TIMES DAILY
Qty: 180 TABLET | Refills: 3 | Status: SHIPPED | OUTPATIENT
Start: 2025-03-31

## 2025-04-03 RX ORDER — BUMETANIDE 1 MG/1
TABLET ORAL
Qty: 90 TABLET | Refills: 3 | Status: SHIPPED | OUTPATIENT
Start: 2025-04-03

## 2025-04-25 ENCOUNTER — OFFICE VISIT (OUTPATIENT)
Dept: CARDIOLOGY | Facility: HOSPITAL | Age: 60
End: 2025-04-25
Payer: MEDICARE

## 2025-04-25 VITALS
HEIGHT: 77 IN | SYSTOLIC BLOOD PRESSURE: 121 MMHG | HEART RATE: 80 BPM | OXYGEN SATURATION: 92 % | RESPIRATION RATE: 20 BRPM | WEIGHT: 315 LBS | BODY MASS INDEX: 37.19 KG/M2 | DIASTOLIC BLOOD PRESSURE: 71 MMHG

## 2025-04-25 DIAGNOSIS — I10 PRIMARY HYPERTENSION: ICD-10-CM

## 2025-04-25 DIAGNOSIS — I50.33 ACUTE ON CHRONIC HEART FAILURE WITH NORMAL EJECTION FRACTION: Primary | ICD-10-CM

## 2025-04-25 DIAGNOSIS — E78.2 MIXED HYPERLIPIDEMIA: ICD-10-CM

## 2025-04-25 LAB
ANION GAP SERPL CALCULATED.3IONS-SCNC: 14.9 MMOL/L (ref 5–15)
BUN SERPL-MCNC: 19 MG/DL (ref 6–20)
BUN/CREAT SERPL: 12.5 (ref 7–25)
CALCIUM SPEC-SCNC: 9.3 MG/DL (ref 8.6–10.5)
CHLORIDE SERPL-SCNC: 97 MMOL/L (ref 98–107)
CO2 SERPL-SCNC: 25.1 MMOL/L (ref 22–29)
CREAT SERPL-MCNC: 1.52 MG/DL (ref 0.76–1.27)
EGFRCR SERPLBLD CKD-EPI 2021: 52.5 ML/MIN/1.73
GLUCOSE SERPL-MCNC: 190 MG/DL (ref 65–99)
NT-PROBNP SERPL-MCNC: 183 PG/ML (ref 0–900)
POTASSIUM SERPL-SCNC: 3.6 MMOL/L (ref 3.5–5.2)
SODIUM SERPL-SCNC: 137 MMOL/L (ref 136–145)

## 2025-04-25 PROCEDURE — 83880 ASSAY OF NATRIURETIC PEPTIDE: CPT | Performed by: NURSE PRACTITIONER

## 2025-04-25 PROCEDURE — 80048 BASIC METABOLIC PNL TOTAL CA: CPT | Performed by: NURSE PRACTITIONER

## 2025-04-25 RX ORDER — SPIRONOLACTONE 25 MG/1
50 TABLET ORAL DAILY
Start: 2025-04-25

## 2025-04-25 NOTE — PROGRESS NOTES
"Chief Complaint  Congestive Heart Failure, Edema, and Follow-up    Subjective    History of Present Illness {CC  Problem List  Visit  Diagnosis   Encounters  Notes  Medications  Labs  Result Review Imaging  Media :23}       History of Present Illness   59-year-old male presents the office as a same-day appointment for ongoing evaluation of his dyspnea, pedal edema and abdominal fullness.  Patient reports that he has gained about 40 or 50 pounds back since stopping his Ozempic.  He does report though that he just does not feel well and will experience dyspnea with just short distances.  He is concerned there is something wrong with one of the valves in his heart.  He is taking his medication as prescribed.  Objective     Vital Signs:   Vitals:    04/25/25 1446   BP: 121/71   BP Location: Left arm   Patient Position: Sitting   Cuff Size: Large Adult   Pulse: 80   Resp: 20   SpO2: 92%   Weight: (!) 161 kg (355 lb)   Height: 195.6 cm (77\")     Body mass index is 42.1 kg/m².  Physical Exam  Vitals and nursing note reviewed.   Constitutional:       Appearance: Normal appearance.   HENT:      Head: Normocephalic.   Eyes:      Pupils: Pupils are equal, round, and reactive to light.   Cardiovascular:      Rate and Rhythm: Normal rate and regular rhythm.      Pulses: Normal pulses.      Heart sounds: Normal heart sounds. No murmur heard.     Comments: Crisp click noted   Pulmonary:      Effort: Pulmonary effort is normal.      Breath sounds: Rales present.   Abdominal:      General: Bowel sounds are normal. There is distension.   Musculoskeletal:         General: Normal range of motion.      Cervical back: Normal range of motion.      Right lower leg: Edema (2+ pitting) present.      Left lower leg: Edema (2+ pitting) present.   Skin:     General: Skin is warm and dry.      Capillary Refill: Capillary refill takes less than 2 seconds.   Neurological:      Mental Status: He is alert and oriented to person, place, and " time.   Psychiatric:         Mood and Affect: Mood normal.         Thought Content: Thought content normal.              Result Review  Data Reviewed:{ Labs  Result Review  Imaging  Med Tab  Media :23}   Adult Transthoracic Echo Complete w/ Color, Spectral and Contrast if necessary per protocol (05/10/2024 09:28)   Comprehensive Metabolic Panel (01/31/2025 10:40)  CBC (No Diff) (01/31/2025 10:40)  BNP (01/31/2025 10:40)  Thyroid Panel With TSH (01/31/2025 10:40)           Assessment and Plan {CC Problem List  Visit Diagnosis  ROS  Review (Popup)  Health Maintenance  Quality  BestPractice  Medications  SmartSets  SnapShot Encounters  Media :23}   1. Acute on chronic heart failure with normal ejection fraction  Reds vest unable to be completed today due to BMI of 42.1  Patient received 100 mg of IV Lasix in office today  Patient to increase Aldactone to 50 mg daily  - Adult Transthoracic Echo Complete W/ Cont if Necessary Per Protocol; Future  - Basic Metabolic Panel; Future  - proBNP; Future  - Basic Metabolic Panel  - proBNP  Heart failure education today including signs and symptoms, the role of the heart failure center, daily weights, low sodium diet (less than 1500 mg per day), and daily physical activity. Reviewed HF Zones with patient and family.  Patient to continue current medications as previously ordered.   2. Primary hypertension  Stable on carvedilol   continue to monitor     3. Mixed hyperlipidemia    Diet controlled         Follow Up {Instructions Charge Capture  Follow-up Communications :23}   Return in about 1 week (around 5/2/2025) for Office visit, HF.    Patient was given instructions and counseling regarding his condition or for health maintenance advice. Please see specific information pulled into the AVS if appropriate.  Patient was instructed to call the [facility] with any questions, concerns, or worsening symptoms.

## 2025-06-04 ENCOUNTER — ANTICOAGULATION VISIT (OUTPATIENT)
Dept: PHARMACY | Facility: HOSPITAL | Age: 60
End: 2025-06-04
Payer: MEDICARE

## 2025-06-04 DIAGNOSIS — Z95.2 S/P AVR (AORTIC VALVE REPLACEMENT): Primary | ICD-10-CM

## 2025-06-04 DIAGNOSIS — I48.91 ATRIAL FIBRILLATION, UNSPECIFIED TYPE: ICD-10-CM

## 2025-06-04 LAB — INR PPP: 2.6

## 2025-06-04 NOTE — PROGRESS NOTES
Anticoagulation Clinic - Remote Progress Note  [facility]  Testing frequency: 7 days    Indication: Atrial Fibrillation and Mechanical Aortic Valve; h/o embolic CVA  Referring Provider: Bunny last seen 3/31/2023; next 5/2024  Initial Warfarin Start Date: 11/2012  Planned Duration of Therapy: Life  Goal INR: 2.5-3.5  Current Drug Interactions: escitalopram, aspirin, amiodarone, pantoprazole  CHADS-VASc: 3 (HTN, h/o CVA)     Vit K Diet: patient eats, goes through spurts when he tries to eat healthier 3/3/2021  Alcohol: none  Tobacco: none       Anticoagulation Clinic INR History:  Date 7/11  8/1 8/15  10/3 10/13 10/30 11/14ED  11/16  11/21  12/4 2/5 2/12   Total Weekly Dose 52.5 mg No ncompliant 52.5mg 52.5mg ???? 48.75 mg 52.5mg  ?? Self-adj  30 mg DUE 41.25 mg ? 48.75 mg 33.75 mg   INR 4.4  6.3 5.2   6.7  5.3  3.7   3.8 4.2 3.8   Notes Verbal  ozempic   clinda  Rcv'd 10/4 clinic Erroneous result hematuria Heldx1, redx1 cipro forgot   Hold x 2     Date  3/19 4/8 4/30/24 5/1 6/10 6/26 7/17/24 8/15/24 9/4/24 10/2 10/31 11/4/24 11/12   Total Weekly Dose Non- compliant 45 mg 45 mg ???  45 mg 37.5 mg 33.75 33.75 mg  37.5 mg 37.5 mg 37.5mg ? 18.75 mg   INR  7.3 4.9 3.3 3.3 4.7 4.2 2.7 2.3 2.7 1.8 2.10 2.5 1.1   Notes  Misdosing, noncompliant Misdosing, noncompliant  Rec'd 5/2 Misdose Non- compliant Overdue  Misdose Overdue  Hematuria   overdue Overdue Hematuria  Large increase in GLV intake  Unable to contact  HOLD      Date 1/2/24 1/13/24 1/27 1/31 3/26  6/4        Total WeeklyDose 52.5 mg ?  52.5  mg  33.75 mg  52.5 mg Non-  compliant Unable to contact        INR 2.5  6.8 1.6 4.2  2.6        Notes Overdue  Self adj Procedure HOLD               Phone Interview:  Verbal Release Authorization signed on 11/7/18 and 3/31/2023 (may speak with sister in emergency)  Tablet Strength:   7.5 mg  Patient Contact: Cell: [phone] Home: [phone] -- try to call in PM Email: [email or web address]      UNABLE TO GET IN  CONTACT WITH THE PATIENT. PLEASE DISREGARD THE FOLLOWING PLAN UNTIL ABLE TO GET IN CONTACT WITH PATIENT/ PATIENT REPRESENTATIVE.   LVM x 3, CLOSING ENCOUNTER.          Dana Dash CPhT, [facility]   15:48 EDT   6/6/2025

## 2025-07-06 RX ORDER — BUMETANIDE 1 MG/1
TABLET ORAL
Qty: 90 TABLET | Refills: 0 | Status: SHIPPED | OUTPATIENT
Start: 2025-07-06

## 2025-07-09 ENCOUNTER — TELEPHONE (OUTPATIENT)
Dept: PHARMACY | Facility: HOSPITAL | Age: 60
End: 2025-07-09

## 2025-07-16 ENCOUNTER — ANTICOAGULATION VISIT (OUTPATIENT)
Dept: PHARMACY | Facility: HOSPITAL | Age: 60
End: 2025-07-16
Payer: MEDICARE

## 2025-07-16 ENCOUNTER — TELEPHONE (OUTPATIENT)
Dept: CARDIOLOGY | Facility: CLINIC | Age: 60
End: 2025-07-16
Payer: MEDICARE

## 2025-07-16 DIAGNOSIS — I48.91 ATRIAL FIBRILLATION, UNSPECIFIED TYPE: Primary | ICD-10-CM

## 2025-07-16 DIAGNOSIS — Z95.2 S/P AVR (AORTIC VALVE REPLACEMENT): ICD-10-CM

## 2025-07-16 LAB — INR PPP: 3.8

## 2025-07-16 NOTE — PROGRESS NOTES
Anticoagulation Clinic - Remote Progress Note  [facility]  Testing frequency: 7 days    Indication: Atrial Fibrillation and Mechanical Aortic Valve; h/o embolic CVA  Referring Provider: Bunny last seen 3/31/2023; next 5/2024  Initial Warfarin Start Date: 11/2012  Planned Duration of Therapy: Life  Goal INR: 2.5-3.5  Current Drug Interactions: escitalopram, aspirin, amiodarone, pantoprazole  CHADS-VASc: 3 (HTN, h/o CVA)     Vit K Diet: patient eats, goes through spurts when he tries to eat healthier 3/3/2021  Alcohol: none  Tobacco: none       Anticoagulation Clinic INR History:  Date 7/11  8/1 8/15  10/3 10/13 10/30 11/14ED  11/16  11/21  12/4 2/5 2/12   Total Weekly Dose 52.5 mg No ncompliant 52.5mg 52.5mg ???? 48.75 mg 52.5mg  ?? Self-adj  30 mg DUE 41.25 mg ? 48.75 mg 33.75 mg   INR 4.4  6.3 5.2   6.7  5.3  3.7   3.8 4.2 3.8   Notes Verbal  ozempic   clinda  Rcv'd 10/4 clinic Erroneous result hematuria Heldx1, redx1 cipro forgot   Hold x 2     Date  3/19 4/8 4/30/24 5/1 6/10 6/26 7/17/24 8/15/24 9/4/24 10/2 10/31 11/4/24 11/12   Total Weekly Dose Non- compliant 45 mg 45 mg ???  45 mg 37.5 mg 33.75 33.75 mg  37.5 mg 37.5 mg 37.5mg ? 18.75 mg   INR  7.3 4.9 3.3 3.3 4.7 4.2 2.7 2.3 2.7 1.8 2.10 2.5 1.1   Notes  Misdosing, noncompliant Misdosing, noncompliant  Rec'd 5/2 Misdose Non- compliant Overdue  Misdose Overdue  Hematuria   overdue Overdue Hematuria  Large increase in GLV intake  Unable to contact  HOLD    Date 1/2/24 1/13/24 1/27 1/31 3/26  6/4 7/16       Total WeeklyDose 52.5 mg ?  52.5  mg  33.75 mg  52.5 mg Non-  compliant Unable to contact 52.5 mg        INR 2.5  6.8 1.6 4.2  2.6 3.8       Notes Overdue  Self adj Procedure HOLD               Phone Interview:  Verbal Release Authorization signed on 11/7/18 and 3/31/2023 (may speak with sister in emergency)  Tablet Strength:   7.5 mg  Patient Contact: Cell: [phone] Home: [phone] -- try to call in PM Email:  [email or web address]    Patient Findings    Negatives: Signs/symptoms of thrombosis, Signs/symptoms of bleeding, Laboratory test error suspected, Change in health, Change in alcohol use, Change in activity, Upcoming invasive procedure, Emergency department visit, Upcoming dental procedure, Missed doses, Extra doses, Change in medications, Change in diet/appetite, Hospital admission, Bruising, Other complaints   Comments: Patient complaining of  shortness of breath and not being able to do things around the house because of this. He is trying to get an appointment with cardiologist.    Patient has been taking 7.5 mg daily since we last spoke with him in June. (Tracker updated)    All other findings negative per patient         Plan:   INR is supratherapeutic at 3.8 (goal 2.5 - 3.5). Instructed patient to take warfarin 7.5 mg daily until recheck.   Repeat INR in 2 weeks, 7/30/25  Verbal information provided over the phone  Mitchell Alberto RBV dosing instructions, expresses understanding by teach back, and has no further questions at this time.       Mary Ann Kirkland, PharmD  7/17/2025  14:48 EDT

## 2025-07-16 NOTE — TELEPHONE ENCOUNTER
"Patient called and left voicemail saying that he has \"been having really bad episodes.\" Left voicemail for patient to call back to discuss issues further.    "

## 2025-07-22 ENCOUNTER — TELEPHONE (OUTPATIENT)
Dept: CARDIOLOGY | Facility: CLINIC | Age: 60
End: 2025-07-22
Payer: MEDICARE

## 2025-08-21 DIAGNOSIS — Z95.2 S/P AVR (AORTIC VALVE REPLACEMENT): ICD-10-CM

## 2025-08-21 RX ORDER — WARFARIN SODIUM 7.5 MG/1
TABLET ORAL
Qty: 30 TABLET | Refills: 0 | Status: SHIPPED | OUTPATIENT
Start: 2025-08-21

## 2025-08-29 ENCOUNTER — TELEPHONE (OUTPATIENT)
Dept: CARDIOLOGY | Facility: CLINIC | Age: 60
End: 2025-08-29

## (undated) DEVICE — CATH DIAG EXPO .056 AL1 6F 100CM

## (undated) DEVICE — SUT PROLN 2/0 PC3 8833H

## (undated) DEVICE — ST EXT IV SMARTSITE 2VLV SP M LL 5ML IV1

## (undated) DEVICE — INTRO SHEATH ENGAGE W/50 GW .038 7F12

## (undated) DEVICE — PENCL ES MEGADINE EZ/CLEAN BUTN W/HOLSTR 10FT

## (undated) DEVICE — SYR LUERLOK 50ML

## (undated) DEVICE — INTRO TEAR AWAY/LVD W/SD PRT 7F 13CM

## (undated) DEVICE — GUIDE WIRE WITH HYDROPHILIC COATING: Brand: ACUITY WHISPER VIEW™

## (undated) DEVICE — CAUTERY TIP POLISHER: Brand: DEVON

## (undated) DEVICE — TUBING, SUCTION, 1/4" X 10', STRAIGHT: Brand: MEDLINE

## (undated) DEVICE — GOWN,PREVENTION PLUS,XXLARGE,STERILE: Brand: MEDLINE

## (undated) DEVICE — APPL CHLORAPREP TINTED 26ML TEAL

## (undated) DEVICE — MODEL AT P65, P/N 701554-001KIT CONTENTS: HAND CONTROLLER, 3-WAY HIGH-PRESSURE STOPCOCK WITH ROTATING END AND PREMIUM HIGH-PRESSURE TUBING: Brand: ANGIOTOUCH® KIT

## (undated) DEVICE — LUBE GEL ENDOGLIDE 1.1OZ

## (undated) DEVICE — RADIFOCUS GLIDEWIRE: Brand: GLIDEWIRE

## (undated) DEVICE — CVR HNDL LIGHT RIGID

## (undated) DEVICE — BALN PRESS WEDGE 6F 110CM

## (undated) DEVICE — SUT ETHLN 2/0 PS 18IN 585H

## (undated) DEVICE — CANN NASL CO2 DIVIDED A/

## (undated) DEVICE — SOL NACL 0.9PCT 1000ML

## (undated) DEVICE — DECANT BG O JET

## (undated) DEVICE — SOL IRR H2O BTL 1000ML STRL

## (undated) DEVICE — DRSNG SURESITE123 4X4.8IN

## (undated) DEVICE — SUT SILK 3/0 SH CR8 18IN C013D

## (undated) DEVICE — SPNG VERSALON 4X4 4PLY NONSTRL LF BG/200

## (undated) DEVICE — Device: Brand: EZ STEER

## (undated) DEVICE — INTRO ACCSR BLNT TP

## (undated) DEVICE — ALLEVYN TRACHEOSTOMY 3.5X3.5" CTN 10: Brand: ALLEVYN TRACHEOSTOMY

## (undated) DEVICE — SET PRIMARY GRVTY 10DP MALE LL 104IN

## (undated) DEVICE — GLV SURG SENSICARE PI MIC PF SZ8 LF STRL

## (undated) DEVICE — SUT MNCRYL 4/0 PS2 18 IN

## (undated) DEVICE — PERCUTANEOUS ENDOSCOPIC GASTROSTOMY KIT: Brand: ENDOVIVE SAFETY PEG KIT

## (undated) DEVICE — DRAINBAG,ANTI-REFLUX TOWER,L/F,2000ML,LL: Brand: MEDLINE

## (undated) DEVICE — THE BITE BLOCK MAXI, LATEX FREE STRAP IS USED TO PROTECT THE ENDOSCOPE INSERTION TUBE FROM BEING BITTEN BY THE PATIENT.

## (undated) DEVICE — GLV SURG SENSICARE PI MIC PF SZ8.5 LF STRL

## (undated) DEVICE — INTRO TEAR AWAY/LVD W/SD PRT 10F 13CM

## (undated) DEVICE — MODEL BT2000 P/N 700287-012KIT CONTENTS: MANIFOLD WITH SALINE AND CONTRAST PORTS, SALINE TUBING WITH SPIKE AND HAND SYRINGE, TRANSDUCER: Brand: BT2000 AUTOMATED MANIFOLD KIT

## (undated) DEVICE — HYBRID CO2 TUBING/CAP SET FOR OLYMPUS® SCOPES & CO2 SOURCE: Brand: ERBE

## (undated) DEVICE — PENCL ROCKRSWCH MEGADYNE W/HOLSTR 10FT SS

## (undated) DEVICE — INTRO TEAR AWAY/LVD W/SD PRT 8F 13CM

## (undated) DEVICE — HDRST POSTIN FM CRDL TRACH SLOT 9X8X4IN

## (undated) DEVICE — KT ORCA ORCAPOD DISP STRL

## (undated) DEVICE — CONTN GRAD MEAS TRIANG 32OZ BLK

## (undated) DEVICE — SYR LL TP 10ML STRL

## (undated) DEVICE — CATH DIAG EXPO .056 AL2 6F 100CM

## (undated) DEVICE — Device

## (undated) DEVICE — SINGLE-USE BIOPSY FORCEPS: Brand: RADIAL JAW 4

## (undated) DEVICE — SUCTION CANISTER, 1000CC,SAFELINER: Brand: DEROYAL

## (undated) DEVICE — MEDI-VAC YANKAUER SUCTION HANDLE W/BULBOUS TIP: Brand: CARDINAL HEALTH

## (undated) DEVICE — ADULT, W/LG. BACK PAD, RADIOTRANSPARENT ELEMENT AND LEAD WIRE: Brand: DEFIBRILLATION ELECTRODES

## (undated) DEVICE — DRESSING,OPTIFOAM,NON-ADHESIVE,4X4: Brand: MEDLINE

## (undated) DEVICE — IRRIGATOR BULB ASEPTO 60CC STRL

## (undated) DEVICE — PINNACLE INTRODUCER SHEATH: Brand: PINNACLE

## (undated) DEVICE — ST INF PRI SMRTSTE 20DRP 2VLV 24ML 117

## (undated) DEVICE — PATIENT RETURN ELECTRODE, SINGLE-USE, CONTACT QUALITY MONITORING, ADULT, WITH 9FT CORD, FOR PATIENTS WEIGING OVER 33LBS. (15KG): Brand: MEGADYNE

## (undated) DEVICE — GW PERIPH VASC ADX J/TP SS .035 150CM 3MM

## (undated) DEVICE — ANGIO-SEAL VIP VASCULAR CLOSURE DEVICE: Brand: ANGIO-SEAL

## (undated) DEVICE — PK MINOR SPLT 10

## (undated) DEVICE — CATH COURNARD DECCA CSL 6F120CM

## (undated) DEVICE — LEX ELECTRO PHYSIOLOGY: Brand: MEDLINE INDUSTRIES, INC.

## (undated) DEVICE — NDL PERC 1PRT THNWALL W/BASEPLT 18G 7CM

## (undated) DEVICE — 3M™ STERI-STRIP™ REINFORCED ADHESIVE SKIN CLOSURES, R1547, 1/2 IN X 4 IN (12 MM X 100 MM), 6 STRIPS/ENVELOPE: Brand: 3M™ STERI-STRIP™

## (undated) DEVICE — LIMB HOLDER, WRIST/ANKLE: Brand: DEROYAL

## (undated) DEVICE — CATH DIAG EXPO M/ PK 6FR FL4/FR4 PIG 3PK